# Patient Record
Sex: MALE | Race: WHITE | NOT HISPANIC OR LATINO | Employment: OTHER | URBAN - METROPOLITAN AREA
[De-identification: names, ages, dates, MRNs, and addresses within clinical notes are randomized per-mention and may not be internally consistent; named-entity substitution may affect disease eponyms.]

---

## 2021-04-27 ENCOUNTER — TRANSCRIBE ORDERS (OUTPATIENT)
Dept: ADMINISTRATIVE | Facility: HOSPITAL | Age: 86
End: 2021-04-27

## 2021-04-27 DIAGNOSIS — C61 MALIGNANT NEOPLASM OF PROSTATE (HCC): ICD-10-CM

## 2021-04-27 DIAGNOSIS — R97.20 INCREASED PROSTATE SPECIFIC ANTIGEN (PSA) VELOCITY: Primary | ICD-10-CM

## 2021-05-11 ENCOUNTER — APPOINTMENT (OUTPATIENT)
Dept: PET IMAGING | Facility: HOSPITAL | Age: 86
End: 2021-05-11

## 2021-06-17 ENCOUNTER — HOSPITAL ENCOUNTER (OUTPATIENT)
Dept: PET IMAGING | Facility: HOSPITAL | Age: 86
Discharge: HOME | End: 2021-06-17
Admitting: UROLOGY

## 2021-06-17 DIAGNOSIS — C61 MALIGNANT NEOPLASM OF PROSTATE (HCC): ICD-10-CM

## 2021-06-17 DIAGNOSIS — R97.20 INCREASED PROSTATE SPECIFIC ANTIGEN (PSA) VELOCITY: ICD-10-CM

## 2021-06-17 PROCEDURE — 78815 PET IMAGE W/CT SKULL-THIGH: CPT

## 2021-06-17 PROCEDURE — A9588 FLUCICLOVINE F-18: HCPCS | Performed by: UROLOGY

## 2021-06-17 PROCEDURE — 0 FLUCICLOVINE: Performed by: UROLOGY

## 2021-06-17 RX ADMIN — FLUCICLOVINE F-18 1 DOSE: 221 INJECTION, SOLUTION INTRAVENOUS at 12:11

## 2021-07-20 ENCOUNTER — HOSPITAL ENCOUNTER (EMERGENCY)
Facility: HOSPITAL | Age: 86
Discharge: HOME | End: 2021-07-20
Attending: EMERGENCY MEDICINE | Admitting: EMERGENCY MEDICINE

## 2021-07-20 VITALS
SYSTOLIC BLOOD PRESSURE: 153 MMHG | HEART RATE: 61 BPM | TEMPERATURE: 98.5 F | RESPIRATION RATE: 18 BRPM | DIASTOLIC BLOOD PRESSURE: 68 MMHG | OXYGEN SATURATION: 96 %

## 2021-07-20 DIAGNOSIS — L89.152 PRESSURE INJURY OF SACRAL REGION, STAGE 2 (HCC): Primary | ICD-10-CM

## 2021-07-20 LAB
ABO GROUP BLD: NORMAL
ALBUMIN SERPL-MCNC: 4.4 G/DL (ref 3.5–5.2)
ALBUMIN/GLOB SERPL: 1.4 G/DL
ALP SERPL-CCNC: 80 U/L (ref 39–117)
ALT SERPL W P-5'-P-CCNC: 16 U/L (ref 1–41)
ANION GAP SERPL CALCULATED.3IONS-SCNC: 10.1 MMOL/L (ref 5–15)
AST SERPL-CCNC: 22 U/L (ref 1–40)
BASOPHILS # BLD AUTO: 0.05 10*3/MM3 (ref 0–0.2)
BASOPHILS NFR BLD AUTO: 0.8 % (ref 0–1.5)
BILIRUB SERPL-MCNC: 0.8 MG/DL (ref 0–1.2)
BLD GP AB SCN SERPL QL: NEGATIVE
BUN SERPL-MCNC: 21 MG/DL (ref 8–23)
BUN/CREAT SERPL: 18.4 (ref 7–25)
CALCIUM SPEC-SCNC: 9.3 MG/DL (ref 8.2–9.6)
CHLORIDE SERPL-SCNC: 103 MMOL/L (ref 98–107)
CO2 SERPL-SCNC: 23.9 MMOL/L (ref 22–29)
CREAT SERPL-MCNC: 1.14 MG/DL (ref 0.76–1.27)
DEPRECATED RDW RBC AUTO: 48.2 FL (ref 37–54)
DEVELOPER EXPIRATION DATE: NORMAL
DEVELOPER LOT NUMBER: 174
EOSINOPHIL # BLD AUTO: 0.1 10*3/MM3 (ref 0–0.4)
EOSINOPHIL NFR BLD AUTO: 1.6 % (ref 0.3–6.2)
ERYTHROCYTE [DISTWIDTH] IN BLOOD BY AUTOMATED COUNT: 14.2 % (ref 12.3–15.4)
EXPIRATION DATE: NORMAL
FECAL OCCULT BLOOD SCREEN, POC: NEGATIVE
GFR SERPL CREATININE-BSD FRML MDRD: 60 ML/MIN/1.73
GLOBULIN UR ELPH-MCNC: 3.1 GM/DL
GLUCOSE SERPL-MCNC: 103 MG/DL (ref 65–99)
HCT VFR BLD AUTO: 38.9 % (ref 37.5–51)
HGB BLD-MCNC: 13.2 G/DL (ref 13–17.7)
IMM GRANULOCYTES # BLD AUTO: 0.03 10*3/MM3 (ref 0–0.05)
IMM GRANULOCYTES NFR BLD AUTO: 0.5 % (ref 0–0.5)
INR PPP: 1.89 (ref 0.9–1.1)
LYMPHOCYTES # BLD AUTO: 1.6 10*3/MM3 (ref 0.7–3.1)
LYMPHOCYTES NFR BLD AUTO: 26 % (ref 19.6–45.3)
Lab: 174
MCH RBC QN AUTO: 31.7 PG (ref 26.6–33)
MCHC RBC AUTO-ENTMCNC: 33.9 G/DL (ref 31.5–35.7)
MCV RBC AUTO: 93.5 FL (ref 79–97)
MONOCYTES # BLD AUTO: 0.34 10*3/MM3 (ref 0.1–0.9)
MONOCYTES NFR BLD AUTO: 5.5 % (ref 5–12)
NEGATIVE CONTROL: NEGATIVE
NEUTROPHILS NFR BLD AUTO: 4.03 10*3/MM3 (ref 1.7–7)
NEUTROPHILS NFR BLD AUTO: 65.6 % (ref 42.7–76)
NRBC BLD AUTO-RTO: 0 /100 WBC (ref 0–0.2)
PLAT MORPH BLD: NORMAL
PLATELET # BLD AUTO: 118 10*3/MM3 (ref 140–450)
PMV BLD AUTO: 11.5 FL (ref 6–12)
POSITIVE CONTROL: POSITIVE
POTASSIUM SERPL-SCNC: 4.6 MMOL/L (ref 3.5–5.2)
PROT SERPL-MCNC: 7.5 G/DL (ref 6–8.5)
PROTHROMBIN TIME: 21.4 SECONDS (ref 11.7–14.2)
RBC # BLD AUTO: 4.16 10*6/MM3 (ref 4.14–5.8)
RBC MORPH BLD: NORMAL
RH BLD: POSITIVE
SODIUM SERPL-SCNC: 137 MMOL/L (ref 136–145)
T&S EXPIRATION DATE: NORMAL
WBC # BLD AUTO: 6.15 10*3/MM3 (ref 3.4–10.8)
WBC MORPH BLD: NORMAL

## 2021-07-20 PROCEDURE — 85025 COMPLETE CBC W/AUTO DIFF WBC: CPT | Performed by: EMERGENCY MEDICINE

## 2021-07-20 PROCEDURE — 87186 SC STD MICRODIL/AGAR DIL: CPT | Performed by: EMERGENCY MEDICINE

## 2021-07-20 PROCEDURE — 87077 CULTURE AEROBIC IDENTIFY: CPT | Performed by: EMERGENCY MEDICINE

## 2021-07-20 PROCEDURE — 80053 COMPREHEN METABOLIC PANEL: CPT | Performed by: EMERGENCY MEDICINE

## 2021-07-20 PROCEDURE — 85610 PROTHROMBIN TIME: CPT | Performed by: EMERGENCY MEDICINE

## 2021-07-20 PROCEDURE — 99283 EMERGENCY DEPT VISIT LOW MDM: CPT

## 2021-07-20 PROCEDURE — 86900 BLOOD TYPING SEROLOGIC ABO: CPT | Performed by: EMERGENCY MEDICINE

## 2021-07-20 PROCEDURE — 87070 CULTURE OTHR SPECIMN AEROBIC: CPT | Performed by: EMERGENCY MEDICINE

## 2021-07-20 PROCEDURE — 85007 BL SMEAR W/DIFF WBC COUNT: CPT | Performed by: EMERGENCY MEDICINE

## 2021-07-20 PROCEDURE — 86850 RBC ANTIBODY SCREEN: CPT | Performed by: EMERGENCY MEDICINE

## 2021-07-20 PROCEDURE — 87205 SMEAR GRAM STAIN: CPT | Performed by: EMERGENCY MEDICINE

## 2021-07-20 PROCEDURE — 82270 OCCULT BLOOD FECES: CPT | Performed by: EMERGENCY MEDICINE

## 2021-07-20 PROCEDURE — 86901 BLOOD TYPING SEROLOGIC RH(D): CPT | Performed by: EMERGENCY MEDICINE

## 2021-07-20 RX ORDER — CEPHALEXIN 500 MG/1
500 CAPSULE ORAL 3 TIMES DAILY
Qty: 21 CAPSULE | Refills: 0 | Status: ON HOLD | OUTPATIENT
Start: 2021-07-20 | End: 2022-04-14

## 2021-07-20 NOTE — DISCHARGE INSTRUCTIONS
Follow-up with the wound care center.  Call today for an appointment.  Sit on several pillows and try to rotate your bottom every 20 to 30 minutes.  Please return to the emergency department if symptoms worsen.

## 2021-07-20 NOTE — ED PROVIDER NOTES
EMERGENCY DEPARTMENT ENCOUNTER  Patient was placed in face mask in first look and the following protective measures were taken unless additional measures were taken and documented below in the ED course. Patient was wearing facemask when I entered the room and throughout our encounter. I wore full protective equipment throughout this patient encounter including a face mask, and gloves. Hand hygiene was performed before donning protective equipment and after removal when leaving the room.    Room Number:  17/17  Date of encounter:  7/20/2021  PCP: Conor Prince MD    HPI:  Context: Bridger Elias is a 89 y.o. male who presents to the ED c/o chief complaint of rectal pain when he is sitting for approximately 2 and half weeks.  Today, while he was sitting down to urinate, he noticed a large amount of bright red rectal bleeding.  He denies abdominal pain, dizziness or lightheadedness.  He denies increasing shortness of air today.  Patient is on Coumadin for atrial fibrillation and he has been compliant with medication.  He states this is never happened before.  He does have a history of a rectal fistula repair approximately 25 years ago.  His family states he also has a history of prostate cancer but that is under control.  Patient denies tobacco or ethanol use.  He denies fever, chills, nausea or vomiting.    MEDICAL HISTORY REVIEW  Reviewed in EPIC    PAST MEDICAL HISTORY  Active Ambulatory Problems     Diagnosis Date Noted   • No Active Ambulatory Problems     Resolved Ambulatory Problems     Diagnosis Date Noted   • No Resolved Ambulatory Problems     No Additional Past Medical History       PAST SURGICAL HISTORY  No past surgical history on file.    FAMILY HISTORY  No family history on file.    SOCIAL HISTORY  Social History     Socioeconomic History   • Marital status:      Spouse name: Not on file   • Number of children: Not on file   • Years of education: Not on file   • Highest education level:  Not on file       ALLERGIES  Patient has no known allergies.    The patient's allergies have been reviewed    REVIEW OF SYSTEMS  All systems reviewed and negative except for those discussed in HPI.     PHYSICAL EXAM  I have reviewed the triage vital signs and nursing notes.  ED Triage Vitals [07/20/21 0649]   Temp Heart Rate Resp BP SpO2   98.5 °F (36.9 °C) 83 18 131/80 98 %      Temp src Heart Rate Source Patient Position BP Location FiO2 (%)   Tympanic -- -- -- --       General: Mild distress.  HENT: NCAT, PERRL, Nares patent.  Eyes: no scleral icterus.  Conjunctiva is not pale.  Neck: trachea midline, no ROM limitations.  CV: regular rhythm, regular rate.  Respiratory: Irregularly irregular without murmur.  Abdomen: soft, nondistended, NTTP, no rebound tenderness, no guarding or rigidity  : Patient has two 1 cm x 2 cm, stage II sacral decubiti.  It has white discharge.  There is no surrounding erythema but it is tender to palpation.  His rectum is mildly tender to palpation with brown stool that is heme-negative.  Musculoskeletal: no deformity.  Neuro: alert, moves all extremities, follows commands.  Skin: warm, dry.    LAB RESULTS  Recent Results (from the past 24 hour(s))   POCT Occult Blood, stool    Collection Time: 07/20/21 11:15 AM    Specimen: Per Rectum; Stool   Result Value Ref Range    Fecal Occult Blood Negative Negative    Lot Number [id number]     Expiration Date 04/30/2022     DEVELOPER LOT NUMBER [id number]     DEVELOPER EXPIRATION DATE 04/30/2022     Positive Control Positive Positive    Negative Control Negative Negative   Comprehensive Metabolic Panel    Collection Time: 07/20/21 12:01 PM    Specimen: Blood   Result Value Ref Range    Glucose 103 (H) 65 - 99 mg/dL    BUN 21 8 - 23 mg/dL    Creatinine 1.14 0.76 - 1.27 mg/dL    Sodium 137 136 - 145 mmol/L    Potassium 4.6 3.5 - 5.2 mmol/L    Chloride 103 98 - 107 mmol/L    CO2 23.9 22.0 - 29.0 mmol/L    Calcium 9.3 8.2 - 9.6 mg/dL    Total Protein 7.5 6.0 -  8.5 g/dL    Albumin 4.40 3.50 - 5.20 g/dL    ALT (SGPT) 16 1 - 41 U/L    AST (SGOT) 22 1 - 40 U/L    Alkaline Phosphatase 80 39 - 117 U/L    Total Bilirubin 0.8 0.0 - 1.2 mg/dL    eGFR Non African Amer 60 (L) >60 mL/min/1.73    Globulin 3.1 gm/dL    A/G Ratio 1.4 g/dL    BUN/Creatinine Ratio 18.4 7.0 - 25.0    Anion Gap 10.1 5.0 - 15.0 mmol/L   Protime-INR    Collection Time: 07/20/21 12:01 PM    Specimen: Blood   Result Value Ref Range    Protime 21.4 (H) 11.7 - 14.2 Seconds    INR 1.89 (H) 0.90 - 1.10   Type & Screen    Collection Time: 07/20/21 12:01 PM    Specimen: Blood   Result Value Ref Range    ABO Type A     RH type Positive     Antibody Screen Negative     T&S Expiration Date 7/23/2021 11:59:59 PM    CBC Auto Differential    Collection Time: 07/20/21 12:01 PM    Specimen: Blood   Result Value Ref Range    WBC 6.15 3.40 - 10.80 10*3/mm3    RBC 4.16 4.14 - 5.80 10*6/mm3    Hemoglobin 13.2 13.0 - 17.7 g/dL    Hematocrit 38.9 37.5 - 51.0 %    MCV 93.5 79.0 - 97.0 fL    MCH 31.7 26.6 - 33.0 pg    MCHC 33.9 31.5 - 35.7 g/dL    RDW 14.2 12.3 - 15.4 %    RDW-SD 48.2 37.0 - 54.0 fl    MPV 11.5 6.0 - 12.0 fL    Platelets 118 (L) 140 - 450 10*3/mm3    Neutrophil % 65.6 42.7 - 76.0 %    Lymphocyte % 26.0 19.6 - 45.3 %    Monocyte % 5.5 5.0 - 12.0 %    Eosinophil % 1.6 0.3 - 6.2 %    Basophil % 0.8 0.0 - 1.5 %    Immature Grans % 0.5 0.0 - 0.5 %    Neutrophils, Absolute 4.03 1.70 - 7.00 10*3/mm3    Lymphocytes, Absolute 1.60 0.70 - 3.10 10*3/mm3    Monocytes, Absolute 0.34 0.10 - 0.90 10*3/mm3    Eosinophils, Absolute 0.10 0.00 - 0.40 10*3/mm3    Basophils, Absolute 0.05 0.00 - 0.20 10*3/mm3    Immature Grans, Absolute 0.03 0.00 - 0.05 10*3/mm3    nRBC 0.0 0.0 - 0.2 /100 WBC   Scan Slide    Collection Time: 07/20/21 12:01 PM    Specimen: Blood   Result Value Ref Range    RBC Morphology Normal Normal    WBC Morphology Normal Normal    Platelet Morphology Normal Normal   Wound Culture - Wound, Spine, Sacral    Collection  Time: 07/20/21 12:05 PM    Specimen: Spine, Sacral; Wound   Result Value Ref Range    Gram Stain No WBCs or organisms seen        I ordered the above labs and reviewed the results.    RADIOLOGY  No Radiology Exams Resulted Within Past 24 Hours    I ordered the above noted radiological studies. I reviewed the images and results. I agree with the radiologist interpretation.    PROCEDURES  Procedures    MEDICATIONS GIVEN IN ER  Medications - No data to display    PROGRESS, DATA ANALYSIS, CONSULTS, AND MEDICAL DECISION MAKING  A complete history and physical exam have been performed.  All available laboratory and imaging results have been reviewed by myself prior to disposition.    MDM  After the initial H&P, I discussed pertinent information from history and physical exam with patient/family.  Discussed differential diagnosis.  Discussed plan for ED evaluation/work-up/treatment.  All questions answered.  Patient/family is agreeable with plan.  ED Course as of Jul 20 1914   Tue Jul 20, 2021   1330 I have updated patient and family on the results of his blood work.  Patient does admit that he sits down for long periods of time.  We will give referral to the wound care center.    [DE]      ED Course User Index  [DE] Jarvis Saenz MD       AS OF 19:14 EDT VITALS:    BP - 153/68  HR - 61  TEMP - 98.5 °F (36.9 °C) (Tympanic)  O2 SATS - 96%    DIAGNOSIS  Final diagnoses:   Pressure injury of sacral region, stage 2 (CMS/HCC)         DISPOSITION    DISCHARGE    Patient discharged in stable condition.    Reviewed implications of results, diagnosis, meds, responsibility to follow up, warning signs and symptoms of possible worsening, potential complications and reasons to return to ER.    Patient/Family voiced understanding of above instructions.    Discussed plan for discharge, as there is no emergent indication for admission. Patient referred to primary care provider for BP management due to today's BP. Pt/family is  agreeable and understands need for follow up and repeat testing.  Pt is aware that discharge does not mean that nothing is wrong but it indicates no emergency is present that requires admission and they must continue care with follow-up as given below or physician of their choice.     FOLLOW-UP  [facility]  [address] [facility] [address] [phone]  Schedule an appointment as soon as possible for a visit            Medication List      New Prescriptions    cephalexin 500 MG capsule  Commonly known as: KEFLEX  Take 1 capsule by mouth 3 (Three) Times a Day.           Where to Get Your Medications      You can get these medications from any pharmacy    Bring a paper prescription for each of these medications  · cephalexin 500 MG capsule          Jarvis Saenz MD  07/20/21 9256

## 2021-07-20 NOTE — ED TRIAGE NOTES
Pt to ed from home with daughter. Per daughter, patient was up urinating this morning when he noticed bright red blood from his rectum. Pt has hx of fistula repair.     I wore full protective equipment throughout this patient encounter including a face mask, goggles, and gloves. Hand hygiene was performed before donning protective equipment and after removal when leaving the room.

## 2021-07-23 LAB
BACTERIA SPEC AEROBE CULT: ABNORMAL
BACTERIA SPEC AEROBE CULT: ABNORMAL
GRAM STN SPEC: ABNORMAL

## 2021-07-28 ENCOUNTER — OFFICE VISIT (OUTPATIENT)
Dept: WOUND CARE | Facility: HOSPITAL | Age: 86
End: 2021-07-28

## 2021-07-28 PROCEDURE — G0463 HOSPITAL OUTPT CLINIC VISIT: HCPCS

## 2021-08-11 ENCOUNTER — OFFICE VISIT (OUTPATIENT)
Dept: WOUND CARE | Facility: HOSPITAL | Age: 86
End: 2021-08-11

## 2021-08-11 PROCEDURE — G0463 HOSPITAL OUTPT CLINIC VISIT: HCPCS

## 2022-02-02 ENCOUNTER — APPOINTMENT (OUTPATIENT)
Dept: RADIATION ONCOLOGY | Facility: HOSPITAL | Age: 87
End: 2022-02-02

## 2022-02-02 ENCOUNTER — CONSULT (OUTPATIENT)
Dept: RADIATION ONCOLOGY | Facility: HOSPITAL | Age: 87
End: 2022-02-02

## 2022-02-02 VITALS
WEIGHT: 165.8 LBS | SYSTOLIC BLOOD PRESSURE: 130 MMHG | DIASTOLIC BLOOD PRESSURE: 81 MMHG | HEART RATE: 81 BPM | OXYGEN SATURATION: 98 %

## 2022-02-02 DIAGNOSIS — C44.320 SCC (SQUAMOUS CELL CARCINOMA), FACE: Primary | ICD-10-CM

## 2022-02-02 PROCEDURE — G0463 HOSPITAL OUTPT CLINIC VISIT: HCPCS | Performed by: RADIOLOGY

## 2022-02-02 PROCEDURE — 99204 OFFICE O/P NEW MOD 45 MIN: CPT | Performed by: RADIOLOGY

## 2022-02-02 RX ORDER — BICALUTAMIDE 50 MG/1
1 TABLET, FILM COATED ORAL DAILY
Status: ON HOLD | COMMUNITY

## 2022-02-02 RX ORDER — CARVEDILOL 3.12 MG/1
1 TABLET ORAL 2 TIMES DAILY
COMMUNITY
End: 2022-04-22 | Stop reason: HOSPADM

## 2022-02-02 RX ORDER — WARFARIN SODIUM 5 MG/1
5 TABLET ORAL
COMMUNITY
End: 2022-04-22 | Stop reason: HOSPADM

## 2022-02-02 RX ORDER — LATANOPROST 50 UG/ML
SOLUTION/ DROPS OPHTHALMIC
Status: ON HOLD | COMMUNITY

## 2022-02-02 RX ORDER — ROSUVASTATIN CALCIUM 10 MG/1
1 TABLET, COATED ORAL DAILY
Status: ON HOLD | COMMUNITY

## 2022-02-02 RX ORDER — LEVOTHYROXINE SODIUM 88 UG/1
88 TABLET ORAL DAILY
Status: ON HOLD | COMMUNITY

## 2022-02-02 RX ORDER — LOSARTAN POTASSIUM 50 MG/1
1 TABLET ORAL DAILY
COMMUNITY
End: 2022-04-22 | Stop reason: HOSPADM

## 2022-02-03 NOTE — PROGRESS NOTES
"Radiation Oncology Consult Note    Name: Bridger Elias  YOB: 1930  MRN #: [id number]  Date of Service: 2/2/2022  Referring Provider: Maurice Cook MD  [address] [place] [address]  Primary Care Provider: Conor Prince MD    DIAGNOSIS:   Encounter Diagnosis   Name Primary?   • SCC (squamous cell carcinoma), face Yes       REASON FOR CONSULTATION/CHIEF COMPLAINT:  \"skin cancers\"  I was asked to see the patient at the request of the referring provider noted below for advice and recommendations regarding this diagnosis and the role of radiation therapy.                              REQUESTING PHYSICIAN:  Maurice Cook Md  [address] [place] [address]    RECORDS OBTAINED:  Records of the patients history including those obtained from the referring provider were reviewed and summarized in detail.    HISTORY OF PRESENT ILLNESS:  Bridger Elias is a 89 y.o. male, accompanied today by his daughter, who reports about a 1 year 6 month history of \"r cheek and L temple\" skin cancers.  His daughter states that Mr. Elias felt that hydrocortisone was helping them for a while.  He noted they were slightly enlarging and that the right preauricular lesion was bleeding and sometimes painful. Additionally, he noted a \"burning/tingling\" sensation from the right preauricular lesion down to the mandible and he could feel a sensation in his teeth when symptomatic.     He is not immunocompromised or organ transplant recipient.      He recently established with Dr. Anshul Roca in regards to the 3 lesions and he was referred to Dr. Quinn for consideration of MOHs.      Lesion 1: SCC located on the right PA cheek, present for 18 months; associated bleeding, enlarging, painful and red.  Biopsied 12/20/21; not treated previously---determined to not be a good candidate for MOHs, concern for deep involvement and bleeding risk,irregular borders.    Lesion 2: SCC on Rt #2, called lower " right temple.     Lesion 3: SCC on Lt temple/lateral forehead.    He denies any facial asymmetry, discrete numbness.  He denies any neck lymph nodes.        The following portions of the patient's history were reviewed and updated as appropriate: allergies, current medications, past family history, past medical history, past social history, past surgical history and problem list. Reviewed with the patient and remain unchanged.    PAST MEDICAL HISTORY:  he  has a past medical history of Prostate cancer (HCC). Currently on ADT. Elevated blood pressure, glaucoma.  MEDICATIONS:   Current Outpatient Medications:   •  bicalutamide (CASODEX) 50 MG chemo tablet, Take 1 tablet by mouth Daily., Disp: , Rfl:   •  carvedilol (COREG) 3.125 MG tablet, Take 1 tablet by mouth 2 (Two) Times a Day., Disp: , Rfl:   •  cephalexin (KEFLEX) 500 MG capsule, Take 1 capsule by mouth 3 (Three) Times a Day., Disp: 21 capsule, Rfl: 0  •  latanoprost (XALATAN) 0.005 % ophthalmic solution, Apply  to eye(s) as directed by provider., Disp: , Rfl:   •  levothyroxine (SYNTHROID, LEVOTHROID) 75 MCG tablet, Take 75 mcg by mouth Daily., Disp: , Rfl:   •  losartan (COZAAR) 50 MG tablet, Take 1 tablet by mouth Daily., Disp: , Rfl:   •  rosuvastatin (CRESTOR) 10 MG tablet, Take 1 tablet by mouth Daily., Disp: , Rfl:   •  warfarin (COUMADIN) 5 MG tablet, Take 5 mg by mouth., Disp: , Rfl:   ALLERGIES: No Known Allergies  PAST SURGICAL HISTORY: he has a past surgical history that includes Pacemaker Implantation (2018).Prostatectomy, Mitral valve repair  PREVIOUS RADIOTHERAPY OR CHEMOTHERAPY: no  FAMILY HISTORY: non-contributory.  No connective tissue disorders.  SOCIAL HISTORY: he  reports that he has never smoked. He has never used smokeless tobacco. He reports that he does not drink alcohol.  PAIN AND PAIN MANAGEMENT: retired.  Vitals:    02/02/22 1554   BP: 130/81   Pulse: 81   SpO2: 98%   Weight: 75.2 kg (165 lb 12.8 oz)   PainSc:   2     NUTRITIONAL  STATUS:  no issues  KPS: 90:  Minor signs or symptoms        PHQ-9 Total Score: 0     Review of Systems: Alerts--pacemaker and blood thinners  As noted above, some itching.  General: No fevers, chills, weight change, or drenching night sweats. Skin: No rashes or jaundice.  HEENT: No change in vision or hearing, no headaches.  Neck: No dysphagia or masses.  Heme/Lymph: No easy bruising or bleeding.  Respiratory System: No shortness of breath or cough.  Cardiovascular: No chest pain, palpitations, or dyspnea on exertion.  - Pacemaker. GI: No nausea, vomiting, diarrhea, melena, or hematochezia.  : No dysuria or hematuria.  Endocrine: No heat or cold intolerance. Musculoskeletal: No myalgias or arthralgias.  Neuro: No weakness, numbness, syncope, or seizures. Psych: No mood changes or depression. Ext: Denies swelling.        Objective     Vitals:  Vitals:    02/02/22 1554   BP: 130/81   Pulse: 81   SpO2: 98%   Weight: 75.2 kg (165 lb 12.8 oz)   PainSc:   2         PHYSICAL EXAM:  GENERAL: in no apparent distress, sitting comfortably in room.    HEENT: normocephalic, atraumatic. Pupils are equal, round, reactive to light. Sclera anicteric. Conjunctiva not injected. Oropharynx without erythema, ulcerations or thrush.   NECK: Supple with no masses.  LYMPHATIC: no cervical, supraclavicular or axillary adenopathy appreciated bilaterally.   CARDIOVASCULAR: S1 & S2 detected; no murmurs, rubs or gallops.  CHEST: clear to auscultation bilaterally; no wheezes, crackles or rubs. Work of breathing normal.  ABDOMEN: bowel sounds present. Abdomen is soft, nontender, nondistended.   MUSCULOSKELETAL: no tenderness to palpation along the spine or scapulae. Normal range of motion.  EXTREMITIES: no clubbing, cyanosis, edema.  SKIN: See photos, no obvious cranial nerve deficits from the heaped edged,deeper R PA SCC.  3 total lesions. Photos in ARIA.  NEUROLOGIC: cranial nerves II-XII grossly intact bilaterally. No focal neurologic  deficits.  PSYCHIATRIC:  alert, aware, and appropriate.    PERTINENT IMAGING/PATHOLOGY/LABS (Medical Decision Making):     COORDINATION OF CARE: A copy of this note is sent to the referring provider.    PATHOLOGY (Reviewed): A: Right preauricular: Invasive squamous cell carcinoma, extending to the deep and peripheral edges  B. Right temple: Invasive, moderately differentiated squamous cell carcinoma, extending to the deep and peripheral edges.    C. Left lateral FH:  Invasive squamous cell carcinoma arising in association with a hypertrophic actinic keratosis, extending to deep and peripheral edges.    IMAGING (Reviewed): No imaging or CT.    LABS (Reviewed):  Hematology WBC   Date Value Ref Range Status   07/20/2021 6.15 3.40 - 10.80 10*3/mm3 Final     RBC   Date Value Ref Range Status   07/20/2021 4.16 4.14 - 5.80 10*6/mm3 Final     Hemoglobin   Date Value Ref Range Status   07/20/2021 13.2 13.0 - 17.7 g/dL Final     Hematocrit   Date Value Ref Range Status   07/20/2021 38.9 37.5 - 51.0 % Final     Platelets   Date Value Ref Range Status   07/20/2021 118 (L) 140 - 450 10*3/mm3 Final      Chemistry   Lab Results   Component Value Date    GLUCOSE 103 (H) 07/20/2021    BUN 21 07/20/2021    CREATININE 1.14 07/20/2021    EGFRIFNONA 60 (L) 07/20/2021    BCR 18.4 07/20/2021    K 4.6 07/20/2021    CO2 23.9 07/20/2021    CALCIUM 9.3 07/20/2021    ALBUMIN 4.40 07/20/2021    AST 22 07/20/2021    ALT 16 07/20/2021       Assessment/Plan     ASSESSMENT AND PLAN:  1. SCC (squamous cell carcinoma), face       3 lesions.  All are appropriate for definitive XRT.  Dr. Quinn saw for MOHs consideration and not recommend as depth of invasion/pni big risk including risk of irregular borders and blood thinners.    He did report a burning/tingling on the right face. No facial asymmetry.  I feel that staging is needed for the Right neck and parotid area.  I am ordering CT neck and will do on the same day as his CT simulation for radiation  planning.    Final dosing/planning will be determined after the diagnostic scan.    Orders Placed This Encounter   Procedures   • CT Soft Tissue Neck With Contrast     Standing Status:   Future     Standing Expiration Date:   2/2/2023     Scheduling Instructions:      Same day as CT simulation for radiation therapy; same set-up     Order Specific Question:   Release to patient     Answer:   Immediate       This assessment comes from my review of the imaging, pathology, physician notes and other pertinent information as mentioned.    DISPOSITION: CT sim and CT neck.        TIME SPENT WITH PATIENT:   I spent 45 minutes caring for Bridger on this date of service. This time includes time spent by me in the following activities: preparing for the visit, reviewing tests, obtaining and/or reviewing a separately obtained history, performing a medically appropriate examination and/or evaluation, ordering medications, tests, or procedures, documenting information in the medical record and care coordination           CC: Anshul Roca MD Weaver, Anthony D, MD Robert Zax, MD John A Cox, MD  2/2/2022  8:35 PM EST

## 2022-02-07 ENCOUNTER — HOSPITAL ENCOUNTER (OUTPATIENT)
Dept: PET IMAGING | Facility: HOSPITAL | Age: 87
Discharge: HOME | End: 2022-02-07
Admitting: RADIOLOGY

## 2022-02-07 DIAGNOSIS — C44.320 SCC (SQUAMOUS CELL CARCINOMA), FACE: ICD-10-CM

## 2022-02-07 LAB — CREAT BLDA-MCNC: 1.2 MG/DL (ref 0.6–1.3)

## 2022-02-07 PROCEDURE — 77334 RADIATION TREATMENT AID(S): CPT | Performed by: RADIOLOGY

## 2022-02-07 PROCEDURE — 77263 THER RADIOLOGY TX PLNG CPLX: CPT | Performed by: RADIOLOGY

## 2022-02-07 PROCEDURE — 70491 CT SOFT TISSUE NECK W/DYE: CPT

## 2022-02-07 PROCEDURE — 25010000002 IOPAMIDOL 61 % SOLUTION: Performed by: RADIOLOGY

## 2022-02-07 PROCEDURE — 82565 ASSAY OF CREATININE: CPT

## 2022-02-07 RX ADMIN — IOPAMIDOL 75 ML: 612 INJECTION, SOLUTION INTRAVENOUS at 14:49

## 2022-02-15 ENCOUNTER — TELEPHONE (OUTPATIENT)
Dept: RADIATION ONCOLOGY | Facility: HOSPITAL | Age: 87
End: 2022-02-15

## 2022-02-15 DIAGNOSIS — C44.320 SCC (SQUAMOUS CELL CARCINOMA), FACE: Primary | ICD-10-CM

## 2022-02-15 NOTE — TELEPHONE ENCOUNTER
Spoke to patients daughter about CT results and need for MRI of face & neck. Patient is willing to have MRI performed. Entered order and calling scheduling to schedule. Will call daughter back with time and place.

## 2022-03-01 ENCOUNTER — APPOINTMENT (OUTPATIENT)
Dept: MRI IMAGING | Facility: HOSPITAL | Age: 87
End: 2022-03-01

## 2022-03-01 ENCOUNTER — APPOINTMENT (OUTPATIENT)
Dept: RADIATION ONCOLOGY | Facility: HOSPITAL | Age: 87
End: 2022-03-01

## 2022-03-03 ENCOUNTER — HOSPITAL ENCOUNTER (OUTPATIENT)
Dept: MRI IMAGING | Facility: HOSPITAL | Age: 87
Discharge: HOME | End: 2022-03-03
Admitting: RADIOLOGY

## 2022-03-03 VITALS
TEMPERATURE: 96.8 F | RESPIRATION RATE: 16 BRPM | OXYGEN SATURATION: 97 % | SYSTOLIC BLOOD PRESSURE: 124 MMHG | HEIGHT: 67 IN | BODY MASS INDEX: 25.9 KG/M2 | HEART RATE: 62 BPM | DIASTOLIC BLOOD PRESSURE: 81 MMHG | WEIGHT: 165 LBS

## 2022-03-03 DIAGNOSIS — C44.320 SCC (SQUAMOUS CELL CARCINOMA), FACE: ICD-10-CM

## 2022-03-03 LAB — CREAT BLDA-MCNC: 1.5 MG/DL (ref 0.6–1.3)

## 2022-03-03 PROCEDURE — A9577 INJ MULTIHANCE: HCPCS | Performed by: RADIOLOGY

## 2022-03-03 PROCEDURE — 0 GADOBENATE DIMEGLUMINE 529 MG/ML SOLUTION: Performed by: RADIOLOGY

## 2022-03-03 PROCEDURE — 70543 MRI ORBT/FAC/NCK W/O &W/DYE: CPT

## 2022-03-03 PROCEDURE — 82565 ASSAY OF CREATININE: CPT

## 2022-03-03 RX ADMIN — GADOBENATE DIMEGLUMINE 15 ML: 529 INJECTION, SOLUTION INTRAVENOUS at 12:48

## 2022-03-03 NOTE — NURSING NOTE
Patient arrived in xray triage for an MRI with pacemaker. Protective goggles and mask in place with all patient interactions today.

## 2022-03-10 ENCOUNTER — APPOINTMENT (OUTPATIENT)
Dept: MRI IMAGING | Facility: HOSPITAL | Age: 87
End: 2022-03-10

## 2022-03-11 PROCEDURE — 77338 DESIGN MLC DEVICE FOR IMRT: CPT | Performed by: RADIOLOGY

## 2022-03-11 PROCEDURE — 77301 RADIOTHERAPY DOSE PLAN IMRT: CPT | Performed by: RADIOLOGY

## 2022-03-11 PROCEDURE — 77300 RADIATION THERAPY DOSE PLAN: CPT | Performed by: RADIOLOGY

## 2022-03-15 ENCOUNTER — APPOINTMENT (OUTPATIENT)
Dept: RADIATION ONCOLOGY | Facility: HOSPITAL | Age: 87
End: 2022-03-15

## 2022-03-15 PROCEDURE — 77427 RADIATION TX MANAGEMENT X5: CPT | Performed by: RADIOLOGY

## 2022-03-15 PROCEDURE — 77386: CPT | Performed by: RADIOLOGY

## 2022-03-15 PROCEDURE — 77370 RADIATION PHYSICS CONSULT: CPT | Performed by: RADIOLOGY

## 2022-03-15 PROCEDURE — 77014 CHG CT GUIDANCE RADIATION THERAPY FLDS PLACEMENT: CPT | Performed by: RADIOLOGY

## 2022-03-15 PROCEDURE — 77386 CHG INTENSITY MODULATED RADIATION TX DLVR COMPLEX: CPT | Performed by: RADIOLOGY

## 2022-03-16 ENCOUNTER — APPOINTMENT (OUTPATIENT)
Dept: RADIATION ONCOLOGY | Facility: HOSPITAL | Age: 87
End: 2022-03-16

## 2022-03-16 PROCEDURE — 77386 CHG INTENSITY MODULATED RADIATION TX DLVR COMPLEX: CPT | Performed by: RADIOLOGY

## 2022-03-16 PROCEDURE — 77014 CHG CT GUIDANCE RADIATION THERAPY FLDS PLACEMENT: CPT | Performed by: RADIOLOGY

## 2022-03-16 PROCEDURE — 77386: CPT | Performed by: RADIOLOGY

## 2022-03-17 ENCOUNTER — APPOINTMENT (OUTPATIENT)
Dept: RADIATION ONCOLOGY | Facility: HOSPITAL | Age: 87
End: 2022-03-17

## 2022-03-17 ENCOUNTER — RADIATION ONCOLOGY WEEKLY ASSESSMENT (OUTPATIENT)
Dept: RADIATION ONCOLOGY | Facility: HOSPITAL | Age: 87
End: 2022-03-17

## 2022-03-17 VITALS
DIASTOLIC BLOOD PRESSURE: 53 MMHG | SYSTOLIC BLOOD PRESSURE: 105 MMHG | BODY MASS INDEX: 25.28 KG/M2 | WEIGHT: 161.4 LBS | HEART RATE: 66 BPM | OXYGEN SATURATION: 97 %

## 2022-03-17 DIAGNOSIS — C44.320 SCC (SQUAMOUS CELL CARCINOMA), FACE: Primary | ICD-10-CM

## 2022-03-17 PROCEDURE — FACE2FACE: Performed by: RADIOLOGY

## 2022-03-17 PROCEDURE — 77386: CPT | Performed by: RADIOLOGY

## 2022-03-17 PROCEDURE — 77386 CHG INTENSITY MODULATED RADIATION TX DLVR COMPLEX: CPT | Performed by: RADIOLOGY

## 2022-03-17 PROCEDURE — 77014 CHG CT GUIDANCE RADIATION THERAPY FLDS PLACEMENT: CPT | Performed by: RADIOLOGY

## 2022-03-18 ENCOUNTER — APPOINTMENT (OUTPATIENT)
Dept: RADIATION ONCOLOGY | Facility: HOSPITAL | Age: 87
End: 2022-03-18

## 2022-03-18 PROCEDURE — 77014 CHG CT GUIDANCE RADIATION THERAPY FLDS PLACEMENT: CPT | Performed by: RADIOLOGY

## 2022-03-18 PROCEDURE — 77386: CPT | Performed by: RADIOLOGY

## 2022-03-18 PROCEDURE — 77386 CHG INTENSITY MODULATED RADIATION TX DLVR COMPLEX: CPT | Performed by: RADIOLOGY

## 2022-03-21 ENCOUNTER — APPOINTMENT (OUTPATIENT)
Dept: RADIATION ONCOLOGY | Facility: HOSPITAL | Age: 87
End: 2022-03-21

## 2022-03-21 PROCEDURE — 77386 CHG INTENSITY MODULATED RADIATION TX DLVR COMPLEX: CPT | Performed by: RADIOLOGY

## 2022-03-21 PROCEDURE — 77386: CPT | Performed by: RADIOLOGY

## 2022-03-21 PROCEDURE — 77014 CHG CT GUIDANCE RADIATION THERAPY FLDS PLACEMENT: CPT | Performed by: RADIOLOGY

## 2022-03-22 ENCOUNTER — APPOINTMENT (OUTPATIENT)
Dept: RADIATION ONCOLOGY | Facility: HOSPITAL | Age: 87
End: 2022-03-22

## 2022-03-22 PROCEDURE — 77427 RADIATION TX MANAGEMENT X5: CPT | Performed by: RADIOLOGY

## 2022-03-22 PROCEDURE — 77386: CPT | Performed by: RADIOLOGY

## 2022-03-22 PROCEDURE — 77014 CHG CT GUIDANCE RADIATION THERAPY FLDS PLACEMENT: CPT | Performed by: RADIOLOGY

## 2022-03-22 PROCEDURE — 77336 RADIATION PHYSICS CONSULT: CPT | Performed by: RADIOLOGY

## 2022-03-23 ENCOUNTER — APPOINTMENT (OUTPATIENT)
Dept: RADIATION ONCOLOGY | Facility: HOSPITAL | Age: 87
End: 2022-03-23

## 2022-03-23 PROCEDURE — 77014 CHG CT GUIDANCE RADIATION THERAPY FLDS PLACEMENT: CPT | Performed by: RADIOLOGY

## 2022-03-23 PROCEDURE — 77386: CPT | Performed by: RADIOLOGY

## 2022-03-24 ENCOUNTER — APPOINTMENT (OUTPATIENT)
Dept: RADIATION ONCOLOGY | Facility: HOSPITAL | Age: 87
End: 2022-03-24

## 2022-03-24 ENCOUNTER — RADIATION ONCOLOGY WEEKLY ASSESSMENT (OUTPATIENT)
Dept: RADIATION ONCOLOGY | Facility: HOSPITAL | Age: 87
End: 2022-03-24

## 2022-03-24 VITALS
BODY MASS INDEX: 26.88 KG/M2 | WEIGHT: 171.6 LBS | OXYGEN SATURATION: 99 % | DIASTOLIC BLOOD PRESSURE: 99 MMHG | SYSTOLIC BLOOD PRESSURE: 167 MMHG | HEART RATE: 63 BPM

## 2022-03-24 DIAGNOSIS — C44.320 SCC (SQUAMOUS CELL CARCINOMA), FACE: Primary | ICD-10-CM

## 2022-03-24 PROCEDURE — 77014 CHG CT GUIDANCE RADIATION THERAPY FLDS PLACEMENT: CPT | Performed by: RADIOLOGY

## 2022-03-24 PROCEDURE — 77386: CPT | Performed by: RADIOLOGY

## 2022-03-25 ENCOUNTER — APPOINTMENT (OUTPATIENT)
Dept: RADIATION ONCOLOGY | Facility: HOSPITAL | Age: 87
End: 2022-03-25

## 2022-03-25 PROCEDURE — 77386: CPT | Performed by: RADIOLOGY

## 2022-03-25 PROCEDURE — 77014 CHG CT GUIDANCE RADIATION THERAPY FLDS PLACEMENT: CPT | Performed by: RADIOLOGY

## 2022-03-25 NOTE — PROGRESS NOTES
Patient Name: Bridger Elias Date: 3/24/2022       : 1930        MRN #: [id number] Diagnosis:   Encounter Diagnosis   Name Primary?   • SCC (squamous cell carcinoma), face Yes                     RADIATION ON TREATMENT VISIT NOTE - GENERAL     Treatment Summary     Treatment Site Ref. ID Energy Dose/Fx (cGy) #Fx Dose Correction (cGy) Total Dose (cGy) Start Date End Date Elapsed Days   RT FACE RXPT:R FACE 6X 200 8 / 33 0 1,600 3/15/2022 3/24/2022 9     Treatment Site Ref. ID Energy Dose/Fx (cGy) #Fx Dose Correction (cGy) Total Dose (cGy) Start Date End Date Elapsed Days   L FACEMixed e RXPT: L FACE 6E/9E 200  / 33 0 1,400 3/16/2022 3/24/2022 8           General:           Review of Systems    [x] No new complaints [] Cough [] Dysphagia  [] Bowel changes [] Fever/chills [] Nausea/vomiting  [] Skin itching/soreness/breakdown  [] Fatigue,  severity: ---------------- [] Pain,  severity: ----------------, location:       Nausea regimen: NONE   Pain medication regimen: NONE   Bowel regimen: NONE   Skin regimen: Aquaphor     Comments/Notes:  Slight drainage/size change  No pain  Tolerating therapy well.  Denies dry mouth or taste changes.    KPS: 90%       Vital Signs: /99   Pulse 63   Wt 77.8 kg (171 lb 9.6 oz)   SpO2 99%   BMI 26.88 kg/m²     Weight:   Wt Readings from Last 3 Encounters:   22 77.8 kg (171 lb 9.6 oz)   22 73.2 kg (161 lb 6.4 oz)   22 74.8 kg (165 lb)       Medication:   Current Outpatient Medications:   •  bicalutamide (CASODEX) 50 MG chemo tablet, Take 1 tablet by mouth Daily., Disp: , Rfl:   •  carvedilol (COREG) 3.125 MG tablet, Take 1 tablet by mouth 2 (Two) Times a Day., Disp: , Rfl:   •  cephalexin (KEFLEX) 500 MG capsule, Take 1 capsule by mouth 3 (Three) Times a Day., Disp: 21 capsule, Rfl: 0  •  latanoprost (XALATAN) 0.005 % ophthalmic solution, Apply  to eye(s) as directed by provider., Disp: , Rfl:   •  levothyroxine (SYNTHROID, LEVOTHROID) 75 MCG tablet,  Take 75 mcg by mouth Daily., Disp: , Rfl:   •  losartan (COZAAR) 50 MG tablet, Take 1 tablet by mouth Daily., Disp: , Rfl:   •  rosuvastatin (CRESTOR) 10 MG tablet, Take 1 tablet by mouth Daily., Disp: , Rfl:   •  warfarin (COUMADIN) 5 MG tablet, Take 5 mg by mouth., Disp: , Rfl:        LABS (Reviewed):  Hematology WBC   Date Value Ref Range Status   07/20/2021 6.15 3.40 - 10.80 10*3/mm3 Final     RBC   Date Value Ref Range Status   07/20/2021 4.16 4.14 - 5.80 10*6/mm3 Final     Hemoglobin   Date Value Ref Range Status   07/20/2021 13.2 13.0 - 17.7 g/dL Final     Hematocrit   Date Value Ref Range Status   07/20/2021 38.9 37.5 - 51.0 % Final     Platelets   Date Value Ref Range Status   07/20/2021 118 (L) 140 - 450 10*3/mm3 Final      Chemistry   Lab Results   Component Value Date    GLUCOSE 103 (H) 07/20/2021    BUN 21 07/20/2021    CREATININE 1.50 (H) 03/03/2022    EGFRIFNONA 60 (L) 07/20/2021    BCR 18.4 07/20/2021    K 4.6 07/20/2021    CO2 23.9 07/20/2021    CALCIUM 9.3 07/20/2021    ALBUMIN 4.40 07/20/2021    AST 22 07/20/2021    ALT 16 07/20/2021         Physical Exam:         Neck: [] Lymphadenopathy  Lungs: [x] Clear to auscultation  CVS: [x] Regular rate & rhythm  Skin: [x] ndGndrndanddndend:nd nd2nd Comments/Notes: no desquamation, expected slight hyperpigmentation.    [x] Review of labs, images, dosimetry, dose delivered, & treatment parameters.   Comments:     [x] Patient treatment setup reviewed.    Comments:     Recommendations: continue XRT and supportive measures    [x] Continue RT  [] Change RT Plan [] Hold RT, length:        Approved Electronically By:  Maurice Cook MD, 3/24/2022, 21:46 EDT          Confidentiality of this medical record shall be maintained except when use or disclosure is required or permitted by law, regulation or written authorization by the patient.

## 2022-03-27 NOTE — PROGRESS NOTES
Patient Name: Bridger Elias Date: 3/17/2022       : 1930        MRN #: [id number] Diagnosis: Facial skin cancers                  RADIATION ON TREATMENT VISIT NOTE - HEAD AND NECK     Treatment Summary  L face, mixed E, 2 fractions; started yesterday without issues, 4 Gy/66 Gy  R face, 2 lesions abutting, IMRT, 3/33 fractions, started on 3/15/22, no issues, 6 Gy/66 Gy     General:     Intent:curative      Review of Systems    [x] No new complaints  [] Dysphagia   [] Eye pain/discharge  [] Dry mouth   [] Hair loss   [] Earache/pain/discharge  [] Loss of taste  [] Supplemental feeding:  [] PO [] PEG/NG    kcals/d  [] Mouth soreness/tenderness,  severity: ----------------      [] Fatigue,  severity: ----------------  [] Pain,  severity: ----------------, location:     Oral care regimen: NONE   Pain medication regimen: NONE   Skin regimen: Aquaphor     Comments/Notes:  No issues    KPS: 90%       Vital Signs: /53   Pulse 66   Wt 73.2 kg (161 lb 6.4 oz)   SpO2 97%   BMI 25.28 kg/m²     Weight:   Wt Readings from Last 3 Encounters:   22 77.8 kg (171 lb 9.6 oz)   22 73.2 kg (161 lb 6.4 oz)   22 74.8 kg (165 lb)       Medication:   Current Outpatient Medications:   •  bicalutamide (CASODEX) 50 MG chemo tablet, Take 1 tablet by mouth Daily., Disp: , Rfl:   •  carvedilol (COREG) 3.125 MG tablet, Take 1 tablet by mouth 2 (Two) Times a Day., Disp: , Rfl:   •  cephalexin (KEFLEX) 500 MG capsule, Take 1 capsule by mouth 3 (Three) Times a Day., Disp: 21 capsule, Rfl: 0  •  latanoprost (XALATAN) 0.005 % ophthalmic solution, Apply  to eye(s) as directed by provider., Disp: , Rfl:   •  levothyroxine (SYNTHROID, LEVOTHROID) 75 MCG tablet, Take 75 mcg by mouth Daily., Disp: , Rfl:   •  losartan (COZAAR) 50 MG tablet, Take 1 tablet by mouth Daily., Disp: , Rfl:   •  rosuvastatin (CRESTOR) 10 MG tablet, Take 1 tablet by mouth Daily., Disp: , Rfl:   •  warfarin (COUMADIN) 5 MG tablet, Take 5 mg by  mouth., Disp: , Rfl:        LABS (Reviewed):  Hematology WBC   Date Value Ref Range Status   07/20/2021 6.15 3.40 - 10.80 10*3/mm3 Final     RBC   Date Value Ref Range Status   07/20/2021 4.16 4.14 - 5.80 10*6/mm3 Final     Hemoglobin   Date Value Ref Range Status   07/20/2021 13.2 13.0 - 17.7 g/dL Final     Hematocrit   Date Value Ref Range Status   07/20/2021 38.9 37.5 - 51.0 % Final     Platelets   Date Value Ref Range Status   07/20/2021 118 (L) 140 - 450 10*3/mm3 Final      Chemistry   Lab Results   Component Value Date    GLUCOSE 103 (H) 07/20/2021    BUN 21 07/20/2021    CREATININE 1.50 (H) 03/03/2022    EGFRIFNONA 60 (L) 07/20/2021    BCR 18.4 07/20/2021    K 4.6 07/20/2021    CO2 23.9 07/20/2021    CALCIUM 9.3 07/20/2021    ALBUMIN 4.40 07/20/2021    AST 22 07/20/2021    ALT 16 07/20/2021         Physical Exam:         Neck: [] Lymphadenopathy  Lungs: [x] Clear to auscultation  HEENT: [x] Throat clear   [] Oral cavity clear  [] TM clear   [] Nares patent   [] Xerostomia   [] Mucositis/stomatitis   [] Dysphagia   [] Voice changes/dysarthia [] PERRLA/EOMI    [] Otitis, external ear (non-infectious):   Skin: [x] stGstrstastdstest:st st1st Comments/Notes:     [x] Review of labs, images, dosimetry, dose delivered, & treatment parameters.    Comments:     [x] Patient treatment setup reviewed.    Comments:     Recommendations: continue XRT and supportive measures  Doing well.    [x] Continue RT  [] Change RT Plan [] Hold RT, length:              Confidentiality of this medical record shall be maintained except when use or disclosure is required or permitted by law, regulation or written authorization by the patient.

## 2022-03-28 PROCEDURE — 77386: CPT | Performed by: RADIOLOGY

## 2022-03-28 PROCEDURE — 77014 CHG CT GUIDANCE RADIATION THERAPY FLDS PLACEMENT: CPT | Performed by: RADIOLOGY

## 2022-03-29 PROCEDURE — 77427 RADIATION TX MANAGEMENT X5: CPT | Performed by: RADIOLOGY

## 2022-03-29 PROCEDURE — 77336 RADIATION PHYSICS CONSULT: CPT | Performed by: RADIOLOGY

## 2022-03-29 PROCEDURE — 77386 CHG INTENSITY MODULATED RADIATION TX DLVR COMPLEX: CPT | Performed by: RADIOLOGY

## 2022-03-29 PROCEDURE — 77014 CHG CT GUIDANCE RADIATION THERAPY FLDS PLACEMENT: CPT | Performed by: RADIOLOGY

## 2022-03-29 PROCEDURE — 77386: CPT | Performed by: RADIOLOGY

## 2022-03-30 PROCEDURE — 77014 CHG CT GUIDANCE RADIATION THERAPY FLDS PLACEMENT: CPT | Performed by: RADIOLOGY

## 2022-03-30 PROCEDURE — 77386 CHG INTENSITY MODULATED RADIATION TX DLVR COMPLEX: CPT | Performed by: RADIOLOGY

## 2022-03-30 PROCEDURE — 77386: CPT | Performed by: RADIOLOGY

## 2022-03-31 ENCOUNTER — APPOINTMENT (OUTPATIENT)
Dept: RADIATION ONCOLOGY | Facility: HOSPITAL | Age: 87
End: 2022-03-31

## 2022-03-31 ENCOUNTER — RADIATION ONCOLOGY WEEKLY ASSESSMENT (OUTPATIENT)
Dept: RADIATION ONCOLOGY | Facility: HOSPITAL | Age: 87
End: 2022-03-31

## 2022-03-31 DIAGNOSIS — C44.320 SCC (SQUAMOUS CELL CARCINOMA), FACE: Primary | ICD-10-CM

## 2022-03-31 PROCEDURE — 77427 RADIATION TX MANAGEMENT X5: CPT | Performed by: RADIOLOGY

## 2022-03-31 PROCEDURE — 77386 CHG INTENSITY MODULATED RADIATION TX DLVR COMPLEX: CPT | Performed by: RADIOLOGY

## 2022-03-31 PROCEDURE — 77014 CHG CT GUIDANCE RADIATION THERAPY FLDS PLACEMENT: CPT | Performed by: RADIOLOGY

## 2022-03-31 PROCEDURE — 77386: CPT | Performed by: RADIOLOGY

## 2022-04-01 ENCOUNTER — APPOINTMENT (OUTPATIENT)
Dept: RADIATION ONCOLOGY | Facility: HOSPITAL | Age: 87
End: 2022-04-01

## 2022-04-01 PROCEDURE — 77386 CHG INTENSITY MODULATED RADIATION TX DLVR COMPLEX: CPT | Performed by: RADIOLOGY

## 2022-04-01 PROCEDURE — 77331 SPECIAL RADIATION DOSIMETRY: CPT | Performed by: RADIOLOGY

## 2022-04-01 PROCEDURE — 77014 CHG CT GUIDANCE RADIATION THERAPY FLDS PLACEMENT: CPT | Performed by: RADIOLOGY

## 2022-04-01 PROCEDURE — 77386: CPT | Performed by: RADIOLOGY

## 2022-04-01 PROCEDURE — 77370 RADIATION PHYSICS CONSULT: CPT | Performed by: RADIOLOGY

## 2022-04-04 PROCEDURE — 77386: CPT | Performed by: RADIOLOGY

## 2022-04-04 PROCEDURE — 77014 CHG CT GUIDANCE RADIATION THERAPY FLDS PLACEMENT: CPT | Performed by: RADIOLOGY

## 2022-04-04 PROCEDURE — 77386 CHG INTENSITY MODULATED RADIATION TX DLVR COMPLEX: CPT | Performed by: RADIOLOGY

## 2022-04-04 PROCEDURE — 77331 SPECIAL RADIATION DOSIMETRY: CPT | Performed by: RADIOLOGY

## 2022-04-04 PROCEDURE — 77370 RADIATION PHYSICS CONSULT: CPT | Performed by: RADIOLOGY

## 2022-04-05 ENCOUNTER — APPOINTMENT (OUTPATIENT)
Dept: RADIATION ONCOLOGY | Facility: HOSPITAL | Age: 87
End: 2022-04-05

## 2022-04-05 PROCEDURE — 77336 RADIATION PHYSICS CONSULT: CPT | Performed by: RADIOLOGY

## 2022-04-05 PROCEDURE — 77386: CPT | Performed by: RADIOLOGY

## 2022-04-05 PROCEDURE — 77014 CHG CT GUIDANCE RADIATION THERAPY FLDS PLACEMENT: CPT | Performed by: RADIOLOGY

## 2022-04-06 PROCEDURE — 77386: CPT | Performed by: RADIOLOGY

## 2022-04-06 PROCEDURE — 77014 CHG CT GUIDANCE RADIATION THERAPY FLDS PLACEMENT: CPT | Performed by: RADIOLOGY

## 2022-04-07 ENCOUNTER — RADIATION ONCOLOGY WEEKLY ASSESSMENT (OUTPATIENT)
Dept: RADIATION ONCOLOGY | Facility: HOSPITAL | Age: 87
End: 2022-04-07

## 2022-04-07 DIAGNOSIS — C44.320 SCC (SQUAMOUS CELL CARCINOMA), FACE: Primary | ICD-10-CM

## 2022-04-07 PROCEDURE — 77014 CHG CT GUIDANCE RADIATION THERAPY FLDS PLACEMENT: CPT | Performed by: RADIOLOGY

## 2022-04-07 PROCEDURE — 77386: CPT | Performed by: RADIOLOGY

## 2022-04-07 NOTE — PROGRESS NOTES
Physician Weekly Management Note    Diagnosis:     Diagnosis Plan   1. SCC (squamous cell carcinoma), face         RT Details:  Treatment #17/33     2  treatment fields -  right and left side of face    Notes on Treatment course, Films, Medical progress:   Expected erythema in the treatment field -  the lesions are flattening, although there is residual nodular crust on the right, which I explained will fall off close to the end of his treatment or after he finishes his course.   He denies any pain or itching.         [facility] Onc ECOG Status: (0) Fully active, able to carry on all predisease performance without restriction      Weekly Management:  Medication reviewed?   Yes  New medications given?   No  Problemlist reviewed?   Yes  Problem added?   No      Technical aspects reviewed:  Weekly OBI approved?   Yes  Weekly port films approved?   Yes  Change requests noted on port film?   No  Patient setup and plan reviewed?   Yes    Chart Reviewed:  Continue current treatment plan?   Yes  Treatment plan change requested?   No  CBC reviewed?   No  Concurrent Chemo?   No    Objective     Toxicities:   As above     Review of Systems   As above     There were no vitals filed for this visit.    No flowsheet data found.    Physical Exam As above      Problem Summary List    Diagnosis:     Diagnosis Plan   1. SCC (squamous cell carcinoma), face       Pathology:       Past Medical History:   Diagnosis Date   • Prostate cancer (HCC)          Past Surgical History:   Procedure Laterality Date   • PACEMAKER IMPLANTATION  2018         Current Outpatient Medications on File Prior to Visit   Medication Sig Dispense Refill   • bicalutamide (CASODEX) 50 MG chemo tablet Take 1 tablet by mouth Daily.     • carvedilol (COREG) 3.125 MG tablet Take 1 tablet by mouth 2 (Two) Times a Day.     • cephalexin (KEFLEX) 500 MG capsule Take 1 capsule by mouth 3 (Three) Times a Day. 21 capsule 0   • latanoprost (XALATAN) 0.005 % ophthalmic  solution Apply  to eye(s) as directed by provider.     • levothyroxine (SYNTHROID, LEVOTHROID) 75 MCG tablet Take 75 mcg by mouth Daily.     • losartan (COZAAR) 50 MG tablet Take 1 tablet by mouth Daily.     • rosuvastatin (CRESTOR) 10 MG tablet Take 1 tablet by mouth Daily.     • warfarin (COUMADIN) 5 MG tablet Take 5 mg by mouth.       No current facility-administered medications on file prior to visit.       No Known Allergies     Primary care MD:    Conor Prince MD    Oncologist:  Patient Care Team:  Maurice Cook MD as Consulting Physician (Radiation Oncology)       Seen and approved by:  Justus Hernandez MD  04/07/2022

## 2022-04-08 ENCOUNTER — APPOINTMENT (OUTPATIENT)
Dept: RADIATION ONCOLOGY | Facility: HOSPITAL | Age: 87
End: 2022-04-08

## 2022-04-08 PROCEDURE — 77386: CPT | Performed by: RADIOLOGY

## 2022-04-08 PROCEDURE — 77014 CHG CT GUIDANCE RADIATION THERAPY FLDS PLACEMENT: CPT | Performed by: RADIOLOGY

## 2022-04-11 ENCOUNTER — APPOINTMENT (OUTPATIENT)
Dept: RADIATION ONCOLOGY | Facility: HOSPITAL | Age: 87
End: 2022-04-11

## 2022-04-11 PROCEDURE — 77014 CHG CT GUIDANCE RADIATION THERAPY FLDS PLACEMENT: CPT | Performed by: RADIOLOGY

## 2022-04-11 PROCEDURE — 77386: CPT | Performed by: RADIOLOGY

## 2022-04-12 ENCOUNTER — APPOINTMENT (OUTPATIENT)
Dept: RADIATION ONCOLOGY | Facility: HOSPITAL | Age: 87
End: 2022-04-12

## 2022-04-12 PROCEDURE — 77386 CHG INTENSITY MODULATED RADIATION TX DLVR COMPLEX: CPT | Performed by: RADIOLOGY

## 2022-04-12 PROCEDURE — 77014 CHG CT GUIDANCE RADIATION THERAPY FLDS PLACEMENT: CPT | Performed by: RADIOLOGY

## 2022-04-12 PROCEDURE — 77336 RADIATION PHYSICS CONSULT: CPT | Performed by: RADIOLOGY

## 2022-04-12 PROCEDURE — 77427 RADIATION TX MANAGEMENT X5: CPT | Performed by: RADIOLOGY

## 2022-04-12 PROCEDURE — 77386: CPT | Performed by: RADIOLOGY

## 2022-04-13 ENCOUNTER — APPOINTMENT (OUTPATIENT)
Dept: RADIATION ONCOLOGY | Facility: HOSPITAL | Age: 87
End: 2022-04-13

## 2022-04-13 ENCOUNTER — RADIATION ONCOLOGY WEEKLY ASSESSMENT (OUTPATIENT)
Dept: RADIATION ONCOLOGY | Facility: HOSPITAL | Age: 87
End: 2022-04-13

## 2022-04-13 VITALS — DIASTOLIC BLOOD PRESSURE: 65 MMHG | HEART RATE: 65 BPM | OXYGEN SATURATION: 97 % | SYSTOLIC BLOOD PRESSURE: 99 MMHG

## 2022-04-13 DIAGNOSIS — C44.320 SQUAMOUS CELL CARCINOMA OF SKIN OF FACE: Primary | ICD-10-CM

## 2022-04-13 PROCEDURE — FACE2FACE: Performed by: RADIOLOGY

## 2022-04-13 PROCEDURE — 77014 CHG CT GUIDANCE RADIATION THERAPY FLDS PLACEMENT: CPT | Performed by: RADIOLOGY

## 2022-04-13 PROCEDURE — 77386 CHG INTENSITY MODULATED RADIATION TX DLVR COMPLEX: CPT | Performed by: RADIOLOGY

## 2022-04-13 PROCEDURE — 77386: CPT | Performed by: RADIOLOGY

## 2022-04-13 NOTE — PROGRESS NOTES
Patient Name: Bridger Elias Date: 2022       : 1930        MRN #: [id number] Diagnosis: SCC Skin (facial skin cancer)                  RADIATION ON TREATMENT VISIT NOTE - HEAD AND NECK     Treatment Summary    [] Concurrent Chemo   Regimen:       Treatment Site Ref. ID Energy Dose/Fx (cGy) #Fx Dose Correction (cGy) Total Dose (cGy) Start Date End Date Elapsed Days   RT FACE RXPT:R FACE 6X 200  /  0 4,400 3/15/2022 2022 29     Treatment Site Ref. ID Energy Dose/Fx (cGy) #Fx Dose Correction (cGy) Total Dose (cGy) Start Date End Date Elapsed Days   L FACEMixed e RXPT: L FACE 6E/9E 200  0 4,200 3/16/2022 2022 28         General:           Review of Systems    [] No new complaints  [] Dysphagia   [] Eye pain/discharge  [] Dry mouth   [] Hair loss   [] Earache/pain/discharge  [] Loss of taste  [] Supplemental feeding:  [] PO [] PEG/NG    kcals/d  [] Mouth soreness/tenderness,  severity: ----------------      [x] Fatigue,  severity: 1 Relief w/ Rest  [] Pain,  severity: ----------------, location:     Oral care regimen: NONE   Pain medication regimen: NONE   Skin regimen: Aquaphor     Comments/Notes:  Denies any dry mouth      KPS: 90%       Vital Signs: BP 99/65   Pulse 65   SpO2 97%     Weight:   Wt Readings from Last 3 Encounters:   22 77.8 kg (171 lb 9.6 oz)   22 73.2 kg (161 lb 6.4 oz)   22 74.8 kg (165 lb)       Medication:   Current Outpatient Medications:   •  bicalutamide (CASODEX) 50 MG chemo tablet, Take 1 tablet by mouth Daily., Disp: , Rfl:   •  carvedilol (COREG) 3.125 MG tablet, Take 1 tablet by mouth 2 (Two) Times a Day., Disp: , Rfl:   •  cephalexin (KEFLEX) 500 MG capsule, Take 1 capsule by mouth 3 (Three) Times a Day., Disp: 21 capsule, Rfl: 0  •  latanoprost (XALATAN) 0.005 % ophthalmic solution, Apply  to eye(s) as directed by provider., Disp: , Rfl:   •  levothyroxine (SYNTHROID, LEVOTHROID) 75 MCG tablet, Take 75 mcg by mouth Daily., Disp: ,  Rfl:   •  losartan (COZAAR) 50 MG tablet, Take 1 tablet by mouth Daily., Disp: , Rfl:   •  rosuvastatin (CRESTOR) 10 MG tablet, Take 1 tablet by mouth Daily., Disp: , Rfl:   •  warfarin (COUMADIN) 5 MG tablet, Take 5 mg by mouth., Disp: , Rfl:        LABS (Reviewed):  Hematology WBC   Date Value Ref Range Status   07/20/2021 6.15 3.40 - 10.80 10*3/mm3 Final     RBC   Date Value Ref Range Status   07/20/2021 4.16 4.14 - 5.80 10*6/mm3 Final     Hemoglobin   Date Value Ref Range Status   07/20/2021 13.2 13.0 - 17.7 g/dL Final     Hematocrit   Date Value Ref Range Status   07/20/2021 38.9 37.5 - 51.0 % Final     Platelets   Date Value Ref Range Status   07/20/2021 118 (L) 140 - 450 10*3/mm3 Final      Chemistry   Lab Results   Component Value Date    GLUCOSE 103 (H) 07/20/2021    BUN 21 07/20/2021    CREATININE 1.50 (H) 03/03/2022    EGFRIFNONA 60 (L) 07/20/2021    BCR 18.4 07/20/2021    K 4.6 07/20/2021    CO2 23.9 07/20/2021    CALCIUM 9.3 07/20/2021    ALBUMIN 4.40 07/20/2021    AST 22 07/20/2021    ALT 16 07/20/2021         Physical Exam:         Neck: [] Lymphadenopathy  Lungs: [x] Clear to auscultation  HEENT: [x] Throat clear   [] Oral cavity clear  [] TM clear   [] Nares patent   [] Xerostomia   [] Mucositis/stomatitis   [] Dysphagia   [] Voice changes/dysarthia [] PERRLA/EOMI    [] Otitis, external ear (non-infectious):   Skin: [x] ndGndrndanddndend:nd nd2nd Comments/Notes: no desquamation    [x] Review of labs, images, dosimetry, dose delivered, & treatment parameters.    Comments:     [x] Patient treatment setup reviewed.    Comments:     Recommendations:asked him to increase his PO intake  Continue XRT    [x] Continue RT  [] Change RT Plan [] Hold RT, length:        Approved Electronically By:  Maurice Cook MD, 4/13/2022, 16:09 EDT        Addendum:  Patient was admitted at the end of last week with weakness/lethargy and speech difficulty. 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  We are  holding XRT at this time.    Approved by:     Maurice Cook MD  04/18/22  23:44 EDT

## 2022-04-14 ENCOUNTER — APPOINTMENT (OUTPATIENT)
Dept: GENERAL RADIOLOGY | Facility: HOSPITAL | Age: 87
End: 2022-04-14

## 2022-04-14 ENCOUNTER — APPOINTMENT (OUTPATIENT)
Dept: CT IMAGING | Facility: HOSPITAL | Age: 87
End: 2022-04-14

## 2022-04-14 ENCOUNTER — HOSPITAL ENCOUNTER (INPATIENT)
Facility: HOSPITAL | Age: 87
LOS: 8 days | Discharge: NURSING, REHAB OR LONG-TERM CARE | End: 2022-04-22
Attending: EMERGENCY MEDICINE | Admitting: INTERNAL MEDICINE

## 2022-04-14 ENCOUNTER — APPOINTMENT (OUTPATIENT)
Dept: CARDIOLOGY | Facility: HOSPITAL | Age: 87
End: 2022-04-14

## 2022-04-14 DIAGNOSIS — R60.0 BILATERAL LOWER EXTREMITY EDEMA: ICD-10-CM

## 2022-04-14 DIAGNOSIS — Z79.01 CURRENT USE OF LONG TERM ANTICOAGULATION: ICD-10-CM

## 2022-04-14 DIAGNOSIS — R53.1 GENERAL WEAKNESS: Primary | ICD-10-CM

## 2022-04-14 DIAGNOSIS — Z74.09 IMMOBILITY: ICD-10-CM

## 2022-04-14 PROBLEM — I48.91 ATRIAL FIBRILLATION (HCC): Status: ACTIVE | Noted: 2022-04-14

## 2022-04-14 PROBLEM — Z92.3 PERSONAL HISTORY OF RADIATION THERAPY: Chronic | Status: ACTIVE | Noted: 2022-04-14

## 2022-04-14 PROBLEM — C44.320 SQUAMOUS CELL CARCINOMA OF SKIN OF FACE: Status: ACTIVE | Noted: 2022-02-02

## 2022-04-14 PROBLEM — N18.30 HYPERTENSIVE KIDNEY DISEASE, STAGE III: Status: ACTIVE | Noted: 2017-03-13

## 2022-04-14 PROBLEM — I50.23 ACUTE ON CHRONIC SYSTOLIC (CONGESTIVE) HEART FAILURE: Status: ACTIVE | Noted: 2022-04-14

## 2022-04-14 PROBLEM — I12.9 HYPERTENSIVE KIDNEY DISEASE, STAGE III: Status: ACTIVE | Noted: 2017-03-13

## 2022-04-14 PROBLEM — H40.9 GLAUCOMA: Status: ACTIVE | Noted: 2022-04-14

## 2022-04-14 PROBLEM — I05.9 MITRAL VALVE DISORDER: Status: ACTIVE | Noted: 2021-01-25

## 2022-04-14 PROBLEM — I50.33 ACUTE ON CHRONIC DIASTOLIC CHF (CONGESTIVE HEART FAILURE): Status: ACTIVE | Noted: 2022-04-14

## 2022-04-14 PROBLEM — R26.9 ABNORMAL GAIT: Status: ACTIVE | Noted: 2019-04-23

## 2022-04-14 PROBLEM — N18.32 STAGE 3B CHRONIC KIDNEY DISEASE (HCC): Chronic | Status: ACTIVE | Noted: 2022-04-14

## 2022-04-14 PROBLEM — I50.32 CHRONIC DIASTOLIC HEART FAILURE (HCC): Status: ACTIVE | Noted: 2022-03-31

## 2022-04-14 PROBLEM — M81.0 OSTEOPOROSIS: Status: ACTIVE | Noted: 2022-04-14

## 2022-04-14 PROBLEM — Z95.0 PACEMAKER: Status: ACTIVE | Noted: 2022-04-14

## 2022-04-14 PROBLEM — E03.9 HYPOTHYROIDISM: Status: ACTIVE | Noted: 2017-03-19

## 2022-04-14 LAB
ALBUMIN SERPL-MCNC: 3.6 G/DL (ref 3.5–5.2)
ALBUMIN/GLOB SERPL: 1.2 G/DL
ALP SERPL-CCNC: 125 U/L (ref 39–117)
ALT SERPL W P-5'-P-CCNC: 20 U/L (ref 1–41)
ANION GAP SERPL CALCULATED.3IONS-SCNC: 9 MMOL/L (ref 5–15)
AST SERPL-CCNC: 30 U/L (ref 1–40)
BACTERIA UR QL AUTO: NORMAL /HPF
BASOPHILS # BLD AUTO: 0.06 10*3/MM3 (ref 0–0.2)
BASOPHILS NFR BLD AUTO: 0.9 % (ref 0–1.5)
BH CV LOWER VASCULAR LEFT COMMON FEMORAL AUGMENT: NORMAL
BH CV LOWER VASCULAR LEFT COMMON FEMORAL COMPETENT: NORMAL
BH CV LOWER VASCULAR LEFT COMMON FEMORAL COMPRESS: NORMAL
BH CV LOWER VASCULAR LEFT COMMON FEMORAL PHASIC: NORMAL
BH CV LOWER VASCULAR LEFT COMMON FEMORAL SPONT: NORMAL
BH CV LOWER VASCULAR LEFT DISTAL FEMORAL COMPRESS: NORMAL
BH CV LOWER VASCULAR LEFT GASTRONEMIUS COMPRESS: NORMAL
BH CV LOWER VASCULAR LEFT GREATER SAPH AK COMPRESS: NORMAL
BH CV LOWER VASCULAR LEFT GREATER SAPH BK COMPRESS: NORMAL
BH CV LOWER VASCULAR LEFT LESSER SAPH COMPRESS: NORMAL
BH CV LOWER VASCULAR LEFT MID FEMORAL AUGMENT: NORMAL
BH CV LOWER VASCULAR LEFT MID FEMORAL COMPETENT: NORMAL
BH CV LOWER VASCULAR LEFT MID FEMORAL COMPRESS: NORMAL
BH CV LOWER VASCULAR LEFT MID FEMORAL PHASIC: NORMAL
BH CV LOWER VASCULAR LEFT MID FEMORAL SPONT: NORMAL
BH CV LOWER VASCULAR LEFT PERONEAL COMPRESS: NORMAL
BH CV LOWER VASCULAR LEFT POPLITEAL AUGMENT: NORMAL
BH CV LOWER VASCULAR LEFT POPLITEAL COMPETENT: NORMAL
BH CV LOWER VASCULAR LEFT POPLITEAL COMPRESS: NORMAL
BH CV LOWER VASCULAR LEFT POPLITEAL PHASIC: NORMAL
BH CV LOWER VASCULAR LEFT POPLITEAL SPONT: NORMAL
BH CV LOWER VASCULAR LEFT POSTERIOR TIBIAL COMPRESS: NORMAL
BH CV LOWER VASCULAR LEFT PROFUNDA FEMORAL COMPRESS: NORMAL
BH CV LOWER VASCULAR LEFT PROXIMAL FEMORAL COMPRESS: NORMAL
BH CV LOWER VASCULAR LEFT SAPHENOFEMORAL JUNCTION COMPRESS: NORMAL
BH CV LOWER VASCULAR RIGHT COMMON FEMORAL AUGMENT: NORMAL
BH CV LOWER VASCULAR RIGHT COMMON FEMORAL COMPETENT: NORMAL
BH CV LOWER VASCULAR RIGHT COMMON FEMORAL COMPRESS: NORMAL
BH CV LOWER VASCULAR RIGHT COMMON FEMORAL PHASIC: NORMAL
BH CV LOWER VASCULAR RIGHT COMMON FEMORAL SPONT: NORMAL
BH CV LOWER VASCULAR RIGHT DISTAL FEMORAL COMPRESS: NORMAL
BH CV LOWER VASCULAR RIGHT GASTRONEMIUS COMPRESS: NORMAL
BH CV LOWER VASCULAR RIGHT GREATER SAPH AK COMPRESS: NORMAL
BH CV LOWER VASCULAR RIGHT GREATER SAPH BK COMPRESS: NORMAL
BH CV LOWER VASCULAR RIGHT LESSER SAPH COMPRESS: NORMAL
BH CV LOWER VASCULAR RIGHT MID FEMORAL AUGMENT: NORMAL
BH CV LOWER VASCULAR RIGHT MID FEMORAL COMPETENT: NORMAL
BH CV LOWER VASCULAR RIGHT MID FEMORAL COMPRESS: NORMAL
BH CV LOWER VASCULAR RIGHT MID FEMORAL PHASIC: NORMAL
BH CV LOWER VASCULAR RIGHT MID FEMORAL SPONT: NORMAL
BH CV LOWER VASCULAR RIGHT PERONEAL COMPRESS: NORMAL
BH CV LOWER VASCULAR RIGHT POPLITEAL AUGMENT: NORMAL
BH CV LOWER VASCULAR RIGHT POPLITEAL COMPETENT: NORMAL
BH CV LOWER VASCULAR RIGHT POPLITEAL COMPRESS: NORMAL
BH CV LOWER VASCULAR RIGHT POPLITEAL PHASIC: NORMAL
BH CV LOWER VASCULAR RIGHT POPLITEAL SPONT: NORMAL
BH CV LOWER VASCULAR RIGHT POSTERIOR TIBIAL COMPRESS: NORMAL
BH CV LOWER VASCULAR RIGHT PROFUNDA FEMORAL COMPRESS: NORMAL
BH CV LOWER VASCULAR RIGHT PROXIMAL FEMORAL COMPRESS: NORMAL
BH CV LOWER VASCULAR RIGHT SAPHENOFEMORAL JUNCTION COMPRESS: NORMAL
BILIRUB SERPL-MCNC: 1.1 MG/DL (ref 0–1.2)
BILIRUB UR QL STRIP: NEGATIVE
BUN SERPL-MCNC: 23 MG/DL (ref 8–23)
BUN/CREAT SERPL: 16.1 (ref 7–25)
CALCIUM SPEC-SCNC: 8.5 MG/DL (ref 8.2–9.6)
CHLORIDE SERPL-SCNC: 101 MMOL/L (ref 98–107)
CLARITY UR: CLEAR
CO2 SERPL-SCNC: 24 MMOL/L (ref 22–29)
COLOR UR: YELLOW
CREAT SERPL-MCNC: 1.43 MG/DL (ref 0.76–1.27)
DEPRECATED RDW RBC AUTO: 56.3 FL (ref 37–54)
EGFRCR SERPLBLD CKD-EPI 2021: 46 ML/MIN/1.73
EOSINOPHIL # BLD AUTO: 0.08 10*3/MM3 (ref 0–0.4)
EOSINOPHIL NFR BLD AUTO: 1.2 % (ref 0.3–6.2)
ERYTHROCYTE [DISTWIDTH] IN BLOOD BY AUTOMATED COUNT: 16.4 % (ref 12.3–15.4)
GLOBULIN UR ELPH-MCNC: 3 GM/DL
GLUCOSE SERPL-MCNC: 112 MG/DL (ref 65–99)
GLUCOSE UR STRIP-MCNC: NEGATIVE MG/DL
HCT VFR BLD AUTO: 32.9 % (ref 37.5–51)
HGB BLD-MCNC: 11.4 G/DL (ref 13–17.7)
HGB UR QL STRIP.AUTO: NEGATIVE
HOLD SPECIMEN: NORMAL
HYALINE CASTS UR QL AUTO: NORMAL /LPF
IMM GRANULOCYTES # BLD AUTO: 0.04 10*3/MM3 (ref 0–0.05)
IMM GRANULOCYTES NFR BLD AUTO: 0.6 % (ref 0–0.5)
INR PPP: 2.06 (ref 0.9–1.1)
KETONES UR QL STRIP: NEGATIVE
LEUKOCYTE ESTERASE UR QL STRIP.AUTO: NEGATIVE
LYMPHOCYTES # BLD AUTO: 0.94 10*3/MM3 (ref 0.7–3.1)
LYMPHOCYTES NFR BLD AUTO: 14.1 % (ref 19.6–45.3)
MAXIMAL PREDICTED HEART RATE: 128 BPM
MCH RBC QN AUTO: 33 PG (ref 26.6–33)
MCHC RBC AUTO-ENTMCNC: 34.7 G/DL (ref 31.5–35.7)
MCV RBC AUTO: 95.4 FL (ref 79–97)
MONOCYTES # BLD AUTO: 0.94 10*3/MM3 (ref 0.1–0.9)
MONOCYTES NFR BLD AUTO: 14.1 % (ref 5–12)
NEUTROPHILS NFR BLD AUTO: 4.61 10*3/MM3 (ref 1.7–7)
NEUTROPHILS NFR BLD AUTO: 69.1 % (ref 42.7–76)
NITRITE UR QL STRIP: NEGATIVE
NRBC BLD AUTO-RTO: 0.1 /100 WBC (ref 0–0.2)
NT-PROBNP SERPL-MCNC: 1816 PG/ML (ref 0–1800)
PH UR STRIP.AUTO: 6.5 [PH] (ref 5–8)
PLATELET # BLD AUTO: 122 10*3/MM3 (ref 140–450)
PMV BLD AUTO: 11 FL (ref 6–12)
POTASSIUM SERPL-SCNC: 4.2 MMOL/L (ref 3.5–5.2)
PROT SERPL-MCNC: 6.6 G/DL (ref 6–8.5)
PROT UR QL STRIP: ABNORMAL
PROTHROMBIN TIME: 23.2 SECONDS (ref 11.7–14.2)
QT INTERVAL: 503 MS
RBC # BLD AUTO: 3.45 10*6/MM3 (ref 4.14–5.8)
RBC # UR STRIP: NORMAL /HPF
REF LAB TEST METHOD: NORMAL
SARS-COV-2 ORF1AB RESP QL NAA+PROBE: NOT DETECTED
SODIUM SERPL-SCNC: 134 MMOL/L (ref 136–145)
SP GR UR STRIP: 1.02 (ref 1–1.03)
SQUAMOUS #/AREA URNS HPF: NORMAL /HPF
STRESS TARGET HR: 109 BPM
TROPONIN T SERPL-MCNC: <0.01 NG/ML (ref 0–0.03)
TROPONIN T SERPL-MCNC: <0.01 NG/ML (ref 0–0.03)
UROBILINOGEN UR QL STRIP: ABNORMAL
WBC # UR STRIP: NORMAL /HPF
WBC NRBC COR # BLD: 6.67 10*3/MM3 (ref 3.4–10.8)
WHOLE BLOOD HOLD SPECIMEN: NORMAL
WHOLE BLOOD HOLD SPECIMEN: NORMAL

## 2022-04-14 PROCEDURE — U0004 COV-19 TEST NON-CDC HGH THRU: HCPCS | Performed by: EMERGENCY MEDICINE

## 2022-04-14 PROCEDURE — 71045 X-RAY EXAM CHEST 1 VIEW: CPT

## 2022-04-14 PROCEDURE — 25010000002 FUROSEMIDE PER 20 MG: Performed by: INTERNAL MEDICINE

## 2022-04-14 PROCEDURE — 93005 ELECTROCARDIOGRAM TRACING: CPT

## 2022-04-14 PROCEDURE — 84484 ASSAY OF TROPONIN QUANT: CPT | Performed by: NURSE PRACTITIONER

## 2022-04-14 PROCEDURE — 93970 EXTREMITY STUDY: CPT

## 2022-04-14 PROCEDURE — 71046 X-RAY EXAM CHEST 2 VIEWS: CPT

## 2022-04-14 PROCEDURE — 85025 COMPLETE CBC W/AUTO DIFF WBC: CPT | Performed by: NURSE PRACTITIONER

## 2022-04-14 PROCEDURE — 73562 X-RAY EXAM OF KNEE 3: CPT

## 2022-04-14 PROCEDURE — 81001 URINALYSIS AUTO W/SCOPE: CPT | Performed by: NURSE PRACTITIONER

## 2022-04-14 PROCEDURE — 84484 ASSAY OF TROPONIN QUANT: CPT | Performed by: INTERNAL MEDICINE

## 2022-04-14 PROCEDURE — 85610 PROTHROMBIN TIME: CPT

## 2022-04-14 PROCEDURE — 83880 ASSAY OF NATRIURETIC PEPTIDE: CPT | Performed by: INTERNAL MEDICINE

## 2022-04-14 PROCEDURE — 80053 COMPREHEN METABOLIC PANEL: CPT | Performed by: NURSE PRACTITIONER

## 2022-04-14 PROCEDURE — 99284 EMERGENCY DEPT VISIT MOD MDM: CPT

## 2022-04-14 PROCEDURE — 93010 ELECTROCARDIOGRAM REPORT: CPT | Performed by: INTERNAL MEDICINE

## 2022-04-14 PROCEDURE — 73552 X-RAY EXAM OF FEMUR 2/>: CPT

## 2022-04-14 PROCEDURE — 70450 CT HEAD/BRAIN W/O DYE: CPT

## 2022-04-14 RX ORDER — ACETAMINOPHEN 160 MG/5ML
650 SOLUTION ORAL EVERY 4 HOURS PRN
Status: DISCONTINUED | OUTPATIENT
Start: 2022-04-14 | End: 2022-04-22 | Stop reason: HOSPADM

## 2022-04-14 RX ORDER — LATANOPROST 50 UG/ML
1 SOLUTION/ DROPS OPHTHALMIC NIGHTLY
Status: DISCONTINUED | OUTPATIENT
Start: 2022-04-15 | End: 2022-04-22 | Stop reason: HOSPADM

## 2022-04-14 RX ORDER — LEVOTHYROXINE SODIUM 0.07 MG/1
75 TABLET ORAL
Status: DISCONTINUED | OUTPATIENT
Start: 2022-04-15 | End: 2022-04-22 | Stop reason: HOSPADM

## 2022-04-14 RX ORDER — BISACODYL 5 MG/1
5 TABLET, DELAYED RELEASE ORAL DAILY PRN
Status: DISCONTINUED | OUTPATIENT
Start: 2022-04-14 | End: 2022-04-22 | Stop reason: HOSPADM

## 2022-04-14 RX ORDER — FUROSEMIDE 10 MG/ML
40 INJECTION INTRAMUSCULAR; INTRAVENOUS
Status: DISCONTINUED | OUTPATIENT
Start: 2022-04-15 | End: 2022-04-16

## 2022-04-14 RX ORDER — POLYETHYLENE GLYCOL 3350 17 G/17G
17 POWDER, FOR SOLUTION ORAL DAILY PRN
Status: DISCONTINUED | OUTPATIENT
Start: 2022-04-14 | End: 2022-04-22 | Stop reason: HOSPADM

## 2022-04-14 RX ORDER — CALCIUM CARBONATE 200(500)MG
2 TABLET,CHEWABLE ORAL 2 TIMES DAILY PRN
Status: DISCONTINUED | OUTPATIENT
Start: 2022-04-14 | End: 2022-04-22 | Stop reason: HOSPADM

## 2022-04-14 RX ORDER — ACETAMINOPHEN 325 MG/1
650 TABLET ORAL EVERY 4 HOURS PRN
Status: DISCONTINUED | OUTPATIENT
Start: 2022-04-14 | End: 2022-04-22 | Stop reason: HOSPADM

## 2022-04-14 RX ORDER — SODIUM CHLORIDE 0.9 % (FLUSH) 0.9 %
1-10 SYRINGE (ML) INJECTION AS NEEDED
Status: DISCONTINUED | OUTPATIENT
Start: 2022-04-14 | End: 2022-04-22 | Stop reason: HOSPADM

## 2022-04-14 RX ORDER — BISACODYL 10 MG
10 SUPPOSITORY, RECTAL RECTAL DAILY PRN
Status: DISCONTINUED | OUTPATIENT
Start: 2022-04-14 | End: 2022-04-22 | Stop reason: HOSPADM

## 2022-04-14 RX ORDER — AMOXICILLIN 250 MG
2 CAPSULE ORAL 2 TIMES DAILY
Status: DISCONTINUED | OUTPATIENT
Start: 2022-04-14 | End: 2022-04-22 | Stop reason: HOSPADM

## 2022-04-14 RX ORDER — CARVEDILOL 3.12 MG/1
3.12 TABLET ORAL 2 TIMES DAILY
Status: DISCONTINUED | OUTPATIENT
Start: 2022-04-15 | End: 2022-04-19

## 2022-04-14 RX ORDER — NITROGLYCERIN 0.4 MG/1
0.4 TABLET SUBLINGUAL
Status: DISCONTINUED | OUTPATIENT
Start: 2022-04-14 | End: 2022-04-22 | Stop reason: HOSPADM

## 2022-04-14 RX ORDER — ONDANSETRON 2 MG/ML
4 INJECTION INTRAMUSCULAR; INTRAVENOUS EVERY 6 HOURS PRN
Status: DISCONTINUED | OUTPATIENT
Start: 2022-04-14 | End: 2022-04-22 | Stop reason: HOSPADM

## 2022-04-14 RX ORDER — ROSUVASTATIN CALCIUM 10 MG/1
10 TABLET, COATED ORAL DAILY
Status: DISCONTINUED | OUTPATIENT
Start: 2022-04-15 | End: 2022-04-22 | Stop reason: HOSPADM

## 2022-04-14 RX ORDER — SODIUM CHLORIDE 0.9 % (FLUSH) 0.9 %
10 SYRINGE (ML) INJECTION EVERY 12 HOURS SCHEDULED
Status: DISCONTINUED | OUTPATIENT
Start: 2022-04-14 | End: 2022-04-22 | Stop reason: HOSPADM

## 2022-04-14 RX ORDER — FUROSEMIDE 10 MG/ML
40 INJECTION INTRAMUSCULAR; INTRAVENOUS ONCE
Status: COMPLETED | OUTPATIENT
Start: 2022-04-14 | End: 2022-04-14

## 2022-04-14 RX ORDER — BICALUTAMIDE 50 MG/1
50 TABLET, FILM COATED ORAL DAILY
Status: DISCONTINUED | OUTPATIENT
Start: 2022-04-15 | End: 2022-04-22 | Stop reason: HOSPADM

## 2022-04-14 RX ORDER — LOSARTAN POTASSIUM 50 MG/1
50 TABLET ORAL DAILY
Status: DISCONTINUED | OUTPATIENT
Start: 2022-04-15 | End: 2022-04-19

## 2022-04-14 RX ORDER — ONDANSETRON 4 MG/1
4 TABLET, FILM COATED ORAL EVERY 6 HOURS PRN
Status: DISCONTINUED | OUTPATIENT
Start: 2022-04-14 | End: 2022-04-22 | Stop reason: HOSPADM

## 2022-04-14 RX ORDER — ACETAMINOPHEN 650 MG/1
650 SUPPOSITORY RECTAL EVERY 4 HOURS PRN
Status: DISCONTINUED | OUTPATIENT
Start: 2022-04-14 | End: 2022-04-22 | Stop reason: HOSPADM

## 2022-04-14 RX ADMIN — DOCUSATE SODIUM 50MG AND SENNOSIDES 8.6MG 2 TABLET: 8.6; 5 TABLET, FILM COATED ORAL at 21:55

## 2022-04-14 RX ADMIN — Medication 10 ML: at 21:55

## 2022-04-14 RX ADMIN — FUROSEMIDE 40 MG: 10 INJECTION, SOLUTION INTRAMUSCULAR; INTRAVENOUS at 18:05

## 2022-04-15 LAB
ANION GAP SERPL CALCULATED.3IONS-SCNC: 9 MMOL/L (ref 5–15)
BASOPHILS # BLD AUTO: 0.06 10*3/MM3 (ref 0–0.2)
BASOPHILS NFR BLD AUTO: 1 % (ref 0–1.5)
BUN SERPL-MCNC: 21 MG/DL (ref 8–23)
BUN/CREAT SERPL: 17.1 (ref 7–25)
CALCIUM SPEC-SCNC: 8.4 MG/DL (ref 8.2–9.6)
CHLORIDE SERPL-SCNC: 101 MMOL/L (ref 98–107)
CHOLEST SERPL-MCNC: 83 MG/DL (ref 0–200)
CO2 SERPL-SCNC: 25 MMOL/L (ref 22–29)
CREAT SERPL-MCNC: 1.23 MG/DL (ref 0.76–1.27)
DEPRECATED RDW RBC AUTO: 53.4 FL (ref 37–54)
EGFRCR SERPLBLD CKD-EPI 2021: 55.1 ML/MIN/1.73
EOSINOPHIL # BLD AUTO: 0.11 10*3/MM3 (ref 0–0.4)
EOSINOPHIL NFR BLD AUTO: 1.8 % (ref 0.3–6.2)
ERYTHROCYTE [DISTWIDTH] IN BLOOD BY AUTOMATED COUNT: 16.1 % (ref 12.3–15.4)
GLUCOSE BLDC GLUCOMTR-MCNC: 101 MG/DL (ref 70–130)
GLUCOSE SERPL-MCNC: 94 MG/DL (ref 65–99)
HCT VFR BLD AUTO: 33.2 % (ref 37.5–51)
HDLC SERPL-MCNC: 43 MG/DL (ref 40–60)
HGB BLD-MCNC: 11.7 G/DL (ref 13–17.7)
LDLC SERPL CALC-MCNC: 26 MG/DL (ref 0–100)
LDLC/HDLC SERPL: 0.65 {RATIO}
LYMPHOCYTES # BLD AUTO: 1.37 10*3/MM3 (ref 0.7–3.1)
LYMPHOCYTES NFR BLD AUTO: 22.3 % (ref 19.6–45.3)
MAGNESIUM SERPL-MCNC: 2.2 MG/DL (ref 1.7–2.3)
MCH RBC QN AUTO: 32.5 PG (ref 26.6–33)
MCHC RBC AUTO-ENTMCNC: 35.2 G/DL (ref 31.5–35.7)
MCV RBC AUTO: 92.2 FL (ref 79–97)
MONOCYTES # BLD AUTO: 0.76 10*3/MM3 (ref 0.1–0.9)
MONOCYTES NFR BLD AUTO: 12.4 % (ref 5–12)
NEUTROPHILS NFR BLD AUTO: 3.81 10*3/MM3 (ref 1.7–7)
NEUTROPHILS NFR BLD AUTO: 62 % (ref 42.7–76)
PHOSPHATE SERPL-MCNC: 2.6 MG/DL (ref 2.5–4.5)
PLATELET # BLD AUTO: 109 10*3/MM3 (ref 140–450)
PMV BLD AUTO: 10.4 FL (ref 6–12)
POTASSIUM SERPL-SCNC: 4.1 MMOL/L (ref 3.5–5.2)
RBC # BLD AUTO: 3.6 10*6/MM3 (ref 4.14–5.8)
SODIUM SERPL-SCNC: 135 MMOL/L (ref 136–145)
TRIGL SERPL-MCNC: 60 MG/DL (ref 0–150)
TSH SERPL DL<=0.05 MIU/L-ACNC: 10 UIU/ML (ref 0.27–4.2)
VLDLC SERPL-MCNC: 14 MG/DL (ref 5–40)
WBC NRBC COR # BLD: 6.14 10*3/MM3 (ref 3.4–10.8)

## 2022-04-15 PROCEDURE — 85025 COMPLETE CBC W/AUTO DIFF WBC: CPT | Performed by: INTERNAL MEDICINE

## 2022-04-15 PROCEDURE — 77386 CHG INTENSITY MODULATED RADIATION TX DLVR COMPLEX: CPT | Performed by: RADIOLOGY

## 2022-04-15 PROCEDURE — 82962 GLUCOSE BLOOD TEST: CPT

## 2022-04-15 PROCEDURE — 84443 ASSAY THYROID STIM HORMONE: CPT | Performed by: INTERNAL MEDICINE

## 2022-04-15 PROCEDURE — 80048 BASIC METABOLIC PNL TOTAL CA: CPT | Performed by: INTERNAL MEDICINE

## 2022-04-15 PROCEDURE — 83735 ASSAY OF MAGNESIUM: CPT | Performed by: INTERNAL MEDICINE

## 2022-04-15 PROCEDURE — 97166 OT EVAL MOD COMPLEX 45 MIN: CPT

## 2022-04-15 PROCEDURE — 25010000002 FUROSEMIDE PER 20 MG: Performed by: INTERNAL MEDICINE

## 2022-04-15 PROCEDURE — 92523 SPEECH SOUND LANG COMPREHEN: CPT

## 2022-04-15 PROCEDURE — 77014 CHG CT GUIDANCE RADIATION THERAPY FLDS PLACEMENT: CPT | Performed by: RADIOLOGY

## 2022-04-15 PROCEDURE — 80061 LIPID PANEL: CPT | Performed by: INTERNAL MEDICINE

## 2022-04-15 PROCEDURE — 97530 THERAPEUTIC ACTIVITIES: CPT

## 2022-04-15 PROCEDURE — 84100 ASSAY OF PHOSPHORUS: CPT | Performed by: INTERNAL MEDICINE

## 2022-04-15 PROCEDURE — 97162 PT EVAL MOD COMPLEX 30 MIN: CPT

## 2022-04-15 PROCEDURE — 77386: CPT | Performed by: RADIOLOGY

## 2022-04-15 PROCEDURE — 97535 SELF CARE MNGMENT TRAINING: CPT

## 2022-04-15 RX ORDER — MULTIPLE VITAMINS W/ MINERALS TAB 9MG-400MCG
1 TAB ORAL DAILY
Status: ON HOLD | COMMUNITY

## 2022-04-15 RX ORDER — AMIODARONE HYDROCHLORIDE 200 MG/1
200 TABLET ORAL DAILY
COMMUNITY
End: 2022-04-22 | Stop reason: HOSPADM

## 2022-04-15 RX ORDER — PHENOL 1.4 %
600 AEROSOL, SPRAY (ML) MUCOUS MEMBRANE DAILY
Status: ON HOLD | COMMUNITY

## 2022-04-15 RX ADMIN — CARVEDILOL 3.12 MG: 3.12 TABLET, FILM COATED ORAL at 09:24

## 2022-04-15 RX ADMIN — Medication 10 ML: at 21:23

## 2022-04-15 RX ADMIN — CARVEDILOL 3.12 MG: 3.12 TABLET, FILM COATED ORAL at 00:53

## 2022-04-15 RX ADMIN — FUROSEMIDE 40 MG: 10 INJECTION, SOLUTION INTRAVENOUS at 09:23

## 2022-04-15 RX ADMIN — ROSUVASTATIN CALCIUM 10 MG: 10 TABLET, FILM COATED ORAL at 09:24

## 2022-04-15 RX ADMIN — FUROSEMIDE 40 MG: 10 INJECTION, SOLUTION INTRAVENOUS at 18:49

## 2022-04-15 RX ADMIN — LATANOPROST 1 DROP: 50 SOLUTION OPHTHALMIC at 00:53

## 2022-04-15 RX ADMIN — BICALUTAMIDE 50 MG: 50 TABLET ORAL at 09:25

## 2022-04-15 RX ADMIN — DOCUSATE SODIUM 50MG AND SENNOSIDES 8.6MG 2 TABLET: 8.6; 5 TABLET, FILM COATED ORAL at 09:24

## 2022-04-15 RX ADMIN — LEVOTHYROXINE SODIUM 75 MCG: 0.07 TABLET ORAL at 05:14

## 2022-04-15 RX ADMIN — LATANOPROST 1 DROP: 50 SOLUTION OPHTHALMIC at 21:31

## 2022-04-15 RX ADMIN — LOSARTAN POTASSIUM 50 MG: 50 TABLET, FILM COATED ORAL at 09:24

## 2022-04-16 LAB
ANION GAP SERPL CALCULATED.3IONS-SCNC: 10.4 MMOL/L (ref 5–15)
BASOPHILS # BLD AUTO: 0.06 10*3/MM3 (ref 0–0.2)
BASOPHILS NFR BLD AUTO: 0.8 % (ref 0–1.5)
BUN SERPL-MCNC: 24 MG/DL (ref 8–23)
BUN/CREAT SERPL: 15.6 (ref 7–25)
CALCIUM SPEC-SCNC: 8.5 MG/DL (ref 8.2–9.6)
CHLORIDE SERPL-SCNC: 98 MMOL/L (ref 98–107)
CO2 SERPL-SCNC: 27.6 MMOL/L (ref 22–29)
CREAT SERPL-MCNC: 1.54 MG/DL (ref 0.76–1.27)
DEPRECATED RDW RBC AUTO: 57.8 FL (ref 37–54)
EGFRCR SERPLBLD CKD-EPI 2021: 42.1 ML/MIN/1.73
EOSINOPHIL # BLD AUTO: 0.16 10*3/MM3 (ref 0–0.4)
EOSINOPHIL NFR BLD AUTO: 2.2 % (ref 0.3–6.2)
ERYTHROCYTE [DISTWIDTH] IN BLOOD BY AUTOMATED COUNT: 16.7 % (ref 12.3–15.4)
GLUCOSE SERPL-MCNC: 93 MG/DL (ref 65–99)
HCT VFR BLD AUTO: 35.1 % (ref 37.5–51)
HGB BLD-MCNC: 11.8 G/DL (ref 13–17.7)
IMM GRANULOCYTES # BLD AUTO: 0.04 10*3/MM3 (ref 0–0.05)
IMM GRANULOCYTES NFR BLD AUTO: 0.5 % (ref 0–0.5)
LYMPHOCYTES # BLD AUTO: 1.3 10*3/MM3 (ref 0.7–3.1)
LYMPHOCYTES NFR BLD AUTO: 17.5 % (ref 19.6–45.3)
MCH RBC QN AUTO: 31.8 PG (ref 26.6–33)
MCHC RBC AUTO-ENTMCNC: 33.6 G/DL (ref 31.5–35.7)
MCV RBC AUTO: 94.6 FL (ref 79–97)
MONOCYTES # BLD AUTO: 0.93 10*3/MM3 (ref 0.1–0.9)
MONOCYTES NFR BLD AUTO: 12.5 % (ref 5–12)
NEUTROPHILS NFR BLD AUTO: 4.94 10*3/MM3 (ref 1.7–7)
NEUTROPHILS NFR BLD AUTO: 66.5 % (ref 42.7–76)
NRBC BLD AUTO-RTO: 0.1 /100 WBC (ref 0–0.2)
PLATELET # BLD AUTO: 115 10*3/MM3 (ref 140–450)
PMV BLD AUTO: 11.2 FL (ref 6–12)
POTASSIUM SERPL-SCNC: 3.9 MMOL/L (ref 3.5–5.2)
RBC # BLD AUTO: 3.71 10*6/MM3 (ref 4.14–5.8)
SODIUM SERPL-SCNC: 136 MMOL/L (ref 136–145)
URATE SERPL-MCNC: 5.3 MG/DL (ref 3.4–7)
WBC NRBC COR # BLD: 7.43 10*3/MM3 (ref 3.4–10.8)

## 2022-04-16 PROCEDURE — 85025 COMPLETE CBC W/AUTO DIFF WBC: CPT | Performed by: INTERNAL MEDICINE

## 2022-04-16 PROCEDURE — 80048 BASIC METABOLIC PNL TOTAL CA: CPT | Performed by: INTERNAL MEDICINE

## 2022-04-16 PROCEDURE — 25010000002 FUROSEMIDE PER 20 MG: Performed by: INTERNAL MEDICINE

## 2022-04-16 PROCEDURE — 84550 ASSAY OF BLOOD/URIC ACID: CPT | Performed by: HOSPITALIST

## 2022-04-16 RX ORDER — FUROSEMIDE 40 MG/1
40 TABLET ORAL
Status: DISCONTINUED | OUTPATIENT
Start: 2022-04-17 | End: 2022-04-18

## 2022-04-16 RX ADMIN — BICALUTAMIDE 50 MG: 50 TABLET ORAL at 09:36

## 2022-04-16 RX ADMIN — CARVEDILOL 3.12 MG: 3.12 TABLET, FILM COATED ORAL at 09:36

## 2022-04-16 RX ADMIN — Medication 10 ML: at 09:36

## 2022-04-16 RX ADMIN — FUROSEMIDE 40 MG: 10 INJECTION, SOLUTION INTRAVENOUS at 09:36

## 2022-04-16 RX ADMIN — DOCUSATE SODIUM 50MG AND SENNOSIDES 8.6MG 2 TABLET: 8.6; 5 TABLET, FILM COATED ORAL at 09:35

## 2022-04-16 RX ADMIN — LATANOPROST 1 DROP: 50 SOLUTION OPHTHALMIC at 22:02

## 2022-04-16 RX ADMIN — ROSUVASTATIN CALCIUM 10 MG: 10 TABLET, FILM COATED ORAL at 09:36

## 2022-04-16 RX ADMIN — DOCUSATE SODIUM 50MG AND SENNOSIDES 8.6MG 2 TABLET: 8.6; 5 TABLET, FILM COATED ORAL at 22:02

## 2022-04-16 RX ADMIN — LEVOTHYROXINE SODIUM 75 MCG: 0.07 TABLET ORAL at 06:13

## 2022-04-16 RX ADMIN — LOSARTAN POTASSIUM 50 MG: 50 TABLET, FILM COATED ORAL at 09:35

## 2022-04-16 RX ADMIN — Medication 10 ML: at 23:13

## 2022-04-16 RX ADMIN — CARVEDILOL 3.12 MG: 3.12 TABLET, FILM COATED ORAL at 23:12

## 2022-04-17 ENCOUNTER — APPOINTMENT (OUTPATIENT)
Dept: CARDIOLOGY | Facility: HOSPITAL | Age: 87
End: 2022-04-17

## 2022-04-17 LAB
ANION GAP SERPL CALCULATED.3IONS-SCNC: 9 MMOL/L (ref 5–15)
ASCENDING AORTA: 2.7 CM
BASOPHILS # BLD AUTO: 0.05 10*3/MM3 (ref 0–0.2)
BASOPHILS NFR BLD AUTO: 0.7 % (ref 0–1.5)
BH CV ECHO MEAS - ACS: 1.81 CM
BH CV ECHO MEAS - AO MAX PG: 5 MMHG
BH CV ECHO MEAS - AO MEAN PG: 2.2 MMHG
BH CV ECHO MEAS - AO ROOT DIAM: 3.5 CM
BH CV ECHO MEAS - AO V2 MAX: 111.3 CM/SEC
BH CV ECHO MEAS - AO V2 VTI: 20.6 CM
BH CV ECHO MEAS - AVA(I,D): 2.9 CM2
BH CV ECHO MEAS - EDV(CUBED): 39.7 ML
BH CV ECHO MEAS - EDV(MOD-SP2): 74 ML
BH CV ECHO MEAS - EDV(MOD-SP4): 55 ML
BH CV ECHO MEAS - EF(MOD-BP): 54.5 %
BH CV ECHO MEAS - EF(MOD-SP2): 54.1 %
BH CV ECHO MEAS - EF(MOD-SP4): 54.5 %
BH CV ECHO MEAS - ESV(CUBED): 20.8 ML
BH CV ECHO MEAS - ESV(MOD-SP2): 34 ML
BH CV ECHO MEAS - ESV(MOD-SP4): 25 ML
BH CV ECHO MEAS - FS: 19.4 %
BH CV ECHO MEAS - IVS/LVPW: 0.8 CM
BH CV ECHO MEAS - IVSD: 1.56 CM
BH CV ECHO MEAS - LAT PEAK E' VEL: 8.6 CM/SEC
BH CV ECHO MEAS - LV DIASTOLIC VOL/BSA (35-75): 29.9 CM2
BH CV ECHO MEAS - LV MASS(C)D: 244 GRAMS
BH CV ECHO MEAS - LV MAX PG: 2.22 MMHG
BH CV ECHO MEAS - LV MEAN PG: 1.16 MMHG
BH CV ECHO MEAS - LV SYSTOLIC VOL/BSA (12-30): 13.6 CM2
BH CV ECHO MEAS - LV V1 MAX: 74.6 CM/SEC
BH CV ECHO MEAS - LV V1 VTI: 16.1 CM
BH CV ECHO MEAS - LVIDD: 3.4 CM
BH CV ECHO MEAS - LVIDS: 2.7 CM
BH CV ECHO MEAS - LVOT AREA: 3.7 CM2
BH CV ECHO MEAS - LVOT DIAM: 2.18 CM
BH CV ECHO MEAS - LVPWD: 1.95 CM
BH CV ECHO MEAS - MED PEAK E' VEL: 6.3 CM/SEC
BH CV ECHO MEAS - MV A DUR: 0.16 SEC
BH CV ECHO MEAS - MV A MAX VEL: 49.7 CM/SEC
BH CV ECHO MEAS - MV DEC SLOPE: 579 CM/SEC2
BH CV ECHO MEAS - MV DEC TIME: 0.21 MSEC
BH CV ECHO MEAS - MV E MAX VEL: 124 CM/SEC
BH CV ECHO MEAS - MV E/A: 2.49
BH CV ECHO MEAS - MV MAX PG: 8.1 MMHG
BH CV ECHO MEAS - MV MEAN PG: 2.8 MMHG
BH CV ECHO MEAS - MV V2 VTI: 38.8 CM
BH CV ECHO MEAS - MVA(VTI): 1.56 CM2
BH CV ECHO MEAS - PA ACC TIME: 0.13 SEC
BH CV ECHO MEAS - PA PR(ACCEL): 20.4 MMHG
BH CV ECHO MEAS - PA V2 MAX: 71.9 CM/SEC
BH CV ECHO MEAS - PI END-D VEL: 80.1 CM/SEC
BH CV ECHO MEAS - PULM DIAS VEL: 37.9 CM/SEC
BH CV ECHO MEAS - PULM SYS VEL: 27.5 CM/SEC
BH CV ECHO MEAS - RAP SYSTOLE: 15 MMHG
BH CV ECHO MEAS - RV MAX PG: 0.57 MMHG
BH CV ECHO MEAS - RV V1 MAX: 37.7 CM/SEC
BH CV ECHO MEAS - RV V1 VTI: 7.6 CM
BH CV ECHO MEAS - RVOT DIAM: 2.14 CM
BH CV ECHO MEAS - RVSP: 38.6 MMHG
BH CV ECHO MEAS - SI(MOD-SP2): 21.7 ML/M2
BH CV ECHO MEAS - SI(MOD-SP4): 16.3 ML/M2
BH CV ECHO MEAS - SUP REN AO DIAM: 2.5 CM
BH CV ECHO MEAS - SV(LVOT): 60.4 ML
BH CV ECHO MEAS - SV(MOD-SP2): 40 ML
BH CV ECHO MEAS - SV(MOD-SP4): 30 ML
BH CV ECHO MEAS - SV(RVOT): 27.4 ML
BH CV ECHO MEAS - TAPSE (>1.6): 1.53 CM
BH CV ECHO MEAS - TR MAX PG: 23.6 MMHG
BH CV ECHO MEAS - TR MAX VEL: 243 CM/SEC
BH CV ECHO MEASUREMENTS AVERAGE E/E' RATIO: 16.64
BH CV XLRA - RV BASE: 4.9 CM
BH CV XLRA - RV LENGTH: 7.5 CM
BH CV XLRA - RV MID: 4.6 CM
BH CV XLRA - TDI S': 10.7 CM/SEC
BUN SERPL-MCNC: 25 MG/DL (ref 8–23)
BUN/CREAT SERPL: 16.4 (ref 7–25)
CALCIUM SPEC-SCNC: 8.5 MG/DL (ref 8.2–9.6)
CHLORIDE SERPL-SCNC: 102 MMOL/L (ref 98–107)
CO2 SERPL-SCNC: 26 MMOL/L (ref 22–29)
CREAT SERPL-MCNC: 1.52 MG/DL (ref 0.76–1.27)
DEPRECATED RDW RBC AUTO: 55.4 FL (ref 37–54)
EGFRCR SERPLBLD CKD-EPI 2021: 42.7 ML/MIN/1.73
EOSINOPHIL # BLD AUTO: 0.14 10*3/MM3 (ref 0–0.4)
EOSINOPHIL NFR BLD AUTO: 2.1 % (ref 0.3–6.2)
ERYTHROCYTE [DISTWIDTH] IN BLOOD BY AUTOMATED COUNT: 16.8 % (ref 12.3–15.4)
GLUCOSE SERPL-MCNC: 88 MG/DL (ref 65–99)
HCT VFR BLD AUTO: 33.3 % (ref 37.5–51)
HGB BLD-MCNC: 11.8 G/DL (ref 13–17.7)
LEFT ATRIUM VOLUME INDEX: 32 ML/M2
LV EF 2D ECHO EST: 55 %
LYMPHOCYTES # BLD AUTO: 1.74 10*3/MM3 (ref 0.7–3.1)
LYMPHOCYTES NFR BLD AUTO: 25.6 % (ref 19.6–45.3)
MAXIMAL PREDICTED HEART RATE: 128 BPM
MCH RBC QN AUTO: 33.2 PG (ref 26.6–33)
MCHC RBC AUTO-ENTMCNC: 35.4 G/DL (ref 31.5–35.7)
MCV RBC AUTO: 93.8 FL (ref 79–97)
MONOCYTES # BLD AUTO: 0.76 10*3/MM3 (ref 0.1–0.9)
MONOCYTES NFR BLD AUTO: 11.2 % (ref 5–12)
NEUTROPHILS NFR BLD AUTO: 4.09 10*3/MM3 (ref 1.7–7)
NEUTROPHILS NFR BLD AUTO: 60 % (ref 42.7–76)
PLATELET # BLD AUTO: 138 10*3/MM3 (ref 140–450)
PMV BLD AUTO: 10.8 FL (ref 6–12)
POTASSIUM SERPL-SCNC: 4.2 MMOL/L (ref 3.5–5.2)
RBC # BLD AUTO: 3.55 10*6/MM3 (ref 4.14–5.8)
SINUS: 3.1 CM
SODIUM SERPL-SCNC: 137 MMOL/L (ref 136–145)
STJ: 2.8 CM
STRESS TARGET HR: 109 BPM
URATE SERPL-MCNC: 5.2 MG/DL (ref 3.4–7)
WBC NRBC COR # BLD: 6.81 10*3/MM3 (ref 3.4–10.8)

## 2022-04-17 PROCEDURE — 84550 ASSAY OF BLOOD/URIC ACID: CPT | Performed by: HOSPITALIST

## 2022-04-17 PROCEDURE — 80048 BASIC METABOLIC PNL TOTAL CA: CPT | Performed by: INTERNAL MEDICINE

## 2022-04-17 PROCEDURE — 85025 COMPLETE CBC W/AUTO DIFF WBC: CPT | Performed by: INTERNAL MEDICINE

## 2022-04-17 PROCEDURE — 93306 TTE W/DOPPLER COMPLETE: CPT

## 2022-04-17 PROCEDURE — 25010000002 PERFLUTREN (DEFINITY) 8.476 MG IN SODIUM CHLORIDE (PF) 0.9 % 10 ML INJECTION: Performed by: INTERNAL MEDICINE

## 2022-04-17 PROCEDURE — 93306 TTE W/DOPPLER COMPLETE: CPT | Performed by: INTERNAL MEDICINE

## 2022-04-17 PROCEDURE — 97110 THERAPEUTIC EXERCISES: CPT

## 2022-04-17 PROCEDURE — 97530 THERAPEUTIC ACTIVITIES: CPT

## 2022-04-17 RX ADMIN — DOCUSATE SODIUM 50MG AND SENNOSIDES 8.6MG 2 TABLET: 8.6; 5 TABLET, FILM COATED ORAL at 09:43

## 2022-04-17 RX ADMIN — LATANOPROST 1 DROP: 50 SOLUTION OPHTHALMIC at 20:24

## 2022-04-17 RX ADMIN — FUROSEMIDE 40 MG: 40 TABLET ORAL at 19:07

## 2022-04-17 RX ADMIN — FUROSEMIDE 40 MG: 40 TABLET ORAL at 09:47

## 2022-04-17 RX ADMIN — CARVEDILOL 3.12 MG: 3.12 TABLET, FILM COATED ORAL at 09:43

## 2022-04-17 RX ADMIN — BICALUTAMIDE 50 MG: 50 TABLET ORAL at 09:43

## 2022-04-17 RX ADMIN — LOSARTAN POTASSIUM 50 MG: 50 TABLET, FILM COATED ORAL at 09:43

## 2022-04-17 RX ADMIN — PERFLUTREN 3 ML: 6.52 INJECTION, SUSPENSION INTRAVENOUS at 11:42

## 2022-04-17 RX ADMIN — CARVEDILOL 3.12 MG: 3.12 TABLET, FILM COATED ORAL at 20:24

## 2022-04-17 RX ADMIN — ROSUVASTATIN CALCIUM 10 MG: 10 TABLET, FILM COATED ORAL at 09:43

## 2022-04-17 RX ADMIN — Medication 10 ML: at 20:24

## 2022-04-17 RX ADMIN — Medication 10 ML: at 09:43

## 2022-04-18 LAB
ANION GAP SERPL CALCULATED.3IONS-SCNC: 10.9 MMOL/L (ref 5–15)
BASOPHILS # BLD AUTO: 0.07 10*3/MM3 (ref 0–0.2)
BASOPHILS NFR BLD AUTO: 0.9 % (ref 0–1.5)
BUN SERPL-MCNC: 23 MG/DL (ref 8–23)
BUN/CREAT SERPL: 15.8 (ref 7–25)
CALCIUM SPEC-SCNC: 8.7 MG/DL (ref 8.2–9.6)
CHLORIDE SERPL-SCNC: 99 MMOL/L (ref 98–107)
CO2 SERPL-SCNC: 27.1 MMOL/L (ref 22–29)
CREAT SERPL-MCNC: 1.46 MG/DL (ref 0.76–1.27)
DEPRECATED RDW RBC AUTO: 55.1 FL (ref 37–54)
EGFRCR SERPLBLD CKD-EPI 2021: 44.8 ML/MIN/1.73
EOSINOPHIL # BLD AUTO: 0.21 10*3/MM3 (ref 0–0.4)
EOSINOPHIL NFR BLD AUTO: 2.7 % (ref 0.3–6.2)
ERYTHROCYTE [DISTWIDTH] IN BLOOD BY AUTOMATED COUNT: 16.2 % (ref 12.3–15.4)
GLUCOSE SERPL-MCNC: 93 MG/DL (ref 65–99)
HCT VFR BLD AUTO: 37 % (ref 37.5–51)
HGB BLD-MCNC: 12.9 G/DL (ref 13–17.7)
IMM GRANULOCYTES # BLD AUTO: 0.04 10*3/MM3 (ref 0–0.05)
IMM GRANULOCYTES NFR BLD AUTO: 0.5 % (ref 0–0.5)
LYMPHOCYTES # BLD AUTO: 1.57 10*3/MM3 (ref 0.7–3.1)
LYMPHOCYTES NFR BLD AUTO: 20 % (ref 19.6–45.3)
MCH RBC QN AUTO: 32.5 PG (ref 26.6–33)
MCHC RBC AUTO-ENTMCNC: 34.9 G/DL (ref 31.5–35.7)
MCV RBC AUTO: 93.2 FL (ref 79–97)
MONOCYTES # BLD AUTO: 0.82 10*3/MM3 (ref 0.1–0.9)
MONOCYTES NFR BLD AUTO: 10.4 % (ref 5–12)
NEUTROPHILS NFR BLD AUTO: 5.14 10*3/MM3 (ref 1.7–7)
NEUTROPHILS NFR BLD AUTO: 65.5 % (ref 42.7–76)
NRBC BLD AUTO-RTO: 0.1 /100 WBC (ref 0–0.2)
PLATELET # BLD AUTO: 145 10*3/MM3 (ref 140–450)
PMV BLD AUTO: 10.5 FL (ref 6–12)
POTASSIUM SERPL-SCNC: 3.7 MMOL/L (ref 3.5–5.2)
RBC # BLD AUTO: 3.97 10*6/MM3 (ref 4.14–5.8)
SODIUM SERPL-SCNC: 137 MMOL/L (ref 136–145)
WBC NRBC COR # BLD: 7.85 10*3/MM3 (ref 3.4–10.8)

## 2022-04-18 PROCEDURE — 80048 BASIC METABOLIC PNL TOTAL CA: CPT | Performed by: INTERNAL MEDICINE

## 2022-04-18 PROCEDURE — 85025 COMPLETE CBC W/AUTO DIFF WBC: CPT | Performed by: INTERNAL MEDICINE

## 2022-04-18 RX ORDER — SODIUM CHLORIDE 9 MG/ML
100 INJECTION, SOLUTION INTRAVENOUS ONCE
Status: COMPLETED | OUTPATIENT
Start: 2022-04-18 | End: 2022-04-18

## 2022-04-18 RX ADMIN — LEVOTHYROXINE SODIUM 75 MCG: 0.07 TABLET ORAL at 06:04

## 2022-04-18 RX ADMIN — SODIUM CHLORIDE 100 ML/HR: 9 INJECTION, SOLUTION INTRAVENOUS at 09:46

## 2022-04-18 RX ADMIN — FUROSEMIDE 40 MG: 40 TABLET ORAL at 08:07

## 2022-04-18 RX ADMIN — CARVEDILOL 3.12 MG: 3.12 TABLET, FILM COATED ORAL at 08:07

## 2022-04-18 RX ADMIN — Medication 10 ML: at 20:36

## 2022-04-18 RX ADMIN — ROSUVASTATIN CALCIUM 10 MG: 10 TABLET, FILM COATED ORAL at 08:07

## 2022-04-18 RX ADMIN — LATANOPROST 1 DROP: 50 SOLUTION OPHTHALMIC at 20:36

## 2022-04-18 RX ADMIN — LOSARTAN POTASSIUM 50 MG: 50 TABLET, FILM COATED ORAL at 08:07

## 2022-04-18 RX ADMIN — BICALUTAMIDE 50 MG: 50 TABLET ORAL at 08:07

## 2022-04-19 ENCOUNTER — APPOINTMENT (OUTPATIENT)
Dept: RADIATION ONCOLOGY | Facility: HOSPITAL | Age: 87
End: 2022-04-19

## 2022-04-19 LAB
BASOPHILS # BLD AUTO: 0.07 10*3/MM3 (ref 0–0.2)
BASOPHILS NFR BLD AUTO: 0.9 % (ref 0–1.5)
DEPRECATED RDW RBC AUTO: 59.2 FL (ref 37–54)
EOSINOPHIL # BLD AUTO: 0.2 10*3/MM3 (ref 0–0.4)
EOSINOPHIL NFR BLD AUTO: 2.5 % (ref 0.3–6.2)
ERYTHROCYTE [DISTWIDTH] IN BLOOD BY AUTOMATED COUNT: 16.4 % (ref 12.3–15.4)
HCT VFR BLD AUTO: 38.1 % (ref 37.5–51)
HGB BLD-MCNC: 12.7 G/DL (ref 13–17.7)
IMM GRANULOCYTES # BLD AUTO: 0.04 10*3/MM3 (ref 0–0.05)
IMM GRANULOCYTES NFR BLD AUTO: 0.5 % (ref 0–0.5)
INR PPP: 1.3 (ref 0.9–1.1)
LYMPHOCYTES # BLD AUTO: 1.56 10*3/MM3 (ref 0.7–3.1)
LYMPHOCYTES NFR BLD AUTO: 19.9 % (ref 19.6–45.3)
MCH RBC QN AUTO: 32.5 PG (ref 26.6–33)
MCHC RBC AUTO-ENTMCNC: 33.3 G/DL (ref 31.5–35.7)
MCV RBC AUTO: 97.4 FL (ref 79–97)
MONOCYTES # BLD AUTO: 0.76 10*3/MM3 (ref 0.1–0.9)
MONOCYTES NFR BLD AUTO: 9.7 % (ref 5–12)
NEUTROPHILS NFR BLD AUTO: 5.22 10*3/MM3 (ref 1.7–7)
NEUTROPHILS NFR BLD AUTO: 66.5 % (ref 42.7–76)
NRBC BLD AUTO-RTO: 0.1 /100 WBC (ref 0–0.2)
PLATELET # BLD AUTO: 159 10*3/MM3 (ref 140–450)
PMV BLD AUTO: 10.8 FL (ref 6–12)
PROTHROMBIN TIME: 16.1 SECONDS (ref 11.7–14.2)
RBC # BLD AUTO: 3.91 10*6/MM3 (ref 4.14–5.8)
WBC NRBC COR # BLD: 7.85 10*3/MM3 (ref 3.4–10.8)

## 2022-04-19 PROCEDURE — 77014 CHG CT GUIDANCE RADIATION THERAPY FLDS PLACEMENT: CPT | Performed by: RADIOLOGY

## 2022-04-19 PROCEDURE — 85025 COMPLETE CBC W/AUTO DIFF WBC: CPT | Performed by: INTERNAL MEDICINE

## 2022-04-19 PROCEDURE — 97110 THERAPEUTIC EXERCISES: CPT

## 2022-04-19 PROCEDURE — 77386: CPT | Performed by: RADIOLOGY

## 2022-04-19 PROCEDURE — 85610 PROTHROMBIN TIME: CPT | Performed by: INTERNAL MEDICINE

## 2022-04-19 PROCEDURE — 77336 RADIATION PHYSICS CONSULT: CPT | Performed by: RADIOLOGY

## 2022-04-19 PROCEDURE — 77386 CHG INTENSITY MODULATED RADIATION TX DLVR COMPLEX: CPT | Performed by: RADIOLOGY

## 2022-04-19 RX ORDER — SODIUM CHLORIDE 9 MG/ML
60 INJECTION, SOLUTION INTRAVENOUS CONTINUOUS
Status: DISCONTINUED | OUTPATIENT
Start: 2022-04-19 | End: 2022-04-22

## 2022-04-19 RX ADMIN — Medication 10 ML: at 20:33

## 2022-04-19 RX ADMIN — BICALUTAMIDE 50 MG: 50 TABLET ORAL at 09:20

## 2022-04-19 RX ADMIN — LEVOTHYROXINE SODIUM 75 MCG: 0.07 TABLET ORAL at 06:35

## 2022-04-19 RX ADMIN — APIXABAN 5 MG: 5 TABLET, FILM COATED ORAL at 20:30

## 2022-04-19 RX ADMIN — LATANOPROST 1 DROP: 50 SOLUTION OPHTHALMIC at 20:33

## 2022-04-19 RX ADMIN — DOCUSATE SODIUM 50MG AND SENNOSIDES 8.6MG 2 TABLET: 8.6; 5 TABLET, FILM COATED ORAL at 20:30

## 2022-04-19 RX ADMIN — SODIUM CHLORIDE 60 ML/HR: 9 INJECTION, SOLUTION INTRAVENOUS at 16:00

## 2022-04-19 RX ADMIN — ROSUVASTATIN CALCIUM 10 MG: 10 TABLET, FILM COATED ORAL at 09:20

## 2022-04-20 ENCOUNTER — RADIATION ONCOLOGY WEEKLY ASSESSMENT (OUTPATIENT)
Dept: RADIATION ONCOLOGY | Facility: HOSPITAL | Age: 87
End: 2022-04-20

## 2022-04-20 ENCOUNTER — APPOINTMENT (OUTPATIENT)
Dept: RADIATION ONCOLOGY | Facility: HOSPITAL | Age: 87
End: 2022-04-20

## 2022-04-20 DIAGNOSIS — C44.320 SCC (SQUAMOUS CELL CARCINOMA), FACE: Primary | ICD-10-CM

## 2022-04-20 LAB
ANION GAP SERPL CALCULATED.3IONS-SCNC: 8.4 MMOL/L (ref 5–15)
BASOPHILS # BLD AUTO: 0.09 10*3/MM3 (ref 0–0.2)
BASOPHILS NFR BLD AUTO: 1.3 % (ref 0–1.5)
BUN SERPL-MCNC: 25 MG/DL (ref 8–23)
BUN/CREAT SERPL: 18.5 (ref 7–25)
CALCIUM SPEC-SCNC: 8.4 MG/DL (ref 8.2–9.6)
CHLORIDE SERPL-SCNC: 100 MMOL/L (ref 98–107)
CO2 SERPL-SCNC: 24.6 MMOL/L (ref 22–29)
CORTIS SERPL-MCNC: 8.52 MCG/DL
CREAT SERPL-MCNC: 1.35 MG/DL (ref 0.76–1.27)
DEPRECATED RDW RBC AUTO: 55 FL (ref 37–54)
EGFRCR SERPLBLD CKD-EPI 2021: 49.3 ML/MIN/1.73
EOSINOPHIL # BLD AUTO: 0.27 10*3/MM3 (ref 0–0.4)
EOSINOPHIL NFR BLD AUTO: 3.9 % (ref 0.3–6.2)
ERYTHROCYTE [DISTWIDTH] IN BLOOD BY AUTOMATED COUNT: 16.4 % (ref 12.3–15.4)
GLUCOSE SERPL-MCNC: 96 MG/DL (ref 65–99)
HCT VFR BLD AUTO: 34.3 % (ref 37.5–51)
HGB BLD-MCNC: 11.9 G/DL (ref 13–17.7)
IMM GRANULOCYTES # BLD AUTO: 0.05 10*3/MM3 (ref 0–0.05)
IMM GRANULOCYTES NFR BLD AUTO: 0.7 % (ref 0–0.5)
LYMPHOCYTES # BLD AUTO: 1.49 10*3/MM3 (ref 0.7–3.1)
LYMPHOCYTES NFR BLD AUTO: 21.6 % (ref 19.6–45.3)
MCH RBC QN AUTO: 32.4 PG (ref 26.6–33)
MCHC RBC AUTO-ENTMCNC: 34.7 G/DL (ref 31.5–35.7)
MCV RBC AUTO: 93.5 FL (ref 79–97)
MONOCYTES # BLD AUTO: 0.69 10*3/MM3 (ref 0.1–0.9)
MONOCYTES NFR BLD AUTO: 10 % (ref 5–12)
NEUTROPHILS NFR BLD AUTO: 4.3 10*3/MM3 (ref 1.7–7)
NEUTROPHILS NFR BLD AUTO: 62.5 % (ref 42.7–76)
NRBC BLD AUTO-RTO: 0 /100 WBC (ref 0–0.2)
PLATELET # BLD AUTO: 154 10*3/MM3 (ref 140–450)
PMV BLD AUTO: 10.5 FL (ref 6–12)
POTASSIUM SERPL-SCNC: 4.3 MMOL/L (ref 3.5–5.2)
RBC # BLD AUTO: 3.67 10*6/MM3 (ref 4.14–5.8)
SODIUM SERPL-SCNC: 133 MMOL/L (ref 136–145)
WBC NRBC COR # BLD: 6.89 10*3/MM3 (ref 3.4–10.8)

## 2022-04-20 PROCEDURE — FACE2FACE: Performed by: RADIOLOGY

## 2022-04-20 PROCEDURE — 80048 BASIC METABOLIC PNL TOTAL CA: CPT | Performed by: INTERNAL MEDICINE

## 2022-04-20 PROCEDURE — 77386: CPT | Performed by: RADIOLOGY

## 2022-04-20 PROCEDURE — 82533 TOTAL CORTISOL: CPT | Performed by: INTERNAL MEDICINE

## 2022-04-20 PROCEDURE — 77014 CHG CT GUIDANCE RADIATION THERAPY FLDS PLACEMENT: CPT | Performed by: RADIOLOGY

## 2022-04-20 PROCEDURE — 77386 CHG INTENSITY MODULATED RADIATION TX DLVR COMPLEX: CPT | Performed by: RADIOLOGY

## 2022-04-20 PROCEDURE — 85025 COMPLETE CBC W/AUTO DIFF WBC: CPT | Performed by: INTERNAL MEDICINE

## 2022-04-20 PROCEDURE — 97530 THERAPEUTIC ACTIVITIES: CPT

## 2022-04-20 RX ADMIN — ROSUVASTATIN CALCIUM 10 MG: 10 TABLET, FILM COATED ORAL at 08:25

## 2022-04-20 RX ADMIN — Medication 10 ML: at 20:09

## 2022-04-20 RX ADMIN — APIXABAN 5 MG: 5 TABLET, FILM COATED ORAL at 20:08

## 2022-04-20 RX ADMIN — LATANOPROST 1 DROP: 50 SOLUTION OPHTHALMIC at 20:08

## 2022-04-20 RX ADMIN — SILVER SULFADIAZINE 1 APPLICATION: 10 CREAM TOPICAL at 20:08

## 2022-04-20 RX ADMIN — APIXABAN 5 MG: 5 TABLET, FILM COATED ORAL at 08:25

## 2022-04-20 RX ADMIN — DOCUSATE SODIUM 50MG AND SENNOSIDES 8.6MG 2 TABLET: 8.6; 5 TABLET, FILM COATED ORAL at 08:25

## 2022-04-20 RX ADMIN — BICALUTAMIDE 50 MG: 50 TABLET ORAL at 08:24

## 2022-04-20 RX ADMIN — LEVOTHYROXINE SODIUM 75 MCG: 0.07 TABLET ORAL at 06:19

## 2022-04-20 RX ADMIN — SODIUM CHLORIDE 60 ML/HR: 9 INJECTION, SOLUTION INTRAVENOUS at 06:19

## 2022-04-20 NOTE — PROGRESS NOTES
Patient Name: Bridger Elias Date: 2022       : 1930        MRN #: [id number] Diagnosis:   Encounter Diagnosis   Name Primary?   • SCC (squamous cell carcinoma), face Yes                     RADIATION ON TREATMENT VISIT NOTE - HEAD AND NECK     Treatment Summary      Treatment Site Ref. ID Energy Dose/Fx (cGy) #Fx Dose Correction (cGy) Total Dose (cGy) Start Date End Date Elapsed Days   RT FACE RXPT:R FACE 6X 200 25 / 33 0 5,000 3/15/2022 2022 36     Treatment Site Ref. ID Energy Dose/Fx (cGy) #Fx Dose Correction (cGy) Total Dose (cGy) Start Date End Date Elapsed Days   L FACEMixed e RXPT: L FACE 6E/9E 200 24 /  0 4,800 3/16/2022 2022 35     Today is day #6 of inpatient stay  He will be discharged to rehab  General:           Review of Systems    [] No new complaints  [] Dysphagia   [] Eye pain/discharge  [] Dry mouth   [] Hair loss   [] Earache/pain/discharge  [] Loss of taste  [] Supplemental feeding:  [] PO [] PEG/NG    kcals/d  [] Mouth soreness/tenderness,  severity: ----------------      [x] Fatigue,  severity: 1 Relief w/ Rest  [] Pain,  severity: ----------------, location:     Oral care regimen: NONE   Pain medication regimen: NONE   Skin regimen: Aquaphor     Comments/Notes:  Area has some desquamation---will discuss Silvadene 1% cream TID to affected areas Rt face and Lt temple    KPS: 70%       Vital Signs: There were no vitals taken for this visit.    Weight:   Wt Readings from Last 3 Encounters:   22 72.6 kg (160 lb 1.6 oz)   22 77.8 kg (171 lb 9.6 oz)   22 73.2 kg (161 lb 6.4 oz)       Medication: No current facility-administered medications for this visit.  No current outpatient medications on file.    Facility-Administered Medications Ordered in Other Visits:   •  acetaminophen (TYLENOL) tablet 650 mg, 650 mg, Oral, Q4H PRN **OR** acetaminophen (TYLENOL) 160 MG/5ML solution 650 mg, 650 mg, Oral, Q4H PRN **OR** acetaminophen (TYLENOL) suppository 650 mg,  650 mg, Rectal, Q4H PRN, Yvonne Robbins MD  •  apixaban (ELIQUIS) tablet 5 mg, 5 mg, Oral, Q12H, Medardo Vanegas MD, 5 mg at 04/20/22 0825  •  bicalutamide (CASODEX) chemo tablet 50 mg, 50 mg, Oral, Daily, Yvonne Robbins MD, 50 mg at 04/20/22 0824  •  sennosides-docusate (PERICOLACE) 8.6-50 MG per tablet 2 tablet, 2 tablet, Oral, BID, 2 tablet at 04/20/22 0825 **AND** polyethylene glycol (MIRALAX) packet 17 g, 17 g, Oral, Daily PRN **AND** bisacodyl (DULCOLAX) EC tablet 5 mg, 5 mg, Oral, Daily PRN **AND** bisacodyl (DULCOLAX) suppository 10 mg, 10 mg, Rectal, Daily PRN, Yvonne Robbins MD  •  calcium carbonate (TUMS) chewable tablet 500 mg (200 mg elemental), 2 tablet, Oral, BID PRN, Yvonne Robbins MD  •  latanoprost (XALATAN) 0.005 % ophthalmic solution 1 drop, 1 drop, Both Eyes, Nightly, Yvonne Robbins MD, 1 drop at 04/19/22 2033  •  levothyroxine (SYNTHROID, LEVOTHROID) tablet 75 mcg, 75 mcg, Oral, Q AM, Yvonne Robbins MD, 75 mcg at 04/20/22 0619  •  nitroglycerin (NITROSTAT) SL tablet 0.4 mg, 0.4 mg, Sublingual, Q5 Min PRN, Yvonne Robbins MD  •  ondansetron (ZOFRAN) tablet 4 mg, 4 mg, Oral, Q6H PRN **OR** ondansetron (ZOFRAN) injection 4 mg, 4 mg, Intravenous, Q6H PRN, Yvonne Robbins MD  •  rosuvastatin (CRESTOR) tablet 10 mg, 10 mg, Oral, Daily, Yvonne Robbins MD, 10 mg at 04/20/22 0825  •  sodium chloride 0.9 % flush 1-10 mL, 1-10 mL, Intravenous, PRN, Yvonne Robbins MD  •  sodium chloride 0.9 % flush 10 mL, 10 mL, Intravenous, Q12H, Yvonne Robbins MD, 10 mL at 04/19/22 2033  •  sodium chloride 0.9 % infusion, 60 mL/hr, Intravenous, Continuous, Jovanny Buenrostro MD, Last Rate: 60 mL/hr at 04/20/22 0619, 60 mL/hr at 04/20/22 0619       LABS (Reviewed):  Hematology WBC   Date Value Ref Range Status   04/20/2022 6.89 3.40 - 10.80 10*3/mm3 Final     RBC   Date Value Ref Range Status   04/20/2022  3.67 (L) 4.14 - 5.80 10*6/mm3 Final     Hemoglobin   Date Value Ref Range Status   04/20/2022 11.9 (L) 13.0 - 17.7 g/dL Final     Hematocrit   Date Value Ref Range Status   04/20/2022 34.3 (L) 37.5 - 51.0 % Final     Platelets   Date Value Ref Range Status   04/20/2022 154 140 - 450 10*3/mm3 Final      Chemistry   Lab Results   Component Value Date    GLUCOSE 96 04/20/2022    BUN 25 (H) 04/20/2022    CREATININE 1.35 (H) 04/20/2022    EGFRIFNONA 60 (L) 07/20/2021    BCR 18.5 04/20/2022    K 4.3 04/20/2022    CO2 24.6 04/20/2022    CALCIUM 8.4 04/20/2022    ALBUMIN 3.60 04/14/2022    AST 30 04/14/2022    ALT 20 04/14/2022         Physical Exam:         Neck: [] Lymphadenopathy  Lungs: [x] Clear to auscultation  HEENT: [x] Throat clear   [] Oral cavity clear  [] TM clear   [] Nares patent   [] Xerostomia   [] Mucositis/stomatitis   [] Dysphagia   [] Voice changes/dysarthia [] PERRLA/EOMI    [] Otitis, external ear (non-infectious):   Skin: [x] stGstrstastdstest:st st1st Comments/Notes: recommending Silvadene TID    [x] Review of labs, images, dosimetry, dose delivered, & treatment parameters.    Comments:     [x] Patient treatment setup reviewed.    Comments:     Recommendations: Silvadene 1% TID to affected Rt/Lt face XRT areas.      [x] Continue RT  [] Change RT Plan [] Hold RT, length:        Approved Electronically By:  Maurice Cook MD, 4/20/2022, 16:04 EDT          Confidentiality of this medical record shall be maintained except when use or disclosure is required or permitted by law, regulation or written authorization by the patient.

## 2022-04-21 ENCOUNTER — APPOINTMENT (OUTPATIENT)
Dept: RADIATION ONCOLOGY | Facility: HOSPITAL | Age: 87
End: 2022-04-21

## 2022-04-21 LAB
BASOPHILS # BLD AUTO: 0.09 10*3/MM3 (ref 0–0.2)
BASOPHILS NFR BLD AUTO: 1.1 % (ref 0–1.5)
DEPRECATED RDW RBC AUTO: 59.9 FL (ref 37–54)
EOSINOPHIL # BLD AUTO: 0.25 10*3/MM3 (ref 0–0.4)
EOSINOPHIL NFR BLD AUTO: 3.1 % (ref 0.3–6.2)
ERYTHROCYTE [DISTWIDTH] IN BLOOD BY AUTOMATED COUNT: 16.7 % (ref 12.3–15.4)
HCT VFR BLD AUTO: 35.9 % (ref 37.5–51)
HGB BLD-MCNC: 12.1 G/DL (ref 13–17.7)
IMM GRANULOCYTES # BLD AUTO: 0.05 10*3/MM3 (ref 0–0.05)
IMM GRANULOCYTES NFR BLD AUTO: 0.6 % (ref 0–0.5)
LYMPHOCYTES # BLD AUTO: 1.71 10*3/MM3 (ref 0.7–3.1)
LYMPHOCYTES NFR BLD AUTO: 21.1 % (ref 19.6–45.3)
MCH RBC QN AUTO: 32.5 PG (ref 26.6–33)
MCHC RBC AUTO-ENTMCNC: 33.7 G/DL (ref 31.5–35.7)
MCV RBC AUTO: 96.5 FL (ref 79–97)
MONOCYTES # BLD AUTO: 0.76 10*3/MM3 (ref 0.1–0.9)
MONOCYTES NFR BLD AUTO: 9.4 % (ref 5–12)
NEUTROPHILS NFR BLD AUTO: 5.25 10*3/MM3 (ref 1.7–7)
NEUTROPHILS NFR BLD AUTO: 64.7 % (ref 42.7–76)
NRBC BLD AUTO-RTO: 0 /100 WBC (ref 0–0.2)
PLATELET # BLD AUTO: 150 10*3/MM3 (ref 140–450)
PMV BLD AUTO: 9.9 FL (ref 6–12)
RBC # BLD AUTO: 3.72 10*6/MM3 (ref 4.14–5.8)
WBC NRBC COR # BLD: 8.11 10*3/MM3 (ref 3.4–10.8)

## 2022-04-21 PROCEDURE — 77386: CPT | Performed by: RADIOLOGY

## 2022-04-21 PROCEDURE — 77427 RADIATION TX MANAGEMENT X5: CPT | Performed by: RADIOLOGY

## 2022-04-21 PROCEDURE — 97535 SELF CARE MNGMENT TRAINING: CPT

## 2022-04-21 PROCEDURE — 97530 THERAPEUTIC ACTIVITIES: CPT

## 2022-04-21 PROCEDURE — 97110 THERAPEUTIC EXERCISES: CPT

## 2022-04-21 PROCEDURE — 77014 CHG CT GUIDANCE RADIATION THERAPY FLDS PLACEMENT: CPT | Performed by: RADIOLOGY

## 2022-04-21 PROCEDURE — 85025 COMPLETE CBC W/AUTO DIFF WBC: CPT | Performed by: INTERNAL MEDICINE

## 2022-04-21 RX ADMIN — SILVER SULFADIAZINE 1 APPLICATION: 10 CREAM TOPICAL at 18:38

## 2022-04-21 RX ADMIN — ROSUVASTATIN CALCIUM 10 MG: 10 TABLET, FILM COATED ORAL at 09:02

## 2022-04-21 RX ADMIN — Medication 10 ML: at 09:03

## 2022-04-21 RX ADMIN — APIXABAN 5 MG: 5 TABLET, FILM COATED ORAL at 09:02

## 2022-04-21 RX ADMIN — BICALUTAMIDE 50 MG: 50 TABLET ORAL at 08:52

## 2022-04-21 RX ADMIN — Medication 10 ML: at 20:04

## 2022-04-21 RX ADMIN — SILVER SULFADIAZINE 1 APPLICATION: 10 CREAM TOPICAL at 20:03

## 2022-04-21 RX ADMIN — SILVER SULFADIAZINE 1 APPLICATION: 10 CREAM TOPICAL at 09:03

## 2022-04-21 RX ADMIN — LATANOPROST 1 DROP: 50 SOLUTION OPHTHALMIC at 20:03

## 2022-04-21 RX ADMIN — APIXABAN 5 MG: 5 TABLET, FILM COATED ORAL at 20:03

## 2022-04-21 RX ADMIN — LEVOTHYROXINE SODIUM 75 MCG: 0.07 TABLET ORAL at 06:24

## 2022-04-21 RX ADMIN — DOCUSATE SODIUM 50MG AND SENNOSIDES 8.6MG 2 TABLET: 8.6; 5 TABLET, FILM COATED ORAL at 09:03

## 2022-04-22 ENCOUNTER — APPOINTMENT (OUTPATIENT)
Dept: RADIATION ONCOLOGY | Facility: HOSPITAL | Age: 87
End: 2022-04-22

## 2022-04-22 VITALS
HEART RATE: 59 BPM | RESPIRATION RATE: 18 BRPM | TEMPERATURE: 98.1 F | SYSTOLIC BLOOD PRESSURE: 106 MMHG | BODY MASS INDEX: 26.42 KG/M2 | DIASTOLIC BLOOD PRESSURE: 58 MMHG | HEIGHT: 67 IN | WEIGHT: 168.3 LBS | OXYGEN SATURATION: 96 %

## 2022-04-22 LAB
BASOPHILS # BLD AUTO: 0.09 10*3/MM3 (ref 0–0.2)
BASOPHILS NFR BLD AUTO: 1.3 % (ref 0–1.5)
DEPRECATED RDW RBC AUTO: 52.7 FL (ref 37–54)
EOSINOPHIL # BLD AUTO: 0.22 10*3/MM3 (ref 0–0.4)
EOSINOPHIL NFR BLD AUTO: 3.1 % (ref 0.3–6.2)
ERYTHROCYTE [DISTWIDTH] IN BLOOD BY AUTOMATED COUNT: 16 % (ref 12.3–15.4)
HCT VFR BLD AUTO: 35 % (ref 37.5–51)
HGB BLD-MCNC: 12.3 G/DL (ref 13–17.7)
IMM GRANULOCYTES # BLD AUTO: 0.03 10*3/MM3 (ref 0–0.05)
IMM GRANULOCYTES NFR BLD AUTO: 0.4 % (ref 0–0.5)
LYMPHOCYTES # BLD AUTO: 1.65 10*3/MM3 (ref 0.7–3.1)
LYMPHOCYTES NFR BLD AUTO: 23.6 % (ref 19.6–45.3)
MCH RBC QN AUTO: 32.5 PG (ref 26.6–33)
MCHC RBC AUTO-ENTMCNC: 35.1 G/DL (ref 31.5–35.7)
MCV RBC AUTO: 92.3 FL (ref 79–97)
MONOCYTES # BLD AUTO: 0.72 10*3/MM3 (ref 0.1–0.9)
MONOCYTES NFR BLD AUTO: 10.3 % (ref 5–12)
NEUTROPHILS NFR BLD AUTO: 4.28 10*3/MM3 (ref 1.7–7)
NEUTROPHILS NFR BLD AUTO: 61.3 % (ref 42.7–76)
NRBC BLD AUTO-RTO: 0.1 /100 WBC (ref 0–0.2)
PLATELET # BLD AUTO: 151 10*3/MM3 (ref 140–450)
PMV BLD AUTO: 10.2 FL (ref 6–12)
RBC # BLD AUTO: 3.79 10*6/MM3 (ref 4.14–5.8)
WBC NRBC COR # BLD: 6.99 10*3/MM3 (ref 3.4–10.8)

## 2022-04-22 PROCEDURE — 97535 SELF CARE MNGMENT TRAINING: CPT

## 2022-04-22 PROCEDURE — 77386: CPT | Performed by: RADIOLOGY

## 2022-04-22 PROCEDURE — 85025 COMPLETE CBC W/AUTO DIFF WBC: CPT | Performed by: INTERNAL MEDICINE

## 2022-04-22 PROCEDURE — 77014 CHG CT GUIDANCE RADIATION THERAPY FLDS PLACEMENT: CPT | Performed by: RADIOLOGY

## 2022-04-22 RX ORDER — ACETAMINOPHEN 325 MG/1
650 TABLET ORAL EVERY 6 HOURS PRN
Status: ON HOLD
Start: 2022-04-22

## 2022-04-22 RX ORDER — POLYETHYLENE GLYCOL 3350 17 G/17G
17 POWDER, FOR SOLUTION ORAL DAILY PRN
Status: ON HOLD
Start: 2022-04-22

## 2022-04-22 RX ADMIN — BICALUTAMIDE 50 MG: 50 TABLET ORAL at 08:14

## 2022-04-22 RX ADMIN — ROSUVASTATIN CALCIUM 10 MG: 10 TABLET, FILM COATED ORAL at 08:14

## 2022-04-22 RX ADMIN — LEVOTHYROXINE SODIUM 75 MCG: 0.07 TABLET ORAL at 05:29

## 2022-04-22 RX ADMIN — APIXABAN 5 MG: 5 TABLET, FILM COATED ORAL at 08:14

## 2022-04-22 RX ADMIN — SILVER SULFADIAZINE 1 APPLICATION: 10 CREAM TOPICAL at 08:15

## 2022-04-25 ENCOUNTER — APPOINTMENT (OUTPATIENT)
Dept: RADIATION ONCOLOGY | Facility: HOSPITAL | Age: 87
End: 2022-04-25

## 2022-04-25 PROCEDURE — 77014 CHG CT GUIDANCE RADIATION THERAPY FLDS PLACEMENT: CPT | Performed by: RADIOLOGY

## 2022-04-25 PROCEDURE — 77386: CPT | Performed by: RADIOLOGY

## 2022-04-26 ENCOUNTER — APPOINTMENT (OUTPATIENT)
Dept: RADIATION ONCOLOGY | Facility: HOSPITAL | Age: 87
End: 2022-04-26

## 2022-04-26 PROCEDURE — 77336 RADIATION PHYSICS CONSULT: CPT | Performed by: RADIOLOGY

## 2022-04-26 PROCEDURE — 77386: CPT | Performed by: RADIOLOGY

## 2022-04-27 ENCOUNTER — RADIATION ONCOLOGY WEEKLY ASSESSMENT (OUTPATIENT)
Dept: RADIATION ONCOLOGY | Facility: HOSPITAL | Age: 87
End: 2022-04-27

## 2022-04-27 ENCOUNTER — APPOINTMENT (OUTPATIENT)
Dept: RADIATION ONCOLOGY | Facility: HOSPITAL | Age: 87
End: 2022-04-27

## 2022-04-27 VITALS — HEART RATE: 60 BPM | DIASTOLIC BLOOD PRESSURE: 90 MMHG | OXYGEN SATURATION: 94 % | SYSTOLIC BLOOD PRESSURE: 138 MMHG

## 2022-04-27 DIAGNOSIS — C44.320 SCC (SQUAMOUS CELL CARCINOMA), FACE: Primary | ICD-10-CM

## 2022-04-27 PROCEDURE — 77386: CPT | Performed by: RADIOLOGY

## 2022-04-27 PROCEDURE — 77014 CHG CT GUIDANCE RADIATION THERAPY FLDS PLACEMENT: CPT | Performed by: RADIOLOGY

## 2022-04-27 NOTE — PROGRESS NOTES
Patient Name: Bridger Elias Date: 2022       : 1930        MRN #: [id number] Diagnosis:   Encounter Diagnosis   Name Primary?   • SCC (squamous cell carcinoma), face Yes                     RADIATION ON TREATMENT VISIT NOTE - HEAD AND NECK     Treatment Summary      Treatment Site Ref. ID Energy Dose/Fx (cGy) #Fx Dose Correction (cGy) Total Dose (cGy) Start Date End Date Elapsed Days   RT FACE RXPT:R FACE 6X 200 30 / 33 0 6,000 3/15/2022 2022 43     Treatment Site Ref. ID Energy Dose/Fx (cGy) #Fx Dose Correction (cGy) Total Dose (cGy) Start Date End Date Elapsed Days   L FACEMixed e RXPT: L FACE 6E/9E 200 29 / 33 0 5,800 3/16/2022 2022 42       General:           Review of Systems    [] No new complaints  [] Dysphagia   [] Eye pain/discharge  [x] Dry mouth--very minor he notes   [] Hair loss   [] Earache/pain/discharge  [] Loss of taste  [] Supplemental feeding:  [] PO [] PEG/NG    kcals/d  [] Mouth soreness/tenderness,  severity: ----------------      [x] Fatigue,  severity: 1 Relief w/ Rest  [x] Pain,  severity: 0, location:     Oral care regimen: NONE   Pain medication regimen: NONE   Skin regimen: Silvadene     Comments/Notes:  Needs silvadene refill    KPS: 80%       Vital Signs:   Vitals:    22 1609   BP: 138/90   Pulse: 60   SpO2: 94%         Weight:   Wt Readings from Last 3 Encounters:   22 76.3 kg (168 lb 4.8 oz)   22 77.8 kg (171 lb 9.6 oz)   22 73.2 kg (161 lb 6.4 oz)       Medication:   Current Outpatient Medications:   •  acetaminophen (TYLENOL) 325 MG tablet, Take 2 tablets by mouth Every 6 (Six) Hours As Needed for Mild Pain , Moderate Pain , Headache or Fever., Disp: , Rfl:   •  apixaban (ELIQUIS) 5 MG tablet tablet, Take 1 tablet by mouth Every 12 (Twelve) Hours. Indications: Atrial Fibrillation, Disp: 60 tablet, Rfl:   •  bicalutamide (CASODEX) 50 MG chemo tablet, Take 1 tablet by mouth Daily., Disp: , Rfl:   •  calcium carbonate (OS-SOPHIA) 600 MG  tablet, Take 600 mg by mouth Daily., Disp: , Rfl:   •  latanoprost (XALATAN) 0.005 % ophthalmic solution, Apply  to eye(s) as directed by provider., Disp: , Rfl:   •  levothyroxine (SYNTHROID, LEVOTHROID) 75 MCG tablet, Take 75 mcg by mouth Daily., Disp: , Rfl:   •  multivitamin with minerals (MULTIVITAMIN ADULT PO), Take 1 tablet by mouth Daily., Disp: , Rfl:   •  polyethylene glycol (MIRALAX) 17 g packet, Take 17 g by mouth Daily As Needed (Use if senna-docusate is ineffective)., Disp: , Rfl:   •  rosuvastatin (CRESTOR) 10 MG tablet, Take 1 tablet by mouth Daily., Disp: , Rfl:   •  silver sulfadiazine (SILVADENE, SSD) 1 % cream, Apply 1 application topically to the appropriate area as directed 3 (Three) Times a Day. Apply to facial regions of inflammation per rad onc recs, Disp: , Rfl:        LABS (Reviewed):  Hematology WBC   Date Value Ref Range Status   04/22/2022 6.99 3.40 - 10.80 10*3/mm3 Final     RBC   Date Value Ref Range Status   04/22/2022 3.79 (L) 4.14 - 5.80 10*6/mm3 Final     Hemoglobin   Date Value Ref Range Status   04/22/2022 12.3 (L) 13.0 - 17.7 g/dL Final     Hematocrit   Date Value Ref Range Status   04/22/2022 35.0 (L) 37.5 - 51.0 % Final     Platelets   Date Value Ref Range Status   04/22/2022 151 140 - 450 10*3/mm3 Final      Chemistry   Lab Results   Component Value Date    GLUCOSE 96 04/20/2022    BUN 25 (H) 04/20/2022    CREATININE 1.35 (H) 04/20/2022    EGFRIFNONA 60 (L) 07/20/2021    BCR 18.5 04/20/2022    K 4.3 04/20/2022    CO2 24.6 04/20/2022    CALCIUM 8.4 04/20/2022    ALBUMIN 3.60 04/14/2022    AST 30 04/14/2022    ALT 20 04/14/2022         Physical Exam:         Neck: [] Lymphadenopathy  Lungs: [x] Clear to auscultation  HEENT: [] Throat clear   [] Oral cavity clear  [] TM clear   [] Nares patent   [x] Xerostomia--still moisture present but less than baseline   [] Mucositis/stomatitis   [] Dysphagia   [] Voice changes/dysarthia [] PERRLA/EOMI    [] Otitis, external ear  (non-infectious):   Skin: [x] rdGrdrrdarddrderd:rd rd3rd Comments/Notes: Left temple area    [x] Review of labs, images, dosimetry, dose delivered, & treatment parameters.    Comments:     [x] Patient treatment setup reviewed.    Comments:     Recommendations: continue Silvadene; calling his rehab facility with a refill.  Will do skin check pre-XRT on Monday    [x] Continue RT  [] Change RT Plan [] Hold RT, length:        Approved Electronically By:  Maurice Cook MD, 4/27/2022, 15:53 EDT          Confidentiality of this medical record shall be maintained except when use or disclosure is required or permitted by law, regulation or written authorization by the patient.

## 2022-04-28 ENCOUNTER — APPOINTMENT (OUTPATIENT)
Dept: RADIATION ONCOLOGY | Facility: HOSPITAL | Age: 87
End: 2022-04-28

## 2022-04-28 ENCOUNTER — TELEPHONE (OUTPATIENT)
Dept: OTHER | Facility: HOSPITAL | Age: 87
End: 2022-04-28

## 2022-04-28 PROCEDURE — 77386 CHG INTENSITY MODULATED RADIATION TX DLVR COMPLEX: CPT | Performed by: RADIOLOGY

## 2022-04-28 PROCEDURE — 77386: CPT | Performed by: RADIOLOGY

## 2022-04-28 PROCEDURE — 77014 CHG CT GUIDANCE RADIATION THERAPY FLDS PLACEMENT: CPT | Performed by: RADIOLOGY

## 2022-04-28 PROCEDURE — 77412 RADIATION TX DELIVERY LVL 3: CPT | Performed by: RADIOLOGY

## 2022-04-28 PROCEDURE — 77427 RADIATION TX MANAGEMENT X5: CPT | Performed by: RADIOLOGY

## 2022-04-28 NOTE — TELEPHONE ENCOUNTER
Oncology Social Worker  [facility]    Phone called received from patient's son seeking information on alternative transportation options for his dad for his radiation appt's.  Patient is currently at [facility] and their wheelchair van is not operational at this time.  According to the son, the pt is needing w/c transport for his appt and this cost is adding up. Patient is paying privately for the round trip ride _ [employer] $125.00.-  OSW recommended looking into his [facility] Medicare policy to see if there is a transportation benefit via his insurance.  Also spoke to son about asking the PT or OT at the [facility] to teach safe sliding board transfers from wheelchair to car as an option as long as the car is not a van or SUV.     OSW spoke to son about [employer] or [employer] transport but both are not reliable options at this time.   Isis Ledesma, MSSW, CSW

## 2022-04-29 PROCEDURE — 77386: CPT | Performed by: RADIOLOGY

## 2022-04-29 PROCEDURE — 77014 CHG CT GUIDANCE RADIATION THERAPY FLDS PLACEMENT: CPT | Performed by: RADIOLOGY

## 2022-04-29 PROCEDURE — 77386 CHG INTENSITY MODULATED RADIATION TX DLVR COMPLEX: CPT | Performed by: RADIOLOGY

## 2022-05-02 ENCOUNTER — RADIATION ONCOLOGY WEEKLY ASSESSMENT (OUTPATIENT)
Dept: RADIATION ONCOLOGY | Facility: HOSPITAL | Age: 87
End: 2022-05-02

## 2022-05-02 ENCOUNTER — APPOINTMENT (OUTPATIENT)
Dept: RADIATION ONCOLOGY | Facility: HOSPITAL | Age: 87
End: 2022-05-02

## 2022-05-02 VITALS — OXYGEN SATURATION: 99 % | DIASTOLIC BLOOD PRESSURE: 78 MMHG | SYSTOLIC BLOOD PRESSURE: 136 MMHG | HEART RATE: 67 BPM

## 2022-05-02 DIAGNOSIS — C44.320 SCC (SQUAMOUS CELL CARCINOMA), FACE: Primary | ICD-10-CM

## 2022-05-02 PROCEDURE — 77336 RADIATION PHYSICS CONSULT: CPT | Performed by: RADIOLOGY

## 2022-05-02 PROCEDURE — FACE2FACE: Performed by: RADIOLOGY

## 2022-05-02 PROCEDURE — 77386 CHG INTENSITY MODULATED RADIATION TX DLVR COMPLEX: CPT | Performed by: RADIOLOGY

## 2022-05-02 PROCEDURE — 77386: CPT | Performed by: RADIOLOGY

## 2022-05-02 NOTE — PROGRESS NOTES
Patient Name: Bridger Elias Date: 2022       : 1930        MRN #: [id number] Diagnosis:   Encounter Diagnosis   Name Primary?   • SCC (squamous cell carcinoma), face Yes                     RADIATION ON TREATMENT VISIT NOTE - HEAD AND NECK     Treatment Summary    [] Concurrent Chemo   Regimen:       Treatment Site Ref. ID Energy Dose/Fx (cGy) #Fx Dose Correction (cGy) Total Dose (cGy) Start Date End Date Elapsed Days   RT FACE RXPT:R FACE 6X 200 33 / 33 0 6,600 3/15/2022 2022 48     Treatment Site Ref. ID Energy Dose/Fx (cGy) #Fx Dose Correction (cGy) Total Dose (cGy) Start Date End Date Elapsed Days   L FACEMixed e RXPT: L FACE 6E/9E 200 32 / 32 0 6,400 3/16/2022 2022 47     Intent:curative  Getting out of rehab today  Using Silvadene TID  Needs to see Dermatology in 3-4 weeks about additional R scalp/forehead area  General:           Review of Systems    [] No new complaints  [] Dysphagia   [] Eye pain/discharge  [] Dry mouth   [] Hair loss   [] Earache/pain/discharge  [] Loss of taste  [] Supplemental feeding:  [] PO [] PEG/NG    kcals/d  [] Mouth soreness/tenderness,  severity: ----------------      [x] Fatigue,  severity: 2 No Relief w/ Rest  [x] Pain,  severity: 1, location: treated lesions  Oral care regimen: NONE   Pain medication regimen: NONE   Skin regimen: Aquaphor     Comments/Notes:  Denies dry mouth  Still feeling weak but progressing with pt/ot    KPS: 70%       Vital Signs: /78   Pulse 67   SpO2 99%     Weight:   Wt Readings from Last 3 Encounters:   22 76.3 kg (168 lb 4.8 oz)   22 77.8 kg (171 lb 9.6 oz)   22 73.2 kg (161 lb 6.4 oz)       Medication:   Current Outpatient Medications:   •  acetaminophen (TYLENOL) 325 MG tablet, Take 2 tablets by mouth Every 6 (Six) Hours As Needed for Mild Pain , Moderate Pain , Headache or Fever., Disp: , Rfl:   •  apixaban (ELIQUIS) 5 MG tablet tablet, Take 1 tablet by mouth Every 12 (Twelve) Hours. Indications:  Atrial Fibrillation, Disp: 60 tablet, Rfl:   •  bicalutamide (CASODEX) 50 MG chemo tablet, Take 1 tablet by mouth Daily., Disp: , Rfl:   •  calcium carbonate (OS-SOPHIA) 600 MG tablet, Take 600 mg by mouth Daily., Disp: , Rfl:   •  latanoprost (XALATAN) 0.005 % ophthalmic solution, Apply  to eye(s) as directed by provider., Disp: , Rfl:   •  levothyroxine (SYNTHROID, LEVOTHROID) 75 MCG tablet, Take 75 mcg by mouth Daily., Disp: , Rfl:   •  multivitamin with minerals (MULTIVITAMIN ADULT PO), Take 1 tablet by mouth Daily., Disp: , Rfl:   •  polyethylene glycol (MIRALAX) 17 g packet, Take 17 g by mouth Daily As Needed (Use if senna-docusate is ineffective)., Disp: , Rfl:   •  rosuvastatin (CRESTOR) 10 MG tablet, Take 1 tablet by mouth Daily., Disp: , Rfl:   •  silver sulfadiazine (SILVADENE, SSD) 1 % cream, Apply 1 application topically to the appropriate area as directed 3 (Three) Times a Day. Apply to facial regions of inflammation per rad onc recs, Disp: , Rfl:   •  silver sulfadiazine (SILVADENE, SSD) 1 % cream, Apply 1 application topically to the appropriate area as directed 2 (Two) Times a Day., Disp: 400 g, Rfl: 2       LABS (Reviewed):  Hematology WBC   Date Value Ref Range Status   04/22/2022 6.99 3.40 - 10.80 10*3/mm3 Final     RBC   Date Value Ref Range Status   04/22/2022 3.79 (L) 4.14 - 5.80 10*6/mm3 Final     Hemoglobin   Date Value Ref Range Status   04/22/2022 12.3 (L) 13.0 - 17.7 g/dL Final     Hematocrit   Date Value Ref Range Status   04/22/2022 35.0 (L) 37.5 - 51.0 % Final     Platelets   Date Value Ref Range Status   04/22/2022 151 140 - 450 10*3/mm3 Final      Chemistry   Lab Results   Component Value Date    GLUCOSE 96 04/20/2022    BUN 25 (H) 04/20/2022    CREATININE 1.35 (H) 04/20/2022    EGFRIFNONA 60 (L) 07/20/2021    BCR 18.5 04/20/2022    K 4.3 04/20/2022    CO2 24.6 04/20/2022    CALCIUM 8.4 04/20/2022    ALBUMIN 3.60 04/14/2022    AST 30 04/14/2022    ALT 20 04/14/2022         Physical  Exam:         Neck: [] Lymphadenopathy  Lungs: [x] Clear to auscultation  HEENT: [x] Throat clear   [] Oral cavity clear  [] TM clear   [] Nares patent   [] Xerostomia   [] Mucositis/stomatitis   [] Dysphagia   [] Voice changes/dysarthia [] PERRLA/EOMI    [] Otitis, external ear (non-infectious):   Skin: [x] rdGrdrrdarddrderd:rd rd3rd Comments/Notes: Silvadene has helped; great response at L forehead---will not give the potential 33rd fraction and he will complete therapy today        [x] Review of labs, images, dosimetry, dose delivered, & treatment parameters.    Comments:     [x] Patient treatment setup reviewed.    Comments:     Recommendations: Continue Silvadene TID  Out of rehab and home likely on 5/3/2022  Decision to complete therapy today at current dosing/great response.  1 month f/u here  3-4 week f/u with dermatology about R forehead lesion---biopsy likely next step    [] Continue RT  [] Change RT Plan [] Hold RT, length:        Approved by:     Maurice Cook MD

## 2022-06-06 ENCOUNTER — APPOINTMENT (OUTPATIENT)
Dept: RADIATION ONCOLOGY | Facility: HOSPITAL | Age: 87
End: 2022-06-06

## 2022-06-06 ENCOUNTER — OFFICE VISIT (OUTPATIENT)
Dept: RADIATION ONCOLOGY | Facility: HOSPITAL | Age: 87
End: 2022-06-06

## 2022-06-06 VITALS
HEART RATE: 64 BPM | OXYGEN SATURATION: 99 % | SYSTOLIC BLOOD PRESSURE: 136 MMHG | DIASTOLIC BLOOD PRESSURE: 83 MMHG | BODY MASS INDEX: 25 KG/M2 | RESPIRATION RATE: 20 BRPM | WEIGHT: 159.6 LBS

## 2022-06-06 DIAGNOSIS — C44.320 SCC (SQUAMOUS CELL CARCINOMA), FACE: Primary | ICD-10-CM

## 2022-06-06 PROCEDURE — G0463 HOSPITAL OUTPT CLINIC VISIT: HCPCS | Performed by: RADIOLOGY

## 2022-06-06 PROCEDURE — 99212-NC PR NO CHARGE CBC OFFICE OUTPATIENT VISIT 10 MINUTES: Performed by: RADIOLOGY

## 2022-06-20 NOTE — PROGRESS NOTES
FOLLOW-UP NOTE    Name: Bridger Elias  YOB: 1930  MRN #: [id number]  Date of Service: 06/06/2022  Primary Care Provider: Conor Prince MD    DIAGNOSIS:   1. SCC (squamous cell carcinoma), face        REASON FOR VISIT: 1 month f/u    RADIATION TREATMENT COURSE: 1. Rt face, adjacent SCC--- 66 Gy in 33 fraction, IMRT/IGRT course--completed 05/02/2022.  2. Lt face/temple SCC--64 Gy in 32 fractions, en facer electrong    HISTORY OF PRESENT ILLNESS: The patient is a 89 y.o. year old male who is now out of rehab--he had generalized deconditioning and a-fib; admitted from 4/14/2022 to 04/22/22.    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 His fatigue is gradually improving. He was noted to have another small SCC on his vertex scalp--going to get a Mohs on this lesion.    He has no issues.         The following portions of the patient's history were reviewed and updated as appropriate: allergies, current medications, past family history, past medical history, past social history, past surgical history and problem list. Reviewed with the patient and remain unchanged.    PAST MEDICAL HISTORY:  he  has a past medical history of Acute on chronic diastolic CHF (congestive heart failure) (HCC) (4/14/2022), Atrial fibrillation (HCC) (4/14/2022), Coronary arteriosclerosis (7/3/2014), Dyslipidemia (12/5/2013), Glaucoma (4/14/2022), Hypertension (12/5/2013), Hypertensive kidney disease, stage III (HCC) (3/13/2017), Hypothyroidism (3/19/2017), Long term (current) use of anticoagulants (12/5/2013), Malignant neoplasm of prostate (HCC) (12/5/2013), Mitral valve disorder (1/25/2021), Pacemaker (4/14/2022), Personal history of radiation therapy (4/14/2022), Prostate cancer (HCC), SCC (squamous cell carcinoma), face (2/2/2022), and Stage 3b chronic kidney disease (HCC) (4/14/2022).  MEDICATIONS:   Current Outpatient Medications:   •  acetaminophen  (TYLENOL) 325 MG tablet, Take 2 tablets by mouth Every 6 (Six) Hours As Needed for Mild Pain , Moderate Pain , Headache or Fever., Disp: , Rfl:   •  apixaban (ELIQUIS) 5 MG tablet tablet, Take 1 tablet by mouth Every 12 (Twelve) Hours. Indications: Atrial Fibrillation, Disp: 60 tablet, Rfl:   •  bicalutamide (CASODEX) 50 MG chemo tablet, Take 1 tablet by mouth Daily., Disp: , Rfl:   •  calcium carbonate (OS-SOPHIA) 600 MG tablet, Take 600 mg by mouth Daily., Disp: , Rfl:   •  latanoprost (XALATAN) 0.005 % ophthalmic solution, Apply  to eye(s) as directed by provider., Disp: , Rfl:   •  levothyroxine (SYNTHROID, LEVOTHROID) 75 MCG tablet, Take 75 mcg by mouth Daily., Disp: , Rfl:   •  multivitamin with minerals tablet tablet, Take 1 tablet by mouth Daily., Disp: , Rfl:   •  polyethylene glycol (MIRALAX) 17 g packet, Take 17 g by mouth Daily As Needed (Use if senna-docusate is ineffective)., Disp: , Rfl:   •  rosuvastatin (CRESTOR) 10 MG tablet, Take 1 tablet by mouth Daily., Disp: , Rfl:   •  silver sulfadiazine (SILVADENE, SSD) 1 % cream, Apply 1 application topically to the appropriate area as directed 3 (Three) Times a Day. Apply to facial regions of inflammation per rad onc recs, Disp: , Rfl:   •  silver sulfadiazine (SILVADENE, SSD) 1 % cream, Apply 1 application topically to the appropriate area as directed 2 (Two) Times a Day., Disp: 400 g, Rfl: 2  ALLERGIES: No Known Allergies  PAST SURGICAL HISTORY: he has a past surgical history that includes Pacemaker Implantation (2018).  PREVIOUS RADIOTHERAPY OR CHEMOTHERAPY: XRT  FAMILY HISTORY: non-contributory.  SOCIAL HISTORY: he  reports that he has never smoked. He has never used smokeless tobacco. He reports that he does not drink alcohol.  PAIN AND PAIN MANAGEMENT: no pain.  Vitals:    06/06/22 1434   BP: 136/83   Pulse: 64   Resp: 20   SpO2: 99%   Weight: 72.4 kg (159 lb 9.6 oz)     NUTRITIONAL STATUS:     no issues  KPS: 80      Review of Systems:        General:  No fevers, chills, weight change, or drenching night sweats. Skin: Improved radiation dermatitis--recommended continuing aquaphor.  HEENT: No change in vision or hearing, no headaches.   Neuro: No weakness, numbness, syncope, or seizures. Psych: No mood changes or depression. Ext: Denies swelling.        Objective     Vitals:  Vitals:    06/06/22 1434   BP: 136/83   Pulse: 64   Resp: 20   SpO2: 99%   Weight: 72.4 kg (159 lb 9.6 oz)       PHYSICAL EXAM:  GENERAL: in no apparent distress, sitting comfortably in room.    HEENT: normocephalic, atraumatic. Pupils are equal, round, reactive to light. Sclera anicteric. Conjunctiva not injected. Oropharynx without erythema, ulcerations or thrush.   NECK: Supple with no masses.  LYMPHATIC: no cervical, supraclavicular or axillary adenopathy appreciated bilaterally.   CARDIOVASCULAR: S1 & S2 detected; no murmurs, rubs or gallops.  CHEST: clear to auscultation bilaterally; no wheezes, crackles or rubs. Work of breathing normal.    SKIN: Great response and cytoreduction; areas of desquamation resolved; residual hyperpigmentation but no residual mass.  Encouraged aquaphor TID.  NEUROLOGIC: cranial nerves II-XII grossly intact bilaterally. No focal neurologic deficits.      PERTINENT IMAGING/PATHOLOGY/LABS (Medical Decision Making):     COORDINATION OF CARE: A copy of this note is sent to the referring provider.    PATHOLOGY (Reviewed):     IMAGING (Reviewed):     LABS (Reviewed):  Hematology WBC   Date Value Ref Range Status   04/22/2022 6.99 3.40 - 10.80 10*3/mm3 Final     RBC   Date Value Ref Range Status   04/22/2022 3.79 (L) 4.14 - 5.80 10*6/mm3 Final     Hemoglobin   Date Value Ref Range Status   04/22/2022 12.3 (L) 13.0 - 17.7 g/dL Final     Hematocrit   Date Value Ref Range Status   04/22/2022 35.0 (L) 37.5 - 51.0 % Final     Platelets   Date Value Ref Range Status   04/22/2022 151 140 - 450 10*3/mm3 Final      Chemistry   Lab Results   Component Value Date    GLUCOSE 96  04/20/2022    BUN 25 (H) 04/20/2022    CREATININE 1.35 (H) 04/20/2022    EGFRIFNONA 60 (L) 07/20/2021    BCR 18.5 04/20/2022    K 4.3 04/20/2022    CO2 24.6 04/20/2022    CALCIUM 8.4 04/20/2022    ALBUMIN 3.60 04/14/2022    AST 30 04/14/2022    ALT 20 04/14/2022         Assessment & Plan     ASSESSMENT AND PLAN:    1. SCC (squamous cell carcinoma), face         Great response to the 3 lesions treated.  -Aquaphor TID  -Following closely with dermatology, getting mohs for a small scalp lesion.    I will offer a 6 month f/u but anticipate he can likely follow with Dermatology alone and be referred back as needed.    This assessment comes from my review of the imaging, pathology, physician notes and other pertinent information as mentioned.        CC: Conor Prince MD

## 2022-09-04 ENCOUNTER — HOSPITAL ENCOUNTER (INPATIENT)
Facility: HOSPITAL | Age: 87
LOS: 5 days | Discharge: HOME | End: 2022-09-09
Attending: INTERNAL MEDICINE | Admitting: INTERNAL MEDICINE
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
## 2022-09-04 PROBLEM — S50.12XA CONTUSION OF FOREARM, LEFT: Status: ACTIVE | Noted: 2022-09-04

## 2022-09-04 PROBLEM — S00.83XA CONTUSION OF FACE: Status: ACTIVE | Noted: 2022-09-04

## 2022-09-04 PROBLEM — I89.0 CHRONIC ACQUIRED LYMPHEDEMA: Status: ACTIVE | Noted: 2022-09-04

## 2022-09-04 PROBLEM — L03.115 CELLULITIS OF RIGHT LEG: Status: ACTIVE | Noted: 2022-09-04

## 2022-09-04 PROCEDURE — 82570 ASSAY OF URINE CREATININE: CPT | Performed by: INTERNAL MEDICINE

## 2022-09-04 PROCEDURE — 84300 ASSAY OF URINE SODIUM: CPT | Performed by: INTERNAL MEDICINE

## 2022-09-04 PROCEDURE — 83935 ASSAY OF URINE OSMOLALITY: CPT | Performed by: INTERNAL MEDICINE

## 2022-09-04 PROCEDURE — 25010000002 CEFEPIME PER 500 MG: Performed by: INTERNAL MEDICINE

## 2022-09-04 PROCEDURE — 82436 ASSAY OF URINE CHLORIDE: CPT | Performed by: INTERNAL MEDICINE

## 2022-09-04 RX ORDER — ONDANSETRON 2 MG/ML
4 INJECTION INTRAMUSCULAR; INTRAVENOUS EVERY 6 HOURS PRN
Status: DISCONTINUED | OUTPATIENT
Start: 2022-09-04 | End: 2022-09-09 | Stop reason: HOSPADM

## 2022-09-04 RX ORDER — CARVEDILOL 3.12 MG/1
3.12 TABLET ORAL NIGHTLY
Status: DISCONTINUED | OUTPATIENT
Start: 2022-09-04 | End: 2022-09-09 | Stop reason: HOSPADM

## 2022-09-04 RX ORDER — ACETAMINOPHEN 325 MG/1
650 TABLET ORAL EVERY 4 HOURS PRN
Status: DISCONTINUED | OUTPATIENT
Start: 2022-09-04 | End: 2022-09-09 | Stop reason: HOSPADM

## 2022-09-04 RX ORDER — SODIUM CHLORIDE 0.9 % (FLUSH) 0.9 %
10 SYRINGE (ML) INJECTION EVERY 12 HOURS SCHEDULED
Status: DISCONTINUED | OUTPATIENT
Start: 2022-09-04 | End: 2022-09-09 | Stop reason: HOSPADM

## 2022-09-04 RX ORDER — LEVOTHYROXINE SODIUM 88 UG/1
88 TABLET ORAL
Status: DISCONTINUED | OUTPATIENT
Start: 2022-09-05 | End: 2022-09-09 | Stop reason: HOSPADM

## 2022-09-04 RX ORDER — ACETAMINOPHEN 650 MG/1
650 SUPPOSITORY RECTAL EVERY 4 HOURS PRN
Status: DISCONTINUED | OUTPATIENT
Start: 2022-09-04 | End: 2022-09-09 | Stop reason: HOSPADM

## 2022-09-04 RX ORDER — POLYETHYLENE GLYCOL 3350 17 G/17G
17 POWDER, FOR SOLUTION ORAL DAILY PRN
Status: DISCONTINUED | OUTPATIENT
Start: 2022-09-04 | End: 2022-09-09 | Stop reason: HOSPADM

## 2022-09-04 RX ORDER — ACETAMINOPHEN 160 MG/5ML
650 SOLUTION ORAL EVERY 4 HOURS PRN
Status: DISCONTINUED | OUTPATIENT
Start: 2022-09-04 | End: 2022-09-09 | Stop reason: HOSPADM

## 2022-09-04 RX ORDER — MULTIPLE VITAMINS W/ MINERALS TAB 9MG-400MCG
1 TAB ORAL DAILY
Status: DISCONTINUED | OUTPATIENT
Start: 2022-09-05 | End: 2022-09-09 | Stop reason: HOSPADM

## 2022-09-04 RX ORDER — CARVEDILOL 3.12 MG/1
3.12 TABLET ORAL NIGHTLY
Status: ON HOLD | COMMUNITY
Start: 2022-03-18

## 2022-09-04 RX ORDER — BICALUTAMIDE 50 MG/1
50 TABLET, FILM COATED ORAL DAILY
Status: DISCONTINUED | OUTPATIENT
Start: 2022-09-05 | End: 2022-09-09 | Stop reason: HOSPADM

## 2022-09-04 RX ORDER — NITROGLYCERIN 0.4 MG/1
0.4 TABLET SUBLINGUAL
Status: DISCONTINUED | OUTPATIENT
Start: 2022-09-04 | End: 2022-09-09 | Stop reason: HOSPADM

## 2022-09-04 RX ORDER — ACETAMINOPHEN 325 MG/1
650 TABLET ORAL EVERY 6 HOURS PRN
Status: DISCONTINUED | OUTPATIENT
Start: 2022-09-04 | End: 2022-09-09 | Stop reason: HOSPADM

## 2022-09-04 RX ORDER — LATANOPROST 50 UG/ML
1 SOLUTION/ DROPS OPHTHALMIC NIGHTLY
Status: DISCONTINUED | OUTPATIENT
Start: 2022-09-04 | End: 2022-09-09 | Stop reason: HOSPADM

## 2022-09-04 RX ORDER — ROSUVASTATIN CALCIUM 10 MG/1
10 TABLET, COATED ORAL DAILY
Status: DISCONTINUED | OUTPATIENT
Start: 2022-09-05 | End: 2022-09-09 | Stop reason: HOSPADM

## 2022-09-04 RX ORDER — SODIUM CHLORIDE 0.9 % (FLUSH) 0.9 %
10 SYRINGE (ML) INJECTION AS NEEDED
Status: DISCONTINUED | OUTPATIENT
Start: 2022-09-04 | End: 2022-09-09 | Stop reason: HOSPADM

## 2022-09-04 RX ADMIN — CEFEPIME 2 G: 2 INJECTION, POWDER, FOR SOLUTION INTRAVENOUS at 23:26

## 2022-09-04 RX ADMIN — APIXABAN 5 MG: 5 TABLET, FILM COATED ORAL at 23:26

## 2022-09-05 ENCOUNTER — APPOINTMENT (OUTPATIENT)
Dept: CARDIOLOGY | Facility: HOSPITAL | Age: 87
End: 2022-09-05

## 2022-09-05 ENCOUNTER — APPOINTMENT (OUTPATIENT)
Dept: CT IMAGING | Facility: HOSPITAL | Age: 87
End: 2022-09-05

## 2022-09-05 LAB
ALBUMIN SERPL-MCNC: 3.3 G/DL (ref 3.5–5.2)
ALBUMIN/GLOB SERPL: 1.1 G/DL
ALP SERPL-CCNC: 84 U/L (ref 39–117)
ALT SERPL W P-5'-P-CCNC: 21 U/L (ref 1–41)
ANION GAP SERPL CALCULATED.3IONS-SCNC: 11.3 MMOL/L (ref 5–15)
AST SERPL-CCNC: 31 U/L (ref 1–40)
BASOPHILS # BLD AUTO: 0.05 10*3/MM3 (ref 0–0.2)
BASOPHILS NFR BLD AUTO: 0.3 % (ref 0–1.5)
BILIRUB SERPL-MCNC: 1 MG/DL (ref 0–1.2)
BUN SERPL-MCNC: 23 MG/DL (ref 8–23)
BUN/CREAT SERPL: 21.7 (ref 7–25)
CALCIUM SPEC-SCNC: 8.6 MG/DL (ref 8.2–9.6)
CHLORIDE SERPL-SCNC: 102 MMOL/L (ref 98–107)
CHLORIDE UR-SCNC: 92 MMOL/L
CO2 SERPL-SCNC: 21.7 MMOL/L (ref 22–29)
CREAT SERPL-MCNC: 1.06 MG/DL (ref 0.76–1.27)
CREAT UR-MCNC: 80.6 MG/DL
D-LACTATE SERPL-SCNC: 1.6 MMOL/L (ref 0.5–2)
DEPRECATED RDW RBC AUTO: 47.8 FL (ref 37–54)
EGFRCR SERPLBLD CKD-EPI 2021: 65.8 ML/MIN/1.73
EOSINOPHIL # BLD AUTO: 0.01 10*3/MM3 (ref 0–0.4)
EOSINOPHIL NFR BLD AUTO: 0.1 % (ref 0.3–6.2)
ERYTHROCYTE [DISTWIDTH] IN BLOOD BY AUTOMATED COUNT: 14.6 % (ref 12.3–15.4)
GLOBULIN UR ELPH-MCNC: 2.9 GM/DL
GLUCOSE SERPL-MCNC: 119 MG/DL (ref 65–99)
HCT VFR BLD AUTO: 31.3 % (ref 37.5–51)
HGB BLD-MCNC: 11.3 G/DL (ref 13–17.7)
IMM GRANULOCYTES # BLD AUTO: 0.18 10*3/MM3 (ref 0–0.05)
IMM GRANULOCYTES NFR BLD AUTO: 1.2 % (ref 0–0.5)
LYMPHOCYTES # BLD AUTO: 1.37 10*3/MM3 (ref 0.7–3.1)
LYMPHOCYTES NFR BLD AUTO: 9.3 % (ref 19.6–45.3)
MCH RBC QN AUTO: 32.4 PG (ref 26.6–33)
MCHC RBC AUTO-ENTMCNC: 36.1 G/DL (ref 31.5–35.7)
MCV RBC AUTO: 89.7 FL (ref 79–97)
MONOCYTES # BLD AUTO: 1.3 10*3/MM3 (ref 0.1–0.9)
MONOCYTES NFR BLD AUTO: 8.8 % (ref 5–12)
NEUTROPHILS NFR BLD AUTO: 11.84 10*3/MM3 (ref 1.7–7)
NEUTROPHILS NFR BLD AUTO: 80.3 % (ref 42.7–76)
NRBC BLD AUTO-RTO: 0.1 /100 WBC (ref 0–0.2)
OSMOLALITY SERPL: 285 MOSM/KG (ref 280–301)
OSMOLALITY UR: 769 MOSM/KG
PLATELET # BLD AUTO: 106 10*3/MM3 (ref 140–450)
PMV BLD AUTO: 11.5 FL (ref 6–12)
POTASSIUM SERPL-SCNC: 4 MMOL/L (ref 3.5–5.2)
PROCALCITONIN SERPL-MCNC: 0.4 NG/ML (ref 0–0.25)
PROT SERPL-MCNC: 6.2 G/DL (ref 6–8.5)
RBC # BLD AUTO: 3.49 10*6/MM3 (ref 4.14–5.8)
SODIUM SERPL-SCNC: 135 MMOL/L (ref 136–145)
SODIUM UR-SCNC: 90 MMOL/L
TROPONIN T SERPL-MCNC: <0.01 NG/ML (ref 0–0.03)
WBC NRBC COR # BLD: 14.75 10*3/MM3 (ref 3.4–10.8)

## 2022-09-05 PROCEDURE — 93306 TTE W/DOPPLER COMPLETE: CPT | Performed by: INTERNAL MEDICINE

## 2022-09-05 PROCEDURE — 83605 ASSAY OF LACTIC ACID: CPT | Performed by: INTERNAL MEDICINE

## 2022-09-05 PROCEDURE — 93010 ELECTROCARDIOGRAM REPORT: CPT | Performed by: INTERNAL MEDICINE

## 2022-09-05 PROCEDURE — 87040 BLOOD CULTURE FOR BACTERIA: CPT | Performed by: INTERNAL MEDICINE

## 2022-09-05 PROCEDURE — 83930 ASSAY OF BLOOD OSMOLALITY: CPT | Performed by: INTERNAL MEDICINE

## 2022-09-05 PROCEDURE — 93005 ELECTROCARDIOGRAM TRACING: CPT | Performed by: INTERNAL MEDICINE

## 2022-09-05 PROCEDURE — 25010000002 CEFEPIME PER 500 MG: Performed by: INTERNAL MEDICINE

## 2022-09-05 PROCEDURE — 93306 TTE W/DOPPLER COMPLETE: CPT

## 2022-09-05 PROCEDURE — 25010000002 VANCOMYCIN 10 G RECONSTITUTED SOLUTION: Performed by: INTERNAL MEDICINE

## 2022-09-05 PROCEDURE — 70450 CT HEAD/BRAIN W/O DYE: CPT

## 2022-09-05 PROCEDURE — 25010000002 PERFLUTREN (DEFINITY) 8.476 MG IN SODIUM CHLORIDE (PF) 0.9 % 10 ML INJECTION: Performed by: INTERNAL MEDICINE

## 2022-09-05 PROCEDURE — 25010000002 VANCOMYCIN PER 500 MG: Performed by: INTERNAL MEDICINE

## 2022-09-05 PROCEDURE — 85025 COMPLETE CBC W/AUTO DIFF WBC: CPT | Performed by: INTERNAL MEDICINE

## 2022-09-05 PROCEDURE — 25010000002 AMIODARONE IN DEXTROSE 5% 360-4.14 MG/200ML-% SOLUTION: Performed by: INTERNAL MEDICINE

## 2022-09-05 PROCEDURE — 84145 PROCALCITONIN (PCT): CPT | Performed by: INTERNAL MEDICINE

## 2022-09-05 PROCEDURE — 84484 ASSAY OF TROPONIN QUANT: CPT | Performed by: INTERNAL MEDICINE

## 2022-09-05 PROCEDURE — 80053 COMPREHEN METABOLIC PANEL: CPT | Performed by: INTERNAL MEDICINE

## 2022-09-05 RX ORDER — VANCOMYCIN HYDROCHLORIDE 1 G/200ML
1000 INJECTION, SOLUTION INTRAVENOUS EVERY 24 HOURS
Status: DISCONTINUED | OUTPATIENT
Start: 2022-09-05 | End: 2022-09-06

## 2022-09-05 RX ADMIN — CEFEPIME 2 G: 2 INJECTION, POWDER, FOR SOLUTION INTRAVENOUS at 08:20

## 2022-09-05 RX ADMIN — AMIODARONE HYDROCHLORIDE 0.5 MG/MIN: 1.8 INJECTION, SOLUTION INTRAVENOUS at 16:22

## 2022-09-05 RX ADMIN — AMIODARONE HYDROCHLORIDE 1 MG/MIN: 1.8 INJECTION, SOLUTION INTRAVENOUS at 10:07

## 2022-09-05 RX ADMIN — MULTIPLE VITAMINS W/ MINERALS TAB 1 TABLET: TAB at 08:20

## 2022-09-05 RX ADMIN — SODIUM CHLORIDE 500 ML: 9 INJECTION, SOLUTION INTRAVENOUS at 09:28

## 2022-09-05 RX ADMIN — Medication 10 ML: at 21:05

## 2022-09-05 RX ADMIN — CEFEPIME 2 G: 2 INJECTION, POWDER, FOR SOLUTION INTRAVENOUS at 21:03

## 2022-09-05 RX ADMIN — CALCIUM CARBONATE-VITAMIN D TAB 500 MG-200 UNIT 1 TABLET: 500-200 TAB at 08:20

## 2022-09-05 RX ADMIN — APIXABAN 5 MG: 5 TABLET, FILM COATED ORAL at 08:20

## 2022-09-05 RX ADMIN — LEVOTHYROXINE SODIUM 88 MCG: 0.09 TABLET ORAL at 06:33

## 2022-09-05 RX ADMIN — CARVEDILOL 3.12 MG: 3.12 TABLET, FILM COATED ORAL at 21:03

## 2022-09-05 RX ADMIN — APIXABAN 5 MG: 5 TABLET, FILM COATED ORAL at 21:03

## 2022-09-05 RX ADMIN — VANCOMYCIN HYDROCHLORIDE 1000 MG: 1 INJECTION, SOLUTION INTRAVENOUS at 12:46

## 2022-09-05 RX ADMIN — Medication 10 ML: at 08:21

## 2022-09-05 RX ADMIN — VANCOMYCIN HYDROCHLORIDE 1250 MG: 10 INJECTION, POWDER, LYOPHILIZED, FOR SOLUTION INTRAVENOUS at 01:33

## 2022-09-05 RX ADMIN — PERFLUTREN 2 ML: 6.52 INJECTION, SUSPENSION INTRAVENOUS at 12:15

## 2022-09-05 RX ADMIN — BICALUTAMIDE 50 MG: 50 TABLET ORAL at 15:13

## 2022-09-05 RX ADMIN — ROSUVASTATIN CALCIUM 10 MG: 10 TABLET, FILM COATED ORAL at 08:20

## 2022-09-05 RX ADMIN — LATANOPROST 1 DROP: 50 SOLUTION/ DROPS OPHTHALMIC at 21:03

## 2022-09-05 NOTE — CONSULTS
Referring Provider:   Reason for Consultation: Hypotension atrial flutter    Patient Care Team:  Conor Prince MD as PCP - General (Internal Medicine)  Maurice Cook MD as Consulting Physician (Radiation Oncology)    Chief complaint presyncope episode of syncope    Subjective .     History of present illness: 89 years old white male well-known to our practice with history of minimal coronary artery disease mitral valve repair and mildly impaired liver function ejection fraction above 45% with sitting in his bathroom when he started became weak tired and had an episode of syncope.    Patient brought to the emergency at [facility] work-up was negative for the possibility of sepsis was brought out because of cellulitis of the right lower extremity.  Cardiology consultation requested for further evaluation and treatment.    Review of Systems   Patient denies any weight loss eyes patient has visual disturbancesENT denies any history epistaxis cardiovascular patient denies any frequent palpitation gastrointestinal denies nausea vomiting diarrhea neuro patient had an episode of syncope but no seizure disorder psychiatric patient denies a history of depression    The remaining system review is negative.    History    Past medical history is positive hypertension positive for mitral valve repair he is positive also for hyperlipidemia positive pulmonary patient accommodation he is on Eliquis on the basis of atrial fibrillation and atrial flutter.    Objective     Vital Sign Min/Max for last 24 hours  Temp  Min: 97.8 °F (36.6 °C)  Max: 98.4 °F (36.9 °C)   BP  Min: 73/46  Max: 140/93   Pulse  Min: 121  Max: 128   Resp  Min: 16  Max: 19   SpO2  Min: 92 %  Max: 98 %   No data recorded   Weight  Min: 76.2 kg (167 lb 14.4 oz)  Max: 76.2 kg (167 lb 14.4 oz)     Flowsheet Rows    Flowsheet Row First Filed Value   Admission Height --   Admission Weight 76.2 kg (167 lb 14.4 oz) Documented at 09/05/2022 0231             Ejection  Fraction  No results found for: EF    Echo EF Estimated  Lab Results   Component Value Date    STEVEN 55 04/17/2022       Nuclear Stress Ejection Fraction  No components found for: NUCEF    Cath Ejection Fraction Quantitative  No results found for: CATHEF    Physical Exam:     General Appearance:    Alert, cooperative, in no acute distress   Head:    Normocephalic, without obvious abnormality, atraumatic   Eyes:            Lids and lashes normal, conjunctivae and sclerae normal, no   icterus, no pallor, corneas clear, PERRLA   Ears:    Ears appear intact with no abnormalities noted   Throat:   No oral lesions, no thrush, oral mucosa moist   Neck:   No adenopathy, supple, trachea midline, no thyromegaly, no   carotid bruit, no JVD   Back:     No kyphosis present, no scoliosis present, no skin lesions,      erythema or scars, no tenderness to percussion or                   palpation,   range of motion normal   Lungs:     Clear to auscultation,respirations regular, even and                  unlabored    Heart:  irregular rhythm.  Systolic murmur is noted in the mitral valve area.  Heart rate is 125 bpm.   Chest Wall:    No abnormalities observed   Abdomen:     Normal bowel sounds, no masses, no organomegaly, soft        non-tender, non-distended, no guarding, no rebound                tenderness   Rectal:     Deferred   Extremities:   Moves all extremities well, no edema, no cyanosis, no             redness   Pulses:   Pulses palpable and equal bilaterally   Skin:   No bleeding, bruising or rash   Lymph nodes:   No palpable adenopathy   Neurologic:   Cranial nerves 2 - 12 grossly intact, sensation intact, DTR       present and equal bilaterally       Results Review:   I reviewed the patient's new clinical results.            Cellulitis of right leg    SCC (squamous cell carcinoma), face    General weakness    Pacemaker    Atrial fibrillation (HCC)    Chronic diastolic heart failure (HCC)    Hypertensive kidney  disease, stage III (HCC)    Hypothyroidism    Long term (current) use of anticoagulants    Malignant neoplasm of prostate (HCC)    Chronic acquired lymphedema    Contusion of face    Contusion of forearm, left    Fall  Rule out sepsis  Plan  Because of present clinical picture still?    We will start patient amiodarone drip to decrease heart rate    Obtain echocardiogram    Follow blood cultures    Follow troponin    I discussed the patients findings and my recommendations with patient and the family    Medardo Vanegas MD  09/05/22  08:36 EDT    Time: Total time for this interview was 25 minutes 50% of the time was direct patient care

## 2022-09-05 NOTE — H&P
Internal medicine history and physical  INTERNAL MEDICINE   [facility]       Patient Identification:  Name: Bridger Elias  Age: 89 y.o.  Sex: male  :  1930  MRN: [id number]                   Primary Care Physician: Conor Prince MD                               Date of admission:2022    Chief Complaint: Sent from outside facility for evaluation and management of sudden fall weakness and possible sepsis.     Of information patient himself and patient's son at the bedside and review of records from outside facility which is very limited at this time.    History of Present Illness:   Patient is a 89-year-old male who has complicated past medical history remarkable for chronic diastolic congestive heart failure, atrial fibrillation, history of sick sinus syndrome and permanent pacemaker placement as well as chronic anticoagulation therapy and chronic kidney disease hypertension and chronic lymphedema as well as history of squamous cell carcinoma of the scalp for which she is currently taking treatment and history of fracture of the left leg while he was serving in the [employer] long time ago has been residing at the assisted care facility for the last couple of months because of the decline in function and needing significant help.  According to the patient's son who is at the bedside and major information provided patient did very well and was doing fine until last evening and did everything what he supposed to do an extra including more activities with physical therapy.  Family members were visiting him at the assisted care facility and did not have any inkling of worries that he would be sick.  In this background patient woke up this morning feeling fine went to the bathroom and very suddenly felt very weak and describes as if somebody took all of his energy suddenly from him.  He lost his balance and fell and developed bruising on his left side of his face as well as wrist and hand.   He was very weak.  Patient was found by the care staff at the assisted care facility and was sent to [facility] emergency room.  Patient had extensive evaluation and was found to have slight elevation in his troponin as well as elevated white blood cell count with negative urinalysis and negative CT scan of the head and chest x-ray.  Patient was recommended hospitalization for further work-up and evaluation of his leukocytosis and the cause of the sudden change in status and he chose [facility] and accepted in transfer from there to here.  Throughout his stay in the emergency room patient was hungry and wanted to eat.  Patient denies any new pain or discomfort anywhere.  Despite the fact the patient has hearing issues he is able to interact and understand and answer questions.  According to the son there is no recent changes in his medications.    Past Medical History:  Past Medical History:   Diagnosis Date   • Acute on chronic diastolic CHF (congestive heart failure) (Formerly [facility]) 4/14/2022   • Atrial fibrillation (Formerly [facility]) 4/14/2022   • Coronary arteriosclerosis 7/3/2014    Formatting of this note might be different from the original. [facility] 12/7/16  IMPRESSION:   1. Right heart disease, hypertensive cardiac disease.   2. Status post mitral valve repair.   3. Anatomy: No hemodynamically significant disease. about 30-40% lesion of    the right coronary artery. Normal. Left coronary artery system.   • Dyslipidemia 12/5/2013   • Glaucoma 4/14/2022   • Hypertension 12/5/2013   • Hypertensive kidney disease, stage III (Formerly [facility]) 3/13/2017   • Hypothyroidism 3/19/2017   • Long term (current) use of anticoagulants 12/5/2013    Formatting of this note might be different from the original. Mitral valve replacement and atrial fibrillation Assume a range of 2.5-3.5.   • Malignant neoplasm of prostate (Formerly [facility]) 12/5/2013    Formatting of this note might be different from the original. Description: He sees  urology every 6 months and he does do Lupron injections   • Mitral valve disorder 1/25/2021   • Pacemaker 4/14/2022   • Personal history of radiation therapy 4/14/2022   • Prostate cancer (HCC)    • SCC (squamous cell carcinoma), face 2/2/2022   • Stage 3b chronic kidney disease (HCC) 4/14/2022     Past Surgical History:  Past Surgical History:   Procedure Laterality Date   • PACEMAKER IMPLANTATION  2018      Home Meds:  Medications Prior to Admission   Medication Sig Dispense Refill Last Dose   • acetaminophen (TYLENOL) 325 MG tablet Take 2 tablets by mouth Every 6 (Six) Hours As Needed for Mild Pain , Moderate Pain , Headache or Fever.      • apixaban (ELIQUIS) 5 MG tablet tablet Take 1 tablet by mouth Every 12 (Twelve) Hours. Indications: Atrial Fibrillation 60 tablet  9/4/2022 at Unknown time   • bicalutamide (CASODEX) 50 MG chemo tablet Take 1 tablet by mouth Daily.   9/3/2022 at Unknown time   • calcium carbonate (OS-SOPHIA) 600 MG tablet Take 600 mg by mouth Daily.   9/4/2022 at Unknown time   • carvedilol (COREG) 3.125 MG tablet Take 3.125 mg by mouth Every Night.   9/3/2022 at Unknown time   • latanoprost (XALATAN) 0.005 % ophthalmic solution Apply  to eye(s) as directed by provider.   9/3/2022 at Unknown time   • levothyroxine (SYNTHROID, LEVOTHROID) 88 MCG tablet Take 88 mcg by mouth Daily.   9/4/2022 at Unknown time   • multivitamin with minerals tablet tablet Take 1 tablet by mouth Daily.   9/4/2022 at Unknown time   • rosuvastatin (CRESTOR) 10 MG tablet Take 1 tablet by mouth Daily.   9/3/2022 at Unknown time   • polyethylene glycol (MIRALAX) 17 g packet Take 17 g by mouth Daily As Needed (Use if senna-docusate is ineffective).      • silver sulfadiazine (SILVADENE, SSD) 1 % cream Apply 1 application topically to the appropriate area as directed 3 (Three) Times a Day. Apply to facial regions of inflammation per rad onc recs      • silver sulfadiazine (SILVADENE, SSD) 1 % cream Apply 1 application  topically to the appropriate area as directed 2 (Two) Times a Day. 400 g 2      Current Meds:   No current facility-administered medications for this encounter.  Allergies:  No Known Allergies  Social History:   Social History     Tobacco Use   • Smoking status: Never Smoker   • Smokeless tobacco: Never Used   Substance Use Topics   • Alcohol use: Never      Family History:  No family history on file.       Review of Systems  See history of present illness and past medical history.    Constitutional: Remarkable with sudden onset of generalized weakness and fall with lack of energy but denies any fever and chills.  Cardiovascular: Remarkable for no chest pain or any unusual or change in the pattern of his breathing or extra shortness of breath or decreased functional capacity except for sudden weakness that developed this morning.  Denies any orthopnea or PND.  Respiratory: Remarkable for no cough congestion or sputum production  GI: Remarkable for preserved appetite denies any nausea vomiting or diarrhea  : Remarkable for no burning in urination frequency or urgency  Musculoskeletal: Remarkable for chronic lymphedema and weakness in the leg but denies any other symptoms.  Neurological: Remarkable for sudden weakness and fall with no loss of consciousness but did develop contusion of his face.  Remainder of ROS is negative.      Vitals:   /93 (BP Location: Left arm, Patient Position: Lying)   Pulse (!) 128   Temp 98.4 °F (36.9 °C) (Oral)   Resp 19   SpO2 97%   I/O: No intake or output data in the 24 hours ending 09/04/22 9284  Exam:  Patient is examined using the personal protective equipment as per guidelines from infection control for this particular patient as enacted.  Hand washing was performed before and after patient interaction.  General Appearance:   Chronically ill male who is interactive and appropriate and does not appear to be in any acute distress   Head:   Chronic changes in the scalp from  previous seborrheic keratosis and squamous cell carcinoma but no obvious cellulitis left side of the face near the jaw as evolving contusion.   Eyes:   Pale conjunctiva   Ears:    Normal external ear canals, both ears   Nose:   Nares normal, septum midline, mucosa normal, no drainage    or sinus tenderness   Throat:   Lips, tongue, gums normal; oral mucosa pink and moist   Neck:   Supple, symmetrical, trachea midline, no adenopathy;     thyroid:  no enlargement/tenderness/nodules; no carotid    bruit or JVD   Back:     Symmetric, no curvature, ROM normal, no CVA tenderness   Lungs:    Bilateral air entry clear to auscultation   Chest Wall:    No tenderness or deformity    Heart:   S1-S2 regular   Abdomen:    Abdomen soft nontender   Extremities:  Bilateral lower extremity edema with chronic changes in the left leg but the right lower extremity appears erythematous and warmth considerably compared to the left leg.  According to the son this is new or different.   Pulses:   Pulses palpable in all extremities; symmetric all extremities   Skin:  Chronic changes in the left leg with discoloration but no warmth on the left leg noted.  Contusion on the face and the left hand and wrist noted.   Neurologic:  Alert oriented and grossly nonfocal       Data Review:      I reviewed the patient's new clinical results.    Database from outside facility except for CBC which is not available for me to review I reviewed as follows:  His respiratory panel consisting of COVID-19 and influenza A and B and RSV PCR have been negative  His lactic acid level is recorded as 2.2 which is at upper limit of normal for that test at their facility.  BNP is recorded as 845 with the upper limit of normal at the other facility is 99  Urinalysis is essentially unremarkable except for 1+ protein and 1+ blood microscopy is normal and negative leuk trase and nitrate and urine specific gravity is reported as 1030  PT is 23.6 INR 2.1  Chemistry shows  sodium 130 potassium 4.7 bicarb 23 glucose 125 BUN 29 creatinine 1.15  LFTs within normal limits except for total bilirubin of 1.6.  I do not have his report of CT scan of the head and CBC results at this time.  Assessment:  Active Hospital Problems    Diagnosis  POA   • **Cellulitis of right leg [L03.115]  Unknown   • Chronic acquired lymphedema [I89.0]  Unknown   • Contusion of face [S00.83XA]  Unknown   • Contusion of forearm, left [S50.12XA]  Unknown   • Fall [W19.XXXA]  Unknown   • Atrial fibrillation (HCC) [I48.91]  Yes   • General weakness [R53.1]  Yes   • Pacemaker [Z95.0]  Yes   • Chronic diastolic heart failure (HCC) [I50.32]  Yes   • SCC (squamous cell carcinoma), face [C44.320]  Yes   • Hypothyroidism [E03.9]  Yes   • Hypertensive kidney disease, stage III (HCC) [I12.9, N18.30]  Yes   • Long term (current) use of anticoagulants [Z79.01]  Not Applicable     Formatting of this note might be different from the original.  Mitral valve replacement and atrial fibrillation  Assume a range of 2.5-3.5.     • Malignant neoplasm of prostate (HCC) [C61]  Yes     Formatting of this note might be different from the original.  Description: He sees urology every 6 months and he does do Lupron injections         Plan: See admitting orders  · His sudden change in status could very well be due to evolving sepsis secondary to cellulitis of the right leg in the setting of chronic lymphedema.  We will start him on IV antibiotics given the fact that he lives in the assisted facility and has been recently in the hospital 5 to 6 months ago and check blood cultures and elevate his leg and based on the evolving culture data and clinical course de-escalate his antibiotics.  · Repeat CT scan of the head in a.m. as he is on anticoagulation therapy and has contusion of the face  · Fall precautions   · Monitor his renal function and avoid nephrotoxic agent and hypotensive episodes  · Cardiology consultation as patient is concerned that  his pacemaker battery is [place] down as he was told by his cardiologist who is Dr. Vanegas.  · Further management as his condition evolves.  · Check orthostatics and monitor his serum sodium level.  Norberto Dhaliwal MD   9/4/2022  21:54 EDT  Much of this encounter note is an electronic transcription/translation of spoken language to printed text. The electronic translation of spoken language may permit erroneous, or at times, nonsensical words or phrases to be inadvertently transcribed; Although I have reviewed the note for such errors, some may still exist

## 2022-09-05 NOTE — PROGRESS NOTES
Name: Bridger Elias ADMIT: 2022   : 1930  PCP: Conor Prince MD    MRN: [id number] LOS: 1 days   AGE/SEX: 89 y.o. male  ROOM: [facility]     Subjective   Subjective   CC: generalized weakness  No acute events. Patient feels a lot better today. Pain is well-controlled. Taking PO. Denies CP/dyspnea/f/c/n/v/d.    Objective   Objective   Vital Signs  Temp:  [97.8 °F (36.6 °C)-98.4 °F (36.9 °C)] 98.3 °F (36.8 °C)  Heart Rate:  [120-128] 120  Resp:  [16-19] 16  BP: ()/(46-93) 103/61  SpO2:  [92 %-98 %] 97 %  on  Flow (L/min):  [1] 1;   Device (Oxygen Therapy): room air  Body mass index is 26.16 kg/m².  Physical Exam  Vitals and nursing note reviewed.   Constitutional:       General: He is not in acute distress.     Appearance: He is not toxic-appearing or diaphoretic.   HENT:      Head: Normocephalic.      Nose: Nose normal.      Mouth/Throat:      Mouth: Mucous membranes are moist.      Pharynx: Oropharynx is clear.   Eyes:      Conjunctiva/sclera: Conjunctivae normal.      Pupils: Pupils are equal, round, and reactive to light.   Cardiovascular:      Rate and Rhythm: Tachycardia present. Rhythm irregular.      Pulses: Normal pulses.   Pulmonary:      Effort: Pulmonary effort is normal.      Breath sounds: Normal breath sounds.   Abdominal:      General: Bowel sounds are normal.      Palpations: Abdomen is soft.      Tenderness: There is no abdominal tenderness.   Musculoskeletal:         General: Swelling (1-2+ BLE), tenderness (right calf) and deformity (LLE with previous injury and well-healed skin graft) present.      Cervical back: Normal range of motion and neck supple.   Skin:     General: Skin is warm and dry.      Capillary Refill: Capillary refill takes less than 2 seconds.   Neurological:      General: No focal deficit present.      Mental Status: He is alert.   Psychiatric:         Mood and Affect: Mood normal.         Behavior: Behavior normal.       Results Review     I reviewed the  patient's new clinical results.  I reviewed the patient's telemetry.  I reviewed the patient's head CT scan  Results from last 7 days   Lab Units 09/05/22  0055   WBC 10*3/mm3 14.75*   HEMOGLOBIN g/dL 11.3*   PLATELETS 10*3/mm3 106*     Results from last 7 days   Lab Units 09/05/22  0055   SODIUM mmol/L 135*   POTASSIUM mmol/L 4.0   CHLORIDE mmol/L 102   CO2 mmol/L 21.7*   BUN mg/dL 23   CREATININE mg/dL 1.06   GLUCOSE mg/dL 119*   EGFR mL/min/1.73 65.8     Results from last 7 days   Lab Units 09/05/22  0055   ALBUMIN g/dL 3.30*   BILIRUBIN mg/dL 1.0   ALK PHOS U/L 84   AST (SGOT) U/L 31   ALT (SGPT) U/L 21     Results from last 7 days   Lab Units 09/05/22  0055 09/04/22  1022   CALCIUM mg/dL 8.6  --    ALBUMIN g/dL 3.30*  --    MAGNESIUM mg/dL  --  2.0     Results from last 7 days   Lab Units 09/05/22  0055 09/05/22  0046 09/04/22  1226   PROCALCITONIN ng/mL  --  0.40* 0.32   LACTATE mmol/L 1.6  --   --      No results found for: HGBA1C, POCGLU    CT Head Without Contrast    Result Date: 9/5/2022  No acute intracranial findings.  Radiation dose reduction techniques were utilized, including automated exposure control and exposure modulation based on body size.  This report was finalized on 9/5/2022 4:05 AM by Dr. Jaida Neal M.D.      Scheduled Medications  apixaban, 5 mg, Oral, Q12H  bicalutamide, 50 mg, Oral, Daily  calcium-vitamin D, 1 tablet, Oral, Daily  carvedilol, 3.125 mg, Oral, Nightly  cefepime, 2 g, Intravenous, Q12H  latanoprost, 1 drop, Both Eyes, Nightly  levothyroxine, 88 mcg, Oral, Q AM  multivitamin with minerals, 1 tablet, Oral, Daily  rosuvastatin, 10 mg, Oral, Daily  sodium chloride, 500 mL, Intravenous, Once  sodium chloride, 10 mL, Intravenous, Q12H  vancomycin, 1,000 mg, Intravenous, Q24H    Infusions  amiodarone, 1 mg/min, Last Rate: 1 mg/min (09/05/22 1007)   Followed by  amiodarone, 0.5 mg/min  Pharmacy to dose vancomycin,     Diet  Diet Regular; Cardiac       Assessment/Plan      Active Hospital Problems    Diagnosis  POA   • **Cellulitis of right leg [L03.115]  Unknown   • Chronic acquired lymphedema [I89.0]  Unknown   • Contusion of face [S00.83XA]  Unknown   • Contusion of forearm, left [S50.12XA]  Unknown   • Fall [W19.XXXA]  Unknown   • Atrial fibrillation (HCC) [I48.91]  Yes   • General weakness [R53.1]  Yes   • Pacemaker [Z95.0]  Yes   • Chronic diastolic heart failure (HCC) [I50.32]  Yes   • SCC (squamous cell carcinoma), face [C44.320]  Yes   • Hypothyroidism [E03.9]  Yes   • Hypertensive kidney disease, stage III (HCC) [I12.9, N18.30]  Yes   • Long term (current) use of anticoagulants [Z79.01]  Not Applicable   • Malignant neoplasm of prostate (HCC) [C61]  Yes      Resolved Hospital Problems   No resolved problems to display.   Afib/RVR  - complicated by low BP  - continue amiodarone drip  - echocardiogram pending  - on AC with eliquis  - appreciate cardiology recs    RLE Cellulitis with Sepsis present on Admission  - leukocytosis and tachycardia present on admission  - continue on vancomycin and cefepime pending blood cultures-no evidence of purulent cellulitis so probably can narrow to cefazolin if these are negative    Hypothyroidism  - continue levothyroxine    Chronic Diastolic CHF  - monitor volume status    Eliquis (home med) for DVT prophylaxis.  Full code.  Discussed with patient, family and nursing staff.  Anticipate discharge to SNU facility timing yet to be determined.      Edis Mae MD  [facility]  09/05/22  13:56 EDT

## 2022-09-05 NOTE — PROGRESS NOTES
[facility] Clinical Pharmacy Services: Vancomycin Pharmacokinetic Initial Consult Note    Bridger Elias is a 89 y.o. male who is on day 1 of pharmacy to dose vancomycin.    Indication:  Sepsis possibly secondary to cellulitis of the right leg  Consulting Provider: Dr. Dhaliwal  Planned Duration of Therapy: 7 days  Loading Dose Ordered or Given: 1250 mg on 9/5 at 0133  Culture/Source:   9/5 Blood culture in process  Target: -600 mg/L.hr   Other Antimicrobials: Cefepime 2 g iv every 12 hours    Vitals/Labs  Ht:  ; Wt: 76.2 kg (167 lb 14.4 oz)  Temp Readings from Last 1 Encounters:   09/04/22 97.9 °F (36.6 °C) (Oral)    Estimated Creatinine Clearance: 47.9 mL/min (by C-G formula based on SCr of 1.06 mg/dL).       Results from last 7 days   Lab Units 09/05/22  0055   CREATININE mg/dL 1.06   WBC 10*3/mm3 14.75*     Assessment/Plan:    Vancomycin Dose:   1000 mg IV every  24  hours  Predictive AUC level for the dose ordered is 458 mg/L.hr, which is within the target of 400-600 mg/L.hr  Vanc Trough has been ordered for 9/7 at 1230     Pharmacy will follow patient's kidney function and will adjust doses and obtain levels as necessary. Thank you for involving pharmacy in this patient's care. Please contact pharmacy with any questions or concerns.                           Cas Cat III, [facility]  Clinical Pharmacist

## 2022-09-06 LAB
ANION GAP SERPL CALCULATED.3IONS-SCNC: 12.3 MMOL/L (ref 5–15)
AORTIC DIMENSIONLESS INDEX: 0.8 (DI)
BASOPHILS # BLD AUTO: 0.07 10*3/MM3 (ref 0–0.2)
BASOPHILS NFR BLD AUTO: 0.7 % (ref 0–1.5)
BH CV ECHO MEAS - AI P1/2T: 853 MSEC
BH CV ECHO MEAS - AO MAX PG: 2.9 MMHG
BH CV ECHO MEAS - AO MEAN PG: 1.74 MMHG
BH CV ECHO MEAS - AO ROOT DIAM: 3.3 CM
BH CV ECHO MEAS - AO V2 MAX: 84.7 CM/SEC
BH CV ECHO MEAS - AO V2 VTI: 12.4 CM
BH CV ECHO MEAS - AVA(I,D): 2.15 CM2
BH CV ECHO MEAS - EDV(CUBED): 58.6 ML
BH CV ECHO MEAS - EDV(MOD-SP2): 53 ML
BH CV ECHO MEAS - EDV(MOD-SP4): 53 ML
BH CV ECHO MEAS - EF(MOD-BP): 26.2 %
BH CV ECHO MEAS - EF(MOD-SP2): 24.5 %
BH CV ECHO MEAS - EF(MOD-SP4): 22.6 %
BH CV ECHO MEAS - ESV(CUBED): 31.6 ML
BH CV ECHO MEAS - ESV(MOD-SP2): 40 ML
BH CV ECHO MEAS - ESV(MOD-SP4): 41 ML
BH CV ECHO MEAS - FS: 18.6 %
BH CV ECHO MEAS - IVS/LVPW: 1.22 CM
BH CV ECHO MEAS - IVSD: 1.87 CM
BH CV ECHO MEAS - LAT PEAK E' VEL: 6.7 CM/SEC
BH CV ECHO MEAS - LV DIASTOLIC VOL/BSA (35-75): 28.3 CM2
BH CV ECHO MEAS - LV MASS(C)D: 273.6 GRAMS
BH CV ECHO MEAS - LV MAX PG: 1.66 MMHG
BH CV ECHO MEAS - LV MEAN PG: 0.9 MMHG
BH CV ECHO MEAS - LV SYSTOLIC VOL/BSA (12-30): 21.9 CM2
BH CV ECHO MEAS - LV V1 MAX: 64.5 CM/SEC
BH CV ECHO MEAS - LV V1 VTI: 10.5 CM
BH CV ECHO MEAS - LVIDD: 3.9 CM
BH CV ECHO MEAS - LVIDS: 3.2 CM
BH CV ECHO MEAS - LVOT AREA: 2.5 CM2
BH CV ECHO MEAS - LVOT DIAM: 1.8 CM
BH CV ECHO MEAS - LVPWD: 1.53 CM
BH CV ECHO MEAS - MED PEAK E' VEL: 6.2 CM/SEC
BH CV ECHO MEAS - MV DEC SLOPE: 1352 CM/SEC2
BH CV ECHO MEAS - MV DEC TIME: 0.1 MSEC
BH CV ECHO MEAS - MV E MAX VEL: 150 CM/SEC
BH CV ECHO MEAS - MV MAX PG: 8.4 MMHG
BH CV ECHO MEAS - MV MEAN PG: 4.5 MMHG
BH CV ECHO MEAS - MV P1/2T: 31 MSEC
BH CV ECHO MEAS - MV V2 VTI: 16.2 CM
BH CV ECHO MEAS - MVA(P1/2T): 7.1 CM2
BH CV ECHO MEAS - MVA(VTI): 1.65 CM2
BH CV ECHO MEAS - PA ACC TIME: 0.07 SEC
BH CV ECHO MEAS - PA PR(ACCEL): 45.7 MMHG
BH CV ECHO MEAS - PA V2 MAX: 64 CM/SEC
BH CV ECHO MEAS - QP/QS: 1.01
BH CV ECHO MEAS - RAP SYSTOLE: 15 MMHG
BH CV ECHO MEAS - RV MAX PG: 0.56 MMHG
BH CV ECHO MEAS - RV V1 MAX: 37.5 CM/SEC
BH CV ECHO MEAS - RV V1 VTI: 4.8 CM
BH CV ECHO MEAS - RVOT DIAM: 2.7 CM
BH CV ECHO MEAS - RVSP: 38.7 MMHG
BH CV ECHO MEAS - SI(MOD-SP2): 6.9 ML/M2
BH CV ECHO MEAS - SI(MOD-SP4): 6.4 ML/M2
BH CV ECHO MEAS - SV(LVOT): 26.7 ML
BH CV ECHO MEAS - SV(MOD-SP2): 13 ML
BH CV ECHO MEAS - SV(MOD-SP4): 12 ML
BH CV ECHO MEAS - SV(RVOT): 26.9 ML
BH CV ECHO MEAS - TAPSE (>1.6): 1.2 CM
BH CV ECHO MEAS - TR MAX PG: 23.7 MMHG
BH CV ECHO MEAS - TR MAX VEL: 243.2 CM/SEC
BH CV ECHO MEASUREMENTS AVERAGE E/E' RATIO: 23.26
BH CV XLRA - RV BASE: 3.7 CM
BH CV XLRA - RV LENGTH: 8 CM
BH CV XLRA - TDI S': 9.6 CM/SEC
BUN SERPL-MCNC: 25 MG/DL (ref 8–23)
BUN/CREAT SERPL: 20.7 (ref 7–25)
CALCIUM SPEC-SCNC: 8.5 MG/DL (ref 8.2–9.6)
CHLORIDE SERPL-SCNC: 103 MMOL/L (ref 98–107)
CO2 SERPL-SCNC: 18.7 MMOL/L (ref 22–29)
CREAT SERPL-MCNC: 1.21 MG/DL (ref 0.76–1.27)
DEPRECATED RDW RBC AUTO: 48 FL (ref 37–54)
EGFRCR SERPLBLD CKD-EPI 2021: 56.2 ML/MIN/1.73
EOSINOPHIL # BLD AUTO: 0.07 10*3/MM3 (ref 0–0.4)
EOSINOPHIL NFR BLD AUTO: 0.7 % (ref 0.3–6.2)
ERYTHROCYTE [DISTWIDTH] IN BLOOD BY AUTOMATED COUNT: 14.6 % (ref 12.3–15.4)
GLUCOSE SERPL-MCNC: 104 MG/DL (ref 65–99)
HCT VFR BLD AUTO: 33.3 % (ref 37.5–51)
HGB BLD-MCNC: 11.8 G/DL (ref 13–17.7)
LEFT ATRIUM VOLUME INDEX: 36 ML/M2
LYMPHOCYTES # BLD AUTO: 1.57 10*3/MM3 (ref 0.7–3.1)
LYMPHOCYTES NFR BLD AUTO: 15.8 % (ref 19.6–45.3)
MAXIMAL PREDICTED HEART RATE: 128 BPM
MCH RBC QN AUTO: 32 PG (ref 26.6–33)
MCHC RBC AUTO-ENTMCNC: 35.4 G/DL (ref 31.5–35.7)
MCV RBC AUTO: 90.2 FL (ref 79–97)
MONOCYTES # BLD AUTO: 0.94 10*3/MM3 (ref 0.1–0.9)
MONOCYTES NFR BLD AUTO: 9.4 % (ref 5–12)
NEUTROPHILS NFR BLD AUTO: 7.23 10*3/MM3 (ref 1.7–7)
NEUTROPHILS NFR BLD AUTO: 72.6 % (ref 42.7–76)
NT-PROBNP SERPL-MCNC: 3274 PG/ML (ref 0–1800)
PLATELET # BLD AUTO: 94 10*3/MM3 (ref 140–450)
PMV BLD AUTO: 11.1 FL (ref 6–12)
POTASSIUM SERPL-SCNC: 4.1 MMOL/L (ref 3.5–5.2)
RBC # BLD AUTO: 3.69 10*6/MM3 (ref 4.14–5.8)
SODIUM SERPL-SCNC: 134 MMOL/L (ref 136–145)
STRESS TARGET HR: 109 BPM
WBC NRBC COR # BLD: 9.96 10*3/MM3 (ref 3.4–10.8)

## 2022-09-06 PROCEDURE — 80048 BASIC METABOLIC PNL TOTAL CA: CPT | Performed by: INTERNAL MEDICINE

## 2022-09-06 PROCEDURE — 25010000002 CEFAZOLIN IN DEXTROSE 2-4 GM/100ML-% SOLUTION: Performed by: INTERNAL MEDICINE

## 2022-09-06 PROCEDURE — 97162 PT EVAL MOD COMPLEX 30 MIN: CPT

## 2022-09-06 PROCEDURE — 25010000002 AMIODARONE IN DEXTROSE 5% 360-4.14 MG/200ML-% SOLUTION: Performed by: INTERNAL MEDICINE

## 2022-09-06 PROCEDURE — 83880 ASSAY OF NATRIURETIC PEPTIDE: CPT | Performed by: INTERNAL MEDICINE

## 2022-09-06 PROCEDURE — 97116 GAIT TRAINING THERAPY: CPT

## 2022-09-06 PROCEDURE — 25010000002 CEFEPIME PER 500 MG: Performed by: INTERNAL MEDICINE

## 2022-09-06 PROCEDURE — 25010000002 VANCOMYCIN PER 500 MG: Performed by: INTERNAL MEDICINE

## 2022-09-06 PROCEDURE — 85025 COMPLETE CBC W/AUTO DIFF WBC: CPT | Performed by: INTERNAL MEDICINE

## 2022-09-06 RX ORDER — CEFAZOLIN SODIUM 2 G/100ML
2 INJECTION, SOLUTION INTRAVENOUS EVERY 8 HOURS
Status: DISCONTINUED | OUTPATIENT
Start: 2022-09-06 | End: 2022-09-08

## 2022-09-06 RX ORDER — DOCUSATE SODIUM 100 MG/1
100 CAPSULE, LIQUID FILLED ORAL 2 TIMES DAILY
Status: DISCONTINUED | OUTPATIENT
Start: 2022-09-06 | End: 2022-09-09 | Stop reason: HOSPADM

## 2022-09-06 RX ADMIN — AMIODARONE HYDROCHLORIDE 0.5 MG/MIN: 1.8 INJECTION, SOLUTION INTRAVENOUS at 16:08

## 2022-09-06 RX ADMIN — DOCUSATE SODIUM 100 MG: 100 CAPSULE, LIQUID FILLED ORAL at 16:14

## 2022-09-06 RX ADMIN — CEFEPIME 2 G: 2 INJECTION, POWDER, FOR SOLUTION INTRAVENOUS at 09:12

## 2022-09-06 RX ADMIN — AMIODARONE HYDROCHLORIDE 0.5 MG/MIN: 1.8 INJECTION, SOLUTION INTRAVENOUS at 03:50

## 2022-09-06 RX ADMIN — LATANOPROST 1 DROP: 50 SOLUTION/ DROPS OPHTHALMIC at 20:54

## 2022-09-06 RX ADMIN — CEFAZOLIN SODIUM 2 G: 2 INJECTION, SOLUTION INTRAVENOUS at 18:16

## 2022-09-06 RX ADMIN — BICALUTAMIDE 50 MG: 50 TABLET ORAL at 09:12

## 2022-09-06 RX ADMIN — LEVOTHYROXINE SODIUM 88 MCG: 0.09 TABLET ORAL at 06:05

## 2022-09-06 RX ADMIN — VANCOMYCIN HYDROCHLORIDE 1000 MG: 1 INJECTION, SOLUTION INTRAVENOUS at 13:33

## 2022-09-06 RX ADMIN — CARVEDILOL 3.12 MG: 3.12 TABLET, FILM COATED ORAL at 20:48

## 2022-09-06 RX ADMIN — APIXABAN 5 MG: 5 TABLET, FILM COATED ORAL at 09:12

## 2022-09-06 RX ADMIN — Medication 10 ML: at 20:56

## 2022-09-06 RX ADMIN — MULTIPLE VITAMINS W/ MINERALS TAB 1 TABLET: TAB at 09:11

## 2022-09-06 RX ADMIN — APIXABAN 5 MG: 5 TABLET, FILM COATED ORAL at 20:48

## 2022-09-06 RX ADMIN — ROSUVASTATIN CALCIUM 10 MG: 10 TABLET, FILM COATED ORAL at 09:12

## 2022-09-06 RX ADMIN — CALCIUM CARBONATE-VITAMIN D TAB 500 MG-200 UNIT 1 TABLET: 500-200 TAB at 09:12

## 2022-09-06 RX ADMIN — DOCUSATE SODIUM 100 MG: 100 CAPSULE, LIQUID FILLED ORAL at 20:48

## 2022-09-06 NOTE — DISCHARGE PLACEMENT REQUEST
"Ailyn Elias (89 y.o. Male)             Date of Birth   01/31/1930    Social Security Number       Address   [address] [place] [address]    Home Phone   [phone]    MRN   [id number]       [facility]    Marital Status                               Admission Date   9/4/22    Admission Type   Urgent    Admitting Provider   Norberto Dahliwal MD    Attending Provider   Edis Mae MD    Department, Room/Bed   [address], [address]       Discharge Date       Discharge Disposition       Discharge Destination                               Attending Provider: Edis Mae MD    Allergies: No Known Allergies    Isolation: None   Infection: None   Code Status: CPR   Advance Care Planning Activity    Ht: 170.2 cm (67\")   Wt: 75.8 kg (167 lb)    Admission Cmt: None   Principal Problem: Cellulitis of right leg [L03.115]                 Active Insurance as of 9/4/2022     Primary Coverage     Payor Plan Insurance Group Employer/Plan Group    AETNA MEDICARE REPLACEMENT AETNA MEDICARE REPLACEMENT [id number]     Payor Plan Address Payor Plan Phone Number Payor Plan Fax Number Effective Dates    [address] [phone]  1/1/2021 - None Entered    [facility] [address]       Subscriber Name Subscriber Birth Date Member ID       AILYN ELIAS 1/31/1930 [id number]                 Emergency Contacts      (Rel.) Home Phone Work Phone Mobile Phone    EVELIA HIGH (Daughter) [phone] -- [phone]            {Outbreak/Travel/Exposure Documentation......;  Question Available Choices Patient Response   COVID-19 Outbreak Screen:  Do you currently have a new onset of the following symptoms?        Fever/Chills, Cough, Shortness of air, Loss of taste or smell, No, Unknown  Unknown (09/04/22 1647)   COVID-19 Outbreak Screen: In the last 14 days, have you had contact with anyone who is ill, has show any of the symptoms listed above and/or has been " diagnosis with the 2019 Novel Coronavirus? This includes any immediate household members but excludes any patients with whom you have been in contact within your normal work duties wearing proper PPE, if you are a healthcare worker.  Yes, No, Unknown              Unknown (09/04/22 1647)   COVID-19 Outbreak Screen: Who was notified? Free text (not recorded)   Ebola Screening Outbreak Screen: Have you traveled to the [place] the [place] or [place] within the past 21 days?  Yes, No, Unknown (not recorded)   Ebola Screening Outbreak Screen: Do you have ANY of the following symptoms: Fever/Chills, Vomiting, Diarrhea, Fatigue, Headache, Muscle pain, Unexplained bleeding, Abdominal (stomach) pain, No, Unknown (not recorded)   Ebola Screening Outbreak Screen: Name of Person notified Free text (not recorded)   Travel Screen: Have you traveled in the last month? If so, to what country have you traveled? If [place] what state? Yes, No, Unknown  List of all countries  List of all States (not recorded)  (not recorded)  (not recorded)   Infection Risk: Do you currently have the following symptoms?  (If cough is selected, the Tuberculosis Screen is performed.) Cough, Fever, Rash, No (not recorded)   Tuberculosis Screen: Do you have any of the following Tuberculosis Risks?  · Have you lived or spent time with anyone who had or may have TB?  · Have you lived in or visited any of the following areas for more than one month: [place], [place], [place], [place] or [place], the [place] or [place]?  · Do you have HIV/AIDS?  · Have you lived in or worked in a nursing home, homeless shelter, correctional facility, or substance abuse treatment facility?   · No    If Yes do you have any of the following symptoms? Yes responses display to the right    If Yes, symptoms listed are:  Cough greater than or equal to 3 weeks, Loss of appetite, Unexplained weight loss, Night sweats, Bloody sputum or hemoptysis, Hoarseness, Fever,  Fatigue, Chest pain, No (not recorded)  (not recorded)   Exposure Screen: Have you been exposed to any of these contagious diseases in the last month? Measles, Chickenpox, Meningitis, Pertussis, Whooping Cough, No (not recorded)

## 2022-09-06 NOTE — PROGRESS NOTES
"   LOS: 2 days   Patient Care Team:  Conor Prince MD as PCP - General (Internal Medicine)  Maurice Cook MD as Consulting Physician (Radiation Oncology)    Chief Complaint: Fatigue moderate shortness of breath    Subjective Patient feels better today.  Blood pressure reported 92/75.  Denies any chest pain.  Continues to receive antibiotics for right lower extremity cellulitis    Echocardiogram documents ejection fraction about 30%.  This is a significant drop compared to echocardiogram 6 months ago with ejection fraction 50%.  These findings could be secondary to infection however most likely represent worsening coronary disease.  In view of his infection and the patient's age we will continue medical therapy.    Interval History:     Patient Complaints: Mild shortness of breath pain in right lower extremity  Patient Denies: Chest pain  History taken from: patient    Review of Systems:   System review is negative except mild pain in the right lower extremity and some shortness of breath at rest.      Objective     Vital Sign Min/Max for last 24 hours  Temp  Min: 97.5 °F (36.4 °C)  Max: 98.3 °F (36.8 °C)   BP  Min: 96/66  Max: 116/85   Pulse  Min: 117  Max: 122   Resp  Min: 16  Max: 18   SpO2  Min: 97 %  Max: 98 %   No data recorded   No data recorded     Flowsheet Rows    Flowsheet Row First Filed Value   Admission Height 170.2 cm (67\") Documented at 09/05/2022 1215   Admission Weight 76.2 kg (167 lb 14.4 oz) Documented at 09/05/2022 0231          Physical Exam:     General Appearance:    Alert, cooperative, in no acute distress   Head:    Normocephalic, without obvious abnormality, atraumatic   Eyes:            Lids and lashes normal, conjunctivae and sclerae normal, no   icterus, no pallor, corneas clear, PERRLA   Ears:    Ears appear intact with no abnormalities noted   Throat:   No oral lesions, no thrush, oral mucosa moist   Neck:   No adenopathy, supple, trachea midline, no thyromegaly, no   carotid " bruit, no JVD   Back:     No kyphosis present, no scoliosis present, no skin lesions,      erythema or scars, no tenderness to percussion or                   palpation,   range of motion normal   Lungs:     Clear to auscultation,respirations regular, even and                  unlabored    Heart:    Regular rhythm and normal rate, normal S1 and S2 systolic murmur noted in the mitral valve area.   Chest Wall:    No abnormalities observed   Abdomen:     Normal bowel sounds, no masses, no organomegaly, soft        non-tender, non-distended, no guarding, no rebound                tenderness   Rectal:     Deferred   Extremities:   Moves all extremities well, no edema, no cyanosis, no             redness   Pulses:   Pulses palpable and equal bilaterally   Skin:   No bleeding, bruising or rash   Lymph nodes:   No palpable adenopathy   Neurologic:   Cranial nerves 2 - 12 grossly intact, sensation intact, DTR       present and equal bilaterally        Results Review:     I reviewed the patient's new clinical results.    Ejection Fraction  No results found for: EF    Echo EF Estimated  Lab Results   Component Value Date    ECHOEFEST 55 04/17/2022       Nuclear Stress Ejection Fraction  No components found for: NUCEF    Cath Ejection Fraction Quantitative  No results found for: CATHEF    Physical Exam    Medication Review:     Assessment & Plan       Cellulitis of right leg    SCC (squamous cell carcinoma), face    General weakness    Pacemaker    Atrial fibrillation (HCC)    Chronic diastolic heart failure (HCC)    Hypertensive kidney disease, stage III (HCC)    Hypothyroidism    Long term (current) use of anticoagulants    Malignant neoplasm of prostate (HCC)    Chronic acquired lymphedema    Contusion of face    Contusion of forearm, left    Fall  Impaired left ventricular function with ejection fraction 30%    Plan    Continue IV amiodarone    Decreased left ventricular function is new compared to echocardiogram 6 months  ago.  Most likely explanation is progression of coronary disease or secondary to atrial fibrillation with fast ventricular response.  We will continue medical therapy.  Discussed with family    Continue IV antibiotics    Plan for disposition rehab    Medardo Vanegas MD  09/06/22  12:37 EDT      Time 15 to 20 minutes.

## 2022-09-06 NOTE — CASE MANAGEMENT/SOCIAL WORK
Discharge Planning Assessment  [facility]     Patient Name: Bridger Elias  MRN: [id number]  Today's Date: 9/6/2022    Admit Date: 9/4/2022     Discharge Needs Assessment     Row Name 09/06/22 1318       Living Environment    People in Home facility resident    Current Living Arrangements assisted living facility    Primary Care Provided by [facility]  Facility staff    Provides Primary Care For no one, unable/limited ability to care for self    Family Caregiver if Needed child(ambika), adult    Family Caregiver Names son Yasir Elias [phone] and daughter Sultana Barnes [phone]    Quality of Family Relationships helpful;involved;supportive    Able to Return to Prior Arrangements yes       Resource/Environmental Concerns    Resource/Environmental Concerns none    Transportation Concerns no car       Transition Planning    Patient/Family Anticipates Transition to long-term care facility    Transportation Anticipated family or friend will provide       Discharge Needs Assessment    Current Outpatient/Agency/Support Group assisted living facility    Equipment Currently Used at Home rollator    Concerns to be Addressed discharge planning    Anticipated Changes Related to Illness inability to care for self    Equipment Needed After Discharge none    Discharge Facility/Level of Care Needs assisted living facility    Current Discharge Risk physical impairment               Discharge Plan     Row Name 09/06/22 1315       Plan    Plan Plans to return to [facility]- follow for P.T. recommendations.    Patient/Family in Agreement with Plan yes    Provided Post Acute Provider List? N/A    N/A Provider List Comment Plans for patient to return to [facility]    Provided Post Acute Provider Quality & Resource List? N/A    N/A Quality & Resource List Comment Plans for patient to return to [facility]    Plan Comments Spoke to Winsome- Director of Health Services at [facility] [phone] who states that the  patient is from personal care and in order to return he must be an assist of 1, must be able to feed himself, cannot return with IV’s or catheters and can receive onsite P.T./O.T. from an outpatient agency called  [facility] (through [facility]).  Winsome states that no COVID test is needed upon d/c as the one that the patient received upon admission on 9/4 is appropriate for his return.  Met with the patient and his son Yasir Elias [phone] at bedside and they both state that they plan for the patient to return to [facility] upon d/c.  The patient’s daughter is Sultana Barnes [phone]. The patient has resided there for about 2 ½ months, has a rollator, PCP is Conor Prince, pharmacy is [facility] in [place], the staff assist him with his medication and he has no trouble affording his medication. The patient has been to [facility] [place], his adult children transport him to his appointments and will transport him home upon d/c.  CCP will follow to see if the patient needs IV Cefepime and IV Vanc upon d/c and will follow for P.T. recommendations to assist with his return to [facility]- personal care if appropriate. TLUIZ ALDANA              Continued Care and Services - Admitted Since 9/4/2022    Coordination has not been started for this encounter.          Demographic Summary     Row Name 09/06/22 6666       General Information    Admission Type inpatient    Arrived From home    Referral Source admission list    Reason for Consult discharge planning    Preferred Language English               Functional Status     Row Name 09/06/22 7403       Functional Status    Usual Activity Tolerance fair    Current Activity Tolerance poor       Functional Status, IADL    Medications assistive equipment and person    Meal Preparation assistive equipment and person    Housekeeping assistive equipment and person    Laundry assistive equipment and person    Shopping assistive equipment and person       Mental  Status    General Appearance WDL WDL       Mental Status Summary    Recent Changes in Mental Status/Cognitive Functioning no changes               Psychosocial    No documentation.                Abuse/Neglect    No documentation.                Legal    No documentation.                Substance Abuse    No documentation.                Patient Forms    No documentation.                   LUIZ Renteria

## 2022-09-06 NOTE — PLAN OF CARE
Goal Outcome Evaluation:  Plan of Care Reviewed With: patient, son           Outcome Evaluation: Pt is a 89 yo male presenting with sudden onset weakness at home. PMH significant for CHF, a fib, and pacemaker. Prior to admission, pt was living at [facility] and independent in mobility with rollator, reports son comes to visit and help often. Upon exam, pt was able to walk 130' CGA using rwx, no complaints of fatigue and says he could go much further. MMT showed 4+/5 on R LE for knee flex/ext, hip flex, and ankle DF, 5/5 L LE throughout. STS x1 with CGA using rwx, pt has to half-stand then sit prior to standing up, states he does this normally. Pt will benefit from PT to address impairments.

## 2022-09-06 NOTE — PLAN OF CARE
Goal Outcome Evaluation:  Plan of Care Reviewed With: patient had no acute distress this shift, family at bedside. Patient had no complains. Patient is still on Amiodarone drip 16.67 ml. Continue plan of care.

## 2022-09-06 NOTE — PROGRESS NOTES
Name: Bridger Elias ADMIT: 2022   : 1930  PCP: Conor Prince MD    MRN: [id number] LOS: 2 days   AGE/SEX: 89 y.o. male  ROOM: [facility]     Subjective   Subjective   CC: generalized weakness  No acute events. Patient overall is feeling better. Taking PO but appetite is not great. Denies CP/dyspnea/f/c/n/v/d. +constipation    Objective   Objective   Vital Signs  Temp:  [97.5 °F (36.4 °C)-97.9 °F (36.6 °C)] 97.8 °F (36.6 °C)  Heart Rate:  [115-122] 115  Resp:  [16-18] 18  BP: ()/(66-85) 117/82  SpO2:  [97 %-99 %] 99 %  on   ;   Device (Oxygen Therapy): room air  Body mass index is 26.16 kg/m².  Physical Exam  Vitals and nursing note reviewed.   Constitutional:       General: He is not in acute distress.     Appearance: He is not toxic-appearing or diaphoretic.   HENT:      Head: Normocephalic.      Nose: Nose normal.      Mouth/Throat:      Mouth: Mucous membranes are moist.      Pharynx: Oropharynx is clear.   Eyes:      Conjunctiva/sclera: Conjunctivae normal.      Pupils: Pupils are equal, round, and reactive to light.   Cardiovascular:      Rate and Rhythm: Tachycardia present. Rhythm irregular.      Pulses: Normal pulses.   Pulmonary:      Effort: Pulmonary effort is normal.      Breath sounds: Normal breath sounds.   Abdominal:      General: Bowel sounds are normal.      Palpations: Abdomen is soft.      Tenderness: There is no abdominal tenderness.   Musculoskeletal:         General: Swelling (1-2+ BLE), tenderness (right calf) and deformity (LLE with previous injury and well-healed skin graft) present.      Cervical back: Normal range of motion and neck supple.   Skin:     General: Skin is warm and dry.      Capillary Refill: Capillary refill takes less than 2 seconds.   Neurological:      General: No focal deficit present.      Mental Status: He is alert.   Psychiatric:         Mood and Affect: Mood normal.         Behavior: Behavior normal.       Results Review     I reviewed the  patient's new clinical results.  I reviewed the patient's telemetry.  Results from last 7 days   Lab Units 09/06/22  0431 09/05/22  0055   WBC 10*3/mm3 9.96 14.75*   HEMOGLOBIN g/dL 11.8* 11.3*   PLATELETS 10*3/mm3 94* 106*     Results from last 7 days   Lab Units 09/06/22  0431 09/05/22  0055   SODIUM mmol/L 134* 135*   POTASSIUM mmol/L 4.1 4.0   CHLORIDE mmol/L 103 102   CO2 mmol/L 18.7* 21.7*   BUN mg/dL 25* 23   CREATININE mg/dL 1.21 1.06   GLUCOSE mg/dL 104* 119*   EGFR mL/min/1.73 56.2* 65.8     Results from last 7 days   Lab Units 09/05/22  0055   ALBUMIN g/dL 3.30*   BILIRUBIN mg/dL 1.0   ALK PHOS U/L 84   AST (SGOT) U/L 31   ALT (SGPT) U/L 21     Results from last 7 days   Lab Units 09/06/22  0431 09/05/22  0055 09/04/22  1022   CALCIUM mg/dL 8.5 8.6  --    ALBUMIN g/dL  --  3.30*  --    MAGNESIUM mg/dL  --   --  2.0     Results from last 7 days   Lab Units 09/05/22  0055 09/05/22  0046 09/04/22  1226   PROCALCITONIN ng/mL  --  0.40* 0.32   LACTATE mmol/L 1.6  --   --      No results found for: HGBA1C, POCGLU    CT Head Without Contrast    Result Date: 9/5/2022  No acute intracranial findings.  Radiation dose reduction techniques were utilized, including automated exposure control and exposure modulation based on body size.  This report was finalized on 9/5/2022 4:05 AM by Dr. Jaida Neal M.D.      Scheduled Medications  apixaban, 5 mg, Oral, Q12H  bicalutamide, 50 mg, Oral, Daily  calcium-vitamin D, 1 tablet, Oral, Daily  carvedilol, 3.125 mg, Oral, Nightly  ceFAZolin, 2 g, Intravenous, Q8H  docusate sodium, 100 mg, Oral, BID  latanoprost, 1 drop, Both Eyes, Nightly  levothyroxine, 88 mcg, Oral, Q AM  multivitamin with minerals, 1 tablet, Oral, Daily  rosuvastatin, 10 mg, Oral, Daily  sodium chloride, 10 mL, Intravenous, Q12H    Infusions  amiodarone, 0.5 mg/min, Last Rate: 0.5 mg/min (09/06/22 0350)    Diet  Diet Regular; Cardiac       Assessment/Plan     Active Hospital Problems    Diagnosis  POA    • **Cellulitis of right leg [L03.115]  Unknown   • Chronic acquired lymphedema [I89.0]  Unknown   • Contusion of face [S00.83XA]  Unknown   • Contusion of forearm, left [S50.12XA]  Unknown   • Fall [W19.XXXA]  Unknown   • Atrial fibrillation (HCC) [I48.91]  Yes   • General weakness [R53.1]  Yes   • Pacemaker [Z95.0]  Yes   • Chronic diastolic heart failure (HCC) [I50.32]  Yes   • SCC (squamous cell carcinoma), face [C44.320]  Yes   • Hypothyroidism [E03.9]  Yes   • Hypertensive kidney disease, stage III (HCC) [I12.9, N18.30]  Yes   • Long term (current) use of anticoagulants [Z79.01]  Not Applicable   • Malignant neoplasm of prostate (HCC) [C61]  Yes      Resolved Hospital Problems   No resolved problems to display.   Afib/RVR  - complicated by low BP  - continue amiodarone drip  - echocardiogram noted with decreased LVEF-treat medically per cardiology  - on AC with eliquis  - appreciate cardiology recs    RLE Cellulitis with Sepsis present on Admission  - leukocytosis and tachycardia present on admission  - overall looking much better, blood cx's NGTD  - stop vancomycin and cefepime  - start cefazolin    Hypothyroidism  - continue levothyroxine    Chronic Diastolic CHF  - monitor volume status    Constipation  - start bowel regimen    Eliquis (home med) for DVT prophylaxis.  Full code.  Discussed with patient, family, nursing staff and care team on multidisciplinary rounds.  Anticipate discharge to SNU facility timing yet to be determined.      Edis Mae MD  [facility]  09/06/22  15:19 EDT

## 2022-09-06 NOTE — THERAPY EVALUATION
Patient Name: Bridger Elias  : 1930    MRN: [id number]                              Today's Date: 2022       Admit Date: 2022    Visit Dx: No diagnosis found.  Patient Active Problem List   Diagnosis   • SCC (squamous cell carcinoma), face   • General weakness   • Pacemaker   • Abnormal gait   • Atrial fibrillation (HCC)   • Chronic diastolic heart failure (HCC)   • Coronary arteriosclerosis   • Dyslipidemia   • Glaucoma   • Mitral valve disorder   • Hypertension   • Hypertensive kidney disease, stage III (HCC)   • Hypothyroidism   • Long term (current) use of anticoagulants   • Malignant neoplasm of prostate (HCC)   • Osteoporosis   • Squamous cell carcinoma of skin of face   • Acute on chronic diastolic CHF (congestive heart failure) (HCC)   • Stage 3b chronic kidney disease (HCC)   • Personal history of radiation therapy   • Acute on chronic systolic (congestive) heart failure (HCC)   • Cellulitis of right leg   • Chronic acquired lymphedema   • Contusion of face   • Contusion of forearm, left   • Fall     Past Medical History:   Diagnosis Date   • Acute on chronic diastolic CHF (congestive heart failure) (HCC) 2022   • Atrial fibrillation (HCC) 2022   • Coronary arteriosclerosis 7/3/2014    Formatting of this note might be different from the original. [facility] 16  IMPRESSION:   1. Right heart disease, hypertensive cardiac disease.   2. Status post mitral valve repair.   3. Anatomy: No hemodynamically significant disease. about 30-40% lesion of    the right coronary artery. Normal. Left coronary artery system.   • Dyslipidemia 2013   • Glaucoma 2022   • Hypertension 2013   • Hypertensive kidney disease, stage III (HCC) 3/13/2017   • Hypothyroidism 3/19/2017   • Long term (current) use of anticoagulants 2013    Formatting of this note might be different from the original. Mitral valve replacement and atrial fibrillation Assume a range of 2.5-3.5.   • Malignant  neoplasm of prostate (HCC) 12/5/2013    Formatting of this note might be different from the original. Description: He sees urology every 6 months and he does do Lupron injections   • Mitral valve disorder 1/25/2021   • Pacemaker 4/14/2022   • Personal history of radiation therapy 4/14/2022   • Prostate cancer (HCC)    • SCC (squamous cell carcinoma), face 2/2/2022   • Stage 3b chronic kidney disease (HCC) 4/14/2022     Past Surgical History:   Procedure Laterality Date   • PACEMAKER IMPLANTATION  2018      General Information     Row Name 09/06/22 1440          Physical Therapy Time and Intention    Document Type evaluation  -EE (r) JF (t) EE (c)     Mode of Treatment individual therapy;physical therapy  -EE (r) JF (t) EE (c)     Row Name 09/06/22 1440          General Information    Patient Profile Reviewed yes  -EE (r) JF (t) EE (c)     Prior Level of Function independent:;all household mobility;community mobility  with rollator, lives in [facility]  -EE (r) JF (t) EE (c)     Existing Precautions/Restrictions fall  -EE (r) JF (t) EE (c)     Barriers to Rehab medically complex  -EE (r) JF (t) EE (c)     Row Name 09/06/22 1440          Home Main Entrance    Number of Stairs, Main Entrance none  -EE (r) JF (t) EE (c)     Row Name 09/06/22 1440          Stairs Within Home, Primary    Number of Stairs, Within Home, Primary none  -EE (r) JF (t) EE (c)     Row Name 09/06/22 1440          Cognition    Orientation Status (Cognition) oriented x 4  -EE (r) JF (t) EE (c)     Row Name 09/06/22 1440          Safety Issues, Functional Mobility    Impairments Affecting Function (Mobility) balance;strength  -EE (r) JF (t) EE (c)           User Key  (r) = Recorded By, (t) = Taken By, (c) = Cosigned By    Initials Name Provider Type    Odalys Heath, PT Physical Therapist    Justus Cuellar, PT Student PT Student               Mobility     Row Name 09/06/22 1443          Bed Mobility    Comment, (Bed Mobility) not tested, up in chair   -EE (r) JF (t) EE (c)     Row Name 09/06/22 1443          Sit-Stand Transfer    Sit-Stand [place] (Transfers) contact guard  -EE (r) JF (t) EE (c)     Assistive Device (Sit-Stand Transfers) walker, front-wheeled  -EE (r) JF (t) EE (c)     Comment, (Sit-Stand Transfer) x1 from chair. Pt does a half-stand before standing up, informed PT this is how he always gets up  -EE (r) JF (t) EE (c)     Row Name 09/06/22 1443          Gait/Stairs (Locomotion)    [place] Level (Gait) contact guard  -EE (r) JF (t) EE (c)     Assistive Device (Gait) walker, front-wheeled  -EE (r) JF (t) EE (c)     Distance in Feet (Gait) 130'  -EE (r) JF (t) EE (c)     Deviations/Abnormal Patterns (Gait) hussein decreased;gait speed decreased  -EE (r) JF (t) EE (c)     Comment, (Gait/Stairs) no LOB noted  -EE (r) JF (t) EE (c)           User Key  (r) = Recorded By, (t) = Taken By, (c) = Cosigned By    Initials Name Provider Type    EE Odalys Cowart, PT Physical Therapist    Justus Cuellar PT Student PT Student               Obj/Interventions     Row Name 09/06/22 1448          Range of Motion Comprehensive    General Range of Motion no range of motion deficits identified  -EE (r) JF (t) EE (c)     Row Name 09/06/22 1448          Strength Comprehensive (MMT)    General Manual Muscle Testing (MMT) Assessment lower extremity strength deficits identified  -EE (r) JF (t) EE (c)     Comment, General Manual Muscle Testing (MMT) Assessment grossly 4+/5 R LE, LLE WFL  -EE (r) JF (t) EE (c)     Row Name 09/06/22 1448          Balance    Balance Assessment sitting static balance;sitting dynamic balance;standing static balance;standing dynamic balance  -EE (r) JF (t) EE (c)     Static Sitting Balance supervision  -EE (r) JF (t) EE (c)     Dynamic Sitting Balance standby assist  -EE (r) JF (t) EE (c)     Position, Sitting Balance sitting in chair  -EE (r) JF (t) EE (c)     Static Standing Balance contact guard  -EE (r) JF (t) EE (c)     Dynamic  Standing Balance contact guard  -EE (r) JF (t) EE (c)     Position/Device Used, Standing Balance walker, front-wheeled  -EE (r) JF (t) EE (c)           User Key  (r) = Recorded By, (t) = Taken By, (c) = Cosigned By    Initials Name Provider Type    EE Odalys Cowart, PT Physical Therapist    Justus Cuellar, PT Student PT Student               Goals/Plan     Row Name 09/06/22 1503          Transfer Goal 1 (PT)    Activity/Assistive Device (Transfer Goal 1, PT) sit-to-stand/stand-to-sit;walker, rolling  -EE (r) JF (t) EE (c)     [place] Level/Cues Needed (Transfer Goal 1, PT) standby assist  -EE (r) JF (t) EE (c)     Time Frame (Transfer Goal 1, PT) long term goal (LTG);1 week  -EE (r) JF (t) EE (c)     Progress/Outcome (Transfer Goal 1, PT) goal ongoing  -EE (r) JF (t) EE (c)     Row Name 09/06/22 1503          Gait Training Goal 1 (PT)    Activity/Assistive Device (Gait Training Goal 1, PT) gait (walking locomotion);walker, rolling  -EE (r) JF (t) EE (c)     [place] Level (Gait Training Goal 1, PT) standby assist  -EE (r) JF (t) EE (c)     Distance (Gait Training Goal 1, PT) 200'  -EE (r) JF (t) EE (c)     Time Frame (Gait Training Goal 1, PT) long term goal (LTG);1 week  -EE (r) JF (t) EE (c)     Progress/Outcome (Gait Training Goal 1, PT) goal ongoing  -EE (r) JF (t) EE (c)     Row Name 09/06/22 1503          Therapy Assessment/Plan (PT)    Planned Therapy Interventions (PT) balance training;bed mobility training;gait training;home exercise program;patient/family education;strengthening;transfer training  -EE (r) JF (t) EE (c)           User Key  (r) = Recorded By, (t) = Taken By, (c) = Cosigned By    Initials Name Provider Type    EE Odalys Cowart, PT Physical Therapist    Justus Cuellar, PT Student PT Student               Clinical Impression     Row Name 09/06/22 1451          Pain    Pretreatment Pain Rating 0/10 - no pain  -EE (r) JF (t) EE (c)     Posttreatment Pain Rating 0/10 - no pain   -EE (r) JF (t) EE (c)     Row Name 09/06/22 1451          Plan of Care Review    Plan of Care Reviewed With patient;son  -EE (r) JF (t) EE (c)     Outcome Evaluation Pt is a 89 yo male presenting with sudden onset weakness at home. PMH significant for CHF, a fib, and pacemaker. Prior to admission, pt was living at [facility] and independent in mobility with rollator, reports son comes to visit and help often. Upon exam, pt was able to walk 130' CGA using rwx, no complaints of fatigue and says he could go much further. MMT showed 4+/5 on R LE for knee flex/ext, hip flex, and ankle DF, 5/5 L LE throughout. STS x1 with CGA using rwx, pt has to half-stand then sit prior to standing up, states he does this normally. Pt will benefit from PT to address impairments.  -EE (r) JF (t) EE (c)     Row Name 09/06/22 3891          Therapy Assessment/Plan (PT)    Rehab Potential (PT) good, to achieve stated therapy goals  -EE (r) JF (t) EE (c)     Criteria for Skilled Interventions Met (PT) yes  -EE (r) JF (t) EE (c)     Therapy Frequency (PT) 5 times/wk  -EE (r) JF (t) EE (c)     Row Name 09/06/22 8900          Positioning and Restraints    Pre-Treatment Position sitting in chair/recliner  -EE (r) JF (t) EE (c)     Post Treatment Position chair  -EE (r) JF (t) EE (c)     In Chair notified nsg;reclined;sitting;call light within reach;encouraged to call for assist;exit alarm on;with family/caregiver  -EE (r) JF (t) EE (c)           User Key  (r) = Recorded By, (t) = Taken By, (c) = Cosigned By    Initials Name Provider Type    Odalys Heath, PT Physical Therapist    Justus Cuellar, PT Student PT Student               Outcome Measures     Row Name 09/06/22 4812          How much help from another person do you currently need...    Turning from your back to your side while in flat bed without using bedrails? 3  -EE (r) JF (t) EE (c)     Moving from lying on back to sitting on the side of a flat bed without bedrails? 3  -DEL sun) ELISSA [place])  EE (c)     Moving to and from a bed to a chair (including a wheelchair)? 3  -EE (r) JF (t) EE (c)     Standing up from a chair using your arms (e.g., wheelchair, bedside chair)? 3  -EE (r) JF (t) EE (c)     Climbing 3-5 steps with a railing? 3  -EE (r) JF (t) EE (c)     To walk in hospital room? 3  -EE (r) JF (t) EE (c)     AM-PAC 6 Clicks Score (PT) 18  -EE (r) JF (t)     Highest level of mobility 6 --> Walked 10 steps or more  -EE (r) JF (t)     Row Name 09/06/22 1506          Functional Assessment    Outcome Measure Options AM-PAC 6 Clicks Basic Mobility (PT)  -EE (r) JF (t) EE (c)           User Key  (r) = Recorded By, (t) = Taken By, (c) = Cosigned By    Initials Name Provider Type     Odalys Cowart, PT Physical Therapist    Justus Cuellar, PT Student PT Student                             Physical Therapy Education                 Title: PT OT SLP Therapies (In Progress)     Topic: Physical Therapy (In Progress)     Point: Mobility training (Done)     Learning Progress Summary           Patient Acceptance, E, VU,NR by  at 9/6/2022 1507                   Point: Home exercise program (Not Started)     Learner Progress:  Not documented in this visit.          Point: Body mechanics (Done)     Learning Progress Summary           Patient Acceptance, E, VU,NR by  at 9/6/2022 1507                   Point: Precautions (Done)     Learning Progress Summary           Patient Acceptance, E, VU,NR by  at 9/6/2022 1507                               User Key     Initials Effective Dates Name Provider Type [facility] 07/28/22 -  Justus Alexander, PT Student PT Student PT              PT Recommendation and Plan  Planned Therapy Interventions (PT): balance training, bed mobility training, gait training, home exercise program, patient/family education, strengthening, transfer training  Plan of Care Reviewed With: patient, son  Outcome Evaluation: Pt is a 89 yo male presenting with sudden onset weakness at home.  PMH significant for CHF, a fib, and pacemaker. Prior to admission, pt was living at [facility] and independent in mobility with rollator, reports son comes to visit and help often. Upon exam, pt was able to walk 130' CGA using rwx, no complaints of fatigue and says he could go much further. MMT showed 4+/5 on R LE for knee flex/ext, hip flex, and ankle DF, 5/5 L LE throughout. STS x1 with CGA using rwx, pt has to half-stand then sit prior to standing up, states he does this normally. Pt will benefit from PT to address impairments.     Time Calculation:    PT Charges     Row Name 09/06/22 1507 09/06/22 0923          Time Calculation    Start Time 1401  -EE (r) JF (t) EE (c) --     Stop Time 1433  -EE (r) JF (t) EE (c) --     Time Calculation (min) 32 min  -EE (r) JF (t) --     PT Received On 09/06/22 (P)   -JF --     PT - Next Appointment 09/07/22 (P)   -JF 09/06/22  -EE     PT Goal Re-Cert Due Date 09/13/22 (P)   -JF --            Timed Charges    87547 - Gait Training Minutes  10 (P)   -JF --            Total Minutes    Timed Charges Total Minutes 10 (P)   -JF --      Total Minutes 10 (P)   -JF --           User Key  (r) = Recorded By, (t) = Taken By, (c) = Cosigned By    Initials Name Provider Type    EE Odalys Cowart, PT Physical Therapist    Justus Cuellar, PT Student PT Student              Therapy Charges for Today     Code Description Service Date Service Provider Modifiers Qty    44807948077 HC PT EVAL MOD COMPLEXITY 2 9/6/2022 Justus Alexander PT Student GP 1    44950349719 HC GAIT TRAINING EA 15 MIN 9/6/2022 Justus Alexander PT Student GP 1          PT G-Codes  Outcome Measure Options: AM-PAC 6 Clicks Basic Mobility (PT)  AM-PAC 6 Clicks Score (PT): 18    MONA Coronado  9/6/2022

## 2022-09-07 LAB
ANION GAP SERPL CALCULATED.3IONS-SCNC: 10.9 MMOL/L (ref 5–15)
BASOPHILS # BLD AUTO: 0.05 10*3/MM3 (ref 0–0.2)
BASOPHILS NFR BLD AUTO: 0.6 % (ref 0–1.5)
BUN SERPL-MCNC: 22 MG/DL (ref 8–23)
BUN/CREAT SERPL: 17.5 (ref 7–25)
CALCIUM SPEC-SCNC: 8.4 MG/DL (ref 8.2–9.6)
CHLORIDE SERPL-SCNC: 100 MMOL/L (ref 98–107)
CO2 SERPL-SCNC: 20.1 MMOL/L (ref 22–29)
CREAT SERPL-MCNC: 1.26 MG/DL (ref 0.76–1.27)
DEPRECATED RDW RBC AUTO: 50.7 FL (ref 37–54)
EGFRCR SERPLBLD CKD-EPI 2021: 53.5 ML/MIN/1.73
EOSINOPHIL # BLD AUTO: 0.11 10*3/MM3 (ref 0–0.4)
EOSINOPHIL NFR BLD AUTO: 1.2 % (ref 0.3–6.2)
ERYTHROCYTE [DISTWIDTH] IN BLOOD BY AUTOMATED COUNT: 14.9 % (ref 12.3–15.4)
GLUCOSE SERPL-MCNC: 116 MG/DL (ref 65–99)
HCT VFR BLD AUTO: 33.6 % (ref 37.5–51)
HGB BLD-MCNC: 11.6 G/DL (ref 13–17.7)
IMM GRANULOCYTES # BLD AUTO: 0.07 10*3/MM3 (ref 0–0.05)
IMM GRANULOCYTES NFR BLD AUTO: 0.8 % (ref 0–0.5)
LYMPHOCYTES # BLD AUTO: 1.26 10*3/MM3 (ref 0.7–3.1)
LYMPHOCYTES NFR BLD AUTO: 14.1 % (ref 19.6–45.3)
MCH RBC QN AUTO: 32.2 PG (ref 26.6–33)
MCHC RBC AUTO-ENTMCNC: 34.5 G/DL (ref 31.5–35.7)
MCV RBC AUTO: 93.3 FL (ref 79–97)
MONOCYTES # BLD AUTO: 0.85 10*3/MM3 (ref 0.1–0.9)
MONOCYTES NFR BLD AUTO: 9.5 % (ref 5–12)
NEUTROPHILS NFR BLD AUTO: 6.57 10*3/MM3 (ref 1.7–7)
NEUTROPHILS NFR BLD AUTO: 73.8 % (ref 42.7–76)
NRBC BLD AUTO-RTO: 0.1 /100 WBC (ref 0–0.2)
PLATELET # BLD AUTO: 105 10*3/MM3 (ref 140–450)
PMV BLD AUTO: 12.2 FL (ref 6–12)
POTASSIUM SERPL-SCNC: 4 MMOL/L (ref 3.5–5.2)
QT INTERVAL: 383 MS
RBC # BLD AUTO: 3.6 10*6/MM3 (ref 4.14–5.8)
SODIUM SERPL-SCNC: 131 MMOL/L (ref 136–145)
TROPONIN T SERPL-MCNC: <0.01 NG/ML (ref 0–0.03)
VANCOMYCIN TROUGH SERPL-MCNC: 13.1 MCG/ML (ref 5–20)
WBC NRBC COR # BLD: 8.91 10*3/MM3 (ref 3.4–10.8)

## 2022-09-07 PROCEDURE — 25010000002 CEFAZOLIN IN DEXTROSE 2-4 GM/100ML-% SOLUTION: Performed by: INTERNAL MEDICINE

## 2022-09-07 PROCEDURE — 25010000002 AMIODARONE IN DEXTROSE 5% 360-4.14 MG/200ML-% SOLUTION: Performed by: INTERNAL MEDICINE

## 2022-09-07 PROCEDURE — 97110 THERAPEUTIC EXERCISES: CPT

## 2022-09-07 PROCEDURE — 85025 COMPLETE CBC W/AUTO DIFF WBC: CPT | Performed by: INTERNAL MEDICINE

## 2022-09-07 PROCEDURE — 80048 BASIC METABOLIC PNL TOTAL CA: CPT | Performed by: INTERNAL MEDICINE

## 2022-09-07 PROCEDURE — 80202 ASSAY OF VANCOMYCIN: CPT | Performed by: INTERNAL MEDICINE

## 2022-09-07 PROCEDURE — 84484 ASSAY OF TROPONIN QUANT: CPT | Performed by: INTERNAL MEDICINE

## 2022-09-07 RX ORDER — AMIODARONE HYDROCHLORIDE 200 MG/1
200 TABLET ORAL EVERY 12 HOURS SCHEDULED
Status: DISCONTINUED | OUTPATIENT
Start: 2022-09-07 | End: 2022-09-08 | Stop reason: DRUGHIGH

## 2022-09-07 RX ADMIN — AMIODARONE HYDROCHLORIDE 200 MG: 200 TABLET ORAL at 09:51

## 2022-09-07 RX ADMIN — CEFAZOLIN SODIUM 2 G: 2 INJECTION, SOLUTION INTRAVENOUS at 08:55

## 2022-09-07 RX ADMIN — CEFAZOLIN SODIUM 2 G: 2 INJECTION, SOLUTION INTRAVENOUS at 21:23

## 2022-09-07 RX ADMIN — LATANOPROST 1 DROP: 50 SOLUTION/ DROPS OPHTHALMIC at 21:23

## 2022-09-07 RX ADMIN — APIXABAN 5 MG: 5 TABLET, FILM COATED ORAL at 21:23

## 2022-09-07 RX ADMIN — CALCIUM CARBONATE-VITAMIN D TAB 500 MG-200 UNIT 1 TABLET: 500-200 TAB at 09:53

## 2022-09-07 RX ADMIN — APIXABAN 5 MG: 5 TABLET, FILM COATED ORAL at 09:51

## 2022-09-07 RX ADMIN — ROSUVASTATIN CALCIUM 10 MG: 10 TABLET, FILM COATED ORAL at 09:51

## 2022-09-07 RX ADMIN — AMIODARONE HYDROCHLORIDE 0.5 MG/MIN: 1.8 INJECTION, SOLUTION INTRAVENOUS at 04:48

## 2022-09-07 RX ADMIN — CEFAZOLIN SODIUM 2 G: 2 INJECTION, SOLUTION INTRAVENOUS at 00:14

## 2022-09-07 RX ADMIN — BICALUTAMIDE 50 MG: 50 TABLET ORAL at 09:55

## 2022-09-07 RX ADMIN — DOCUSATE SODIUM 100 MG: 100 CAPSULE, LIQUID FILLED ORAL at 21:23

## 2022-09-07 RX ADMIN — CARVEDILOL 3.12 MG: 3.12 TABLET, FILM COATED ORAL at 21:22

## 2022-09-07 RX ADMIN — Medication 10 ML: at 21:23

## 2022-09-07 RX ADMIN — AMIODARONE HYDROCHLORIDE 200 MG: 200 TABLET ORAL at 21:22

## 2022-09-07 RX ADMIN — MULTIPLE VITAMINS W/ MINERALS TAB 1 TABLET: TAB at 09:53

## 2022-09-07 RX ADMIN — AMIODARONE HYDROCHLORIDE 0.5 MG/MIN: 1.8 INJECTION, SOLUTION INTRAVENOUS at 17:02

## 2022-09-07 RX ADMIN — LEVOTHYROXINE SODIUM 88 MCG: 0.09 TABLET ORAL at 06:43

## 2022-09-07 RX ADMIN — CEFAZOLIN SODIUM 2 G: 2 INJECTION, SOLUTION INTRAVENOUS at 17:03

## 2022-09-07 RX ADMIN — Medication 10 ML: at 09:18

## 2022-09-07 NOTE — PLAN OF CARE
Goal Outcome Evaluation:  Plan of Care Reviewed With: patient   Patient had no acute distress on this shift. Amiodarone drip still infusing at 16.67 ml. Administered cefazolin antibiotic as ordered. Patient was weak but was able to use bedside commode with assistance. Family at bedside throughout the shift. Continuing plan of care.           Problem: Adult Inpatient Plan of Care  Goal: Plan of Care Review  Outcome: Ongoing, Progressing  Flowsheets (Taken 9/7/2022 0545)  Plan of Care Reviewed With: patient  Goal: Patient-Specific Goal (Individualized)  Outcome: Ongoing, Progressing  Goal: Absence of Hospital-Acquired Illness or Injury  Outcome: Ongoing, Progressing  Intervention: Identify and Manage Fall Risk  Recent Flowsheet Documentation  Taken 9/7/2022 0400 by Terri Dan, RN  Safety Promotion/Fall Prevention:   safety round/check completed   assistive device/personal items within reach   activity supervised   clutter free environment maintained   fall prevention program maintained   lighting adjusted   nonskid shoes/slippers when out of bed   room organization consistent  Taken 9/7/2022 0200 by Terri Dan, RN  Safety Promotion/Fall Prevention:   assistive device/personal items within reach   clutter free environment maintained   activity supervised   fall prevention program maintained   lighting adjusted   nonskid shoes/slippers when out of bed   room organization consistent   safety round/check completed  Taken 9/7/2022 0000 by Terri Dan, RN  Safety Promotion/Fall Prevention:   activity supervised   assistive device/personal items within reach   clutter free environment maintained   fall prevention program maintained   lighting adjusted   nonskid shoes/slippers when out of bed   room organization consistent   safety round/check completed   toileting scheduled  Taken 9/6/2022 2200 by Terri Dan, RN  Safety Promotion/Fall Prevention:   assistive device/personal items within reach   elopement  precautions   clutter free environment maintained   fall prevention program maintained   nonskid shoes/slippers when out of bed   lighting adjusted   room organization consistent   safety round/check completed  Taken 9/6/2022 2000 by Terri Dan RN  Safety Promotion/Fall Prevention:   safety round/check completed   activity supervised   assistive device/personal items within reach   clutter free environment maintained   fall prevention program maintained   lighting adjusted   nonskid shoes/slippers when out of bed   room organization consistent   toileting scheduled  Intervention: Prevent Skin Injury  Recent Flowsheet Documentation  Taken 9/7/2022 0400 by Terri Dan RN  Body Position:   30 degrees   supine   foot of bed elevated  Taken 9/7/2022 0200 by Terri Dan RN  Body Position:   30 degrees   foot of bed elevated   supine, legs elevated  Taken 9/6/2022 2200 by Terri Dan RN  Body Position:   turned   head facing, right   supine  Taken 9/6/2022 2000 by Terri Dan RN  Body Position:   turned   left   supine  Skin Protection: adhesive use limited  Intervention: Prevent and Manage VTE (Venous Thromboembolism) Risk  Recent Flowsheet Documentation  Taken 9/6/2022 2000 by Terri Dan RN  Activity Management: activity adjusted per tolerance  Intervention: Prevent Infection  Recent Flowsheet Documentation  Taken 9/7/2022 0400 by Terri Dan RN  Infection Prevention: hand hygiene promoted  Taken 9/7/2022 0200 by Terri Dan RN  Infection Prevention: hand hygiene promoted  Taken 9/7/2022 0000 by Terri Dan RN  Infection Prevention: hand hygiene promoted  Taken 9/6/2022 2200 by Terri Dan RN  Infection Prevention: hand hygiene promoted  Taken 9/6/2022 2000 by Terri Dan RN  Infection Prevention: hand hygiene promoted  Goal: Optimal Comfort and Wellbeing  Outcome: Ongoing, Progressing  Intervention: Monitor Pain and Promote Comfort  Recent Flowsheet Documentation  Taken 9/7/2022 0000  by Terri Dan RN  Pain Management Interventions: medication offered but refused  Intervention: Provide Person-Centered Care  Recent Flowsheet Documentation  Taken 9/6/2022 2000 by Terri Dan RN  Trust Relationship/Rapport:   care explained   thoughts/feelings acknowledged   questions encouraged   questions answered  Goal: Readiness for Transition of Care  Outcome: Ongoing, Progressing     Problem: Skin Injury Risk Increased  Goal: Skin Health and Integrity  Outcome: Ongoing, Progressing  Intervention: Optimize Skin Protection  Recent Flowsheet Documentation  Taken 9/7/2022 0400 by Terri Dan RN  Head of Bed ([facility]) Positioning: HOB at 30-45 degrees  Taken 9/7/2022 0200 by Terri Dan RN  Head of Bed ([facility]) Positioning: HOB at 30-45 degrees  Taken 9/6/2022 2200 by Terri Dan RN  Head of Bed ([facility]) Positioning: HOB at 30-45 degrees  Taken 9/6/2022 2000 by Terri Dan RN  Pressure Reduction Techniques:   positioned off wounds   pressure points protected   weight shift assistance provided  Head of Bed (HOB) Positioning: HOB at 30-45 degrees  Pressure Reduction Devices:   alternating pressure pump (ADD)   feet on footrest/footstool  Skin Protection: adhesive use limited     Problem: Fall Injury Risk  Goal: Absence of Fall and Fall-Related Injury  Outcome: Ongoing, Progressing  Intervention: Identify and Manage Contributors  Recent Flowsheet Documentation  Taken 9/6/2022 2000 by Terri Dan RN  Medication Review/Management: medications reviewed  Intervention: Promote Injury-Free Environment  Recent Flowsheet Documentation  Taken 9/7/2022 0400 by Terri Dan RN  Safety Promotion/Fall Prevention:   safety round/check completed   assistive device/personal items within reach   activity supervised   clutter free environment maintained   fall prevention program maintained   lighting adjusted   nonskid shoes/slippers when out of bed   room organization consistent  Taken 9/7/2022 0200 by Terri Dan  RN  Safety Promotion/Fall Prevention:   assistive device/personal items within reach   clutter free environment maintained   activity supervised   fall prevention program maintained   lighting adjusted   nonskid shoes/slippers when out of bed   room organization consistent   safety round/check completed  Taken 9/7/2022 0000 by Terri Dan RN  Safety Promotion/Fall Prevention:   activity supervised   assistive device/personal items within reach   clutter free environment maintained   fall prevention program maintained   lighting adjusted   nonskid shoes/slippers when out of bed   room organization consistent   safety round/check completed   toileting scheduled  Taken 9/6/2022 2200 by Terri Dan RN  Safety Promotion/Fall Prevention:   assistive device/personal items within reach   elopement precautions   clutter free environment maintained   fall prevention program maintained   nonskid shoes/slippers when out of bed   lighting adjusted   room organization consistent   safety round/check completed  Taken 9/6/2022 2000 by Terri Dan RN  Safety Promotion/Fall Prevention:   safety round/check completed   activity supervised   assistive device/personal items within reach   clutter free environment maintained   fall prevention program maintained   lighting adjusted   nonskid shoes/slippers when out of bed   room organization consistent   toileting scheduled     Problem: Diabetes Comorbidity  Goal: Blood Glucose Level Within Targeted Range  Outcome: Ongoing, Progressing     Problem: Heart Failure Comorbidity  Goal: Maintenance of Heart Failure Symptom Control  Outcome: Ongoing, Progressing  Intervention: Maintain Heart Failure-Management  Recent Flowsheet Documentation  Taken 9/6/2022 2000 by Terri Dan RN  Medication Review/Management: medications reviewed     Problem: Obstructive Sleep Apnea Risk or Actual Comorbidity Management  Goal: Unobstructed Breathing During Sleep  Outcome: Ongoing, Progressing

## 2022-09-07 NOTE — NURSING NOTE
"Assessed pt for piv, pt currently has a RFA near the AC that is warm to touch, red, and firmly swollen . Pt asked if it hurts and he stated \"only when it is touched\". Alvin minaya RN is at [facility] - due to there is great blood return from this iv it would be recommended to notify MD to see if piv catheter should be cultured for infection before removing it and further orders from MD to how to proceed with identifying the cause.  "

## 2022-09-07 NOTE — PROGRESS NOTES
"   LOS: 3 days   Patient Care Team:  Conor Prince MD as PCP - General (Internal Medicine)  Maurice Cook MD as Consulting Physician (Radiation Oncology)    Chief Complaint: Fatigue mild shortness of breath  Subjective Patient feels better today denies any chest pain or shortness of breath at rest.  Continues to complain of some shortness of breath mild exercise.    Heart rate was reported 110 bpm.  We will continue IV amiodarone for 24 hours and start him on some p.o. amiodarone.  Continue anticoagulation for DVT and atrial fibrillation.    Right lower extremity cellulitis has improved.    Interval History:     Patient Complaints:   Patient Denies: Chest pain  History taken from: patient    Review of Systems:   The following systems were reviewed and negative;  respiratory complains of mild shortness of breath with exercise.    Pain of the right upper extremity secondary  to cellulitis has improved.      Objective     Vital Sign Min/Max for last 24 hours  Temp  Min: 97.8 °F (36.6 °C)  Max: 98.2 °F (36.8 °C)   BP  Min: 98/68  Max: 125/68   Pulse  Min: 113  Max: 116   Resp  Min: 18  Max: 18   SpO2  Min: 97 %  Max: 99 %   No data recorded   No data recorded     Flowsheet Rows    Flowsheet Row First Filed Value   Admission Height 170.2 cm (67\") Documented at 09/05/2022 1215   Admission Weight 76.2 kg (167 lb 14.4 oz) Documented at 09/05/2022 0231          Physical Exam:     General Appearance:    Alert, cooperative, in no acute distress   Head:    Normocephalic, without obvious abnormality, atraumatic   Eyes:            Lids and lashes normal, conjunctivae and sclerae normal, no   icterus, no pallor, corneas clear, PERRLA   Ears:    Ears appear intact with no abnormalities noted   Throat:   No oral lesions, no thrush, oral mucosa moist   Neck:   No adenopathy, supple, trachea midline, no thyromegaly, no   carotid bruit, no JVD   Back:     No kyphosis present, no scoliosis present, no skin lesions,      erythema or " scars, no tenderness to percussion or                   palpation,   range of motion normal   Lungs:     Clear to auscultation,respirations regular, even and                  unlabored    Heart:    Regular rhythm and normal rate, normal S1 and S2, systolic murmur noted in the mitral valve area.   Chest Wall:    No abnormalities observed   Abdomen:     Normal bowel sounds, no masses, no organomegaly, soft        non-tender, non-distended, no guarding, no rebound                tenderness   Rectal:     Deferred   Extremities:   Moves all extremities well, no edema, no cyanosis, no             redness   Pulses:   Pulses palpable and equal bilaterally   Skin:   No bleeding, bruising or rash   Lymph nodes:   No palpable adenopathy   Neurologic:   Cranial nerves 2 - 12 grossly intact, sensation intact, DTR       present and equal bilaterally        Results Review:     I reviewed the patient's new clinical results.    Ejection Fraction  EF calculated to 30%    Echo EF Estimated  Lab Results   Component Value Date    ECHOEFEST 55 04/17/2022       Nuclear Stress Ejection Fraction  No components found for: NUCEF    Cath Ejection Fraction Quantitative  No results found for: CATHEF    Physical Exam    Medication Review:     Assessment & Plan       Cellulitis of right leg    SCC (squamous cell carcinoma), face    General weakness    Pacemaker    Atrial fibrillation (HCC)    Chronic diastolic heart failure (HCC)    Hypertensive kidney disease, stage III (HCC)    Hypothyroidism    Long term (current) use of anticoagulants    Malignant neoplasm of prostate (HCC)    Chronic acquired lymphedema    Contusion of face    Contusion of forearm, left    Fall  Impaired LV function with EF 30%  Plan    Continue IV amiodarone    Start p.o. amiodarone    Reprogram pacemaker    Repeat chest x-ray.    Overall improving slowly      Plan for disposition: Assisted living    Medardo Vanegas MD  09/07/22  06:54 EDT      Time: 20  minutes

## 2022-09-07 NOTE — PROGRESS NOTES
Name: Bridger Elias ADMIT: 2022   : 1930  PCP: Conor Prince MD    MRN: [id number] LOS: 3 days   AGE/SEX: 89 y.o. male  ROOM: [facility]     Subjective   Subjective   CC: generalized weakness  No acute events. Patient overall is feeling better. Taking PO but appetite is poor. Denies CP/dyspnea/f/c/n/v/d. +BM    Objective   Objective   Vital Signs  Temp:  [97.8 °F (36.6 °C)-98.2 °F (36.8 °C)] 97.8 °F (36.6 °C)  Heart Rate:  [] 74  Resp:  [18] 18  BP: (111-125)/(68-86) 117/86  SpO2:  [97 %-99 %] 99 %  on   ;   Device (Oxygen Therapy): room air  Body mass index is 26.16 kg/m².  Physical Exam  Vitals and nursing note reviewed.   Constitutional:       General: He is not in acute distress.     Appearance: He is not toxic-appearing or diaphoretic.   HENT:      Head: Normocephalic.      Nose: Nose normal.      Mouth/Throat:      Mouth: Mucous membranes are moist.      Pharynx: Oropharynx is clear.   Eyes:      Conjunctiva/sclera: Conjunctivae normal.      Pupils: Pupils are equal, round, and reactive to light.   Cardiovascular:      Rate and Rhythm: Normal rate and regular rhythm.      Pulses: Normal pulses.   Pulmonary:      Effort: Pulmonary effort is normal.      Breath sounds: Normal breath sounds.   Abdominal:      General: Bowel sounds are normal.      Palpations: Abdomen is soft.      Tenderness: There is no abdominal tenderness.   Musculoskeletal:         General: Swelling (1-2+ BLE), tenderness (right calf) and deformity (LLE with previous injury and well-healed skin graft) present.      Cervical back: Normal range of motion and neck supple.      Comments: Right antecubital IV site with mild swelling and erythema   Skin:     General: Skin is warm and dry.      Capillary Refill: Capillary refill takes less than 2 seconds.   Neurological:      General: No focal deficit present.      Mental Status: He is alert.   Psychiatric:         Mood and Affect: Mood normal.         Behavior: Behavior  normal.       Results Review     I reviewed the patient's new clinical results.  I reviewed the patient's telemetry.  Results from last 7 days   Lab Units 09/07/22  0352 09/06/22 0431 09/05/22  0055   WBC 10*3/mm3 8.91 9.96 14.75*   HEMOGLOBIN g/dL 11.6* 11.8* 11.3*   PLATELETS 10*3/mm3 105* 94* 106*     Results from last 7 days   Lab Units 09/07/22  0352 09/06/22 0431 09/05/22  0055   SODIUM mmol/L 131* 134* 135*   POTASSIUM mmol/L 4.0 4.1 4.0   CHLORIDE mmol/L 100 103 102   CO2 mmol/L 20.1* 18.7* 21.7*   BUN mg/dL 22 25* 23   CREATININE mg/dL 1.26 1.21 1.06   GLUCOSE mg/dL 116* 104* 119*   EGFR mL/min/1.73 53.5* 56.2* 65.8     Results from last 7 days   Lab Units 09/05/22  0055   ALBUMIN g/dL 3.30*   BILIRUBIN mg/dL 1.0   ALK PHOS U/L 84   AST (SGOT) U/L 31   ALT (SGPT) U/L 21     Results from last 7 days   Lab Units 09/07/22  0352 09/06/22 0431 09/05/22 0055 09/04/22  1022   CALCIUM mg/dL 8.4 8.5 8.6  --    ALBUMIN g/dL  --   --  3.30*  --    MAGNESIUM mg/dL  --   --   --  2.0     Results from last 7 days   Lab Units 09/05/22  0055 09/05/22  0046 09/04/22  1226   PROCALCITONIN ng/mL  --  0.40* 0.32   LACTATE mmol/L 1.6  --   --      No results found for: HGBA1C, POCGLU    No radiology results for the last day  Scheduled Medications  amiodarone, 200 mg, Oral, Q12H  apixaban, 5 mg, Oral, Q12H  bicalutamide, 50 mg, Oral, Daily  calcium-vitamin D, 1 tablet, Oral, Daily  carvedilol, 3.125 mg, Oral, Nightly  ceFAZolin, 2 g, Intravenous, Q8H  docusate sodium, 100 mg, Oral, BID  latanoprost, 1 drop, Both Eyes, Nightly  levothyroxine, 88 mcg, Oral, Q AM  multivitamin with minerals, 1 tablet, Oral, Daily  rosuvastatin, 10 mg, Oral, Daily  sodium chloride, 10 mL, Intravenous, Q12H    Infusions  amiodarone, 0.5 mg/min, Last Rate: 0.5 mg/min (09/07/22 0448)    Diet  Diet Regular; Cardiac       Assessment/Plan     Active Hospital Problems    Diagnosis  POA   • **Cellulitis of right leg [L03.115]  Unknown   • Chronic  acquired lymphedema [I89.0]  Unknown   • Contusion of face [S00.83XA]  Unknown   • Contusion of forearm, left [S50.12XA]  Unknown   • Fall [W19.XXXA]  Unknown   • Atrial fibrillation (HCC) [I48.91]  Yes   • General weakness [R53.1]  Yes   • Pacemaker [Z95.0]  Yes   • Chronic diastolic heart failure (HCC) [I50.32]  Yes   • SCC (squamous cell carcinoma), face [C44.320]  Yes   • Hypothyroidism [E03.9]  Yes   • Hypertensive kidney disease, stage III (HCC) [I12.9, N18.30]  Yes   • Long term (current) use of anticoagulants [Z79.01]  Not Applicable   • Malignant neoplasm of prostate (HCC) [C61]  Yes      Resolved Hospital Problems   No resolved problems to display.   Afib/RVR  - complicated by low BP  - continue amiodarone drip-transitioning to PO  - echocardiogram noted with decreased LVEF-treat medically per cardiology  - pacemaker adjusted  - on AC with eliquis  - appreciate cardiology recs    RLE Cellulitis with Sepsis present on Admission  - leukocytosis and tachycardia present on admission  - overall looking much better, blood cx's NGTD  - stopped vancomycin and cefepime  - continue cefazolin    Hypothyroidism  - continue levothyroxine    Chronic Diastolic CHF  - monitor volume status    Constipation  - resovled  - continue bowel regimen    Eliquis (home med) for DVT prophylaxis.  Full code.  Discussed with patient, nursing staff and care team on multidisciplinary rounds.  Anticipate discharge assisted living with home health vs SNU facility in 1-2 days.      Edis Mae MD  [facility]  09/07/22  13:26 EDT

## 2022-09-07 NOTE — PAYOR COMM NOTE
"Ailyn Elias (89 y.o. Male)     PLEASE SEE ATTACHED FOR INPT AUTH.     REF#[id number]    PLEASE CALL   OR  [phone]    THANK YOU    JONAH TAFOYA LPN CCP            Date of Birth   01/31/1930    Social Security Number       Address   [address] [place] [address]    Home Phone   [phone]    MRN   [id number]       [facility]    Marital Status                               Admission Date   9/4/22    Admission Type   Urgent    Admitting Provider   Norberto Dhaliwal MD    Attending Provider   Edis Mae MD    Department, Room/Bed   [address], [address]/1       Discharge Date       Discharge Disposition       Discharge Destination                               Attending Provider: Edis Mae MD    Allergies: No Known Allergies    Isolation: None   Infection: None   Code Status: CPR   Advance Care Planning Activity    Ht: 170.2 cm (67\")   Wt: 75.8 kg (167 lb)    Admission Cmt: None   Principal Problem: Cellulitis of right leg [L03.115]                 Active Insurance as of 9/4/2022     Primary Coverage     Payor Plan Insurance Group Employer/Plan Group    AETNA MEDICARE REPLACEMENT AETNA MEDICARE REPLACEMENT [id number]     Payor Plan Address Payor Plan Phone Number Payor Plan Fax Number Effective Dates    [address] [phone]  1/1/2021 - None Entered    [facility] [id number]       Subscriber Name Subscriber Birth Date Member ID       AILYN ELIAS 1/31/1930 [id number]                 Emergency Contacts      (Rel.) Home Phone Work Phone Mobile Phone    EVELIA HIGH (Daughter) [phone] -- [phone]    Yasir Elias (Son) [phone] -- --            [place]: NPI [id number]  Tax ID [id number]     History & Physical      Norberto Dhaliwal MD at 09/04/22 2154          Internal medicine history and physical  INTERNAL MEDICINE   [facility]       Patient Identification:  Name: Ailyn SINGLETARY" Curt  Age: 89 y.o.  Sex: male  :  1930  MRN: [id number]                   Primary Care Physician: Conor Prince MD                               Date of admission:2022    Chief Complaint: Sent from outside facility for evaluation and management of sudden fall weakness and possible sepsis.     Of information patient himself and patient's son at the bedside and review of records from outside facility which is very limited at this time.    History of Present Illness:   Patient is a 89-year-old male who has complicated past medical history remarkable for chronic diastolic congestive heart failure, atrial fibrillation, history of sick sinus syndrome and permanent pacemaker placement as well as chronic anticoagulation therapy and chronic kidney disease hypertension and chronic lymphedema as well as history of squamous cell carcinoma of the scalp for which she is currently taking treatment and history of fracture of the left leg while he was serving in the [employer] long time ago has been residing at the assisted care facility for the last couple of months because of the decline in function and needing significant help.  According to the patient's son who is at the bedside and major information provided patient did very well and was doing fine until last evening and did everything what he supposed to do an extra including more activities with physical therapy.  Family members were visiting him at the assisted care facility and did not have any inkling of worries that he would be sick.  In this background patient woke up this morning feeling fine went to the bathroom and very suddenly felt very weak and describes as if somebody took all of his energy suddenly from him.  He lost his balance and fell and developed bruising on his left side of his face as well as wrist and hand.  He was very weak.  Patient was found by the care staff at the assisted care facility and was sent to [facility] emergency room.  Patient had extensive evaluation and was found to have slight elevation in his troponin as well as elevated white blood cell count with negative urinalysis and negative CT scan of the head and chest x-ray.  Patient was recommended hospitalization for further work-up and evaluation of his leukocytosis and the cause of the sudden change in status and he chose [facility] and accepted in transfer from there to here.  Throughout his stay in the emergency room patient was hungry and wanted to eat.  Patient denies any new pain or discomfort anywhere.  Despite the fact the patient has hearing issues he is able to interact and understand and answer questions.  According to the son there is no recent changes in his medications.    Past Medical History:  Past Medical History:   Diagnosis Date   • Acute on chronic diastolic CHF (congestive heart failure) ([facility]) 4/14/2022   • Atrial fibrillation ([facility]) 4/14/2022   • Coronary arteriosclerosis 7/3/2014    Formatting of this note might be different from the original. [facility] 12/7/16  IMPRESSION:   1. Right heart disease, hypertensive cardiac disease.   2. Status post mitral valve repair.   3. Anatomy: No hemodynamically significant disease. about 30-40% lesion of    the right coronary artery. Normal. Left coronary artery system.   • Dyslipidemia 12/5/2013   • Glaucoma 4/14/2022   • Hypertension 12/5/2013   • Hypertensive kidney disease, stage III ([facility]) 3/13/2017   • Hypothyroidism 3/19/2017   • Long term (current) use of anticoagulants 12/5/2013    Formatting of this note might be different from the original. Mitral valve replacement and atrial fibrillation Assume a range of 2.5-3.5.   • Malignant neoplasm of prostate ([facility]) 12/5/2013    Formatting of this note might be different from the original. Description: He sees urology every 6 months and he does do Lupron injections   • Mitral valve disorder 1/25/2021   • Pacemaker 4/14/2022   • Personal  history of radiation therapy 4/14/2022   • Prostate cancer (HCC)    • SCC (squamous cell carcinoma), face 2/2/2022   • Stage 3b chronic kidney disease (HCC) 4/14/2022     Past Surgical History:  Past Surgical History:   Procedure Laterality Date   • PACEMAKER IMPLANTATION  2018      Home Meds:  Medications Prior to Admission   Medication Sig Dispense Refill Last Dose   • acetaminophen (TYLENOL) 325 MG tablet Take 2 tablets by mouth Every 6 (Six) Hours As Needed for Mild Pain , Moderate Pain , Headache or Fever.      • apixaban (ELIQUIS) 5 MG tablet tablet Take 1 tablet by mouth Every 12 (Twelve) Hours. Indications: Atrial Fibrillation 60 tablet  9/4/2022 at Unknown time   • bicalutamide (CASODEX) 50 MG chemo tablet Take 1 tablet by mouth Daily.   9/3/2022 at Unknown time   • calcium carbonate (OS-SOPHIA) 600 MG tablet Take 600 mg by mouth Daily.   9/4/2022 at Unknown time   • carvedilol (COREG) 3.125 MG tablet Take 3.125 mg by mouth Every Night.   9/3/2022 at Unknown time   • latanoprost (XALATAN) 0.005 % ophthalmic solution Apply  to eye(s) as directed by provider.   9/3/2022 at Unknown time   • levothyroxine (SYNTHROID, LEVOTHROID) 88 MCG tablet Take 88 mcg by mouth Daily.   9/4/2022 at Unknown time   • multivitamin with minerals tablet tablet Take 1 tablet by mouth Daily.   9/4/2022 at Unknown time   • rosuvastatin (CRESTOR) 10 MG tablet Take 1 tablet by mouth Daily.   9/3/2022 at Unknown time   • polyethylene glycol (MIRALAX) 17 g packet Take 17 g by mouth Daily As Needed (Use if senna-docusate is ineffective).      • silver sulfadiazine (SILVADENE, SSD) 1 % cream Apply 1 application topically to the appropriate area as directed 3 (Three) Times a Day. Apply to facial regions of inflammation per rad onc recs      • silver sulfadiazine (SILVADENE, SSD) 1 % cream Apply 1 application topically to the appropriate area as directed 2 (Two) Times a Day. 400 g 2      Current Meds:   No current facility-administered  medications for this encounter.  Allergies:  No Known Allergies  Social History:   Social History     Tobacco Use   • Smoking status: Never Smoker   • Smokeless tobacco: Never Used   Substance Use Topics   • Alcohol use: Never      Family History:  No family history on file.       Review of Systems  See history of present illness and past medical history.    Constitutional: Remarkable with sudden onset of generalized weakness and fall with lack of energy but denies any fever and chills.  Cardiovascular: Remarkable for no chest pain or any unusual or change in the pattern of his breathing or extra shortness of breath or decreased functional capacity except for sudden weakness that developed this morning.  Denies any orthopnea or PND.  Respiratory: Remarkable for no cough congestion or sputum production  GI: Remarkable for preserved appetite denies any nausea vomiting or diarrhea  : Remarkable for no burning in urination frequency or urgency  Musculoskeletal: Remarkable for chronic lymphedema and weakness in the leg but denies any other symptoms.  Neurological: Remarkable for sudden weakness and fall with no loss of consciousness but did develop contusion of his face.  Remainder of ROS is negative.      Vitals:   /93 (BP Location: Left arm, Patient Position: Lying)   Pulse (!) 128   Temp 98.4 °F (36.9 °C) (Oral)   Resp 19   SpO2 97%   I/O: No intake or output data in the 24 hours ending 09/04/22 2231  Exam:  Patient is examined using the personal protective equipment as per guidelines from infection control for this particular patient as enacted.  Hand washing was performed before and after patient interaction.  General Appearance:   Chronically ill male who is interactive and appropriate and does not appear to be in any acute distress   Head:   Chronic changes in the scalp from previous seborrheic keratosis and squamous cell carcinoma but no obvious cellulitis left side of the face near the jaw as  evolving contusion.   Eyes:   Pale conjunctiva   Ears:    Normal external ear canals, both ears   Nose:   Nares normal, septum midline, mucosa normal, no drainage    or sinus tenderness   Throat:   Lips, tongue, gums normal; oral mucosa pink and moist   Neck:   Supple, symmetrical, trachea midline, no adenopathy;     thyroid:  no enlargement/tenderness/nodules; no carotid    bruit or JVD   Back:     Symmetric, no curvature, ROM normal, no CVA tenderness   Lungs:    Bilateral air entry clear to auscultation   Chest Wall:    No tenderness or deformity    Heart:   S1-S2 regular   Abdomen:    Abdomen soft nontender   Extremities:  Bilateral lower extremity edema with chronic changes in the left leg but the right lower extremity appears erythematous and warmth considerably compared to the left leg.  According to the son this is new or different.   Pulses:   Pulses palpable in all extremities; symmetric all extremities   Skin:  Chronic changes in the left leg with discoloration but no warmth on the left leg noted.  Contusion on the face and the left hand and wrist noted.   Neurologic:  Alert oriented and grossly nonfocal       Data Review:      I reviewed the patient's new clinical results.    Database from outside facility except for CBC which is not available for me to review I reviewed as follows:  His respiratory panel consisting of COVID-19 and influenza A and B and RSV PCR have been negative  His lactic acid level is recorded as 2.2 which is at upper limit of normal for that test at their facility.  BNP is recorded as 845 with the upper limit of normal at the other facility is 99  Urinalysis is essentially unremarkable except for 1+ protein and 1+ blood microscopy is normal and negative leuk trase and nitrate and urine specific gravity is reported as 1030  PT is 23.6 INR 2.1  Chemistry shows sodium 130 potassium 4.7 bicarb 23 glucose 125 BUN 29 creatinine 1.15  LFTs within normal limits except for total bilirubin of  1.6.  I do not have his report of CT scan of the head and CBC results at this time.  Assessment:  Active Hospital Problems    Diagnosis  POA   • **Cellulitis of right leg [L03.115]  Unknown   • Chronic acquired lymphedema [I89.0]  Unknown   • Contusion of face [S00.83XA]  Unknown   • Contusion of forearm, left [S50.12XA]  Unknown   • Fall [W19.XXXA]  Unknown   • Atrial fibrillation (HCC) [I48.91]  Yes   • General weakness [R53.1]  Yes   • Pacemaker [Z95.0]  Yes   • Chronic diastolic heart failure (HCC) [I50.32]  Yes   • SCC (squamous cell carcinoma), face [C44.320]  Yes   • Hypothyroidism [E03.9]  Yes   • Hypertensive kidney disease, stage III (HCC) [I12.9, N18.30]  Yes   • Long term (current) use of anticoagulants [Z79.01]  Not Applicable     Formatting of this note might be different from the original.  Mitral valve replacement and atrial fibrillation  Assume a range of 2.5-3.5.     • Malignant neoplasm of prostate (HCC) [C61]  Yes     Formatting of this note might be different from the original.  Description: He sees urology every 6 months and he does do Lupron injections         Plan: See admitting orders  · His sudden change in status could very well be due to evolving sepsis secondary to cellulitis of the right leg in the setting of chronic lymphedema.  We will start him on IV antibiotics given the fact that he lives in the assisted facility and has been recently in the hospital 5 to 6 months ago and check blood cultures and elevate his leg and based on the evolving culture data and clinical course de-escalate his antibiotics.  · Repeat CT scan of the head in a.m. as he is on anticoagulation therapy and has contusion of the face  · Fall precautions   · Monitor his renal function and avoid nephrotoxic agent and hypotensive episodes  · Cardiology consultation as patient is concerned that his pacemaker battery is [place] down as he was told by his cardiologist who is Dr. Vanegas.  · Further management as his  condition evolves.  · Check orthostatics and monitor his serum sodium level.  Norberto Dhaliwal MD   9/4/2022  21:54 EDT  Much of this encounter note is an electronic transcription/translation of spoken language to printed text. The electronic translation of spoken language may permit erroneous, or at times, nonsensical words or phrases to be inadvertently transcribed; Although I have reviewed the note for such errors, some may still exist      Electronically signed by Norberto Dhaliwal MD at 09/04/22 2210       Emergency Department Notes    No notes of this type exist for this encounter.         Oxygen Therapy (since admission)     Date/Time SpO2 Device (Oxygen Therapy) Flow (L/min) Oxygen Concentration (%) ETCO2 (mmHg)    09/06/22 2253 97 room air -- -- --    09/06/22 2000 -- room air -- -- --    09/06/22 1908 98 room air -- -- --    09/06/22 1430 -- room air -- -- --    09/06/22 1317 99 room air -- -- --    09/06/22 0920 -- room air -- -- --    09/06/22 0718 98 room air -- -- --    09/06/22 0000 -- room air -- -- --    09/05/22 2340 98 room air -- -- --    09/05/22 2053 97 room air -- -- --    09/05/22 1940 -- room air -- -- --    09/05/22 1337 97 room air -- -- --    09/05/22 0815 -- room air -- -- --    09/05/22 0727 92 room air -- -- --    09/05/22 0320 98 -- -- -- --    09/04/22 2326 -- nasal cannula 1 -- --    09/04/22 2306 94 room air -- -- --    09/04/22 2009 97 room air -- -- --    09/04/22 2000 -- room air -- -- --        Intake & Output (last 3 days)       09/04 0701  09/05 0700 09/05 0701  09/06 0700 09/06 0701  09/07 0700 09/07 0701  09/08 0700    P.O.   880     Total Intake(mL/kg)   880 (11.6)     Urine (mL/kg/hr) 200 1050 (0.6) 1825 (1)     Stool   0     Total Output 200 1050 1825     Net -200 -1050 -945             Urine Unmeasured Occurrence 0 x 0 x 1 x     Stool Unmeasured Occurrence   1 x          Lines, Drains & Airways     Active LDAs     Name Placement date Placement time Site Days    Peripheral IV  09/04/22 1900 Anterior;Right Forearm 09/04/22  1900  Forearm  2    Peripheral IV 09/06/22 1729 Distal;Posterior;Right Forearm 09/06/22  1729  Forearm  less than 1          Inactive LDAs     None                  Medication Administration Report for Bridger Elias as of 09/07/22 0721   Legend:    Given Hold Not Given Due Canceled Entry Other Actions    Time Time (Time) Time  Time-Action       Discontinued     Completed     Future     MAR Hold     Linked           Medications 09/05/22 09/06/22 09/07/22    acetaminophen (TYLENOL) tablet 650 mg  Dose: 650 mg  Freq: Every 4 Hours PRN Route: PO  PRN Reason: Mild Pain  Start: 09/04/22 2157   Admin Instructions:   Do not exceed 4 grams of acetaminophen in a 24 hr period. Max dose of 2gm for AST/ALT greater than 120 units/L    If given for fever, use fever parameter: fever greater than 100.4 °F.    If given for pain, use the following pain scale:   Mild Pain = Pain Score of 1-3, CPOT 1-2  Moderate Pain = Pain Score of 4-6, CPOT 3-4  Severe Pain = Pain Score of 7-10, CPOT 5-8         Or  acetaminophen (TYLENOL) 160 MG/5ML solution 650 mg  Dose: 650 mg  Freq: Every 4 Hours PRN Route: PO  PRN Reason: Mild Pain  Start: 09/04/22 2157   Admin Instructions:   Do not exceed 4 grams of acetaminophen in a 24 hr period. Max dose of 2gm for AST/ALT greater than 120 units/L    If given for fever, use fever parameter: fever greater than 100.4 °F.    If given for pain, use the following pain scale:   Mild Pain = Pain Score of 1-3, CPOT 1-2  Moderate Pain = Pain Score of 4-6, CPOT 3-4  Severe Pain = Pain Score of 7-10, CPOT 5-8         Or  acetaminophen (TYLENOL) suppository 650 mg  Dose: 650 mg  Freq: Every 4 Hours PRN Route: RE  PRN Reason: Mild Pain  Start: 09/04/22 2157   Admin Instructions:   Do not exceed 4 grams of acetaminophen in a 24 hr period. Max dose of 2gm for AST/ALT greater than 120 units/L    If given for fever, use fever parameter: fever greater than 100.4 °F.    If  given for pain, use the following pain scale:   Mild Pain = Pain Score of 1-3, CPOT 1-2  Moderate Pain = Pain Score of 4-6, CPOT 3-4  Severe Pain = Pain Score of 7-10, CPOT 5-8          acetaminophen (TYLENOL) tablet 650 mg  Dose: 650 mg  Freq: Every 6 Hours PRN Route: PO  PRN Reasons: Mild Pain,Moderate Pain,Headache,Fever  Start: 09/04/22 2154   Admin Instructions:   Do not exceed 4 grams of acetaminophen in a 24 hr period. Max dose of 2gm for AST/ALT greater than 120 units/L    If given for fever, use fever parameter: fever greater than 100.4 °F.    If given for pain, use the following pain scale:   Mild Pain = Pain Score of 1-3, CPOT 1-2  Moderate Pain = Pain Score of 4-6, CPOT 3-4  Severe Pain = Pain Score of 7-10, CPOT 5-8          amiodarone (PACERONE) tablet 200 mg  Dose: 200 mg  Freq: Every 12 Hours Scheduled Route: PO  Start: 09/07/22 0900   Admin Instructions:   Avoid grapefruit juice while taking this medication.      0900 2100            amiodarone 360 mg in 200 mL D5W infusion  Rate: 33.3 mL/hr Dose: 1 mg/min  Freq: Continuous Route: IV  Start: 09/05/22 0930   End: 09/05/22 1606   Admin Instructions:   Central line preferred, if unavailable use large bore IV access with frequent nurse monitoring of IV site.  IV med requiring filtration 0.22 micron inline filter.    1007-New Bag              Followed by  amiodarone 360 mg in 200 mL D5W infusion  Rate: 16.67 mL/hr Dose: 0.5 mg/min  Freq: Continuous Route: IV  Start: 09/05/22 1607   End: 09/08/22 1021   Admin Instructions:   Central line preferred, if unavailable use large bore IV access with frequent nurse monitoring of IV site.  IV med requiring filtration 0.22 micron inline filter.    1622-New Bag          0350-New Bag     1608-New Bag         0448-New Bag             apixaban (ELIQUIS) tablet 5 mg  Dose: 5 mg  Freq: Every 12 Hours Scheduled Route: PO  Indications of Use: ATRIAL FIBRILLATION  Start: 09/04/22 2245   Admin Instructions:   Tablet may  be crushed and suspended in 60 mL of water or D5W and immediately delivered via NG tube.    0820-Given     2103-Given         0912-Given     2048-Given 0900 2100            bicalutamide (CASODEX) chemo tablet 50 mg  Dose: 50 mg  Freq: Daily Route: PO  Start: 09/05/22 0900   Admin Instructions:   HAZARDOUS - Handle with care    1513-Given          0912-Given 0900             calcium-vitamin D 500-200 MG-UNIT tablet 1 tablet  Dose: 1 tablet  Freq: Daily Route: PO  Start: 09/05/22 0900 0820-Given          0912-Given 0900             carvedilol (COREG) tablet 3.125 mg  Dose: 3.125 mg  Freq: Nightly Route: PO  Start: 09/04/22 2245   Admin Instructions:   Give with food.    2103-Given 2048-Given 2100             ceFAZolin in dextrose (ANCEF) IVPB solution 2 g  Dose: 2 g  Freq: Every 8 Hours Route: IV  Indications of Use: SKIN AND SOFT TISSUE INFECTION  Start: 09/06/22 1615   End: 09/13/22 1614   Admin Instructions:   Caution: Look alike/sound alike drug alert     1816-New Bag          0014-New Bag     0815     1615           docusate sodium (COLACE) capsule 100 mg  Dose: 100 mg  Freq: 2 Times Daily Route: PO  Start: 09/06/22 1615   Admin Instructions:   Swallow whole.  Do not open, crush, or chew capsule.     1614-Given     2048-Given 0900 2100            latanoprost (XALATAN) 0.005 % ophthalmic solution 1 drop  Dose: 1 drop  Freq: Nightly Route: Both Eyes  Start: 09/04/22 2245    0136-Canceled Entry     2103-Given 2054-Given 2100             levothyroxine (SYNTHROID, LEVOTHROID) tablet 88 mcg  Dose: 88 mcg  Freq: Every Early Morning Route: PO  Start: 09/05/22 0600   Admin Instructions:   Take on empty stomach.    0633-Given          0605-Given          0643-Given             multivitamin with minerals 1 tablet  Dose: 1 tablet  Freq: Daily Route: PO  Start: 09/05/22 0900   Admin Instructions:       0820-Given          0911-Given           0900             nitroglycerin (NITROSTAT) SL tablet 0.4 mg  Dose: 0.4 mg  Freq: Every 5 Minutes PRN Route: SL  PRN Reason: Chest Pain  PRN Comment: Only if SBP Greater Than 100  Start: 09/04/22 2156   Admin Instructions:   If Pain Unrelieved After 3 Doses Notify MD          ondansetron (ZOFRAN) injection 4 mg  Dose: 4 mg  Freq: Every 6 Hours PRN Route: IV  PRN Reasons: Nausea,Vomiting  Start: 09/04/22 2157   Admin Instructions:   If BOTH ondansetron (ZOFRAN) and promethazine (PHENERGAN) are ordered use ondansetron first and THEN promethazine IF ondansetron is ineffective.          polyethylene glycol (MIRALAX) packet 17 g  Dose: 17 g  Freq: Daily PRN Route: PO  PRN Comment: Use if senna-docusate is ineffective  Start: 09/04/22 2155   Admin Instructions:   Use 4-8 ounces of water, tea, or juice for each 17 gram dose.          rosuvastatin (CRESTOR) tablet 10 mg  Dose: 10 mg  Freq: Daily Route: PO  Start: 09/05/22 0900   Admin Instructions:   Avoid grapefruit juice.    0820-Given          0912-Given          0900             sodium chloride 0.9 % flush 10 mL  Dose: 10 mL  Freq: As Needed Route: IV  PRN Reason: Line Care  Start: 09/04/22 2155          sodium chloride 0.9 % flush 10 mL  Dose: 10 mL  Freq: Every 12 Hours Scheduled Route: IV  Start: 09/04/22 2245    0821-Given     2105-Given         1223-Canceled Entry     2056-Given         0900     2100           Completed Medications  Medications 09/05/22 09/06/22 09/07/22       cefepime 2 gm IVPB in 100 ml NS (VTB)  Dose: 2 g  Freq: Once Route: IV  Start: 09/04/22 2245   End: 09/04/22 2356          perflutren (DEFINITY) 8.476 mg in Sodium Chloride (PF) 0.9 % 10 mL injection  Dose: 2 mL  Freq: Once in Imaging Route: IV  Start: 09/05/22 1315   End: 09/05/22 1215   Admin Instructions:   Mix 1.3 mL of mechanically activated Definity with 8.7 mL of normal saline. A total of 2 mL of the resulting Definity solution was administered.    1215-Given               sodium  chloride 0.9 % bolus 500 mL  Dose: 500 mL  Freq: Once Route: IV  Start: 09/05/22 0930   End: 09/05/22 1428    0928-New Bag               vancomycin 1250 mg/250 mL 0.9% NS IVPB ([facility])  Dose: 1,250 mg  Freq: Once Route: IV  Indications of Use: SKIN AND SOFT TISSUE INFECTION  Start: 09/04/22 2300   End: 09/05/22 0248    0133-New Bag              Discontinued Medications  Medications 09/05/22 09/06/22 09/07/22       cefepime 2 gm IVPB in 100 ml NS (VTB)  Dose: 2 g  Freq: Every 12 Hours Route: IV  Indications of Use: SKIN AND SOFT TISSUE INFECTION  Start: 09/05/22 0800   End: 09/06/22 1518    0820-New Bag     2103-New Bag         0912-New Bag              Pharmacy to dose vancomycin  Freq: Continuous PRN Route: XX  PRN Reason: Consult  Indications of Use: SKIN AND SOFT TISSUE INFECTION  Start: 09/04/22 2157   End: 09/06/22 1518          vancomycin (VANCOCIN) 1000 mg/200 mL dextrose 5% IVPB  Dose: 1,000 mg  Freq: Every 24 Hours Route: IV  Indications of Use: SKIN AND SOFT TISSUE INFECTION  Start: 09/05/22 1300   End: 09/06/22 1518    1246-New Bag          1333-New Bag                          Physician Progress Notes (all)      Medardo Vanegas MD at 09/07/22 0653             LOS: 3 days   Patient Care Team:  Conor Prince MD as PCP - General (Internal Medicine)  Maurice Cook MD as Consulting Physician (Radiation Oncology)    Chief Complaint: Fatigue mild shortness of breath  Subjective Patient feels better today denies any chest pain or shortness of breath at rest.  Continues to complain of some shortness of breath mild exercise.    Heart rate was reported 110 bpm.  We will continue IV amiodarone for 24 hours and start him on some p.o. amiodarone.  Continue anticoagulation for DVT and atrial fibrillation.    Right lower extremity cellulitis has improved.    Interval History:     Patient Complaints:   Patient Denies: Chest pain  History taken from: patient    Review of Systems:   The following systems  "were reviewed and negative;  respiratory complains of mild shortness of breath with exercise.    Pain of the right upper extremity secondary  to cellulitis has improved.      Objective     Vital Sign Min/Max for last 24 hours  Temp  Min: 97.8 °F (36.6 °C)  Max: 98.2 °F (36.8 °C)   BP  Min: 98/68  Max: 125/68   Pulse  Min: 113  Max: 116   Resp  Min: 18  Max: 18   SpO2  Min: 97 %  Max: 99 %   No data recorded   No data recorded     Flowsheet Rows    Flowsheet Row First Filed Value   Admission Height 170.2 cm (67\") Documented at 09/05/2022 1215   Admission Weight 76.2 kg (167 lb 14.4 oz) Documented at 09/05/2022 0231          Physical Exam:     General Appearance:    Alert, cooperative, in no acute distress   Head:    Normocephalic, without obvious abnormality, atraumatic   Eyes:            Lids and lashes normal, conjunctivae and sclerae normal, no   icterus, no pallor, corneas clear, PERRLA   Ears:    Ears appear intact with no abnormalities noted   Throat:   No oral lesions, no thrush, oral mucosa moist   Neck:   No adenopathy, supple, trachea midline, no thyromegaly, no   carotid bruit, no JVD   Back:     No kyphosis present, no scoliosis present, no skin lesions,      erythema or scars, no tenderness to percussion or                   palpation,   range of motion normal   Lungs:     Clear to auscultation,respirations regular, even and                  unlabored    Heart:    Regular rhythm and normal rate, normal S1 and S2, systolic murmur noted in the mitral valve area.   Chest Wall:    No abnormalities observed   Abdomen:     Normal bowel sounds, no masses, no organomegaly, soft        non-tender, non-distended, no guarding, no rebound                tenderness   Rectal:     Deferred   Extremities:   Moves all extremities well, no edema, no cyanosis, no             redness   Pulses:   Pulses palpable and equal bilaterally   Skin:   No bleeding, bruising or rash   Lymph nodes:   No palpable adenopathy "   Neurologic:   Cranial nerves 2 - 12 grossly intact, sensation intact, DTR       present and equal bilaterally        Results Review:     I reviewed the patient's new clinical results.    Ejection Fraction  EF calculated to 30%    Echo EF Estimated  Lab Results   Component Value Date    ECHOEFEST 55 2022       Nuclear Stress Ejection Fraction  No components found for: NUCEF    Cath Ejection Fraction Quantitative  No results found for: CATHEF    Physical Exam    Medication Review:     Assessment & Plan       Cellulitis of right leg    SCC (squamous cell carcinoma), face    General weakness    Pacemaker    Atrial fibrillation (HCC)    Chronic diastolic heart failure (HCC)    Hypertensive kidney disease, stage III (HCC)    Hypothyroidism    Long term (current) use of anticoagulants    Malignant neoplasm of prostate (HCC)    Chronic acquired lymphedema    Contusion of face    Contusion of forearm, left    Fall  Impaired LV function with EF 30%  Plan    Continue IV amiodarone    Start p.o. amiodarone    Reprogram pacemaker    Repeat chest x-ray.    Overall improving slowly      Plan for disposition: Assisted living    Medardo Vanegas MD  22  06:54 EDT      Time: 20 minutes    Electronically signed by Medardo Vanegas MD at 22 0700     Edis Mae MD at 22 1604              Name: Bridger Elias ADMIT: 2022   : 1930  PCP: Conor Prince MD    MRN: [id number] LOS: 2 days   AGE/SEX: 89 y.o. male  ROOM: [facility]     Subjective   Subjective   CC: generalized weakness  No acute events. Patient overall is feeling better. Taking PO but appetite is not great. Denies CP/dyspnea/f/c/n/v/d. +constipation    Objective   Objective   Vital Signs  Temp:  [97.5 °F (36.4 °C)-97.9 °F (36.6 °C)] 97.8 °F (36.6 °C)  Heart Rate:  [115-122] 115  Resp:  [16-18] 18  BP: ()/(66-85) 117/82  SpO2:  [97 %-99 %] 99 %  on   ;   Device (Oxygen Therapy): room air  Body  mass index is 26.16 kg/m².  Physical Exam  Vitals and nursing note reviewed.   Constitutional:       General: He is not in acute distress.     Appearance: He is not toxic-appearing or diaphoretic.   HENT:      Head: Normocephalic.      Nose: Nose normal.      Mouth/Throat:      Mouth: Mucous membranes are moist.      Pharynx: Oropharynx is clear.   Eyes:      Conjunctiva/sclera: Conjunctivae normal.      Pupils: Pupils are equal, round, and reactive to light.   Cardiovascular:      Rate and Rhythm: Tachycardia present. Rhythm irregular.      Pulses: Normal pulses.   Pulmonary:      Effort: Pulmonary effort is normal.      Breath sounds: Normal breath sounds.   Abdominal:      General: Bowel sounds are normal.      Palpations: Abdomen is soft.      Tenderness: There is no abdominal tenderness.   Musculoskeletal:         General: Swelling (1-2+ BLE), tenderness (right calf) and deformity (LLE with previous injury and well-healed skin graft) present.      Cervical back: Normal range of motion and neck supple.   Skin:     General: Skin is warm and dry.      Capillary Refill: Capillary refill takes less than 2 seconds.   Neurological:      General: No focal deficit present.      Mental Status: He is alert.   Psychiatric:         Mood and Affect: Mood normal.         Behavior: Behavior normal.       Results Review     I reviewed the patient's new clinical results.  I reviewed the patient's telemetry.  Results from last 7 days   Lab Units 09/06/22  0431 09/05/22  0055   WBC 10*3/mm3 9.96 14.75*   HEMOGLOBIN g/dL 11.8* 11.3*   PLATELETS 10*3/mm3 94* 106*     Results from last 7 days   Lab Units 09/06/22  0431 09/05/22  0055   SODIUM mmol/L 134* 135*   POTASSIUM mmol/L 4.1 4.0   CHLORIDE mmol/L 103 102   CO2 mmol/L 18.7* 21.7*   BUN mg/dL 25* 23   CREATININE mg/dL 1.21 1.06   GLUCOSE mg/dL 104* 119*   EGFR mL/min/1.73 56.2* 65.8     Results from last 7 days   Lab Units 09/05/22  0055   ALBUMIN g/dL 3.30*   BILIRUBIN mg/dL  1.0   ALK PHOS U/L 84   AST (SGOT) U/L 31   ALT (SGPT) U/L 21     Results from last 7 days   Lab Units 09/06/22  0431 09/05/22  0055 09/04/22  1022   CALCIUM mg/dL 8.5 8.6  --    ALBUMIN g/dL  --  3.30*  --    MAGNESIUM mg/dL  --   --  2.0     Results from last 7 days   Lab Units 09/05/22  0055 09/05/22  0046 09/04/22  1226   PROCALCITONIN ng/mL  --  0.40* 0.32   LACTATE mmol/L 1.6  --   --      No results found for: HGBA1C, POCGLU    CT Head Without Contrast    Result Date: 9/5/2022  No acute intracranial findings.  Radiation dose reduction techniques were utilized, including automated exposure control and exposure modulation based on body size.  This report was finalized on 9/5/2022 4:05 AM by Dr. Jaida Neal M.D.      Scheduled Medications  apixaban, 5 mg, Oral, Q12H  bicalutamide, 50 mg, Oral, Daily  calcium-vitamin D, 1 tablet, Oral, Daily  carvedilol, 3.125 mg, Oral, Nightly  ceFAZolin, 2 g, Intravenous, Q8H  docusate sodium, 100 mg, Oral, BID  latanoprost, 1 drop, Both Eyes, Nightly  levothyroxine, 88 mcg, Oral, Q AM  multivitamin with minerals, 1 tablet, Oral, Daily  rosuvastatin, 10 mg, Oral, Daily  sodium chloride, 10 mL, Intravenous, Q12H    Infusions  amiodarone, 0.5 mg/min, Last Rate: 0.5 mg/min (09/06/22 0350)    Diet  Diet Regular; Cardiac      Assessment/Plan     Active Hospital Problems    Diagnosis  POA   • **Cellulitis of right leg [L03.115]  Unknown   • Chronic acquired lymphedema [I89.0]  Unknown   • Contusion of face [S00.83XA]  Unknown   • Contusion of forearm, left [S50.12XA]  Unknown   • Fall [W19.XXXA]  Unknown   • Atrial fibrillation (HCC) [I48.91]  Yes   • General weakness [R53.1]  Yes   • Pacemaker [Z95.0]  Yes   • Chronic diastolic heart failure (HCC) [I50.32]  Yes   • SCC (squamous cell carcinoma), face [C44.320]  Yes   • Hypothyroidism [E03.9]  Yes   • Hypertensive kidney disease, stage III (HCC) [I12.9, N18.30]  Yes   • Long term (current) use of anticoagulants [Z79.01]  Not  Applicable   • Malignant neoplasm of prostate (HCC) [C61]  Yes      Resolved Hospital Problems   No resolved problems to display.   Afib/RVR  - complicated by low BP  - continue amiodarone drip  - echocardiogram noted with decreased LVEF-treat medically per cardiology  - on AC with eliquis  - appreciate cardiology recs    RLE Cellulitis with Sepsis present on Admission  - leukocytosis and tachycardia present on admission  - overall looking much better, blood cx's NGTD  - stop vancomycin and cefepime  - start cefazolin    Hypothyroidism  - continue levothyroxine    Chronic Diastolic CHF  - monitor volume status    Constipation  - start bowel regimen    Eliquis (home med) for DVT prophylaxis.  Full code.  Discussed with patient, family, nursing staff and care team on multidisciplinary rounds.  Anticipate discharge to SNU facility timing yet to be determined.      Edis Mae MD  [facility]  09/06/22  15:19 EDT      Electronically signed by Edis Mae MD at 09/06/22 1525     Medardo Vanegas MD at 09/06/22 1237             LOS: 2 days   Patient Care Team:  Conor Prince MD as PCP - General (Internal Medicine)  Maurice Cook MD as Consulting Physician (Radiation Oncology)    Chief Complaint: Fatigue moderate shortness of breath    Subjective Patient feels better today.  Blood pressure reported 92/75.  Denies any chest pain.  Continues to receive antibiotics for right lower extremity cellulitis    Echocardiogram documents ejection fraction about 30%.  This is a significant drop compared to echocardiogram 6 months ago with ejection fraction 50%.  These findings could be secondary to infection however most likely represent worsening coronary disease.  In view of his infection and the patient's age we will continue medical therapy.    Interval History:     Patient Complaints: Mild shortness of breath pain in right lower extremity  Patient Denies: Chest pain  History  "taken from: patient    Review of Systems:   System review is negative except mild pain in the right lower extremity and some shortness of breath at rest.      Objective     Vital Sign Min/Max for last 24 hours  Temp  Min: 97.5 °F (36.4 °C)  Max: 98.3 °F (36.8 °C)   BP  Min: 96/66  Max: 116/85   Pulse  Min: 117  Max: 122   Resp  Min: 16  Max: 18   SpO2  Min: 97 %  Max: 98 %   No data recorded   No data recorded     Flowsheet Rows    Flowsheet Row First Filed Value   Admission Height 170.2 cm (67\") Documented at 09/05/2022 1215   Admission Weight 76.2 kg (167 lb 14.4 oz) Documented at 09/05/2022 0231          Physical Exam:     General Appearance:    Alert, cooperative, in no acute distress   Head:    Normocephalic, without obvious abnormality, atraumatic   Eyes:            Lids and lashes normal, conjunctivae and sclerae normal, no   icterus, no pallor, corneas clear, PERRLA   Ears:    Ears appear intact with no abnormalities noted   Throat:   No oral lesions, no thrush, oral mucosa moist   Neck:   No adenopathy, supple, trachea midline, no thyromegaly, no   carotid bruit, no JVD   Back:     No kyphosis present, no scoliosis present, no skin lesions,      erythema or scars, no tenderness to percussion or                   palpation,   range of motion normal   Lungs:     Clear to auscultation,respirations regular, even and                  unlabored    Heart:    Regular rhythm and normal rate, normal S1 and S2 systolic murmur noted in the mitral valve area.   Chest Wall:    No abnormalities observed   Abdomen:     Normal bowel sounds, no masses, no organomegaly, soft        non-tender, non-distended, no guarding, no rebound                tenderness   Rectal:     Deferred   Extremities:   Moves all extremities well, no edema, no cyanosis, no             redness   Pulses:   Pulses palpable and equal bilaterally   Skin:   No bleeding, bruising or rash   Lymph nodes:   No palpable adenopathy   Neurologic:   Cranial " nerves 2 - 12 grossly intact, sensation intact, DTR       present and equal bilaterally        Results Review:     I reviewed the patient's new clinical results.    Ejection Fraction  No results found for: EF    Echo EF Estimated  Lab Results   Component Value Date    ECHOEFEST 55 2022       Nuclear Stress Ejection Fraction  No components found for: NUCEF    Cath Ejection Fraction Quantitative  No results found for: CATHEF    Physical Exam    Medication Review:     Assessment & Plan       Cellulitis of right leg    SCC (squamous cell carcinoma), face    General weakness    Pacemaker    Atrial fibrillation (HCC)    Chronic diastolic heart failure (HCC)    Hypertensive kidney disease, stage III (HCC)    Hypothyroidism    Long term (current) use of anticoagulants    Malignant neoplasm of prostate (HCC)    Chronic acquired lymphedema    Contusion of face    Contusion of forearm, left    Fall  Impaired left ventricular function with ejection fraction 30%    Plan    Continue IV amiodarone    Decreased left ventricular function is new compared to echocardiogram 6 months ago.  Most likely explanation is progression of coronary disease or secondary to atrial fibrillation with fast ventricular response.  We will continue medical therapy.  Discussed with family    Continue IV antibiotics    Plan for disposition rehab    Medardo Vanegas MD  22  12:37 EDT      Time 15 to 20 minutes.    Electronically signed by Medardo Vanegas MD at 22 1242     Edis Mae MD at 22 8727              Name: Bridger Elias ADMIT: 2022   : 1930  PCP: Conor Prince MD    MRN: [id number] LOS: 1 days   AGE/SEX: 89 y.o. male  ROOM: [facility]     Subjective   Subjective   CC: generalized weakness  No acute events. Patient feels a lot better today. Pain is well-controlled. Taking PO. Denies CP/dyspnea/f/c/n/v/d.    Objective   Objective   Vital Signs  Temp:  [97.8 °F (36.6  °C)-98.4 °F (36.9 °C)] 98.3 °F (36.8 °C)  Heart Rate:  [120-128] 120  Resp:  [16-19] 16  BP: ()/(46-93) 103/61  SpO2:  [92 %-98 %] 97 %  on  Flow (L/min):  [1] 1;   Device (Oxygen Therapy): room air  Body mass index is 26.16 kg/m².  Physical Exam  Vitals and nursing note reviewed.   Constitutional:       General: He is not in acute distress.     Appearance: He is not toxic-appearing or diaphoretic.   HENT:      Head: Normocephalic.      Nose: Nose normal.      Mouth/Throat:      Mouth: Mucous membranes are moist.      Pharynx: Oropharynx is clear.   Eyes:      Conjunctiva/sclera: Conjunctivae normal.      Pupils: Pupils are equal, round, and reactive to light.   Cardiovascular:      Rate and Rhythm: Tachycardia present. Rhythm irregular.      Pulses: Normal pulses.   Pulmonary:      Effort: Pulmonary effort is normal.      Breath sounds: Normal breath sounds.   Abdominal:      General: Bowel sounds are normal.      Palpations: Abdomen is soft.      Tenderness: There is no abdominal tenderness.   Musculoskeletal:         General: Swelling (1-2+ BLE), tenderness (right calf) and deformity (LLE with previous injury and well-healed skin graft) present.      Cervical back: Normal range of motion and neck supple.   Skin:     General: Skin is warm and dry.      Capillary Refill: Capillary refill takes less than 2 seconds.   Neurological:      General: No focal deficit present.      Mental Status: He is alert.   Psychiatric:         Mood and Affect: Mood normal.         Behavior: Behavior normal.       Results Review     I reviewed the patient's new clinical results.  I reviewed the patient's telemetry.  I reviewed the patient's head CT scan  Results from last 7 days   Lab Units 09/05/22  0055   WBC 10*3/mm3 14.75*   HEMOGLOBIN g/dL 11.3*   PLATELETS 10*3/mm3 106*     Results from last 7 days   Lab Units 09/05/22  0055   SODIUM mmol/L 135*   POTASSIUM mmol/L 4.0   CHLORIDE mmol/L 102   CO2 mmol/L 21.7*   BUN mg/dL 23    CREATININE mg/dL 1.06   GLUCOSE mg/dL 119*   EGFR mL/min/1.73 65.8     Results from last 7 days   Lab Units 09/05/22  0055   ALBUMIN g/dL 3.30*   BILIRUBIN mg/dL 1.0   ALK PHOS U/L 84   AST (SGOT) U/L 31   ALT (SGPT) U/L 21     Results from last 7 days   Lab Units 09/05/22  0055 09/04/22  1022   CALCIUM mg/dL 8.6  --    ALBUMIN g/dL 3.30*  --    MAGNESIUM mg/dL  --  2.0     Results from last 7 days   Lab Units 09/05/22  0055 09/05/22  0046 09/04/22  1226   PROCALCITONIN ng/mL  --  0.40* 0.32   LACTATE mmol/L 1.6  --   --      No results found for: HGBA1C, POCGLU    CT Head Without Contrast    Result Date: 9/5/2022  No acute intracranial findings.  Radiation dose reduction techniques were utilized, including automated exposure control and exposure modulation based on body size.  This report was finalized on 9/5/2022 4:05 AM by Dr. Jaida Neal M.D.      Scheduled Medications  apixaban, 5 mg, Oral, Q12H  bicalutamide, 50 mg, Oral, Daily  calcium-vitamin D, 1 tablet, Oral, Daily  carvedilol, 3.125 mg, Oral, Nightly  cefepime, 2 g, Intravenous, Q12H  latanoprost, 1 drop, Both Eyes, Nightly  levothyroxine, 88 mcg, Oral, Q AM  multivitamin with minerals, 1 tablet, Oral, Daily  rosuvastatin, 10 mg, Oral, Daily  sodium chloride, 500 mL, Intravenous, Once  sodium chloride, 10 mL, Intravenous, Q12H  vancomycin, 1,000 mg, Intravenous, Q24H    Infusions  amiodarone, 1 mg/min, Last Rate: 1 mg/min (09/05/22 1007)   Followed by  amiodarone, 0.5 mg/min  Pharmacy to dose vancomycin,     Diet  Diet Regular; Cardiac      Assessment/Plan     Active Hospital Problems    Diagnosis  POA   • **Cellulitis of right leg [L03.115]  Unknown   • Chronic acquired lymphedema [I89.0]  Unknown   • Contusion of face [S00.83XA]  Unknown   • Contusion of forearm, left [S50.12XA]  Unknown   • Fall [W19.XXXA]  Unknown   • Atrial fibrillation (HCC) [I48.91]  Yes   • General weakness [R53.1]  Yes   • Pacemaker [Z95.0]  Yes   • Chronic diastolic  heart failure (HCC) [I50.32]  Yes   • SCC (squamous cell carcinoma), face [C44.320]  Yes   • Hypothyroidism [E03.9]  Yes   • Hypertensive kidney disease, stage III (HCC) [I12.9, N18.30]  Yes   • Long term (current) use of anticoagulants [Z79.01]  Not Applicable   • Malignant neoplasm of prostate (HCC) [C61]  Yes      Resolved Hospital Problems   No resolved problems to display.   Afib/RVR  - complicated by low BP  - continue amiodarone drip  - echocardiogram pending  - on AC with eliquis  - appreciate cardiology recs    RLE Cellulitis with Sepsis present on Admission  - leukocytosis and tachycardia present on admission  - continue on vancomycin and cefepime pending blood cultures-no evidence of purulent cellulitis so probably can narrow to cefazolin if these are negative    Hypothyroidism  - continue levothyroxine    Chronic Diastolic CHF  - monitor volume status    Eliquis (home med) for DVT prophylaxis.  Full code.  Discussed with patient, family and nursing staff.  Anticipate discharge to SNU facility timing yet to be determined.      Edis Mae MD  [facility]  09/05/22  13:56 EDT      Electronically signed by Edis Mae MD at 09/05/22 1403          Consult Notes (all)      Medardo Vanegas MD at 09/05/22 0836            Referring Provider:   Reason for Consultation: Hypotension atrial flutter    Patient Care Team:  Conor Prince MD as PCP - General (Internal Medicine)  Maurice Cook MD as Consulting Physician (Radiation Oncology)    Chief complaint presyncope episode of syncope    Subjective .     History of present illness: 89 years old white male well-known to our practice with history of minimal coronary artery disease mitral valve repair and mildly impaired liver function ejection fraction above 45% with sitting in his bathroom when he started became weak tired and had an episode of syncope.    Patient brought to the emergency at [facility] work-up  was negative for the possibility of sepsis was brought out because of cellulitis of the right lower extremity.  Cardiology consultation requested for further evaluation and treatment.    Review of Systems   Patient denies any weight loss eyes patient has visual disturbancesENT denies any history epistaxis cardiovascular patient denies any frequent palpitation gastrointestinal denies nausea vomiting diarrhea neuro patient had an episode of syncope but no seizure disorder psychiatric patient denies a history of depression    The remaining system review is negative.    History    Past medical history is positive hypertension positive for mitral valve repair he is positive also for hyperlipidemia positive pulmonary patient accommodation he is on Eliquis on the basis of atrial fibrillation and atrial flutter.    Objective     Vital Sign Min/Max for last 24 hours  Temp  Min: 97.8 °F (36.6 °C)  Max: 98.4 °F (36.9 °C)   BP  Min: 73/46  Max: 140/93   Pulse  Min: 121  Max: 128   Resp  Min: 16  Max: 19   SpO2  Min: 92 %  Max: 98 %   No data recorded   Weight  Min: 76.2 kg (167 lb 14.4 oz)  Max: 76.2 kg (167 lb 14.4 oz)     Flowsheet Rows    Flowsheet Row First Filed Value   Admission Height --   Admission Weight 76.2 kg (167 lb 14.4 oz) Documented at 09/05/2022 0231             Ejection Fraction  No results found for: EF    Echo EF Estimated  Lab Results   Component Value Date    ECHOEFEST 55 04/17/2022       Nuclear Stress Ejection Fraction  No components found for: NUCEF    Cath Ejection Fraction Quantitative  No results found for: CATHEF    Physical Exam:     General Appearance:    Alert, cooperative, in no acute distress   Head:    Normocephalic, without obvious abnormality, atraumatic   Eyes:            Lids and lashes normal, conjunctivae and sclerae normal, no   icterus, no pallor, corneas clear, PERRLA   Ears:    Ears appear intact with no abnormalities noted   Throat:   No oral lesions, no thrush, oral mucosa moist    Neck:   No adenopathy, supple, trachea midline, no thyromegaly, no   carotid bruit, no JVD   Back:     No kyphosis present, no scoliosis present, no skin lesions,      erythema or scars, no tenderness to percussion or                   palpation,   range of motion normal   Lungs:     Clear to auscultation,respirations regular, even and                  unlabored    Heart:  irregular rhythm.  Systolic murmur is noted in the mitral valve area.  Heart rate is 125 bpm.   Chest Wall:    No abnormalities observed   Abdomen:     Normal bowel sounds, no masses, no organomegaly, soft        non-tender, non-distended, no guarding, no rebound                tenderness   Rectal:     Deferred   Extremities:   Moves all extremities well, no edema, no cyanosis, no             redness   Pulses:   Pulses palpable and equal bilaterally   Skin:   No bleeding, bruising or rash   Lymph nodes:   No palpable adenopathy   Neurologic:   Cranial nerves 2 - 12 grossly intact, sensation intact, DTR       present and equal bilaterally       Results Review:   I reviewed the patient's new clinical results.            Cellulitis of right leg    SCC (squamous cell carcinoma), face    General weakness    Pacemaker    Atrial fibrillation (HCC)    Chronic diastolic heart failure (HCC)    Hypertensive kidney disease, stage III (HCC)    Hypothyroidism    Long term (current) use of anticoagulants    Malignant neoplasm of prostate (HCC)    Chronic acquired lymphedema    Contusion of face    Contusion of forearm, left    Fall  Rule out sepsis  Plan  Because of present clinical picture still?    We will start patient amiodarone drip to decrease heart rate    Obtain echocardiogram    Follow blood cultures    Follow troponin    I discussed the patients findings and my recommendations with patient and the family    Medardo Vanegas MD  09/05/22  08:36 EDT    Time: Total time for this interview was 25 minutes 50% of the time was direct patient  care        Electronically signed by Medardo Vanegas MD at 09/05/22 0801

## 2022-09-07 NOTE — PLAN OF CARE
Goal Outcome Evaluation:  Plan of Care Reviewed With: patient           Outcome Evaluation: Patient pleasant and sitting UIC upon PT arrival. CGA required for transfers and ambulated 120' CGA with a RW- fatigue limiting. No new complaints and will continue to advance as able. Encourage patient to continue to ambulate 3x/day with nsg to maintain functional mobility.

## 2022-09-07 NOTE — THERAPY TREATMENT NOTE
Patient Name: Bridger Elias  : 1930    MRN: [id number]                              Today's Date: 2022       Admit Date: 2022    Visit Dx: No diagnosis found.  Patient Active Problem List   Diagnosis   • SCC (squamous cell carcinoma), face   • General weakness   • Pacemaker   • Abnormal gait   • Atrial fibrillation (HCC)   • Chronic diastolic heart failure (HCC)   • Coronary arteriosclerosis   • Dyslipidemia   • Glaucoma   • Mitral valve disorder   • Hypertension   • Hypertensive kidney disease, stage III (HCC)   • Hypothyroidism   • Long term (current) use of anticoagulants   • Malignant neoplasm of prostate (HCC)   • Osteoporosis   • Squamous cell carcinoma of skin of face   • Acute on chronic diastolic CHF (congestive heart failure) (HCC)   • Stage 3b chronic kidney disease (HCC)   • Personal history of radiation therapy   • Acute on chronic systolic (congestive) heart failure (HCC)   • Cellulitis of right leg   • Chronic acquired lymphedema   • Contusion of face   • Contusion of forearm, left   • Fall     Past Medical History:   Diagnosis Date   • Acute on chronic diastolic CHF (congestive heart failure) (HCC) 2022   • Atrial fibrillation (HCC) 2022   • Coronary arteriosclerosis 7/3/2014    Formatting of this note might be different from the original. [facility] 16  IMPRESSION:   1. Right heart disease, hypertensive cardiac disease.   2. Status post mitral valve repair.   3. Anatomy: No hemodynamically significant disease. about 30-40% lesion of    the right coronary artery. Normal. Left coronary artery system.   • Dyslipidemia 2013   • Glaucoma 2022   • Hypertension 2013   • Hypertensive kidney disease, stage III (HCC) 3/13/2017   • Hypothyroidism 3/19/2017   • Long term (current) use of anticoagulants 2013    Formatting of this note might be different from the original. Mitral valve replacement and atrial fibrillation Assume a range of 2.5-3.5.   • Malignant  neoplasm of prostate (HCC) 12/5/2013    Formatting of this note might be different from the original. Description: He sees urology every 6 months and he does do Lupron injections   • Mitral valve disorder 1/25/2021   • Pacemaker 4/14/2022   • Personal history of radiation therapy 4/14/2022   • Prostate cancer (HCC)    • SCC (squamous cell carcinoma), face 2/2/2022   • Stage 3b chronic kidney disease (HCC) 4/14/2022     Past Surgical History:   Procedure Laterality Date   • PACEMAKER IMPLANTATION  2018      General Information     Row Name 09/07/22 1543          Physical Therapy Time and Intention    Document Type therapy note (daily note)  -MS     Mode of Treatment physical therapy  -MS     Row Name 09/07/22 1543          General Information    Existing Precautions/Restrictions fall  -MS     Row Name 09/07/22 1543          Cognition    Orientation Status (Cognition) oriented x 4  -MS     Row Name 09/07/22 1543          Safety Issues, Functional Mobility    Impairments Affecting Function (Mobility) balance;strength;endurance/activity tolerance  -MS     Comment, Safety Issues/Impairments (Mobility) Nonskid socks and gait belt donned.  -MS           User Key  (r) = Recorded By, (t) = Taken By, (c) = Cosigned By    Initials Name Provider Type    MS DangeloTressa, PT Physical Therapist               Mobility     Row Name 09/07/22 1544          Bed Mobility    Comment, (Bed Mobility) NT- UIC upon PT arival  -MS     Row Name 09/07/22 1544          Sit-Stand Transfer    Sit-Stand [place] (Transfers) contact guard  -MS     Assistive Device (Sit-Stand Transfers) walker, front-wheeled  -MS     Row Name 09/07/22 1544          Gait/Stairs (Locomotion)    [place] Level (Gait) contact guard  -MS     Assistive Device (Gait) walker, front-wheeled  -MS     Distance in Feet (Gait) 120'  -MS     Deviations/Abnormal Patterns (Gait) hussein decreased;gait speed decreased  -MS     Comment, (Gait/Stairs) No overt LOB or  janell noted, fatigue limiting.  -MS           User Key  (r) = Recorded By, (t) = Taken By, (c) = Cosigned By    Initials Name Provider Type    MS QuirogasTressa, PT Physical Therapist               Obj/Interventions     Row Name 09/07/22 1544          Motor Skills    Therapeutic Exercise --  Seated LAQs and MIPs x 10 reps  -MS     Row Name 09/07/22 1544          Balance    Static Sitting Balance supervision  -MS     Position, Sitting Balance sitting in chair  -MS     Static Standing Balance contact guard  -MS     Dynamic Standing Balance contact guard  -MS     Position/Device Used, Standing Balance supported;walker, front-wheeled  -MS           User Key  (r) = Recorded By, (t) = Taken By, (c) = Cosigned By    Initials Name Provider Type    MS DangeloTressa, PT Physical Therapist               Goals/Plan    No documentation.                Clinical Impression     Row Name 09/07/22 1545          Pain    Pretreatment Pain Rating 0/10 - no pain  -MS     Posttreatment Pain Rating 0/10 - no pain  -MS     Row Name 09/07/22 1545          Plan of Care Review    Plan of Care Reviewed With patient  -MS     Outcome Evaluation Patient pleasant and sitting UIC upon PT arrival. CGA required for transfers and ambulated 120' CGA with a RW- fatigue limiting. No new complaints and will continue to advance as able. Encourage patient to continue to ambulate 3x/day with nsg to maintain functional mobility.  -MS     Row Name 09/07/22 1545          Therapy Assessment/Plan (PT)    Therapy Frequency (PT) 3 times/wk  -MS     Row Name 09/07/22 1545          Vital Signs    O2 Delivery Pre Treatment room air  -MS     Row Name 09/07/22 1545          Positioning and Restraints    Pre-Treatment Position sitting in chair/recliner  -MS     Post Treatment Position chair  -MS     In Chair reclined;call light within reach;encouraged to call for assist;exit alarm on  -MS           User Key  (r) = Recorded By, (t) = Taken By, (c) = Cosigned By     Initials Name Provider Type    MS DangeloTressa, PT Physical Therapist               Outcome Measures     Row Name 09/07/22 1546          How much help from another person do you currently need...    Turning from your back to your side while in flat bed without using bedrails? 3  -MS     Moving from lying on back to sitting on the side of a flat bed without bedrails? 3  -MS     Moving to and from a bed to a chair (including a wheelchair)? 3  -MS     Standing up from a chair using your arms (e.g., wheelchair, bedside chair)? 3  -MS     Climbing 3-5 steps with a railing? 2  -MS     To walk in hospital room? 3  -MS     AM-PAC 6 Clicks Score (PT) 17  -MS     Highest level of mobility 5 --> Static standing  -MS           User Key  (r) = Recorded By, (t) = Taken By, (c) = Cosigned By    Initials Name Provider Type    MS Dangelo Tressa RIVAS, PT Physical Therapist                             Physical Therapy Education                 Title: PT OT SLP Therapies (Done)     Topic: Physical Therapy (Done)     Point: Mobility training (Done)     Learning Progress Summary           Patient Acceptance, E,TB, VU,NR by MS at 9/7/2022 1546    Acceptance, E, VU,NR by  at 9/6/2022 1507                   Point: Home exercise program (Done)     Learning Progress Summary           Patient Acceptance, E,TB, VU,NR by MS at 9/7/2022 1546                   Point: Body mechanics (Done)     Learning Progress Summary           Patient Acceptance, E,TB, VU,NR by MS at 9/7/2022 1546    Acceptance, E, VU,NR by  at 9/6/2022 1507                   Point: Precautions (Done)     Learning Progress Summary           Patient Acceptance, E,TB, VU,NR by MS at 9/7/2022 1546    Acceptance, E, VU,NR by  at 9/6/2022 1507                               User Key     Initials Effective Dates Name Provider Type Discipline    MS 06/16/21 -  Tressa Dangelo, PT Physical Therapist PT    ELISSA 07/28/22 -  Justus Alexander PT Student PT Student PT               PT Recommendation and Plan     Plan of Care Reviewed With: patient  Outcome Evaluation: Patient pleasant and sitting UIC upon PT arrival. CGA required for transfers and ambulated 120' CGA with a RW- fatigue limiting. No new complaints and will continue to advance as able. Encourage patient to continue to ambulate 3x/day with nsg to maintain functional mobility.     Time Calculation:    PT Charges     Row Name 09/07/22 1543             Time Calculation    Start Time 1530  -MS      Stop Time 1543  -MS      Time Calculation (min) 13 min  -MS      PT Received On 09/07/22  -MS      PT - Next Appointment 09/09/22  -MS            User Key  (r) = Recorded By, (t) = Taken By, (c) = Cosigned By    Initials Name Provider Type    Tressa Macias, PT Physical Therapist              Therapy Charges for Today     Code Description Service Date Service Provider Modifiers Qty    09313396353 HC PT THER PROC EA 15 MIN 9/7/2022 Tressa Dangelo, PT GP 1          PT G-Codes  Outcome Measure Options: AM-PAC 6 Clicks Basic Mobility (PT)  AM-PAC 6 Clicks Score (PT): 17    Tressa Dangelo PT  9/7/2022

## 2022-09-08 LAB
ANION GAP SERPL CALCULATED.3IONS-SCNC: 9 MMOL/L (ref 5–15)
BASOPHILS # BLD AUTO: 0.04 10*3/MM3 (ref 0–0.2)
BASOPHILS NFR BLD AUTO: 0.6 % (ref 0–1.5)
BUN SERPL-MCNC: 18 MG/DL (ref 8–23)
BUN/CREAT SERPL: 14.8 (ref 7–25)
CALCIUM SPEC-SCNC: 8.5 MG/DL (ref 8.2–9.6)
CHLORIDE SERPL-SCNC: 100 MMOL/L (ref 98–107)
CO2 SERPL-SCNC: 23 MMOL/L (ref 22–29)
CREAT SERPL-MCNC: 1.22 MG/DL (ref 0.76–1.27)
DEPRECATED RDW RBC AUTO: 47.2 FL (ref 37–54)
EGFRCR SERPLBLD CKD-EPI 2021: 55.6 ML/MIN/1.73
EOSINOPHIL # BLD AUTO: 0.1 10*3/MM3 (ref 0–0.4)
EOSINOPHIL NFR BLD AUTO: 1.4 % (ref 0.3–6.2)
ERYTHROCYTE [DISTWIDTH] IN BLOOD BY AUTOMATED COUNT: 14.5 % (ref 12.3–15.4)
GLUCOSE SERPL-MCNC: 102 MG/DL (ref 65–99)
HCT VFR BLD AUTO: 32.6 % (ref 37.5–51)
HGB BLD-MCNC: 11.8 G/DL (ref 13–17.7)
LYMPHOCYTES # BLD AUTO: 1.3 10*3/MM3 (ref 0.7–3.1)
LYMPHOCYTES NFR BLD AUTO: 18.4 % (ref 19.6–45.3)
MCH RBC QN AUTO: 32.2 PG (ref 26.6–33)
MCHC RBC AUTO-ENTMCNC: 36.2 G/DL (ref 31.5–35.7)
MCV RBC AUTO: 89.1 FL (ref 79–97)
MONOCYTES # BLD AUTO: 0.72 10*3/MM3 (ref 0.1–0.9)
MONOCYTES NFR BLD AUTO: 10.2 % (ref 5–12)
NEUTROPHILS NFR BLD AUTO: 4.85 10*3/MM3 (ref 1.7–7)
NEUTROPHILS NFR BLD AUTO: 68.6 % (ref 42.7–76)
PLATELET # BLD AUTO: 118 10*3/MM3 (ref 140–450)
PMV BLD AUTO: 11.5 FL (ref 6–12)
POTASSIUM SERPL-SCNC: 4.1 MMOL/L (ref 3.5–5.2)
RBC # BLD AUTO: 3.66 10*6/MM3 (ref 4.14–5.8)
SODIUM SERPL-SCNC: 132 MMOL/L (ref 136–145)
WBC NRBC COR # BLD: 7.07 10*3/MM3 (ref 3.4–10.8)

## 2022-09-08 PROCEDURE — 25010000002 CEFAZOLIN IN DEXTROSE 2-4 GM/100ML-% SOLUTION: Performed by: INTERNAL MEDICINE

## 2022-09-08 PROCEDURE — 90791 PSYCH DIAGNOSTIC EVALUATION: CPT

## 2022-09-08 PROCEDURE — 80048 BASIC METABOLIC PNL TOTAL CA: CPT | Performed by: INTERNAL MEDICINE

## 2022-09-08 PROCEDURE — 25010000002 AMIODARONE IN DEXTROSE 5% 360-4.14 MG/200ML-% SOLUTION: Performed by: INTERNAL MEDICINE

## 2022-09-08 PROCEDURE — 85025 COMPLETE CBC W/AUTO DIFF WBC: CPT | Performed by: INTERNAL MEDICINE

## 2022-09-08 RX ORDER — CEPHALEXIN 500 MG/1
500 CAPSULE ORAL 4 TIMES DAILY
Status: DISCONTINUED | OUTPATIENT
Start: 2022-09-08 | End: 2022-09-09 | Stop reason: HOSPADM

## 2022-09-08 RX ORDER — AMIODARONE HYDROCHLORIDE 200 MG/1
200 TABLET ORAL DAILY
Status: DISCONTINUED | OUTPATIENT
Start: 2022-09-09 | End: 2022-09-09 | Stop reason: HOSPADM

## 2022-09-08 RX ADMIN — AMIODARONE HYDROCHLORIDE 200 MG: 200 TABLET ORAL at 09:16

## 2022-09-08 RX ADMIN — MULTIPLE VITAMINS W/ MINERALS TAB 1 TABLET: TAB at 09:16

## 2022-09-08 RX ADMIN — DOCUSATE SODIUM 100 MG: 100 CAPSULE, LIQUID FILLED ORAL at 09:16

## 2022-09-08 RX ADMIN — BICALUTAMIDE 50 MG: 50 TABLET ORAL at 09:16

## 2022-09-08 RX ADMIN — Medication 10 ML: at 09:18

## 2022-09-08 RX ADMIN — AMIODARONE HYDROCHLORIDE 0.5 MG/MIN: 1.8 INJECTION, SOLUTION INTRAVENOUS at 05:59

## 2022-09-08 RX ADMIN — CARVEDILOL 3.12 MG: 3.12 TABLET, FILM COATED ORAL at 21:32

## 2022-09-08 RX ADMIN — DOCUSATE SODIUM 100 MG: 100 CAPSULE, LIQUID FILLED ORAL at 21:33

## 2022-09-08 RX ADMIN — LEVOTHYROXINE SODIUM 88 MCG: 0.09 TABLET ORAL at 06:35

## 2022-09-08 RX ADMIN — Medication 10 ML: at 23:55

## 2022-09-08 RX ADMIN — APIXABAN 5 MG: 5 TABLET, FILM COATED ORAL at 21:32

## 2022-09-08 RX ADMIN — CALCIUM CARBONATE-VITAMIN D TAB 500 MG-200 UNIT 1 TABLET: 500-200 TAB at 09:16

## 2022-09-08 RX ADMIN — ROSUVASTATIN CALCIUM 10 MG: 10 TABLET, FILM COATED ORAL at 09:16

## 2022-09-08 RX ADMIN — CEPHALEXIN 500 MG: 500 CAPSULE ORAL at 18:00

## 2022-09-08 RX ADMIN — LATANOPROST 1 DROP: 50 SOLUTION/ DROPS OPHTHALMIC at 21:33

## 2022-09-08 RX ADMIN — CEPHALEXIN 500 MG: 500 CAPSULE ORAL at 21:33

## 2022-09-08 RX ADMIN — APIXABAN 5 MG: 5 TABLET, FILM COATED ORAL at 09:16

## 2022-09-08 RX ADMIN — CEFAZOLIN SODIUM 2 G: 2 INJECTION, SOLUTION INTRAVENOUS at 09:16

## 2022-09-08 NOTE — PLAN OF CARE
Goal Outcome Evaluation:   Diuretic in Use: none  Response to Diuretics (Output greater than intake): n/a  Daily Weight (up or down): unable to tell  O2 Requirements: room air  Functional Status (Activity level, tolerance and respiratory symptoms): up with standby assist + walker   Discharge Plans:  return to assisted living facility

## 2022-09-08 NOTE — PROGRESS NOTES
Name: Bridger Elias ADMIT: 2022   : 1930  PCP: Conor Prince MD    MRN: [id number] LOS: 4 days   AGE/SEX: 89 y.o. male  ROOM: [facility]     Subjective   Subjective     No acute events.  Patient feeling down this morning.  Patient laying in bed, son at bedside.  Swelling has improved.  Leg no longer tender.  Denies any fevers, chills, nausea, vomiting, diarrhea, chest pain, palpitations, dyspnea.    Objective   Objective   Vital Signs  Temp:  [97.8 °F (36.6 °C)-98.5 °F (36.9 °C)] 98.5 °F (36.9 °C)  Heart Rate:  [74-75] 74  Resp:  [18] 18  BP: ()/(63-91) 106/79  SpO2:  [92 %-99 %] 98 %  on   ;   Device (Oxygen Therapy): room air  Body mass index is 26.16 kg/m².  Physical Exam  Vitals and nursing note reviewed.   Constitutional:       General: He is not in acute distress.     Appearance: He is not toxic-appearing or diaphoretic.   HENT:      Head: Normocephalic.      Nose: Nose normal.      Mouth/Throat:      Mouth: Mucous membranes are moist.      Pharynx: Oropharynx is clear.   Eyes:      Conjunctiva/sclera: Conjunctivae normal.      Pupils: Pupils are equal, round, and reactive to light.   Cardiovascular:      Rate and Rhythm: Normal rate and regular rhythm.      Pulses: Normal pulses.   Pulmonary:      Effort: Pulmonary effort is normal.      Breath sounds: Normal breath sounds.   Abdominal:      General: Bowel sounds are normal.      Palpations: Abdomen is soft.      Tenderness: There is no abdominal tenderness.   Musculoskeletal:         General: Swelling (1-2+ BLE) and deformity (LLE with previous injury and well-healed skin graft) present. No tenderness (right calf).      Cervical back: Normal range of motion and neck supple.   Skin:     General: Skin is warm and dry.      Capillary Refill: Capillary refill takes less than 2 seconds.   Neurological:      General: No focal deficit present.      Mental Status: He is alert.   Psychiatric:         Mood and Affect: Mood normal.          Behavior: Behavior normal.       Results Review     I reviewed the patient's new clinical results.  I reviewed the patient's telemetry.  Results from last 7 days   Lab Units 09/08/22 0337 09/07/22 0352 09/06/22 0431 09/05/22  0055   WBC 10*3/mm3 7.07 8.91 9.96 14.75*   HEMOGLOBIN g/dL 11.8* 11.6* 11.8* 11.3*   PLATELETS 10*3/mm3 118* 105* 94* 106*     Results from last 7 days   Lab Units 09/08/22 0337 09/07/22 0352 09/06/22 0431 09/05/22  0055   SODIUM mmol/L 132* 131* 134* 135*   POTASSIUM mmol/L 4.1 4.0 4.1 4.0   CHLORIDE mmol/L 100 100 103 102   CO2 mmol/L 23.0 20.1* 18.7* 21.7*   BUN mg/dL 18 22 25* 23   CREATININE mg/dL 1.22 1.26 1.21 1.06   GLUCOSE mg/dL 102* 116* 104* 119*   EGFR mL/min/1.73 55.6* 53.5* 56.2* 65.8     Results from last 7 days   Lab Units 09/05/22  0055   ALBUMIN g/dL 3.30*   BILIRUBIN mg/dL 1.0   ALK PHOS U/L 84   AST (SGOT) U/L 31   ALT (SGPT) U/L 21     Results from last 7 days   Lab Units 09/08/22 0337 09/07/22 0352 09/06/22 0431 09/05/22 0055 09/04/22  1022   CALCIUM mg/dL 8.5 8.4 8.5 8.6  --    ALBUMIN g/dL  --   --   --  3.30*  --    MAGNESIUM mg/dL  --   --   --   --  2.0     Results from last 7 days   Lab Units 09/05/22 0055 09/05/22  0046 09/04/22  1226   PROCALCITONIN ng/mL  --  0.40* 0.32   LACTATE mmol/L 1.6  --   --      No results found for: HGBA1C, POCGLU    No radiology results for the last day  Scheduled Medications  amiodarone, 200 mg, Oral, Q12H  apixaban, 5 mg, Oral, Q12H  bicalutamide, 50 mg, Oral, Daily  calcium-vitamin D, 1 tablet, Oral, Daily  carvedilol, 3.125 mg, Oral, Nightly  cephalexin, 500 mg, Oral, 4x Daily  docusate sodium, 100 mg, Oral, BID  latanoprost, 1 drop, Both Eyes, Nightly  levothyroxine, 88 mcg, Oral, Q AM  multivitamin with minerals, 1 tablet, Oral, Daily  rosuvastatin, 10 mg, Oral, Daily  sodium chloride, 10 mL, Intravenous, Q12H    Infusions   Diet  Diet Regular; Cardiac       Assessment/Plan     Active Hospital Problems    Diagnosis   POA   • **Cellulitis of right leg [L03.115]  Unknown   • Chronic acquired lymphedema [I89.0]  Unknown   • Contusion of face [S00.83XA]  Unknown   • Contusion of forearm, left [S50.12XA]  Unknown   • Fall [W19.XXXA]  Unknown   • Atrial fibrillation (HCC) [I48.91]  Yes   • General weakness [R53.1]  Yes   • Pacemaker [Z95.0]  Yes   • Chronic diastolic heart failure (HCC) [I50.32]  Yes   • SCC (squamous cell carcinoma), face [C44.320]  Yes   • Hypothyroidism [E03.9]  Yes   • Hypertensive kidney disease, stage III (HCC) [I12.9, N18.30]  Yes   • Long term (current) use of anticoagulants [Z79.01]  Not Applicable   • Malignant neoplasm of prostate (HCC) [C61]  Yes      Resolved Hospital Problems   No resolved problems to display.   Afib/RVR  Chronic HFrEF  - complicated by low BP  -Patient transition to oral amiodarone  - echocardiogram noted with decreased LVEF 30%-treat medically per cardiology  - pacemaker adjusted  - on AC with eliquis  -Continue home Coreg and statin.  Consider ACEt if blood pressure remained stable.  - appreciate cardiology recs    RLE Cellulitis with Sepsis present on Admission  - leukocytosis and tachycardia present on admission  - overall looking much better, blood cx's NGTD  - stopped vancomycin and cefepime  -Cefazolin transitioned to Keflex today    Hypothyroidism  - continue levothyroxine    Chronic Diastolic CHF  - monitor volume status    Constipation  - resovled  - continue bowel regimen.    Depression-Access consulted.  Does not want to take medication unless he needs to.  If antidepressant to be started we will need to recheck EKG as previous QTC was over 500.    Eliquis (home med) for DVT prophylaxis.  Full code.  Discussed with patient, nursing staff and care team on multidisciplinary rounds.  Anticipate discharge Assisted living facility with home health tomorrow.      Amari Alva MD  [facility]  09/08/22  15:00 EDT

## 2022-09-08 NOTE — PROGRESS NOTES
"   LOS: 4 days   Patient Care Team:  Conor Prince MD as PCP - General (Internal Medicine)  Maurice Cook MD as Consulting Physician (Radiation Oncology)    Chief Complaint: Fatigue    Subjective     Interval History: Patient denies any chest pain shortness of breath continues to be afebrile.  Patient continues to be in atrial fibrillation with controlled ventricular response (paced rhythm) 75 bpm.    Developed superficial thrombophlebitis of the right upper extremity secondary to amiodarone infusion.    Patient is scheduled to discharge the next 24 hours.  Antibiotics for cellulitis of the right lower extremity will cover also with a superficial thrombophlebitis of the right upper extremity    Patient Complaints: Complains of mild tenderness at the right antecubital area  Patient Denies:  Chest pain or shortness of breath  History taken from: patient    Review of Systems:   All systems were reviewed and negative except for:  Cardiovascular: positive for  irregular pulse and paroxysmal nocturnal dyspnea      Objective     Vital Sign Min/Max for last 24 hours  Temp  Min: 97.8 °F (36.6 °C)  Max: 98.5 °F (36.9 °C)   BP  Min: 93/63  Max: 130/91   Pulse  Min: 74  Max: 75   Resp  Min: 18  Max: 18   SpO2  Min: 92 %  Max: 99 %   No data recorded   No data recorded     Flowsheet Rows    Flowsheet Row First Filed Value   Admission Height 170.2 cm (67\") Documented at 09/05/2022 1215   Admission Weight 76.2 kg (167 lb 14.4 oz) Documented at 09/05/2022 0231          Physical Exam:     General Appearance:    Alert, cooperative, in no acute distress   Head:    Normocephalic, without obvious abnormality, atraumatic   Eyes:            Lids and lashes normal, conjunctivae and sclerae normal, no   icterus, no pallor, corneas clear, PERRLA   Ears:    Ears appear intact with no abnormalities noted   Throat:   No oral lesions, no thrush, oral mucosa moist   Neck:   No adenopathy, supple, trachea midline, no thyromegaly, no   carotid " bruit, no JVD   Back:     No kyphosis present, no scoliosis present, no skin lesions,      erythema or scars, no tenderness to percussion or                   palpation,   range of motion normal   Lungs:     Clear to auscultation,respirations regular, even and                  unlabored    Heart:    irregular rhythm and systolic murmur is noted in the mitral valve area.   Chest Wall:    No abnormalities observed   Abdomen:     Normal bowel sounds, no masses, no organomegaly, soft        non-tender, non-distended, no guarding, no rebound                tenderness   Rectal:     Deferred   Extremities:   Moves all extremities well, no edema, no cyanosis, no             redness   Pulses:   Pulses palpable and equal bilaterally   Skin:   No bleeding, bruising or rash   Lymph nodes:   No palpable adenopathy   Neurologic:   Cranial nerves 2 - 12 grossly intact, sensation intact, DTR       present and equal bilaterally        Results Review:     I reviewed the patient's new clinical results.    Ejection Fraction  No results found for: EF    Echo EF Estimated  Lab Results   Component Value Date    ECHOEFEST 55 04/17/2022       Nuclear Stress Ejection Fraction  No components found for: NUCEF    Cath Ejection Fraction Quantitative  No results found for: CATHEF    Physical Exam    Medication Review:   Assessment & Plan       Cellulitis of right leg    SCC (squamous cell carcinoma), face    General weakness    Pacemaker    Atrial fibrillation (HCC)    Chronic diastolic heart failure (HCC)    Hypertensive kidney disease, stage III (HCC)    Hypothyroidism    Long term (current) use of anticoagulants    Malignant neoplasm of prostate (HCC)    Chronic acquired lymphedema    Contusion of face    Contusion of forearm, left    Fall  Atrial fibrillation with controlled ventricular response    Plan    Continue amiodarone 200 mg daily until patient is seen in the office    Continue Eliquis    Continue p.o. antibiotics for right lower   extremity cellulitis and thrombophlebitis of the right upper extremity    Follow-up in the office in 4 to 5 weeks    Will follow as needed.    Plan for disposition assisted living    Medardo Vanegas MD  09/08/22  17:57 EDT      Time: 25 minutes

## 2022-09-08 NOTE — CASE MANAGEMENT/SOCIAL WORK
Continued Stay Note  [facility]     Patient Name: Bridger Elias  MRN: [id number]  Today's Date: 9/8/2022    Admit Date: 9/4/2022     Discharge Plan     Row Name 09/08/22 1404       Plan    Plan Return to [facility]    Patient/Family in Agreement with Plan yes    Plan Comments CCP met with patient and son at bedside to discuss discharge planning. They confirm plans to return to [facility] at discharge. Patient plans to continue PT/OT services there and son states he has already spoken with [facility] staff about resuming therapy. Family able to provide transportation. Patient and son deny other discharge planning needs. Catalina CHAVEZ RN/CCP               Discharge Codes    No documentation.               Expected Discharge Date and Time     Expected Discharge Date Expected Discharge Time    Sep 9, 2022             Isis Dickerson

## 2022-09-08 NOTE — PLAN OF CARE
Goal Outcome Evaluation:           Progress: improving  Outcome Evaluation: pt seemed very hopeless and sad this morning, provided support and access consulted, pt felt better later this afternoon. no c/o pain or discomfort. up in the chair most of the day. amiodarone gtt switched to P.O. abx switched to P.O.  VSS.

## 2022-09-08 NOTE — PLAN OF CARE
Goal Outcome Evaluation:              Outcome Evaluation: pt rested comfortably tonight with son at bedside, amiodarone still infusing, pt remains on IV abx, room air, all VSS, safety maintained, will CTM cl

## 2022-09-08 NOTE — CONSULTS
"Access Center consulted regards depression:    Primary RN reported pt \"was just really sad and hopeless this morning... tearful...\".    Pt is found up in chair upon approach. Son, Yasir, is at bedside and pt gives permission for his presence during assessment. Introduced self, explained role, and pt stated, \"I think we're okay now.\". He stated, \"I had a bad night. I was awake most of it, I couldn't sleep, had dark thoughts all night long and then I shot my mouth off about it..\". \"My son and I had a good long talk and I'm doing okay\".     He spoke at length about his life, and ongoing health issues. He stated, \"I've had a good life, but sometimes I get tired of living....but I'm not going to ever do anything to myself.. I'm a believer in the good Lord..\".    He does not quantify anxiety or depression on a scale of 0-10 (10 being worst), but does relay he is \"nervous about going back.. falling.. health\". He denied current SI/HI/AVH. He denied issues with sleep. Appetite is fair, he stated, \"I have to like the food.\". Son reported his dad ate all lunch today.    MENTAL HEALTH and SUBSTANCE USE HX:    Denied and current/past history.     SOCIAL:    Pt was  for 65 yrs to his \"childhood sweetheart\" Merissa until she passed away in 2019. He has two adult children, son and daughter. His children live nearby and are attentive to his needs. He currently lives in an assisted-living facility - both pt and son state this is a \"wonderful\" place. Both feel he is getting good care and attention here. Pt relayed he will take meals with his peers in dining room, gets out walking and does exercising class as able. Relays there are a variety of activities available if he so chooses to participate. He may watch Buddhist on TV. He denied violence in his environment and stated he felt safe.     PLAN:    Pt and son denied need for mental health resources or Access follow-up. Pt was encouraged to share any thoughts or concerns with " those he feels comfortable with. He voiced understanding.

## 2022-09-09 ENCOUNTER — READMISSION MANAGEMENT (OUTPATIENT)
Dept: CALL CENTER | Facility: HOSPITAL | Age: 87
End: 2022-09-09

## 2022-09-09 VITALS
WEIGHT: 167 LBS | HEIGHT: 67 IN | HEART RATE: 74 BPM | BODY MASS INDEX: 26.21 KG/M2 | RESPIRATION RATE: 18 BRPM | SYSTOLIC BLOOD PRESSURE: 120 MMHG | DIASTOLIC BLOOD PRESSURE: 74 MMHG | TEMPERATURE: 97.6 F | OXYGEN SATURATION: 99 %

## 2022-09-09 PROBLEM — I50.22 CHRONIC SYSTOLIC HEART FAILURE: Status: ACTIVE | Noted: 2022-09-09

## 2022-09-09 LAB
BASOPHILS # BLD AUTO: 0.05 10*3/MM3 (ref 0–0.2)
BASOPHILS NFR BLD AUTO: 0.7 % (ref 0–1.5)
DEPRECATED RDW RBC AUTO: 49 FL (ref 37–54)
EOSINOPHIL # BLD AUTO: 0.12 10*3/MM3 (ref 0–0.4)
EOSINOPHIL NFR BLD AUTO: 1.6 % (ref 0.3–6.2)
ERYTHROCYTE [DISTWIDTH] IN BLOOD BY AUTOMATED COUNT: 15 % (ref 12.3–15.4)
HCT VFR BLD AUTO: 32.2 % (ref 37.5–51)
HGB BLD-MCNC: 11.5 G/DL (ref 13–17.7)
LYMPHOCYTES # BLD AUTO: 1.65 10*3/MM3 (ref 0.7–3.1)
LYMPHOCYTES NFR BLD AUTO: 22 % (ref 19.6–45.3)
MCH RBC QN AUTO: 32.8 PG (ref 26.6–33)
MCHC RBC AUTO-ENTMCNC: 35.7 G/DL (ref 31.5–35.7)
MCV RBC AUTO: 91.7 FL (ref 79–97)
MONOCYTES # BLD AUTO: 0.75 10*3/MM3 (ref 0.1–0.9)
MONOCYTES NFR BLD AUTO: 10 % (ref 5–12)
NEUTROPHILS NFR BLD AUTO: 4.84 10*3/MM3 (ref 1.7–7)
NEUTROPHILS NFR BLD AUTO: 64.6 % (ref 42.7–76)
PLATELET # BLD AUTO: 140 10*3/MM3 (ref 140–450)
PMV BLD AUTO: 11.3 FL (ref 6–12)
RBC # BLD AUTO: 3.51 10*6/MM3 (ref 4.14–5.8)
WBC NRBC COR # BLD: 7.49 10*3/MM3 (ref 3.4–10.8)

## 2022-09-09 PROCEDURE — 85025 COMPLETE CBC W/AUTO DIFF WBC: CPT | Performed by: INTERNAL MEDICINE

## 2022-09-09 PROCEDURE — 97530 THERAPEUTIC ACTIVITIES: CPT

## 2022-09-09 PROCEDURE — 97116 GAIT TRAINING THERAPY: CPT

## 2022-09-09 RX ORDER — CEPHALEXIN 500 MG/1
500 CAPSULE ORAL 4 TIMES DAILY
Qty: 17 CAPSULE | Refills: 0 | Status: SHIPPED | OUTPATIENT
Start: 2022-09-09 | End: 2022-09-14

## 2022-09-09 RX ORDER — AMIODARONE HYDROCHLORIDE 200 MG/1
200 TABLET ORAL DAILY
Qty: 30 TABLET | Refills: 0 | Status: ON HOLD | OUTPATIENT
Start: 2022-09-10

## 2022-09-09 RX ADMIN — LEVOTHYROXINE SODIUM 88 MCG: 0.09 TABLET ORAL at 05:36

## 2022-09-09 RX ADMIN — BICALUTAMIDE 50 MG: 50 TABLET ORAL at 08:59

## 2022-09-09 RX ADMIN — ROSUVASTATIN CALCIUM 10 MG: 10 TABLET, FILM COATED ORAL at 09:00

## 2022-09-09 RX ADMIN — MULTIPLE VITAMINS W/ MINERALS TAB 1 TABLET: TAB at 09:00

## 2022-09-09 RX ADMIN — Medication 10 ML: at 09:01

## 2022-09-09 RX ADMIN — CALCIUM CARBONATE-VITAMIN D TAB 500 MG-200 UNIT 1 TABLET: 500-200 TAB at 09:00

## 2022-09-09 RX ADMIN — CEPHALEXIN 500 MG: 500 CAPSULE ORAL at 08:59

## 2022-09-09 RX ADMIN — AMIODARONE HYDROCHLORIDE 200 MG: 200 TABLET ORAL at 09:00

## 2022-09-09 RX ADMIN — DOCUSATE SODIUM 100 MG: 100 CAPSULE, LIQUID FILLED ORAL at 09:00

## 2022-09-09 RX ADMIN — APIXABAN 5 MG: 5 TABLET, FILM COATED ORAL at 09:00

## 2022-09-09 NOTE — DISCHARGE SUMMARY
Patient Name: Bridger Elias  : 1930  MRN: [id number]    Date of Admission: 2022  Date of Discharge:  2022  Primary Care Physician: Conor Prince MD      Chief Complaint:   No chief complaint on file.      Discharge Diagnoses     Active Hospital Problems    Diagnosis  POA   • **Cellulitis of right leg [L03.115]  Unknown   • Chronic systolic heart failure (CMS/HCC) [I50.22]  Unknown   • Chronic acquired lymphedema [I89.0]  Unknown   • Contusion of face [S00.83XA]  Unknown   • Contusion of forearm, left [S50.12XA]  Unknown   • Fall [W19.XXXA]  Unknown   • Atrial fibrillation (HCC) [I48.91]  Yes   • General weakness [R53.1]  Yes   • Pacemaker [Z95.0]  Yes   • Chronic diastolic heart failure (HCC) [I50.32]  Yes   • SCC (squamous cell carcinoma), face [C44.320]  Yes   • Hypothyroidism [E03.9]  Yes   • Hypertensive kidney disease, stage III (HCC) [I12.9, N18.30]  Yes   • Long term (current) use of anticoagulants [Z79.01]  Not Applicable   • Malignant neoplasm of prostate (HCC) [C61]  Yes      Resolved Hospital Problems   No resolved problems to display.        Hospital Course     Mr. Elias is a 89 y.o. male with a history of chronic systolic and diastolic heart failure, A. fib, hypothyroidism presenting with right upper extremity cellulitis complicated by atrial fibrillation with RVR.  Patient started on amnio drip and transition to amnio with good rate control.  Right lower extremity cellulitis improved with antibiotics.  Patient to finish 7-day course of antibiotics.  Patient discharged on Keflex.  Patient safe to discharge.    Afib/RVR  Chronic diastolic and systolic heart failure  - complicated by low BP  -Patient transition to oral amiodarone  - echocardiogram noted with decreased LVEF 30%-treat medically per cardiology  - on AC with eliquis  -Continue home Coreg and statin.  Consider ACEt if blood pressure remained stable as outpatient  - appreciate cardiology recs     RLE Cellulitis with  Sepsis present on Admission  - leukocytosis and tachycardia present on admission  - overall looking much better  -Discharged on Keflex for total of 7 days antibiotic treatment     Hypothyroidism  - continue levothyroxine     Chronic Diastolic CHF  - monitor volume status     Constipation  - resovled  - continue bowel regimen.    At the time of discharge patient was told to take all medications as prescribed, keep all follow-up appointments, and call their doctor or return to the hospital with any worsening or concerning symptoms.    Day of Discharge     Subjective:  No acute events.  Patient sitting on side the bed about to work with PT.  Right lower extremity swelling, erythema, tenderness continues to improve.  Patient ate well this morning.  Son at bedside.    Review of Systems   Respiratory: Negative for cough and shortness of breath.    Cardiovascular: Negative for chest pain and palpitations.   Gastrointestinal: Negative for abdominal pain, diarrhea, nausea and vomiting.   Genitourinary: Negative for dysuria.   Neurological: Negative for headaches.       Physical Exam:  Temp:  [97.6 °F (36.4 °C)-98.5 °F (36.9 °C)] 97.6 °F (36.4 °C)  Heart Rate:  [74] 74  Resp:  [18] 18  BP: ()/(63-79) 120/74  Body mass index is 26.16 kg/m².  Physical Exam  Vitals and nursing note reviewed.   Constitutional:       General: He is not in acute distress.     Appearance: He is not toxic-appearing or diaphoretic.   HENT:      Head: Normocephalic.      Nose: Nose normal.      Mouth/Throat:      Mouth: Mucous membranes are moist.      Pharynx: Oropharynx is clear.   Eyes:      Conjunctiva/sclera: Conjunctivae normal.      Pupils: Pupils are equal, round, and reactive to light.   Cardiovascular:      Rate and Rhythm: Normal rate and regular rhythm.      Pulses: Normal pulses.   Pulmonary:      Effort: Pulmonary effort is normal.      Breath sounds: Normal breath sounds.   Abdominal:      General: Bowel sounds are normal.       Palpations: Abdomen is soft.      Tenderness: There is no abdominal tenderness.   Musculoskeletal:         General:   Swelling resolved.  Erythema improving.  No tenderness (right calf).      Cervical back: Normal range of motion and neck supple.   Skin:     General: Skin is warm and dry.      Capillary Refill: Capillary refill takes less than 2 seconds.   Neurological:      General: No focal deficit present.      Mental Status: He is alert.   Psychiatric:         Mood and Affect: Mood normal.         Behavior: Behavior normal.   Consultants     Consult Orders (all) (From admission, onward)     Start     Ordered    09/08/22 1109  Inpatient Access Center Consult  Once        Provider:  (Not yet assigned)    09/08/22 1109    09/04/22 2214  Inpatient Cardiology Consult  Once        Specialty:  Cardiology  Provider:  Medardo Vanegas MD    09/04/22 2213              Procedures     Imaging Results (All)     Procedure Component Value Units Date/Time    CT Head Without Contrast [[id number]] Collected: 09/05/22 0400     Updated: 09/05/22 0408    Narrative:      CT HEAD WITHOUT CONTRAST     HISTORY: Closed head injury. Patient is on blood thinners.     COMPARISON: 04/14/2022     TECHNIQUE: Axial CT imaging was obtained through the brain. No IV  contrast was administered.     FINDINGS:  No acute intracranial hemorrhage is seen. There is diffuse atrophy.  There is periventricular and deep white matter microangiopathic change.  There is no midline shift or mass effect. Paranasal sinuses are  essentially clear. There is some opacification of the right mastoid air  cells.       Impression:      No acute intracranial findings.     Radiation dose reduction techniques were utilized, including automated  exposure control and exposure modulation based on body size.     This report was finalized on 9/5/2022 4:05 AM by Dr. Jaida Neal M.D.             Pertinent Labs     Results from last 7 days   Lab Units  09/09/22 0350 09/08/22 0337 09/07/22 0352 09/06/22  0431   WBC 10*3/mm3 7.49 7.07 8.91 9.96   HEMOGLOBIN g/dL 11.5* 11.8* 11.6* 11.8*   PLATELETS 10*3/mm3 140 118* 105* 94*     Results from last 7 days   Lab Units 09/08/22 0337 09/07/22 0352 09/06/22 0431 09/05/22  0055   SODIUM mmol/L 132* 131* 134* 135*   POTASSIUM mmol/L 4.1 4.0 4.1 4.0   CHLORIDE mmol/L 100 100 103 102   CO2 mmol/L 23.0 20.1* 18.7* 21.7*   BUN mg/dL 18 22 25* 23   CREATININE mg/dL 1.22 1.26 1.21 1.06   GLUCOSE mg/dL 102* 116* 104* 119*   Estimated Creatinine Clearance: 41.4 mL/min (by C-G formula based on SCr of 1.22 mg/dL).  Results from last 7 days   Lab Units 09/05/22  0055   ALBUMIN g/dL 3.30*   BILIRUBIN mg/dL 1.0   ALK PHOS U/L 84   AST (SGOT) U/L 31   ALT (SGPT) U/L 21     Results from last 7 days   Lab Units 09/08/22 0337 09/07/22 0352 09/06/22 0431 09/05/22  0055 09/04/22  1022   CALCIUM mg/dL 8.5 8.4 8.5 8.6  --    ALBUMIN g/dL  --   --   --  3.30*  --    MAGNESIUM mg/dL  --   --   --   --  2.0       Results from last 7 days   Lab Units 09/07/22  0352 09/06/22  1750 09/05/22  1024   TROPONIN T ng/mL <0.010  --  <0.010   PROBNP pg/mL  --  3,274.0*  --      Results from last 7 days   Lab Units 09/04/22  2338   SODIUM UR mmol/L 90   CREATININE UR mg/dL 80.6   CHLORIDE UR mmol/L 92   OSMOLALITY UR mOsm/kg 769         Invalid input(s): LDLCALC  Results from last 7 days   Lab Units 09/05/22  0046   BLOODCX  No growth at 4 days  No growth at 4 days       Test Results Pending at Discharge     Pending Labs     Order Current Status    Blood Culture - Blood, Arm, Right Preliminary result    Blood Culture - Blood, Foot, Left Preliminary result          Discharge Details        Discharge Medications      New Medications      Instructions Start Date   amiodarone 200 MG tablet  Commonly known as: PACERONE   200 mg, Oral, Daily   Start Date: September 10, 2022     cephalexin 500 MG capsule  Commonly known as: KEFLEX   500 mg, Oral, 4 Times  Daily         Continue These Medications      Instructions Start Date   acetaminophen 325 MG tablet  Commonly known as: TYLENOL   650 mg, Oral, Every 6 Hours PRN      apixaban 5 MG tablet tablet  Commonly known as: ELIQUIS   5 mg, Oral, Every 12 Hours Scheduled      bicalutamide 50 MG chemo tablet  Commonly known as: CASODEX   1 tablet, Oral, Daily      calcium carbonate 600 MG tablet  Commonly known as: OS-SOPHIA   600 mg, Oral, Daily      carvedilol 3.125 MG tablet  Commonly known as: COREG   3.125 mg, Oral, Nightly      latanoprost 0.005 % ophthalmic solution  Commonly known as: XALATAN   Ophthalmic      levothyroxine 88 MCG tablet  Commonly known as: SYNTHROID, LEVOTHROID   88 mcg, Oral, Daily      multivitamin with minerals tablet tablet   1 tablet, Oral, Daily      polyethylene glycol 17 g packet  Commonly known as: MIRALAX   17 g, Oral, Daily PRN      rosuvastatin 10 MG tablet  Commonly known as: CRESTOR   1 tablet, Oral, Daily      silver sulfadiazine 1 % cream  Commonly known as: SILVADENE, SSD   1 application, Topical, 3 Times Daily, Apply to facial regions of inflammation per rad onc recs      silver sulfadiazine 1 % cream  Commonly known as: SILVADENE, SSD   1 application, Topical, 2 Times Daily             No Known Allergies      Discharge Disposition:  Home or Self Care    Discharge Diet:  Diet Order   Procedures   • Diet Regular; Cardiac       Discharge Activity:   As tolerated    CODE STATUS:    Code Status and Medical Interventions:   Ordered at: 09/04/22 2212     Code Status (Patient has no pulse and is not breathing):    CPR (Attempt to Resuscitate)     Medical Interventions (Patient has pulse or is breathing):    Full Support     Release to patient:    Routine Release       No future appointments.   Follow-up Information     Conor Prince MD .    Specialty: Internal Medicine  Contact information:  [address] [facility] [address] [phone]             Medardo Vanegas  MD [facility] Follow up.    Specialty: Cardiology  Contact information:  [address]  [address]  [place] [address]  [phone]                         Time Spent on Discharge:  Greater than 30 minutes      Amari Alva MD  [facility]  09/09/22  13:23 EDT

## 2022-09-09 NOTE — CASE MANAGEMENT/SOCIAL WORK
Case Management Discharge Note      Final Note: [facility] via son    Provided Post Acute Provider List?: N/A  N/A Provider List Comment: Plans for patient to return to [facility]  Provided Post Acute Provider Quality & Resource List?: N/A  N/A Quality & Resource List Comment: Plans for patient to return to [facility]    Selected Continued Care - Discharged on 9/9/2022 Admission date: 9/4/2022 - Discharge disposition: Home or Self Care    Destination    No services have been selected for the patient.              Durable Medical Equipment    No services have been selected for the patient.              Dialysis/Infusion    No services have been selected for the patient.              Home Medical Care    No services have been selected for the patient.              Therapy    No services have been selected for the patient.              Community Resources    No services have been selected for the patient.              Community & DME    No services have been selected for the patient.                       Final Discharge Disposition Code: 01 - home or self-care

## 2022-09-09 NOTE — THERAPY TREATMENT NOTE
Patient Name: Bridger Elias  : 1930    MRN: [id number]                              Today's Date: 2022       Admit Date: 2022    Visit Dx: No diagnosis found.  Patient Active Problem List   Diagnosis   • SCC (squamous cell carcinoma), face   • General weakness   • Pacemaker   • Abnormal gait   • Atrial fibrillation (HCC)   • Chronic diastolic heart failure (HCC)   • Coronary arteriosclerosis   • Dyslipidemia   • Glaucoma   • Mitral valve disorder   • Hypertension   • Hypertensive kidney disease, stage III (HCC)   • Hypothyroidism   • Long term (current) use of anticoagulants   • Malignant neoplasm of prostate (HCC)   • Osteoporosis   • Squamous cell carcinoma of skin of face   • Acute on chronic diastolic CHF (congestive heart failure) (HCC)   • Stage 3b chronic kidney disease (HCC)   • Personal history of radiation therapy   • Acute on chronic systolic (congestive) heart failure (HCC)   • Cellulitis of right leg   • Chronic acquired lymphedema   • Contusion of face   • Contusion of forearm, left   • Fall   • Chronic systolic heart failure (CMS/HCC)     Past Medical History:   Diagnosis Date   • Acute on chronic diastolic CHF (congestive heart failure) (HCC) 2022   • Atrial fibrillation (HCC) 2022   • Coronary arteriosclerosis 7/3/2014    Formatting of this note might be different from the original. [facility]  IMPRESSION:   1. Right heart disease, hypertensive cardiac disease.   2. Status post mitral valve repair.   3. Anatomy: No hemodynamically significant disease. about 30-40% lesion of    the right coronary artery. Normal. Left coronary artery system.   • Dyslipidemia 2013   • Glaucoma 2022   • Hypertension 2013   • Hypertensive kidney disease, stage III (HCC) 3/13/2017   • Hypothyroidism 3/19/2017   • Long term (current) use of anticoagulants 2013    Formatting of this note might be different from the original. Mitral valve replacement and atrial fibrillation  Assume a range of 2.5-3.5.   • Malignant neoplasm of prostate (HCC) 12/5/2013    Formatting of this note might be different from the original. Description: He sees urology every 6 months and he does do Lupron injections   • Mitral valve disorder 1/25/2021   • Pacemaker 4/14/2022   • Personal history of radiation therapy 4/14/2022   • Prostate cancer (HCC)    • SCC (squamous cell carcinoma), face 2/2/2022   • Stage 3b chronic kidney disease (HCC) 4/14/2022     Past Surgical History:   Procedure Laterality Date   • PACEMAKER IMPLANTATION  2018      General Information     Row Name 09/09/22 1000          Physical Therapy Time and Intention    Document Type therapy note (daily note)  -EE (r) JF (t) EE (c)     Mode of Treatment physical therapy;individual therapy  -EE (r) JF (t) EE (c)     Row Name 09/09/22 1000          General Information    Patient Profile Reviewed yes  -EE (r) JF (t) EE (c)     Existing Precautions/Restrictions fall  -EE (r) JF (t) EE (c)     Row Name 09/09/22 1000          Living Environment    People in Home facility resident  -EE (r) JF (t) EE (c)     Row Name 09/09/22 1000          Home Main Entrance    Number of Stairs, Main Entrance none  -EE (r) JF (t) EE (c)     Row Name 09/09/22 1000          Stairs Within Home, Primary    Number of Stairs, Within Home, Primary none  -EE (r) JF (t) EE (c)     Row Name 09/09/22 1000          Cognition    Orientation Status (Cognition) oriented x 4  -EE (r) JF (t) EE (c)     Row Name 09/09/22 1000          Safety Issues, Functional Mobility    Impairments Affecting Function (Mobility) balance;strength;endurance/activity tolerance  -EE (r) JF (t) EE (c)           User Key  (r) = Recorded By, (t) = Taken By, (c) = Cosigned By    Initials Name Provider Type    EE Odalys Cowart, PT Physical Therapist    Justus Cuellar, PT Student PT Student               Mobility     Row Name 09/09/22 1001          Bed Mobility    Bed Mobility supine-sit  -EE (r) JF (t) EE (c)      Supine-Sit [place] (Bed Mobility) modified independence  -EE (r) JF (t) EE (c)     Assistive Device (Bed Mobility) bed rails;head of bed elevated  -EE (r) JF (t) EE (c)     Row Name 09/09/22 1001          Sit-Stand Transfer    Sit-Stand [place] (Transfers) standby assist  -EE (r) JF (t) EE (c)     Assistive Device (Sit-Stand Transfers) walker, front-wheeled  -EE (r) JF (t) EE (c)     Comment, (Sit-Stand Transfer) x1 from EOB, x1 from commode  -EE (r) JF (t) EE (c)     Row Name 09/09/22 1001          Gait/Stairs (Locomotion)    [place] Level (Gait) standby assist  -EE (r) JF (t) EE (c)     Assistive Device (Gait) walker, front-wheeled  -EE (r) JF (t) EE (c)     Distance in Feet (Gait) 180'  -EE (r) JF (t) EE (c)     Deviations/Abnormal Patterns (Gait) hussein decreased;gait speed decreased  -EE (r) JF (t) EE (c)     Comment, (Gait/Stairs) pt reports rwx is difficult to navigate around corners. no overt LOB, reports fatigue senior living through  -EE (r) JF (t) EE (c)           User Key  (r) = Recorded By, (t) = Taken By, (c) = Cosigned By    Initials Name Provider Type    Odalys Heath, PT Physical Therapist    Justus Cuellar PT Student PT Student               Obj/Interventions     Row Name 09/09/22 1004          Balance    Balance Assessment sitting static balance;sitting dynamic balance;standing static balance;standing dynamic balance  -EE (r) JF (t) EE (c)     Static Sitting Balance supervision  -EE (r) JF (t) EE (c)     Dynamic Sitting Balance supervision  -EE (r) JF (t) EE (c)     Position, Sitting Balance sitting edge of bed  -EE (r) JF (t) EE (c)     Static Standing Balance standby assist  -EE (r) JF (t) EE (c)     Dynamic Standing Balance standby assist  -EE (r) JF (t) EE (c)     Position/Device Used, Standing Balance walker, front-wheeled  -EE (r) JF (t) EE (c)           User Key  (r) = Recorded By, (t) = Taken By, (c) = Cosigned By    Initials Name Provider Type    Odalys Heath, PT  Physical Therapist    Justus Cuellar, PT Student PT Student               Goals/Plan    No documentation.                Clinical Impression     Row Name 09/09/22 1005          Pain    Pretreatment Pain Rating 0/10 - no pain  -EE (r) JF (t) EE (c)     Posttreatment Pain Rating 0/10 - no pain  -EE (r) JF (t) EE (c)     Row Name 09/09/22 1005          Plan of Care Review    Plan of Care Reviewed With patient;son  -EE (r) JF (t) EE (c)     Progress improving  -EE (r) JF (t) EE (c)     Outcome Evaluation Pt was able to walk further today with less unsteadiness, 180' SBA using rwx, reports some fatigue midway through but able to walk back to room without assist. Pt requested to brush his teeth and use bathroom while PT in room. Pt was able to complete both tasks SBA, though he needed some help getting his brief back up. STS x1 from EOB and x1 from commode, both SBA. Pt requested to walk more today, PT informed him that he can walk with nursing throughout the day. Pt will continue to benefit from PT to improve mobility and independence.  -EE (r) JF (t) EE (c)     Row Name 09/09/22 1005          Positioning and Restraints    Pre-Treatment Position in bed  -EE (r) JF (t) EE (c)     Post Treatment Position chair  -EE (r) JF (t) EE (c)     In Chair notified nsg;reclined;sitting;call light within reach;encouraged to call for assist;exit alarm on;with family/caregiver  -EE (r) JF (t) EE (c)           User Key  (r) = Recorded By, (t) = Taken By, (c) = Cosigned By    Initials Name Provider Type    Odalys Heath, PT Physical Therapist    Justus Cuellar, PT Student PT Student               Outcome Measures     Row Name 09/09/22 1013          How much help from another person do you currently need...    Turning from your back to your side while in flat bed without using bedrails? 3  -EE (r) JF (t) EE (c)     Moving from lying on back to sitting on the side of a flat bed without bedrails? 3  -EE (r) JF (t) EE (c)      Moving to and from a bed to a chair (including a wheelchair)? 3  -EE (r) JF (t) EE (c)     Standing up from a chair using your arms (e.g., wheelchair, bedside chair)? 4  -EE (r) JF (t) EE (c)     Climbing 3-5 steps with a railing? 2  -EE (r) JF (t) EE (c)     To walk in hospital room? 3  -EE (r) JF (t) EE (c)     AM-PAC 6 Clicks Score (PT) 18  -EE (r) JF (t)     Highest level of mobility 6 --> Walked 10 steps or more  -EE (r) JF (t)     Row Name 09/09/22 1013          Functional Assessment    Outcome Measure Options AM-PAC 6 Clicks Basic Mobility (PT)  -EE (r) JF (t) EE (c)           User Key  (r) = Recorded By, (t) = Taken By, (c) = Cosigned By    Initials Name Provider Type    EE Odalys Cowart, PT Physical Therapist    Justus Cuellar PT Student PT Student                             Physical Therapy Education                 Title: PT OT SLP Therapies (Resolved)     Topic: Physical Therapy (Resolved)     Point: Mobility training (Resolved)     Learning Progress Summary           Patient Acceptance, E, VU by  at 9/9/2022 1015    Acceptance, E,TB, VU,NR by MS at 9/7/2022 1546    Acceptance, E, VU,NR by JF at 9/6/2022 1507   Family Acceptance, E, VU by JF at 9/9/2022 1015                   Point: Home exercise program (Resolved)     Learning Progress Summary           Patient Acceptance, E,TB, VU,NR by MS at 9/7/2022 1546                   Point: Body mechanics (Resolved)     Learning Progress Summary           Patient Acceptance, E, VU by JF at 9/9/2022 1015    Acceptance, E,TB, VU,NR by MS at 9/7/2022 1546    Acceptance, E, VU,NR by JF at 9/6/2022 1507   Family Acceptance, E, VU by JF at 9/9/2022 1015                   Point: Precautions (Resolved)     Learning Progress Summary           Patient Acceptance, E,TB, VU,NR by MS at 9/7/2022 1546    Acceptance, E, VU,NR by  at 9/6/2022 1507                               User Key     Initials Effective Dates Name Provider Type Discipline    MS 06/16/21 -   Tressa Dangelo, PT Physical Therapist PT     07/28/22 -  Justus Alexander, PT Student PT Student PT              PT Recommendation and Plan  Planned Therapy Interventions (PT): balance training, bed mobility training, gait training, home exercise program, patient/family education, strengthening, transfer training  Plan of Care Reviewed With: patient, son  Progress: improving  Outcome Evaluation: Pt was able to walk further today with less unsteadiness, 180' SBA using rwx, reports some fatigue midway through but able to walk back to room without assist. Pt requested to brush his teeth and use bathroom while PT in room. Pt was able to complete both tasks SBA, though he needed some help getting his brief back up. STS x1 from EOB and x1 from commode, both SBA. Pt requested to walk more today, PT informed him that he can walk with nursing throughout the day. Pt will continue to benefit from PT to improve mobility and independence.     Time Calculation:    PT Charges     Row Name 09/09/22 1015             Time Calculation    Start Time 0924  -EE (r) JF (t) EE (c)      Stop Time 0954  -EE (r) JF (t) EE (c)      Time Calculation (min) 30 min  -EE (r) JF (t)      PT Received On 09/09/22  -EE (r) JF (t) EE (c)      PT - Next Appointment 09/11/22  -EE (r) JF (t) EE (c)              Time Calculation- PT    Total Timed Code Minutes- PT 30 minute(s)  -EE (r) JF (t) EE (c)              Timed Charges    77951 - Gait Training Minutes  15  -EE (r) JF (t) EE (c)      27061 - PT Therapeutic Activity Minutes 15  -EE (r) JF (t) EE (c)              Total Minutes    Timed Charges Total Minutes 30  -EE (r) JF (t)       Total Minutes 30  -EE (r) JF (t)            User Key  (r) = Recorded By, (t) = Taken By, (c) = Cosigned By    Initials Name Provider Type    Odalys Heath, PT Physical Therapist    Justus Cuellar, PT Student PT Student              Therapy Charges for Today     Code Description Service Date Service Provider  Modifiers Qty    35636393708 HC GAIT TRAINING EA 15 MIN 9/9/2022 Justus Alexander, PT Student GP 1    97772211083 HC PT THERAPEUTIC ACT EA 15 MIN 9/9/2022 Justus Alexander, PT Student GP 1          PT G-Codes  Outcome Measure Options: AM-PAC 6 Clicks Basic Mobility (PT)  AM-PAC 6 Clicks Score (PT): 18    Jsutus Alexander, PT Student  9/9/2022

## 2022-09-09 NOTE — PAYOR COMM NOTE
"Ailyn Elias (89 y.o. Male)     PLEASE SEE ATTACHED DC SUMMARY    REF#[id number]    THANK YOU    JONAH TAFOYA LPN CCP              Date of Birth   1930    Social Security Number       Address   [address] [place] [address]    Home Phone   [phone]    MRN   [id number]       [place]   [facility]    Marital Status                               Admission Date   22    Admission Type   Urgent    Admitting Provider   Norberto Dhaliwal MD    Attending Provider       Department, Room/Bed   [address], [address]/1       Discharge Date   2022    Discharge Disposition   Home or Self Care    Discharge Destination                               Attending Provider: (none)   Allergies: No Known Allergies    Isolation: None   Infection: None   Code Status: Prior   Advance Care Planning Activity    Ht: 170.2 cm (67\")   Wt: 75.8 kg (167 lb)    Admission Cmt: None   Principal Problem: Cellulitis of right leg [L03.115]                 Active Insurance as of 2022     Primary Coverage     Payor Plan Insurance Group Employer/Plan Group    AETNA MEDICARE REPLACEMENT AETNA MEDICARE REPLACEMENT [id number]     Payor Plan Address Payor Plan Phone Number Payor Plan Fax Number Effective Dates    [address] [phone]  2021 - None Entered    [facility] [id number]       Subscriber Name Subscriber Birth Date Member ID       AILYN ELIAS 1930 [id number]                 Emergency Contacts      (Rel.) Home Phone Work Phone Mobile Phone    EVELIA HIGH (Daughter) [phone] -- [phone]    Yasir Elias (Son) [phone] -- --               Discharge Summary      Amari Alva MD at 22 1322              Patient Name: Ailyn Elias  : 1930  MRN: [id number]    Date of Admission: 2022  Date of Discharge:  2022  Primary Care Physician: Conor Prince MD      Chief Complaint:   No chief complaint on file.      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        Hospital Course     Mr. Elias is a 89 y.o. male with a history of chronic systolic and diastolic heart failure, A. fib, hypothyroidism presenting with right upper extremity cellulitis complicated by atrial fibrillation with RVR.  Patient started on amnio drip and transition to amnio with good rate control.  Right lower extremity cellulitis improved with antibiotics.  Patient to finish 7-day course of antibiotics.  Patient discharged on Keflex.  Patient safe to discharge.    Afib/RVR  Chronic diastolic and systolic heart failure  - complicated by low BP  -Patient transition to oral amiodarone  - echocardiogram noted with decreased LVEF 30%-treat medically per cardiology  - on AC with eliquis  -Continue home Coreg and statin.  Consider ACEt if blood pressure remained stable as outpatient  - appreciate cardiology recs     RLE Cellulitis with Sepsis present on Admission  - leukocytosis and tachycardia present on admission  - overall looking much better  -Discharged on Keflex for total of 7 days antibiotic treatment     Hypothyroidism  - continue levothyroxine     Chronic Diastolic CHF  -  monitor volume status     Constipation  - resovled  - continue bowel regimen.    At the time of discharge patient was told to take all medications as prescribed, keep all follow-up appointments, and call their doctor or return to the hospital with any worsening or concerning symptoms.    Day of Discharge     Subjective:  No acute events.  Patient sitting on side the bed about to work with PT.  Right lower extremity swelling, erythema, tenderness continues to improve.  Patient ate well this morning.  Son at bedside.    Review of Systems   Respiratory: Negative for cough and shortness of breath.    Cardiovascular: Negative for chest pain and palpitations.   Gastrointestinal: Negative for abdominal pain, diarrhea, nausea and vomiting.   Genitourinary: Negative for dysuria.   Neurological: Negative for headaches.       Physical Exam:  Temp:  [97.6 °F (36.4 °C)-98.5 °F (36.9 °C)] 97.6 °F (36.4 °C)  Heart Rate:  [74] 74  Resp:  [18] 18  BP: ()/(63-79) 120/74  Body mass index is 26.16 kg/m².  Physical Exam  Vitals and nursing note reviewed.   Constitutional:       General: He is not in acute distress.     Appearance: He is not toxic-appearing or diaphoretic.   HENT:      Head: Normocephalic.      Nose: Nose normal.      Mouth/Throat:      Mouth: Mucous membranes are moist.      Pharynx: Oropharynx is clear.   Eyes:      Conjunctiva/sclera: Conjunctivae normal.      Pupils: Pupils are equal, round, and reactive to light.   Cardiovascular:      Rate and Rhythm: Normal rate and regular rhythm.      Pulses: Normal pulses.   Pulmonary:      Effort: Pulmonary effort is normal.      Breath sounds: Normal breath sounds.   Abdominal:      General: Bowel sounds are normal.      Palpations: Abdomen is soft.      Tenderness: There is no abdominal tenderness.   Musculoskeletal:         General:   Swelling resolved.  Erythema improving.  No tenderness (right calf).      Cervical back: Normal range of motion and neck supple.    Skin:     General: Skin is warm and dry.      Capillary Refill: Capillary refill takes less than 2 seconds.   Neurological:      General: No focal deficit present.      Mental Status: He is alert.   Psychiatric:         Mood and Affect: Mood normal.         Behavior: Behavior normal.   Consultants     Consult Orders (all) (From admission, onward)     Start     Ordered    09/08/22 1109  Inpatient Access Center Consult  Once        Provider:  (Not yet assigned)    09/08/22 1109    09/04/22 2214  Inpatient Cardiology Consult  Once        Specialty:  Cardiology  Provider:  Medardo Vanegas MD    09/04/22 2213              Procedures     Imaging Results (All)     Procedure Component Value Units Date/Time    CT Head Without Contrast [[id number]] Collected: 09/05/22 0400     Updated: 09/05/22 0408    Narrative:      CT HEAD WITHOUT CONTRAST     HISTORY: Closed head injury. Patient is on blood thinners.     COMPARISON: 04/14/2022     TECHNIQUE: Axial CT imaging was obtained through the brain. No IV  contrast was administered.     FINDINGS:  No acute intracranial hemorrhage is seen. There is diffuse atrophy.  There is periventricular and deep white matter microangiopathic change.  There is no midline shift or mass effect. Paranasal sinuses are  essentially clear. There is some opacification of the right mastoid air  cells.       Impression:      No acute intracranial findings.     Radiation dose reduction techniques were utilized, including automated  exposure control and exposure modulation based on body size.     This report was finalized on 9/5/2022 4:05 AM by Dr. Jaida Neal M.D.             Pertinent Labs     Results from last 7 days   Lab Units 09/09/22  0350 09/08/22  0337 09/07/22  0352 09/06/22  0431   WBC 10*3/mm3 7.49 7.07 8.91 9.96   HEMOGLOBIN g/dL 11.5* 11.8* 11.6* 11.8*   PLATELETS 10*3/mm3 140 118* 105* 94*     Results from last 7 days   Lab Units 09/08/22  0337 09/07/22  0352  09/06/22  0431 09/05/22  0055   SODIUM mmol/L 132* 131* 134* 135*   POTASSIUM mmol/L 4.1 4.0 4.1 4.0   CHLORIDE mmol/L 100 100 103 102   CO2 mmol/L 23.0 20.1* 18.7* 21.7*   BUN mg/dL 18 22 25* 23   CREATININE mg/dL 1.22 1.26 1.21 1.06   GLUCOSE mg/dL 102* 116* 104* 119*   Estimated Creatinine Clearance: 41.4 mL/min (by C-G formula based on SCr of 1.22 mg/dL).  Results from last 7 days   Lab Units 09/05/22  0055   ALBUMIN g/dL 3.30*   BILIRUBIN mg/dL 1.0   ALK PHOS U/L 84   AST (SGOT) U/L 31   ALT (SGPT) U/L 21     Results from last 7 days   Lab Units 09/08/22  0337 09/07/22  0352 09/06/22  0431 09/05/22  0055 09/04/22  1022   CALCIUM mg/dL 8.5 8.4 8.5 8.6  --    ALBUMIN g/dL  --   --   --  3.30*  --    MAGNESIUM mg/dL  --   --   --   --  2.0       Results from last 7 days   Lab Units 09/07/22  0352 09/06/22  1750 09/05/22  1024   TROPONIN T ng/mL <0.010  --  <0.010   PROBNP pg/mL  --  3,274.0*  --      Results from last 7 days   Lab Units 09/04/22  2338   SODIUM UR mmol/L 90   CREATININE UR mg/dL 80.6   CHLORIDE UR mmol/L 92   OSMOLALITY UR mOsm/kg 769         Invalid input(s): LDLCALC  Results from last 7 days   Lab Units 09/05/22  0046   BLOODCX  No growth at 4 days  No growth at 4 days       Test Results Pending at Discharge     Pending Labs     Order Current Status    Blood Culture - Blood, Arm, Right Preliminary result    Blood Culture - Blood, Foot, Left Preliminary result          Discharge Details        Discharge Medications      New Medications      Instructions Start Date   amiodarone 200 MG tablet  Commonly known as: PACERONE   200 mg, Oral, Daily   Start Date: September 10, 2022     cephalexin 500 MG capsule  Commonly known as: KEFLEX   500 mg, Oral, 4 Times Daily         Continue These Medications      Instructions Start Date   acetaminophen 325 MG tablet  Commonly known as: TYLENOL   650 mg, Oral, Every 6 Hours PRN      apixaban 5 MG tablet tablet  Commonly known as: ELIQUIS   5 mg, Oral, Every 12  Hours Scheduled      bicalutamide 50 MG chemo tablet  Commonly known as: CASODEX   1 tablet, Oral, Daily      calcium carbonate 600 MG tablet  Commonly known as: OS-SOPHIA   600 mg, Oral, Daily      carvedilol 3.125 MG tablet  Commonly known as: COREG   3.125 mg, Oral, Nightly      latanoprost 0.005 % ophthalmic solution  Commonly known as: XALATAN   Ophthalmic      levothyroxine 88 MCG tablet  Commonly known as: SYNTHROID, LEVOTHROID   88 mcg, Oral, Daily      multivitamin with minerals tablet tablet   1 tablet, Oral, Daily      polyethylene glycol 17 g packet  Commonly known as: MIRALAX   17 g, Oral, Daily PRN      rosuvastatin 10 MG tablet  Commonly known as: CRESTOR   1 tablet, Oral, Daily      silver sulfadiazine 1 % cream  Commonly known as: SILVADENE, SSD   1 application, Topical, 3 Times Daily, Apply to facial regions of inflammation per rad onc recs      silver sulfadiazine 1 % cream  Commonly known as: SILVADENE, SSD   1 application, Topical, 2 Times Daily             No Known Allergies      Discharge Disposition:  Home or Self Care    Discharge Diet:  Diet Order   Procedures   • Diet Regular; Cardiac       Discharge Activity:   As tolerated    CODE STATUS:    Code Status and Medical Interventions:   Ordered at: 09/04/22 2212     Code Status (Patient has no pulse and is not breathing):    CPR (Attempt to Resuscitate)     Medical Interventions (Patient has pulse or is breathing):    Full Support     Release to patient:    Routine Release       No future appointments.   Follow-up Information     Conor Prince MD .    Specialty: Internal Medicine  Contact information:  [address] [facility] [address] [phone]             Medardo Vanegas MD Follow up.    Specialty: Cardiology  Contact information:  [address] [facility]  [address]  [facility] [id number] [phone]                         Time Spent on Discharge:  Greater than 30 minutes      Amari Alva MD  [facility]  09/09/22  13:23 EDT                Electronically signed by Amari Alva MD at 09/09/22 2449

## 2022-09-09 NOTE — PLAN OF CARE
Goal Outcome Evaluation:  Plan of Care Reviewed With: patient, son           Outcome Evaluation: Pt being discharged to [facility]. IV and heart monitor removed. Pt and son verbalize understanding.

## 2022-09-09 NOTE — PLAN OF CARE
Goal Outcome Evaluation:  Plan of Care Reviewed With: patient, son        Progress: improving  Outcome Evaluation: Pt was able to walk further today with less unsteadiness, 180' SBA using rwx, reports some fatigue midway through but able to walk back to room without assist. Pt requested to brush his teeth and use bathroom while PT in room. Pt was able to complete both tasks SBA, though he needed some help getting his brief back up. STS x1 from EOB and x1 from commode, both SBA. Pt requested to walk more today, PT informed him that he can walk with nursing throughout the day. Pt will continue to benefit from PT to improve mobility and independence.

## 2022-09-10 LAB
BACTERIA SPEC AEROBE CULT: NORMAL
BACTERIA SPEC AEROBE CULT: NORMAL

## 2022-09-10 NOTE — OUTREACH NOTE
Prep Survey    Flowsheet Row Responses   [facility] facility patient discharged from? [place]   Is LACE score < 7 ? No   Emergency Room discharge w/ pulse ox? No   Eligibility Not Eligible   What are the reasons patient is not eligible? [facility]  [[facility]]   Does the patient have one of the following disease processes/diagnoses(primary or secondary)? Sepsis   Prep survey completed? Yes          NISSA WRIGHT - Registered Nurse

## 2023-03-30 ENCOUNTER — HOSPITAL ENCOUNTER (INPATIENT)
Facility: HOSPITAL | Age: 88
LOS: 24 days | Discharge: NURSING, REHAB OR LONG-TERM CARE | DRG: 291 | End: 2023-04-24
Attending: EMERGENCY MEDICINE | Admitting: STUDENT IN AN ORGANIZED HEALTH CARE EDUCATION/TRAINING PROGRAM
Payer: MEDICARE

## 2023-03-30 ENCOUNTER — APPOINTMENT (OUTPATIENT)
Dept: GENERAL RADIOLOGY | Facility: HOSPITAL | Age: 88
DRG: 291 | End: 2023-03-30
Payer: MEDICARE

## 2023-03-30 DIAGNOSIS — J18.9 COMMUNITY ACQUIRED PNEUMONIA, UNSPECIFIED LATERALITY: Primary | ICD-10-CM

## 2023-03-30 LAB
ALBUMIN SERPL-MCNC: 3.6 G/DL (ref 3.5–5.2)
ALBUMIN/GLOB SERPL: 0.9 G/DL
ALP SERPL-CCNC: 214 U/L (ref 39–117)
ALT SERPL W P-5'-P-CCNC: 30 U/L (ref 1–41)
ANION GAP SERPL CALCULATED.3IONS-SCNC: 9 MMOL/L (ref 5–15)
AST SERPL-CCNC: 54 U/L (ref 1–40)
B PARAPERT DNA SPEC QL NAA+PROBE: NOT DETECTED
B PERT DNA SPEC QL NAA+PROBE: NOT DETECTED
BASOPHILS # BLD AUTO: 0.08 10*3/MM3 (ref 0–0.2)
BASOPHILS NFR BLD AUTO: 0.8 % (ref 0–1.5)
BILIRUB SERPL-MCNC: 0.8 MG/DL (ref 0–1.2)
BUN SERPL-MCNC: 27 MG/DL (ref 8–23)
BUN/CREAT SERPL: 22.1 (ref 7–25)
C PNEUM DNA NPH QL NAA+NON-PROBE: NOT DETECTED
CALCIUM SPEC-SCNC: 9 MG/DL (ref 8.2–9.6)
CHLORIDE SERPL-SCNC: 100 MMOL/L (ref 98–107)
CO2 SERPL-SCNC: 24 MMOL/L (ref 22–29)
CREAT SERPL-MCNC: 1.22 MG/DL (ref 0.76–1.27)
D-LACTATE SERPL-SCNC: 1.6 MMOL/L (ref 0.5–2)
DEPRECATED RDW RBC AUTO: 50.9 FL (ref 37–54)
EGFRCR SERPLBLD CKD-EPI 2021: 55.3 ML/MIN/1.73
EOSINOPHIL # BLD AUTO: 0.4 10*3/MM3 (ref 0–0.4)
EOSINOPHIL NFR BLD AUTO: 4 % (ref 0.3–6.2)
ERYTHROCYTE [DISTWIDTH] IN BLOOD BY AUTOMATED COUNT: 14.7 % (ref 12.3–15.4)
FLUAV SUBTYP SPEC NAA+PROBE: NOT DETECTED
FLUBV RNA ISLT QL NAA+PROBE: NOT DETECTED
GEN 5 2HR TROPONIN T REFLEX: 11 NG/L
GLOBULIN UR ELPH-MCNC: 3.9 GM/DL
GLUCOSE SERPL-MCNC: 98 MG/DL (ref 65–99)
HADV DNA SPEC NAA+PROBE: NOT DETECTED
HCOV 229E RNA SPEC QL NAA+PROBE: NOT DETECTED
HCOV HKU1 RNA SPEC QL NAA+PROBE: NOT DETECTED
HCOV NL63 RNA SPEC QL NAA+PROBE: NOT DETECTED
HCOV OC43 RNA SPEC QL NAA+PROBE: NOT DETECTED
HCT VFR BLD AUTO: 34.3 % (ref 37.5–51)
HGB BLD-MCNC: 12.2 G/DL (ref 13–17.7)
HMPV RNA NPH QL NAA+NON-PROBE: NOT DETECTED
HPIV1 RNA ISLT QL NAA+PROBE: NOT DETECTED
HPIV2 RNA SPEC QL NAA+PROBE: NOT DETECTED
HPIV3 RNA NPH QL NAA+PROBE: NOT DETECTED
HPIV4 P GENE NPH QL NAA+PROBE: NOT DETECTED
IMM GRANULOCYTES # BLD AUTO: 0.1 10*3/MM3 (ref 0–0.05)
IMM GRANULOCYTES NFR BLD AUTO: 1 % (ref 0–0.5)
LYMPHOCYTES # BLD AUTO: 1.15 10*3/MM3 (ref 0.7–3.1)
LYMPHOCYTES NFR BLD AUTO: 11.5 % (ref 19.6–45.3)
M PNEUMO IGG SER IA-ACNC: NOT DETECTED
MCH RBC QN AUTO: 34 PG (ref 26.6–33)
MCHC RBC AUTO-ENTMCNC: 35.6 G/DL (ref 31.5–35.7)
MCV RBC AUTO: 95.5 FL (ref 79–97)
MONOCYTES # BLD AUTO: 1.39 10*3/MM3 (ref 0.1–0.9)
MONOCYTES NFR BLD AUTO: 13.8 % (ref 5–12)
NEUTROPHILS NFR BLD AUTO: 6.92 10*3/MM3 (ref 1.7–7)
NEUTROPHILS NFR BLD AUTO: 68.9 % (ref 42.7–76)
NRBC BLD AUTO-RTO: 0.2 /100 WBC (ref 0–0.2)
NT-PROBNP SERPL-MCNC: 2683 PG/ML (ref 0–1800)
PLATELET # BLD AUTO: 165 10*3/MM3 (ref 140–450)
PMV BLD AUTO: 10 FL (ref 6–12)
POTASSIUM SERPL-SCNC: 4.3 MMOL/L (ref 3.5–5.2)
PROCALCITONIN SERPL-MCNC: 0.08 NG/ML (ref 0–0.25)
PROT SERPL-MCNC: 7.5 G/DL (ref 6–8.5)
QT INTERVAL: 475 MS
RBC # BLD AUTO: 3.59 10*6/MM3 (ref 4.14–5.8)
RHINOVIRUS RNA SPEC NAA+PROBE: NOT DETECTED
RSV RNA NPH QL NAA+NON-PROBE: NOT DETECTED
SARS-COV-2 RNA NPH QL NAA+NON-PROBE: NOT DETECTED
SODIUM SERPL-SCNC: 133 MMOL/L (ref 136–145)
TROPONIN T DELTA: -1 NG/L
TROPONIN T SERPL HS-MCNC: 12 NG/L
TROPONIN T SERPL HS-MCNC: 12 NG/L
WBC NRBC COR # BLD: 10.04 10*3/MM3 (ref 3.4–10.8)

## 2023-03-30 PROCEDURE — 25010000002 CEFTRIAXONE PER 250 MG: Performed by: EMERGENCY MEDICINE

## 2023-03-30 PROCEDURE — 93010 ELECTROCARDIOGRAM REPORT: CPT | Performed by: INTERNAL MEDICINE

## 2023-03-30 PROCEDURE — 36415 COLL VENOUS BLD VENIPUNCTURE: CPT

## 2023-03-30 PROCEDURE — 84145 PROCALCITONIN (PCT): CPT | Performed by: EMERGENCY MEDICINE

## 2023-03-30 PROCEDURE — 84484 ASSAY OF TROPONIN QUANT: CPT | Performed by: EMERGENCY MEDICINE

## 2023-03-30 PROCEDURE — 80053 COMPREHEN METABOLIC PANEL: CPT | Performed by: EMERGENCY MEDICINE

## 2023-03-30 PROCEDURE — 87040 BLOOD CULTURE FOR BACTERIA: CPT | Performed by: EMERGENCY MEDICINE

## 2023-03-30 PROCEDURE — 85025 COMPLETE CBC W/AUTO DIFF WBC: CPT | Performed by: EMERGENCY MEDICINE

## 2023-03-30 PROCEDURE — 93005 ELECTROCARDIOGRAM TRACING: CPT | Performed by: EMERGENCY MEDICINE

## 2023-03-30 PROCEDURE — 83880 ASSAY OF NATRIURETIC PEPTIDE: CPT | Performed by: EMERGENCY MEDICINE

## 2023-03-30 PROCEDURE — 25010000002 AZITHROMYCIN PER 500 MG: Performed by: EMERGENCY MEDICINE

## 2023-03-30 PROCEDURE — 83605 ASSAY OF LACTIC ACID: CPT | Performed by: EMERGENCY MEDICINE

## 2023-03-30 PROCEDURE — 99285 EMERGENCY DEPT VISIT HI MDM: CPT

## 2023-03-30 PROCEDURE — 71045 X-RAY EXAM CHEST 1 VIEW: CPT

## 2023-03-30 PROCEDURE — 0202U NFCT DS 22 TRGT SARS-COV-2: CPT | Performed by: EMERGENCY MEDICINE

## 2023-03-30 RX ORDER — SODIUM CHLORIDE 0.9 % (FLUSH) 0.9 %
10 SYRINGE (ML) INJECTION AS NEEDED
Status: DISCONTINUED | OUTPATIENT
Start: 2023-03-30 | End: 2023-04-24 | Stop reason: HOSPADM

## 2023-03-30 RX ADMIN — CEFTRIAXONE SODIUM 1 G: 1 INJECTION, POWDER, FOR SOLUTION INTRAMUSCULAR; INTRAVENOUS at 22:30

## 2023-03-30 RX ADMIN — AZITHROMYCIN MONOHYDRATE 500 MG: 500 INJECTION, POWDER, LYOPHILIZED, FOR SOLUTION INTRAVENOUS at 23:13

## 2023-03-30 NOTE — ED PROVIDER NOTES
EMERGENCY DEPARTMENT ENCOUNTER    Room Number:  N431/1  Date seen:  3/31/2023  PCP: Alma Cat MD      HPI:  Chief Complaint: Shortness of breath and fatigue  A complete HPI/ROS/PMH/PSH/SH/FH are unobtainable due to: None  Context: Bridger Elias is a 89 y.o. male who presents to the ED c/o shortness of breath and fatigue.  This has been ongoing for greater than 1 week terms of shortness of breath and generalized fatigue for several weeks.  He reports having associated cough and sneezing.  No chest pain.  Symptoms are worse with exertion.  He states that about 1 month ago he was able to walk half a mile total while walking laps at his facility.  Now he cannot even make it 10 feet.  He states that he has been on diuretics for the past 2 to 3 weeks and has had improvement in his leg swelling but daughter is concerned he may be over diuresed.          PAST MEDICAL HISTORY  Active Ambulatory Problems     Diagnosis Date Noted   • SCC (squamous cell carcinoma), face 02/02/2022   • General weakness 04/14/2022   • Pacemaker 04/14/2022   • Abnormal gait 04/23/2019   • Atrial fibrillation (HCC) 04/14/2022   • Chronic diastolic heart failure (HCC) 03/31/2022   • Coronary arteriosclerosis 07/03/2014   • Dyslipidemia 12/05/2013   • Glaucoma 04/14/2022   • Mitral valve disorder 01/25/2021   • Hypertension 12/05/2013   • Hypertensive kidney disease, stage III (HCC) 03/13/2017   • Hypothyroidism 03/19/2017   • Long term (current) use of anticoagulants 12/05/2013   • Malignant neoplasm of prostate (HCC) 12/05/2013   • Osteoporosis 04/14/2022   • Squamous cell carcinoma of skin of face 02/02/2022   • Acute on chronic diastolic CHF (congestive heart failure) (HCC) 04/14/2022   • Stage 3b chronic kidney disease (HCC) 04/14/2022   • Personal history of radiation therapy 04/14/2022   • Acute on chronic systolic (congestive) heart failure (HCC) 04/14/2022   • Cellulitis of right leg 09/04/2022   • Chronic acquired lymphedema  09/04/2022   • Contusion of face 09/04/2022   • Contusion of forearm, left 09/04/2022   • Fall 09/04/2022   • Chronic systolic heart failure (CMS/HCC) 09/09/2022     Resolved Ambulatory Problems     Diagnosis Date Noted   • No Resolved Ambulatory Problems     Past Medical History:   Diagnosis Date   • Prostate cancer (HCC)          PAST SURGICAL HISTORY  Past Surgical History:   Procedure Laterality Date   • PACEMAKER IMPLANTATION  2018         FAMILY HISTORY  History reviewed. No pertinent family history.      SOCIAL HISTORY  Social History     Socioeconomic History   • Marital status:    Tobacco Use   • Smoking status: Never   • Smokeless tobacco: Never   Substance and Sexual Activity   • Alcohol use: Never         ALLERGIES  Patient has no known allergies.        REVIEW OF SYSTEMS  Review of Systems     All systems reviewed and negative except for those discussed in HPI.       PHYSICAL EXAM  ED Triage Vitals   Temp Heart Rate Resp BP SpO2   03/30/23 1933 03/30/23 1933 03/30/23 1933 03/30/23 1939 03/30/23 1933   96.4 °F (35.8 °C) 74 16 124/74 91 %      Temp src Heart Rate Source Patient Position BP Location FiO2 (%)   03/30/23 1933 03/30/23 1933 -- 03/30/23 1939 --   Tympanic Monitor  Right arm        Physical Exam      GENERAL: no acute distress  HENT: nares patent  EYES: no scleral icterus  CV: regular rhythm, normal rate, 2+ edema to the ankles bilaterally  RESPIRATORY: normal effort, clear to auscultation bilaterally  ABDOMEN: soft, nontender  MUSCULOSKELETAL: no deformity  NEURO: alert, moves all extremities, follows commands  PSYCH:  calm, cooperative  SKIN: warm, dry    Vital signs and nursing notes reviewed.          LAB RESULTS  Recent Results (from the past 24 hour(s))   Respiratory Panel PCR w/COVID-19(SARS-CoV-2) [facility] In-House, NP Swab in UTM/VTM, 3-4 HR TAT - Swab, Nasopharynx    Collection Time: 03/30/23  8:04 PM    Specimen: Nasopharynx; Swab   Result Value Ref Range     ADENOVIRUS, PCR Not Detected Not Detected    Coronavirus 229E Not Detected Not Detected    Coronavirus HKU1 Not Detected Not Detected    Coronavirus NL63 Not Detected Not Detected    Coronavirus OC43 Not Detected Not Detected    COVID19 Not Detected Not Detected - Ref. Range    Human Metapneumovirus Not Detected Not Detected    Human Rhinovirus/Enterovirus Not Detected Not Detected    Influenza A PCR Not Detected Not Detected    Influenza B PCR Not Detected Not Detected    Parainfluenza Virus 1 Not Detected Not Detected    Parainfluenza Virus 2 Not Detected Not Detected    Parainfluenza Virus 3 Not Detected Not Detected    Parainfluenza Virus 4 Not Detected Not Detected    RSV, PCR Not Detected Not Detected    Bordetella pertussis pcr Not Detected Not Detected    Bordetella parapertussis PCR Not Detected Not Detected    Chlamydophila pneumoniae PCR Not Detected Not Detected    Mycoplasma pneumo by PCR Not Detected Not Detected   Comprehensive Metabolic Panel    Collection Time: 03/30/23  8:10 PM    Specimen: Arm, Right; Blood   Result Value Ref Range    Glucose 98 65 - 99 mg/dL    BUN 27 (H) 8 - 23 mg/dL    Creatinine 1.22 0.76 - 1.27 mg/dL    Sodium 133 (L) 136 - 145 mmol/L    Potassium 4.3 3.5 - 5.2 mmol/L    Chloride 100 98 - 107 mmol/L    CO2 24.0 22.0 - 29.0 mmol/L    Calcium 9.0 8.2 - 9.6 mg/dL    Total Protein 7.5 6.0 - 8.5 g/dL    Albumin 3.6 3.5 - 5.2 g/dL    ALT (SGPT) 30 1 - 41 U/L    AST (SGOT) 54 (H) 1 - 40 U/L    Alkaline Phosphatase 214 (H) 39 - 117 U/L    Total Bilirubin 0.8 0.0 - 1.2 mg/dL    Globulin 3.9 gm/dL    A/G Ratio 0.9 g/dL    BUN/Creatinine Ratio 22.1 7.0 - 25.0    Anion Gap 9.0 5.0 - 15.0 mmol/L    eGFR 55.3 (L) >60.0 mL/min/1.73   Single High Sensitivity Troponin T    Collection Time: 03/30/23  8:10 PM    Specimen: Arm, Right; Blood   Result Value Ref Range    HS Troponin T 12 <15 ng/L   High Sensitivity Troponin T    Collection Time: 03/30/23  8:10 PM    Specimen: Arm, Right; Blood    Result Value Ref Range    HS Troponin T 12 <15 ng/L   BNP    Collection Time: 03/30/23  8:10 PM    Specimen: Arm, Right; Blood   Result Value Ref Range    proBNP 2,683.0 (H) 0.0 - 1,800.0 pg/mL   CBC Auto Differential    Collection Time: 03/30/23  8:10 PM    Specimen: Arm, Right; Blood   Result Value Ref Range    WBC 10.04 3.40 - 10.80 10*3/mm3    RBC 3.59 (L) 4.14 - 5.80 10*6/mm3    Hemoglobin 12.2 (L) 13.0 - 17.7 g/dL    Hematocrit 34.3 (L) 37.5 - 51.0 %    MCV 95.5 79.0 - 97.0 fL    MCH 34.0 (H) 26.6 - 33.0 pg    MCHC 35.6 31.5 - 35.7 g/dL    RDW 14.7 12.3 - 15.4 %    RDW-SD 50.9 37.0 - 54.0 fl    MPV 10.0 6.0 - 12.0 fL    Platelets 165 140 - 450 10*3/mm3    Neutrophil % 68.9 42.7 - 76.0 %    Lymphocyte % 11.5 (L) 19.6 - 45.3 %    Monocyte % 13.8 (H) 5.0 - 12.0 %    Eosinophil % 4.0 0.3 - 6.2 %    Basophil % 0.8 0.0 - 1.5 %    Immature Grans % 1.0 (H) 0.0 - 0.5 %    Neutrophils, Absolute 6.92 1.70 - 7.00 10*3/mm3    Lymphocytes, Absolute 1.15 0.70 - 3.10 10*3/mm3    Monocytes, Absolute 1.39 (H) 0.10 - 0.90 10*3/mm3    Eosinophils, Absolute 0.40 0.00 - 0.40 10*3/mm3    Basophils, Absolute 0.08 0.00 - 0.20 10*3/mm3    Immature Grans, Absolute 0.10 (H) 0.00 - 0.05 10*3/mm3    nRBC 0.2 0.0 - 0.2 /100 WBC   Procalcitonin    Collection Time: 03/30/23  8:10 PM    Specimen: Arm, Right; Blood   Result Value Ref Range    Procalcitonin 0.08 0.00 - 0.25 ng/mL   ECG 12 Lead Dyspnea    Collection Time: 03/30/23  8:20 PM   Result Value Ref Range    QT Interval 475 ms   High Sensitivity Troponin T 2Hr    Collection Time: 03/30/23 10:07 PM    Specimen: Blood   Result Value Ref Range    HS Troponin T 11 <15 ng/L    Troponin T Delta -1 >=-4 - <+4 ng/L   Lactic Acid, Plasma    Collection Time: 03/30/23 10:07 PM    Specimen: Blood   Result Value Ref Range    Lactate 1.6 0.5 - 2.0 mmol/L       Ordered the above labs and reviewed the results.        RADIOLOGY  XR Chest 1 View    Result Date: 3/30/2023  PORTABLE CHEST X-RAY  HISTORY:  Shortness of breath.  TECHNIQUE: Portable chest x-ray correlated with chest x-ray 04/14/2022.  FINDINGS: Sternal wires with cardiac valve hardware and a cardiac pacing device are again observed. The cardiac silhouette is enlarged. Patchy infiltrate in the upper lobes is observed bilaterally, more dense on the left than the right. There is also some density in the left lung base and mildly at the periphery of the right lung base. There also appears to be small pleural effusions blunting the costophrenic angles.      Bilateral pneumonia.  This report was finalized on 3/30/2023 8:50 PM by Dr. Maurice Alejandro M.D.        Ordered the above noted radiological studies. Reviewed by me in PACS.          PROCEDURES  Procedures          MEDICATIONS GIVEN IN ER  Medications   sodium chloride 0.9 % flush 10 mL (has no administration in time range)   sodium chloride 0.9 % flush 10 mL (has no administration in time range)   nitroglycerin (NITROSTAT) SL tablet 0.4 mg (has no administration in time range)   acetaminophen (TYLENOL) tablet 650 mg (has no administration in time range)   ondansetron (ZOFRAN) tablet 4 mg (has no administration in time range)     Or   ondansetron (ZOFRAN) injection 4 mg (has no administration in time range)   aluminum-magnesium hydroxide-simethicone (MAALOX MAX) 400-400-40 MG/5ML suspension 15 mL (has no administration in time range)   cefTRIAXone (ROCEPHIN) 1 g in sodium chloride 0.9 % 100 mL IVPB-VTB (0 g Intravenous Stopped 3/30/23 2300)   azithromycin (ZITHROMAX) 500 mg in sodium chloride 0.9 % 250 mL IVPB-VTB (500 mg Intravenous New Bag 3/30/23 2313)         MEDICAL DECISION MAKING, PROGRESS, and CONSULTS    Discussion below represents my analysis of pertinent findings related to patient's condition, differential diagnosis, treatment plan and final disposition.      Orders placed during this visit:  Orders Placed This Encounter   Procedures   • Respiratory Panel PCR w/COVID-19(SARS-CoV-2)  [facility] In-[facility], NP Swab in UTM/VTM, 3-4 HR TAT - Swab, Nasopharynx   • Blood Culture - Blood,   • Blood Culture - Blood,   • S. Pneumo Ag Urine or CSF - Urine, Urine, Clean Catch   • Legionella Antigen, Urine - Urine, Urine, Clean Catch   • MRSA Screen, PCR (Inpatient) - Swab, Nares   • XR Chest 1 View   • CT Chest Without Contrast Diagnostic   • Comprehensive Metabolic Panel   • Single High Sensitivity Troponin T   • High Sensitivity Troponin T   • BNP   • CBC Auto Differential   • High Sensitivity Troponin T 2Hr   • Lactic Acid, Plasma   • Procalcitonin   • Potassium   • Magnesium   • High Sensitivity Troponin T   • Blood Gas, Arterial -   • Comprehensive Metabolic Panel   • CBC (No Diff)   • Diet: Cardiac Diets, Diabetic Diets; Healthy Heart (2-3 Na+); Consistent Carbohydrate; Texture: Regular Texture (IDDSI 7); Fluid Consistency: Thin (IDDSI 0)   • Monitor Blood Pressure   • Pulse Oximetry, Continuous   • Vital Signs   • Oral Care   • Place Sequential Compression Device   • Maintain Sequential Compression Device   • Telemetry - Maintain IV Access   • Continuous Cardiac Monitoring   • Telemetry - Pulse Oximetry   • May Be Off Telemetry for Tests   • Up With Assistance   • Daily Weights   • Strict Intake & Output   • Please place a check by the medications patient is currently taking.  Nursing Communication   • Code Status and Medical Interventions:   • L[facility] (on-call MD unless specified) Details   • Oxygen Therapy- Nasal Cannula; Titrate for SPO2: 90% - 95%   • Incentive Spirometry   • ECG 12 Lead Dyspnea   • ECG 12 Lead Chest Pain   • Insert Peripheral IV   • Inpatient Admission   • CBC & Differential         Additional sources:  - Discussed/obtained information from independent historians: Daughter and son-in-law  Additional information was obtained to confirm the patient's history.    - External (non-ED) record review: On medical chart review, patient was discharged from the hospital  9/9/2022.  I reviewed the discharge summary by Dr. Alva.  He has a history of chronic systolic and diastolic heart failure, atrial fibrillation and hypothyroidism.  He was admitted to the hospital with upper extremity cellulitis and atrial fibrillation with RVR.  Treated with amnio drip and transition to oral amiodarone.  Discharged home with Keflex.        Patient's most recent echocardiogram was on 9/6/2022.  Ejection fraction 31 to 35%.  Diastolic function is indeterminate.        Differential diagnosis:    Differential diagnosis includes but not limited to CHF, acute coronary syndrome, pulmonary embolism, pneumothorax, pneumonia, asthma/COPD, pulmonary hypertension, deconditioning, anemia, other hematologic etiologies such as CO poisoning, anxiety.         Independent interpretation of labs, radiology studies, and discussions with consultants:  ED Course as of 03/31/23 0344   u Mar 30, 2023   1943 Patient has a history of atrial fibrillation on Eliquis.  Patient has a history of systolic and diastolic heart failure, stage III chronic kidney disease [TD]   2024 EKG independently interpreted by myself.  Time 8:20 PM.  ventricularly paced rhythm.  Heart rate 75.  Negative Sgarbossa criteria.  PVC x1. [TD]   2131 proBNP(!): 2,683.0 [TD]   2131 Chest x-ray dependently interpreted by myself.  Bilateral pneumonia. [TD]   Fri Mar 31, 2023   0049 I discussed the case with JESUS Sher for hospitalist service.  We reviewed patient's labs, strict imaging.  She will admit for Dr. Talamantes. [TD]      ED Course User Index  [TD] Preet Singh II, MD               PPE: The patient wore a mask throughout the entire encounter. I wore a well-fitting mask.    DIAGNOSIS  Final diagnoses:   Community acquired pneumonia, unspecified laterality         DISPOSITION  Admit      Latest Documented Vital Signs:  As of 03:44 EDT  BP- 131/70 HR- 74 Temp- 97.7 °F (36.5 °C) (Oral) O2 sat- 93%      --    Please note that portions  of this were completed with a voice recognition program.       Note Disclaimer: At [facility], we believe that sharing information builds trust and better relationships. You are receiving this note because you are receiving care at [facility] or recently visited. It is possible you will see health information before a provider has talked with you about it. This kind of information can be easy to misunderstand. To help you fully understand what it means for your health, we urge you to discuss this note with your provider.       Preet Singh II, MD  03/31/23 3919

## 2023-03-30 NOTE — ED TRIAGE NOTES
Pt was brought in by family for soa and generalized weakness that has been progressive over the last couple weeks. Pt was seen at [place] for fall couple weeks ago and it was noted that he had fluid on his chest xray. Pt was placed on lasix. Reports soa with exertion    This RN wore mask and goggles during time of contact

## 2023-03-31 ENCOUNTER — APPOINTMENT (OUTPATIENT)
Dept: CT IMAGING | Facility: HOSPITAL | Age: 88
DRG: 291 | End: 2023-03-31
Payer: MEDICARE

## 2023-03-31 PROBLEM — I50.43 ACUTE ON CHRONIC COMBINED SYSTOLIC AND DIASTOLIC CHF (CONGESTIVE HEART FAILURE): Status: ACTIVE | Noted: 2022-04-14

## 2023-03-31 PROBLEM — J18.9 COMMUNITY ACQUIRED PNEUMONIA, UNSPECIFIED LATERALITY: Status: ACTIVE | Noted: 2023-03-31

## 2023-03-31 LAB
ALBUMIN SERPL-MCNC: 3.2 G/DL (ref 3.5–5.2)
ALBUMIN/GLOB SERPL: 1.1 G/DL
ALP SERPL-CCNC: 197 U/L (ref 39–117)
ALT SERPL W P-5'-P-CCNC: 27 U/L (ref 1–41)
ANION GAP SERPL CALCULATED.3IONS-SCNC: 9.4 MMOL/L (ref 5–15)
ARTERIAL PATENCY WRIST A: POSITIVE
AST SERPL-CCNC: 49 U/L (ref 1–40)
ATMOSPHERIC PRESS: 747.3 MMHG
BASE EXCESS BLDA CALC-SCNC: -1.8 MMOL/L (ref 0–2)
BDY SITE: ABNORMAL
BILIRUB SERPL-MCNC: 0.8 MG/DL (ref 0–1.2)
BUN SERPL-MCNC: 24 MG/DL (ref 8–23)
BUN/CREAT SERPL: 20.7 (ref 7–25)
CALCIUM SPEC-SCNC: 8.4 MG/DL (ref 8.2–9.6)
CHLORIDE SERPL-SCNC: 102 MMOL/L (ref 98–107)
CO2 SERPL-SCNC: 20.6 MMOL/L (ref 22–29)
CREAT SERPL-MCNC: 1.16 MG/DL (ref 0.76–1.27)
DEPRECATED RDW RBC AUTO: 49.9 FL (ref 37–54)
EGFRCR SERPLBLD CKD-EPI 2021: 58.7 ML/MIN/1.73
ERYTHROCYTE [DISTWIDTH] IN BLOOD BY AUTOMATED COUNT: 14.5 % (ref 12.3–15.4)
GAS FLOW AIRWAY: 5 LPM
GLOBULIN UR ELPH-MCNC: 3 GM/DL
GLUCOSE SERPL-MCNC: 104 MG/DL (ref 65–99)
HCO3 BLDA-SCNC: 20.4 MMOL/L (ref 22–28)
HCT VFR BLD AUTO: 29.8 % (ref 37.5–51)
HGB BLD-MCNC: 10.2 G/DL (ref 13–17.7)
L PNEUMO1 AG UR QL IA: NEGATIVE
MCH RBC QN AUTO: 32.5 PG (ref 26.6–33)
MCHC RBC AUTO-ENTMCNC: 34.2 G/DL (ref 31.5–35.7)
MCV RBC AUTO: 94.9 FL (ref 79–97)
MODALITY: ABNORMAL
PCO2 BLDA: 27 MM HG (ref 35–45)
PH BLDA: 7.49 PH UNITS (ref 7.35–7.45)
PLATELET # BLD AUTO: 156 10*3/MM3 (ref 140–450)
PMV BLD AUTO: 10.2 FL (ref 6–12)
PO2 BLDA: 68.1 MM HG (ref 80–100)
POTASSIUM SERPL-SCNC: 4.2 MMOL/L (ref 3.5–5.2)
PROT SERPL-MCNC: 6.2 G/DL (ref 6–8.5)
RBC # BLD AUTO: 3.14 10*6/MM3 (ref 4.14–5.8)
S PNEUM AG SPEC QL LA: NEGATIVE
SAO2 % BLDCOA: 95.1 % (ref 92–99)
SODIUM SERPL-SCNC: 132 MMOL/L (ref 136–145)
TOTAL RATE: 20 BREATHS/MINUTE
TSH SERPL DL<=0.05 MIU/L-ACNC: 2.54 UIU/ML (ref 0.27–4.2)
WBC NRBC COR # BLD: 9.38 10*3/MM3 (ref 3.4–10.8)

## 2023-03-31 PROCEDURE — 87449 NOS EACH ORGANISM AG IA: CPT | Performed by: NURSE PRACTITIONER

## 2023-03-31 PROCEDURE — 97116 GAIT TRAINING THERAPY: CPT

## 2023-03-31 PROCEDURE — 85027 COMPLETE CBC AUTOMATED: CPT | Performed by: NURSE PRACTITIONER

## 2023-03-31 PROCEDURE — 84443 ASSAY THYROID STIM HORMONE: CPT | Performed by: STUDENT IN AN ORGANIZED HEALTH CARE EDUCATION/TRAINING PROGRAM

## 2023-03-31 PROCEDURE — 97162 PT EVAL MOD COMPLEX 30 MIN: CPT

## 2023-03-31 PROCEDURE — 36600 WITHDRAWAL OF ARTERIAL BLOOD: CPT

## 2023-03-31 PROCEDURE — 87899 AGENT NOS ASSAY W/OPTIC: CPT | Performed by: STUDENT IN AN ORGANIZED HEALTH CARE EDUCATION/TRAINING PROGRAM

## 2023-03-31 PROCEDURE — 82803 BLOOD GASES ANY COMBINATION: CPT

## 2023-03-31 PROCEDURE — 25010000002 FUROSEMIDE PER 20 MG: Performed by: INTERNAL MEDICINE

## 2023-03-31 PROCEDURE — 80053 COMPREHEN METABOLIC PANEL: CPT | Performed by: NURSE PRACTITIONER

## 2023-03-31 PROCEDURE — 99222 1ST HOSP IP/OBS MODERATE 55: CPT | Performed by: INTERNAL MEDICINE

## 2023-03-31 PROCEDURE — 97530 THERAPEUTIC ACTIVITIES: CPT

## 2023-03-31 PROCEDURE — 97166 OT EVAL MOD COMPLEX 45 MIN: CPT

## 2023-03-31 PROCEDURE — 71250 CT THORAX DX C-: CPT

## 2023-03-31 PROCEDURE — 25010000002 FUROSEMIDE PER 20 MG: Performed by: STUDENT IN AN ORGANIZED HEALTH CARE EDUCATION/TRAINING PROGRAM

## 2023-03-31 RX ORDER — MAGNESIUM SULFATE HEPTAHYDRATE 40 MG/ML
4 INJECTION, SOLUTION INTRAVENOUS AS NEEDED
Status: DISCONTINUED | OUTPATIENT
Start: 2023-03-31 | End: 2023-04-11

## 2023-03-31 RX ORDER — POTASSIUM CHLORIDE 1.5 G/1.77G
40 POWDER, FOR SOLUTION ORAL AS NEEDED
Status: DISCONTINUED | OUTPATIENT
Start: 2023-03-31 | End: 2023-04-24 | Stop reason: HOSPADM

## 2023-03-31 RX ORDER — POLYETHYLENE GLYCOL 3350 17 G/17G
17 POWDER, FOR SOLUTION ORAL DAILY PRN
Status: DISCONTINUED | OUTPATIENT
Start: 2023-03-31 | End: 2023-04-24 | Stop reason: HOSPADM

## 2023-03-31 RX ORDER — ACETAMINOPHEN 325 MG/1
650 TABLET ORAL EVERY 4 HOURS PRN
Status: DISCONTINUED | OUTPATIENT
Start: 2023-03-31 | End: 2023-04-24 | Stop reason: HOSPADM

## 2023-03-31 RX ORDER — AMIODARONE HYDROCHLORIDE 200 MG/1
100 TABLET ORAL EVERY OTHER DAY
Status: DISCONTINUED | OUTPATIENT
Start: 2023-03-31 | End: 2023-04-05

## 2023-03-31 RX ORDER — ALUMINA, MAGNESIA, AND SIMETHICONE 2400; 2400; 240 MG/30ML; MG/30ML; MG/30ML
15 SUSPENSION ORAL EVERY 6 HOURS PRN
Status: DISCONTINUED | OUTPATIENT
Start: 2023-03-31 | End: 2023-04-24 | Stop reason: HOSPADM

## 2023-03-31 RX ORDER — LATANOPROST 50 UG/ML
1 SOLUTION/ DROPS OPHTHALMIC DAILY
Status: DISCONTINUED | OUTPATIENT
Start: 2023-03-31 | End: 2023-04-24 | Stop reason: HOSPADM

## 2023-03-31 RX ORDER — LEVOTHYROXINE SODIUM 88 UG/1
88 TABLET ORAL DAILY
Status: DISCONTINUED | OUTPATIENT
Start: 2023-03-31 | End: 2023-04-24 | Stop reason: HOSPADM

## 2023-03-31 RX ORDER — POTASSIUM CHLORIDE 7.45 MG/ML
10 INJECTION INTRAVENOUS
Status: DISCONTINUED | OUTPATIENT
Start: 2023-03-31 | End: 2023-04-24 | Stop reason: HOSPADM

## 2023-03-31 RX ORDER — FUROSEMIDE 10 MG/ML
40 INJECTION INTRAMUSCULAR; INTRAVENOUS ONCE
Status: DISCONTINUED | OUTPATIENT
Start: 2023-03-31 | End: 2023-03-31

## 2023-03-31 RX ORDER — FUROSEMIDE 10 MG/ML
40 INJECTION INTRAMUSCULAR; INTRAVENOUS ONCE
Status: COMPLETED | OUTPATIENT
Start: 2023-03-31 | End: 2023-03-31

## 2023-03-31 RX ORDER — SODIUM CHLORIDE 0.9 % (FLUSH) 0.9 %
10 SYRINGE (ML) INJECTION AS NEEDED
Status: DISCONTINUED | OUTPATIENT
Start: 2023-03-31 | End: 2023-04-24 | Stop reason: HOSPADM

## 2023-03-31 RX ORDER — MAGNESIUM SULFATE HEPTAHYDRATE 40 MG/ML
2 INJECTION, SOLUTION INTRAVENOUS AS NEEDED
Status: DISCONTINUED | OUTPATIENT
Start: 2023-03-31 | End: 2023-04-11

## 2023-03-31 RX ORDER — ROSUVASTATIN CALCIUM 10 MG/1
10 TABLET, COATED ORAL DAILY
Status: DISCONTINUED | OUTPATIENT
Start: 2023-03-31 | End: 2023-04-08

## 2023-03-31 RX ORDER — ONDANSETRON 2 MG/ML
4 INJECTION INTRAMUSCULAR; INTRAVENOUS EVERY 6 HOURS PRN
Status: DISCONTINUED | OUTPATIENT
Start: 2023-03-31 | End: 2023-04-24 | Stop reason: HOSPADM

## 2023-03-31 RX ORDER — ONDANSETRON 4 MG/1
4 TABLET, FILM COATED ORAL EVERY 6 HOURS PRN
Status: DISCONTINUED | OUTPATIENT
Start: 2023-03-31 | End: 2023-04-24 | Stop reason: HOSPADM

## 2023-03-31 RX ORDER — BICALUTAMIDE 50 MG/1
50 TABLET, FILM COATED ORAL DAILY
Status: DISCONTINUED | OUTPATIENT
Start: 2023-03-31 | End: 2023-04-05

## 2023-03-31 RX ORDER — NITROGLYCERIN 0.4 MG/1
0.4 TABLET SUBLINGUAL
Status: DISCONTINUED | OUTPATIENT
Start: 2023-03-31 | End: 2023-04-24 | Stop reason: HOSPADM

## 2023-03-31 RX ORDER — MULTIPLE VITAMINS W/ MINERALS TAB 9MG-400MCG
1 TAB ORAL DAILY
Status: DISCONTINUED | OUTPATIENT
Start: 2023-03-31 | End: 2023-04-24 | Stop reason: HOSPADM

## 2023-03-31 RX ORDER — POTASSIUM CHLORIDE 750 MG/1
40 TABLET, FILM COATED, EXTENDED RELEASE ORAL AS NEEDED
Status: DISCONTINUED | OUTPATIENT
Start: 2023-03-31 | End: 2023-04-24 | Stop reason: HOSPADM

## 2023-03-31 RX ORDER — FUROSEMIDE 10 MG/ML
20 INJECTION INTRAMUSCULAR; INTRAVENOUS ONCE
Status: COMPLETED | OUTPATIENT
Start: 2023-03-31 | End: 2023-03-31

## 2023-03-31 RX ADMIN — AMIODARONE HYDROCHLORIDE 100 MG: 200 TABLET ORAL at 17:34

## 2023-03-31 RX ADMIN — APIXABAN 5 MG: 5 TABLET, FILM COATED ORAL at 20:09

## 2023-03-31 RX ADMIN — BICALUTAMIDE 50 MG: 50 TABLET ORAL at 17:34

## 2023-03-31 RX ADMIN — FUROSEMIDE 20 MG: 10 INJECTION, SOLUTION INTRAMUSCULAR; INTRAVENOUS at 17:40

## 2023-03-31 RX ADMIN — FUROSEMIDE 40 MG: 10 INJECTION, SOLUTION INTRAMUSCULAR; INTRAVENOUS at 12:03

## 2023-03-31 RX ADMIN — LEVOTHYROXINE SODIUM 88 MCG: 0.09 TABLET ORAL at 17:34

## 2023-03-31 RX ADMIN — MULTIPLE VITAMINS W/ MINERALS TAB 1 TABLET: TAB at 17:34

## 2023-03-31 RX ADMIN — LATANOPROST 1 DROP: 50 SOLUTION OPHTHALMIC at 17:46

## 2023-03-31 RX ADMIN — ROSUVASTATIN CALCIUM 10 MG: 10 TABLET, FILM COATED ORAL at 17:33

## 2023-03-31 NOTE — PLAN OF CARE
Goal Outcome Evaluation:  Diuretic in Use: receiving IV lasix  Response to Diuretics (Output greater than intake): large output  Daily Weight (up or down): wt down  O2 Requirements: 4 L to keep pt > 90%  Functional Status (Activity level, tolerance and respiratory symptoms): up to use urinal-sitting on side of bed-working with PT  Discharge Plans:  hoping to go back to assisted living at discharge  Early this am pt placed on room air but sats dropped to 86% so pt placed back to 4L

## 2023-03-31 NOTE — H&P
Patient Name:  Bridger Elias  YOB: 1930  MRN:  [id number]  Admit Date:  3/30/2023  Patient Care Team:  Alma Cat MD as PCP - General (Family Medicine)  Maurice Cook MD as Consulting Physician (Radiation Oncology)      Subjective   History Present Illness     Chief Complaint   Patient presents with   • Shortness of Breath   • Weakness - Generalized     History of Present Illness   Mr. Elias is a 89 y.o. non-smoker with a history of CKD stage IIIb, squamous cell carcinoma, atrial fibrillation on Eliquis, pacemaker placement, chronic diastolic CHF, CAD, HLD, HTN, and hypothyroidism that presents to [facility] complaining of shortness of breath and generalized weakness.  HPI has been obtained by daughter at bedside.  She reports that patient has had a decline since his fall 3 weeks ago.  He has been very weak and fatigue.  He currently resides at assisted living facility.  She states that he typically can ride a stationary bike and walk long distances but lately has not been able to move much.  She reports that his diuretic was increased by his cardiologist a couple of weeks ago.  This week they have cut back on his diuretics to every other day.  He endorses an occasional cough and denies chest pain.  Chest x-ray obtained in the ED shows bilateral pneumonia.  Labs obtained show Pro-Peter 0.08, lactate 1.6, BNP 2683, and a white count of 10.    Review of Systems   Constitutional: Positive for fatigue. Negative for chills and fever.   HENT: Negative for congestion and rhinorrhea.    Eyes: Negative for photophobia and visual disturbance.   Respiratory: Positive for shortness of breath. Negative for cough.    Cardiovascular: Negative for chest pain and palpitations.   Gastrointestinal: Negative for constipation, diarrhea, nausea and vomiting.   Endocrine: Negative for cold intolerance and heat intolerance.   Genitourinary: Negative for difficulty urinating and dysuria.    Musculoskeletal: Negative for gait problem and joint swelling.   Skin: Negative for rash and wound.   Neurological: Positive for weakness. Negative for dizziness, light-headedness and headaches.   Psychiatric/Behavioral: Negative for sleep disturbance and suicidal ideas.        Personal History     Past Medical History:   Diagnosis Date   • Acute on chronic diastolic CHF (congestive heart failure) (HCC) 4/14/2022   • Atrial fibrillation (HCC) 4/14/2022   • Coronary arteriosclerosis 7/3/2014    Formatting of this note might be different from the original. [facility] 12/7/16  IMPRESSION:   1. Right heart disease, hypertensive cardiac disease.   2. Status post mitral valve repair.   3. Anatomy: No hemodynamically significant disease. about 30-40% lesion of    the right coronary artery. Normal. Left coronary artery system.   • Dyslipidemia 12/5/2013   • Glaucoma 4/14/2022   • Hypertension 12/5/2013   • Hypertensive kidney disease, stage III (HCC) 3/13/2017   • Hypothyroidism 3/19/2017   • Long term (current) use of anticoagulants 12/5/2013    Formatting of this note might be different from the original. Mitral valve replacement and atrial fibrillation Assume a range of 2.5-3.5.   • Malignant neoplasm of prostate (HCC) 12/5/2013    Formatting of this note might be different from the original. Description: He sees urology every 6 months and he does do Lupron injections   • Mitral valve disorder 1/25/2021   • Pacemaker 4/14/2022   • Personal history of radiation therapy 4/14/2022   • Prostate cancer (HCC)    • SCC (squamous cell carcinoma), face 2/2/2022   • Stage 3b chronic kidney disease (HCC) 4/14/2022     Past Surgical History:   Procedure Laterality Date   • PACEMAKER IMPLANTATION  2018     History reviewed. No pertinent family history.  Social History     Tobacco Use   • Smoking status: Never   • Smokeless tobacco: Never   Substance Use Topics   • Alcohol use: Never     No current facility-administered medications on file  prior to encounter.     Current Outpatient Medications on File Prior to Encounter   Medication Sig Dispense Refill   • acetaminophen (TYLENOL) 325 MG tablet Take 2 tablets by mouth Every 6 (Six) Hours As Needed for Mild Pain , Moderate Pain , Headache or Fever.     • amiodarone (PACERONE) 200 MG tablet Take 1 tablet by mouth Daily. 30 tablet 0   • apixaban (ELIQUIS) 5 MG tablet tablet Take 1 tablet by mouth Every 12 (Twelve) Hours. Indications: Atrial Fibrillation 60 tablet    • bicalutamide (CASODEX) 50 MG chemo tablet Take 1 tablet by mouth Daily.     • calcium carbonate (OS-SOPHIA) 600 MG tablet Take 600 mg by mouth Daily.     • carvedilol (COREG) 3.125 MG tablet Take 3.125 mg by mouth Every Night.     • latanoprost (XALATAN) 0.005 % ophthalmic solution Apply  to eye(s) as directed by provider.     • levothyroxine (SYNTHROID, LEVOTHROID) 88 MCG tablet Take 88 mcg by mouth Daily.     • multivitamin with minerals tablet tablet Take 1 tablet by mouth Daily.     • polyethylene glycol (MIRALAX) 17 g packet Take 17 g by mouth Daily As Needed (Use if senna-docusate is ineffective).     • rosuvastatin (CRESTOR) 10 MG tablet Take 1 tablet by mouth Daily.     • silver sulfadiazine (SILVADENE, SSD) 1 % cream Apply 1 application topically to the appropriate area as directed 3 (Three) Times a Day. Apply to facial regions of inflammation per rad onc recs     • silver sulfadiazine (SILVADENE, SSD) 1 % cream Apply 1 application topically to the appropriate area as directed 2 (Two) Times a Day. 400 g 2     No Known Allergies    Objective    Objective     Vital Signs  Temp:  [96.4 °F (35.8 °C)-97.7 °F (36.5 °C)] 97.7 °F (36.5 °C)  Heart Rate:  [74] 74  Resp:  [16-18] 18  BP: (103-149)/(70-81) 131/70  SpO2:  [91 %-94 %] 93 %  on  Flow (L/min):  [4] 4;   Device (Oxygen Therapy): nasal cannula  Body mass index is 25.69 kg/m².    Physical Exam  Vitals and nursing note reviewed.   Constitutional:       General: He is not in acute  distress.     Appearance: He is well-developed. He is not toxic-appearing.      Comments: Frail, chronically ill-appearing   HENT:      Head: Normocephalic and atraumatic.      Comments: Multiple skin lesions noted on face     Right Ear: Decreased hearing noted.      Left Ear: Decreased hearing noted.   Eyes:      General: No scleral icterus.        Right eye: No discharge.         Left eye: No discharge.      Conjunctiva/sclera: Conjunctivae normal.   Neck:      Vascular: No JVD.   Cardiovascular:      Rate and Rhythm: Normal rate. Rhythm irregularly irregular.      Heart sounds: Normal heart sounds. No murmur heard.    No friction rub. No gallop.      Comments: A paced  Pulmonary:      Effort: Pulmonary effort is normal. No respiratory distress.      Breath sounds: Normal breath sounds. No wheezing or rales.   Abdominal:      General: Bowel sounds are normal. There is no distension.      Palpations: Abdomen is soft.      Tenderness: There is no abdominal tenderness. There is no guarding.   Musculoskeletal:         General: No tenderness or deformity. Normal range of motion.      Cervical back: Normal range of motion and neck supple.      Right lower leg: Edema (  ) present.      Left lower leg: Edema present.   Skin:     General: Skin is warm and dry.      Capillary Refill: Capillary refill takes less than 2 seconds.   Neurological:      Mental Status: He is alert and oriented to person, place, and time.   Psychiatric:         Behavior: Behavior is agitated.     Results Review:  I reviewed the patient's new clinical results.  I reviewed the patient's new imaging results and agree with the interpretation.  I reviewed the patient's other test results and agree with the interpretation  I personally viewed and interpreted the patient's EKG/Telemetry data  Discussed with ED provider.    Lab Results (last 24 hours)     Procedure Component Value Units Date/Time    Respiratory Panel PCR w/COVID-19(SARS-CoV-2)  ALLY/FAITH/YONATAN/PAD/COR/MAD/HAYDEN In-House, NP Swab in UTM/VTM, 3-4 HR TAT - Swab, Nasopharynx [[id number]]  (Normal) Collected: 03/30/23 2004    Specimen: Swab from Nasopharynx Updated: 03/30/23 2336     ADENOVIRUS, PCR Not Detected     Coronavirus 229E Not Detected     Coronavirus HKU1 Not Detected     Coronavirus NL63 Not Detected     Coronavirus OC43 Not Detected     COVID19 Not Detected     Human Metapneumovirus Not Detected     Human Rhinovirus/Enterovirus Not Detected     Influenza A PCR Not Detected     Influenza B PCR Not Detected     Parainfluenza Virus 1 Not Detected     Parainfluenza Virus 2 Not Detected     Parainfluenza Virus 3 Not Detected     Parainfluenza Virus 4 Not Detected     RSV, PCR Not Detected     Bordetella pertussis pcr Not Detected     Bordetella parapertussis PCR Not Detected     Chlamydophila pneumoniae PCR Not Detected     Mycoplasma pneumo by PCR Not Detected    Narrative:      In the setting of a positive respiratory panel with a viral infection PLUS a negative procalcitonin without other underlying concern for bacterial infection, consider observing off antibiotics or discontinuation of antibiotics and continue supportive care. If the respiratory panel is positive for atypical bacterial infection (Bordetella pertussis, Chlamydophila pneumoniae, or Mycoplasma pneumoniae), consider antibiotic de-escalation to target atypical bacterial infection.    CBC & Differential [[id number]]  (Abnormal) Collected: 03/30/23 2010    Specimen: Blood from Arm, Right Updated: 03/30/23 2025    Narrative:      The following orders were created for panel order CBC & Differential.  Procedure                               Abnormality         Status                     ---------                               -----------         ------                     CBC Auto Differential[[id number]]        Abnormal            Final result                 Please view results for these tests on the individual orders.     Comprehensive Metabolic Panel [[id number]]  (Abnormal) Collected: 03/30/23 2010    Specimen: Blood from Arm, Right Updated: 03/30/23 2044     Glucose 98 mg/dL      BUN 27 mg/dL      Creatinine 1.22 mg/dL      Sodium 133 mmol/L      Potassium 4.3 mmol/L      Chloride 100 mmol/L      CO2 24.0 mmol/L      Calcium 9.0 mg/dL      Total Protein 7.5 g/dL      Albumin 3.6 g/dL      ALT (SGPT) 30 U/L      AST (SGOT) 54 U/L      Alkaline Phosphatase 214 U/L      Total Bilirubin 0.8 mg/dL      Globulin 3.9 gm/dL      A/G Ratio 0.9 g/dL      BUN/Creatinine Ratio 22.1     Anion Gap 9.0 mmol/L      eGFR 55.3 mL/min/1.73     Narrative:      GFR Normal >60  Chronic Kidney Disease <60  Kidney Failure <15    The GFR formula is only valid for adults with stable renal function between ages 18 and 70.    Single High Sensitivity Troponin T [[id number]]  (Normal) Collected: 03/30/23 2010    Specimen: Blood from Arm, Right Updated: 03/30/23 2044     HS Troponin T 12 ng/L     Narrative:      High Sensitive Troponin T Reference Range:  <10.0 ng/L- Negative Female for AMI  <15.0 ng/L- Negative Male for AMI  >=10 - Abnormal Female indicating possible myocardial injury.  >=15 - Abnormal Male indicating possible myocardial injury.   Clinicians would have to utilize clinical acumen, EKG, Troponin, and serial changes to determine if it is an Acute Myocardial Infarction or myocardial injury due to an underlying chronic condition.         High Sensitivity Troponin T [[id number]]  (Normal) Collected: 03/30/23 2010    Specimen: Blood from Arm, Right Updated: 03/30/23 2044     HS Troponin T 12 ng/L     Narrative:      High Sensitive Troponin T Reference Range:  <10.0 ng/L- Negative Female for AMI  <15.0 ng/L- Negative Male for AMI  >=10 - Abnormal Female indicating possible myocardial injury.  >=15 - Abnormal Male indicating possible myocardial injury.   Clinicians would have to utilize clinical acumen, EKG, Troponin, and serial changes to determine if it  is an Acute Myocardial Infarction or myocardial injury due to an underlying chronic condition.         BNP [[id number]]  (Abnormal) Collected: 03/30/23 2010    Specimen: Blood from Arm, Right Updated: 03/30/23 2043     proBNP 2,683.0 pg/mL     Narrative:      Among patients with dyspnea, NT-proBNP is highly sensitive for the detection of acute congestive heart failure. In addition NT-proBNP of <300 pg/ml effectively rules out acute congestive heart failure with 99% negative predictive value.    Results may be falsely decreased if patient taking Biotin.      CBC Auto Differential [[id number]]  (Abnormal) Collected: 03/30/23 2010    Specimen: Blood from Arm, Right Updated: 03/30/23 2025     WBC 10.04 10*3/mm3      RBC 3.59 10*6/mm3      Hemoglobin 12.2 g/dL      Hematocrit 34.3 %      MCV 95.5 fL      MCH 34.0 pg      MCHC 35.6 g/dL      RDW 14.7 %      RDW-SD 50.9 fl      MPV 10.0 fL      Platelets 165 10*3/mm3      Neutrophil % 68.9 %      Lymphocyte % 11.5 %      Monocyte % 13.8 %      Eosinophil % 4.0 %      Basophil % 0.8 %      Immature Grans % 1.0 %      Neutrophils, Absolute 6.92 10*3/mm3      Lymphocytes, Absolute 1.15 10*3/mm3      Monocytes, Absolute 1.39 10*3/mm3      Eosinophils, Absolute 0.40 10*3/mm3      Basophils, Absolute 0.08 10*3/mm3      Immature Grans, Absolute 0.10 10*3/mm3      nRBC 0.2 /100 WBC     Procalcitonin [[id number]]  (Normal) Collected: 03/30/23 2010    Specimen: Blood from Arm, Right Updated: 03/30/23 2259     Procalcitonin 0.08 ng/mL     Narrative:      As a Marker for Sepsis (Non-Neonates):    1. <0.5 ng/mL represents a low risk of severe sepsis and/or septic shock.  2. >2 ng/mL represents a high risk of severe sepsis and/or septic shock.    As a Marker for Lower Respiratory Tract Infections that require antibiotic therapy:    PCT on Admission    Antibiotic Therapy       6-12 Hrs later    >0.5                Strongly Recommended  >0.25 - <0.5        Recommended   0.1 - 0.25           "Discouraged              Remeasure/reassess PCT  <0.1                Strongly Discouraged     Remeasure/reassess PCT    As 28 day mortality risk marker: \"Change in Procalcitonin Result\" (>80% or <=80%) if Day 0 (or Day 1) and Day 4 values are available. Refer to http://www.[facility]-pct-calculator.com    Change in PCT <=80%  A decrease of PCT levels below or equal to 80% defines a positive change in PCT test result representing a higher risk for 28-day all-cause mortality of patients diagnosed with severe sepsis for septic shock.    Change in PCT >80%  A decrease of PCT levels of more than 80% defines a negative change in PCT result representing a lower risk for 28-day all-cause mortality of patients diagnosed with severe sepsis or septic shock.       Blood Culture - Blood, Arm, Left [[id number]] Collected: 03/30/23 2204    Specimen: Blood from Arm, Left Updated: 03/30/23 2216    High Sensitivity Troponin T 2Hr [[id number]]  (Normal) Collected: 03/30/23 2207    Specimen: Blood Updated: 03/30/23 2255     HS Troponin T 11 ng/L      Troponin T Delta -1 ng/L     Narrative:      High Sensitive Troponin T Reference Range:  <10.0 ng/L- Negative Female for AMI  <15.0 ng/L- Negative Male for AMI  >=10 - Abnormal Female indicating possible myocardial injury.  >=15 - Abnormal Male indicating possible myocardial injury.   Clinicians would have to utilize clinical acumen, EKG, Troponin, and serial changes to determine if it is an Acute Myocardial Infarction or myocardial injury due to an underlying chronic condition.         Lactic Acid, Plasma [[id number]]  (Normal) Collected: 03/30/23 2207    Specimen: Blood Updated: 03/30/23 2357     Lactate 1.6 mmol/L     Blood Culture - Blood, Arm, Left [[id number]] Collected: 03/30/23 2229    Specimen: Blood from Arm, Left Updated: 03/30/23 2240          Imaging Results (Last 24 Hours)     Procedure Component Value Units Date/Time    XR Chest 1 View [[id number]] Collected: 03/30/23 2045     " Updated: 03/30/23 2053    Narrative:      PORTABLE CHEST X-RAY     HISTORY: Shortness of breath.     TECHNIQUE: Portable chest x-ray correlated with chest x-ray 04/14/2022.     FINDINGS: Sternal wires with cardiac valve hardware and a cardiac pacing  device are again observed. The cardiac silhouette is enlarged. Patchy  infiltrate in the upper lobes is observed bilaterally, more dense on the  left than the right. There is also some density in the left lung base  and mildly at the periphery of the right lung base. There also appears  to be small pleural effusions blunting the costophrenic angles.       Impression:      Bilateral pneumonia.     This report was finalized on 3/30/2023 8:50 PM by Dr. Maurice Alejandro M.D.             Results for orders placed during the hospital encounter of 09/04/22    Adult Transthoracic Echo Complete W/ Cont if Necessary Per Protocol    Interpretation Summary  · Left ventricular ejection fraction appears to be 31 - 35%. Left ventricular systolic function is moderately decreased. Septal wall motion is abnormal, consistent with a post-operative state. Normal left ventricular cavity size noted. Left ventricular wall thickness is consistent with mild to moderate concentric hypertrophy. Left ventricular diastolic function was indeterminate.  · The right ventricular cavity is moderately dilated.  · Moderate tricuspid valve regurgitation is present. Estimated right ventricular systolic pressure from tricuspid regurgitation is mildly elevated (35-45 mmHg).      ECG 12 Lead Dyspnea   Final Result   HEART RATE= 75  bpm   RR Interval= 800  ms   MS Interval= 194  ms   P Horizontal Axis= 247  deg   P Front Axis= 199  deg   QRSD Interval= 176  ms   QT Interval= 475  ms   QRS Axis= 269  deg   T Wave Axis= 105  deg   - ABNORMAL ECG -   Atrial-sensed ventricular-paced complexes   No further analysis attempted due to paced rhythm   No change from previous tracing   Electronically Signed By: Jerzy  Anshul (HARSHAL) ([facility]) 30-Mar-2023 20:37:24   Date and Time of Study: 2023-03-30 20:20:54           Assessment/Plan     Active Hospital Problems    Diagnosis  POA   • **Community acquired pneumonia, unspecified laterality [J18.9]  Yes   • Stage 3b chronic kidney disease (HCC) [N18.32]  Yes   • Pacemaker [Z95.0]  Yes   • Atrial fibrillation (HCC) [I48.91]  Yes   • Chronic diastolic heart failure (HCC) [I50.32]  Yes   • SCC (squamous cell carcinoma), 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       Mr. Elias is a 89 y.o. non-smoker with a history of chronic diastolic CHF, CKD stage IIIb, squamous cell carcinoma, CAD, HLD, HTN, and hypothyroidism who presents with dyspnea.    Acute respiratory failure secondary to community-acquired pneumonia  -Chest x-ray shows bilateral pneumonia.  His Pro-Peter is unremarkable, no leukocytosis.  Lactate 1.6.  Has remained afebrile.  Daughter reports episodes of extreme fatigue and states that he does not have any energy which is atypical for him.  He does reside at an assisted living facility.  Consider repeating respiratory viral panel?  -Will check a CT of chest  -Given Rocephin and Zithromax in the ED, will hold for now until CT results  -Blood cultures pending  -Check urine for Legionella and S pneumoniae   -Check for MRSA swab of nares   -Supplemental O2 as needed    CKD stage IIIb  -Creat stable  -Avoid nephrotoxins  -Trend BMP    Permanent atrial fibrillation  -Currently paced on the monitor.  Rate controlled, continue antiarrhythmics  -Resume Eliquis    Chronic diastolic congestive heart failure  -He does have nonpitting mild bilateral lower extremity edema.  Chest x-ray does not mention any vascular congestion or pulmonary  edema  -As stated above, will check a CT of chest  -Daily weights  -Strict I's and O   -Resume home regimen    CAD  -Denies chest pain  -Continue statin therapy    Hypertension  -Stable, resume home regimen    Hypothyroidism  -Resume replacement as once more has been completed by nursing    Squamous cell carcinoma  -Followed by radiation oncology and dermatology    I discussed the patient's findings and my recommendations with patient and ED provider.    VTE Prophylaxis - Eliquis (home med).  Code Status - Full code.       JESUS Kathleen  [facility]  03/31/23  02:15 EDT

## 2023-03-31 NOTE — CONSULTS
[facility]  Consult Note                                                                              3/31/2023  Ramsey Talamantes MD    Patient Identification:  Bridger Elias:   89 y.o.  male  1/31/1930     Date of Admission:3/30/2023    CC:  Consult requested for management of valvular heart disease, congestive heart failure, cardiomyopathy    History of Present Illness:  Patient is a 89-year-old gentleman with history of atrial fibrillation, mild coronary artery disease, mitral valve repair, cardiomyopathy, sick sinus syndrome with pacer placement, hypertension, renal insufficiency, dyslipidemia, prostate cancer and hypothyroidism.  He has been followed by Dr. Vanegas.  He last saw Dr. Vanegas in September 2022 after he was admitted with near syncope and fatigue.  He ruled out for sepsis and was found to have atrial fibrillation with ventricular pacing.  He was started on IV amiodarone.  He was noted to have cellulitis treated with antibiotics.  He was continued on Eliquis.  Echo at that time showed an ejection fraction of 30 to 35% with indeterminate diastolic function, moderate left ventricular hypertrophy, dilated RV with moderate tricuspid valve regurgitation and mild pulmonary hypertension.  Mitral valve ring appeared to be stable without stenosis.    Patient is now admitted with worsening shortness of breath and states that Dr. Vanegas increase diuretics several weeks ago this week it was decreased and he now presents with worsening shortness of breath with chest x-ray findings also of pneumonia.  proBNP elevated to 2683.  Troponin is normal.  Sodium slightly low at 132.  He has been given Zithromax Rocephin and 1 dose of Lasix today.  Vital stable with borderline low blood pressure.  Home meds have not continued of so far including Eliquis and amiodarone.    Currently resting quietly with mild dyspnea.  No chest pain or palpitations.  He denies any exacerbating events no salt intake has been  slightly labile.    Past Medical History:  Past Medical History:   Diagnosis Date   • Acute on chronic diastolic CHF (congestive heart failure) ([facility]) 4/14/2022   • Atrial fibrillation ([facility]) 4/14/2022   • Coronary arteriosclerosis 7/3/2014    Formatting of this note might be different from the original. [facility] 12/7/16  IMPRESSION:   1. Right heart disease, hypertensive cardiac disease.   2. Status post mitral valve repair.   3. Anatomy: No hemodynamically significant disease. about 30-40% lesion of    the right coronary artery. Normal. Left coronary artery system.   • Dyslipidemia 12/5/2013   • Glaucoma 4/14/2022   • Hypertension 12/5/2013   • Hypertensive kidney disease, stage III ([facility]) 3/13/2017   • Hypothyroidism 3/19/2017   • Long term (current) use of anticoagulants 12/5/2013    Formatting of this note might be different from the original. Mitral valve replacement and atrial fibrillation Assume a range of 2.5-3.5.   • Malignant neoplasm of prostate ([facility]) 12/5/2013    Formatting of this note might be different from the original. 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   Unknown   • amiodarone (PACERONE) 200 MG tablet Take 1 tablet by mouth Daily. (Patient taking differently: Take 100 mg by mouth Every Other Day.) 30 tablet 0 Unknown   • apixaban (ELIQUIS) 5 MG  tablet tablet Take 1 tablet by mouth Every 12 (Twelve) Hours. Indications: Atrial Fibrillation 60 tablet  Unknown   • bicalutamide (CASODEX) 50 MG chemo tablet Take 1 tablet by mouth Daily.   Unknown   • calcium carbonate (OS-SOPHIA) 600 MG tablet Take 1 tablet by mouth Daily.   Unknown   • carvedilol (COREG) 3.125 MG tablet Take 1 tablet by mouth Every Night.   Unknown   • latanoprost (XALATAN) 0.005 % ophthalmic solution Apply  to eye(s) as directed by provider.   Unknown   • levothyroxine (SYNTHROID, LEVOTHROID) 88 MCG tablet Take 1 tablet by mouth Daily.   Unknown   • multivitamin with minerals tablet tablet Take 1 tablet by mouth Daily.   Unknown   • polyethylene glycol (MIRALAX) 17 g packet Take 17 g by mouth Daily As Needed (Use if senna-docusate is ineffective).   Unknown   • rosuvastatin (CRESTOR) 10 MG tablet Take 1 tablet by mouth Daily.   Unknown   • silver sulfadiazine (SILVADENE, SSD) 1 % cream Apply 1 application topically to the appropriate area as directed 3 (Three) Times a Day. Apply to facial regions of inflammation per rad onc recs      • silver sulfadiazine (SILVADENE, SSD) 1 % cream Apply 1 application topically to the appropriate area as directed 2 (Two) Times a Day. 400 g 2        Current Meds  Scheduled Meds:amiodarone, 100 mg, Oral, Every Other Day  apixaban, 5 mg, Oral, Q12H  bicalutamide, 50 mg, Oral, Daily  latanoprost, 1 drop, Both Eyes, Daily  levothyroxine, 88 mcg, Oral, Daily  multivitamin with minerals, 1 tablet, Oral, Daily  rosuvastatin, 10 mg, Oral, Daily        Social History:   Social History     Socioeconomic History   • Marital status:    Tobacco Use   • Smoking status: Never   • Smokeless tobacco: Never   Vaping Use   • Vaping Use: Never used   Substance and Sexual Activity   • Alcohol use: Never   • Drug use: Never       Family History:  History reviewed. No pertinent family history.    REVIEW OF SYSTEMS:   CONSTITUTIONAL: No weight loss, .  HEENT: Eyes: No visual loss,  "blurred vision, double vision or yellow sclerae. Ears, Nose, Throat: No hearing loss, sneezing, congestion, runny nose or sore throat.   SKIN: No rash or itching.     RESPIRATORY: No hemoptysis, cough or sputum.   GASTROINTESTINAL: No anorexia, nausea, vomiting or diarrhea. No abdominal pain, bright red blood per rectum or melena.  GENITOURINARY: No burning on urination, hematuria or increased frequency.  NEUROLOGICAL: No headache, dizziness, syncope, paralysis, ataxia, numbness or tingling in the extremities. No change in bowel or bladder control.   MUSCULOSKELETAL: No muscle, back pain, joint pain or stiffness.   HEMATOLOGIC: No anemia, bleeding or bruising.   LYMPHATICS: No enlarged nodes. No history of splenectomy.   PSYCHIATRIC: No history of depression, anxiety, hallucinations.   ENDOCRINOLOGIC: No reports of sweating, cold or heat intolerance. No polyuria or polydipsia.     Physical Exam    /74 (BP Location: Right arm, Patient Position: Lying)   Pulse 74   Temp 97.4 °F (36.3 °C) (Oral)   Resp 18   Ht 170.2 cm (67\")   Wt 73.5 kg (162 lb 1.6 oz)   SpO2 93%   BMI 25.39 kg/m²     General Appearance Well developed, cooperative and well nourished and no acute distress   Head Normocephalic, without abnormality, atraumatic   Ears Ears appear intact with no abnormalities noted   Throat No oral lesions, no thrush, oral mucosa moist   Neck No adenopathy, supple, trachea midline, no thyromegaly, no carotid bruit,   Back No skin lesions, erythema or scars, no tenderness to percussion or palpation,range of motion is normal   Lungs  few rales at both bases.  Respirations regular, even and unlabored   Heart Regular rhythm and normal rate, normal S1 and S2, no murmur, no gallop, no rub, no click   Chest wall No abnormalities observed   Abdomen Normal bowel sounds, no masses, no hepatomegaly,    Extremities Moves all extremities well, no edema, no cyanosis, no redness   Pulses Pulses palpable and equal " bilaterally. Normal radial, carotid, femoral, dorsalis pedis and posterior tibial pulses bilaterally.    Skin No bleeding, bruising or rash   Psychiatric Alert and oriented x 3, normal mood and affect     Results from last 7 days   Lab Units 03/31/23  0530   SODIUM mmol/L 132*   POTASSIUM mmol/L 4.2   CHLORIDE mmol/L 102   CO2 mmol/L 20.6*   BUN mg/dL 24*   CREATININE mg/dL 1.16   CALCIUM mg/dL 8.4   BILIRUBIN mg/dL 0.8   ALK PHOS U/L 197*   ALT (SGPT) U/L 27   AST (SGOT) U/L 49*   GLUCOSE mg/dL 104*     Results from last 7 days   Lab Units 03/30/23  2207 03/30/23 2010   HSTROP T ng/L 11 12  12     Results from last 7 days   Lab Units 03/31/23  0530 03/30/23 2010   WBC 10*3/mm3 9.38 10.04   HEMOGLOBIN g/dL 10.2* 12.2*   HEMATOCRIT % 29.8* 34.3*   PLATELETS 10*3/mm3 156 165     I personally viewed and interpreted the patient's EKG/Telemetry data  I have reviewed HPI and ROS above.    Assessment and Plan  1.  Congestive heart failure with acute on chronic systolic and diastolic heart failure..  Continue with IV Lasix for another 24 to 48 hours.  No clear precipitating event but does have persistent ventricular pacing.  Would consider the pacer and she will discuss further with primary cardiology team tomorrow  2.  Pneumonia, on antibiotic therapy  3.  Paroxysmal atrial fibrillation with ventricular pacing.  Continue with Eliquis.  QTc  4.  Sick sinus syndrome  5.  History of mitral valve repair with normal function by echo 9/2022  6.  Nonischemic cardiomyopathy, ejection fraction 30 to 35% in 9/2022 but in the setting of tachycardia.  7.  Mild coronary artery disease  8.  Hypothyroidism  9.  Anemia      Mini Mcconnell  3/31/2023  23:39 EDT    60min spent in reviewing records, discussion and examination of the patient and discussion with other members of the patient's medical team.     Dictated utilizing Dragon dictation

## 2023-03-31 NOTE — PLAN OF CARE
Goal Outcome Evaluation:  Plan of Care Reviewed With: patient, daughter           Outcome Evaluation: 89 yo white male admitted 3/30/23 with pna and generalized weakness including a recent fall at facility. PMH includes afib, hbp, CKD3b, heart failure, osteoporosis, prostate CA. PLOF: Pt lives at [facility] and was using [employer] bicycle as well as walking around facility for exercise until recent fall. He is limited now by generalized weakness and decreased activity tolerance and c/o L knee giving out. Today, eh was found sitting EOB with family present. He stood from EOB with CGA. Pt amb 100' with r wx and CGA - slow pace, flexed posture, fair balance with r wx, decreased endurance. Pt fearful of R knee giving out. Pt returned to bed and he requested to sit EOB. He would benefit from skilled PT services to address functional deficits and prepare for return to facility.

## 2023-03-31 NOTE — THERAPY EVALUATION
Patient Name: Bridger Elias  : 1930    MRN: [id number]                              Today's Date: 3/31/2023       Admit Date: 3/30/2023    Visit Dx:     ICD-10-CM ICD-9-CM   1. Community acquired pneumonia, unspecified laterality  J18.9 486     Patient Active Problem List   Diagnosis   • SCC (squamous cell carcinoma), face   • General weakness   • Pacemaker   • Abnormal gait   • Atrial fibrillation (HCC)   • Chronic diastolic heart failure (HCC)   • Coronary arteriosclerosis   • Dyslipidemia   • Glaucoma   • Mitral valve disorder   • Hypertension   • Hypertensive kidney disease, stage III (HCC)   • Hypothyroidism   • Long term (current) use of anticoagulants   • Malignant neoplasm of prostate (HCC)   • Osteoporosis   • Squamous cell carcinoma of skin of face   • Acute on chronic diastolic CHF (congestive heart failure) (HCC)   • Stage 3b chronic kidney disease (HCC)   • Personal history of radiation therapy   • Acute on chronic combined systolic and diastolic CHF (congestive heart failure) (HCC)   • Cellulitis of right leg   • Chronic acquired lymphedema   • Contusion of face   • Contusion of forearm, left   • Fall   • Chronic systolic heart failure (CMS/HCC)   • Community acquired pneumonia, unspecified laterality     Past Medical History:   Diagnosis Date   • Acute on chronic diastolic CHF (congestive heart failure) (HCC) 2022   • Atrial fibrillation (HCC) 2022   • Coronary arteriosclerosis 7/3/2014    Formatting of this note might be different from the original. [facility] 16  IMPRESSION:   1. Right heart disease, hypertensive cardiac disease.   2. Status post mitral valve repair.   3. Anatomy: No hemodynamically significant disease. about 30-40% lesion of    the right coronary artery. Normal. Left coronary artery system.   • Dyslipidemia 2013   • Glaucoma 2022   • Hypertension 2013   • Hypertensive kidney disease, stage III (HCC) 3/13/2017   • Hypothyroidism 3/19/2017   • Long  term (current) use of anticoagulants 12/5/2013    Formatting of this note might be different from the original. Mitral valve replacement and atrial fibrillation Assume a range of 2.5-3.5.   • Malignant neoplasm of prostate (HCC) 12/5/2013    Formatting of this note might be different from the original. Description: He sees urology every 6 months and he does do Lupron injections   • Mitral valve disorder 1/25/2021   • Pacemaker 4/14/2022   • Personal history of radiation therapy 4/14/2022   • Prostate cancer (HCC)    • SCC (squamous cell carcinoma), face 2/2/2022   • Stage 3b chronic kidney disease (HCC) 4/14/2022     Past Surgical History:   Procedure Laterality Date   • PACEMAKER IMPLANTATION  2018      General Information     Row Name 03/31/23 1604          Physical Therapy Time and Intention    Document Type evaluation  -DJ     Mode of Treatment co-treatment;physical therapy;occupational therapy  -DJ     Row Name 03/31/23 1604          General Information    Patient Profile Reviewed yes  -DJ     Prior Level of Function independent:  used nustep and was walking for exs at facility  -DJ     Existing Precautions/Restrictions fall  -DJ     Barriers to Rehab hearing deficit  -DJ     Row Name 03/31/23 1604          Living Environment    People in Home facility resident  -DJ     Row Name 03/31/23 1604          Home Main Entrance    Number of Stairs, Main Entrance none  -DJ     Row Name 03/31/23 1604          Stairs Within Home, Primary    Number of Stairs, Within Home, Primary none  -DJ     Row Name 03/31/23 1604          Cognition    Orientation Status (Cognition) oriented x 3  -DJ     Row Name 03/31/23 1604          Safety Issues, Functional Mobility    Safety Issues Affecting Function (Mobility) safety precaution awareness;judgment  -DJ     Impairments Affecting Function (Mobility) endurance/activity tolerance;strength;balance  -DJ     Comment, Safety Issues/Impairments (Mobility) gt belt, nonskid socks  -DJ            User Key  (r) = Recorded By, (t) = Taken By, (c) = Cosigned By    Initials Name Provider Type    Latosha Sheets, PT Physical Therapist               Mobility     Row Name 03/31/23 1605          Bed Mobility    Bed Mobility supine-sit;sit-supine  -DJ     Supine-Sit [place] (Bed Mobility) not tested  -DJ     Sit-Supine [place] (Bed Mobility) not tested  -DJ     Comment, (Bed Mobility) Pt sitting EOB upon PT entry; pt left sitting EOB per pt request with family present and nurse aware  -DJ     Row Name 03/31/23 1605          Transfers    Comment, (Transfers) sit/stand from EOB  -DJ     Row Name 03/31/23 1605          Bed-Chair Transfer    Bed-Chair [place] (Transfers) not tested  -DJ     Row Name 03/31/23 1605          Sit-Stand Transfer    Sit-Stand [place] (Transfers) standby assist  -DJ     Assistive Device (Sit-Stand Transfers) walker, front-wheeled  -DJ     Row Name 03/31/23 1605          Gait/Stairs (Locomotion)    [place] Level (Gait) contact guard  -DJ     Assistive Device (Gait) walker, front-wheeled  -DJ     Distance in Feet (Gait) 100'  -DJ     Deviations/Abnormal Patterns (Gait) hussein decreased;gait speed decreased;stride length decreased  -DJ     Bilateral Gait Deviations forward flexed posture  -DJ     [place] Level (Stairs) not tested  -DJ     Comment, (Gait/Stairs) Pt amb 100' with r wx and CGA - slow pace, flexed posture, fair balance with r wx, decreased endurance. Pt fearful of R knee giving out  -DJ           User Key  (r) = Recorded By, (t) = Taken By, (c) = Cosigned By    Initials Name Provider Type    Latosha Sheets, PT Physical Therapist               Obj/Interventions     Row Name 03/31/23 1608          Range of Motion Comprehensive    Comment, General Range of Motion grossly WFL  -DJ     Row Name 03/31/23 1608          Strength Comprehensive (MMT)    Comment, General Manual Muscle Testing (MMT) Assessment fair extremity strength  -DJ     Row Name 03/31/23  1608          Motor Skills    Motor Skills functional endurance  -DJ     Functional Endurance fatigued after amb  -DJ     Row Name 03/31/23 1608          Balance    Balance Assessment standing static balance;standing dynamic balance  -DJ     Static Standing Balance standby assist;verbal cues  -DJ     Dynamic Standing Balance contact guard;verbal cues  -DJ     Position/Device Used, Standing Balance walker, front-wheeled;supported  -DJ     Balance Interventions sitting;standing;sit to stand;supported;weight shifting activity  -DJ     Comment, Balance no LOB  -DJ     Row Name 03/31/23 1608          Sensory Assessment (Somatosensory)    Sensory Assessment (Somatosensory) not tested  -DJ           User Key  (r) = Recorded By, (t) = Taken By, (c) = Cosigned By    Initials Name Provider Type    Latosha Sheets, PT Physical Therapist               Goals/Plan     Row Name 03/31/23 1632          Bed Mobility Goal 1 (PT)    Activity/Assistive Device (Bed Mobility Goal 1, PT) sit to supine;supine to sit  -DJ     [place] Level/Cues Needed (Bed Mobility Goal 1, PT) standby assist;verbal cues required  -DJ     Time Frame (Bed Mobility Goal 1, PT) 10 days  -DJ     Row Name 03/31/23 1632          Transfer Goal 1 (PT)    Activity/Assistive Device (Transfer Goal 1, PT) sit-to-stand/stand-to-sit  -DJ     [place] Level/Cues Needed (Transfer Goal 1, PT) verbal cues required;standby assist  -DJ     Time Frame (Transfer Goal 1, PT) 10 days  -DJ     Row Name 03/31/23 1632          Gait Training Goal 1 (PT)    Activity/Assistive Device (Gait Training Goal 1, PT) gait (walking locomotion);assistive device use;improve balance and speed;increase endurance/gait distance;walker, rolling  -DJ     Bowman Level (Gait Training Goal 1, PT) standby assist  -DJ     Distance (Gait Training Goal 1, PT) >350'  -DJ     Row Name 03/31/23 1632          Patient Education Goal (PT)    Activity (Patient Education Goal, PT) HEP  -DJ      [place]/Cues/Accuracy (Memory Goal 2, PT) demonstrates adequately;verbalizes understanding  -DJ     Time Frame (Patient Education Goal, PT) 5 days;short term goal (STG)  -DJ     Row Name 03/31/23 1632          Therapy Assessment/Plan (PT)    Planned Therapy Interventions (PT) balance training;bed mobility training;gait training;home exercise program;strengthening;postural re-education;patient/family education;transfer training  -DJ           User Key  (r) = Recorded By, (t) = Taken By, (c) = Cosigned By    Initials Name Provider Type    Latosha Sheets, PT Physical Therapist               Clinical Impression     Row Name 03/31/23 1610          Pain    Pretreatment Pain Rating 0/10 - no pain  -DJ     Row Name 03/31/23 1610          Plan of Care Review    Plan of Care Reviewed With patient;daughter  -DJ     Outcome Evaluation 89 yo white male admitted 3/30/23 with pna and generalized weakness including a recent fall at facility. PMH includes afib, hbp, CKD3b, heart failure, osteoporosis, prostate CA. PLOF: Pt lives at [facility] and was using Nustep bicycle as well as walking around facility for exercise until recent fall. He is limited now by generalized weakness and decreased activity tolerance and c/o L knee giving out. Today, eh was found sitting EOB with family present. He stood from EOB with CGA. Pt amb 100' with r wx and CGA - slow pace, flexed posture, fair balance with r wx, decreased endurance. Pt fearful of R knee giving out. Pt returned to bed and he requested to sit EOB. He would benefit from skilled PT services to address functional deficits and prepare for return to facility.  -DJ     Row Name 03/31/23 1610          Therapy Assessment/Plan (PT)    Patient/Family Therapy Goals Statement (PT) return to [facility]  -DJ     Rehab Potential (PT) good, to achieve stated therapy goals  -DJ     Criteria for Skilled Interventions Met (PT) yes;meets criteria;skilled treatment is necessary  -DJ     Therapy Frequency (PT) 5  times/wk  -DJ     Row Name 03/31/23 1610          Vital Signs    O2 Delivery Pre Treatment room air  -DJ     O2 Delivery Intra Treatment room air  -DJ     O2 Delivery Post Treatment room air  -DJ     Pre Patient Position Sitting  -DJ     Intra Patient Position Standing  -DJ     Post Patient Position Sitting  -DJ     Row Name 03/31/23 1610          Positioning and Restraints    Pre-Treatment Position sitting in chair/recliner  -DJ     Post Treatment Position bed  -DJ     In Bed sitting EOB;call light within reach;encouraged to call for assist;with family/caregiver  -DJ           User Key  (r) = Recorded By, (t) = Taken By, (c) = Cosigned By    Initials Name Provider Type    Latosha Sheets, PT Physical Therapist               Outcome Measures     Row Name 03/31/23 1633 03/31/23 0800       How much help from another person do you currently need...    Turning from your back to your side while in flat bed without using bedrails? 4  -DJ 4  -CM    Moving from lying on back to sitting on the side of a flat bed without bedrails? 4  -DJ 4  -CM    Moving to and from a bed to a chair (including a wheelchair)? 3  -DJ 3  -CM    Standing up from a chair using your arms (e.g., wheelchair, bedside chair)? 3  -DJ 3  -CM    Climbing 3-5 steps with a railing? 2  -DJ 2  -CM    To walk in hospital room? 3  -DJ 3  -CM    AM-PAC 6 Clicks Score (PT) 19  -DJ 19  -CM    Highest level of mobility 6 --> Walked 10 steps or more  -DJ 6 --> Walked 10 steps or more  -CM    Row Name 03/31/23 1633 03/31/23 1528       Functional Assessment    Outcome Measure Options AM-PAC 6 Clicks Basic Mobility (PT)  -DJ AM-PAC 6 Clicks Daily Activity (OT)  -MW          User Key  (r) = Recorded By, (t) = Taken By, (c) = Cosigned By    Initials Name Provider Type    Violette Ashby RN Registered Nurse    Latosha Sheets, PT Physical Therapist    Mariel Patel, OT Occupational Therapist                             Physical Therapy Education     Title:  PT OT SLP Therapies (In Progress)     Topic: Physical Therapy (In Progress)     Point: Mobility training (Done)     Learning Progress Summary           Patient Acceptance, E, VU,NR by DJ at 3/31/2023 1633                   Point: Home exercise program (Not Started)     Learner Progress:  Not documented in this visit.          Point: Body mechanics (Done)     Learning Progress Summary           Patient Acceptance, E, VU,NR by DJ at 3/31/2023 1633                   Point: Precautions (Done)     Learning Progress Summary           Patient Acceptance, E, VU,NR by DJ at 3/31/2023 1633                               User Key     Initials Effective Dates Name Provider Type Discipline    DJ 10/25/19 -  Latosha Sanders, PT Physical Therapist PT              PT Recommendation and Plan  Planned Therapy Interventions (PT): balance training, bed mobility training, gait training, home exercise program, strengthening, postural re-education, patient/family education, transfer training  Plan of Care Reviewed With: patient, daughter  Outcome Evaluation: 89 yo white male admitted 3/30/23 with pna and generalized weakness including a recent fall at facility. PMH includes afib, hbp, CKD3b, heart failure, osteoporosis, prostate CA. PLOF: Pt lives at [facility] and was using Nustep bicycle as well as walking around facility for exercise until recent fall. He is limited now by generalized weakness and decreased activity tolerance and c/o L knee giving out. Today, eh was found sitting EOB with family present. He stood from EOB with CGA. Pt amb 100' with r wx and CGA - slow pace, flexed posture, fair balance with r wx, decreased endurance. Pt fearful of R knee giving out. Pt returned to bed and he requested to sit EOB. He would benefit from skilled PT services to address functional deficits and prepare for return to facility.     Time Calculation:    PT Charges     Row Name 03/31/23 7587             Time Calculation    Start Time 1425  -DJ      Stop  Time 1449  -DJ      Time Calculation (min) 24 min  -DJ      PT Non-Billable Time (min) 10 min  -DJ      PT Received On 03/31/23  -RODGER      PT - Next Appointment 04/03/23  -RODGER      PT Goal Re-Cert Due Date 04/10/23  -DJ            User Key  (r) = Recorded By, (t) = Taken By, (c) = Cosigned By    Initials Name Provider Type    Latosha Sheets, MONA Physical Therapist              Therapy Charges for Today     Code Description Service Date Service Provider Modifiers Qty    71105331667 HC PT EVAL MOD COMPLEXITY 2 3/31/2023 Latosha Sanders, PT GP 1    27916012509 HC PT THERAPEUTIC ACT EA 15 MIN 3/31/2023 Latosha Sanders, PT GP 1    75473734895 HC PT THER SUPP EA 15 MIN 3/31/2023 Latosha Sanders, PT GP 1    20151085992 HC GAIT TRAINING EA 15 MIN 3/31/2023 Latosha Sanders, PT GP 1          PT G-Codes  Outcome Measure Options: AM-PAC 6 Clicks Basic Mobility (PT)  AM-PAC 6 Clicks Score (PT): 19  AM-PAC 6 Clicks Score (OT): 19  PT Discharge Summary  Anticipated Discharge Disposition (PT): home with home health, assisted living    Latosha Sanders PT  3/31/2023

## 2023-03-31 NOTE — PLAN OF CARE
Goal Outcome Evaluation:  Plan of Care Reviewed With: patient, daughter        Progress: no change  Outcome Evaluation: Pt is a 89 yo male admitted with generalized weakness that has been progressive over the last couple weeks. dx acute resp failure 2/2 PNA. Pt seen for OT eval, pt's daughter present for eval and provided detailed prior level history as well as pt. Pt lives at [facility], using rollator, walking halls and usign bike 20 mins/day. (I) with ADLs. x1 recent fall getting out of shower. Pt presents this date with SOA on exertion, overall decreased activity tolerance and decreased (I) with ADLs due to generalized weakness. Pt sitting on EOB upon entering, A&Ox3, on RA. STS with CGA, Rwx demo'ing hosuehold distances with CGA, minimal standing rest breaks with activity. Pt plans to d/c back to [facility] with OT/PT, continue to progress during acute stay.

## 2023-03-31 NOTE — NURSING NOTE
Consult to pt.'s cardiologist  Dr Vanegas returned call and is out of town today and cannot see pt  Requested to have Dr Gamboa to see today  Dr Vanegas will be here tomorrow

## 2023-03-31 NOTE — PLAN OF CARE
Goal Outcome Evaluation:         No acute distress noted this shift, safety maintained, continue plan of care

## 2023-03-31 NOTE — THERAPY EVALUATION
Patient Name: Bridger Elias  : 1930    MRN: [id number]                              Today's Date: 3/31/2023       Admit Date: 3/30/2023    Visit Dx:     ICD-10-CM ICD-9-CM   1. Community acquired pneumonia, unspecified laterality  J18.9 486     Patient Active Problem List   Diagnosis   • SCC (squamous cell carcinoma), face   • General weakness   • Pacemaker   • Abnormal gait   • Atrial fibrillation (HCC)   • Chronic diastolic heart failure (HCC)   • Coronary arteriosclerosis   • Dyslipidemia   • Glaucoma   • Mitral valve disorder   • Hypertension   • Hypertensive kidney disease, stage III (HCC)   • Hypothyroidism   • Long term (current) use of anticoagulants   • Malignant neoplasm of prostate (HCC)   • Osteoporosis   • Squamous cell carcinoma of skin of face   • Acute on chronic diastolic CHF (congestive heart failure) (HCC)   • Stage 3b chronic kidney disease (HCC)   • Personal history of radiation therapy   • Acute on chronic combined systolic and diastolic CHF (congestive heart failure) (HCC)   • Cellulitis of right leg   • Chronic acquired lymphedema   • Contusion of face   • Contusion of forearm, left   • Fall   • Chronic systolic heart failure (CMS/HCC)   • Community acquired pneumonia, unspecified laterality     Past Medical History:   Diagnosis Date   • Acute on chronic diastolic CHF (congestive heart failure) (HCC) 2022   • Atrial fibrillation (HCC) 2022   • Coronary arteriosclerosis 7/3/2014    Formatting of this note might be different from the original. [facility]  IMPRESSION:   1. Right heart disease, hypertensive cardiac disease.   2. Status post mitral valve repair.   3. Anatomy: No hemodynamically significant disease. about 30-40% lesion of    the right coronary artery. Normal. Left coronary artery system.   • Dyslipidemia 2013   • Glaucoma 2022   • Hypertension 2013   • Hypertensive kidney disease, stage III (HCC) 3/13/2017   • Hypothyroidism 3/19/2017   • Long  term (current) use of anticoagulants 12/5/2013    Formatting of this note might be different from the original. Mitral valve replacement and atrial fibrillation Assume a range of 2.5-3.5.   • Malignant neoplasm of prostate (HCC) 12/5/2013    Formatting of this note might be different from the original. Description: He sees urology every 6 months and he does do Lupron injections   • Mitral valve disorder 1/25/2021   • Pacemaker 4/14/2022   • Personal history of radiation therapy 4/14/2022   • Prostate cancer (HCC)    • SCC (squamous cell carcinoma), face 2/2/2022   • Stage 3b chronic kidney disease (HCC) 4/14/2022     Past Surgical History:   Procedure Laterality Date   • PACEMAKER IMPLANTATION  2018      General Information     Row Name 03/31/23 1520          OT Time and Intention    Document Type evaluation  -MW     Mode of Treatment occupational therapy  -     Row Name 03/31/23 1520          General Information    Patient Profile Reviewed yes  -MW     Prior Level of Function independent:;ADL's;all household mobility;transfer  rollator, JASON, (I) with ADLs, x1 fall recently getting out of shower  -     Existing Precautions/Restrictions fall  -MW     Barriers to Rehab none identified  -MW     Row Name 03/31/23 1520          Living Environment    People in Home facility resident  [facility]  -     Row Name 03/31/23 1520          Home Main Entrance    Number of Stairs, Main Entrance none  -MW     Row Name 03/31/23 1520          Cognition    Orientation Status (Cognition) oriented x 4  [place]  -     Row Name 03/31/23 1520          Safety Issues, Functional Mobility    Impairments Affecting Function (Mobility) endurance/activity tolerance;strength;shortness of breath  -           User Key  (r) = Recorded By, (t) = Taken By, (c) = Cosigned By    Initials Name Provider Type    Mariel Patel OT Occupational Therapist                 Mobility/ADL's     Row Name 03/31/23 1520          Bed Mobility    Comment, (Bed  Mobility) sitting on EOB on entry and end of session (I), family present and RN aware  -MW     Row Name 03/31/23 1520          Transfers    Transfers sit-stand transfer;stand-sit transfer;toilet transfer  -MW     Row Name 03/31/23 1520          Sit-Stand Transfer    Sit-Stand [place] (Transfers) standby assist  -     Assistive Device (Sit-Stand Transfers) walker, front-wheeled  -MW     Row Name 03/31/23 1520          Stand-Sit Transfer    Stand-Sit [place] (Transfers) standby assist;contact guard  -     Assistive Device (Stand-Sit Transfers) walker, front-wheeled  -MW     Row Name 03/31/23 1520          Toilet Transfer    Comment, (Toilet Transfer) pt standing using urinal at bedside multiple times in AM  -MW     Row Name 03/31/23 1520          Functional Mobility    Functional Mobility- Ind. Level contact guard assist  -     Functional Mobility- Device walker, front-wheeled  -     Functional Mobility- Comment demo household distance into hallway with Rwx, CGA  -MW     Row Name 03/31/23 1520          Activities of Daily Living    BADL Assessment/Intervention toileting;feeding  -MW     Row Name 03/31/23 1520          Toileting Assessment/Training    Comment, (Toileting) urinal, s/up  -MW     Row Name 03/31/23 1520          Self-Feeding Assessment/Training    Comment, (Feeding) WFL  -           User Key  (r) = Recorded By, (t) = Taken By, (c) = Cosigned By    Initials Name Provider Type     Mariel Garza OT Occupational Therapist               Obj/Interventions     Row Name 03/31/23 1522          Sensory Assessment (Somatosensory)    Sensory Assessment (Somatosensory) UE sensation intact  -MW     Row Name 03/31/23 1522          Vision Assessment/Intervention    Visual Impairment/Limitations WFL;corrective lenses full-time  -MW     Row Name 03/31/23 1522          Range of Motion Comprehensive    General Range of Motion bilateral upper extremity ROM WNL  -MW     Row Name 03/31/23 1522           Strength Comprehensive (MMT)    General Manual Muscle Testing (MMT) Assessment no strength deficits identified  -     Comment, General Manual Muscle Testing (MMT) Assessment grossly weak due to PNA  -     Row Name 03/31/23 1522          Motor Skills    Motor Skills functional endurance  -     Functional Endurance increased SOA with exertion  -     Row Name 03/31/23 1522          Balance    Balance Assessment sitting static balance;sitting dynamic balance;sit to stand dynamic balance;standing static balance;standing dynamic balance  -     Static Sitting Balance independent  -     Dynamic Sitting Balance supervision  -     Position, Sitting Balance sitting edge of bed  -     Sit to Stand Dynamic Balance standby assist;contact guard  -     Static Standing Balance standby assist  -MW     Dynamic Standing Balance contact guard  -     Position/Device Used, Standing Balance supported;walker, front-wheeled  -     Comment, Balance no overt LOBs  -           User Key  (r) = Recorded By, (t) = Taken By, (c) = Cosigned By    Initials Name Provider Type     Mariel Garza, AZUCENA Occupational Therapist               Goals/Plan     Row Name 03/31/23 1527          Transfer Goal 1 (OT)    Activity/Assistive Device (Transfer Goal 1, OT) transfers, all  -MW     [place] Level/Cues Needed (Transfer Goal 1, OT) modified independence  -MW     Time Frame (Transfer Goal 1, OT) short term goal (STG);2 weeks  -MW     Progress/Outcome (Transfer Goal 1, OT) goal ongoing  -[facility] Name 03/31/23 1527          Dressing Goal 1 (OT)    Activity/Device (Dressing Goal 1, OT) dressing skills, all  -MW     [place]/Cues Needed (Dressing Goal 1, OT) modified independence  -MW     Time Frame (Dressing Goal 1, OT) short term goal (STG);2 weeks  -MW     Progress/Outcome (Dressing Goal 1, OT) goal ongoing  -[facility] Name 03/31/23 1527          Toileting Goal 1 (OT)    Activity/Device (Toileting Goal 1, OT) toileting  skills, all  -MW     [place] Level/Cues Needed (Toileting Goal 1, OT) modified independence  -MW     Time Frame (Toileting Goal 1, OT) short term goal (STG);2 weeks  -MW     Row Name 03/31/23 1527          Grooming Goal 1 (OT)    Activity/Device (Grooming Goal 1, OT) grooming skills, all  -MW     [place] (Grooming Goal 1, OT) modified independence  -MW     Time Frame (Grooming Goal 1, OT) short term goal (STG);2 weeks  -MW     Progress/Outcome (Grooming Goal 1, OT) goal ongoing  -MW     Row Name 03/31/23 1527          Therapy Assessment/Plan (OT)    Planned Therapy Interventions (OT) activity tolerance training;functional balance retraining;BADL retraining;neuromuscular control/coordination retraining;patient/caregiver education/training;transfer/mobility retraining;strengthening exercise;ROM/therapeutic exercise;occupation/activity based interventions  -MW           User Key  (r) = Recorded By, (t) = Taken By, (c) = Cosigned By    Initials Name Provider Type    Mariel Patel, OT Occupational Therapist               Clinical Impression     Row Name 03/31/23 1523          Pain Assessment    Pretreatment Pain Rating 0/10 - no pain  -MW     Posttreatment Pain Rating 0/10 - no pain  -MW     Row Name 03/31/23 1523          Plan of Care Review    Plan of Care Reviewed With patient;daughter  -MW     Progress no change  -MW     Outcome Evaluation Pt is a 89 yo male admitted with generalized weakness that has been progressive over the last couple weeks. dx acute resp failure 2/2 PNA. Pt seen for OT eval, pt's daughter present for eval and provided detailed prior level history as well as pt. Pt lives at [facility], using rollator, walking halls and usign bike 20 mins/day. (I) with ADLs. x1 recent fall getting out of shower. Pt presents this date with SOA on exertion, overall decreased activity tolerance and decreased (I) with ADLs due to generalized weakness. Pt sitting on EOB upon entering, A&Ox3, on RA. STS with  CGA, Rwx demo'ing hosuehold distances with CGA, minimal standing rest breaks with activity. Pt plans to d/c back to long term with OT/PT, continue to progress during acute stay.  -     Row Name 03/31/23 1523          Therapy Assessment/Plan (OT)    Rehab Potential (OT) good, to achieve stated therapy goals  -     Criteria for Skilled Therapeutic Interventions Met (OT) yes;meets criteria;skilled treatment is necessary  -     Therapy Frequency (OT) 5 times/wk  -     Row Name 03/31/23 1523          Therapy Plan Review/Discharge Plan (OT)    Anticipated Discharge Disposition (OT) assisted living  -     Row Name 03/31/23 1523          Vital Signs    O2 Delivery Pre Treatment room air  -MW     Pre Patient Position Sitting  -MW     Intra Patient Position Standing  -MW     Post Patient Position Sitting  -MW     Row Name 03/31/23 1523          Positioning and Restraints    Pre-Treatment Position in bed  -MW     Post Treatment Position bed  -MW     In Bed sitting EOB;encouraged to call for assist;call light within reach;notified nsg;patient within staff view  -MW           User Key  (r) = Recorded By, (t) = Taken By, (c) = Cosigned By    Initials Name Provider Type    Mariel Patel, OT Occupational Therapist               Outcome Measures     Row Name 03/31/23 1528          How much help from another is currently needed...    Putting on and taking off regular lower body clothing? 3  -MW     Bathing (including washing, rinsing, and drying) 3  -MW     Toileting (which includes using toilet bed pan or urinal) 3  -MW     Putting on and taking off regular upper body clothing 3  -MW     Taking care of personal grooming (such as brushing teeth) 3  -MW     Eating meals 4  -MW     AM-PAC 6 Clicks Score (OT) 19  -MW     Row Name 03/31/23 0800          How much help from another person do you currently need...    Turning from your back to your side while in flat bed without using bedrails? 4  -CM     Moving from lying on back  to sitting on the side of a flat bed without bedrails? 4  -CM     Moving to and from a bed to a chair (including a wheelchair)? 3  -CM     Standing up from a chair using your arms (e.g., wheelchair, bedside chair)? 3  -CM     Climbing 3-5 steps with a railing? 2  -CM     To walk in hospital room? 3  -CM     AM-PAC 6 Clicks Score (PT) 19  -CM     Highest level of mobility 6 --> Walked 10 steps or more  -CM     Row Name 03/31/23 1528          Functional Assessment    Outcome Measure Options AM-PAC 6 Clicks Daily Activity (OT)  -MW           User Key  (r) = Recorded By, (t) = Taken By, (c) = Cosigned By    Initials Name Provider Type    Violette Ashby RN Registered Nurse    Mariel Patel OT Occupational Therapist                Occupational Therapy Education     Title: PT OT SLP Therapies (Done)     Topic: Occupational Therapy (Done)     Point: ADL training (Done)     Description:   Instruct learner(s) on proper safety adaptation and remediation techniques during self care or transfers.   Instruct in proper use of assistive devices.              Learning Progress Summary           Patient Acceptance, E, VU by ENOCH at 3/31/2023 1528    Comment: role of OT, d/c rec   Family Acceptance, E, VU by MW at 3/31/2023 1528    Comment: role of OT, d/c rec                   Point: Home exercise program (Done)     Description:   Instruct learner(s) on appropriate technique for monitoring, assisting and/or progressing therapeutic exercises/activities.              Learning Progress Summary           Patient Acceptance, E, VU by MW at 3/31/2023 1528    Comment: role of OT, d/c rec   Family Acceptance, E, VU by ENOCH at 3/31/2023 1528    Comment: role of OT, d/c rec                   Point: Precautions (Done)     Description:   Instruct learner(s) on prescribed precautions during self-care and functional transfers.              Learning Progress Summary           Patient Acceptance, E, VU by ENOCH at 3/31/2023 1528     Comment: role of OT, d/c rec   Family Acceptance, E, VU by  at 3/31/2023 1528    Comment: role of OT, d/c rec                   Point: Body mechanics (Done)     Description:   Instruct learner(s) on proper positioning and spine alignment during self-care, functional mobility activities and/or exercises.              Learning Progress Summary           Patient Acceptance, E, VU by  at 3/31/2023 1528    Comment: role of OT, d/c rec   Family Acceptance, E, VU by  at 3/31/2023 1528    Comment: role of OT, d/c rec                               User Key     Initials Effective Dates Name Provider Type Discipline    ENOCH 08/20/21 -  Mariel Garza OT Occupational Therapist OT              OT Recommendation and Plan  Planned Therapy Interventions (OT): activity tolerance training, functional balance retraining, BADL retraining, neuromuscular control/coordination retraining, patient/caregiver education/training, transfer/mobility retraining, strengthening exercise, ROM/therapeutic exercise, occupation/activity based interventions  Therapy Frequency (OT): 5 times/wk  Plan of Care Review  Plan of Care Reviewed With: patient, daughter  Progress: no change  Outcome Evaluation: Pt is a 89 yo male admitted with generalized weakness that has been progressive over the last couple weeks. dx acute resp failure 2/2 PNA. Pt seen for OT eval, pt's daughter present for eval and provided detailed prior level history as well as pt. Pt lives at [facility], using rollator, walking halls and usign bike 20 mins/day. (I) with ADLs. x1 recent fall getting out of shower. Pt presents this date with SOA on exertion, overall decreased activity tolerance and decreased (I) with ADLs due to generalized weakness. Pt sitting on EOB upon entering, A&Ox3, on RA. STS with CGA, Rwx demo'ing hosuehold distances with CGA, minimal standing rest breaks with activity. Pt plans to d/c back to [facility] with OT/PT, continue to progress during acute stay.     Time  Calculation:    Time Calculation- OT     Row Name 03/31/23 1529             Time Calculation- OT    OT Start Time 1424  -MW      OT Stop Time 1445  -MW      OT Time Calculation (min) 21 min  -MW      Total Timed Code Minutes- OT 14 minute(s)  -MW      OT Received On 03/31/23  -MW      OT - Next Appointment 04/03/23  -MW      OT Goal Re-Cert Due Date 04/14/23  -MW         Timed Charges    71397 - OT Therapeutic Activity Minutes 14  -MW         Untimed Charges    OT Eval/Re-eval Minutes 7  -MW         Total Minutes    Timed Charges Total Minutes 14  -MW      Untimed Charges Total Minutes 7  -MW       Total Minutes 21  -MW            User Key  (r) = Recorded By, (t) = Taken By, (c) = Cosigned By    Initials Name Provider Type    Mariel Patel OT Occupational Therapist              Therapy Charges for Today     Code Description Service Date Service Provider Modifiers Qty    54838333585 HC OT THERAPEUTIC ACT EA 15 MIN 3/31/2023 Mariel Garza OT GO 1    50713743567 HC OT EVAL MOD COMPLEXITY 2 3/31/2023 Mariel Garza OT GO 1               Mariel Garza OT  3/31/2023

## 2023-04-01 ENCOUNTER — APPOINTMENT (OUTPATIENT)
Dept: CARDIOLOGY | Facility: HOSPITAL | Age: 88
DRG: 291 | End: 2023-04-01
Payer: MEDICARE

## 2023-04-01 LAB
ALBUMIN SERPL-MCNC: 2.9 G/DL (ref 3.5–5.2)
ALBUMIN/GLOB SERPL: 0.7 G/DL
ALP SERPL-CCNC: 220 U/L (ref 39–117)
ALT SERPL W P-5'-P-CCNC: 37 U/L (ref 1–41)
ANION GAP SERPL CALCULATED.3IONS-SCNC: 10.2 MMOL/L (ref 5–15)
AORTIC DIMENSIONLESS INDEX: 0.8 (DI)
AST SERPL-CCNC: 55 U/L (ref 1–40)
BH CV ECHO MEAS - ACS: 2 CM
BH CV ECHO MEAS - AO MAX PG: 2.11 MMHG
BH CV ECHO MEAS - AO MEAN PG: 1 MMHG
BH CV ECHO MEAS - AO ROOT DIAM: 3.5 CM
BH CV ECHO MEAS - AO V2 MAX: 72.7 CM/SEC
BH CV ECHO MEAS - AO V2 VTI: 14.4 CM
BH CV ECHO MEAS - AVA(I,D): 2.8 CM2
BH CV ECHO MEAS - EDV(CUBED): 79.5 ML
BH CV ECHO MEAS - EDV(MOD-SP2): 56 ML
BH CV ECHO MEAS - EDV(MOD-SP4): 67 ML
BH CV ECHO MEAS - EF(MOD-BP): 35.4 %
BH CV ECHO MEAS - EF(MOD-SP2): 35.7 %
BH CV ECHO MEAS - EF(MOD-SP4): 34.3 %
BH CV ECHO MEAS - ESV(CUBED): 46.3 ML
BH CV ECHO MEAS - ESV(MOD-SP2): 36 ML
BH CV ECHO MEAS - ESV(MOD-SP4): 44 ML
BH CV ECHO MEAS - FS: 16.5 %
BH CV ECHO MEAS - IVS/LVPW: 1 CM
BH CV ECHO MEAS - IVSD: 1 CM
BH CV ECHO MEAS - LAT PEAK E' VEL: 4.6 CM/SEC
BH CV ECHO MEAS - LV DIASTOLIC VOL/BSA (35-75): 36.7 CM2
BH CV ECHO MEAS - LV MASS(C)D: 142.5 GRAMS
BH CV ECHO MEAS - LV MAX PG: 1.59 MMHG
BH CV ECHO MEAS - LV MEAN PG: 1 MMHG
BH CV ECHO MEAS - LV SYSTOLIC VOL/BSA (12-30): 24.1 CM2
BH CV ECHO MEAS - LV V1 MAX: 63 CM/SEC
BH CV ECHO MEAS - LV V1 VTI: 11 CM
BH CV ECHO MEAS - LVIDD: 4.3 CM
BH CV ECHO MEAS - LVIDS: 3.6 CM
BH CV ECHO MEAS - LVOT AREA: 3.6 CM2
BH CV ECHO MEAS - LVOT DIAM: 2.15 CM
BH CV ECHO MEAS - LVPWD: 1 CM
BH CV ECHO MEAS - MED PEAK E' VEL: 3.7 CM/SEC
BH CV ECHO MEAS - MV A MAX VEL: 72.8 CM/SEC
BH CV ECHO MEAS - MV DEC SLOPE: 474.5 CM/SEC2
BH CV ECHO MEAS - MV DEC TIME: 0.24 MSEC
BH CV ECHO MEAS - MV E MAX VEL: 97.3 CM/SEC
BH CV ECHO MEAS - MV E/A: 1.34
BH CV ECHO MEAS - MV MAX PG: 4 MMHG
BH CV ECHO MEAS - MV MEAN PG: 1.65 MMHG
BH CV ECHO MEAS - MV P1/2T: 65.4 MSEC
BH CV ECHO MEAS - MV V2 VTI: 23.5 CM
BH CV ECHO MEAS - MVA(P1/2T): 3.4 CM2
BH CV ECHO MEAS - MVA(VTI): 1.7 CM2
BH CV ECHO MEAS - PA V2 MAX: 84.2 CM/SEC
BH CV ECHO MEAS - QP/QS: 0.65
BH CV ECHO MEAS - RAP SYSTOLE: 3 MMHG
BH CV ECHO MEAS - RV MAX PG: 0.68 MMHG
BH CV ECHO MEAS - RV V1 MAX: 41.1 CM/SEC
BH CV ECHO MEAS - RV V1 VTI: 8.4 CM
BH CV ECHO MEAS - RVOT DIAM: 1.99 CM
BH CV ECHO MEAS - RVSP: 38 MMHG
BH CV ECHO MEAS - SI(MOD-SP2): 11 ML/M2
BH CV ECHO MEAS - SI(MOD-SP4): 12.6 ML/M2
BH CV ECHO MEAS - SV(LVOT): 40 ML
BH CV ECHO MEAS - SV(MOD-SP2): 20 ML
BH CV ECHO MEAS - SV(MOD-SP4): 23 ML
BH CV ECHO MEAS - SV(RVOT): 25.9 ML
BH CV ECHO MEAS - TR MAX PG: 35 MMHG
BH CV ECHO MEAS - TR MAX VEL: 295.7 CM/SEC
BH CV ECHO MEASUREMENTS AVERAGE E/E' RATIO: 23.45
BH CV ECHO SHUNT ASSESSMENT PERFORMED (HIDDEN SCRIPTING): 1
BH CV XLRA - TDI S': 8.9 CM/SEC
BILIRUB SERPL-MCNC: 1 MG/DL (ref 0–1.2)
BUN SERPL-MCNC: 21 MG/DL (ref 8–23)
BUN/CREAT SERPL: 17.2 (ref 7–25)
CALCIUM SPEC-SCNC: 8.4 MG/DL (ref 8.2–9.6)
CHLORIDE SERPL-SCNC: 97 MMOL/L (ref 98–107)
CO2 SERPL-SCNC: 23.8 MMOL/L (ref 22–29)
CREAT SERPL-MCNC: 1.22 MG/DL (ref 0.76–1.27)
DEPRECATED RDW RBC AUTO: 50.9 FL (ref 37–54)
EGFRCR SERPLBLD CKD-EPI 2021: 55.3 ML/MIN/1.73
ERYTHROCYTE [DISTWIDTH] IN BLOOD BY AUTOMATED COUNT: 14.5 % (ref 12.3–15.4)
GLOBULIN UR ELPH-MCNC: 4.1 GM/DL
GLUCOSE BLDC GLUCOMTR-MCNC: 113 MG/DL (ref 70–130)
GLUCOSE SERPL-MCNC: 108 MG/DL (ref 65–99)
HCT VFR BLD AUTO: 32.4 % (ref 37.5–51)
HGB BLD-MCNC: 11.2 G/DL (ref 13–17.7)
LEFT ATRIUM VOLUME INDEX: 43.2 ML/M2
MAGNESIUM SERPL-MCNC: 2.1 MG/DL (ref 1.7–2.3)
MAXIMAL PREDICTED HEART RATE: 127 BPM
MCH RBC QN AUTO: 33 PG (ref 26.6–33)
MCHC RBC AUTO-ENTMCNC: 34.6 G/DL (ref 31.5–35.7)
MCV RBC AUTO: 95.6 FL (ref 79–97)
PHOSPHATE SERPL-MCNC: 3 MG/DL (ref 2.5–4.5)
PLATELET # BLD AUTO: 180 10*3/MM3 (ref 140–450)
PMV BLD AUTO: 10.3 FL (ref 6–12)
POTASSIUM SERPL-SCNC: 4 MMOL/L (ref 3.5–5.2)
PROT SERPL-MCNC: 7 G/DL (ref 6–8.5)
RBC # BLD AUTO: 3.39 10*6/MM3 (ref 4.14–5.8)
SINUS: 3.8 CM
SODIUM SERPL-SCNC: 131 MMOL/L (ref 136–145)
STRESS TARGET HR: 108 BPM
WBC NRBC COR # BLD: 9.28 10*3/MM3 (ref 3.4–10.8)

## 2023-04-01 PROCEDURE — 85027 COMPLETE CBC AUTOMATED: CPT | Performed by: STUDENT IN AN ORGANIZED HEALTH CARE EDUCATION/TRAINING PROGRAM

## 2023-04-01 PROCEDURE — 84100 ASSAY OF PHOSPHORUS: CPT | Performed by: STUDENT IN AN ORGANIZED HEALTH CARE EDUCATION/TRAINING PROGRAM

## 2023-04-01 PROCEDURE — 80053 COMPREHEN METABOLIC PANEL: CPT | Performed by: STUDENT IN AN ORGANIZED HEALTH CARE EDUCATION/TRAINING PROGRAM

## 2023-04-01 PROCEDURE — 25010000002 FUROSEMIDE PER 20 MG: Performed by: INTERNAL MEDICINE

## 2023-04-01 PROCEDURE — 82962 GLUCOSE BLOOD TEST: CPT

## 2023-04-01 PROCEDURE — 93306 TTE W/DOPPLER COMPLETE: CPT | Performed by: INTERNAL MEDICINE

## 2023-04-01 PROCEDURE — 25510000001 PERFLUTREN (DEFINITY) 8.476 MG IN SODIUM CHLORIDE (PF) 0.9 % 10 ML INJECTION: Performed by: STUDENT IN AN ORGANIZED HEALTH CARE EDUCATION/TRAINING PROGRAM

## 2023-04-01 PROCEDURE — 99232 SBSQ HOSP IP/OBS MODERATE 35: CPT | Performed by: INTERNAL MEDICINE

## 2023-04-01 PROCEDURE — 83735 ASSAY OF MAGNESIUM: CPT | Performed by: STUDENT IN AN ORGANIZED HEALTH CARE EDUCATION/TRAINING PROGRAM

## 2023-04-01 PROCEDURE — 93306 TTE W/DOPPLER COMPLETE: CPT

## 2023-04-01 PROCEDURE — 25010000002 FUROSEMIDE PER 20 MG: Performed by: STUDENT IN AN ORGANIZED HEALTH CARE EDUCATION/TRAINING PROGRAM

## 2023-04-01 RX ORDER — FUROSEMIDE 10 MG/ML
20 INJECTION INTRAMUSCULAR; INTRAVENOUS ONCE
Status: COMPLETED | OUTPATIENT
Start: 2023-04-01 | End: 2023-04-01

## 2023-04-01 RX ORDER — UREA 10 %
2 LOTION (ML) TOPICAL NIGHTLY
Status: DISCONTINUED | OUTPATIENT
Start: 2023-04-01 | End: 2023-04-10

## 2023-04-01 RX ADMIN — BICALUTAMIDE 50 MG: 50 TABLET ORAL at 09:00

## 2023-04-01 RX ADMIN — APIXABAN 5 MG: 5 TABLET, FILM COATED ORAL at 09:00

## 2023-04-01 RX ADMIN — APIXABAN 5 MG: 5 TABLET, FILM COATED ORAL at 23:09

## 2023-04-01 RX ADMIN — MULTIPLE VITAMINS W/ MINERALS TAB 1 TABLET: TAB at 09:00

## 2023-04-01 RX ADMIN — ROSUVASTATIN CALCIUM 10 MG: 10 TABLET, FILM COATED ORAL at 09:00

## 2023-04-01 RX ADMIN — FUROSEMIDE 20 MG: 10 INJECTION, SOLUTION INTRAMUSCULAR; INTRAVENOUS at 10:57

## 2023-04-01 RX ADMIN — FUROSEMIDE 20 MG: 10 INJECTION, SOLUTION INTRAMUSCULAR; INTRAVENOUS at 18:38

## 2023-04-01 RX ADMIN — PERFLUTREN 2 ML: 6.52 INJECTION, SUSPENSION INTRAVENOUS at 10:19

## 2023-04-01 RX ADMIN — LEVOTHYROXINE SODIUM 88 MCG: 0.09 TABLET ORAL at 09:00

## 2023-04-01 RX ADMIN — LATANOPROST 1 DROP: 50 SOLUTION OPHTHALMIC at 09:02

## 2023-04-01 NOTE — PROGRESS NOTES
Name: Bridger Elias ADMIT: 3/30/2023   : 1930  PCP: Alma Cat MD    MRN: [id number] LOS: 1 days   AGE/SEX: 89 y.o. male  ROOM: [facility]     Subjective   Subjective   Patient seen and examined this morning.  Hospital day 2.  Daughter at bedside.  Patient states he continues to have dyspnea, generalized fatigue and weakness, slightly improved from prior.  Discussed with nursing, patient has had great response to diuretics, weight is down from admission.  Remains on supplemental oxygen, however, on 3 L currently (down from 4 L on admission).         Objective   Objective   Vital Signs  Temp:  [97.4 °F (36.3 °C)-98.6 °F (37 °C)] 98.2 °F (36.8 °C)  Heart Rate:  [74-75] 74  Resp:  [18] 18  BP: ()/(51-74) 92/60  SpO2:  [92 %-95 %] 92 %  on  Flow (L/min):  [3-5] 3;   Device (Oxygen Therapy): nasal cannula  Body mass index is 24.59 kg/m².  Const: Elderly/frail appearing, no acute distress  Eyes: PERRL, normal conjunctiva  HENT: atraumatic, non-tender  CV: Paced rhythm, normal rate.  RESP: On 3 L O2 via NC, decreased breath sounds bilaterally  GI: soft, non-tender, non-distended  MSK: No gross deformities appreciated, no tenderness, trace bilateral lower extremity edema  Skin: Warm, dry, pale, scattered bruises.  Neuro: Awake, alert, oriented, no appreciable focal neurological deficits.  Psych: Appropriate mood and affect.    Results Review     I reviewed the patient's new clinical results.  Results from last 7 days   Lab Units 23  0438 23  0530 23   WBC 10*3/mm3 9.28 9.38 10.04   HEMOGLOBIN g/dL 11.2* 10.2* 12.2*   PLATELETS 10*3/mm3 180 156 165     Results from last 7 days   Lab Units 23  0438 23  0530 23   SODIUM mmol/L 131* 132* 133*   POTASSIUM mmol/L 4.0 4.2 4.3   CHLORIDE mmol/L 97* 102 100   CO2 mmol/L 23.8 20.6* 24.0   BUN mg/dL 21 24* 27*   CREATININE mg/dL 1.22 1.16 1.22   GLUCOSE mg/dL 108* 104* 98   EGFR mL/min/1.73 55.3* 58.7* 55.3*      Results from last 7 days   Lab Units 04/01/23 0438 03/31/23 0530 03/30/23 2010   ALBUMIN g/dL 2.9* 3.2* 3.6   BILIRUBIN mg/dL 1.0 0.8 0.8   ALK PHOS U/L 220* 197* 214*   AST (SGOT) U/L 55* 49* 54*   ALT (SGPT) U/L 37 27 30     Results from last 7 days   Lab Units 04/01/23 0438 03/31/23 0530 03/30/23 2010   CALCIUM mg/dL 8.4 8.4 9.0   ALBUMIN g/dL 2.9* 3.2* 3.6   MAGNESIUM mg/dL 2.1  --   --    PHOSPHORUS mg/dL 3.0  --   --      Results from last 7 days   Lab Units 03/30/23 2207 03/30/23 2010   PROCALCITONIN ng/mL  --  0.08   LACTATE mmol/L 1.6  --      Glucose   Date/Time Value Ref Range Status   04/01/2023 0551 113 70 - 130 mg/dL Final     Comment:     Meter: [id number] : [id number] Krissy DEWITT       CT Chest Without Contrast Diagnostic    Result Date: 3/31/2023  Cardiac hardware with changes of prior sternotomy and mitral valve repair or replacement. There our bilateral pleural effusions with patchy opacity in the lungs bilaterally which appears similar as on the x-ray from yesterday and is favored represent pneumonia.  Radiation dose reduction techniques were utilized, including automated exposure control and exposure modulation based on body size.  This report was finalized on 3/31/2023 9:06 PM by Dr. Maurice Alejandro M.D.      XR Chest 1 View    Result Date: 3/30/2023  Bilateral pneumonia.  This report was finalized on 3/30/2023 8:50 PM by Dr. Maurice Alejandro M.D.      I have personally reviewed all medications:  Scheduled Medications  amiodarone, 100 mg, Oral, Every Other Day  apixaban, 5 mg, Oral, Q12H  bicalutamide, 50 mg, Oral, Daily  latanoprost, 1 drop, Both Eyes, Daily  levothyroxine, 88 mcg, Oral, Daily  multivitamin with minerals, 1 tablet, Oral, Daily  rosuvastatin, 10 mg, Oral, Daily    Infusions   Diet  Diet: Cardiac Diets; Low Sodium (2g); Texture: Regular Texture (IDDSI 7); Fluid Consistency: Thin (IDDSI 0)    I have personally reviewed:  [x]  Laboratory   [x]  Microbiology    [x]  Radiology   [x]  EKG/Telemetry  [x]  Cardiology/Vascular   [x]  Records       Assessment/Plan     Active Hospital Problems    Diagnosis  POA   • **Community acquired pneumonia, unspecified laterality [J18.9]  Yes   • Stage 3b chronic kidney disease [N18.32]  Yes   • Pacemaker [Z95.0]  Yes   • Atrial fibrillation [I48.91]  Yes   • Acute on chronic combined systolic and diastolic CHF (congestive heart failure) [I50.43]  Yes   • SCC (squamous cell carcinoma), 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       89 y.o. male admitted with Community acquired pneumonia, unspecified laterality.    89 y.o. male with history of chronic combined systolic and diastolic heart failure, atrial fibrillation on long-term anticoagulation, s/p PPM, CAD, hypertension, hyperlipidemia, squamous cell carcinoma, CKD stage IIIb, hypothyroidism who presents to [facility] with complaints of worsening dyspnea and generalized weakness.  Admitted with acute hypoxic respiratory failure secondary to acute on acquired pneumonia and acute on chronic combined systolic and diastolic heart failure.     Acute respiratory failure with hypoxia  Community-acquired pneumonia  Acute on chronic combined systolic and diastolic heart failure  - Patient meets criteria for diagnosis of acute respiratory failure with hypoxia with: Complaints of dyspnea, new oxygen requirement, P/F ratio <300 per ABG.  - ProBNP elevated to 2683 on admission (most recently was 662 on 03/10).  Chest x-ray showing findings concerning for multifocal pneumonia, bilateral pleural effusions and possible pulmonary vascular congestion. Did receive x1 dose of IV antibiotics in ED, however, given absence of additional findings to  support pneumonia, further antibiotics were held, pending results of CT scan and infectious workup.  - Procalcitonin, strep, Legionella, respiratory viral panel negative.  Cultures no growth to date.  - Has had a great response to diuresis thus far, weight 73.5 kg on admission, most recently 71.4 kg this morning.  Does continue to require supplemental oxygen, however, requirement slightly less than prior.  - Continue diuresis.  Closely monitor urine output in response to ongoing therapy.   - Follow-up Cardiology. Follow up CT Chest and 2D Echo for further evaluation.  - Continue to monitor off antibiotic therapy pending CT and patient clinical course.  - Strict I/O, daily weights, telemetry, pulse oximetry, supplemental oxygen as needed to maintain O2 sats greater than 90%, elevate HOB, aspiration precautions.     Coronary artery disease  Atrial fibrillation on long-term anticoagulation  - Appears stable at present, patient has history of PPM placement, per chart review.  No signs or symptoms suggestive of ACS. Hold carvedilol on admission in the setting of acute decompensated heart failure, while awaiting cardiology input.  Continue amiodarone and Eliquis as prescribed.  Monitor on telemetry.     Hypertension  - BP acceptable acutely. Appears stable. No indications to warrant acute changes/intervention at this time.  - Continue current medications as prescribed. Trend BP to guide ongoing management decisions.     Hypothyroidism  - Stable. Continue Levothyroxine as prescribed.     Hyperlipidemia  - Stable. Continue Statin as prescribed.    Chronic kidney disease stage 3B  - On most recent labs, patient's creatinine found to be stable and near apparent baseline, per review of previous lab values and outside records.  - No indications to warrant acute changes and/or intervention at this time.  - Order repeat BMP in AM for reassessment of renal function.   - Will continue to monitor and trend creatinine to guide  ongoing management decisions. Avoid nephrotoxic medications, IVF, strict I/O, daily weights.     Generalized weakness  - Consult PT/OT, fall precautions.      · Eliquis (home med) for DVT prophylaxis.  · Full code.  · Discussed with patient, family and nursing staff.  · Anticipate discharge TBD timing yet to be determined.      Som Mckinney DO  [facility]  04/01/23  12:21 EDT

## 2023-04-01 NOTE — PLAN OF CARE
Goal Outcome Evaluation:   Iv lasix x2 today, good UO, oxygenation improving, down to 2L. Still feels weak. ECHO completed today. Continue to treat for chf exac/fluid overload, seems to be improving.

## 2023-04-01 NOTE — PROGRESS NOTES
LOS: 1 day   Patient Care Team:  Alma Cat MD as PCP - General (Family Medicine)  Maurice Cook MD as Consulting Physician (Radiation Oncology)    Chief Complaint: Follow-up pneumonia, acute on chronic combined CHF, paroxysmal atrial fibrillation, pacemaker.    Interval History: He is still very weak.  He still gets short of breath with movement.  No chest pain.    Vital Signs:  Temp:  [97.4 °F (36.3 °C)-98.6 °F (37 °C)] 98 °F (36.7 °C)  Heart Rate:  [74-75] 75  Resp:  [18] 18  BP: ()/(60-74) 112/68    Intake/Output Summary (Last 24 hours) at 4/1/2023 1656  Last data filed at 4/1/2023 1304  Gross per 24 hour   Intake 0 ml   Output 2070 ml   Net -2070 ml       Physical Exam:   General Appearance:    No acute distress, alert and oriented x4, appears ill.   Lungs:     Decreased breath sounds bilaterally    Heart:    Regular rhythm and normal rate.  No murmurs, gallops, or       rubs.   Abdomen:     Soft, nontender, nondistended.    Extremities:   Trace edema of the lower extremities.     Results Review:    Results from last 7 days   Lab Units 04/01/23  0438   SODIUM mmol/L 131*   POTASSIUM mmol/L 4.0   CHLORIDE mmol/L 97*   CO2 mmol/L 23.8   BUN mg/dL 21   CREATININE mg/dL 1.22   GLUCOSE mg/dL 108*   CALCIUM mg/dL 8.4     Results from last 7 days   Lab Units 03/30/23 2207 03/30/23 2010   HSTROP T ng/L 11 12  12     Results from last 7 days   Lab Units 04/01/23  0438   WBC 10*3/mm3 9.28   HEMOGLOBIN g/dL 11.2*   HEMATOCRIT % 32.4*   PLATELETS 10*3/mm3 180             Results from last 7 days   Lab Units 04/01/23  0438   MAGNESIUM mg/dL 2.1           I reviewed the patient's new clinical results.        Assessment:  1.  Acute hypoxic respiratory failure  2.  Community-acquired pneumonia  3.  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  Severe right ventricular enlargement with normal function  5.  Severe tricuspid regurgitation  6.  Status post mitral valve repair  7.  Paroxysmal atrial  fibrillation  8.  Sick sinus syndrome, status post pacemaker  9.  Hypertension  10.  Generalized weakness, multifactorial  11.  Deconditioning    Plan:  -I reviewed his echocardiogram.  EF is about 40 to 45%.  The right ventricle is significantly enlarged with normal function, although there is septal flattening consistent with increased right ventricular pressure and volume overload.  There was also severe tricuspid regurgitation.  His mitral valve repair looked good.    -He is currently being treated for pneumonia, and this is likely a combined picture with the heart failure.    -Continue diuretics today.  He got 20 mg of IV Lasix this morning.  I will give him another 20 mg this afternoon.    -His blood pressure has been on the lower side.  I am going to hold his Coreg today and potentially resume tomorrow.    -Continue amiodarone and Eliquis for his history of atrial fibrillation.    -We will continue to follow over the weekend.  Dr. Vanegas will likely take over on Monday.    Osbaldo Bruce MD  04/01/23  16:56 EDT

## 2023-04-02 PROBLEM — E87.1 HYPONATREMIA: Status: ACTIVE | Noted: 2023-04-02

## 2023-04-02 PROBLEM — E87.6 HYPOKALEMIA: Status: ACTIVE | Noted: 2023-04-02

## 2023-04-02 PROBLEM — D64.9 NORMOCYTIC ANEMIA: Status: ACTIVE | Noted: 2023-04-02

## 2023-04-02 PROBLEM — R74.01 TRANSAMINITIS: Status: ACTIVE | Noted: 2023-04-02

## 2023-04-02 PROBLEM — J96.01 ACUTE RESPIRATORY FAILURE WITH HYPOXIA: Status: ACTIVE | Noted: 2023-04-02

## 2023-04-02 PROBLEM — N18.31 STAGE 3A CHRONIC KIDNEY DISEASE: Status: ACTIVE | Noted: 2022-04-14

## 2023-04-02 LAB
ALBUMIN SERPL-MCNC: 2.9 G/DL (ref 3.5–5.2)
ALBUMIN/GLOB SERPL: 0.8 G/DL
ALP SERPL-CCNC: 219 U/L (ref 39–117)
ALT SERPL W P-5'-P-CCNC: 40 U/L (ref 1–41)
ANION GAP SERPL CALCULATED.3IONS-SCNC: 11.9 MMOL/L (ref 5–15)
AST SERPL-CCNC: 53 U/L (ref 1–40)
BILIRUB SERPL-MCNC: 0.8 MG/DL (ref 0–1.2)
BILIRUB UR QL STRIP: NEGATIVE
BUN SERPL-MCNC: 23 MG/DL (ref 8–23)
BUN/CREAT SERPL: 20.2 (ref 7–25)
CALCIUM SPEC-SCNC: 8.3 MG/DL (ref 8.2–9.6)
CHLORIDE SERPL-SCNC: 92 MMOL/L (ref 98–107)
CHLORIDE UR-SCNC: 69 MMOL/L
CLARITY UR: CLEAR
CO2 SERPL-SCNC: 23.1 MMOL/L (ref 22–29)
COLOR UR: YELLOW
CREAT SERPL-MCNC: 1.14 MG/DL (ref 0.76–1.27)
DEPRECATED RDW RBC AUTO: 47.6 FL (ref 37–54)
EGFRCR SERPLBLD CKD-EPI 2021: 60 ML/MIN/1.73
ERYTHROCYTE [DISTWIDTH] IN BLOOD BY AUTOMATED COUNT: 14 % (ref 12.3–15.4)
GLOBULIN UR ELPH-MCNC: 3.7 GM/DL
GLUCOSE BLDC GLUCOMTR-MCNC: 115 MG/DL (ref 70–130)
GLUCOSE SERPL-MCNC: 130 MG/DL (ref 65–99)
GLUCOSE UR STRIP-MCNC: NEGATIVE MG/DL
HCT VFR BLD AUTO: 32.1 % (ref 37.5–51)
HGB BLD-MCNC: 11.1 G/DL (ref 13–17.7)
HGB UR QL STRIP.AUTO: NEGATIVE
KETONES UR QL STRIP: NEGATIVE
LEUKOCYTE ESTERASE UR QL STRIP.AUTO: NEGATIVE
MAGNESIUM SERPL-MCNC: 2.3 MG/DL (ref 1.7–2.3)
MCH RBC QN AUTO: 32.5 PG (ref 26.6–33)
MCHC RBC AUTO-ENTMCNC: 34.6 G/DL (ref 31.5–35.7)
MCV RBC AUTO: 93.9 FL (ref 79–97)
NITRITE UR QL STRIP: NEGATIVE
OSMOLALITY SERPL: 269 MOSM/KG (ref 280–301)
OSMOLALITY UR: 276 MOSM/KG
PH UR STRIP.AUTO: 6 [PH] (ref 5–8)
PHOSPHATE SERPL-MCNC: 2.5 MG/DL (ref 2.5–4.5)
PLATELET # BLD AUTO: 171 10*3/MM3 (ref 140–450)
PMV BLD AUTO: 10.4 FL (ref 6–12)
POTASSIUM SERPL-SCNC: 3.2 MMOL/L (ref 3.5–5.2)
PROT SERPL-MCNC: 6.6 G/DL (ref 6–8.5)
PROT UR QL STRIP: NEGATIVE
RBC # BLD AUTO: 3.42 10*6/MM3 (ref 4.14–5.8)
SODIUM SERPL-SCNC: 127 MMOL/L (ref 136–145)
SODIUM UR-SCNC: 66 MMOL/L
SP GR UR STRIP: 1.01 (ref 1–1.03)
URATE SERPL-MCNC: 4.4 MG/DL (ref 3.4–7)
UROBILINOGEN UR QL STRIP: NORMAL
WBC NRBC COR # BLD: 8.75 10*3/MM3 (ref 3.4–10.8)

## 2023-04-02 PROCEDURE — 82962 GLUCOSE BLOOD TEST: CPT

## 2023-04-02 PROCEDURE — 85027 COMPLETE CBC AUTOMATED: CPT | Performed by: STUDENT IN AN ORGANIZED HEALTH CARE EDUCATION/TRAINING PROGRAM

## 2023-04-02 PROCEDURE — 84550 ASSAY OF BLOOD/URIC ACID: CPT | Performed by: INTERNAL MEDICINE

## 2023-04-02 PROCEDURE — 99232 SBSQ HOSP IP/OBS MODERATE 35: CPT | Performed by: INTERNAL MEDICINE

## 2023-04-02 PROCEDURE — 82436 ASSAY OF URINE CHLORIDE: CPT | Performed by: INTERNAL MEDICINE

## 2023-04-02 PROCEDURE — 83735 ASSAY OF MAGNESIUM: CPT | Performed by: STUDENT IN AN ORGANIZED HEALTH CARE EDUCATION/TRAINING PROGRAM

## 2023-04-02 PROCEDURE — 80053 COMPREHEN METABOLIC PANEL: CPT | Performed by: STUDENT IN AN ORGANIZED HEALTH CARE EDUCATION/TRAINING PROGRAM

## 2023-04-02 PROCEDURE — 25010000002 CEFTRIAXONE PER 250 MG: Performed by: STUDENT IN AN ORGANIZED HEALTH CARE EDUCATION/TRAINING PROGRAM

## 2023-04-02 PROCEDURE — 83930 ASSAY OF BLOOD OSMOLALITY: CPT | Performed by: INTERNAL MEDICINE

## 2023-04-02 PROCEDURE — 81003 URINALYSIS AUTO W/O SCOPE: CPT | Performed by: STUDENT IN AN ORGANIZED HEALTH CARE EDUCATION/TRAINING PROGRAM

## 2023-04-02 PROCEDURE — 84100 ASSAY OF PHOSPHORUS: CPT | Performed by: STUDENT IN AN ORGANIZED HEALTH CARE EDUCATION/TRAINING PROGRAM

## 2023-04-02 PROCEDURE — 83935 ASSAY OF URINE OSMOLALITY: CPT | Performed by: INTERNAL MEDICINE

## 2023-04-02 PROCEDURE — 84300 ASSAY OF URINE SODIUM: CPT | Performed by: INTERNAL MEDICINE

## 2023-04-02 RX ORDER — BUMETANIDE 0.25 MG/ML
4 INJECTION INTRAMUSCULAR; INTRAVENOUS ONCE
Status: COMPLETED | OUTPATIENT
Start: 2023-04-02 | End: 2023-04-02

## 2023-04-02 RX ORDER — FLUTICASONE PROPIONATE 50 MCG
2 SPRAY, SUSPENSION (ML) NASAL DAILY
Status: DISCONTINUED | OUTPATIENT
Start: 2023-04-02 | End: 2023-04-24 | Stop reason: HOSPADM

## 2023-04-02 RX ADMIN — ROSUVASTATIN CALCIUM 10 MG: 10 TABLET, FILM COATED ORAL at 09:04

## 2023-04-02 RX ADMIN — CEFTRIAXONE 2 G: 2 INJECTION, POWDER, FOR SOLUTION INTRAMUSCULAR; INTRAVENOUS at 10:24

## 2023-04-02 RX ADMIN — MULTIPLE VITAMINS W/ MINERALS TAB 1 TABLET: TAB at 09:04

## 2023-04-02 RX ADMIN — BUMETANIDE 4 MG: 0.25 INJECTION INTRAMUSCULAR; INTRAVENOUS at 12:43

## 2023-04-02 RX ADMIN — POTASSIUM CHLORIDE 40 MEQ: 750 TABLET, EXTENDED RELEASE ORAL at 17:06

## 2023-04-02 RX ADMIN — BICALUTAMIDE 50 MG: 50 TABLET ORAL at 09:03

## 2023-04-02 RX ADMIN — LATANOPROST 1 DROP: 50 SOLUTION OPHTHALMIC at 09:04

## 2023-04-02 RX ADMIN — APIXABAN 5 MG: 5 TABLET, FILM COATED ORAL at 19:58

## 2023-04-02 RX ADMIN — POTASSIUM CHLORIDE 40 MEQ: 750 TABLET, EXTENDED RELEASE ORAL at 09:03

## 2023-04-02 RX ADMIN — AMIODARONE HYDROCHLORIDE 100 MG: 200 TABLET ORAL at 09:03

## 2023-04-02 RX ADMIN — APIXABAN 5 MG: 5 TABLET, FILM COATED ORAL at 09:03

## 2023-04-02 RX ADMIN — LEVOTHYROXINE SODIUM 88 MCG: 0.09 TABLET ORAL at 09:03

## 2023-04-02 NOTE — PROGRESS NOTES
Name: Bridger Elias ADMIT: 3/30/2023   : 1930  PCP: Alma Cat MD    MRN: [id number] LOS: 2 days   AGE/SEX: 89 y.o. male  ROOM: [facility]     Subjective   Subjective   Patient seen and examined this morning.  Hospital day 3.  Daughter at bedside.  Patient states that he feels better today when compared to prior as far as his breathing goes, he is now on 2 L O2 via NC with O2 sats greater than 90% (improved from prior).  However, does continue to have generalized fatigue and unsteadiness on his feet when walking, he states that he is worried about falling and walking by himself at this time.     Objective   Objective   Vital Signs  Temp:  [97.8 °F (36.6 °C)-98.3 °F (36.8 °C)] 97.8 °F (36.6 °C)  Heart Rate:  [74-75] 75  Resp:  [16-18] 16  BP: ()/(60-68) 97/63  SpO2:  [91 %-95 %] 92 %  on  Flow (L/min):  [2-3] 2;   Device (Oxygen Therapy): nasal cannula  Body mass index is 24.56 kg/m².  Const: Elderly/frail appearing, no acute distress  Eyes: PERRL, normal conjunctiva  HENT: atraumatic, non-tender  CV: Paced rhythm, normal rate.  RESP: On 2 L O2 via NC, decreased breath sounds bilaterally  GI: soft, non-tender, non-distended  MSK: No gross deformities appreciated, no tenderness, trace bilateral lower extremity edema  Skin: Warm, dry, pale, scattered bruises.  Neuro: Awake, alert, oriented, no appreciable focal neurological deficits.  Psych: Appropriate mood and affect.    Results Review     I reviewed the patient's new clinical results.  Results from last 7 days   Lab Units 238 23  0530 23   WBC 10*3/mm3 8.75 9.28 9.38 10.04   HEMOGLOBIN g/dL 11.1* 11.2* 10.2* 12.2*   PLATELETS 10*3/mm3 171 180 156 165     Results from last 7 days   Lab Units 23  0438 23  0530 23   SODIUM mmol/L 127* 131* 132* 133*   POTASSIUM mmol/L 3.2* 4.0 4.2 4.3   CHLORIDE mmol/L 92* 97* 102 100   CO2 mmol/L 23.1 23.8 20.6* 24.0   BUN mg/dL  23 21 24* 27*   CREATININE mg/dL 1.14 1.22 1.16 1.22   GLUCOSE mg/dL 130* 108* 104* 98   EGFR mL/min/1.73 60.0* 55.3* 58.7* 55.3*     Results from last 7 days   Lab Units 04/02/23 0418 04/01/23 0438 03/31/23 0530 03/30/23 2010   ALBUMIN g/dL 2.9* 2.9* 3.2* 3.6   BILIRUBIN mg/dL 0.8 1.0 0.8 0.8   ALK PHOS U/L 219* 220* 197* 214*   AST (SGOT) U/L 53* 55* 49* 54*   ALT (SGPT) U/L 40 37 27 30     Results from last 7 days   Lab Units 04/02/23 0418 04/01/23 0438 03/31/23 0530 03/30/23 2010   CALCIUM mg/dL 8.3 8.4 8.4 9.0   ALBUMIN g/dL 2.9* 2.9* 3.2* 3.6   MAGNESIUM mg/dL 2.3 2.1  --   --    PHOSPHORUS mg/dL 2.5 3.0  --   --      Results from last 7 days   Lab Units 03/30/23 2207 03/30/23 2010   PROCALCITONIN ng/mL  --  0.08   LACTATE mmol/L 1.6  --      Glucose   Date/Time Value Ref Range Status   04/01/2023 0551 113 70 - 130 mg/dL Final     Comment:     Meter: [id number] : [id number] Krissy DEWITT       CT Chest Without Contrast Diagnostic    Result Date: 3/31/2023  Cardiac hardware with changes of prior sternotomy and mitral valve repair or replacement. There our bilateral pleural effusions with patchy opacity in the lungs bilaterally which appears similar as on the x-ray from yesterday and is favored represent pneumonia.  Radiation dose reduction techniques were utilized, including automated exposure control and exposure modulation based on body size.  This report was finalized on 3/31/2023 9:06 PM by Dr. Maurice Alejandro M.D.      I have personally reviewed all medications:  Scheduled Medications  amiodarone, 100 mg, Oral, Every Other Day  apixaban, 5 mg, Oral, Q12H  bicalutamide, 50 mg, Oral, Daily  [START ON 4/3/2023] cefTRIAXone, 1 g, Intravenous, Q24H  cefTRIAXone, 2 g, Intravenous, Once  latanoprost, 1 drop, Both Eyes, Daily  levothyroxine, 88 mcg, Oral, Daily  melatonin, 2 mg, Oral, Nightly  multivitamin with minerals, 1 tablet, Oral, Daily  rosuvastatin, 10 mg, Oral, Daily    Infusions    Diet  Diet: Cardiac Diets; Low Sodium (2g); Texture: Regular Texture (IDDSI 7); Fluid Consistency: Thin (IDDSI 0)    I have personally reviewed:  [x]  Laboratory   [x]  Microbiology   [x]  Radiology   [x]  EKG/Telemetry  [x]  Cardiology/Vascular   [x]  Records       Assessment/Plan     Active Hospital Problems    Diagnosis  POA   • **Acute respiratory failure with hypoxia [J96.01]  Yes   • Hyponatremia [E87.1]  Yes   • Normocytic anemia [D64.9]  Yes   • Hypokalemia [E87.6]  No   • Transaminitis [R74.01]  Yes   • Community acquired pneumonia, unspecified laterality [J18.9]  Yes   • Stage 3a chronic kidney disease [N18.31]  Yes   • Pacemaker [Z95.0]  Yes   • Atrial fibrillation [I48.91]  Yes   • Acute on chronic combined systolic and diastolic CHF (congestive heart failure) [I50.43]  Yes   • SCC (squamous cell carcinoma), 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       89 y.o. male with history of chronic combined systolic and diastolic heart failure, atrial fibrillation on long-term anticoagulation, s/p PPM, CAD, hypertension, hyperlipidemia, squamous cell carcinoma, CKD stage IIIb, hypothyroidism who presents to [facility] with complaints of worsening dyspnea and generalized weakness.  Admitted with acute hypoxic respiratory failure secondary to acute on chronic combined systolic and diastolic heart failure.     Acute respiratory failure with hypoxia  Acute on chronic combined systolic and diastolic heart failure  - Patient meets criteria for diagnosis of acute respiratory failure with hypoxia with: Complaints of dyspnea, new oxygen requirement, P/F ratio <300 per ABG.  - ProBNP elevated to 2683 on admission (most recently was 662 on 03/10).  Chest x-ray  showing findings concerning for multifocal pneumonia, bilateral pleural effusions and pulmonary vascular congestion. Did receive x1 dose of IV antibiotics in ED, however, given absence of additional findings to support pneumonia, further antibiotics were held, pending results of CT scan and infectious workup.  - Procalcitonin, strep, Legionella, respiratory viral panel negative.  Cultures no growth to date.  - 2D Echocardiogram showing EF 41-45% with systolic and diastolic flattening of the interventricular septum consistent with right ventricular pressure and volume overload, severely dilated right ventricular cavity.  - Initially had great response to diuresis, however, now management is complicated by worsening hyponatremia. Consulted Nephrology after discussion with Dr. Bruce, subsequently discussed with Dr. Hernandez, he is going to adjust diuretic regimen today.  - Clinically, patient appears to be improving from a respiratory perspective, his symptoms are improving compared to prior and his oxygen requirements are decreased.  The CT scan obtained of his chest suggested that findings were consistent with pulmonary vascular congestion and pneumonia, however, I personally reviewed the imaging and discussed with Dr. Hernandez, both of us feel that the findings on CT are more indicative of pulmonary vascular congestion from heart failure rather than a pneumonia, further supported by the fact that patient denies any cough/sputum production, he is afebrile, infectious work-up including procalcitonin is negative and his WBC count is within normal limits. Patient did receive an additional dose of Ceftriaxone after admission, however, at this time, further antibiotic use does not seem to be warranted. However, can always reassess and treat if patient develops new signs or symptoms.  - Continue diuresis as prescribed. Closely monitor urine output in response to ongoing therapy.   - Follow-up Cardiology and Nephrology.  -  Continue to monitor off antibiotic therapy.  - Strict I/O, daily weights, telemetry, pulse oximetry, supplemental oxygen as needed to maintain O2 sats greater than 90%, elevate HOB, aspiration precautions.    Hyponatremia  - Sodium continues to be low, however, worse today when compared to prior.  Discussed with Dr. Bruce, and we agreed that nephrology consultation was warranted, especially in setting of diuretic use from CHF.  Patient was subsequently seen by Dr. Hernandez this morning, I discussed with him afterwards, he is going to check urine studies for further evaluation, also going to place patient on 1500 cc fluid restriction.  - Order repeat BMP in AM for reassessment.  - Follow up Nephrology plans and recommendations.    Hypokalemia  - Potassium low on most recent labs. Will replete via electrolyte protocol. Follow up repeat labs to guide ongoing management decisions. Replete PRN per protocol. Continue to monitor    History of Hypertension now with hypotension  - BP more soft today when compared to prior, have been holding his carvedilol since admission.  Discussed with nephrology today, Dr. Hernandez is going to consider adding midodrine to treatment regimen, if blood pressure continues to be low.  - Closely monitor BP to guide further management decisions.     Coronary artery disease  Atrial fibrillation on long-term anticoagulation  - Appears stable at present, patient has history of PPM placement, per chart review.  No signs or symptoms suggestive of ACS. Held carvedilol on admission in the setting of acute decompensated heart failure and soft BP. Continue amiodarone and Eliquis as prescribed.  Continue to hold Carvedilol, resume if/when appropriate. Monitor on telemetry.    Chronic kidney disease stage 3A  - On most recent labs, patient's creatinine found to be stable and near apparent baseline, per review of previous lab values and outside records.  - No indications to warrant acute changes and/or  intervention at this time.  - Order repeat BMP in AM for reassessment of renal function.   - Will continue to monitor and trend creatinine to guide ongoing management decisions. Avoid nephrotoxic medications, IVF, strict I/O, daily weights.    Anemia  - Hemoglobin slightly low, however, stable from prior. No evidence of overt blood loss. No indications for acute intervention at this time.    - Order repeat CBC in AM for reassessment. Continue to monitor, transfuse for hemoglobin <7.    Transaminitis  - LFTs slightly elevated, however, relatively stable from prior.  Monitor for now.  Repeat CMP in the AM for reassessment.    Hypothyroidism  - Stable. Repeat TSH within normal limits. Continue Levothyroxine as prescribed.     Hyperlipidemia  - Stable. Continue Statin as prescribed.     Generalized weakness/Debility  - Consulted PT/OT, fall precautions.      · Eliquis (home med) for DVT prophylaxis.  · Full code.  · Discussed with patient, family and nursing staff.  · Anticipate discharge TBD timing yet to be determined.      Som Mckinney DO  [facility]  04/02/23  09:00 EDT

## 2023-04-02 NOTE — PROGRESS NOTES
LOS: 2 days   Patient Care Team:  Alma Cat MD as PCP - General (Family Medicine)  Maurice Cook MD as Consulting Physician (Radiation Oncology)    Chief Complaint: Follow-up pneumonia, acute on chronic combined CHF, paroxysmal atrial fibrillation, pacemaker.    Interval History: He is still very weak.  Feels slightly better.  Not as short of breath today.  No chest pain.    Vital Signs:  Temp:  [97.5 °F (36.4 °C)-98.3 °F (36.8 °C)] 97.5 °F (36.4 °C)  Heart Rate:  [74-75] 74  Resp:  [16-17] 17  BP: ()/(56-63) 111/62    Intake/Output Summary (Last 24 hours) at 4/2/2023 1907  Last data filed at 4/2/2023 1530  Gross per 24 hour   Intake 240 ml   Output 1400 ml   Net -1160 ml       Physical Exam:   General Appearance:    No acute distress, alert and oriented x4, appears ill.   Lungs:     Decreased breath sounds bilaterally    Heart:    Regular rhythm and normal rate.  No murmurs, gallops, or       rubs.   Abdomen:     Soft, nontender, nondistended.    Extremities:   Trace edema of the lower extremities.     Results Review:    Results from last 7 days   Lab Units 04/02/23  0418   SODIUM mmol/L 127*   POTASSIUM mmol/L 3.2*   CHLORIDE mmol/L 92*   CO2 mmol/L 23.1   BUN mg/dL 23   CREATININE mg/dL 1.14   GLUCOSE mg/dL 130*   CALCIUM mg/dL 8.3     Results from last 7 days   Lab Units 03/30/23 2207 03/30/23 2010   HSTROP T ng/L 11 12  12     Results from last 7 days   Lab Units 04/02/23  0418   WBC 10*3/mm3 8.75   HEMOGLOBIN g/dL 11.1*   HEMATOCRIT % 32.1*   PLATELETS 10*3/mm3 171             Results from last 7 days   Lab Units 04/02/23  0418   MAGNESIUM mg/dL 2.3           I reviewed the patient's new clinical results.        Assessment:  1.  Acute hypoxic respiratory failure  2.  Community-acquired pneumonia  3.  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  Severe right ventricular enlargement with normal function  5.  Severe tricuspid regurgitation  6.  Status post mitral valve repair  7.   Paroxysmal atrial fibrillation  8.  Sick sinus syndrome, status post pacemaker  9.  Hypertension  10.  Generalized weakness, multifactorial  11.  Deconditioning    Plan:  -I reviewed his echocardiogram.  EF is about 40 to 45%.  The right ventricle is significantly enlarged with normal function, although there is septal flattening consistent with increased right ventricular pressure and volume overload.  There was also severe tricuspid regurgitation.  His mitral valve repair looked good.    -He is currently being treated for pneumonia, although his white blood cell count and procalcitonin have been normal.  Suspect that this was largely secondary to congestive heart failure.  Discussed with Dr. Mckinney.    -Appreciate assistance from Dr. Hernandez for hyponatremia.  He is going to give him 1 dose of IV Bumex today.  Agree with midodrine if needed for blood pressure.    -His blood pressure has been on the lower side.  I am going to hold his Coreg today and potentially resume tomorrow.     -Continue amiodarone and Eliquis for his history of atrial fibrillation.    -He does have bilateral pleural effusions which are moderate on my review of the CT scan.  Could consider thoracentesis if he does not improve clinically.  I will recheck chest x-ray tomorrow.    -Discussed with the patient and his daughter at bedside.    Osbaldo Bruce MD  04/02/23  19:07 EDT

## 2023-04-02 NOTE — CONSULTS
[facility] Consult Note      Patient Name: Bridger Elias  : 1930  MRN: [id number]  Primary Care Physician:  Alma Cat MD  Referring Physician: Ramsey Talamantes MD  Date of admission: 3/30/2023    Subjective     Reason for Consult: Hyponatremia    HPI:   Bridger Elias is a 89 y.o. male was admitted on 3/30/2023 with increasing shortness of breath and generalized weakness.  The patient has worsening hyponatremia hence nephrology consult was requested and he has abnormal renal function he had CKD's stage III.  Baseline creatinine around 1.2-1.4.  He has a history of acute on chronic diastolic congestive heart failure, atrial fibrillation, coronary artery disease, dyslipidemia, hypertension tension with chronic kidney disease, hypothyroidism, chronically anticoagulated, he has history of prostate cancer, pacemaker.  He will he was on a low-dose of diuretics furosemide 20 mg daily which was discontinued and his ejection fraction reported to be 40 to 45% also had severe tricuspid regurgitation and he had a prior mitral valve repair.    The patient is having some difficulty breathing when lying down, no chest pain, no nausea or vomiting, no abdominal pain, he had difficulty getting up to void so he had some urinary incontinence because of inability to get up to void.          Review of Systems:   14 point review of systems is otherwise negative except for mentioned above on HPI    Personal History     Past Medical History:   Diagnosis Date   • Acute on chronic diastolic CHF (congestive heart failure) 2022   • Atrial fibrillation 2022   • Coronary arteriosclerosis 7/3/2014    Formatting of this note might be different from the original. [facility] 16  IMPRESSION:   1. Right heart disease, hypertensive cardiac disease.   2. Status post mitral valve repair.   3. Anatomy: No hemodynamically significant disease. about 30-40% lesion of    the right coronary artery. Normal.  Left coronary artery system.   • Dyslipidemia 12/5/2013   • Glaucoma 4/14/2022   • Hypertension 12/5/2013   • Hypertensive kidney disease, stage III 3/13/2017   • Hypothyroidism 3/19/2017   • Long term (current) use of anticoagulants 12/5/2013    Formatting of this note might be different from the original. Mitral valve replacement and atrial fibrillation Assume a range of 2.5-3.5.   • Malignant neoplasm of prostate 12/5/2013    Formatting of this note might be different from the original. 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    Social History:  reports that he has never smoked. He has never used smokeless tobacco. He reports that he does not drink alcohol and does not use drugs.    Home Medications:  Prior to Admission medications    Medication Sig Start Date End Date Taking? Authorizing Provider   acetaminophen (TYLENOL) 325 MG tablet Take 2 tablets by mouth Every 6 (Six) Hours As Needed for Mild Pain , Moderate Pain , Headache or Fever. 4/22/22   Abad Yee MD   amiodarone (PACERONE) 200 MG tablet Take 1 tablet by mouth Daily.  Patient taking differently: Take 100 mg by mouth Every Other Day. 9/10/22   Amari Alva MD   apixaban (ELIQUIS) 5 MG tablet tablet Take 1 tablet by mouth Every 12 (Twelve) Hours. Indications: Atrial Fibrillation 4/22/22   Abad Yee MD   bicalutamide (CASODEX) 50 MG chemo tablet Take 1 tablet by mouth Daily.    Provider, MD Eugenia   calcium carbonate (OS-SOPHIA) 600 MG tablet Take 1 tablet by mouth Daily.    ProviderEugenia MD   carvedilol (COREG)  3.125 MG tablet Take 1 tablet by mouth Every Night. 3/18/22   Eugenia Fisher MD   latanoprost (XALATAN) 0.005 % ophthalmic solution Apply  to eye(s) as directed by provider.    Eugenia Fisher MD   levothyroxine (SYNTHROID, LEVOTHROID) 88 MCG tablet Take 1 tablet by mouth Daily.    Eugenia Fisher MD   multivitamin with minerals tablet tablet Take 1 tablet by mouth Daily.    Eugenia Fisher MD   polyethylene glycol (MIRALAX) 17 g packet Take 17 g by mouth Daily As Needed (Use if senna-docusate is ineffective). 4/22/22   Abad Yee MD   rosuvastatin (CRESTOR) 10 MG tablet Take 1 tablet by mouth Daily.    Eugenia Fisher MD   silver sulfadiazine (SILVADENE, SSD) 1 % cream Apply 1 application topically to the appropriate area as directed 3 (Three) Times a Day. Apply to facial regions of inflammation per rad onc recs 4/22/22   Abad Yee MD   silver sulfadiazine (SILVADENE, SSD) 1 % cream Apply 1 application topically to the appropriate area as directed 2 (Two) Times a Day. 5/2/22   Maurice Cook MD       Allergies:  No Known Allergies    Objective     Vitals:   Temp:  [97.8 °F (36.6 °C)-98.3 °F (36.8 °C)] 97.8 °F (36.6 °C)  Heart Rate:  [74-75] 75  Resp:  [16-18] 16  BP: ()/(63-68) 97/63  Flow (L/min):  [2-5] 5    Intake/Output Summary (Last 24 hours) at 4/2/2023 1124  Last data filed at 4/2/2023 0725  Gross per 24 hour   Intake 240 ml   Output 1360 ml   Net -1120 ml       Physical Exam:   Constitutional: Awake, alert, no acute distress.  Chronically ill and frail  HEENT: Sclera anicteric, no conjunctival injection  Neck: Supple, no thyromegaly, no lymphadenopathy, trachea at midline, mild JVD  Respiratory: Bilateral rhonchi and crackles, nonlabored respiration  Cardiovascular: Irregularly irregular, no murmurs, no rubs or gallops, no carotid bruit  Gastrointestinal: Positive bowel sounds, abdomen is soft, nontender and nondistended  : No palpable  bladder  Musculoskeletal: N 2+ left ankle edema, the patient has no right ankle edema, no clubbing or cyanosis  Psychiatric: Flat affect, cooperative  Neurologic: Hard of hearing, moving all extremities, normal speech and mental status  Skin: Warm and dry       Scheduled Meds:     amiodarone, 100 mg, Oral, Every Other Day  apixaban, 5 mg, Oral, Q12H  bicalutamide, 50 mg, Oral, Daily  [START ON 4/3/2023] cefTRIAXone, 1 g, Intravenous, Q24H  latanoprost, 1 drop, Both Eyes, Daily  levothyroxine, 88 mcg, Oral, Daily  melatonin, 2 mg, Oral, Nightly  multivitamin with minerals, 1 tablet, Oral, Daily  rosuvastatin, 10 mg, Oral, Daily      IV Meds:        Results Reviewed:   I have personally reviewed the results from the time of this admission to 4/2/2023 11:24 EDT     Lab Results   Component Value Date    GLUCOSE 130 (H) 04/02/2023    CALCIUM 8.3 04/02/2023     (L) 04/02/2023    K 3.2 (L) 04/02/2023    CO2 23.1 04/02/2023    CL 92 (L) 04/02/2023    BUN 23 04/02/2023    CREATININE 1.14 04/02/2023    EGFRIFNONA 60 (L) 07/20/2021    BCR 20.2 04/02/2023    ANIONGAP 11.9 04/02/2023      Lab Results   Component Value Date    MG 2.3 04/02/2023    PHOS 2.5 04/02/2023    ALBUMIN 2.9 (L) 04/02/2023           Assessment / Plan     ASSESSMENT:  -Hyponatremia associated with acute on chronic diastolic dysfunction  -CKD stage III secondary to nephrosclerosis and advanced age  -History of hypertension but the patient is hypotensive at present  -A-fib  -Chronically anticoagulated  -Coronary artery disease  -Valvular heart disease  -Frailty    PLAN:  -Check random urine for sodium, chloride and also urine and serum osmolality, will check uric acid  -1 dose of bumetanide 4 mg IV today  -May consider adding midodrine  -Restrict fluid intake to 1500 cc per 24 hours  -Surveillance labs  Prognosis is is very guarded given his advanced age.    Thank you for involving us in the care of Bridger Elias.  Please feel free to call with any  questions.    Naeem Hernandez MD  04/02/23  11:24 EDT    [facility]  [phone]      Please note that portions of this note were completed with a voice recognition program.

## 2023-04-02 NOTE — PLAN OF CARE
Goal Outcome Evaluation:   Nephrology consulted for hyponatremia. IV bumex x1 given per nephrology. Patient appetite is poor. Stregth and gait remain steady, patient up to bathroom with just CGA with a walker. Blanchable redness observed to bilateral gluteal folds and scrotum, barrier cream applied, patient encouraged to shift positions frequently. Weight continues to decrease, breathing still about the same. 2-3L NC at rest, up to 5L with activity. Potassium fully replaced today.     Diuretic in Use: iv lasix, iv bumex  Response to Diuretics (Output greater than intake): yes  Daily Weight (up or down): down   O2 Requirements: 2-5L  Functional Status (Activity level, tolerance and respiratory symptoms): CGA, walker  Discharge Plans: TBD

## 2023-04-03 ENCOUNTER — APPOINTMENT (OUTPATIENT)
Dept: GENERAL RADIOLOGY | Facility: HOSPITAL | Age: 88
DRG: 291 | End: 2023-04-03
Payer: MEDICARE

## 2023-04-03 PROBLEM — J18.9 COMMUNITY ACQUIRED PNEUMONIA, UNSPECIFIED LATERALITY: Status: RESOLVED | Noted: 2023-03-31 | Resolved: 2023-04-03

## 2023-04-03 LAB
ALBUMIN SERPL-MCNC: 3.1 G/DL (ref 3.5–5.2)
ALBUMIN/GLOB SERPL: 0.8 G/DL
ALP SERPL-CCNC: 251 U/L (ref 39–117)
ALT SERPL W P-5'-P-CCNC: 45 U/L (ref 1–41)
ANION GAP SERPL CALCULATED.3IONS-SCNC: 9.3 MMOL/L (ref 5–15)
AST SERPL-CCNC: 65 U/L (ref 1–40)
BASOPHILS # BLD AUTO: 0.09 10*3/MM3 (ref 0–0.2)
BASOPHILS NFR BLD AUTO: 0.8 % (ref 0–1.5)
BILIRUB SERPL-MCNC: 0.7 MG/DL (ref 0–1.2)
BUN SERPL-MCNC: 24 MG/DL (ref 8–23)
BUN/CREAT SERPL: 17.5 (ref 7–25)
CALCIUM SPEC-SCNC: 8.8 MG/DL (ref 8.2–9.6)
CHLORIDE SERPL-SCNC: 96 MMOL/L (ref 98–107)
CO2 SERPL-SCNC: 26.7 MMOL/L (ref 22–29)
CREAT SERPL-MCNC: 1.37 MG/DL (ref 0.76–1.27)
DEPRECATED RDW RBC AUTO: 47.1 FL (ref 37–54)
EGFRCR SERPLBLD CKD-EPI 2021: 48.1 ML/MIN/1.73
EOSINOPHIL # BLD AUTO: 0.4 10*3/MM3 (ref 0–0.4)
EOSINOPHIL NFR BLD AUTO: 3.8 % (ref 0.3–6.2)
ERYTHROCYTE [DISTWIDTH] IN BLOOD BY AUTOMATED COUNT: 14 % (ref 12.3–15.4)
GLOBULIN UR ELPH-MCNC: 3.9 GM/DL
GLUCOSE SERPL-MCNC: 108 MG/DL (ref 65–99)
HCT VFR BLD AUTO: 32.4 % (ref 37.5–51)
HGB BLD-MCNC: 11.9 G/DL (ref 13–17.7)
IMM GRANULOCYTES # BLD AUTO: 0.14 10*3/MM3 (ref 0–0.05)
IMM GRANULOCYTES NFR BLD AUTO: 1.3 % (ref 0–0.5)
LYMPHOCYTES # BLD AUTO: 1.28 10*3/MM3 (ref 0.7–3.1)
LYMPHOCYTES NFR BLD AUTO: 12.1 % (ref 19.6–45.3)
MAGNESIUM SERPL-MCNC: 2.2 MG/DL (ref 1.7–2.3)
MCH RBC QN AUTO: 34.1 PG (ref 26.6–33)
MCHC RBC AUTO-ENTMCNC: 36.7 G/DL (ref 31.5–35.7)
MCV RBC AUTO: 92.8 FL (ref 79–97)
MONOCYTES # BLD AUTO: 1.25 10*3/MM3 (ref 0.1–0.9)
MONOCYTES NFR BLD AUTO: 11.8 % (ref 5–12)
NEUTROPHILS NFR BLD AUTO: 7.45 10*3/MM3 (ref 1.7–7)
NEUTROPHILS NFR BLD AUTO: 70.2 % (ref 42.7–76)
NRBC BLD AUTO-RTO: 0.2 /100 WBC (ref 0–0.2)
PHOSPHATE SERPL-MCNC: 2.1 MG/DL (ref 2.5–4.5)
PLATELET # BLD AUTO: 181 10*3/MM3 (ref 140–450)
PMV BLD AUTO: 10.5 FL (ref 6–12)
POTASSIUM SERPL-SCNC: 4.6 MMOL/L (ref 3.5–5.2)
PROT SERPL-MCNC: 7 G/DL (ref 6–8.5)
RBC # BLD AUTO: 3.49 10*6/MM3 (ref 4.14–5.8)
SODIUM SERPL-SCNC: 132 MMOL/L (ref 136–145)
URATE SERPL-MCNC: 4.6 MG/DL (ref 3.4–7)
WBC NRBC COR # BLD: 10.61 10*3/MM3 (ref 3.4–10.8)

## 2023-04-03 PROCEDURE — 99232 SBSQ HOSP IP/OBS MODERATE 35: CPT | Performed by: INTERNAL MEDICINE

## 2023-04-03 PROCEDURE — 85025 COMPLETE CBC W/AUTO DIFF WBC: CPT | Performed by: INTERNAL MEDICINE

## 2023-04-03 PROCEDURE — 71045 X-RAY EXAM CHEST 1 VIEW: CPT

## 2023-04-03 PROCEDURE — 84550 ASSAY OF BLOOD/URIC ACID: CPT | Performed by: INTERNAL MEDICINE

## 2023-04-03 PROCEDURE — 83735 ASSAY OF MAGNESIUM: CPT | Performed by: STUDENT IN AN ORGANIZED HEALTH CARE EDUCATION/TRAINING PROGRAM

## 2023-04-03 PROCEDURE — 97530 THERAPEUTIC ACTIVITIES: CPT

## 2023-04-03 PROCEDURE — 97110 THERAPEUTIC EXERCISES: CPT

## 2023-04-03 PROCEDURE — 80053 COMPREHEN METABOLIC PANEL: CPT | Performed by: STUDENT IN AN ORGANIZED HEALTH CARE EDUCATION/TRAINING PROGRAM

## 2023-04-03 PROCEDURE — 84100 ASSAY OF PHOSPHORUS: CPT | Performed by: STUDENT IN AN ORGANIZED HEALTH CARE EDUCATION/TRAINING PROGRAM

## 2023-04-03 RX ORDER — TORSEMIDE 20 MG/1
40 TABLET ORAL DAILY
Status: DISCONTINUED | OUTPATIENT
Start: 2023-04-03 | End: 2023-04-07

## 2023-04-03 RX ADMIN — MULTIPLE VITAMINS W/ MINERALS TAB 1 TABLET: TAB at 09:04

## 2023-04-03 RX ADMIN — Medication 10 ML: at 20:57

## 2023-04-03 RX ADMIN — TORSEMIDE 40 MG: 20 TABLET ORAL at 13:47

## 2023-04-03 RX ADMIN — APIXABAN 5 MG: 5 TABLET, FILM COATED ORAL at 20:57

## 2023-04-03 RX ADMIN — FLUTICASONE PROPIONATE 2 SPRAY: 50 SPRAY, METERED NASAL at 09:04

## 2023-04-03 RX ADMIN — BICALUTAMIDE 50 MG: 50 TABLET ORAL at 09:04

## 2023-04-03 RX ADMIN — ROSUVASTATIN CALCIUM 10 MG: 10 TABLET, FILM COATED ORAL at 09:04

## 2023-04-03 RX ADMIN — LEVOTHYROXINE SODIUM 88 MCG: 0.09 TABLET ORAL at 09:04

## 2023-04-03 RX ADMIN — APIXABAN 5 MG: 5 TABLET, FILM COATED ORAL at 09:04

## 2023-04-03 RX ADMIN — LATANOPROST 1 DROP: 50 SOLUTION OPHTHALMIC at 09:04

## 2023-04-03 NOTE — THERAPY TREATMENT NOTE
Patient Name: Bridger Elias  : 1930    MRN: [id number]                              Today's Date: 4/3/2023       Admit Date: 3/30/2023    Visit Dx:     ICD-10-CM ICD-9-CM   1. Community acquired pneumonia, unspecified laterality  J18.9 486     Patient Active Problem List   Diagnosis   • SCC (squamous cell carcinoma), face   • General weakness   • Pacemaker   • Abnormal gait   • Atrial fibrillation   • Chronic diastolic heart failure   • Coronary arteriosclerosis   • Dyslipidemia   • Glaucoma   • Mitral valve disorder   • Hypertension   • Hypertensive kidney disease, stage III   • Hypothyroidism   • Long term (current) use of anticoagulants   • Malignant neoplasm of prostate   • Osteoporosis   • Squamous cell carcinoma of skin of face   • Acute on chronic diastolic CHF (congestive heart failure)   • Stage 3a chronic kidney disease   • Personal history of radiation therapy   • Acute on chronic combined systolic and diastolic CHF (congestive heart failure)   • Cellulitis of right leg   • Chronic acquired lymphedema   • Contusion of face   • Contusion of forearm, left   • Fall   • Chronic systolic heart failure (CMS/HCC)   • Acute respiratory failure with hypoxia   • Hyponatremia   • Normocytic anemia   • Hypokalemia   • Transaminitis     Past Medical History:   Diagnosis Date   • Acute on chronic diastolic CHF (congestive heart failure) 2022   • Atrial fibrillation 2022   • Coronary arteriosclerosis 7/3/2014    Formatting of this note might be different from the original. [facility] 16  IMPRESSION:   1. Right heart disease, hypertensive cardiac disease.   2. Status post mitral valve repair.   3. Anatomy: No hemodynamically significant disease. about 30-40% lesion of    the right coronary artery. Normal. Left coronary artery system.   • Dyslipidemia 2013   • Glaucoma 2022   • Hypertension 2013   • Hypertensive kidney disease, stage III 3/13/2017   • Hypothyroidism 3/19/2017   • Long term  (current) use of anticoagulants 12/5/2013    Formatting of this note might be different from the original. Mitral valve replacement and atrial fibrillation Assume a range of 2.5-3.5.   • Malignant neoplasm of prostate 12/5/2013    Formatting of this note might be different from the original. 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   • Stage 3b chronic kidney disease 4/14/2022     Past Surgical History:   Procedure Laterality Date   • PACEMAKER IMPLANTATION  2018      General Information     Row Name 04/03/23 1557          Physical Therapy Time and Intention    Document Type therapy note (daily note)  -EB     Mode of Treatment individual therapy;physical therapy  -EB     Row Name 04/03/23 1557          General Information    Existing Precautions/Restrictions fall  -EB     Row Name 04/03/23 1557          Cognition    Orientation Status (Cognition) oriented x 3  -EB     Row Name 04/03/23 1558          Safety Issues, Functional Mobility    Safety Issues Affecting Function (Mobility) judgment;insight into deficits/self-awareness  -     Impairments Affecting Function (Mobility) endurance/activity tolerance;strength;range of motion (ROM)  -           User Key  (r) = Recorded By, (t) = Taken By, (c) = Cosigned By    Initials Name Provider Type    EB Noa Garcia PTA Physical Therapist Assistant               Mobility     Row Name 04/03/23 1557          Bed Mobility    Supine-Sit Bishop (Bed Mobility) minimum assist (75% patient effort)  -     Sit-Supine Bishop (Bed Mobility) contact guard  -     Assistive Device (Bed Mobility) bed rails;head of bed elevated  -EB     Comment, (Bed Mobility) increased time with bed mobility but able get to EOB with little assistance. Pt felt weak and  unable to perform bed mobility  -EB     Row Name 04/03/23 1559          Transfers    Comment, (Transfers) pt declined transfers or ambulation due to feeling weak. Max encouragement given.  -EB           User Key  (r) = Recorded By, (t) = Taken By, (c) = Cosigned By    Initials Name Provider Type    Noa Hancock PTA Physical Therapist Assistant               Obj/Interventions     Row Name 04/03/23 1552          Motor Skills    Therapeutic Exercise --  BLE: AP, LAQs, seated marches (X10)  -     Row Name 04/03/23 1556          Balance    Balance Assessment sitting static balance;sitting dynamic balance  -EB     Static Sitting Balance independent  -EB     Dynamic Sitting Balance supervision  -EB     Position, Sitting Balance sitting edge of bed  -EB     Comment, Balance pt felt he was leaning back too far, however pt leans forward  and cues for pt to correct sitting posture. Pt's center is off. but no LOB.  -EB           User Key  (r) = Recorded By, (t) = Taken By, (c) = Cosigned By    Initials Name Provider Type    Noa Hancock PTA Physical Therapist Assistant               Goals/Plan    No documentation.                Clinical Impression     Row Name 04/03/23 0457          Plan of Care Review    Plan of Care Reviewed With patient;daughter  -     Progress no change  -     Outcome Evaluation Pt seen for PT treatment today. Pt initially declined due to feeling too weak but with pt's daughter's encouragement, pt agreed to at least sit on the EOB. Pt moves well with bed mobility needing Celso/CGA. Once pt was sitting on EOB, pt took some time scooting out but able to without assistance. Pt able to sit on EOB with supervision and perform bilateral LE exercises. Pt felt like he was lean posteriorly, however pt's center was leaning forward. Cues for pt to correct sitting posture. Pt felt too weak to be able to perform transfers and ambulate despite encouragement. Pt felt fatigued and requested to lay back  down. Will continue to progress pt as able.  -EB     Row Name 04/03/23 1559          Therapy Assessment/Plan (PT)    Therapy Frequency (PT) 5 times/wk  -EB     Row Name 04/03/23 1559          Positioning and Restraints    Pre-Treatment Position in bed  -EB     Post Treatment Position bed  -EB     In Bed supine;encouraged to call for assist;exit alarm on;call light within reach  -EB           User Key  (r) = Recorded By, (t) = Taken By, (c) = Cosigned By    Initials Name Provider Type    Noa Hancock PTA Physical Therapist Assistant               Outcome Measures     Row Name 04/03/23 1603          How much help from another person do you currently need...    Turning from your back to your side while in flat bed without using bedrails? 3  -EB     Moving from lying on back to sitting on the side of a flat bed without bedrails? 3  -EB     Moving to and from a bed to a chair (including a wheelchair)? 3  -EB     Standing up from a chair using your arms (e.g., wheelchair, bedside chair)? 3  -EB     Climbing 3-5 steps with a railing? 2  -EB     To walk in hospital room? 2  -EB     AM-PAC 6 Clicks Score (PT) 16  -EB     Highest level of mobility 5 --> Static standing  -EB           User Key  (r) = Recorded By, (t) = Taken By, (c) = Cosigned By    Initials Name Provider Type    Noa Hancock PTA Physical Therapist Assistant                             Physical Therapy Education     Title: PT OT SLP Therapies (In Progress)     Topic: Physical Therapy (In Progress)     Point: Mobility training (Done)     Learning Progress Summary           Patient Acceptance, E,D, VU,NR by GEORGE at 4/3/2023 1603    Acceptance, E, VU,NR by RODGER at 3/31/2023 1633                   Point: Home exercise program (Not Started)     Learner Progress:  Not documented in this visit.          Point: Body mechanics (Done)     Learning Progress Summary           Patient Acceptance, E,D, VU,NR by GEORGE at 4/3/2023 1603    Acceptance, E, VU,NR by RODGER at  3/31/2023 1633                   Point: Precautions (Done)     Learning Progress Summary           Patient Acceptance, E,D, VU,NR by EB at 4/3/2023 1603    Acceptance, E, VU,NR by DJ at 3/31/2023 1633                               User Key     Initials Effective Dates Name Provider Type Discipline    EB 02/14/23 -  Noa Garcia PTA Physical Therapist Assistant PT    DJ 10/25/19 -  Latosha Sanders PT Physical Therapist PT              PT Recommendation and Plan     Plan of Care Reviewed With: patient, daughter  Progress: no change  Outcome Evaluation: Pt seen for PT treatment today. Pt initially declined due to feeling too weak but with pt's daughter's encouragement, pt agreed to at least sit on the EOB. Pt moves well with bed mobility needing Celso/CGA. Once pt was sitting on EOB, pt took some time scooting out but able to without assistance. Pt able to sit on EOB with supervision and perform bilateral LE exercises. Pt felt like he was lean posteriorly, however pt's center was leaning forward. Cues for pt to correct sitting posture. Pt felt too weak to be able to perform transfers and ambulate despite encouragement. Pt felt fatigued and requested to lay back down. Will continue to progress pt as able.     Time Calculation:    PT Charges     Row Name 04/03/23 1556             Time Calculation    Start Time 1446  -EB      Stop Time 1509  -EB      Time Calculation (min) 23 min  -EB      PT Received On 04/03/23  -EB      PT - Next Appointment 04/04/23  -EB         Time Calculation- PT    Total Timed Code Minutes- PT 23 minute(s)  -EB            User Key  (r) = Recorded By, (t) = Taken By, (c) = Cosigned By    Initials Name Provider Type    EB Noa Garcia PTA Physical Therapist Assistant              Therapy Charges for Today     Code Description Service Date Service Provider Modifiers Qty    02561603089 HC PT THER PROC EA 15 MIN 4/3/2023 Noa Garcia PTA GP 1    07486174727 HC PT THERAPEUTIC ACT EA 15 MIN 4/3/2023  Noa Garcia, PTA GP 1          PT G-Codes  Outcome Measure Options: AM-PAC 6 Clicks Basic Mobility (PT)  AM-PAC 6 Clicks Score (PT): 16  AM-PAC 6 Clicks Score (OT): 19       Noa Garcia, INEZ  4/3/2023

## 2023-04-03 NOTE — PLAN OF CARE
Goal Outcome Evaluation:         Pt A&Ox4, 3L while awake and had to increase to 5L n/c humidified during HS due to pt was mouth-breathing and desat'ing.  V-paced, repositions self in bed with minimal assist and has preferred daughter's help.  Pt's daughter at bedside assisting pt with urinal and has applied zinc cream to pt's scrotum with perineal care as well.  VSS, no acute signs of distress at this time.        Problem: Fall Injury Risk  Goal: Absence of Fall and Fall-Related Injury  Outcome: Ongoing, Progressing  Intervention: Identify and Manage Contributors  Description: Develop a fall prevention plan with the patient and caregiver/family.  Provide reorientation, appropriate sensory stimulation and routines with changes in mental status to decrease risk of fall.  Promote use of personal vision and auditory aids.  Assess assistance level required for safe and effective self-care; provide support as needed, such as toileting, mobilization. For age 65 and older, implement timed toileting with assistance.  Encourage physical activity, such as performance of mobility and self-care at highest level of patient ability, multicomponent exercise program and provision of appropriate assistive devices.  If fall occurs, assess the severity of injury; implement fall injury protocol. Determine the cause and revise fall injury prevention plan.  Regularly review medication contribution to fall risk; adjust medication administration times to minimize risk of falling.  Consider risk related to polypharmacy and age.  Balance adequate pain management with potential for oversedation.  Recent Flowsheet Documentation  Taken 4/3/2023 0209 by Lakeisha Cottrell, RN  Medication Review/Management: medications reviewed  Taken 4/3/2023 0021 by Lakeisha Cottrell, RN  Medication Review/Management:  • medications reviewed  • high-risk medications identified  Self-Care Promotion:  • independence encouraged  • BADL personal objects within  reach  • safe use of adaptive equipment encouraged  Taken 4/2/2023 2146 by Lakeisha Cottrell, RN  Medication Review/Management: medications reviewed  Taken 4/2/2023 1952 by Lakeisha Cottrell RN  Medication Review/Management:  • medications reviewed  • high-risk medications identified  Self-Care Promotion:  • independence encouraged  • BADL personal objects within reach  • safe use of adaptive equipment encouraged  Intervention: Promote Injury-Free Environment  Description: Provide a safe, barrier-free environment that encourages independent activity.  Keep care area uncluttered and well-lighted.  Determine need for increased observation or monitoring.  Avoid use of devices that minimize mobility, such as restraints or indwelling urinary catheter.  Recent Flowsheet Documentation  Taken 4/3/2023 0209 by Lakeisha Cottrell RN  Safety Promotion/Fall Prevention: safety round/check completed  Taken 4/3/2023 0021 by Lakeisha Cottrell RN  Safety Promotion/Fall Prevention: safety round/check completed  Taken 4/2/2023 2146 by Lakeisha Cottrell RN  Safety Promotion/Fall Prevention: safety round/check completed  Taken 4/2/2023 1952 by Lakeisha Cottrell RN  Safety Promotion/Fall Prevention: safety round/check completed     Problem: Skin Injury Risk Increased  Goal: Skin Health and Integrity  Outcome: Ongoing, Progressing  Intervention: Promote and Optimize Oral Intake  Description: Perform a nutrition assessment; include a nutrition-focused physical exam.  Determine calorie, protein, vitamin, mineral and fluid requirements.  Assess for micronutrient deficiencies; supplement if depleted.  Assess need and assist with meal set-up and feeding.  Adjust diet or meal schedule based on preferences and tolerance.  Offer oral supplemental food or drinks to enhance calorie and protein intake.  Establish bowel elimination program to increase comfort and appetite.  Minimize unnecessary dietary restrictions to increase oral intake.  Provide and  encourage oral hygiene to enhance desire to eat.  Consider enteral nutrition support if oral intake remains inadequate; provide parenteral nutrition if enteral is contraindicated.  Recent Flowsheet Documentation  Taken 4/3/2023 0021 by Lakeisha Cottrell, RN  Oral Nutrition Promotion:  • rest periods promoted  • safe use of adaptive equipment encouraged  Taken 4/2/2023 1952 by Lakeisha Cottrell, RN  Oral Nutrition Promotion:  • safe use of adaptive equipment encouraged  • rest periods promoted  Intervention: Optimize Skin Protection  Description: Perform a full pressure injury risk assessment, as indicated by screening, upon admission to care unit.  Reassess skin (injury risk, full inspection) frequently (e.g., scheduled interval, with change in condition) to provide optimal early detection and prevention.  Maintain adequate tissue perfusion (e.g., encourage fluid balance; avoid crossing legs, constrictive clothing or devices) to promote tissue oxygenation.  Maintain head of bed at lowest degree of elevation tolerated, considering medical condition and other restrictions.  Avoid positioning onto an area that remains reddened.  Minimize incontinence and moisture (e.g., toileting schedule; moisture-wicking pad, diaper or incontinence collection device; skin moisture barrier).  Cleanse skin promptly and gently when soiled utilizing a pH-balanced cleanser.  Relieve and redistribute pressure (e.g., scheduled position changes, weight shifts, use of support surface, medical device repositioning, protective dressing application, use of positioning device, microclimate control, use of pressure-injury-monitor  Encourage increased activity, such as sitting in a chair at the bedside or early mobilization, when able to tolerate.  Recent Flowsheet Documentation  Taken 4/3/2023 0209 by Lakeisha Cottrell, RN  Head of Bed (HOB) Positioning: HOB elevated  Taken 4/3/2023 0021 by Lakeisha Cottrell, RN  Pressure Reduction Techniques:  •  frequent weight shift encouraged  • weight shift assistance provided  • positioned off wounds  Head of Bed (HOB) Positioning: HOB at 20-30 degrees  Pressure Reduction Devices:  • positioning supports utilized  • pressure-redistributing mattress utilized  Skin Protection:  • adhesive use limited  • incontinence pads utilized  • tubing/devices free from skin contact  • transparent dressing maintained  Taken 4/2/2023 2146 by Lakeisha Cottrell, RN  Head of Bed (HOB) Positioning: HOB at 20-30 degrees  Taken 4/2/2023 1952 by Lakeisha Cottrell, RN  Pressure Reduction Techniques:  • frequent weight shift encouraged  • weight shift assistance provided  • positioned off wounds  Head of Bed (HOB) Positioning: HOB elevated  Pressure Reduction Devices:  • positioning supports utilized  • pressure-redistributing mattress utilized  Skin Protection:  • adhesive use limited  • incontinence pads utilized  • tubing/devices free from skin contact  • transparent dressing maintained  • skin sealant/moisture barrier applied

## 2023-04-03 NOTE — PLAN OF CARE
Goal Outcome Evaluation:  Plan of Care Reviewed With: patient, daughter        Progress: no change  Outcome Evaluation: Pt seen for PT treatment today. Pt initially declined due to feeling too weak but with pt's daughter's encouragement, pt agreed to at least sit on the EOB. Pt moves well with bed mobility needing Celso/CGA. Once pt was sitting on EOB, pt took some time scooting out but able to without assistance. Pt able to sit on EOB with supervision and perform bilateral LE exercises. Pt felt like he was lean posteriorly, however pt's center was leaning forward. Cues for pt to correct sitting posture. Pt felt too weak to be able to perform transfers and ambulate despite encouragement. Pt felt fatigued and requested to lay back down. Will continue to progress pt as able.

## 2023-04-03 NOTE — PLAN OF CARE
Goal Outcome Evaluation:  Diuretic in Use: demadex  Response to Diuretics (Output greater than intake): output is greater than input-pt on 1500 cc requirement  Daily Weight (up or down): pt is 10 # less than admission  O2 Requirements: up to 6L-SOB with minimal exertion  Functional Status (Activity level, tolerance and respiratory symptoms): pt up to BRP with minimal assist-takes pt several minutes to recoup o2 sats >90%  Discharge Plans:  plans to return to assistive living facility at present  Daughter at side always  Safety maintained this shift

## 2023-04-03 NOTE — PAYOR COMM NOTE
"Ailyn Elias E \"GENE\" (89 y.o. Male)     PLEASE SEE ATTACHED FOR INPT AUTH AND DAYS.     REF#[id number]    PLEASE CALL  OR  [phone]    THANK YOU    JONAH TAFOYA LPN CCP    Date of Birth   01/31/1930    Social Security Number       Address   [address] [place] [address]    Home Phone   [phone]    MRN   [id number]       Congregation   [facility]    Marital Status                               Admission Date   3/30/23    Admission Type   Emergency    Admitting Provider   Som Mckinney DO    Attending Provider   Rock Daley MD    Department, Room/Bed   [address], [address]/1       Discharge Date       Discharge Disposition       Discharge Destination                               Attending Provider: Rock Daley MD    Allergies: No Known Allergies    Isolation: None   Infection: None   Code Status: CPR    Ht: 170.2 cm (67\")   Wt: 70.3 kg (154 lb 14.4 oz)    Admission Cmt: None   Principal Problem: Acute respiratory failure with hypoxia [J96.01]                 Active Insurance as of 3/30/2023     Primary Coverage     Payor Plan Insurance Group Employer/Plan Group    AETNA MEDICARE REPLACEMENT AETNA MEDICARE REPLACEMENT [id number]     Payor Plan Address Payor Plan Phone Number Payor Plan Fax Number Effective Dates    [address] [phone]  1/1/2021 - None Entered    [facility] [id number]       Subscriber Name Subscriber Birth Date Member ID       ALIYN ELIAS 1/31/1930 [id number]                 Emergency Contacts      (Rel.) Home Phone Work Phone Mobile Phone    EVELIA HIGH (Daughter) [phone] -- [phone]    Yasir Elias (Son) [phone] -- --            [place]: NPI [id number]  Tax ID [id number]     History & Physical      Gavi Norman APRN at 03/31/23 0215     Attestation signed by Som Mckinney DO at 03/31/23 2016 (Updated)      Brief Attending Admission Attestation   I have seen and " examined the patient, performing an independent face-to-face diagnostic evaluation with plan of care reviewed and developed with JESUS Sher.  The majority of medical decision making (>50%) for this encounter was completed by myself and discussed with this APRN.    Brief Summary Statement/HPI  89 y.o. male with history of chronic combined systolic and diastolic heart failure, atrial fibrillation on long-term anticoagulation, s/p PPM, CAD, hypertension, hyperlipidemia, squamous cell carcinoma, CKD stage IIIb, hypothyroidism who presents to [facility] with complaints of worsening dyspnea and generalized weakness.  Patient states that symptoms began roughly 3 weeks ago, have been constant and progressive in nature since onset.  Dyspnea is both at rest and with exertion.  States that he recently had his diuretic regimen adjusted, changed to every other day.  Otherwise, denies recollection of other inciting factors.  Family at bedside during time my examination, helps to provide information surrounding HPI.  Upon arrival to the ED, further work-up obtained, chest x-ray showing findings concerning for multifocal pneumonia coupled with possible pulmonary vascular congestion, labs showing elevated proBNP and patient found to have hypoxia with new oxygen requirement.  He has subsequently been admitted for further evaluation and management.  Remainder of detailed HPI is as noted above and has been reviewed by me for completeness.    Attending Physical Exam  Temp:  [97.5 °F (36.4 °C)-98.5 °F (36.9 °C)] 97.5 °F (36.4 °C)  Heart Rate:  [74-77] 74  Resp:  [18] 18  BP: (100-149)/(51-81) 120/70  Const: Elderly/frail appearing, no acute distress  Eyes: PERRL, normal conjunctiva  HENT: atraumatic, non-tender  CV: Regular rate, regular rhythm  RESP: On 4 L O2 via NC, decreased breath sounds bilaterally  GI: soft, non-tender, non-distended  MSK: No gross deformities appreciated, no tenderness, 1+ bilateral  lower extremity edema  Skin: Warm, dry.  Neuro: Awake, alert, oriented, no appreciable focal neurological deficits.  Psych: Appropriate mood and affect.    Results Review:  I reviewed the patient's new clinical results.  I reviewed the patient's new imaging results and agree with the interpretation.  I reviewed the patient's other test results and agree with the interpretation  I personally viewed and interpreted the patient's EKG/Telemetry data    Assessment/Plan  89 y.o. male with history of chronic combined systolic and diastolic heart failure, atrial fibrillation on long-term anticoagulation, s/p PPM, CAD, hypertension, hyperlipidemia, squamous cell carcinoma, CKD stage IIIb, hypothyroidism who presents to [facility] with complaints of worsening dyspnea and generalized weakness.    Acute respiratory failure with hypoxia  Community-acquired pneumonia  Acute on chronic combined systolic and diastolic heart failure  - Patient meets criteria for diagnosis of acute respiratory failure with hypoxia with: Complaints of dyspnea, new oxygen requirement, P/F ratio <300 per ABG.  - ProBNP elevated to 2683 on admission (most recently was 662 on 03/10).  Chest x-ray showing findings concerning for multifocal pneumonia, bilateral pleural effusions and possible pulmonary vascular congestion.  - Continue empiric antibiotics as prescribed for community acquired pneumonia while awaiting results of infectious work-up.  - Give x1 dose of IV Lasix 40 mg, closely monitor urine output and response to this to guide further diuretic management.  - Order CT Chest and 2D Echo for further evaluation.  - Consult cardiology for further evaluation and management.  - Strict I/O, daily weights, telemetry, pulse oximetry, supplemental oxygen as needed to maintain O2 sats greater than 90%, elevate HOB, aspiration precautions.    Atrial fibrillation on long-term anticoagulation  - Appears stable at present, patient has history of  PPM placement, per chart review.  Hold carvedilol on admission in the setting of acute decompensated heart failure, while awaiting cardiology input.  Continue amiodarone and Eliquis as prescribed.  Monitor on telemetry.    Hypertension  - BP acceptable acutely. Appears stable. No indications to warrant acute changes/intervention at this time.  - Continue current medications as prescribed. Trend BP to guide ongoing management decisions.    Hypothyroidism  - Stable. Continue Levothyroxine as prescribed.    Chronic kidney disease stage 3B  - On most recent labs, patient's creatinine found to be stable and near apparent baseline, per review of previous lab values and outside records.  - No indications to warrant acute changes and/or intervention at this time.  - Order repeat BMP in AM for reassessment of renal function.   - Will continue to monitor and trend creatinine to guide ongoing management decisions. Avoid nephrotoxic medications, IVF, strict I/O, daily weights.    Generalized weakness  - Consult PT/OT, fall precautions.    Active Hospital Problems    Diagnosis  POA   • **Community acquired pneumonia, unspecified laterality [J18.9]  Yes   • Stage 3b chronic kidney disease (HCC) [N18.32]  Yes   • Pacemaker [Z95.0]  Yes   • Atrial fibrillation (HCC) [I48.91]  Yes   • Acute on chronic combined systolic and diastolic CHF (congestive heart failure) (HCC) [I50.43]  Yes   • SCC (squamous cell carcinoma), 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     See above section for further detailed assessment and plan developed with APRN which I have reviewed.      Electronically signed by Som Mckinney DO, 3/31/2023.                         Patient Name:  Bridger Elias  Date of  Birth:  1/31/1930  MRN:  [id number]  Admit Date:  3/30/2023  Patient Care Team:  Alma Cat MD as PCP - General (Family Medicine)  Maurice Cook MD as Consulting Physician (Radiation Oncology)      Subjective    History Present Illness     Chief Complaint   Patient presents with   • Shortness of Breath   • Weakness - Generalized     History of Present Illness   Mr. Elias is a 89 y.o. non-smoker with a history of CKD stage IIIb, squamous cell carcinoma, atrial fibrillation on Eliquis, pacemaker placement, chronic diastolic CHF, CAD, HLD, HTN, and hypothyroidism that presents to [facility] complaining of shortness of breath and generalized weakness.  HPI has been obtained by daughter at bedside.  She reports that patient has had a decline since his fall 3 weeks ago.  He has been very weak and fatigue.  He currently resides at assisted living facility.  She states that he typically can ride a stationary bike and walk long distances but lately has not been able to move much.  She reports that his diuretic was increased by his cardiologist a couple of weeks ago.  This week they have cut back on his diuretics to every other day.  He endorses an occasional cough and denies chest pain.  Chest x-ray obtained in the ED shows bilateral pneumonia.  Labs obtained show Pro-Peter 0.08, lactate 1.6, BNP 2683, and a white count of 10.    Review of Systems   Constitutional: Positive for fatigue. Negative for chills and fever.   HENT: Negative for congestion and rhinorrhea.    Eyes: Negative for photophobia and visual disturbance.   Respiratory: Positive for shortness of breath. Negative for cough.    Cardiovascular: Negative for chest pain and palpitations.   Gastrointestinal: Negative for constipation, diarrhea, nausea and vomiting.   Endocrine: Negative for cold intolerance and heat intolerance.   Genitourinary: Negative for difficulty urinating and dysuria.   Musculoskeletal: Negative for gait problem and  joint swelling.   Skin: Negative for rash and wound.   Neurological: Positive for weakness. Negative for dizziness, light-headedness and headaches.   Psychiatric/Behavioral: Negative for sleep disturbance and suicidal ideas.        Personal History     Past Medical History:   Diagnosis Date   • Acute on chronic diastolic CHF (congestive heart failure) (HCC) 4/14/2022   • Atrial fibrillation (HCC) 4/14/2022   • Coronary arteriosclerosis 7/3/2014    Formatting of this note might be different from the original. [facility] 12/7/16  IMPRESSION:   1. Right heart disease, hypertensive cardiac disease.   2. Status post mitral valve repair.   3. Anatomy: No hemodynamically significant disease. about 30-40% lesion of    the right coronary artery. Normal. Left coronary artery system.   • Dyslipidemia 12/5/2013   • Glaucoma 4/14/2022   • Hypertension 12/5/2013   • Hypertensive kidney disease, stage III (formerly [facility]) 3/13/2017   • Hypothyroidism 3/19/2017   • Long term (current) use of anticoagulants 12/5/2013    Formatting of this note might be different from the original. Mitral valve replacement and atrial fibrillation Assume a range of 2.5-3.5.   • Malignant neoplasm of prostate (HCC) 12/5/2013    Formatting of this note might be different from the original. Description: He sees urology every 6 months and he does do Lupron injections   • Mitral valve disorder 1/25/2021   • Pacemaker 4/14/2022   • Personal history of radiation therapy 4/14/2022   • Prostate cancer (HCC)    • SCC (squamous cell carcinoma), face 2/2/2022   • Stage 3b chronic kidney disease (HCC) 4/14/2022     Past Surgical History:   Procedure Laterality Date   • PACEMAKER IMPLANTATION  2018     History reviewed. No pertinent family history.  Social History     Tobacco Use   • Smoking status: Never   • Smokeless tobacco: Never   Substance Use Topics   • Alcohol use: Never     No current facility-administered medications on file prior to encounter.     Current Outpatient  Medications on File Prior to Encounter   Medication Sig Dispense Refill   • acetaminophen (TYLENOL) 325 MG tablet Take 2 tablets by mouth Every 6 (Six) Hours As Needed for Mild Pain , Moderate Pain , Headache or Fever.     • amiodarone (PACERONE) 200 MG tablet Take 1 tablet by mouth Daily. 30 tablet 0   • apixaban (ELIQUIS) 5 MG tablet tablet Take 1 tablet by mouth Every 12 (Twelve) Hours. Indications: Atrial Fibrillation 60 tablet    • bicalutamide (CASODEX) 50 MG chemo tablet Take 1 tablet by mouth Daily.     • calcium carbonate (OS-SOPHIA) 600 MG tablet Take 600 mg by mouth Daily.     • carvedilol (COREG) 3.125 MG tablet Take 3.125 mg by mouth Every Night.     • latanoprost (XALATAN) 0.005 % ophthalmic solution Apply  to eye(s) as directed by provider.     • levothyroxine (SYNTHROID, LEVOTHROID) 88 MCG tablet Take 88 mcg by mouth Daily.     • multivitamin with minerals tablet tablet Take 1 tablet by mouth Daily.     • polyethylene glycol (MIRALAX) 17 g packet Take 17 g by mouth Daily As Needed (Use if senna-docusate is ineffective).     • rosuvastatin (CRESTOR) 10 MG tablet Take 1 tablet by mouth Daily.     • silver sulfadiazine (SILVADENE, SSD) 1 % cream Apply 1 application topically to the appropriate area as directed 3 (Three) Times a Day. Apply to facial regions of inflammation per rad onc recs     • silver sulfadiazine (SILVADENE, SSD) 1 % cream Apply 1 application topically to the appropriate area as directed 2 (Two) Times a Day. 400 g 2     No Known Allergies    Objective     Objective     Vital Signs  Temp:  [96.4 °F (35.8 °C)-97.7 °F (36.5 °C)] 97.7 °F (36.5 °C)  Heart Rate:  [74] 74  Resp:  [16-18] 18  BP: (103-149)/(70-81) 131/70  SpO2:  [91 %-94 %] 93 %  on  Flow (L/min):  [4] 4;   Device (Oxygen Therapy): nasal cannula  Body mass index is 25.69 kg/m².    Physical Exam  Vitals and nursing note reviewed.   Constitutional:       General: He is not in acute distress.     Appearance: He is well-developed. He  is not toxic-appearing.      Comments: Frail, chronically ill-appearing   HENT:      Head: Normocephalic and atraumatic.      Comments: Multiple skin lesions noted on face     Right Ear: Decreased hearing noted.      Left Ear: Decreased hearing noted.   Eyes:      General: No scleral icterus.        Right eye: No discharge.         Left eye: No discharge.      Conjunctiva/sclera: Conjunctivae normal.   Neck:      Vascular: No JVD.   Cardiovascular:      Rate and Rhythm: Normal rate. Rhythm irregularly irregular.      Heart sounds: Normal heart sounds. No murmur heard.    No friction rub. No gallop.      Comments: A paced  Pulmonary:      Effort: Pulmonary effort is normal. No respiratory distress.      Breath sounds: Normal breath sounds. No wheezing or rales.   Abdominal:      General: Bowel sounds are normal. There is no distension.      Palpations: Abdomen is soft.      Tenderness: There is no abdominal tenderness. There is no guarding.   Musculoskeletal:         General: No tenderness or deformity. Normal range of motion.      Cervical back: Normal range of motion and neck supple.      Right lower leg: Edema (  ) present.      Left lower leg: Edema present.   Skin:     General: Skin is warm and dry.      Capillary Refill: Capillary refill takes less than 2 seconds.   Neurological:      Mental Status: He is alert and oriented to person, place, and time.   Psychiatric:         Behavior: Behavior is agitated.     Results Review:  I reviewed the patient's new clinical results.  I reviewed the patient's new imaging results and agree with the interpretation.  I reviewed the patient's other test results and agree with the interpretation  I personally viewed and interpreted the patient's EKG/Telemetry data  Discussed with ED provider.    Lab Results (last 24 hours)     Procedure Component Value Units Date/Time    Respiratory Panel PCR w/COVID-19(SARS-CoV-2) ALLY/FAITH/YONATAN/PAD/COR/MAD/HAYDEN In-House, NP Swab in UTM/VTM, 3-4  HR TAT - Swab, Nasopharynx [[id number]]  (Normal) Collected: 03/30/23 2004    Specimen: Swab from Nasopharynx Updated: 03/30/23 2336     ADENOVIRUS, PCR Not Detected     Coronavirus 229E Not Detected     Coronavirus HKU1 Not Detected     Coronavirus NL63 Not Detected     Coronavirus OC43 Not Detected     COVID19 Not Detected     Human Metapneumovirus Not Detected     Human Rhinovirus/Enterovirus Not Detected     Influenza A PCR Not Detected     Influenza B PCR Not Detected     Parainfluenza Virus 1 Not Detected     Parainfluenza Virus 2 Not Detected     Parainfluenza Virus 3 Not Detected     Parainfluenza Virus 4 Not Detected     RSV, PCR Not Detected     Bordetella pertussis pcr Not Detected     Bordetella parapertussis PCR Not Detected     Chlamydophila pneumoniae PCR Not Detected     Mycoplasma pneumo by PCR Not Detected    Narrative:      CBC & Differential [[id number]]  (Abnormal) Collected: 03/30/23 2010    Specimen: Blood from Arm, Right Updated: 03/30/23 2025    Narrative:      Comprehensive Metabolic Panel [[id number]]  (Abnormal) Collected: 03/30/23 2010    Specimen: Blood from Arm, Right Updated: 03/30/23 2044     Glucose 98 mg/dL      BUN 27 mg/dL      Creatinine 1.22 mg/dL      Sodium 133 mmol/L      Potassium 4.3 mmol/L      Chloride 100 mmol/L      CO2 24.0 mmol/L      Calcium 9.0 mg/dL      Total Protein 7.5 g/dL      Albumin 3.6 g/dL      ALT (SGPT) 30 U/L      AST (SGOT) 54 U/L      Alkaline Phosphatase 214 U/L      Total Bilirubin 0.8 mg/dL      Globulin 3.9 gm/dL      A/G Ratio 0.9 g/dL      BUN/Creatinine Ratio 22.1     Anion Gap 9.0 mmol/L      eGFR 55.3 mL/min/1.73     Narrative:      Single High Sensitivity Troponin T [[id number]]  (Normal) Collected: 03/30/23 2010    Specimen: Blood from Arm, Right Updated: 03/30/23 2044     HS Troponin T 12 ng/L     Narrative:      High Sensitivity Troponin T [[id number]]  (Normal) Collected: 03/30/23 2010    Specimen: Blood from Arm, Right Updated: 03/30/23  2044     HS Troponin T 12 ng/L     Narrative:      BNP [[id number]]  (Abnormal) Collected: 03/30/23 2010    Specimen: Blood from Arm, Right Updated: 03/30/23 2043     proBNP 2,683.0 pg/mL     Narrative:      Among patients with dyspnea, NT-proBNP is highly sensitive for the detection of acute congestive heart failure. In addition NT-proBNP of <300 pg/ml effectively rules out acute congestive heart failure with 99% negative predictive value.    Results may be falsely decreased if patient taking Biotin.      Specimen: Blood from Arm, Right Updated: 03/30/23 2025     WBC 10.04 10*3/mm3      RBC 3.59 10*6/mm3      Hemoglobin 12.2 g/dL      Hematocrit 34.3 %      MCV 95.5 fL      MCH 34.0 pg      MCHC 35.6 g/dL      RDW 14.7 %      RDW-SD 50.9 fl      MPV 10.0 fL      Platelets 165 10*3/mm3      Neutrophil % 68.9 %      Lymphocyte % 11.5 %      Monocyte % 13.8 %      Eosinophil % 4.0 %      Basophil % 0.8 %      Immature Grans % 1.0 %      Neutrophils, Absolute 6.92 10*3/mm3      Lymphocytes, Absolute 1.15 10*3/mm3      Monocytes, Absolute 1.39 10*3/mm3      Eosinophils, Absolute 0.40 10*3/mm3      Basophils, Absolute 0.08 10*3/mm3      Immature Grans, Absolute 0.10 10*3/mm3      nRBC 0.2 /100 WBC     Procalcitonin [[id number]]  (Normal) Collected: 03/30/23 2010    Specimen: Blood from Arm, Right Updated: 03/30/23 2259     Procalcitonin 0.08 ng/mL     Narrative:      Blood Culture - Blood, Arm, Left [[id number]] Collected: 03/30/23 2204    Specimen: Blood from Arm, Left Updated: 03/30/23 2216    High Sensitivity Troponin T 2Hr [[id number]]  (Normal) Collected: 03/30/23 2207    Specimen: Blood Updated: 03/30/23 2255     HS Troponin T 11 ng/L      Troponin T Delta -1 ng/L     Narrative:      High Sensitive Troponin T Reference Range:  <10.0 ng/L- Negative Female for AMI  <15.0 ng/L- Negative Male for AMI  >=10 - Abnormal Female indicating possible myocardial injury.  >=15 - Abnormal Male indicating possible myocardial injury.    Clinicians would have to utilize clinical acumen, EKG, Troponin, and serial changes to determine if it is an Acute Myocardial Infarction or myocardial injury due to an underlying chronic condition.         Lactic Acid, Plasma [[id number]]  (Normal) Collected: 03/30/23 2207    Specimen: Blood Updated: 03/30/23 2357     Lactate 1.6 mmol/L     Blood Culture - Blood, Arm, Left [[id number]] Collected: 03/30/23 2229    Specimen: Blood from Arm, Left Updated: 03/30/23 2240          Imaging Results (Last 24 Hours)     Procedure Component Value Units Date/Time    XR Chest 1 View [[id number]] Collected: 03/30/23 2045     Updated: 03/30/23 2053    Narrative:      PORTABLE CHEST X-RAY     HISTORY: Shortness of breath.     TECHNIQUE: Portable chest x-ray correlated with chest x-ray 04/14/2022.     FINDINGS: Sternal wires with cardiac valve hardware and a cardiac pacing  device are again observed. The cardiac silhouette is enlarged. Patchy  infiltrate in the upper lobes is observed bilaterally, more dense on the  left than the right. There is also some density in the left lung base  and mildly at the periphery of the right lung base. There also appears  to be small pleural effusions blunting the costophrenic angles.       Impression:      Bilateral pneumonia.     This report was finalized on 3/30/2023 8:50 PM by Dr. Maurice Alejandro M.D.             Results for orders placed during the hospital encounter of 09/04/22    Adult Transthoracic Echo Complete W/ Cont if Necessary Per Protocol    Interpretation Summary  · Left ventricular ejection fraction appears to be 31 - 35%. Left ventricular systolic function is moderately decreased. Septal wall motion is abnormal, consistent with a post-operative state. Normal left ventricular cavity size noted. Left ventricular wall thickness is consistent with mild to moderate concentric hypertrophy. Left ventricular diastolic function was indeterminate.  · The right ventricular cavity is  moderately dilated.  · Moderate tricuspid valve regurgitation is present. Estimated right ventricular systolic pressure from tricuspid regurgitation is mildly elevated (35-45 mmHg).      ECG 12 Lead Dyspnea   Final Result   HEART RATE= 75  bpm   RR Interval= 800  ms   NV Interval= 194  ms   P Horizontal Axis= 247  deg   P Front Axis= 199  deg   QRSD Interval= 176  ms   QT Interval= 475  ms   QRS Axis= 269  deg   T Wave Axis= 105  deg   - ABNORMAL ECG -   Atrial-sensed ventricular-paced complexes   No further analysis attempted due to paced rhythm   No change from previous tracing   Electronically Signed By: Anshul Bertrand) ([facility]) 30-Mar-2023 20:37:24   Date and Time of Study: 2023-03-30 20:20:54          Assessment/Plan     Active Hospital Problems    Diagnosis  POA   • **Community acquired pneumonia, unspecified laterality [J18.9]  Yes   • Stage 3b chronic kidney disease (HCC) [N18.32]  Yes   • Pacemaker [Z95.0]  Yes   • Atrial fibrillation (HCC) [I48.91]  Yes   • Chronic diastolic heart failure (HCC) [I50.32]  Yes   • SCC (squamous cell carcinoma), 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       Mr. Elias is a 89 y.o. non-smoker with a history of chronic diastolic CHF, CKD stage IIIb, squamous cell carcinoma, CAD, HLD, HTN, and hypothyroidism who presents with dyspnea.    Acute respiratory failure secondary to community-acquired pneumonia  -Chest x-ray shows bilateral pneumonia.  His Pro-Peter is unremarkable, no leukocytosis.  Lactate 1.6.  Has remained afebrile.  Daughter reports episodes of extreme fatigue and states that he does not have any energy which is atypical for him.  He does reside at an assisted living facility.  Consider repeating respiratory viral  panel?  -Will check a CT of chest  -Given Rocephin and Zithromax in the ED, will hold for now until CT results  -Blood cultures pending  -Check urine for Legionella and S pneumoniae   -Check for MRSA swab of nares   -Supplemental O2 as needed    CKD stage IIIb  -Creat stable  -Avoid nephrotoxins  -Trend BMP    Permanent atrial fibrillation  -Currently paced on the monitor.  Rate controlled, continue antiarrhythmics  -Resume Eliquis    Chronic diastolic congestive heart failure  -He does have nonpitting mild bilateral lower extremity edema.  Chest x-ray does not mention any vascular congestion or pulmonary edema  -As stated above, will check a CT of chest  -Daily weights  -Strict I's and O   -Resume home regimen    CAD  -Denies chest pain  -Continue statin therapy    Hypertension  -Stable, resume home regimen    Hypothyroidism  -Resume replacement as once more has been completed by nursing    Squamous cell carcinoma  -Followed by radiation oncology and dermatology    I discussed the patient's findings and my recommendations with patient and ED provider.    VTE Prophylaxis - Eliquis (home med).  Code Status - Full code.       JESUS Kathleen  [facility]  03/31/23  02:15 EDT    Electronically signed by Som Mckinney DO at 03/31/23 2016          Emergency Department Notes      Keila Dunn, RN at 03/30/23 1937        Pt was brought in by family for soa and generalized weakness that has been progressive over the last couple weeks. Pt was seen at [place] for fall couple weeks ago and it was noted that he had fluid on his chest xray. Pt was placed on lasix. Reports soa with exertion    This RN wore mask and goggles during time of contact      Electronically signed by Keila Dunn, RN at 03/30/23 1939     Preet Singh II, MD at 03/30/23 1959           EMERGENCY DEPARTMENT ENCOUNTER    Room Number:  N431/1  Date seen:  3/31/2023  PCP: Alma Cta,  MD      HPI:  Chief Complaint: Shortness of breath and fatigue  A complete HPI/ROS/PMH/PSH/SH/FH are unobtainable due to: None  Context: Bridger Elias is a 89 y.o. male who presents to the ED c/o shortness of breath and fatigue.  This has been ongoing for greater than 1 week terms of shortness of breath and generalized fatigue for several weeks.  He reports having associated cough and sneezing.  No chest pain.  Symptoms are worse with exertion.  He states that about 1 month ago he was able to walk half a mile total while walking laps at his facility.  Now he cannot even make it 10 feet.  He states that he has been on diuretics for the past 2 to 3 weeks and has had improvement in his leg swelling but daughter is concerned he may be over diuresed.          PAST MEDICAL HISTORY  Active Ambulatory Problems     Diagnosis Date Noted   • SCC (squamous cell carcinoma), face 02/02/2022   • General weakness 04/14/2022   • Pacemaker 04/14/2022   • Abnormal gait 04/23/2019   • Atrial fibrillation (HCC) 04/14/2022   • Chronic diastolic heart failure ([facility]) 03/31/2022   • Coronary arteriosclerosis 07/03/2014   • Dyslipidemia 12/05/2013   • Glaucoma 04/14/2022   • Mitral valve disorder 01/25/2021   • Hypertension 12/05/2013   • Hypertensive kidney disease, stage III ([facility]) 03/13/2017   • Hypothyroidism 03/19/2017   • Long term (current) use of anticoagulants 12/05/2013   • Malignant neoplasm of prostate ([facility]) 12/05/2013   • Osteoporosis 04/14/2022   • Squamous cell carcinoma of skin of face 02/02/2022   • Acute on chronic diastolic CHF (congestive heart failure) ([facility]) 04/14/2022   • Stage 3b chronic kidney disease ([facility]) 04/14/2022   • Personal history of radiation therapy 04/14/2022   • Acute on chronic systolic (congestive) heart failure (HCC) 04/14/2022   • Cellulitis of right leg 09/04/2022   • Chronic acquired lymphedema 09/04/2022   • Contusion of face 09/04/2022   • Contusion of forearm, left 09/04/2022   • Fall 09/04/2022    • Chronic systolic heart failure (CMS/HCC) 09/09/2022     Resolved Ambulatory Problems     Diagnosis Date Noted   • No Resolved Ambulatory Problems     Past Medical History:   Diagnosis Date   • Prostate cancer (HCC)          PAST SURGICAL HISTORY  Past Surgical History:   Procedure Laterality Date   • PACEMAKER IMPLANTATION  2018         FAMILY HISTORY  History reviewed. No pertinent family history.      SOCIAL HISTORY  Social History     Socioeconomic History   • Marital status:    Tobacco Use   • Smoking status: Never   • Smokeless tobacco: Never   Substance and Sexual Activity   • Alcohol use: Never         ALLERGIES  Patient has no known allergies.        REVIEW OF SYSTEMS  Review of Systems     All systems reviewed and negative except for those discussed in HPI.       PHYSICAL EXAM  ED Triage Vitals   Temp Heart Rate Resp BP SpO2   03/30/23 1933 03/30/23 1933 03/30/23 1933 03/30/23 1939 03/30/23 1933   96.4 °F (35.8 °C) 74 16 124/74 91 %      Temp src Heart Rate Source Patient Position BP Location FiO2 (%)   03/30/23 1933 03/30/23 1933 -- 03/30/23 1939 --   Tympanic Monitor  Right arm        Physical Exam      GENERAL: no acute distress  HENT: nares patent  EYES: no scleral icterus  CV: regular rhythm, normal rate, 2+ edema to the ankles bilaterally  RESPIRATORY: normal effort, clear to auscultation bilaterally  ABDOMEN: soft, nontender  MUSCULOSKELETAL: no deformity  NEURO: alert, moves all extremities, follows commands  PSYCH:  calm, cooperative  SKIN: warm, dry    Vital signs and nursing notes reviewed.              RADIOLOGY  XR Chest 1 View    Result Date: 3/30/2023  PORTABLE CHEST X-RAY  HISTORY: Shortness of breath.  TECHNIQUE: Portable chest x-ray correlated with chest x-ray 04/14/2022.  FINDINGS: Sternal wires with cardiac valve hardware and a cardiac pacing device are again observed. The cardiac silhouette is enlarged. Patchy infiltrate in the upper lobes is observed bilaterally, more dense  on the left than the right. There is also some density in the left lung base and mildly at the periphery of the right lung base. There also appears to be small pleural effusions blunting the costophrenic angles.      Bilateral pneumonia.  This report was finalized on 3/30/2023 8:50 PM by Dr. Maurice Alejandro M.D.        Ordered the above noted radiological studies. Reviewed by me in PACS.          PROCEDURES  Procedures          MEDICATIONS GIVEN IN ER  Medications   sodium chloride 0.9 % flush 10 mL (has no administration in time range)   sodium chloride 0.9 % flush 10 mL (has no administration in time range)   nitroglycerin (NITROSTAT) SL tablet 0.4 mg (has no administration in time range)   acetaminophen (TYLENOL) tablet 650 mg (has no administration in time range)   ondansetron (ZOFRAN) tablet 4 mg (has no administration in time range)     Or   ondansetron (ZOFRAN) injection 4 mg (has no administration in time range)   aluminum-magnesium hydroxide-simethicone (MAALOX MAX) 400-400-40 MG/5ML suspension 15 mL (has no administration in time range)   cefTRIAXone (ROCEPHIN) 1 g in sodium chloride 0.9 % 100 mL IVPB-VTB (0 g Intravenous Stopped 3/30/23 2300)   azithromycin (ZITHROMAX) 500 mg in sodium chloride 0.9 % 250 mL IVPB-VTB (500 mg Intravenous New Bag 3/30/23 2313)         MEDICAL DECISION MAKING, PROGRESS, and CONSULTS    Discussion below represents my analysis of pertinent findings related to patient's condition, differential diagnosis, treatment plan and final disposition.      Orders placed during this visit:  Orders Placed This Encounter   Procedures   • Respiratory Panel PCR w/COVID-19(SARS-CoV-2) ALLY/FAITH/YONATAN/PAD/[facility]/M[facility]DEN In-House, NP Swab in UTM/VTM, 3-4 HR TAT - Swab, Nasopharynx   • Blood Culture - Blood,   • Blood Culture - Blood,   • S. Pneumo Ag Urine or CSF - Urine, Urine, Clean Catch   • Legionella Antigen, Urine - Urine, Urine, Clean Catch   • MRSA Screen, PCR (Inpatient) - Swab, Nares   • XR  Chest 1 View   • CT Chest Without Contrast Diagnostic   • Comprehensive Metabolic Panel   • Single High Sensitivity Troponin T   • High Sensitivity Troponin T   • BNP   • CBC Auto Differential   • High Sensitivity Troponin T 2Hr   • Lactic Acid, Plasma   • Procalcitonin   • Potassium   • Magnesium   • High Sensitivity Troponin T   • Blood Gas, Arterial -   • Comprehensive Metabolic Panel   • CBC (No Diff)   • Diet: Cardiac Diets, Diabetic Diets; Healthy Heart (2-3 Na+); Consistent Carbohydrate; Texture: Regular Texture (IDDSI 7); Fluid Consistency: Thin (IDDSI 0)   • Monitor Blood Pressure   • Pulse Oximetry, Continuous   • Vital Signs   • Oral Care   • Place Sequential Compression Device   • Maintain Sequential Compression Device   • Telemetry - Maintain IV Access   • Continuous Cardiac Monitoring   • Telemetry - Pulse Oximetry   • May Be Off Telemetry for Tests   • Up With Assistance   • Daily Weights   • Strict Intake & Output   • Please place a check by the medications patient is currently taking.  Nursing Communication   • Code Status and Medical Interventions:   • LHA (on-call MD unless specified) Details   • Oxygen Therapy- Nasal Cannula; Titrate for SPO2: 90% - 95%   • Incentive Spirometry   • ECG 12 Lead Dyspnea   • ECG 12 Lead Chest Pain   • Insert Peripheral IV   • Inpatient Admission   • CBC & Differential         Additional sources:  - Discussed/obtained information from independent historians: Daughter and son-in-law  Additional information was obtained to confirm the patient's history.    - External (non-ED) record review: On medical chart review, patient was discharged from the hospital 9/9/2022.  I reviewed the discharge summary by Dr. Alva.  He has a history of chronic systolic and diastolic heart failure, atrial fibrillation and hypothyroidism.  He was admitted to the hospital with upper extremity cellulitis and atrial fibrillation with RVR.  Treated with amnio drip and transition to oral  amiodarone.  Discharged home with Keflex.        Patient's most recent echocardiogram was on 9/6/2022.  Ejection fraction 31 to 35%.  Diastolic function is indeterminate.        Differential diagnosis:    Differential diagnosis includes but not limited to CHF, acute coronary syndrome, pulmonary embolism, pneumothorax, pneumonia, asthma/COPD, pulmonary hypertension, deconditioning, anemia, other hematologic etiologies such as CO poisoning, anxiety.         Independent interpretation of labs, radiology studies, and discussions with consultants:  ED Course as of 03/31/23 0344   Thu Mar 30, 2023   1943 Patient has a history of atrial fibrillation on Eliquis.  Patient has a history of systolic and diastolic heart failure, stage III chronic kidney disease [TD]   2024 EKG independently interpreted by myself.  Time 8:20 PM.  ventricularly paced rhythm.  Heart rate 75.  Negative Sgarbossa criteria.  PVC x1. [TD]   2131 proBNP(!): 2,683.0 [TD]   2131 Chest x-ray dependently interpreted by myself.  Bilateral pneumonia. [TD]   Fri Mar 31, 2023   0049 I discussed the case with JESUS Sher for hospitalist service.  We reviewed patient's labs, strict imaging.  She will admit for Dr. Talamantes. [TD]      ED Course User Index  [TD] Preet Singh II, MD               PPE: The patient wore a mask throughout the entire encounter. I wore a well-fitting mask.    DIAGNOSIS  Final diagnoses:   Community acquired pneumonia, unspecified laterality         DISPOSITION  Admit      Latest Documented Vital Signs:  As of 03:44 EDT  BP- 131/70 HR- 74 Temp- 97.7 °F (36.5 °C) (Oral) O2 sat- 93%      --    Please note that portions of this were completed with a voice recognition program.       Note Disclaimer: At [facility], we believe that sharing information builds trust and better relationships. You are receiving this note because you are receiving care at [facility] or recently visited. It is possible you will see [facility]  information before a provider has talked with you about it. This kind of information can be easy to misunderstand. To help you fully understand what it means for your health, we urge you to discuss this note with your provider.       Preet Singh II, MD  03/31/23 0344      Electronically signed by Preet Singh II, MD at 03/31/23 0344     Orly Mosquera, RN at 03/31/23 0057        Report given to ADDISON Meredith, on [address].     Electronically signed by Orly Mosquera, RN at 03/31/23 0057       Oxygen Therapy (since admission)     Date/Time SpO2 Device (Oxygen Therapy) Flow (L/min) Oxygen Concentration (%) ETCO2 (mmHg)    04/03/23 0700 90 room air -- -- --    04/03/23 0021 -- nasal cannula;humidified 5 -- --    04/02/23 2237 90 nasal cannula;humidified 3 -- --    04/02/23 1952 -- nasal cannula;humidified 3 -- --    04/02/23 1929 91 nasal cannula 3 -- --    04/02/23 1731 90 nasal cannula 3 -- --    04/02/23 1615 93 nasal cannula 2 -- --    04/02/23 1552 92 -- -- -- --    04/02/23 1421 -- nasal cannula 2 -- --    04/02/23 1340 94 nasal cannula;humidified 3 -- --    04/02/23 1239 93 nasal cannula 3 -- --    04/02/23 1235 93 -- -- -- --    04/02/23 0912 90 nasal cannula 5 -- --    04/02/23 0859 -- nasal cannula 5 -- --    04/02/23 0725 92 nasal cannula 2 -- --    04/02/23 0012 -- nasal cannula 2 -- --    04/01/23 2254 92 nasal cannula;humidified 2 -- --    04/01/23 2030 91 nasal cannula 2 -- --    04/01/23 2023 -- nasal cannula 2 -- --    04/01/23 1840 95 nasal cannula 2 -- --    04/01/23 1436 -- nasal cannula 3 -- --    04/01/23 1304 93 nasal cannula 3 -- --    04/01/23 1101 92 nasal cannula 3 -- --    04/01/23 0859 -- nasal cannula 3 -- --    04/01/23 0726 94 nasal cannula 5 -- --    04/01/23 0016 -- nasal cannula 4 -- --    03/31/23 2243 93 nasal cannula 4 -- --    03/31/23 2011 -- nasal cannula 4 -- --    03/31/23 1922 95 nasal cannula 4 -- --    03/31/23 1304 -- room air -- -- --    03/31/23 0915 -- room air  "-- -- --    03/31/23 0845 -- nasal cannula 4 -- --    03/31/23 0752 97 -- -- -- --    03/31/23 0153 -- nasal cannula 4 -- --    03/31/23 0139 93 nasal cannula 4 -- --    03/31/23 0041 92 -- -- -- --    03/31/23 0035 93 -- -- -- --    03/31/23 0011 93 -- -- -- --    03/30/23 2211 94 -- -- -- --    03/30/23 2141 93 room air -- -- --    03/30/23 2111 92 -- -- -- --    03/30/23 2041 93 room air -- -- --    03/30/23 1933 91 room air -- -- --        Intake & Output (last 7 days)       03/27 0701 03/28 0700 03/28 0701 03/29 0700 03/29 0701 03/30 0700 03/30 0701 03/31 0700 03/31 0701  04/01 0700 04/01 0701  04/02 0700 04/02 0701  04/03 0700 04/03 0701  04/04 0700    P.O.      240 360     IV Piggyback    100        Total Intake(mL/kg)    100 (1.3)  240 (3.4) 360 (5.1)     Urine (mL/kg/hr)     2900 (1.7) 1280 (0.8) 1700 (1)     Total Output     2900 1280 1700     Net    +100 -2900 -1040 -1340                 Urine Unmeasured Occurrence     0 x 2 x 0 x           Lines, Drains & Airways     Active LDAs     Name Placement date Placement time Site Days    Peripheral IV 03/30/23 2011 Right Antecubital 03/30/23 2011  Antecubital  3                  Medication Administration Report for CurtBridger E \"GENE\" as of 04/03/23 0920   Legend:    Given Hold Not Given Due Canceled Entry Other Actions    Time Time (Time) Time  Time-Action       Discontinued     Completed     Future     MAR Hold     Linked           Medications 03/28/23 03/29/23 03/30/23 03/31/23 04/01/23 04/02/23 04/03/23    acetaminophen (TYLENOL) tablet 650 mg  Dose: 650 mg  Freq: Every 4 Hours PRN Route: PO  PRN Reason: Mild Pain  Start: 03/31/23 0211   Admin Instructions:   Based on patient request - if ordered for moderate or severe pain, provider allows for administration of a medication prescribed for a lower pain scale.  Do not exceed 4 grams of acetaminophen in a 24 hr period. Max dose of 2gm for AST/ALT greater than 120 units/L    If given for fever, use " fever parameter: fever greater than 100.4 °F.    If given for pain, use the following pain scale:   Mild Pain = Pain Score of 1-3, CPOT 1-2  Moderate Pain = Pain Score of 4-6, CPOT 3-4  Severe Pain = Pain Score of 7-10, CPOT 5-8              aluminum-magnesium hydroxide-simethicone (MAALOX MAX) 400-400-40 MG/5ML suspension 15 mL  Dose: 15 mL  Freq: Every 6 Hours PRN Route: PO  PRN Reason: Heartburn  Start: 03/31/23 0212   Admin Instructions:   Maximum 60 mL in 24 hours.              amiodarone (PACERONE) tablet 100 mg  Dose: 100 mg  Freq: Every Other Day Route: PO  Start: 03/31/23 1630   Admin Instructions:   Avoid grapefruit juice while taking this medication.       1734-Given         0903-Given            apixaban (ELIQUIS) tablet 5 mg  Dose: 5 mg  Freq: Every 12 Hours Scheduled Route: PO  Indications of Use: ATRIAL FIBRILLATION  Start: 03/31/23 2100   Admin Instructions:   Tablet may be crushed and suspended in 60 mL of water or D5W and immediately delivered via NG tube.       2009-Given        0900-Given       2309-Given        0903-Given       1958-Given        0904-Given       2100           bicalutamide (CASODEX) chemo tablet 50 mg  Dose: 50 mg  Freq: Daily Route: PO  Start: 03/31/23 1630   Admin Instructions:   HAZARDOUS - Handle with care       1734-Given        0900-Given        0903-Given        0904-Given           fluticasone (FLONASE) 50 MCG/ACT nasal spray 2 spray  Dose: 2 spray  Freq: Daily Route: EACH NARE  Start: 04/02/23 1700         1616-Canceled Entry        0904-Given           latanoprost (XALATAN) 0.005 % ophthalmic solution 1 drop  Dose: 1 drop  Freq: Daily Route: Both Eyes  Start: 03/31/23 1630       1746-Given        0902-Given        0904-Given        0904-Given           levothyroxine (SYNTHROID, LEVOTHROID) tablet 88 mcg  Dose: 88 mcg  Freq: Daily Route: PO  Start: 03/31/23 1630   Admin Instructions:   Take on empty stomach.       1734-Given        0900-Given        0903-Given         0904-Given           Magnesium Sulfate - Total Dose 10 grams - Magnesium 1 or Less  Dose: 2 g  Freq: As Needed Route: IV  PRN Comment: See Administration Instructions  Indications of Use: HYPOMAGNESEMIA  Start: 03/31/23 0808   Admin Instructions:   Magnesium 1 or Less  Administer 2 grams over 30 minutes, Followed By 2 grams over 2 Hours x4 Doses (Total Dose 10 grams)  Recheck Magnesium in AM             Or  Magnesium Sulfate - Total Dose 6 grams - Magnesium 1.1 - 1.5  Dose: 2 g  Freq: As Needed Route: IV  PRN Comment: See Administration Instructions  Start: 03/31/23 0808   Admin Instructions:   Mg 1.1 -1.5  Administer 2 grams over 2 Hours x3 Doses (Total Dose 6 grams)  Recheck Magnesium in AM             Or  Magnesium Sulfate - Total Dose 4 grams - Magnesium 1.6 - 1.9  Dose: 4 g  Freq: As Needed Route: IV  PRN Comment: See Administration Instructions  Start: 03/31/23 0808   Admin Instructions:   Mg 1.6 - 1.9  Administer 4 grams over 4 Hours x1 Dose  Recheck Magnesium in AM              melatonin tablet 2 mg  Dose: 2 mg  Freq: Nightly Route: PO  Start: 04/01/23 2100        (2309)-Not Given        2100-Hold        2100           multivitamin with minerals 1 tablet  Dose: 1 tablet  Freq: Daily Route: PO  Start: 03/31/23 1630   Admin Instructions:          1734-Given        0900-Given        0904-Given        0904-Given           nitroglycerin (NITROSTAT) SL tablet 0.4 mg  Dose: 0.4 mg  Freq: Every 5 Minutes PRN Route: SL  PRN Reason: Chest Pain  PRN Comment: Only if SBP Greater Than 100  Start: 03/31/23 0211   Admin Instructions:   If Pain Unrelieved After 3 Doses Notify MD              ondansetron (ZOFRAN) tablet 4 mg  Dose: 4 mg  Freq: Every 6 Hours PRN Route: PO  PRN Reasons: Nausea,Vomiting  Start: 03/31/23 0211   Admin Instructions:   If BOTH ondansetron (ZOFRAN) and promethazine (PHENERGAN) are ordered use ondansetron first and THEN promethazine IF ondansetron is ineffective.             Or  ondansetron (ZOFRAN)  injection 4 mg  Dose: 4 mg  Freq: Every 6 Hours PRN Route: IV  PRN Reasons: Nausea,Vomiting  Start: 03/31/23 0211   Admin Instructions:   If BOTH ondansetron (ZOFRAN) and promethazine (PHENERGAN) are ordered use ondansetron first and THEN promethazine IF ondansetron is ineffective.              polyethylene glycol (MIRALAX) packet 17 g  Dose: 17 g  Freq: Daily PRN Route: PO  PRN Comment: Use if senna-docusate is ineffective  Start: 03/31/23 1531   Admin Instructions:   Use 4-8 ounces of water, tea, or juice for each 17 gram dose.              potassium & sodium phosphates (PHOS-NAK) 280-160-250 MG packet - for Phosphorus less than 1.25 mg/dL  Dose: 2 packet  Freq: Every 6 Hours PRN Route: PO  PRN Comment: Phosphorus less than 1.25 mg/dL  Start: 03/31/23 0808   Admin Instructions:   Phosphorus replacement:     If Phos    < 1.25 mg/dL   : Give Neutraphos 2 packets by mouth every 6 hours x 2 doses. Recheck Phosphorus level 6 hours after replacement complete.             Or  potassium & sodium phosphates (PHOS-NAK) 280-160-250 MG packet - for Phosphorus 1.25 - 2.5 mg/dL  Dose: 2 packet  Freq: Every 6 Hours PRN Route: PO  PRN Comment: Phosphorus 1.25 - 2.5 mg/dL  Start: 03/31/23 0808   Admin Instructions:   Phosphorus replacement:       If Phos 1.25 - 2.5  mg/dL: Give Neutraphos 2 packets by mouth every 6 hours x 1 dose. Recheck Phosphorus level 6 hours after replacement complete.              potassium chloride (K-DUR,KLOR-CON) ER tablet 40 mEq  Dose: 40 mEq  Freq: As Needed Route: PO  PRN Comment: Potassium Replacement.  See Admin Instructions  Start: 03/31/23 0808   Admin Instructions:   Potassium 3.1 or Less Give KCl 40 mEq q4h x3 Doses   Potassium 3.2 - 3.6 Give KCl 40 mEq q4h x2 Doses     Check Potassium 4 Hours After Last Dose Given   Check Magnesium if Potassium Level Remains Low After Replacement   DO NOT GIVE if CrCl is Less Than 30 mL/minute or Urine Output Less Than 30 mL/hr  Swallow whole; do not crush,  split, or chew.         0903-Given       1706-Given           Or  potassium chloride (KLOR-CON) packet 40 mEq  Dose: 40 mEq  Freq: As Needed Route: PO  PRN Comment: potassium replacement, see admin instructions  Start: 03/31/23 0808   Admin Instructions:   Potassium 3.1 or Less Give KCl 40 mEq q4h x3 Doses   Potassium 3.2 - 3.6 Give KCl 40 mEq q4h x2 Doses     Check Potassium 4 Hours After Last Dose Given   Check Magnesium if Potassium Level Remains Low After Replacement   DO NOT GIVE if CrCl is Less Than 30 mL/minute or Urine Output Less Than 30 mL/hr         0903-Not Given:  See Alt       1706-Not Given:  See Alt           Or  potassium chloride 10 mEq in 100 mL IVPB  Dose: 10 mEq  Freq: Every 1 Hour PRN Route: IV  PRN Comment: Potassium Replacement - See Admin Instructions  Start: 03/31/23 0808   Admin Instructions:   Peripheral or Central IV  Potassium 3.1 or Less Give KCl 10 mEq/100 mL NS IV q1h x6 Doses  Potassium 3.2 - 3.6 Give KCl 10 mEq/100 mL NS q1h x4 Doses    Check Potassium 4 Hours After Last Dose Given  Check Magnesium if Potassium Remains Low After Replacement  DO NOT GIVE if CrCl is Less Than 30 mL/minute or Urine Output Less Than 30 mL/hr.     Rates Greater Than 10 mEq/hr Require ECG Monitoring.  OUTPATIENT/NON-MONITORED UNITS: Potassium Chloride standard bolus infusion rate is a maximum of 10 mEq/hr on unmonitored patients    MONITORED UNITS: Potassium Chloride standard bolus infusion rate is a maximum of 20 mEq/hr on ECG monitored patients ONLY           0903-Not Given:  See Alt       1706-Not Given:  See Alt            rosuvastatin (CRESTOR) tablet 10 mg  Dose: 10 mg  Freq: Daily Route: PO  Start: 03/31/23 1630   Admin Instructions:   Avoid grapefruit juice.       1733-Given        0900-Given        0904-Given        0904-Given           sodium chloride 0.9 % flush 10 mL  Dose: 10 mL  Freq: As Needed Route: IV  PRN Reason: Line Care  Start: 03/31/23 0211              sodium chloride 0.9 % flush  10 mL  Dose: 10 mL  Freq: As Needed Route: IV  PRN Reason: Line Care  Start: 03/30/23 1942             Completed Medications  Medications 03/28/23 03/29/23 03/30/23 03/31/23 04/01/23 04/02/23 04/03/23       azithromycin (ZITHROMAX) 500 mg in sodium chloride 0.9 % 250 mL IVPB-VTB  Dose: 500 mg  Freq: Once Route: IV  Indications of Use: PNEUMONIA  Start: 03/30/23 2134   End: 03/31/23 0013      2313-New Bag [C]               bumetanide (BUMEX) injection 4 mg  Dose: 4 mg  Freq: Once Route: IV  Start: 04/02/23 1230   End: 04/02/23 1243   Admin Instructions:   Give slow IV push over 1-2 minutes.         1243-Given            cefTRIAXone (ROCEPHIN) 1 g in sodium chloride 0.9 % 100 mL IVPB-VTB  Dose: 1 g  Freq: Once Route: IV  Indications of Use: PNEUMONIA  Start: 03/30/23 2134   End: 03/30/23 2300   Admin Instructions:   LR should be paused and flushing of the line with NS is recommended prior to and after completion of ceftriaxone infusion due to incompatibility. Do not co-adminster with calcium-containing solutions.  Caution: Look alike/sound alike drug alert      2230-New Bag       2300-Stopped               cefTRIAXone (ROCEPHIN) 2 g in sodium chloride 0.9 % 100 mL IVPB-VTB  Dose: 2 g  Freq: Once Route: IV  Indications of Use: PNEUMONIA  Start: 04/02/23 1000   End: 04/02/23 1054   Admin Instructions:   LR should be paused and flushing of the line with NS is recommended prior to and after completion of ceftriaxone infusion due to incompatibility. Do not co-adminster with calcium-containing solutions.  Caution: Look alike/sound alike drug alert         1024-New Bag            furosemide (LASIX) injection 20 mg  Dose: 20 mg  Freq: Once Route: IV  Start: 04/01/23 1745   End: 04/01/23 1838        1838-Given             furosemide (LASIX) injection 20 mg  Dose: 20 mg  Freq: Once Route: IV  Start: 04/01/23 1015   End: 04/01/23 1057        1057-Given             furosemide (LASIX) injection 20 mg  Dose: 20 mg  Freq: Once  Route: IV  Start: 03/31/23 1700   End: 03/31/23 1740       1740-Given              furosemide (LASIX) injection 40 mg  Dose: 40 mg  Freq: Once Route: IV  Start: 03/31/23 1200   End: 03/31/23 1203       1203-Given              perflutren (DEFINITY) 8.476 mg in Sodium Chloride (PF) 0.9 % 10 mL injection  Dose: 2 mL  Freq: Once in Imaging Route: IV  Start: 04/01/23 1115   End: 04/01/23 1019   Admin Instructions:   Mix 1.3 mL of mechanically activated Definity with 8.7 mL of normal saline. A total of 2 mL of the resulting Definity solution was administered.        1019-Given            Discontinued Medications  Medications 03/28/23 03/29/23 03/30/23 03/31/23 04/01/23 04/02/23 04/03/23       cefTRIAXone (ROCEPHIN) 1 g in sodium chloride 0.9 % 100 mL IVPB-VTB  Dose: 1 g  Freq: Every 24 Hours Route: IV  Indications of Use: PNEUMONIA  Start: 04/03/23 1000   End: 04/02/23 1614   Admin Instructions:   LR should be paused and flushing of the line with NS is recommended prior to and after completion of ceftriaxone infusion due to incompatibility. Do not co-adminster with calcium-containing solutions.  Caution: Look alike/sound alike drug alert              cefTRIAXone (ROCEPHIN) 1 g in sodium chloride 0.9 % 100 mL IVPB-VTB  Dose: 1 g  Freq: Every 24 Hours Route: IV  Indications of Use: PNEUMONIA  Start: 04/02/23 0945   End: 04/02/23 0900   Admin Instructions:   LR should be paused and flushing of the line with NS is recommended prior to and after completion of ceftriaxone infusion due to incompatibility. Do not co-adminster with calcium-containing solutions.  Caution: Look alike/sound alike drug alert              furosemide (LASIX) injection 40 mg  Dose: 40 mg  Freq: Once Route: IV  Start: 03/31/23 1700   End: 03/31/23 1607                          Physician Progress Notes (all)      Osbaldo Bruce MD at 04/02/23 1906           LOS: 2 days   Patient Care Team:  Alma Cat MD as PCP - General (Family  Medicine)  Maurice Cook MD as Consulting Physician (Radiation Oncology)    Chief Complaint: Follow-up pneumonia, acute on chronic combined CHF, paroxysmal atrial fibrillation, pacemaker.    Interval History: He is still very weak.  Feels slightly better.  Not as short of breath today.  No chest pain.    Vital Signs:  Temp:  [97.5 °F (36.4 °C)-98.3 °F (36.8 °C)] 97.5 °F (36.4 °C)  Heart Rate:  [74-75] 74  Resp:  [16-17] 17  BP: ()/(56-63) 111/62    Intake/Output Summary (Last 24 hours) at 4/2/2023 1907  Last data filed at 4/2/2023 1530  Gross per 24 hour   Intake 240 ml   Output 1400 ml   Net -1160 ml       Physical Exam:   General Appearance:    No acute distress, alert and oriented x4, appears ill.   Lungs:     Decreased breath sounds bilaterally    Heart:    Regular rhythm and normal rate.  No murmurs, gallops, or       rubs.   Abdomen:     Soft, nontender, nondistended.    Extremities:   Trace edema of the lower extremities.     Results Review:    Results from last 7 days   Lab Units 04/02/23  0418   SODIUM mmol/L 127*   POTASSIUM mmol/L 3.2*   CHLORIDE mmol/L 92*   CO2 mmol/L 23.1   BUN mg/dL 23   CREATININE mg/dL 1.14   GLUCOSE mg/dL 130*   CALCIUM mg/dL 8.3     Results from last 7 days   Lab Units 03/30/23 2207 03/30/23 2010   HSTROP T ng/L 11 12  12     Results from last 7 days   Lab Units 04/02/23  0418   WBC 10*3/mm3 8.75   HEMOGLOBIN g/dL 11.1*   HEMATOCRIT % 32.1*   PLATELETS 10*3/mm3 171             Results from last 7 days   Lab Units 04/02/23  0418   MAGNESIUM mg/dL 2.3           I reviewed the patient's new clinical results.        Assessment:  1.  Acute hypoxic respiratory failure  2.  Community-acquired pneumonia  3.  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  Severe right ventricular enlargement with normal function  5.  Severe tricuspid regurgitation  6.  Status post mitral valve repair  7.  Paroxysmal atrial fibrillation  8.  Sick sinus syndrome, status post pacemaker  9.   Hypertension  10.  Generalized weakness, multifactorial  11.  Deconditioning    Plan:  -I reviewed his echocardiogram.  EF is about 40 to 45%.  The right ventricle is significantly enlarged with normal function, although there is septal flattening consistent with increased right ventricular pressure and volume overload.  There was also severe tricuspid regurgitation.  His mitral valve repair looked good.    -He is currently being treated for pneumonia, although his white blood cell count and procalcitonin have been normal.  Suspect that this was largely secondary to congestive heart failure.  Discussed with Dr. Mckinney.    -Appreciate assistance from Dr. Hernandez for hyponatremia.  He is going to give him 1 dose of IV Bumex today.  Agree with midodrine if needed for blood pressure.    -His blood pressure has been on the lower side.  I am going to hold his Coreg today and potentially resume tomorrow.     -Continue amiodarone and Eliquis for his history of atrial fibrillation.    -He does have bilateral pleural effusions which are moderate on my review of the CT scan.  Could consider thoracentesis if he does not improve clinically.  I will recheck chest x-ray tomorrow.    -Discussed with the patient and his daughter at bedside.    Osbaldo Bruce MD  23  19:07 EDT        Electronically signed by Osbaldo Bruce MD at 23 191     Som Mckinnye DO at 23 0859              Name: Bridger Elias ADMIT: 3/30/2023   : 1930  PCP: Alma Cat MD    MRN: [id number] LOS: 2 days   AGE/SEX: 89 y.o. male  ROOM: [facility]     Subjective   Subjective   Patient seen and examined this morning.  Hospital day 3.  Daughter at bedside.  Patient states that he feels better today when compared to prior as far as his breathing goes, he is now on 2 L O2 via NC with O2 sats greater than 90% (improved from prior).  However, does continue to have generalized fatigue and unsteadiness on his feet when walking, he  states that he is worried about falling and walking by himself at this time.    Objective   Objective   Vital Signs  Temp:  [97.8 °F (36.6 °C)-98.3 °F (36.8 °C)] 97.8 °F (36.6 °C)  Heart Rate:  [74-75] 75  Resp:  [16-18] 16  BP: ()/(60-68) 97/63  SpO2:  [91 %-95 %] 92 %  on  Flow (L/min):  [2-3] 2;   Device (Oxygen Therapy): nasal cannula  Body mass index is 24.56 kg/m².  Const: Elderly/frail appearing, no acute distress  Eyes: PERRL, normal conjunctiva  HENT: atraumatic, non-tender  CV: Paced rhythm, normal rate.  RESP: On 2 L O2 via NC, decreased breath sounds bilaterally  GI: soft, non-tender, non-distended  MSK: No gross deformities appreciated, no tenderness, trace bilateral lower extremity edema  Skin: Warm, dry, pale, scattered bruises.  Neuro: Awake, alert, oriented, no appreciable focal neurological deficits.  Psych: Appropriate mood and affect.    Results Review     I reviewed the patient's new clinical results.  Results from last 7 days   Lab Units 04/02/23 0418 04/01/23 0438 03/31/23 0530 03/30/23 2010   WBC 10*3/mm3 8.75 9.28 9.38 10.04   HEMOGLOBIN g/dL 11.1* 11.2* 10.2* 12.2*   PLATELETS 10*3/mm3 171 180 156 165     Results from last 7 days   Lab Units 04/02/23 0418 04/01/23 0438 03/31/23 0530 03/30/23 2010   SODIUM mmol/L 127* 131* 132* 133*   POTASSIUM mmol/L 3.2* 4.0 4.2 4.3   CHLORIDE mmol/L 92* 97* 102 100   CO2 mmol/L 23.1 23.8 20.6* 24.0   BUN mg/dL 23 21 24* 27*   CREATININE mg/dL 1.14 1.22 1.16 1.22   GLUCOSE mg/dL 130* 108* 104* 98   EGFR mL/min/1.73 60.0* 55.3* 58.7* 55.3*     Results from last 7 days   Lab Units 04/02/23 0418 04/01/23 0438 03/31/23 0530 03/30/23 2010   ALBUMIN g/dL 2.9* 2.9* 3.2* 3.6   BILIRUBIN mg/dL 0.8 1.0 0.8 0.8   ALK PHOS U/L 219* 220* 197* 214*   AST (SGOT) U/L 53* 55* 49* 54*   ALT (SGPT) U/L 40 37 27 30     Results from last 7 days   Lab Units 04/02/23 0418 04/01/23 0438 03/31/23  0530 03/30/23 2010   CALCIUM mg/dL 8.3 8.4 8.4 9.0   ALBUMIN  g/dL 2.9* 2.9* 3.2* 3.6   MAGNESIUM mg/dL 2.3 2.1  --   --    PHOSPHORUS mg/dL 2.5 3.0  --   --      Results from last 7 days   Lab Units 03/30/23 2207 03/30/23 2010   PROCALCITONIN ng/mL  --  0.08   LACTATE mmol/L 1.6  --      Glucose   Date/Time Value Ref Range Status   04/01/2023 0551 113 70 - 130 mg/dL Final     Comment:     Meter: [id number] : [id number] Krissy DEWITT       CT Chest Without Contrast Diagnostic    Result Date: 3/31/2023  Cardiac hardware with changes of prior sternotomy and mitral valve repair or replacement. There our bilateral pleural effusions with patchy opacity in the lungs bilaterally which appears similar as on the x-ray from yesterday and is favored represent pneumonia.  Radiation dose reduction techniques were utilized, including automated exposure control and exposure modulation based on body size.  This report was finalized on 3/31/2023 9:06 PM by Dr. Maurice Alejandro M.D.      I have personally reviewed all medications:  Scheduled Medications  amiodarone, 100 mg, Oral, Every Other Day  apixaban, 5 mg, Oral, Q12H  bicalutamide, 50 mg, Oral, Daily  [START ON 4/3/2023] cefTRIAXone, 1 g, Intravenous, Q24H  cefTRIAXone, 2 g, Intravenous, Once  latanoprost, 1 drop, Both Eyes, Daily  levothyroxine, 88 mcg, Oral, Daily  melatonin, 2 mg, Oral, Nightly  multivitamin with minerals, 1 tablet, Oral, Daily  rosuvastatin, 10 mg, Oral, Daily    Infusions   Diet  Diet: Cardiac Diets; Low Sodium (2g); Texture: Regular Texture (IDDSI 7); Fluid Consistency: Thin (IDDSI 0)    I have personally reviewed:  [x]  Laboratory   [x]  Microbiology   [x]  Radiology   [x]  EKG/Telemetry  [x]  Cardiology/Vascular   [x]  Records      Assessment/Plan     Active Hospital Problems    Diagnosis  POA   • **Acute respiratory failure with hypoxia [J96.01]  Yes   • Hyponatremia [E87.1]  Yes   • Normocytic anemia [D64.9]  Yes   • Hypokalemia [E87.6]  No   • Transaminitis [R74.01]  Yes   • Community acquired  pneumonia, unspecified laterality [J18.9]  Yes   • Stage 3a chronic kidney disease [N18.31]  Yes   • Pacemaker [Z95.0]  Yes   • Atrial fibrillation [I48.91]  Yes   • Acute on chronic combined systolic and diastolic CHF (congestive heart failure) [I50.43]  Yes   • SCC (squamous cell carcinoma), 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       89 y.o. male with history of chronic combined systolic and diastolic heart failure, atrial fibrillation on long-term anticoagulation, s/p PPM, CAD, hypertension, hyperlipidemia, squamous cell carcinoma, CKD stage IIIb, hypothyroidism who presents to [facility] with complaints of worsening dyspnea and generalized weakness.  Admitted with acute hypoxic respiratory failure secondary to acute on chronic combined systolic and diastolic heart failure.     Acute respiratory failure with hypoxia  Acute on chronic combined systolic and diastolic heart failure  - Patient meets criteria for diagnosis of acute respiratory failure with hypoxia with: Complaints of dyspnea, new oxygen requirement, P/F ratio <300 per ABG.  - ProBNP elevated to 2683 on admission (most recently was 662 on 03/10).  Chest x-ray showing findings concerning for multifocal pneumonia, bilateral pleural effusions and pulmonary vascular congestion. Did receive x1 dose of IV antibiotics in ED, however, given absence of additional findings to support pneumonia, further antibiotics were held, pending results of CT scan and infectious workup.  - Procalcitonin, strep, Legionella, respiratory viral panel negative.  Cultures no growth to date.  - 2D Echocardiogram showing EF 41-45% with systolic and diastolic flattening of the interventricular septum consistent with  right ventricular pressure and volume overload, severely dilated right ventricular cavity.  - Initially had great response to diuresis, however, now management is complicated by worsening hyponatremia. Consulted Nephrology after discussion with Dr. Bruce, subsequently discussed with Dr. Hernandez, he is going to adjust diuretic regimen today.  - Clinically, patient appears to be improving from a respiratory perspective, his symptoms are improving compared to prior and his oxygen requirements are decreased.  The CT scan obtained of his chest suggested that findings were consistent with pulmonary vascular congestion and pneumonia, however, I personally reviewed the imaging and discussed with Dr. Hernandez, both of us feel that the findings on CT are more indicative of pulmonary vascular congestion from heart failure rather than a pneumonia, further supported by the fact that patient denies any cough/sputum production, he is afebrile, infectious work-up including procalcitonin is negative and his WBC count is within normal limits. Patient did receive an additional dose of Ceftriaxone after admission, however, at this time, further antibiotic use does not seem to be warranted. However, can always reassess and treat if patient develops new signs or symptoms.  - Continue diuresis as prescribed. Closely monitor urine output in response to ongoing therapy.   - Follow-up Cardiology and Nephrology.  - Continue to monitor off antibiotic therapy.  - Strict I/O, daily weights, telemetry, pulse oximetry, supplemental oxygen as needed to maintain O2 sats greater than 90%, elevate HOB, aspiration precautions.    Hyponatremia  - Sodium continues to be low, however, worse today when compared to prior.  Discussed with Dr. Bruce, and we agreed that nephrology consultation was warranted, especially in setting of diuretic use from CHF.  Patient was subsequently seen by Dr. Hernandez this morning, I discussed with him afterwards, he is  going to check urine studies for further evaluation, also going to place patient on 1500 cc fluid restriction.  - Order repeat BMP in AM for reassessment.  - Follow up Nephrology plans and recommendations.    Hypokalemia  - Potassium low on most recent labs. Will replete via electrolyte protocol. Follow up repeat labs to guide ongoing management decisions. Replete PRN per protocol. Continue to monitor    History of Hypertension now with hypotension  - BP more soft today when compared to prior, have been holding his carvedilol since admission.  Discussed with nephrology today, Dr. Hernandez is going to consider adding midodrine to treatment regimen, if blood pressure continues to be low.  - Closely monitor BP to guide further management decisions.     Coronary artery disease  Atrial fibrillation on long-term anticoagulation  - Appears stable at present, patient has history of PPM placement, per chart review.  No signs or symptoms suggestive of ACS. Held carvedilol on admission in the setting of acute decompensated heart failure and soft BP. Continue amiodarone and Eliquis as prescribed.  Continue to hold Carvedilol, resume if/when appropriate. Monitor on telemetry.    Chronic kidney disease stage 3A  - On most recent labs, patient's creatinine found to be stable and near apparent baseline, per review of previous lab values and outside records.  - No indications to warrant acute changes and/or intervention at this time.  - Order repeat BMP in AM for reassessment of renal function.   - Will continue to monitor and trend creatinine to guide ongoing management decisions. Avoid nephrotoxic medications, IVF, strict I/O, daily weights.    Anemia  - Hemoglobin slightly low, however, stable from prior. No evidence of overt blood loss. No indications for acute intervention at this time.    - Order repeat CBC in AM for reassessment. Continue to monitor, transfuse for hemoglobin <7.    Transaminitis  - LFTs slightly elevated,  however, relatively stable from prior.  Monitor for now.  Repeat CMP in the AM for reassessment.    Hypothyroidism  - Stable. Repeat TSH within normal limits. Continue Levothyroxine as prescribed.     Hyperlipidemia  - Stable. Continue Statin as prescribed.     Generalized weakness/Debility  - Consulted PT/OT, fall precautions.      · Eliquis (home med) for DVT prophylaxis.  · Full code.  · Discussed with patient, family and nursing staff.  · Anticipate discharge TBD timing yet to be determined.      DO [facility]  04/02/23  09:00 EDT      Electronically signed by Som Mckinney DO at 04/02/23 1610     Osbaldo Bruce MD at 04/01/23 1656           LOS: 1 day   Patient Care Team:  Alma Cat MD as PCP - General (Family Medicine)  Maurice Cook MD as Consulting Physician (Radiation Oncology)    Chief Complaint: Follow-up pneumonia, acute on chronic combined CHF, paroxysmal atrial fibrillation, pacemaker.    Interval History: He is still very weak.  He still gets short of breath with movement.  No chest pain.    Vital Signs:  Temp:  [97.4 °F (36.3 °C)-98.6 °F (37 °C)] 98 °F (36.7 °C)  Heart Rate:  [74-75] 75  Resp:  [18] 18  BP: ()/(60-74) 112/68    Intake/Output Summary (Last 24 hours) at 4/1/2023 1656  Last data filed at 4/1/2023 1304  Gross per 24 hour   Intake 0 ml   Output 2070 ml   Net -2070 ml       Physical Exam:   General Appearance:    No acute distress, alert and oriented x4, appears ill.   Lungs:     Decreased breath sounds bilaterally    Heart:    Regular rhythm and normal rate.  No murmurs, gallops, or       rubs.   Abdomen:     Soft, nontender, nondistended.    Extremities:   Trace edema of the lower extremities.     Results Review:    Results from last 7 days   Lab Units 04/01/23  0438   SODIUM mmol/L 131*   POTASSIUM mmol/L 4.0   CHLORIDE mmol/L 97*   CO2 mmol/L 23.8   BUN mg/dL 21   CREATININE mg/dL 1.22   GLUCOSE mg/dL 108*   CALCIUM mg/dL 8.4      Results from last 7 days   Lab Units 23  2207 23   HSTROP T ng/L 11 12  12     Results from last 7 days   Lab Units 23  0438   WBC 10*3/mm3 9.28   HEMOGLOBIN g/dL 11.2*   HEMATOCRIT % 32.4*   PLATELETS 10*3/mm3 180             Results from last 7 days   Lab Units 23  0438   MAGNESIUM mg/dL 2.1           I reviewed the patient's new clinical results.        Assessment:  1.  Acute hypoxic respiratory failure  2.  Community-acquired pneumonia  3.  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  Severe right ventricular enlargement with normal function  5.  Severe tricuspid regurgitation  6.  Status post mitral valve repair  7.  Paroxysmal atrial fibrillation  8.  Sick sinus syndrome, status post pacemaker  9.  Hypertension  10.  Generalized weakness, multifactorial  11.  Deconditioning    Plan:  -I reviewed his echocardiogram.  EF is about 40 to 45%.  The right ventricle is significantly enlarged with normal function, although there is septal flattening consistent with increased right ventricular pressure and volume overload.  There was also severe tricuspid regurgitation.  His mitral valve repair looked good.    -He is currently being treated for pneumonia, and this is likely a combined picture with the heart failure.    -Continue diuretics today.  He got 20 mg of IV Lasix this morning.  I will give him another 20 mg this afternoon.    -His blood pressure has been on the lower side.  I am going to hold his Coreg today and potentially resume tomorrow.    -Continue amiodarone and Eliquis for his history of atrial fibrillation.    -We will continue to follow over the weekend.  Dr. Vanegas will likely take over on Monday.    Osbaldo Bruce MD  23  16:56 EDT        Electronically signed by Osbaldo Bruce MD at 23 1710     Som Mckinney DO at 23 1021              Name: Bridger Elias ADMIT: 3/30/2023   : 1930  PCP: Alma Cat MD    MRN:  [id number] LOS: 1 days   AGE/SEX: 89 y.o. male  ROOM: [facility]     Subjective   Subjective   Patient seen and examined this morning.  Hospital day 2.  Daughter at bedside.  Patient states he continues to have dyspnea, generalized fatigue and weakness, slightly improved from prior.  Discussed with nursing, patient has had great response to diuretics, weight is down from admission.  Remains on supplemental oxygen, however, on 3 L currently (down from 4 L on admission).        Objective   Objective   Vital Signs  Temp:  [97.4 °F (36.3 °C)-98.6 °F (37 °C)] 98.2 °F (36.8 °C)  Heart Rate:  [74-75] 74  Resp:  [18] 18  BP: ()/(51-74) 92/60  SpO2:  [92 %-95 %] 92 %  on  Flow (L/min):  [3-5] 3;   Device (Oxygen Therapy): nasal cannula  Body mass index is 24.59 kg/m².  Const: Elderly/frail appearing, no acute distress  Eyes: PERRL, normal conjunctiva  HENT: atraumatic, non-tender  CV: Paced rhythm, normal rate.  RESP: On 3 L O2 via NC, decreased breath sounds bilaterally  GI: soft, non-tender, non-distended  MSK: No gross deformities appreciated, no tenderness, trace bilateral lower extremity edema  Skin: Warm, dry, pale, scattered bruises.  Neuro: Awake, alert, oriented, no appreciable focal neurological deficits.  Psych: Appropriate mood and affect.    Results Review     I reviewed the patient's new clinical results.  Results from last 7 days   Lab Units 04/01/23 0438 03/31/23 0530 03/30/23 2010   WBC 10*3/mm3 9.28 9.38 10.04   HEMOGLOBIN g/dL 11.2* 10.2* 12.2*   PLATELETS 10*3/mm3 180 156 165     Results from last 7 days   Lab Units 04/01/23 0438 03/31/23 0530 03/30/23 2010   SODIUM mmol/L 131* 132* 133*   POTASSIUM mmol/L 4.0 4.2 4.3   CHLORIDE mmol/L 97* 102 100   CO2 mmol/L 23.8 20.6* 24.0   BUN mg/dL 21 24* 27*   CREATININE mg/dL 1.22 1.16 1.22   GLUCOSE mg/dL 108* 104* 98   EGFR mL/min/1.73 55.3* 58.7* 55.3*     Results from last 7 days   Lab Units 04/01/23  0438 03/31/23  0530 03/30/23 2010   ALBUMIN g/dL  2.9* 3.2* 3.6   BILIRUBIN mg/dL 1.0 0.8 0.8   ALK PHOS U/L 220* 197* 214*   AST (SGOT) U/L 55* 49* 54*   ALT (SGPT) U/L 37 27 30     Results from last 7 days   Lab Units 04/01/23  0438 03/31/23  0530 03/30/23 2010   CALCIUM mg/dL 8.4 8.4 9.0   ALBUMIN g/dL 2.9* 3.2* 3.6   MAGNESIUM mg/dL 2.1  --   --    PHOSPHORUS mg/dL 3.0  --   --      Results from last 7 days   Lab Units 03/30/23 2207 03/30/23 2010   PROCALCITONIN ng/mL  --  0.08   LACTATE mmol/L 1.6  --      Glucose   Date/Time Value Ref Range Status   04/01/2023 0551 113 70 - 130 mg/dL Final     Comment:     Meter: [id number] : [id number] DEWITT       CT Chest Without Contrast Diagnostic    Result Date: 3/31/2023  Cardiac hardware with changes of prior sternotomy and mitral valve repair or replacement. There our bilateral pleural effusions with patchy opacity in the lungs bilaterally which appears similar as on the x-ray from yesterday and is favored represent pneumonia.  Radiation dose reduction techniques were utilized, including automated exposure control and exposure modulation based on body size.  This report was finalized on 3/31/2023 9:06 PM by Dr. Maurice Alejandro M.D.      XR Chest 1 View    Result Date: 3/30/2023  Bilateral pneumonia.  This report was finalized on 3/30/2023 8:50 PM by Dr. Maurice Alejandro M.D.      I have personally reviewed all medications:  Scheduled Medications  amiodarone, 100 mg, Oral, Every Other Day  apixaban, 5 mg, Oral, Q12H  bicalutamide, 50 mg, Oral, Daily  latanoprost, 1 drop, Both Eyes, Daily  levothyroxine, 88 mcg, Oral, Daily  multivitamin with minerals, 1 tablet, Oral, Daily  rosuvastatin, 10 mg, Oral, Daily    Infusions   Diet  Diet: Cardiac Diets; Low Sodium (2g); Texture: Regular Texture (IDDSI 7); Fluid Consistency: Thin (IDDSI 0)    I have personally reviewed:  [x]  Laboratory   [x]  Microbiology   [x]  Radiology   [x]  EKG/Telemetry  [x]  Cardiology/Vascular   [x]  Records      Assessment/Plan      Active Hospital Problems    Diagnosis  POA   • **Community acquired pneumonia, unspecified laterality [J18.9]  Yes   • Stage 3b chronic kidney disease [N18.32]  Yes   • Pacemaker [Z95.0]  Yes   • Atrial fibrillation [I48.91]  Yes   • Acute on chronic combined systolic and diastolic CHF (congestive heart failure) [I50.43]  Yes   • SCC (squamous cell carcinoma), 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       89 y.o. male admitted with Community acquired pneumonia, unspecified laterality.    89 y.o. male with history of chronic combined systolic and diastolic heart failure, atrial fibrillation on long-term anticoagulation, s/p PPM, CAD, hypertension, hyperlipidemia, squamous cell carcinoma, CKD stage IIIb, hypothyroidism who presents to [facility] with complaints of worsening dyspnea and generalized weakness.  Admitted with acute hypoxic respiratory failure secondary to acute on acquired pneumonia and acute on chronic combined systolic and diastolic heart failure.     Acute respiratory failure with hypoxia  Community-acquired pneumonia  Acute on chronic combined systolic and diastolic heart failure  - Patient meets criteria for diagnosis of acute respiratory failure with hypoxia with: Complaints of dyspnea, new oxygen requirement, P/F ratio <300 per ABG.  - ProBNP elevated to 2683 on admission (most recently was 662 on 03/10).  Chest x-ray showing findings concerning for multifocal pneumonia, bilateral pleural effusions and possible pulmonary vascular congestion. Did receive x1 dose of IV antibiotics in ED, however, given absence of additional findings to support pneumonia, further antibiotics were held, pending results of CT scan and infectious  workup.  - Procalcitonin, strep, Legionella, respiratory viral panel negative.  Cultures no growth to date.  - Has had a great response to diuresis thus far, weight 73.5 kg on admission, most recently 71.4 kg this morning.  Does continue to require supplemental oxygen, however, requirement slightly less than prior.  - Continue diuresis.  Closely monitor urine output in response to ongoing therapy.   - Follow-up Cardiology. Follow up CT Chest and 2D Echo for further evaluation.  - Continue to monitor off antibiotic therapy pending CT and patient clinical course.  - Strict I/O, daily weights, telemetry, pulse oximetry, supplemental oxygen as needed to maintain O2 sats greater than 90%, elevate HOB, aspiration precautions.     Coronary artery disease  Atrial fibrillation on long-term anticoagulation  - Appears stable at present, patient has history of PPM placement, per chart review.  No signs or symptoms suggestive of ACS. Hold carvedilol on admission in the setting of acute decompensated heart failure, while awaiting cardiology input.  Continue amiodarone and Eliquis as prescribed.  Monitor on telemetry.     Hypertension  - BP acceptable acutely. Appears stable. No indications to warrant acute changes/intervention at this time.  - Continue current medications as prescribed. Trend BP to guide ongoing management decisions.     Hypothyroidism  - Stable. Continue Levothyroxine as prescribed.     Hyperlipidemia  - Stable. Continue Statin as prescribed.    Chronic kidney disease stage 3B  - On most recent labs, patient's creatinine found to be stable and near apparent baseline, per review of previous lab values and outside records.  - No indications to warrant acute changes and/or intervention at this time.  - Order repeat BMP in AM for reassessment of renal function.   - Will continue to monitor and trend creatinine to guide ongoing management decisions. Avoid nephrotoxic medications, IVF, strict I/O, daily  weights.     Generalized weakness  - Consult PT/OT, fall precautions.      · Eliquis (home med) for DVT prophylaxis.  · Full code.  · Discussed with patient, family and nursing staff.  · Anticipate discharge TBD timing yet to be determined.      Som Mckinney DO  [facility]  23  12:21 EDT        Electronically signed by Som Mckinney DO at 23 1543          Consult Notes (all)      Naeem Hernandez MD at 23 1124      Consult Orders    1. Inpatient Nephrology Consult [[id number]] ordered by Som Mckinney DO at 23 0859                 [facility] Consult Note      Patient Name: Bridger Elias  : 1930  MRN: [id number]  Primary Care Physician:  Alma Cat MD  Referring Physician: Ramsey Talamantes MD  Date of admission: 3/30/2023    Subjective     Reason for Consult: Hyponatremia    HPI:   Bridger Elias is a 89 y.o. male was admitted on 3/30/2023 with increasing shortness of breath and generalized weakness.  The patient has worsening hyponatremia hence nephrology consult was requested and he has abnormal renal function he had CKD's stage III.  Baseline creatinine around 1.2-1.4.  He has a history of acute on chronic diastolic congestive heart failure, atrial fibrillation, coronary artery disease, dyslipidemia, hypertension tension with chronic kidney disease, hypothyroidism, chronically anticoagulated, he has history of prostate cancer, pacemaker.  He will he was on a low-dose of diuretics furosemide 20 mg daily which was discontinued and his ejection fraction reported to be 40 to 45% also had severe tricuspid regurgitation and he had a prior mitral valve repair.    The patient is having some difficulty breathing when lying down, no chest pain, no nausea or vomiting, no abdominal pain, he had difficulty getting up to void so he had some urinary incontinence because of inability to get up to void.          Review of Systems:   14  point review of systems is otherwise negative except for mentioned above on HPI    Personal History     Past Medical History:   Diagnosis Date   • Acute on chronic diastolic CHF (congestive heart failure) 4/14/2022   • Atrial fibrillation 4/14/2022   • Coronary arteriosclerosis 7/3/2014    Formatting of this note might be different from the original. [facility] 12/7/16  IMPRESSION:   1. Right heart disease, hypertensive cardiac disease.   2. Status post mitral valve repair.   3. Anatomy: No hemodynamically significant disease. about 30-40% lesion of    the right coronary artery. Normal. Left coronary artery system.   • Dyslipidemia 12/5/2013   • Glaucoma 4/14/2022   • Hypertension 12/5/2013   • Hypertensive kidney disease, stage III 3/13/2017   • Hypothyroidism 3/19/2017   • Long term (current) use of anticoagulants 12/5/2013    Formatting of this note might be different from the original. Mitral valve replacement and atrial fibrillation Assume a range of 2.5-3.5.   • Malignant neoplasm of prostate 12/5/2013    Formatting of this note might be different from the original. 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    Social History:  reports that he has never smoked. He has never used smokeless tobacco. He reports that he does not drink alcohol and does not use drugs.    Home Medications:  Prior to Admission medications    Medication Sig Start Date End Date Taking? Authorizing Provider   acetaminophen (TYLENOL) 325 MG tablet Take 2 tablets by mouth Every 6 (Six) Hours As Needed for Mild Pain , Moderate Pain , Headache or  Fever. 4/22/22   Abad Yee MD   amiodarone (PACERONE) 200 MG tablet Take 1 tablet by mouth Daily.  Patient taking differently: Take 100 mg by mouth Every Other Day. 9/10/22   Amari Alva MD   apixaban (ELIQUIS) 5 MG tablet tablet Take 1 tablet by mouth Every 12 (Twelve) Hours. Indications: Atrial Fibrillation 4/22/22   Abad Yee MD   bicalutamide (CASODEX) 50 MG chemo tablet Take 1 tablet by mouth Daily.    Eugenia Fisher MD   calcium carbonate (OS-SOPHIA) 600 MG tablet Take 1 tablet by mouth Daily.    Eugenia Fisher MD   carvedilol (COREG) 3.125 MG tablet Take 1 tablet by mouth Every Night. 3/18/22   Eugenia Fisher MD   latanoprost (XALATAN) 0.005 % ophthalmic solution Apply  to eye(s) as directed by provider.    Eugenia Fisher MD   levothyroxine (SYNTHROID, LEVOTHROID) 88 MCG tablet Take 1 tablet by mouth Daily.    Eugenia Fisher MD   multivitamin with minerals tablet tablet Take 1 tablet by mouth Daily.    Eugenia Fisher MD   polyethylene glycol (MIRALAX) 17 g packet Take 17 g by mouth Daily As Needed (Use if senna-docusate is ineffective). 4/22/22   Abad Yee MD   rosuvastatin (CRESTOR) 10 MG tablet Take 1 tablet by mouth Daily.    ProvideruEgenia MD   silver sulfadiazine (SILVADENE, SSD) 1 % cream Apply 1 application topically to the appropriate area as directed 3 (Three) Times a Day. Apply to facial regions of inflammation per rad onc recs 4/22/22   Abad Yee MD   silver sulfadiazine (SILVADENE, SSD) 1 % cream Apply 1 application topically to the appropriate area as directed 2 (Two) Times a Day. 5/2/22   Maurice Cook MD       Allergies:  No Known Allergies    Objective     Vitals:   Temp:  [97.8 °F (36.6 °C)-98.3 °F (36.8 °C)] 97.8 °F (36.6 °C)  Heart Rate:  [74-75] 75  Resp:  [16-18] 16  BP: ()/(63-68) 97/63  Flow (L/min):  [2-5] 5    Intake/Output Summary (Last 24 hours) at 4/2/2023 1124  Last  data filed at 4/2/2023 0725  Gross per 24 hour   Intake 240 ml   Output 1360 ml   Net -1120 ml       Physical Exam:   Constitutional: Awake, alert, no acute distress.  Chronically ill and frail  HEENT: Sclera anicteric, no conjunctival injection  Neck: Supple, no thyromegaly, no lymphadenopathy, trachea at midline, mild JVD  Respiratory: Bilateral rhonchi and crackles, nonlabored respiration  Cardiovascular: Irregularly irregular, no murmurs, no rubs or gallops, no carotid bruit  Gastrointestinal: Positive bowel sounds, abdomen is soft, nontender and nondistended  : No palpable bladder  Musculoskeletal: N 2+ left ankle edema, the patient has no right ankle edema, no clubbing or cyanosis  Psychiatric: Flat affect, cooperative  Neurologic: Hard of hearing, moving all extremities, normal speech and mental status  Skin: Warm and dry       Scheduled Meds:     amiodarone, 100 mg, Oral, Every Other Day  apixaban, 5 mg, Oral, Q12H  bicalutamide, 50 mg, Oral, Daily  [START ON 4/3/2023] cefTRIAXone, 1 g, Intravenous, Q24H  latanoprost, 1 drop, Both Eyes, Daily  levothyroxine, 88 mcg, Oral, Daily  melatonin, 2 mg, Oral, Nightly  multivitamin with minerals, 1 tablet, Oral, Daily  rosuvastatin, 10 mg, Oral, Daily      IV Meds:        Results Reviewed:   I have personally reviewed the results from the time of this admission to 4/2/2023 11:24 EDT     Lab Results   Component Value Date    GLUCOSE 130 (H) 04/02/2023    CALCIUM 8.3 04/02/2023     (L) 04/02/2023    K 3.2 (L) 04/02/2023    CO2 23.1 04/02/2023    CL 92 (L) 04/02/2023    BUN 23 04/02/2023    CREATININE 1.14 04/02/2023    EGFRIFNONA 60 (L) 07/20/2021    BCR 20.2 04/02/2023    ANIONGAP 11.9 04/02/2023      Lab Results   Component Value Date    MG 2.3 04/02/2023    PHOS 2.5 04/02/2023    ALBUMIN 2.9 (L) 04/02/2023           Assessment / Plan     ASSESSMENT:  -Hyponatremia associated with acute on chronic diastolic dysfunction  -CKD stage III secondary to  nephrosclerosis and advanced age  -History of hypertension but the patient is hypotensive at present  -A-fib  -Chronically anticoagulated  -Coronary artery disease  -Valvular heart disease  -Frailty    PLAN:  -Check random urine for sodium, chloride and also urine and serum osmolality, will check uric acid  -1 dose of bumetanide 4 mg IV today  -May consider adding midodrine  -Restrict fluid intake to 1500 cc per 24 hours  -Surveillance labs  Prognosis is is very guarded given his advanced age.    Thank you for involving us in the care of Bridger Elias.  Please feel free to call with any questions.    Naeem Hernandez MD  04/02/23  11:24 EDT    [facility]  [phone]      Please note that portions of this note were completed with a voice recognition program.    Electronically signed by Naeem Hernandez MD at 04/02/23 1131     Lisa Mcconnell MD at 03/31/23 1504          [facility]  Consult Note                                                                              3/31/2023  Ramsey Talamantes MD    Patient Identification:  Bridger Elias:   89 y.o.  male  1/31/1930     Date of Admission:3/30/2023    CC:  Consult requested for management of valvular heart disease, congestive heart failure, cardiomyopathy    History of Present Illness:  Patient is a 89-year-old gentleman with history of atrial fibrillation, mild coronary artery disease, mitral valve repair, cardiomyopathy, sick sinus syndrome with pacer placement, hypertension, renal insufficiency, dyslipidemia, prostate cancer and hypothyroidism.  He has been followed by Dr. Vanegas.  He last saw Dr. Vanegas in September 2022 after he was admitted with near syncope and fatigue.  He ruled out for sepsis and was found to have atrial fibrillation with ventricular pacing.  He was started on IV amiodarone.  He was noted to have cellulitis treated with antibiotics.  He was continued on Eliquis.  Echo at that time showed an  ejection fraction of 30 to 35% with indeterminate diastolic function, moderate left ventricular hypertrophy, dilated RV with moderate tricuspid valve regurgitation and mild pulmonary hypertension.  Mitral valve ring appeared to be stable without stenosis.    Patient is now admitted with worsening shortness of breath and states that Dr. Vanegas increase diuretics several weeks ago this week it was decreased and he now presents with worsening shortness of breath with chest x-ray findings also of pneumonia.  proBNP elevated to 2683.  Troponin is normal.  Sodium slightly low at 132.  He has been given Zithromax Rocephin and 1 dose of Lasix today.  Vital stable with borderline low blood pressure.  Home meds have not continued of so far including Eliquis and amiodarone.    Currently resting quietly with mild dyspnea.  No chest pain or palpitations.  He denies any exacerbating events no salt intake has been slightly labile.    Past Medical History:  Past Medical History:   Diagnosis Date   • Acute on chronic diastolic CHF (congestive heart failure) ([facility]) 4/14/2022   • Atrial fibrillation ([facility]) 4/14/2022   • Coronary arteriosclerosis 7/3/2014    Formatting of this note might be different from the original. [facility] 12/7/16  IMPRESSION:   1. Right heart disease, hypertensive cardiac disease.   2. Status post mitral valve repair.   3. Anatomy: No hemodynamically significant disease. about 30-40% lesion of    the right coronary artery. Normal. Left coronary artery system.   • Dyslipidemia 12/5/2013   • Glaucoma 4/14/2022   • Hypertension 12/5/2013   • Hypertensive kidney disease, stage III ([facility]) 3/13/2017   • Hypothyroidism 3/19/2017   • Long term (current) use of anticoagulants 12/5/2013    Formatting of this note might be different from the original. Mitral valve replacement and atrial fibrillation Assume a range of 2.5-3.5.   • Malignant neoplasm of prostate ([facility]) 12/5/2013    Formatting of this note might be different from the  original. Description: He sees urology every 6 months and he does do Lupron injections   • Mitral valve disorder 1/25/2021   • Pacemaker 4/14/2022   • Personal history of radiation therapy 4/14/2022   • Prostate cancer ([facility])    • SCC (squamous cell carcinoma), face 2/2/2022   • Stage 3b chronic kidney disease ([facility]) 4/14/2022       Past Surgical History:  Past Surgical History:   Procedure Laterality Date   • PACEMAKER IMPLANTATION  2018       Allergies:  No Known Allergies    Home Meds:  Medications Prior to Admission   Medication Sig Dispense Refill Last Dose   • acetaminophen (TYLENOL) 325 MG tablet Take 2 tablets by mouth Every 6 (Six) Hours As Needed for Mild Pain , Moderate Pain , Headache or Fever.   Unknown   • amiodarone (PACERONE) 200 MG tablet Take 1 tablet by mouth Daily. (Patient taking differently: Take 100 mg by mouth Every Other Day.) 30 tablet 0 Unknown   • apixaban (ELIQUIS) 5 MG tablet tablet Take 1 tablet by mouth Every 12 (Twelve) Hours. Indications: Atrial Fibrillation 60 tablet  Unknown   • bicalutamide (CASODEX) 50 MG chemo tablet Take 1 tablet by mouth Daily.   Unknown   • calcium carbonate (OS-SOPHIA) 600 MG tablet Take 1 tablet by mouth Daily.   Unknown   • carvedilol (COREG) 3.125 MG tablet Take 1 tablet by mouth Every Night.   Unknown   • latanoprost (XALATAN) 0.005 % ophthalmic solution Apply  to eye(s) as directed by provider.   Unknown   • levothyroxine (SYNTHROID, LEVOTHROID) 88 MCG tablet Take 1 tablet by mouth Daily.   Unknown   • multivitamin with minerals tablet tablet Take 1 tablet by mouth Daily.   Unknown   • polyethylene glycol (MIRALAX) 17 g packet Take 17 g by mouth Daily As Needed (Use if senna-docusate is ineffective).   Unknown   • rosuvastatin (CRESTOR) 10 MG tablet Take 1 tablet by mouth Daily.   Unknown   • silver sulfadiazine (SILVADENE, SSD) 1 % cream Apply 1 application topically to the appropriate area as directed 3 (Three) Times a Day. Apply to facial regions of  inflammation per rad onc recs      • silver sulfadiazine (SILVADENE, SSD) 1 % cream Apply 1 application topically to the appropriate area as directed 2 (Two) Times a Day. 400 g 2        Current Meds  Scheduled Meds:amiodarone, 100 mg, Oral, Every Other Day  apixaban, 5 mg, Oral, Q12H  bicalutamide, 50 mg, Oral, Daily  latanoprost, 1 drop, Both Eyes, Daily  levothyroxine, 88 mcg, Oral, Daily  multivitamin with minerals, 1 tablet, Oral, Daily  rosuvastatin, 10 mg, Oral, Daily        Social History:   Social History     Socioeconomic History   • Marital status:    Tobacco Use   • Smoking status: Never   • Smokeless tobacco: Never   Vaping Use   • Vaping Use: Never used   Substance and Sexual Activity   • Alcohol use: Never   • Drug use: Never       Family History:  History reviewed. No pertinent family history.    REVIEW OF SYSTEMS:   CONSTITUTIONAL: No weight loss, .  HEENT: Eyes: No visual loss, blurred vision, double vision or yellow sclerae. Ears, Nose, Throat: No hearing loss, sneezing, congestion, runny nose or sore throat.   SKIN: No rash or itching.     RESPIRATORY: No hemoptysis, cough or sputum.   GASTROINTESTINAL: No anorexia, nausea, vomiting or diarrhea. No abdominal pain, bright red blood per rectum or melena.  GENITOURINARY: No burning on urination, hematuria or increased frequency.  NEUROLOGICAL: No headache, dizziness, syncope, paralysis, ataxia, numbness or tingling in the extremities. No change in bowel or bladder control.   MUSCULOSKELETAL: No muscle, back pain, joint pain or stiffness.   HEMATOLOGIC: No anemia, bleeding or bruising.   LYMPHATICS: No enlarged nodes. No history of splenectomy.   PSYCHIATRIC: No history of depression, anxiety, hallucinations.   ENDOCRINOLOGIC: No reports of sweating, cold or heat intolerance. No polyuria or polydipsia.     Physical Exam    /74 (BP Location: Right arm, Patient Position: Lying)   Pulse 74   Temp 97.4 °F (36.3 °C) (Oral)   Resp 18   Ht  "170.2 cm (67\")   Wt 73.5 kg (162 lb 1.6 oz)   SpO2 93%   BMI 25.39 kg/m²     General Appearance Well developed, cooperative and well nourished and no acute distress   Head Normocephalic, without abnormality, atraumatic   Ears Ears appear intact with no abnormalities noted   Throat No oral lesions, no thrush, oral mucosa moist   Neck No adenopathy, supple, trachea midline, no thyromegaly, no carotid bruit,   Back No skin lesions, erythema or scars, no tenderness to percussion or palpation,range of motion is normal   Lungs  few rales at both bases.  Respirations regular, even and unlabored   Heart Regular rhythm and normal rate, normal S1 and S2, no murmur, no gallop, no rub, no click   Chest wall No abnormalities observed   Abdomen Normal bowel sounds, no masses, no hepatomegaly,    Extremities Moves all extremities well, no edema, no cyanosis, no redness   Pulses Pulses palpable and equal bilaterally. Normal radial, carotid, femoral, dorsalis pedis and posterior tibial pulses bilaterally.    Skin No bleeding, bruising or rash   Psychiatric Alert and oriented x 3, normal mood and affect     Results from last 7 days   Lab Units 03/31/23  0530   SODIUM mmol/L 132*   POTASSIUM mmol/L 4.2   CHLORIDE mmol/L 102   CO2 mmol/L 20.6*   BUN mg/dL 24*   CREATININE mg/dL 1.16   CALCIUM mg/dL 8.4   BILIRUBIN mg/dL 0.8   ALK PHOS U/L 197*   ALT (SGPT) U/L 27   AST (SGOT) U/L 49*   GLUCOSE mg/dL 104*     Results from last 7 days   Lab Units 03/30/23  2207 03/30/23 2010   HSTROP T ng/L 11 12  12     Results from last 7 days   Lab Units 03/31/23  0530 03/30/23 2010   WBC 10*3/mm3 9.38 10.04   HEMOGLOBIN g/dL 10.2* 12.2*   HEMATOCRIT % 29.8* 34.3*   PLATELETS 10*3/mm3 156 165     I personally viewed and interpreted the patient's EKG/Telemetry data  I have reviewed HPI and ROS above.    Assessment and Plan  1.  Congestive heart failure with acute on chronic systolic and diastolic heart failure..  Continue with IV Lasix for " another 24 to 48 hours.  No clear precipitating event but does have persistent ventricular pacing.  Would consider the pacer and she will discuss further with primary cardiology team tomorrow  2.  Pneumonia, on antibiotic therapy  3.  Paroxysmal atrial fibrillation with ventricular pacing.  Continue with Eliquis.  QTc  4.  Sick sinus syndrome  5.  History of mitral valve repair with normal function by echo 9/2022  6.  Nonischemic cardiomyopathy, ejection fraction 30 to 35% in 9/2022 but in the setting of tachycardia.  7.  Mild coronary artery disease  8.  Hypothyroidism  9.  Anemia      Mini Mcconnell  3/31/2023  23:39 EDT    60min spent in reviewing records, discussion and examination of the patient and discussion with other members of the patient's medical team.     Dictated utilizing Dragon dictation    Electronically signed by Lisa Mcconnell MD at 03/31/23 5740

## 2023-04-03 NOTE — DISCHARGE PLACEMENT REQUEST
"Ailyn Elias \"GENE\" (89 y.o. Male)     Date of Birth   01/31/1930    Social Security Number       Address   [address] [place] [address]    Home Phone   [phone]    MRN   [id number]       [facility]    Marital Status                               Admission Date   3/30/23    Admission Type   Emergency    Admitting Provider   Som Mckinney DO    Attending Provider   Rock Daley MD    Department, Room/Bed   [address], [address]       Discharge Date       Discharge Disposition       Discharge Destination                               Attending Provider: Rock Daley MD    Allergies: No Known Allergies    Isolation: None   Infection: None   Code Status: CPR    Ht: 170.2 cm (67\")   Wt: 70.3 kg (154 lb 14.4 oz)    Admission Cmt: None   Principal Problem: Acute respiratory failure with hypoxia [J96.01]                 Active Insurance as of 3/30/2023     Primary Coverage     Payor Plan Insurance Group Employer/Plan Group    AETNA MEDICARE REPLACEMENT AETNA MEDICARE REPLACEMENT [id number]     Payor Plan Address Payor Plan Phone Number Payor Plan Fax Number Effective Dates    [address] [phone]  1/1/2021 - None Entered    [facility] [id number]       Subscriber Name Subscriber Birth Date Member ID       AILYN ELIAS 1/31/1930 [id number]                 Emergency Contacts      (Rel.) Home Phone Work Phone Mobile Phone    LENNOXWESTI (Daughter) [phone] -- [phone]    Yasir Elias (Son) [phone] -- --              "

## 2023-04-03 NOTE — PROGRESS NOTES
LOS: 3 days   Patient Care Team:  Alma Cat MD as PCP - General (Family Medicine)  Mauirce Cook MD as Consulting Physician (Radiation Oncology)    Chief Complaint: Follow-up pneumonia, acute on chronic combined CHF, paroxysmal atrial fibrillation, pacemaker.    Interval History: No chest pain    Vital Signs:  Temp:  [97.5 °F (36.4 °C)-98.3 °F (36.8 °C)] 98.3 °F (36.8 °C)  Heart Rate:  [74-75] 74  Resp:  [17-18] 18  BP: (103-113)/(56-67) 113/61    Intake/Output Summary (Last 24 hours) at 4/3/2023 0930  Last data filed at 4/3/2023 0700  Gross per 24 hour   Intake 360 ml   Output 1500 ml   Net -1140 ml       Physical Exam:   General Appearance:    No acute distress, alert and oriented x4, appears ill.   Lungs:     Decreased breath sounds bilaterally    Heart:    Regular rhythm and normal rate.  No murmurs, gallops, or       rubs.   Abdomen:     Soft, nontender, nondistended.    Extremities:   Trace edema of the lower extremities.     Results Review:    Results from last 7 days   Lab Units 04/03/23  0427   SODIUM mmol/L 132*   POTASSIUM mmol/L 4.6   CHLORIDE mmol/L 96*   CO2 mmol/L 26.7   BUN mg/dL 24*   CREATININE mg/dL 1.37*   GLUCOSE mg/dL 108*   CALCIUM mg/dL 8.8     Results from last 7 days   Lab Units 03/30/23 2207 03/30/23 2010   HSTROP T ng/L 11 12  12     Results from last 7 days   Lab Units 04/03/23  0427   WBC 10*3/mm3 10.61   HEMOGLOBIN g/dL 11.9*   HEMATOCRIT % 32.4*   PLATELETS 10*3/mm3 181             Results from last 7 days   Lab Units 04/03/23  0427   MAGNESIUM mg/dL 2.2           I reviewed the patient's new clinical results.        Assessment:  1.  Acute hypoxic respiratory failure, on 5 L nasal cannula when sleeping, 3 L nasal cannula while awake  2.  Community-acquired pneumonia  3.  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  Severe right ventricular enlargement with normal function  5.  Severe tricuspid regurgitation  6.  Status post mitral  valve repair-looks good  7.  Paroxysmal atrial fibrillation, carvedilol has been held because of hypotension, remains on amiodarone and Eliquis  8.  Sick sinus syndrome, status post pacemaker  9.  Hypertension  10.  Generalized weakness, multifactorial  11.  Deconditioning  12.  Hyponatremia, renal following  13.  Pleural effusions, bilateral, chest x-ray this morning shows some improvement on the right, unchanged on the left.      Oswaldo Fish III, MD  04/03/23  09:30 EDT

## 2023-04-03 NOTE — PROGRESS NOTES
Discharge Planning Assessment  [facility]     Patient Name: Bridger Elias  MRN: [id number]  Today's Date: 4/3/2023    Admit Date: 3/30/2023    Plan: tbd   Discharge Needs Assessment     Row Name 04/03/23 1646       Living Environment    People in Home facility resident    Current Living Arrangements assisted living facility    Primary Care Provided by other (see comments)  PC at [facility]    Provides Primary Care For no one    Family Caregiver if Needed child(ambika), adult    Family Caregiver Names daughterSultana    Quality of Family Relationships helpful;involved;supportive    Able to Return to Prior Arrangements yes       Resource/Environmental Concerns    Resource/Environmental Concerns none       Transition Planning    Patient/Family Anticipates Transition to inpatient rehabilitation facility    Patient/Family Anticipated Services at Transition rehabilitation services;skilled nursing;home health care    Transportation Anticipated family or friend will provide;health plan transportation       Discharge Needs Assessment    Readmission Within the Last 30 Days no previous admission in last 30 days    Equipment Currently Used at Home wheelchair;walker, rolling    Concerns to be Addressed adjustment to diagnosis/illness    Equipment Needed After Discharge other (see comments)  o2    Discharge Facility/Level of Care Needs assisted living facility;rehabilitation facility;nursing facility, skilled    Provided Post Acute Provider List? N/A    Provided Post Acute Provider Quality & Resource List? N/A               Discharge Plan     Row Name 04/03/23 1648       Plan    Plan tbd    Plan Comments Met with patient and pt's daughter, Sultana at the bedside. Facesheet verified. Patient is in PC at  [facility]. Patient must be no more than asst x1 and able to feed himself in order to return to [facility]; [facility] left for [place] to confirm [phone] awaiting call back. If pt requires rehab [facility]  family prefer [facility] or [facility]. Patient has had HH at his [facility] in the past, if HH is need pt/family request [facility]. Pt currently requiring 5.5 lpm o2, CCP will check to see if [facility] is able to accept pt's on o2. [facility] and [facility]. [employer] MCR pre-cert required. CCP will follow up.              Continued Care and Services - Admitted Since 3/30/2023    Coordination has not been started for this encounter.       Expected Discharge Date and Time     Expected Discharge Date Expected Discharge Time    Apr 3, 2023          Demographic Summary    No documentation.                Functional Status     Row Name 04/03/23 1648       Functional Status    Usual Activity Tolerance fair       Assessment of Health Literacy    Health Literacy Low       Functional Status, IADL    Medications completely dependent    Meal Preparation completely dependent    Housekeeping completely dependent    Laundry completely dependent    Shopping completely dependent       Mental Status    General Appearance WDL WDL       Mental Status Summary    Recent Changes in Mental Status/Cognitive Functioning unable to assess               Psychosocial    No documentation.                Abuse/Neglect    No documentation.                Legal    No documentation.                Substance Abuse    No documentation.                Patient Forms    No documentation.                   Jennifer Marcos RN

## 2023-04-03 NOTE — PROGRESS NOTES
Name: Bridger Elias ADMIT: 3/30/2023   : 1930  PCP: Alma Cat MD    MRN: [id number] LOS: 3 days   AGE/SEX: 89 y.o. male  ROOM: [facility]     Subjective   Subjective   Feels about the same today.  Mainly fatigued and rundown overall.    Objective   Objective   Vital Signs  Temp:  [97.5 °F (36.4 °C)-98.3 °F (36.8 °C)] 98.3 °F (36.8 °C)  Heart Rate:  [74-75] 74  Resp:  [17-18] 18  BP: (103-113)/(56-67) 113/61  SpO2:  [90 %-94 %] 90 %  on  Flow (L/min):  [2-5] 5;   Device (Oxygen Therapy): room air  Body mass index is 24.26 kg/m².  Physical Exam  Constitutional:       General: He is not in acute distress.     Comments: Chronically ill-appearing   Cardiovascular:      Rate and Rhythm: Normal rate.   Pulmonary:      Effort: Pulmonary effort is normal. No respiratory distress.      Breath sounds: Wheezing present.   Abdominal:      General: Abdomen is flat. There is no distension.      Palpations: Abdomen is soft.      Tenderness: There is no abdominal tenderness.   Musculoskeletal:         General: No swelling.      Right lower leg: No edema.      Left lower leg: No edema.   Skin:     Comments: Scattered ecchymoses   Neurological:      General: No focal deficit present.      Mental Status: He is alert and oriented to person, place, and time.         Results Review     I reviewed the patient's new clinical results.  Results from last 7 days   Lab Units 23  0530   WBC 10*3/mm3 10.61 8.75 9.28 9.38   HEMOGLOBIN g/dL 11.9* 11.1* 11.2* 10.2*   PLATELETS 10*3/mm3 181 171 180 156     Results from last 7 days   Lab Units 23  0530   SODIUM mmol/L 132* 127* 131* 132*   POTASSIUM mmol/L 4.6 3.2* 4.0 4.2   CHLORIDE mmol/L 96* 92* 97* 102   CO2 mmol/L 26.7 23.1 23.8 20.6*   BUN mg/dL 24* 23 21 24*   CREATININE mg/dL 1.37* 1.14 1.22 1.16   GLUCOSE mg/dL 108* 130* 108* 104*   EGFR mL/min/1.73 48.1* 60.0* 55.3* 58.7*      Results from last 7 days   Lab Units 04/03/23 0427 04/02/23 0418 04/01/23 0438 03/31/23  0530   ALBUMIN g/dL 3.1* 2.9* 2.9* 3.2*   BILIRUBIN mg/dL 0.7 0.8 1.0 0.8   ALK PHOS U/L 251* 219* 220* 197*   AST (SGOT) U/L 65* 53* 55* 49*   ALT (SGPT) U/L 45* 40 37 27     Results from last 7 days   Lab Units 04/03/23 0427 04/02/23 0418 04/01/23 0438 03/31/23  0530   CALCIUM mg/dL 8.8 8.3 8.4 8.4   ALBUMIN g/dL 3.1* 2.9* 2.9* 3.2*   MAGNESIUM mg/dL 2.2 2.3 2.1  --    PHOSPHORUS mg/dL 2.1* 2.5 3.0  --      Results from last 7 days   Lab Units 03/30/23 2207 03/30/23 2010   PROCALCITONIN ng/mL  --  0.08   LACTATE mmol/L 1.6  --      Glucose   Date/Time Value Ref Range Status   04/02/2023 1638 115 70 - 130 mg/dL Final     Comment:     Meter: [id number] : [id number] Radha Jewell RN   04/01/2023 0551 113 70 - 130 mg/dL Final     Comment:     Meter: [id number] : [id number] Krissy DEWITT       No radiology results for the last day  Scheduled Medications  amiodarone, 100 mg, Oral, Every Other Day  apixaban, 5 mg, Oral, Q12H  bicalutamide, 50 mg, Oral, Daily  fluticasone, 2 spray, Each Nare, Daily  latanoprost, 1 drop, Both Eyes, Daily  levothyroxine, 88 mcg, Oral, Daily  melatonin, 2 mg, Oral, Nightly  multivitamin with minerals, 1 tablet, Oral, Daily  rosuvastatin, 10 mg, Oral, Daily  torsemide, 40 mg, Oral, Daily    Infusions   Diet  Diet: Cardiac Diets, Fluid Restriction (240 mL/tray) Diets; Low Sodium (2g); 1500 mL/day; Texture: Regular Texture (IDDSI 7); Fluid Consistency: Thin (IDDSI 0)       Assessment/Plan     Active Hospital Problems    Diagnosis  POA   • **Acute respiratory failure with hypoxia [J96.01]  Yes   • Hyponatremia [E87.1]  Yes   • Normocytic anemia [D64.9]  Yes   • Hypokalemia [E87.6]  No   • Transaminitis [R74.01]  Yes   • Community acquired pneumonia, unspecified laterality [J18.9]  Yes   • Stage 3a chronic kidney disease [N18.31]  Yes   • Pacemaker [Z95.0]  Yes   • Atrial  fibrillation [I48.91]  Yes   • Acute on chronic combined systolic and diastolic CHF (congestive heart failure) [I50.43]  Yes   • SCC (squamous cell carcinoma), 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       89 y.o. male admitted with Acute respiratory failure with hypoxia.      04/03/23  Switch to p.o. diuretics.    Acute respiratory failure with hypoxia  Acute on chronic combined systolic and diastolic heart failure  -ProBNP 2683 OA  -CXR w/ b/l effusions, congestion  -Procalcitonin, strep, Legionella, respiratory viral panel negative.  -TTE (4/1/23): 41-45%; systolic and diastolic flattening of IV septum consistent with RV pressure/volume overload  -Cardiology, Nephrology following  -Torsemide 40 mg     Hyponatremia  -improved with diuresis  -Renal following     Atrial fibrillation on long-term anticoagulation  -RC: Amiodarone 100 mg every other day; Coreg ON HOLD given soft BP  -AC: Apixaban    CKD3a  -Crt near baseline; monitor with diuresis      Transaminitis  -LFTs slightly elevated, however, relatively stable     Hypothyroidism  -Synthroid 88 mcg     Hyperlipidemia  -Rosuvastatin 10 mg     Generalized weakness/Debility  - Consulted PT/OT, fall precautions.    · Eliquis  for DVT prophylaxis.  · Full code.  · Discussed with patient, family and care team on multidisciplinary rounds.  · Anticipate discharge return to [facility] when cleared by consultants      Rock Daley MD  [facility]  04/03/23  12:15 EDT

## 2023-04-03 NOTE — PROGRESS NOTES
[facility] Progress Note      Patient Name: Bridger Elias  : 1930  MRN: [id number]  Primary Care Physician:  Alma Cat MD  Date of admission: 3/30/2023    Subjective     Interval History:   Follow-up hyponatremia and CKD    The patient is feeling the same, denies any chest pain or shortness of air, no orthopnea or PND, no nausea or vomiting, no dysuria or gross hematuria, he has chronic swelling of his left ankle.  He received 1 dose of diuretics yesterday and his sodium has improved    Review of Systems:   As noted above    Objective     Vitals:   Temp:  [97.5 °F (36.4 °C)-98.3 °F (36.8 °C)] 98.3 °F (36.8 °C)  Heart Rate:  [74-75] 74  Resp:  [17-18] 18  BP: (103-113)/(56-67) 113/61  Flow (L/min):  [2-5] 5    Intake/Output Summary (Last 24 hours) at 4/3/2023 1138  Last data filed at 4/3/2023 1122  Gross per 24 hour   Intake 360 ml   Output 1650 ml   Net -1290 ml       Physical Exam:    General Appearance: Awake, alert, very frail and chronically ill  Skin: warm and dry  HEENT: oral mucosa normal, nonicteric sclera  Neck: Minimal JVD  Lungs: Bilateral rhonchi, breathing effort not labored  Heart: Irregularly irregular, no rub  Abdomen: soft, nontender, nondistended, normoactive bowels  : no palpable bladder  Extremities: 1+ left ankle edema no edema on the right, no hip or thigh edema  Neuro: normal speech and mental status, hard of hearing    Scheduled Meds:     amiodarone, 100 mg, Oral, Every Other Day  apixaban, 5 mg, Oral, Q12H  bicalutamide, 50 mg, Oral, Daily  fluticasone, 2 spray, Each Nare, Daily  latanoprost, 1 drop, Both Eyes, Daily  levothyroxine, 88 mcg, Oral, Daily  melatonin, 2 mg, Oral, Nightly  multivitamin with minerals, 1 tablet, Oral, Daily  rosuvastatin, 10 mg, Oral, Daily      IV Meds:        Results Reviewed:   I have personally reviewed the results from the time of this admission to 4/3/2023 11:38 EDT     Results from last 7 days   Lab Units  04/03/23 0427 04/02/23 0418 04/01/23 0438   SODIUM mmol/L 132* 127* 131*   POTASSIUM mmol/L 4.6 3.2* 4.0   CHLORIDE mmol/L 96* 92* 97*   CO2 mmol/L 26.7 23.1 23.8   BUN mg/dL 24* 23 21   CREATININE mg/dL 1.37* 1.14 1.22   CALCIUM mg/dL 8.8 8.3 8.4   BILIRUBIN mg/dL 0.7 0.8 1.0   ALK PHOS U/L 251* 219* 220*   ALT (SGPT) U/L 45* 40 37   AST (SGOT) U/L 65* 53* 55*   GLUCOSE mg/dL 108* 130* 108*       Estimated Creatinine Clearance: 33.5 mL/min (A) (by C-G formula based on SCr of 1.37 mg/dL (H)).    Results from last 7 days   Lab Units 04/03/23 0427 04/02/23 0418 04/01/23 0438   MAGNESIUM mg/dL 2.2 2.3 2.1   PHOSPHORUS mg/dL 2.1* 2.5 3.0       Results from last 7 days   Lab Units 04/03/23 0427 04/02/23  1215   URIC ACID mg/dL 4.6 4.4       Results from last 7 days   Lab Units 04/03/23 0427 04/02/23 0418 04/01/23 0438 03/31/23  0530 03/30/23 2010   WBC 10*3/mm3 10.61 8.75 9.28 9.38 10.04   HEMOGLOBIN g/dL 11.9* 11.1* 11.2* 10.2* 12.2*   PLATELETS 10*3/mm3 181 171 180 156 165             Assessment / Plan     ASSESSMENT:  -Cardiorenal syndrome, creatinine up to 1.37, related to diuretics but the sodium has improved  -Hyponatremia associated with acute on chronic diastolic dysfunction, sodium improved to 132 with diuretics  -CKD stage III secondary to nephrosclerosis and advanced age  -History of hypertension but the patient is hypotensive at present  -A-fib  -Chronically anticoagulated  -Coronary artery disease  -Valvular heart disease  -Frailty    PLAN:  -Permissive azotemia to avoid congestive heart failure-keep the sodium greater than 130, to minimize the risk of falls  -Start patient on torsemide 40 mg daily  -Surveillance labs  -I discussed the case with the patient's daughter at the bedside, and her family member over FaceTime.      Thank you for involving us in the care of Bridger Elias.  Please feel free to call with any questions.    Naeem Hernandez MD  04/03/23  11:38 EDT    Nephrology  [facility]  [phone]    Please note that portions of this note were completed with a voice recognition program.

## 2023-04-04 LAB
ALBUMIN SERPL-MCNC: 2.9 G/DL (ref 3.5–5.2)
ALBUMIN SERPL-MCNC: 3.4 G/DL (ref 3.5–5.2)
ALBUMIN/GLOB SERPL: 0.7 G/DL
ALP SERPL-CCNC: 247 U/L (ref 39–117)
ALT SERPL W P-5'-P-CCNC: 44 U/L (ref 1–41)
ANION GAP SERPL CALCULATED.3IONS-SCNC: 11 MMOL/L (ref 5–15)
ANION GAP SERPL CALCULATED.3IONS-SCNC: 13.2 MMOL/L (ref 5–15)
AST SERPL-CCNC: 64 U/L (ref 1–40)
BACTERIA SPEC AEROBE CULT: NORMAL
BACTERIA SPEC AEROBE CULT: NORMAL
BASOPHILS # BLD AUTO: 0.07 10*3/MM3 (ref 0–0.2)
BASOPHILS NFR BLD AUTO: 0.7 % (ref 0–1.5)
BILIRUB SERPL-MCNC: 0.8 MG/DL (ref 0–1.2)
BUN SERPL-MCNC: 27 MG/DL (ref 8–23)
BUN SERPL-MCNC: 29 MG/DL (ref 8–23)
BUN/CREAT SERPL: 18.6 (ref 7–25)
BUN/CREAT SERPL: 21.6 (ref 7–25)
CALCIUM SPEC-SCNC: 8.5 MG/DL (ref 8.2–9.6)
CALCIUM SPEC-SCNC: 8.6 MG/DL (ref 8.2–9.6)
CHLORIDE SERPL-SCNC: 96 MMOL/L (ref 98–107)
CHLORIDE SERPL-SCNC: 97 MMOL/L (ref 98–107)
CO2 SERPL-SCNC: 25 MMOL/L (ref 22–29)
CO2 SERPL-SCNC: 26.8 MMOL/L (ref 22–29)
CREAT SERPL-MCNC: 1.34 MG/DL (ref 0.76–1.27)
CREAT SERPL-MCNC: 1.45 MG/DL (ref 0.76–1.27)
DEPRECATED RDW RBC AUTO: 49.2 FL (ref 37–54)
DEPRECATED RDW RBC AUTO: 50.8 FL (ref 37–54)
EGFRCR SERPLBLD CKD-EPI 2021: 44.9 ML/MIN/1.73
EGFRCR SERPLBLD CKD-EPI 2021: 49.4 ML/MIN/1.73
EOSINOPHIL # BLD AUTO: 0.32 10*3/MM3 (ref 0–0.4)
EOSINOPHIL NFR BLD AUTO: 3.4 % (ref 0.3–6.2)
ERYTHROCYTE [DISTWIDTH] IN BLOOD BY AUTOMATED COUNT: 14.3 % (ref 12.3–15.4)
ERYTHROCYTE [DISTWIDTH] IN BLOOD BY AUTOMATED COUNT: 14.5 % (ref 12.3–15.4)
GLOBULIN UR ELPH-MCNC: 4.3 GM/DL
GLUCOSE SERPL-MCNC: 110 MG/DL (ref 65–99)
GLUCOSE SERPL-MCNC: 129 MG/DL (ref 65–99)
HCT VFR BLD AUTO: 34.1 % (ref 37.5–51)
HCT VFR BLD AUTO: 35.1 % (ref 37.5–51)
HGB BLD-MCNC: 11.7 G/DL (ref 13–17.7)
HGB BLD-MCNC: 12.3 G/DL (ref 13–17.7)
HIV1+2 AB SER QL: NORMAL
L PNEUMO1 AG UR QL IA: NEGATIVE
LYMPHOCYTES # BLD AUTO: 0.99 10*3/MM3 (ref 0.7–3.1)
LYMPHOCYTES NFR BLD AUTO: 10.4 % (ref 19.6–45.3)
MAGNESIUM SERPL-MCNC: 2.4 MG/DL (ref 1.7–2.3)
MAGNESIUM SERPL-MCNC: 2.4 MG/DL (ref 1.7–2.3)
MCH RBC QN AUTO: 32.5 PG (ref 26.6–33)
MCH RBC QN AUTO: 33.3 PG (ref 26.6–33)
MCHC RBC AUTO-ENTMCNC: 34.3 G/DL (ref 31.5–35.7)
MCHC RBC AUTO-ENTMCNC: 35 G/DL (ref 31.5–35.7)
MCV RBC AUTO: 94.7 FL (ref 79–97)
MCV RBC AUTO: 95.1 FL (ref 79–97)
MONOCYTES # BLD AUTO: 1.2 10*3/MM3 (ref 0.1–0.9)
MONOCYTES NFR BLD AUTO: 12.6 % (ref 5–12)
NEUTROPHILS NFR BLD AUTO: 6.85 10*3/MM3 (ref 1.7–7)
NEUTROPHILS NFR BLD AUTO: 71.6 % (ref 42.7–76)
PHOSPHATE SERPL-MCNC: 2.9 MG/DL (ref 2.5–4.5)
PLATELET # BLD AUTO: 163 10*3/MM3 (ref 140–450)
PLATELET # BLD AUTO: 177 10*3/MM3 (ref 140–450)
PMV BLD AUTO: 10.1 FL (ref 6–12)
PMV BLD AUTO: 10.3 FL (ref 6–12)
POTASSIUM SERPL-SCNC: 3.8 MMOL/L (ref 3.5–5.2)
POTASSIUM SERPL-SCNC: 4 MMOL/L (ref 3.5–5.2)
PROCALCITONIN SERPL-MCNC: 0.14 NG/ML (ref 0–0.25)
PROT SERPL-MCNC: 7.2 G/DL (ref 6–8.5)
RBC # BLD AUTO: 3.6 10*6/MM3 (ref 4.14–5.8)
RBC # BLD AUTO: 3.69 10*6/MM3 (ref 4.14–5.8)
SODIUM SERPL-SCNC: 133 MMOL/L (ref 136–145)
SODIUM SERPL-SCNC: 136 MMOL/L (ref 136–145)
URATE SERPL-MCNC: 5 MG/DL (ref 3.4–7)
URATE SERPL-MCNC: 5.6 MG/DL (ref 3.4–7)
WBC NRBC COR # BLD: 10.9 10*3/MM3 (ref 3.4–10.8)
WBC NRBC COR # BLD: 9.55 10*3/MM3 (ref 3.4–10.8)

## 2023-04-04 PROCEDURE — 83735 ASSAY OF MAGNESIUM: CPT | Performed by: INTERNAL MEDICINE

## 2023-04-04 PROCEDURE — 85025 COMPLETE CBC W/AUTO DIFF WBC: CPT | Performed by: INTERNAL MEDICINE

## 2023-04-04 PROCEDURE — 80053 COMPREHEN METABOLIC PANEL: CPT | Performed by: STUDENT IN AN ORGANIZED HEALTH CARE EDUCATION/TRAINING PROGRAM

## 2023-04-04 PROCEDURE — 97530 THERAPEUTIC ACTIVITIES: CPT

## 2023-04-04 PROCEDURE — 86480 TB TEST CELL IMMUN MEASURE: CPT | Performed by: INTERNAL MEDICINE

## 2023-04-04 PROCEDURE — 84100 ASSAY OF PHOSPHORUS: CPT | Performed by: STUDENT IN AN ORGANIZED HEALTH CARE EDUCATION/TRAINING PROGRAM

## 2023-04-04 PROCEDURE — 87449 NOS EACH ORGANISM AG IA: CPT | Performed by: INTERNAL MEDICINE

## 2023-04-04 PROCEDURE — G0432 EIA HIV-1/HIV-2 SCREEN: HCPCS | Performed by: INTERNAL MEDICINE

## 2023-04-04 PROCEDURE — 84550 ASSAY OF BLOOD/URIC ACID: CPT | Performed by: INTERNAL MEDICINE

## 2023-04-04 PROCEDURE — 84145 PROCALCITONIN (PCT): CPT | Performed by: INTERNAL MEDICINE

## 2023-04-04 PROCEDURE — 85027 COMPLETE CBC AUTOMATED: CPT | Performed by: STUDENT IN AN ORGANIZED HEALTH CARE EDUCATION/TRAINING PROGRAM

## 2023-04-04 PROCEDURE — 99232 SBSQ HOSP IP/OBS MODERATE 35: CPT | Performed by: NURSE PRACTITIONER

## 2023-04-04 RX ORDER — SODIUM CHLORIDE FOR INHALATION 3 %
3 VIAL, NEBULIZER (ML) INHALATION AS NEEDED
Status: DISCONTINUED | OUTPATIENT
Start: 2023-04-05 | End: 2023-04-24 | Stop reason: HOSPADM

## 2023-04-04 RX ORDER — ALBUTEROL SULFATE 2.5 MG/3ML
2.5 SOLUTION RESPIRATORY (INHALATION) AS NEEDED
Status: DISCONTINUED | OUTPATIENT
Start: 2023-04-05 | End: 2023-04-24 | Stop reason: HOSPADM

## 2023-04-04 RX ORDER — ALBUTEROL SULFATE 2.5 MG/3ML
2.5 SOLUTION RESPIRATORY (INHALATION) ONCE AS NEEDED
Status: COMPLETED | OUTPATIENT
Start: 2023-04-04 | End: 2023-04-05

## 2023-04-04 RX ORDER — SODIUM CHLORIDE FOR INHALATION 7 %
4 VIAL, NEBULIZER (ML) INHALATION ONCE AS NEEDED
Status: COMPLETED | OUTPATIENT
Start: 2023-04-04 | End: 2023-04-05

## 2023-04-04 RX ADMIN — BICALUTAMIDE 50 MG: 50 TABLET ORAL at 09:13

## 2023-04-04 RX ADMIN — LEVOTHYROXINE SODIUM 88 MCG: 0.09 TABLET ORAL at 09:13

## 2023-04-04 RX ADMIN — TORSEMIDE 40 MG: 20 TABLET ORAL at 09:13

## 2023-04-04 RX ADMIN — AMIODARONE HYDROCHLORIDE 100 MG: 200 TABLET ORAL at 09:13

## 2023-04-04 RX ADMIN — ROSUVASTATIN CALCIUM 10 MG: 10 TABLET, FILM COATED ORAL at 09:13

## 2023-04-04 RX ADMIN — MULTIPLE VITAMINS W/ MINERALS TAB 1 TABLET: TAB at 09:13

## 2023-04-04 RX ADMIN — FLUTICASONE PROPIONATE 2 SPRAY: 50 SPRAY, METERED NASAL at 10:21

## 2023-04-04 RX ADMIN — APIXABAN 5 MG: 5 TABLET, FILM COATED ORAL at 10:21

## 2023-04-04 NOTE — PLAN OF CARE
Goal Outcome Evaluation:  Plan of Care Reviewed With: patient        Progress: no change  Outcome Evaluation: Maintained on 6L N/C to keep sats >92%. Pt desats to mid to high 80's with turns, recovers to baseline quickly. Pt reports very fatigued, has rested well during shift. Daughter at bedside, assisting pt. Safety maintained.  Diuretic in Use: Demadex  Response to Diuretics (Output greater than intake): yes   Daily Weight (up or down):   O2 Requirements: 6 L N/C  Functional Status (Activity level, tolerance and respiratory symptoms): reports very fatigued, soa /desats with min exertion  Discharge Plans:  pt wishes to go back to Assisted Living

## 2023-04-04 NOTE — PROGRESS NOTES
Name: Bridger Elias ADMIT: 3/30/2023   : 1930  PCP: Alma Cat MD    MRN: [id number] LOS: 4 days   AGE/SEX: 89 y.o. male  ROOM: [facility]     Subjective   Subjective   Continuing to require higher amounts of oxygen.  No new complaints this morning. Discussed with son in law.    Objective   Objective   Vital Signs  Temp:  [97.6 °F (36.4 °C)-98.5 °F (36.9 °C)] 98.5 °F (36.9 °C)  Heart Rate:  [74] 74  Resp:  [18] 18  BP: ()/(52-58) 90/57  SpO2:  [90 %-96 %] 94 %  on  Flow (L/min):  [6] 6;   Device (Oxygen Therapy): humidified;nasal cannula  Body mass index is 24.26 kg/m².  Physical Exam  Constitutional:       General: He is not in acute distress.     Comments: Chronically ill-appearing   Cardiovascular:      Rate and Rhythm: Normal rate.   Pulmonary:      Effort: Pulmonary effort is normal. No respiratory distress.      Breath sounds: Wheezing present.   Abdominal:      General: Abdomen is flat. There is no distension.      Palpations: Abdomen is soft.      Tenderness: There is no abdominal tenderness.   Musculoskeletal:         General: No swelling.      Right lower leg: No edema.      Left lower leg: No edema.   Skin:     Comments: Scattered ecchymoses   Neurological:      General: No focal deficit present.      Mental Status: He is alert and oriented to person, place, and time.         Results Review     I reviewed the patient's new clinical results.  Results from last 7 days   Lab Units 23   WBC 10*3/mm3 10.90* 10.61 8.75 9.28   HEMOGLOBIN g/dL 11.7* 11.9* 11.1* 11.2*   PLATELETS 10*3/mm3 177 181 171 180     Results from last 7 days   Lab Units 23  043   SODIUM mmol/L 133* 132* 127* 131*   POTASSIUM mmol/L 4.0 4.6 3.2* 4.0   CHLORIDE mmol/L 97* 96* 92* 97*   CO2 mmol/L 25.0 26.7 23.1 23.8   BUN mg/dL 27* 24* 23 21   CREATININE mg/dL 1.45* 1.37* 1.14 1.22   GLUCOSE mg/dL 110* 108*  130* 108*   EGFR mL/min/1.73 44.9* 48.1* 60.0* 55.3*     Results from last 7 days   Lab Units 04/04/23 0334 04/03/23 0427 04/02/23 0418 04/01/23 0438   ALBUMIN g/dL 2.9* 3.1* 2.9* 2.9*   BILIRUBIN mg/dL 0.8 0.7 0.8 1.0   ALK PHOS U/L 247* 251* 219* 220*   AST (SGOT) U/L 64* 65* 53* 55*   ALT (SGPT) U/L 44* 45* 40 37     Results from last 7 days   Lab Units 04/04/23 0334 04/03/23 0427 04/02/23 0418 04/01/23 0438   CALCIUM mg/dL 8.5 8.8 8.3 8.4   ALBUMIN g/dL 2.9* 3.1* 2.9* 2.9*   MAGNESIUM mg/dL 2.4* 2.2 2.3 2.1   PHOSPHORUS mg/dL  --  2.1* 2.5 3.0     Results from last 7 days   Lab Units 03/30/23 2207 03/30/23 2010   PROCALCITONIN ng/mL  --  0.08   LACTATE mmol/L 1.6  --      Glucose   Date/Time Value Ref Range Status   04/02/2023 1638 115 70 - 130 mg/dL Final     Comment:     Meter: [id number] : [id number] Radha Jewell RN       No radiology results for the last day  Scheduled Medications  amiodarone, 100 mg, Oral, Every Other Day  apixaban, 5 mg, Oral, Q12H  bicalutamide, 50 mg, Oral, Daily  fluticasone, 2 spray, Each Nare, Daily  latanoprost, 1 drop, Both Eyes, Daily  levothyroxine, 88 mcg, Oral, Daily  melatonin, 2 mg, Oral, Nightly  multivitamin with minerals, 1 tablet, Oral, Daily  rosuvastatin, 10 mg, Oral, Daily  torsemide, 40 mg, Oral, Daily    Infusions   Diet  Diet: Cardiac Diets, Fluid Restriction (240 mL/tray) Diets; Low Sodium (2g); 1500 mL/day; Texture: Regular Texture (IDDSI 7); Fluid Consistency: Thin (IDDSI 0)       Assessment/Plan     Active Hospital Problems    Diagnosis  POA   • **Acute respiratory failure with hypoxia [J96.01]  Yes   • Hyponatremia [E87.1]  Yes   • Normocytic anemia [D64.9]  Yes   • Hypokalemia [E87.6]  No   • Transaminitis [R74.01]  Yes   • Stage 3a chronic kidney disease [N18.31]  Yes   • Pacemaker [Z95.0]  Yes   • Atrial fibrillation [I48.91]  Yes   • Acute on chronic combined systolic and diastolic CHF (congestive heart failure) [I50.43]  Yes   • SCC  (squamous cell carcinoma), face [C44.320]  Yes   • Hypothyroidism [E03.9]  Yes   • Coronary arteriosclerosis [I25.10]  Yes   • Dyslipidemia [E78.5]  Yes   • Hypertension [I10]  Yes   • Long term (current) use of anticoagulants [Z79.01]  Not Applicable      Resolved Hospital Problems    Diagnosis Date Resolved POA   • Community acquired pneumonia, unspecified laterality [J18.9] 04/03/2023 Yes       89 y.o. male admitted with Acute respiratory failure with hypoxia.      04/04/23  Ask pulmonology to see given persistent high O2 requirements.  Unsure that there is enough of a pocket for thoracentesis but will appreciate their expertise.     Acute respiratory failure with hypoxia  Acute on chronic combined systolic and diastolic heart failure  -ProBNP 2683 OA  -CXR w/ b/l effusions, congestion  -Procalcitonin, strep, Legionella, respiratory viral panel negative.  -TTE (4/1/23): 41-45%; systolic and diastolic flattening of IV septum consistent with RV pressure/volume overload  -Cardiology, Nephrology following  -Torsemide 40 mg      Hyponatremia  -improved with diuresis  -Renal following     Atrial fibrillation on long-term anticoagulation  -RC: Amiodarone 100 mg every other day; Coreg ON HOLD given soft BP  -AC: Apixaban    CKD3a  -Crt near baseline; monitor with diuresis      Transaminitis  -LFTs slightly elevated, however, relatively stable     Hypothyroidism  -Synthroid 88 mcg     Hyperlipidemia  -Rosuvastatin 10 mg     Generalized weakness/Debility  - Consulted PT/OT, fall precautions.    · Eliquis  for DVT prophylaxis.  · Full code.  · Discussed with patient, family and care team on multidisciplinary rounds.  · Anticipate discharge return to [facility] when cleared by consultants      Rock Daley MD  [facility]  04/04/23  12:27 EDT

## 2023-04-04 NOTE — PLAN OF CARE
Goal Outcome Evaluation:  Plan of Care Reviewed With: patient, family        Progress: no change  Outcome Evaluation: Pt tolerated treatment with c/o fatigue. Pt agreeable to participate with encouragement but states he is very tired. Pt performed bed mobility Shaista and able to sit on EOB for several minutes independently. Pt agreed STS transfer which pt required CGA/SBA and able to maintain standing balance with SBA for at least 2 minutes. Pt took 4 side steps towards HOB but deferred further ambulation.  Will continue to progress pt as able.

## 2023-04-04 NOTE — THERAPY TREATMENT NOTE
Patient Name: Bridger Elias  : 1930    MRN: [id number]                              Today's Date: 2023       Admit Date: 3/30/2023    Visit Dx:     ICD-10-CM ICD-9-CM   1. Community acquired pneumonia, unspecified laterality  J18.9 486     Patient Active Problem List   Diagnosis   • SCC (squamous cell carcinoma), face   • General weakness   • Pacemaker   • Abnormal gait   • Atrial fibrillation   • Chronic diastolic heart failure   • Coronary arteriosclerosis   • Dyslipidemia   • Glaucoma   • Mitral valve disorder   • Hypertension   • Hypertensive kidney disease, stage III   • Hypothyroidism   • Long term (current) use of anticoagulants   • Malignant neoplasm of prostate   • Osteoporosis   • Squamous cell carcinoma of skin of face   • Acute on chronic diastolic CHF (congestive heart failure)   • Stage 3a chronic kidney disease   • Personal history of radiation therapy   • Acute on chronic combined systolic and diastolic CHF (congestive heart failure)   • Cellulitis of right leg   • Chronic acquired lymphedema   • Contusion of face   • Contusion of forearm, left   • Fall   • Chronic systolic heart failure (CMS/HCC)   • Acute respiratory failure with hypoxia   • Hyponatremia   • Normocytic anemia   • Hypokalemia   • Transaminitis     Past Medical History:   Diagnosis Date   • Acute on chronic diastolic CHF (congestive heart failure) 2022   • Atrial fibrillation 2022   • Coronary arteriosclerosis 7/3/2014    Formatting of this note might be different from the original. [facility]  IMPRESSION:   1. Right heart disease, hypertensive cardiac disease.   2. Status post mitral valve repair.   3. Anatomy: No hemodynamically significant disease. about 30-40% lesion of    the right coronary artery. Normal. Left coronary artery system.   • Dyslipidemia 2013   • Glaucoma 2022   • Hypertension 2013   • Hypertensive kidney disease, stage III 3/13/2017   • Hypothyroidism 3/19/2017   • Long term  (current) use of anticoagulants 12/5/2013    Formatting of this note might be different from the original. Mitral valve replacement and atrial fibrillation Assume a range of 2.5-3.5.   • Malignant neoplasm of prostate 12/5/2013    Formatting of this note might be different from the original. 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   • Stage 3b chronic kidney disease 4/14/2022     Past Surgical History:   Procedure Laterality Date   • PACEMAKER IMPLANTATION  2018      General Information     Row Name 04/04/23 1540          Physical Therapy Time and Intention    Document Type therapy note (daily note)  -     Mode of Treatment physical therapy  -     Row Name 04/04/23 1540          General Information    Existing Precautions/Restrictions fall  -     Row Name 04/04/23 1540          Cognition    Orientation Status (Cognition) oriented x 3  -     Row Name 04/04/23 1540          Safety Issues, Functional Mobility    Safety Issues Affecting Function (Mobility) judgment  -     Impairments Affecting Function (Mobility) endurance/activity tolerance;strength;range of motion (ROM);shortness of breath  -           User Key  (r) = Recorded By, (t) = Taken By, (c) = Cosigned By    Initials Name Provider Type     Noa Garcia PTA Physical Therapist Assistant               Mobility     Row Name 04/04/23 1546          Bed Mobility    Supine-Sit Rolla (Bed Mobility) minimum assist (75% patient effort)  -     Sit-Supine Rolla (Bed Mobility) minimum assist (75% patient effort)  -     Assistive Device (Bed Mobility) head of bed elevated;bed rails  -     Row Name 04/04/23 1549          Sit-Stand Transfer    Sit-Stand Rolla (Transfers) contact guard;standby assist  -     Assistive  Device (Sit-Stand Transfers) walker, front-wheeled  -EB     Comment, (Sit-Stand Transfer) pt able to take 4 lateral side steps to HOB with CGA. Pt declined further gait today.  -EB           User Key  (r) = Recorded By, (t) = Taken By, (c) = Cosigned By    Initials Name Provider Type    Noa Hancock PTA Physical Therapist Assistant               Obj/Interventions     Row Name 04/04/23 1541          Balance    Balance Assessment sitting static balance;standing static balance  -EB     Static Sitting Balance independent  -EB     Position, Sitting Balance sitting edge of bed  -EB     Static Standing Balance standby assist  -EB     Comment, Balance Pt able to maintain standing balance for at least a couple of minutes near EOB.  -EB           User Key  (r) = Recorded By, (t) = Taken By, (c) = Cosigned By    Initials Name Provider Type    Noa Hancock PTA Physical Therapist Assistant               Goals/Plan    No documentation.                Clinical Impression     Row Name 04/04/23 1541          Plan of Care Review    Plan of Care Reviewed With patient;family  -EB     Progress no change  -     Outcome Evaluation Pt tolerated treatment with c/o fatigue. Pt agreeable to participate with encouragement but states he is very tired. Pt performed bed mobility Shaista and able to sit on EOB for several minutes independently. Pt agreed STS transfer which pt required CGA/SBA and able to maintain standing balance with SBA for at least 2 minutes. Pt took 4 side steps towards HOB but deferred further ambulation.  Will continue to progress pt as able.  -     Row Name 04/04/23 1541          Therapy Assessment/Plan (PT)    Therapy Frequency (PT) 5 times/wk  -     Row Name 04/04/23 1541          Positioning and Restraints    Pre-Treatment Position in bed  -EB     Post Treatment Position bed  -EB     In Bed supine;call light within reach;encouraged to call for assist;exit alarm on  -EB           User Key  (r) = Recorded By,  (t) = Taken By, (c) = Cosigned By    Initials Name Provider Type    Noa Hancock PTA Physical Therapist Assistant               Outcome Measures     Row Name 04/04/23 1544 04/04/23 0800       How much help from another person do you currently need...    Turning from your back to your side while in flat bed without using bedrails? 3  -EB 3  -CM    Moving from lying on back to sitting on the side of a flat bed without bedrails? 3  -EB 3  -CM    Moving to and from a bed to a chair (including a wheelchair)? 3  -EB 3  -CM    Standing up from a chair using your arms (e.g., wheelchair, bedside chair)? 3  -EB 3  -CM    Climbing 3-5 steps with a railing? 2  -EB 2  -CM    To walk in hospital room? 2  -EB 2  -CM    AM-PAC 6 Clicks Score (PT) 16  -EB 16  -CM    Highest level of mobility 5 --> Static standing  -EB 5 --> Static standing  -CM    Row Name 04/04/23 1506          Functional Assessment    Outcome Measure Options AM-PAC 6 Clicks Daily Activity (OT)  -           User Key  (r) = Recorded By, (t) = Taken By, (c) = Cosigned By    Initials Name Provider Type    Violette Ashby RN Registered Nurse    Noa Hancock PTA Physical Therapist Assistant    Mariel Patel, OT Occupational Therapist                             Physical Therapy Education     Title: PT OT SLP Therapies (In Progress)     Topic: Physical Therapy (In Progress)     Point: Mobility training (Done)     Learning Progress Summary           Patient Acceptance, E, VU by GEORGE at 4/4/2023 1545    Acceptance, E,D, VU,NR by GEORGE at 4/3/2023 1603    Acceptance, E, VU,NR by RODGER at 3/31/2023 1633                   Point: Home exercise program (Not Started)     Learner Progress:  Not documented in this visit.          Point: Body mechanics (Done)     Learning Progress Summary           Patient Acceptance, E, VU by GEORGE at 4/4/2023 1545    Acceptance, E,D, VU,NR by GEORGE at 4/3/2023 1603    Acceptance, E, VU,NR by RODGER at 3/31/2023 1633                    Point: Precautions (Done)     Learning Progress Summary           Patient Acceptance, E, VU by EB at 4/4/2023 1545    Acceptance, E,D, VU,NR by EB at 4/3/2023 1603    Acceptance, E, VU,NR by DJ at 3/31/2023 1633                               User Key     Initials Effective Dates Name Provider Type Discipline    EB 02/14/23 -  Noa Garcia PTA Physical Therapist Assistant PT    DJ 10/25/19 -  Latosha Sanders PT Physical Therapist PT              PT Recommendation and Plan     Plan of Care Reviewed With: patient, family  Progress: no change  Outcome Evaluation: Pt tolerated treatment with c/o fatigue. Pt agreeable to participate with encouragement but states he is very tired. Pt performed bed mobility Shaista and able to sit on EOB for several minutes independently. Pt agreed STS transfer which pt required CGA/SBA and able to maintain standing balance with SBA for at least 2 minutes. Pt took 4 side steps towards HOB but deferred further ambulation.  Will continue to progress pt as able.     Time Calculation:    PT Charges     Row Name 04/04/23 1540             Time Calculation    Start Time 1340  -EB      Stop Time 1357  -EB      Time Calculation (min) 17 min  -EB      PT Received On 04/04/23  -EB      PT - Next Appointment 04/05/23  -EB         Time Calculation- PT    Total Timed Code Minutes- PT 17 minute(s)  -EB            User Key  (r) = Recorded By, (t) = Taken By, (c) = Cosigned By    Initials Name Provider Type    EB Noa Garcia PTA Physical Therapist Assistant              Therapy Charges for Today     Code Description Service Date Service Provider Modifiers Qty    48535937938 HC PT THER PROC EA 15 MIN 4/3/2023 Noa Garcia PTA GP 1    07077041513 HC PT THERAPEUTIC ACT EA 15 MIN 4/3/2023 Noa Garcia PTA GP 1    24576778465 HC PT THERAPEUTIC ACT EA 15 MIN 4/4/2023 Noa Garcia PTA GP 1    02899194351 HC PT THER SUPP EA 15 MIN 4/4/2023 Noa Garcia PTA GP 1          PT G-Codes  Outcome Measure Options:  AM-PAC 6 Clicks Daily Activity (OT)  AM-PAC 6 Clicks Score (PT): 16  AM-PAC 6 Clicks Score (OT): 17       Noa Garcia, INEZ  4/4/2023

## 2023-04-04 NOTE — PLAN OF CARE
Goal Outcome Evaluation:  Diuretic in Use: demadex po  Response to Diuretics (Output greater than intake): output > input  Daily Weight (up or down): daily weight down  O2 Requirements: remains on 6 L  Functional Status (Activity level, tolerance and respiratory symptoms): pt with minimal activity tolerance -pt verbalizes he is too tired to do anything  Discharge Plans:  pt unsure is he will go back to assisted living  Pt daughter very upset at Father's lack of trying-much assurance given-  Pastoral Care consult placed  Safety maintained

## 2023-04-04 NOTE — PROGRESS NOTES
[facility] Progress Note      Patient Name: Bridger Elias  : 1930  MRN: [id number]  Primary Care Physician:  Alma Cat MD  Date of admission: 3/30/2023    Subjective     Interval History:   Follow-up hyponatremia and CKD    The patient is feeling the same, denies any chest pain or shortness of air, no orthopnea or PND, no nausea or vomiting, no dysuria or gross hematuria, he has chronic swelling of his left ankle.  He was transitioned to oral diuretics, and his sodium is slowly improving    Review of Systems:   As noted above    Objective     Vitals:   Temp:  [97.6 °F (36.4 °C)-98.5 °F (36.9 °C)] 98.5 °F (36.9 °C)  Heart Rate:  [74] 74  Resp:  [18] 18  BP: ()/(52-58) 90/57  Flow (L/min):  [6] 6    Intake/Output Summary (Last 24 hours) at 2023 1209  Last data filed at 2023 0910  Gross per 24 hour   Intake 60 ml   Output 2000 ml   Net -1940 ml       Physical Exam:    General Appearance: Awake, alert, very frail and chronically ill  Skin: warm and dry  HEENT: oral mucosa normal, nonicteric sclera  Neck: Minimal JVD  Lungs: Bilateral rhonchi, breathing effort not labored  Heart: Irregularly irregular, no rub  Abdomen: soft, nontender, nondistended, normoactive bowels  : no palpable bladder  Extremities: Trace left ankle edema no edema on the right, no hip or thigh edema  Neuro: normal speech and mental status, hard of hearing    Scheduled Meds:     amiodarone, 100 mg, Oral, Every Other Day  apixaban, 5 mg, Oral, Q12H  bicalutamide, 50 mg, Oral, Daily  fluticasone, 2 spray, Each Nare, Daily  latanoprost, 1 drop, Both Eyes, Daily  levothyroxine, 88 mcg, Oral, Daily  melatonin, 2 mg, Oral, Nightly  multivitamin with minerals, 1 tablet, Oral, Daily  rosuvastatin, 10 mg, Oral, Daily  torsemide, 40 mg, Oral, Daily      IV Meds:        Results Reviewed:   I have personally reviewed the results from the time of this admission to 2023 12:09 EDT     Results from  last 7 days   Lab Units 04/04/23  0334 04/03/23 0427 04/02/23  0418   SODIUM mmol/L 133* 132* 127*   POTASSIUM mmol/L 4.0 4.6 3.2*   CHLORIDE mmol/L 97* 96* 92*   CO2 mmol/L 25.0 26.7 23.1   BUN mg/dL 27* 24* 23   CREATININE mg/dL 1.45* 1.37* 1.14   CALCIUM mg/dL 8.5 8.8 8.3   BILIRUBIN mg/dL 0.8 0.7 0.8   ALK PHOS U/L 247* 251* 219*   ALT (SGPT) U/L 44* 45* 40   AST (SGOT) U/L 64* 65* 53*   GLUCOSE mg/dL 110* 108* 130*       Estimated Creatinine Clearance: 31.6 mL/min (A) (by C-G formula based on SCr of 1.45 mg/dL (H)).    Results from last 7 days   Lab Units 04/04/23 0334 04/03/23 0427 04/02/23 0418 04/01/23  0438   MAGNESIUM mg/dL 2.4* 2.2 2.3 2.1   PHOSPHORUS mg/dL  --  2.1* 2.5 3.0       Results from last 7 days   Lab Units 04/04/23 0334 04/03/23 0427 04/02/23  1215   URIC ACID mg/dL 5.0 4.6 4.4       Results from last 7 days   Lab Units 04/04/23 0334 04/03/23 0427 04/02/23 0418 04/01/23  0438 03/31/23  0530   WBC 10*3/mm3 10.90* 10.61 8.75 9.28 9.38   HEMOGLOBIN g/dL 11.7* 11.9* 11.1* 11.2* 10.2*   PLATELETS 10*3/mm3 177 181 171 180 156             Assessment / Plan     ASSESSMENT:  -Cardiorenal syndrome, creatinine up to 1.45, related to diuretics but the sodium has improved  -Hyponatremia associated with acute on chronic diastolic dysfunction, sodium improved to 133 with diuretics  -CKD stage III secondary to nephrosclerosis and advanced age  -History of hypertension but the patient is hypotensive at present  -A-fib  -Chronically anticoagulated  -Coronary artery disease  -Valvular heart disease  -Frailty    PLAN:  -Permissive azotemia to avoid congestive heart failure-keep the sodium greater than 130, to minimize the risk of falls  -Continue the same dose of oral torsemide 40 mg daily  -Surveillance labs  -I discussed the case with the patient's daughter at the bedside.    Thank you for involving us in the care of Bridger Elias.  Please feel free to call with any questions.    Naeem Hernandez,  MD  04/04/23  12:09 EDT    [facility]  [phone]    Please note that portions of this note were completed with a voice recognition program.

## 2023-04-04 NOTE — CONSULTS
Patient Care Team:  Alma Cat MD as PCP - General (Family Medicine)  Maurice Cook MD as Consulting Physician (Radiation Oncology)      Subjective     Patient is a 89 y.o. male.  Asked to see for persistent O2 requirements and bilateral pleural effusions.  Patient was admitted on sleep 3/31 presenting with shortness of breath and weakness he has chronic kidney disease stage IIIb, atrial fibrillation chronically anticoagulated and sick sinus syndrome with a permanent pacemaker chronic diastolic CHF, coronary disease, hypertension, hyperlipidemia, hypothyroidism and history of squamous cell carcinoma of the face.  He has been treated for acute on chronic combined CHF, he has cardiorenal syndrome nephrology is helping manage diuretics, he has had some hyponatremia that is complicated management.  Patient is very pleasant he is not the best historian family and caregivers help with history.  He did have the squamous cell carcinoma of his head and had radiation treatments 35 him that was last year he has prostate cancer he is getting every 6 month injections to control that.  He has no history of prior lung disease he was never smoker.  No history of TB or TB exposure.  He has had a cough for a little while not just recently but more recently the cough is worse and he has been producing sputum it sort of gray.  He has not been somewhat short of breath as he has been very weak he says he has just no energy he has no appetite he says he can even force himself to eat he is lost a lot of weight with diuresis but apparently he was probably losing some weight before then.  No chest pain.  No heartburn indigestion belching no sour taste in his mouth no difficulty swallowing choking or eating with liquids or solids.  No hemoptysis.  Patient has dropped about 8 pounds since admission but his oxygenation really has not improved..  No sweats    Review of Systems:  No chest pain or palpitation no nausea or  vomiting no easy bleeding.  No headaches no new skin rashes or lesions      History  Past Medical History:   Diagnosis Date   • Acute on chronic diastolic CHF (congestive heart failure) 4/14/2022   • Atrial fibrillation 4/14/2022   • Coronary arteriosclerosis 7/3/2014    Formatting of this note might be different from the original. [facility] 12/7/16  IMPRESSION:   1. Right heart disease, hypertensive cardiac disease.   2. Status post mitral valve repair.   3. Anatomy: No hemodynamically significant disease. about 30-40% lesion of    the right coronary artery. Normal. Left coronary artery system.   • Dyslipidemia 12/5/2013   • Glaucoma 4/14/2022   • Hypertension 12/5/2013   • Hypertensive kidney disease, stage III 3/13/2017   • Hypothyroidism 3/19/2017   • Long term (current) use of anticoagulants 12/5/2013    Formatting of this note might be different from the original. Mitral valve replacement and atrial fibrillation Assume a range of 2.5-3.5.   • Malignant neoplasm of prostate 12/5/2013    Formatting of this note might be different from the original. 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   • Stage 3b chronic kidney disease 4/14/2022     Past Surgical History:   Procedure Laterality Date   • PACEMAKER IMPLANTATION  2018     Social History     Socioeconomic History   • Marital status:    Tobacco Use   • Smoking status: Never   • Smokeless tobacco: Never   Vaping Use   • Vaping Use: Never used   Substance and Sexual Activity   • Alcohol use: Never   • Drug use: Never     History reviewed. No pertinent family history.      Allergies:  Patient has no known allergies.    Medications:  Prior to Admission medications    Medication Sig Start Date End Date Taking? Authorizing Provider   acetaminophen  (TYLENOL) 325 MG tablet Take 2 tablets by mouth Every 6 (Six) Hours As Needed for Mild Pain , Moderate Pain , Headache or Fever. 4/22/22   Abad Yee MD   amiodarone (PACERONE) 200 MG tablet Take 1 tablet by mouth Daily.  Patient taking differently: Take 100 mg by mouth Every Other Day. 9/10/22   Amari Alva MD   apixaban (ELIQUIS) 5 MG tablet tablet Take 1 tablet by mouth Every 12 (Twelve) Hours. Indications: Atrial Fibrillation 4/22/22   Abad Yee MD   bicalutamide (CASODEX) 50 MG chemo tablet Take 1 tablet by mouth Daily.    ProviderEugenia MD   calcium carbonate (OS-SOPHIA) 600 MG tablet Take 1 tablet by mouth Daily.    Eugenia Fisher MD   carvedilol (COREG) 3.125 MG tablet Take 1 tablet by mouth Every Night. 3/18/22   Eugenia Fisher MD   latanoprost (XALATAN) 0.005 % ophthalmic solution Apply  to eye(s) as directed by provider.    Eugenia Fisher MD   levothyroxine (SYNTHROID, LEVOTHROID) 88 MCG tablet Take 1 tablet by mouth Daily.    Eugenia Fisher MD   multivitamin with minerals tablet tablet Take 1 tablet by mouth Daily.    Eugenia Fisher MD   polyethylene glycol (MIRALAX) 17 g packet Take 17 g by mouth Daily As Needed (Use if senna-docusate is ineffective). 4/22/22   Abad Yee MD   rosuvastatin (CRESTOR) 10 MG tablet Take 1 tablet by mouth Daily.    ProviderEugenia MD   silver sulfadiazine (SILVADENE, SSD) 1 % cream Apply 1 application topically to the appropriate area as directed 3 (Three) Times a Day. Apply to facial regions of inflammation per rad onc recs 4/22/22   Abad Yee MD   silver sulfadiazine (SILVADENE, SSD) 1 % cream Apply 1 application topically to the appropriate area as directed 2 (Two) Times a Day. 5/2/22   Maurice Cook MD     amiodarone, 100 mg, Oral, Every Other Day  apixaban, 5 mg, Oral, Q12H  bicalutamide, 50 mg, Oral, Daily  fluticasone, 2 spray, Each Nare, Daily  latanoprost, 1  "drop, Both Eyes, Daily  levothyroxine, 88 mcg, Oral, Daily  melatonin, 2 mg, Oral, Nightly  multivitamin with minerals, 1 tablet, Oral, Daily  rosuvastatin, 10 mg, Oral, Daily  torsemide, 40 mg, Oral, Daily           Objective     Vital Signs  Vital Sign Min/Max for last 24 hours  Temp  Min: 97.6 °F (36.4 °C)  Max: 98.5 °F (36.9 °C)   BP  Min: 90/57  Max: 115/58   Pulse  Min: 74  Max: 74   Resp  Min: 18  Max: 18   SpO2  Min: 90 %  Max: 95 %   Flow (L/min)  Min: 6  Max: 6   No data recorded       Intake/Output Summary (Last 24 hours) at 4/4/2023 1400  Last data filed at 4/4/2023 1300  Gross per 24 hour   Intake 60 ml   Output 2400 ml   Net -2340 ml     I/O this shift:  In: -   Out: 800 [Urine:800]  Last Weight and Admission Weight        04/03/23  0502   Weight: 70.3 kg (154 lb 14.4 oz)     Flowsheet Rows    Flowsheet Row First Filed Value   Admission Height 170.2 cm (67\") Documented at 03/30/2023 1939   Admission Weight 74.4 kg (164 lb) Documented at 03/30/2023 1939          Body mass index is 24.26 kg/m².           Physical Exam:  General Appearance: Well-developed elderly white male he is sitting up in a chair he does not look in acute distress he is requiring 6 L nasal cannula O2 with oxygen saturations of about 93 to 94%  Eyes: Conjunctiva are clear and anicteric pupils look equal mucous membranes are moist no erythema no exudates  ENT: Mucous membranes are moist Mallampati 1 airway nasal septum midline  Neck: No palpable adenopathy or thyromegaly no visible jugular venous distention and trachea midline  Lungs: Clear I do not hear wheezes rales or rhonchi amazing given his x-ray and CT scan  Cardiac: Irregularly irregular no definite murmur  Abdomen: Soft no palpable hepatosplenomegaly or masses  : Not examined  Musculoskeletal: He has some mild to moderate thoracic kyphosis  Skin: Warm and dry no jaundice no petechiae  Neuro: He is hard of hearing but he is alert and oriented he is cooperative  Extremities/P " Vascular: No clubbing no cyanosis no edema palpable radial and dorsalis pedis pulses  MSE: Pleasant gentleman he is appropriate      Labs:  Results from last 7 days   Lab Units 04/04/23 0334 04/03/23 0427 04/02/23 0418 04/01/23 0438 03/31/23 0530 03/30/23 2010   GLUCOSE mg/dL 110* 108* 130* 108* 104* 98   SODIUM mmol/L 133* 132* 127* 131* 132* 133*   POTASSIUM mmol/L 4.0 4.6 3.2* 4.0 4.2 4.3   MAGNESIUM mg/dL 2.4* 2.2 2.3 2.1  --   --    CO2 mmol/L 25.0 26.7 23.1 23.8 20.6* 24.0   CHLORIDE mmol/L 97* 96* 92* 97* 102 100   ANION GAP mmol/L 11.0 9.3 11.9 10.2 9.4 9.0   CREATININE mg/dL 1.45* 1.37* 1.14 1.22 1.16 1.22   BUN mg/dL 27* 24* 23 21 24* 27*   BUN / CREAT RATIO  18.6 17.5 20.2 17.2 20.7 22.1   CALCIUM mg/dL 8.5 8.8 8.3 8.4 8.4 9.0   ALK PHOS U/L 247* 251* 219* 220* 197* 214*   TOTAL PROTEIN g/dL 7.2 7.0 6.6 7.0 6.2 7.5   ALT (SGPT) U/L 44* 45* 40 37 27 30   AST (SGOT) U/L 64* 65* 53* 55* 49* 54*   BILIRUBIN mg/dL 0.8 0.7 0.8 1.0 0.8 0.8   ALBUMIN g/dL 2.9* 3.1* 2.9* 2.9* 3.2* 3.6   GLOBULIN gm/dL 4.3 3.9 3.7 4.1 3.0 3.9     Estimated Creatinine Clearance: 31.6 mL/min (A) (by C-G formula based on SCr of 1.45 mg/dL (H)).      Results from last 7 days   Lab Units 04/04/23 0334 04/03/23 0427 04/02/23 0418 04/01/23  0438 03/31/23  0530 03/30/23 2010   WBC 10*3/mm3 10.90* 10.61 8.75 9.28 9.38 10.04   RBC 10*6/mm3 3.60* 3.49* 3.42* 3.39* 3.14* 3.59*   HEMOGLOBIN g/dL 11.7* 11.9* 11.1* 11.2* 10.2* 12.2*   HEMATOCRIT % 34.1* 32.4* 32.1* 32.4* 29.8* 34.3*   MCV fL 94.7 92.8 93.9 95.6 94.9 95.5   MCH pg 32.5 34.1* 32.5 33.0 32.5 34.0*   MCHC g/dL 34.3 36.7* 34.6 34.6 34.2 35.6   RDW % 14.3 14.0 14.0 14.5 14.5 14.7   RDW-SD fl 49.2 47.1 47.6 50.9 49.9 50.9   MPV fL 10.3 10.5 10.4 10.3 10.2 10.0   PLATELETS 10*3/mm3 177 181 171 180 156 165   NEUTROPHIL % %  --  70.2  --   --   --  68.9   LYMPHOCYTE % %  --  12.1*  --   --   --  11.5*   MONOCYTES % %  --  11.8  --   --   --  13.8*   EOSINOPHIL % %  --  3.8  --    --   --  4.0   BASOPHIL % %  --  0.8  --   --   --  0.8   IMM GRAN % %  --  1.3*  --   --   --  1.0*   NEUTROS ABS 10*3/mm3  --  7.45*  --   --   --  6.92   LYMPHS ABS 10*3/mm3  --  1.28  --   --   --  1.15   MONOS ABS 10*3/mm3  --  1.25*  --   --   --  1.39*   EOS ABS 10*3/mm3  --  0.40  --   --   --  0.40   BASOS ABS 10*3/mm3  --  0.09  --   --   --  0.08   IMMATURE GRANS (ABS) 10*3/mm3  --  0.14*  --   --   --  0.10*   NRBC /100 WBC  --  0.2  --   --   --  0.2     Results from last 7 days   Lab Units 03/31/23  0854   PH, ARTERIAL pH units 7.486*   PO2 ART mm Hg 68.1*   PCO2, ARTERIAL mm Hg 27.0*   HCO3 ART mmol/L 20.4*     Results from last 7 days   Lab Units 03/30/23 2207 03/30/23 2010   HSTROP T ng/L 11 12  12     Results from last 7 days   Lab Units 03/30/23 2010   PROBNP pg/mL 2,683.0*     Results from last 7 days   Lab Units 03/31/23  0530   TSH uIU/mL 2.540     Results from last 7 days   Lab Units 03/30/23 2207 03/30/23 2010   LACTATE mmol/L 1.6  --    PROCALCITONIN ng/mL  --  0.08         Microbiology Results (last 10 days)     Procedure Component Value - Date/Time    Legionella Antigen, Urine - Urine, Urine, Clean Catch [[id number]]  (Normal) Collected: 03/31/23 0819    Lab Status: Final result Specimen: Urine, Clean Catch Updated: 03/31/23 0922     LEGIONELLA ANTIGEN, URINE Negative    S. Pneumo Ag Urine or CSF - Urine, Urine, Clean Catch [[id number]]  (Normal) Collected: 03/31/23 0819    Lab Status: Final result Specimen: Urine, Clean Catch Updated: 03/31/23 0922     Strep Pneumo Ag Negative    Blood Culture - Blood, Arm, Left [[id number]]  (Normal) Collected: 03/30/23 2229    Lab Status: Preliminary result Specimen: Blood from Arm, Left Updated: 04/03/23 2245     Blood Culture No growth at 4 days    Blood Culture - Blood, Arm, Left [[id number]]  (Normal) Collected: 03/30/23 2204    Lab Status: Preliminary result Specimen: Blood from Arm, Left Updated: 04/03/23 2230     Blood Culture No growth at 4  days    Respiratory Panel PCR w/COVID-19(SARS-CoV-2) [facility] In-House, NP Swab in UTM/VTM, 3-4 HR TAT - Swab, Nasopharynx [[id number]]  (Normal) Collected: 03/30/23 2004    Lab Status: Final result Specimen: Swab from Nasopharynx Updated: 03/30/23 2336     ADENOVIRUS, PCR Not Detected     Coronavirus 229E Not Detected     Coronavirus HKU1 Not Detected     Coronavirus NL63 Not Detected     Coronavirus OC43 Not Detected     COVID19 Not Detected     Human Metapneumovirus Not Detected     Human Rhinovirus/Enterovirus Not Detected     Influenza A PCR Not Detected     Influenza B PCR Not Detected     Parainfluenza Virus 1 Not Detected     Parainfluenza Virus 2 Not Detected     Parainfluenza Virus 3 Not Detected     Parainfluenza Virus 4 Not Detected     RSV, PCR Not Detected     Bordetella pertussis pcr Not Detected     Bordetella parapertussis PCR Not Detected     Chlamydophila pneumoniae PCR Not Detected     Mycoplasma pneumo by PCR Not Detected    Narrative:      In the setting of a positive respiratory panel with a viral infection PLUS a negative procalcitonin without other underlying concern for bacterial infection, consider observing off antibiotics or discontinuation of antibiotics and continue supportive care. If the respiratory panel is positive for atypical bacterial infection (Bordetella pertussis, Chlamydophila pneumoniae, or Mycoplasma pneumoniae), consider antibiotic de-escalation to target atypical bacterial infection.            Diagnostics:  Adult Transthoracic Echo Complete W/ Cont if Necessary Per Protocol    Result Date: 4/1/2023  •  Left ventricular ejection fraction appears to be 41 - 45%. •  Systolic and diastolic flattening of the interventricular septum consistent with right ventricle pressure and volume overload. •  The right ventricular cavity is severely dilated. Normal right ventricular systolic function noted. •  The left atrial cavity is moderately dilated. •  The right  atrial cavity is severely dilated. •  Saline test results are negative. •  Trace mitral valve regurgitation is present. The mitral valve mean gradient is 1.65 mmHg. There is a mitral valve ring present. •  Severe tricuspid valve regurgitation is present. •  Calculated right ventricular systolic pressure from tricuspid regurgitation is 38.0 mmHg. •  There is a trivial pericardial effusion.     CT Chest Without Contrast Diagnostic    Result Date: 3/31/2023  CT CHEST WITHOUT CONTRAST  HISTORY: Dyspnea.  TECHNIQUE: Noncontrast chest CT is provided and correlated with x-ray from yesterday.  FINDINGS: Cardiac pacing hardware is observed along with hardware at the mitral valve, dilated cardiac chambers, and simple appearing, posteriorly layering pleural effusions bilaterally. Those measure under 3 cm AP thickness in each. There is an enlarged precarinal lymph node measuring about 12 mm short axis. No other significant appearing lymphadenopathy. Abnormal opacity in the left upper lobe and posterior left lower lobe with groundglass density throughout the right upper lobe favored represent pneumonia. No obstructing airway lesion.  Visualized upper abdominal structures appear normal. Degenerative change in the spine.      Cardiac hardware with changes of prior sternotomy and mitral valve repair or replacement. There our bilateral pleural effusions with patchy opacity in the lungs bilaterally which appears similar as on the x-ray from yesterday and is favored represent pneumonia.  Radiation dose reduction techniques were utilized, including automated exposure control and exposure modulation based on body size.  This report was finalized on 3/31/2023 9:06 PM by Dr. Maurice Alejandro M.D.      XR Chest 1 View    Result Date: 4/3/2023  XR CHEST 1 VW-  Clinical: CHF, bilateral pleural effusions  COMPARISON examination 03/31/2013 CT chest  FINDINGS: Unchanged left-sided pleural effusion, right-sided pleural effusion appears slightly  improved within the interim. The cardiomediastinal silhouette is stable. Right base infiltrate/atelectasis more pronounced compared to the previous examination. Right and left upper lobe infiltrates similar to the previous examination. The remainder of examination is unremarkable.  This report was finalized on 4/3/2023 6:22 AM by Dr. Segun Jara M.D.      XR Chest 1 View    Result Date: 3/30/2023  PORTABLE CHEST X-RAY  HISTORY: Shortness of breath.  TECHNIQUE: Portable chest x-ray correlated with chest x-ray 04/14/2022.  FINDINGS: Sternal wires with cardiac valve hardware and a cardiac pacing device are again observed. The cardiac silhouette is enlarged. Patchy infiltrate in the upper lobes is observed bilaterally, more dense on the left than the right. There is also some density in the left lung base and mildly at the periphery of the right lung base. There also appears to be small pleural effusions blunting the costophrenic angles.      Bilateral pneumonia.  This report was finalized on 3/30/2023 8:50 PM by Dr. Maurice Alejandro M.D.      XR CHEST 1 VW PORTABLE    Result Date: 3/10/2023  EXAM: CR Chest 1 Vw Portable NUMBER OF IMAGES:  1 INDICATION: Shortness of breath Technique: AP view of the chest obtained portably on 3/10/2023 10:31 AM Comparison:  Comparison is made to frontal radiograph the chest dated 09/04/2022. Findings: The cardiomediastinal silhouette is enlarged. There are calcified lesions of the thoracic aorta. There are bilateral pleural effusions, right larger than left, with associated patchy bilateral lower lobe airspace disease. There is no pneumothorax. The visualized osseous structures demonstrate degenerative changes. Intact midline sternotomy wires are seen. Lines and Tubes: Left-sided dual-lead cardiac pacer is in place, with leads terminating in satisfactory position. Impression:  Bilateral pleural effusions, right larger than left, with associated patchy bilateral lower lobe airspace  disease. Dictated by: Paco Shah MD Signed by Paco Shah MD on 3/10/2023 10:46 AM ##### Final ##### Dictated by:    PACO SHAH MD-RAD Dictated DT/TM: 03/10/2023 10:46 am Interpreted and electronically signed by:  PACO SHAH MD-RAD Signed DT/TM:  03/10/2023 10:46 am    Results for orders placed during the hospital encounter of 03/30/23    Adult Transthoracic Echo Complete W/ Cont if Necessary Per Protocol    Interpretation Summary  •  Left ventricular ejection fraction appears to be 41 - 45%.  •  Systolic and diastolic flattening of the interventricular septum consistent with right ventricle pressure and volume overload.  •  The right ventricular cavity is severely dilated. Normal right ventricular systolic function noted.  •  The left atrial cavity is moderately dilated.  •  The right atrial cavity is severely dilated.  •  Saline test results are negative.  •  Trace mitral valve regurgitation is present. The mitral valve mean gradient is 1.65 mmHg. There is a mitral valve ring present.  •  Severe tricuspid valve regurgitation is present.  •  Calculated right ventricular systolic pressure from tricuspid regurgitation is 38.0 mmHg.  •  There is a trivial pericardial effusion.      CT scan of the chest reviewed from 3/31/2023 small bilateral layering pleural effusions with minimal associated compressive atelectasis, there is cardiomegaly there are bilateral predominantly upper lobe infiltrates the right is more interstitial the left has a mixture of interstitial and more dense alveolar consolidation.  There is a minimal amount in the left base as well  I compared admit chest x-rays to the one done on 4/3/2023 and there has been slight improvement in the pleural effusions probably more on the right than left and there is slight improvement may be in the right effusion but there is increased right base infiltrate/edema and may be even slight increase in the right apical or upper lung zone infiltrates the left  is not appreciably changed.    Assessment & Plan     1. Bilateral pulmonary infiltrates versus edema CT scan certainly looks more infiltrative than pulmonary edema white count procalcitonin initially and I have repeated it today are negative no leukocytosis no fevers or chills.  This speaks against at least typical infections.  Do not think it entirely excludes atypical infections with his anorexia etc. which could also be explained by heart failure but he is not improved with diuresis we have to consider atypical infection such as TB atypical Mycobacterium fungal less likely.  Viral infection less likely given the respiratory panel being negative.  This of course could be noninfectious inflammation could be a pneumonitis from aspiration although we have no history of aspiration.  He could have a noninfectious process such as cryptogenic organizing pneumonia or this could be an another ILD.  Lymphangitic tumor would seem to be less likely.  I think we have to rule out TB sputum's for AFB and a T spot if that is negative the decision is whether we do bronchoscopy with washings and biopsy or whether we try some empiric steroids.  He is 89 with heart disease I am not super anxious to put him under anesthesia but it could probably be done.  They might have some trouble with hypoxia as well since he is on 6 L.  I talked with he and family and caregivers I think they would probably tend to lean towards empiric steroid trial but we will cross that road we will get there we certainly cannot start steroids until we have at least tried to rule out atypical infections.  The other big differential here that we have not discussed would be drugs he is on amiodarone and amiodarone can cause interstitial disease so can Casodex.  His amiodarone test is tiny in the ILD tends to be more dose related .  If Casodex or immune related steroids would be the treatment of choice and withdrawing therapy.  2. Bilateral pleural effusions looks  like there has been some improvement in these with diuresis.  3. Acute hypoxic respiratory failure despite treatment of his CHF he has had worsening oxygenation.  I think this is due to the infiltrates they do appear to be progressing some and if we get pushed to fatigue spot is negative we could jump to empiric steroids I guess.  4. Acute on chronic combined heart failure per nephrology and cardiology looks like patient has been diuresing.  5. Acute kidney injury chronic kidney disease/cardiorenal syndrome nephrology following  6. Sick sinus syndrome and paroxysmal atrial fibrillation he is on amiodarone we certainly have to consider amiodarone in the differential of his interstitial disease he is on a very tiny dose..  He is anticoagulated with Eliquis.  Has permanent pacemaker.  7. Pulmonary hypertension mild by echocardiogram RV dilatation  8. History of mitral valve disease status post mitral valve repair valve appears to be functioning well by echocardiogram  9. Hyponatremia nephrology following and managing  10. Anemia mild  11. Mildly elevated transaminases question etiology.      Martín Flowers Jr, MD  04/04/23  14:00 EDT    Time:

## 2023-04-04 NOTE — PLAN OF CARE
Goal Outcome Evaluation:  Plan of Care Reviewed With: patient, family        Progress: no change  Outcome Evaluation: Pt seen for OT tx session in PM, agreeable with encouragment for participation. Pt currently on 6LO2 with spO2 WFL throughout activity. Pt coming to EOB with CGA/min A x1, SPV for sitting balance, increased time at EOB due to overall pt feeling very fatigued per pt report. Pt agreeable to x1 STS with CGA, demo'd x2 lateral side steps to HOB, spO2 WFL. Pt very quick to fatigue with activity, returning to supine at end of session. Pt now plans SNF prior to senior care. Pt will continue to benefit from skilled OT to address deficits and improve overall (I) with ADLs.

## 2023-04-04 NOTE — PROGRESS NOTES
"    Patient Name: Bridegr Elias  :1930  89 y.o.      Patient Care Team:  Alma Cat MD as PCP - General (Family Medicine)  Maurice Cook MD as Consulting Physician (Radiation Oncology)    Chief Complaint: follow up CHF      Interval History:  Spoke with daughter. She is very worried about his weakness and decreased appetite. It all seems to be declining since he was admitted.  He was working with PT. Doesn't voice any complaints.    Objective   Vital Signs  Temp:  [97.6 °F (36.4 °C)-98.5 °F (36.9 °C)] 97.7 °F (36.5 °C)  Heart Rate:  [74] 74  Resp:  [18] 18  BP: ()/(52-58) 106/56    Intake/Output Summary (Last 24 hours) at 2023 1540  Last data filed at 2023 1300  Gross per 24 hour   Intake 60 ml   Output 2175 ml   Net -2115 ml     Flowsheet Rows    Flowsheet Row First Filed Value   Admission Height 170.2 cm (67\") Documented at 2023   Admission Weight 74.4 kg (164 lb) Documented at 2023          Physical Exam:   General Appearance:    Alert, cooperative, in no acute distress   Lungs:     Clear to auscultation.  Normal respiratory effort and rate.      Heart:    Regular rhythm and normal rate, normal S1 and S2, no murmurs, gallops or rubs.     Chest Wall:    No abnormalities observed   Abdomen:     Soft, nontender, positive bowel sounds.     Extremities:   no cyanosis, clubbing or edema.  No marked joint deformities.  Adequate musculoskeletal strength.       Results Review:    Results from last 7 days   Lab Units 23  0334   SODIUM mmol/L 133*   POTASSIUM mmol/L 4.0   CHLORIDE mmol/L 97*   CO2 mmol/L 25.0   BUN mg/dL 27*   CREATININE mg/dL 1.45*   GLUCOSE mg/dL 110*   CALCIUM mg/dL 8.5     Results from last 7 days   Lab Units 23   HSTROP T ng/L 11 12  12     Results from last 7 days   Lab Units 23  0334   WBC 10*3/mm3 10.90*   HEMOGLOBIN g/dL 11.7*   HEMATOCRIT % 34.1*   PLATELETS 10*3/mm3 177         Results from last 7 days "   Lab Units 04/04/23  0334   MAGNESIUM mg/dL 2.4*           Medication Review:   amiodarone, 100 mg, Oral, Every Other Day  apixaban, 5 mg, Oral, Q12H  bicalutamide, 50 mg, Oral, Daily  fluticasone, 2 spray, Each Nare, Daily  latanoprost, 1 drop, Both Eyes, Daily  levothyroxine, 88 mcg, Oral, Daily  melatonin, 2 mg, Oral, Nightly  multivitamin with minerals, 1 tablet, Oral, Daily  rosuvastatin, 10 mg, Oral, Daily  torsemide, 40 mg, Oral, Daily             Assessment & Plan   1.  Acute hypoxic respiratory failure, on 6L awake  2.  Community-acquired pneumonia  3.  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  Severe right ventricular enlargement with normal function  5.  Severe tricuspid regurgitation  6.  Status post mitral valve repair-looks good  7.  Paroxysmal atrial fibrillation, carvedilol has been held because of hypotension, remains on amiodarone and Eliquis  8.  Sick sinus syndrome, status post pacemaker  9.  Hypertension  10.  Generalized weakness, multifactorial  11.  Deconditioning  12.  Hyponatremia, renal following  13.  Pleural effusions, bilateral, chest x-ray 4/3 shows some improvement on the right, unchanged on the left.    Pretty weak and not eating much.   Stable on current dose of diuretic per nephrology.   Beta blocker remains held due to low BP. Would continue to hold this.    JESUS El  [facility]  04/04/23  15:40 EDT

## 2023-04-04 NOTE — PROGRESS NOTES
Continued Stay Note  [facility]     Patient Name: Bridger Elias  MRN: [id number]  Today's Date: 4/4/2023    Admit Date: 3/30/2023    Plan: rehab   Discharge Plan     Row Name 04/04/23 1358       Plan    Plan rehab    Plan Comments Spoke with Brianna at [facility] (Brianna covering for [facility]) reviewed therapy eval. Brianna recommends patient go to SNF prior to returning to [facility]. If patient improves with therapy and o2 requirements decrease pt would be able to return to [facility]. No beds at [facility] in [place]. Maria Luisa following for [facility]. [facility] does not have a bed, but they may have some discharges soon. [facility] will update [facility] once a bed becomes available.  [facility] cannot accept while pt is  requiring more than 5L o2; they're unable to titrate o2. [facility] will follow progress.               Discharge Codes    No documentation.               Expected Discharge Date and Time     Expected Discharge Date Expected Discharge Time    Apr 7, 2023             Jennifer Marcos RN

## 2023-04-04 NOTE — THERAPY TREATMENT NOTE
Patient Name: Bridger Elias  : 1930    MRN: [id number]                              Today's Date: 2023       Admit Date: 3/30/2023    Visit Dx:     ICD-10-CM ICD-9-CM   1. Community acquired pneumonia, unspecified laterality  J18.9 486     Patient Active Problem List   Diagnosis   • SCC (squamous cell carcinoma), face   • General weakness   • Pacemaker   • Abnormal gait   • Atrial fibrillation   • Chronic diastolic heart failure   • Coronary arteriosclerosis   • Dyslipidemia   • Glaucoma   • Mitral valve disorder   • Hypertension   • Hypertensive kidney disease, stage III   • Hypothyroidism   • Long term (current) use of anticoagulants   • Malignant neoplasm of prostate   • Osteoporosis   • Squamous cell carcinoma of skin of face   • Acute on chronic diastolic CHF (congestive heart failure)   • Stage 3a chronic kidney disease   • Personal history of radiation therapy   • Acute on chronic combined systolic and diastolic CHF (congestive heart failure)   • Cellulitis of right leg   • Chronic acquired lymphedema   • Contusion of face   • Contusion of forearm, left   • Fall   • Chronic systolic heart failure (CMS/HCC)   • Acute respiratory failure with hypoxia   • Hyponatremia   • Normocytic anemia   • Hypokalemia   • Transaminitis     Past Medical History:   Diagnosis Date   • Acute on chronic diastolic CHF (congestive heart failure) 2022   • Atrial fibrillation 2022   • Coronary arteriosclerosis 7/3/2014    Formatting of this note might be different from the original. [facility]  IMPRESSION:   1. Right heart disease, hypertensive cardiac disease.   2. Status post mitral valve repair.   3. Anatomy: No hemodynamically significant disease. about 30-40% lesion of    the right coronary artery. Normal. Left coronary artery system.   • Dyslipidemia 2013   • Glaucoma 2022   • Hypertension 2013   • Hypertensive kidney disease, stage III 3/13/2017   • Hypothyroidism 3/19/2017   • Long term  (current) use of anticoagulants 12/5/2013    Formatting of this note might be different from the original. Mitral valve replacement and atrial fibrillation Assume a range of 2.5-3.5.   • Malignant neoplasm of prostate 12/5/2013    Formatting of this note might be different from the original. 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   • Stage 3b chronic kidney disease 4/14/2022     Past Surgical History:   Procedure Laterality Date   • PACEMAKER IMPLANTATION  2018      General Information     Row Name 04/04/23 1501          OT Time and Intention    Document Type therapy note (daily note)  -MW     Mode of Treatment occupational therapy  -     Row Name 04/04/23 1501          General Information    Patient Profile Reviewed yes  -MW     Existing Precautions/Restrictions fall  -     Row Name 04/04/23 1501          Cognition    Orientation Status (Cognition) oriented x 3  -MW     Row Name 04/04/23 1501          Safety Issues, Functional Mobility    Impairments Affecting Function (Mobility) endurance/activity tolerance;strength;range of motion (ROM);shortness of breath  -MW           User Key  (r) = Recorded By, (t) = Taken By, (c) = Cosigned By    Initials Name Provider Type    Mariel Patel OT Occupational Therapist                 Mobility/ADL's     Row Name 04/04/23 1501          Bed Mobility    Bed Mobility supine-sit;sit-supine  -MW     Supine-Sit [place] (Bed Mobility) minimum assist (75% patient effort)  -MW     Sit-Supine [place] (Bed Mobility) minimum assist (75% patient effort)  -MW     Assistive Device (Bed Mobility) bed rails;head of bed elevated  -MW     Comment, (Bed Mobility) SPV sitting balance  -     Row Name 04/04/23 1501          Transfers    Transfers sit-stand  transfer;stand-sit transfer  -     Comment, (Transfers) pt agreeable to x1 STS from EOB, deferred walking due to fatigue  -MW     Row Name 04/04/23 1501          Sit-Stand Transfer    Sit-Stand [place] (Transfers) contact guard;standby assist  -MW     Assistive Device (Sit-Stand Transfers) walker, front-wheeled  -MW     Comment, (Sit-Stand Transfer) able to demo x2 lateral side steps with CGA  -MW     Row Name 04/04/23 1501          Stand-Sit Transfer    Stand-Sit [place] (Transfers) standby assist;contact guard  -     Assistive Device (Stand-Sit Transfers) walker, front-wheeled  -MW     Row Name 04/04/23 1501          Activities of Daily Living    BADL Assessment/Intervention toileting  -     Row Name 04/04/23 1501          Toileting Assessment/Training    Comment, (Toileting) urinal at EOB  -MW           User Key  (r) = Recorded By, (t) = Taken By, (c) = Cosigned By    Initials Name Provider Type    Mariel Patel OT Occupational Therapist               Obj/Interventions     Row Name 04/04/23 1502          Balance    Balance Assessment sitting static balance;sitting dynamic balance;sit to stand dynamic balance;standing static balance;standing dynamic balance  -MW     Static Sitting Balance independent  -     Dynamic Sitting Balance supervision  -     Position, Sitting Balance sitting edge of bed  -     Sit to Stand Dynamic Balance contact guard  -MW     Static Standing Balance standby assist  -MW     Dynamic Standing Balance contact guard;standby assist  -MW     Position/Device Used, Standing Balance supported;walker, front-wheeled  -MW           User Key  (r) = Recorded By, (t) = Taken By, (c) = Cosigned By    Initials Name Provider Type    Mariel Patel OT Occupational Therapist               Goals/Plan    No documentation.                Clinical Impression     Row Name 04/04/23 1503          Pain Assessment    Pretreatment Pain Rating 0/10 - no pain  -     Posttreatment  Pain Rating 0/10 - no pain  -     Row Name 04/04/23 1503          Plan of Care Review    Plan of Care Reviewed With patient;family  -MW     Progress no change  -     Outcome Evaluation Pt seen for OT tx session in PM, agreeable with encouragment for participation. Pt currently on 6LO2 with spO2 WFL throughout activity. Pt coming to EOB with CGA/min A x1, SPV for sitting balance, increased time at EOB due to overall pt feeling very fatigued per pt report. Pt agreeable to x1 STS with CGA, demo'd x2 lateral side steps to HOB, spO2 WFL. Pt very quick to fatigue with activity, returning to supine at end of session. Pt now plans SNF prior to [facility]. Pt will continue to benefit from skilled OT to address deficits and improve overall (I) with ADLs.  -     Row Name 04/04/23 1503          Therapy Plan Review/Discharge Plan (OT)    Anticipated Discharge Disposition (OT) skilled nursing facility  -     Row Name 04/04/23 1503          Vital Signs    Pre SpO2 (%) 94  -MW     O2 Delivery Pre Treatment nasal cannula  -MW     O2 Delivery Intra Treatment nasal cannula  -MW     Post SpO2 (%) 91  -MW     O2 Delivery Post Treatment nasal cannula  -MW     Pre Patient Position Supine  -MW     Intra Patient Position Standing  -MW     Post Patient Position Supine  -MW     Row Name 04/04/23 1503          Positioning and Restraints    Pre-Treatment Position in bed  -MW     Post Treatment Position bed  -MW     In Bed notified nsg;fowlers;call light within reach;encouraged to call for assist;exit alarm on;side rails up x2;with family/caregiver;with other staff  -MW           User Key  (r) = Recorded By, (t) = Taken By, (c) = Cosigned By    Initials Name Provider Type    Mariel Patel, OT Occupational Therapist               Outcome Measures     Row Name 04/04/23 1506          How much help from another is currently needed...    Putting on and taking off regular lower body clothing? 2  -MW     Bathing (including washing, rinsing, and  drying) 2  -MW     Toileting (which includes using toilet bed pan or urinal) 3  -MW     Putting on and taking off regular upper body clothing 3  -MW     Taking care of personal grooming (such as brushing teeth) 3  -MW     Eating meals 4  -MW     AM-PAC 6 Clicks Score (OT) 17  -MW     Row Name 04/04/23 0800          How much help from another person do you currently need...    Turning from your back to your side while in flat bed without using bedrails? 3  -CM     Moving from lying on back to sitting on the side of a flat bed without bedrails? 3  -CM     Moving to and from a bed to a chair (including a wheelchair)? 3  -CM     Standing up from a chair using your arms (e.g., wheelchair, bedside chair)? 3  -CM     Climbing 3-5 steps with a railing? 2  -CM     To walk in hospital room? 2  -CM     AM-PAC 6 Clicks Score (PT) 16  -CM     Highest level of mobility 5 --> Static standing  -CM     Row Name 04/04/23 1506          Functional Assessment    Outcome Measure Options AM-PAC 6 Clicks Daily Activity (OT)  -MW           User Key  (r) = Recorded By, (t) = Taken By, (c) = Cosigned By    Initials Name Provider Type    Violette Ashby RN Registered Nurse    Mariel Patel OT Occupational Therapist                Occupational Therapy Education     Title: PT OT SLP Therapies (In Progress)     Topic: Occupational Therapy (Done)     Point: ADL training (Done)     Description:   Instruct learner(s) on proper safety adaptation and remediation techniques during self care or transfers.   Instruct in proper use of assistive devices.              Learning Progress Summary           Patient Acceptance, E, VU by ENOCH at 3/31/2023 1528    Comment: role of OT, d/c rec   Family Acceptance, E, VU by ENOCH at 3/31/2023 1528    Comment: role of OT, d/c rec                   Point: Home exercise program (Done)     Description:   Instruct learner(s) on appropriate technique for monitoring, assisting and/or progressing therapeutic  exercises/activities.              Learning Progress Summary           Patient Acceptance, E, VU by  at 3/31/2023 1528    Comment: role of OT, d/c rec   Family Acceptance, E, VU by  at 3/31/2023 1528    Comment: role of OT, d/c rec                   Point: Precautions (Done)     Description:   Instruct learner(s) on prescribed precautions during self-care and functional transfers.              Learning Progress Summary           Patient Acceptance, E, VU by  at 3/31/2023 1528    Comment: role of OT, d/c rec   Family Acceptance, E, VU by  at 3/31/2023 1528    Comment: role of OT, d/c rec                   Point: Body mechanics (Done)     Description:   Instruct learner(s) on proper positioning and spine alignment during self-care, functional mobility activities and/or exercises.              Learning Progress Summary           Patient Acceptance, E, VU by  at 3/31/2023 1528    Comment: role of OT, d/c rec   Family Acceptance, E, VU by  at 3/31/2023 1528    Comment: role of OT, d/c rec                               User Key     Initials Effective Dates Name Provider Type Discipline     08/20/21 -  Mariel Garza OT Occupational Therapist OT              OT Recommendation and Plan  Planned Therapy Interventions (OT): activity tolerance training, functional balance retraining, BADL retraining, neuromuscular control/coordination retraining, patient/caregiver education/training, transfer/mobility retraining, strengthening exercise, ROM/therapeutic exercise, occupation/activity based interventions  Therapy Frequency (OT): 5 times/wk  Plan of Care Review  Plan of Care Reviewed With: patient, family  Progress: no change  Outcome Evaluation: Pt seen for OT tx session in PM, agreeable with encouragment for participation. Pt currently on 6LO2 with spO2 WFL throughout activity. Pt coming to EOB with CGA/min A x1, SPV for sitting balance, increased time at EOB due to overall pt feeling very fatigued per pt  report. Pt agreeable to x1 STS with CGA, demo'd x2 lateral side steps to HOB, spO2 WFL. Pt very quick to fatigue with activity, returning to supine at end of session. Pt now plans SNF prior to [facility]. Pt will continue to benefit from skilled OT to address deficits and improve overall (I) with ADLs.     Time Calculation:    Time Calculation- OT     Row Name 04/04/23 1506             Time Calculation- OT    OT Start Time 1338  -MW      OT Stop Time 1356  -MW      OT Time Calculation (min) 18 min  -MW      Total Timed Code Minutes- OT 18 minute(s)  -MW      OT Received On 04/04/23  -MW      OT - Next Appointment 04/05/23  -MW         Timed Charges    35416 - OT Therapeutic Activity Minutes 18  -MW         Total Minutes    Timed Charges Total Minutes 18  -MW       Total Minutes 18  -MW            User Key  (r) = Recorded By, (t) = Taken By, (c) = Cosigned By    Initials Name Provider Type     Mariel Garza OT Occupational Therapist              Therapy Charges for Today     Code Description Service Date Service Provider Modifiers Qty    [id number]  OT THERAPEUTIC ACT EA 15 MIN 4/4/2023 Mariel Garza OT GO 1               Mariel Garza OT  4/4/2023

## 2023-04-05 PROBLEM — E87.1 HYPONATREMIA: Status: RESOLVED | Noted: 2023-04-02 | Resolved: 2023-04-05

## 2023-04-05 PROBLEM — E87.6 HYPOKALEMIA: Status: RESOLVED | Noted: 2023-04-02 | Resolved: 2023-04-05

## 2023-04-05 LAB
ALBUMIN SERPL-MCNC: 3 G/DL (ref 3.5–5.2)
ALBUMIN/GLOB SERPL: 0.7 G/DL
ALP SERPL-CCNC: 215 U/L (ref 39–117)
ALT SERPL W P-5'-P-CCNC: 41 U/L (ref 1–41)
ANION GAP SERPL CALCULATED.3IONS-SCNC: 7.9 MMOL/L (ref 5–15)
AST SERPL-CCNC: 56 U/L (ref 1–40)
BILIRUB SERPL-MCNC: 0.9 MG/DL (ref 0–1.2)
BUN SERPL-MCNC: 30 MG/DL (ref 8–23)
BUN/CREAT SERPL: 20 (ref 7–25)
CALCIUM SPEC-SCNC: 8.4 MG/DL (ref 8.2–9.6)
CHLORIDE SERPL-SCNC: 95 MMOL/L (ref 98–107)
CO2 SERPL-SCNC: 27.1 MMOL/L (ref 22–29)
CREAT SERPL-MCNC: 1.5 MG/DL (ref 0.76–1.27)
DEPRECATED RDW RBC AUTO: 48.3 FL (ref 37–54)
EGFRCR SERPLBLD CKD-EPI 2021: 43.1 ML/MIN/1.73
ERYTHROCYTE [DISTWIDTH] IN BLOOD BY AUTOMATED COUNT: 14.1 % (ref 12.3–15.4)
GLOBULIN UR ELPH-MCNC: 4.3 GM/DL
GLUCOSE SERPL-MCNC: 105 MG/DL (ref 65–99)
HCT VFR BLD AUTO: 33.2 % (ref 37.5–51)
HGB BLD-MCNC: 11.8 G/DL (ref 13–17.7)
MCH RBC QN AUTO: 33.5 PG (ref 26.6–33)
MCHC RBC AUTO-ENTMCNC: 35.5 G/DL (ref 31.5–35.7)
MCV RBC AUTO: 94.3 FL (ref 79–97)
PLATELET # BLD AUTO: 154 10*3/MM3 (ref 140–450)
PMV BLD AUTO: 10 FL (ref 6–12)
POTASSIUM SERPL-SCNC: 3.6 MMOL/L (ref 3.5–5.2)
PROT SERPL-MCNC: 7.3 G/DL (ref 6–8.5)
RBC # BLD AUTO: 3.52 10*6/MM3 (ref 4.14–5.8)
SODIUM SERPL-SCNC: 130 MMOL/L (ref 136–145)
WBC NRBC COR # BLD: 9.78 10*3/MM3 (ref 3.4–10.8)

## 2023-04-05 PROCEDURE — 99232 SBSQ HOSP IP/OBS MODERATE 35: CPT | Performed by: NURSE PRACTITIONER

## 2023-04-05 PROCEDURE — 94761 N-INVAS EAR/PLS OXIMETRY MLT: CPT

## 2023-04-05 PROCEDURE — 87206 SMEAR FLUORESCENT/ACID STAI: CPT | Performed by: INTERNAL MEDICINE

## 2023-04-05 PROCEDURE — 87070 CULTURE OTHR SPECIMN AEROBIC: CPT | Performed by: INTERNAL MEDICINE

## 2023-04-05 PROCEDURE — 94799 UNLISTED PULMONARY SVC/PX: CPT

## 2023-04-05 PROCEDURE — 94664 DEMO&/EVAL PT USE INHALER: CPT

## 2023-04-05 PROCEDURE — 87116 MYCOBACTERIA CULTURE: CPT | Performed by: INTERNAL MEDICINE

## 2023-04-05 PROCEDURE — 94640 AIRWAY INHALATION TREATMENT: CPT

## 2023-04-05 PROCEDURE — 97530 THERAPEUTIC ACTIVITIES: CPT

## 2023-04-05 PROCEDURE — 80053 COMPREHEN METABOLIC PANEL: CPT | Performed by: STUDENT IN AN ORGANIZED HEALTH CARE EDUCATION/TRAINING PROGRAM

## 2023-04-05 PROCEDURE — 87205 SMEAR GRAM STAIN: CPT | Performed by: INTERNAL MEDICINE

## 2023-04-05 PROCEDURE — 97110 THERAPEUTIC EXERCISES: CPT

## 2023-04-05 PROCEDURE — 85027 COMPLETE CBC AUTOMATED: CPT | Performed by: STUDENT IN AN ORGANIZED HEALTH CARE EDUCATION/TRAINING PROGRAM

## 2023-04-05 RX ORDER — BICALUTAMIDE 50 MG/1
50 TABLET, FILM COATED ORAL DAILY
Status: DISCONTINUED | OUTPATIENT
Start: 2023-04-06 | End: 2023-04-11

## 2023-04-05 RX ADMIN — ROSUVASTATIN CALCIUM 10 MG: 10 TABLET, FILM COATED ORAL at 09:05

## 2023-04-05 RX ADMIN — APIXABAN 5 MG: 5 TABLET, FILM COATED ORAL at 10:39

## 2023-04-05 RX ADMIN — Medication 4 ML: at 07:59

## 2023-04-05 RX ADMIN — MULTIPLE VITAMINS W/ MINERALS TAB 1 TABLET: TAB at 09:05

## 2023-04-05 RX ADMIN — LATANOPROST 1 DROP: 50 SOLUTION OPHTHALMIC at 21:26

## 2023-04-05 RX ADMIN — BICALUTAMIDE 50 MG: 50 TABLET ORAL at 10:39

## 2023-04-05 RX ADMIN — ALBUTEROL SULFATE 2.5 MG: 2.5 SOLUTION RESPIRATORY (INHALATION) at 07:59

## 2023-04-05 RX ADMIN — FLUTICASONE PROPIONATE 2 SPRAY: 50 SPRAY, METERED NASAL at 09:07

## 2023-04-05 RX ADMIN — Medication 10 ML: at 21:26

## 2023-04-05 RX ADMIN — LEVOTHYROXINE SODIUM 88 MCG: 0.09 TABLET ORAL at 09:05

## 2023-04-05 RX ADMIN — LATANOPROST 1 DROP: 50 SOLUTION OPHTHALMIC at 21:44

## 2023-04-05 RX ADMIN — APIXABAN 5 MG: 5 TABLET, FILM COATED ORAL at 21:26

## 2023-04-05 RX ADMIN — APIXABAN 5 MG: 5 TABLET, FILM COATED ORAL at 00:55

## 2023-04-05 NOTE — PROGRESS NOTES
"    Patient Name: Bridger Elias  :1930  89 y.o.      Patient Care Team:  Alma Cat MD as PCP - General (Family Medicine)  Maurice Cook MD as Consulting Physician (Radiation Oncology)    Chief Complaint: follow up CHF      Interval History:  Resting in bed. Doing a little better today. Drinking a boost.     Objective   Vital Signs  Temp:  [98.2 °F (36.8 °C)-99.7 °F (37.6 °C)] 99.2 °F (37.3 °C)  Heart Rate:  [74-75] 75  Resp:  [16-18] 16  BP: ()/(47-62) 94/52    Intake/Output Summary (Last 24 hours) at 2023 1635  Last data filed at 2023 1538  Gross per 24 hour   Intake 580 ml   Output 1275 ml   Net -695 ml     Flowsheet Rows    Flowsheet Row First Filed Value   Admission Height 170.2 cm (67\") Documented at 2023   Admission Weight 74.4 kg (164 lb) Documented at 2023          Physical Exam:   General Appearance:    Alert, cooperative, in no acute distress   Lungs:     Clear to auscultation.  Normal respiratory effort and rate.      Heart:    Regular rhythm and normal rate, normal S1 and S2, no murmurs, gallops or rubs.     Chest Wall:    No abnormalities observed   Abdomen:     Soft, nontender, positive bowel sounds.     Extremities:   no cyanosis, clubbing or edema.  No marked joint deformities.  Adequate musculoskeletal strength.       Results Review:    Results from last 7 days   Lab Units 23  0506   SODIUM mmol/L 130*   POTASSIUM mmol/L 3.6   CHLORIDE mmol/L 95*   CO2 mmol/L 27.1   BUN mg/dL 30*   CREATININE mg/dL 1.50*   GLUCOSE mg/dL 105*   CALCIUM mg/dL 8.4     Results from last 7 days   Lab Units 237 23   HSTROP T ng/L 11 12  12     Results from last 7 days   Lab Units 23  0506   WBC 10*3/mm3 9.78   HEMOGLOBIN g/dL 11.8*   HEMATOCRIT % 33.2*   PLATELETS 10*3/mm3 154         Results from last 7 days   Lab Units 238   MAGNESIUM mg/dL 2.4*           Medication Review:   apixaban, 5 mg, Oral, Q12H  [START ON " 4/6/2023] bicalutamide, 50 mg, Oral, Daily  fluticasone, 2 spray, Each Nare, Daily  latanoprost, 1 drop, Both Eyes, Daily  levothyroxine, 88 mcg, Oral, Daily  melatonin, 2 mg, Oral, Nightly  multivitamin with minerals, 1 tablet, Oral, Daily  rosuvastatin, 10 mg, Oral, Daily  torsemide, 40 mg, Oral, Daily             Assessment & Plan   1.  Acute hypoxic respiratory failure, on 6L awake  2.  Community-acquired pneumonia  3.  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  Severe right ventricular enlargement with normal function  5.  Severe tricuspid regurgitation  6.  Status post mitral valve repair-looks good  7.  Paroxysmal atrial fibrillation, carvedilol has been held because of hypotension, remains on amiodarone and Eliquis  8.  Sick sinus syndrome, status post pacemaker  9.  Hypertension  10.  Generalized weakness, multifactorial  11.  Deconditioning  12.  Hyponatremia, renal following  13.  Pleural effusions, bilateral, chest x-ray 4/3 shows some improvement on the right, unchanged on the left.    Pulmonary evaluated for persistent hypoxia. He is now in TB precautions. Amiodarone was mentioned as unlikely but could not be ruled out.  Stop amiodarone and follow.     JESUS El  [facility]  04/05/23  16:35 EDT

## 2023-04-05 NOTE — PLAN OF CARE
Goal Outcome Evaluation:  Plan of Care Reviewed With: patient        Progress: improving  Outcome Evaluation: Pt seen this PM for PT treatment. Pt more alert today and agreeable to treatment, however still fatigues quickly. Pt able to complete bed mobility with SBA along with STS transfers. Pt completed bilateral LE/UE exercises with supervision. Pt took a few steps forward and back and along side of EOB with SBA. Pt fatigued after and asked to lay back down. Encouraged pt to get up to the chair later today with nsg. Will continue to progress pt as able.

## 2023-04-05 NOTE — PROGRESS NOTES
[facility] Progress Note      Patient Name: Bridger Elias  : 1930  MRN: [id number]  Primary Care Physician:  Alma Cat MD  Date of admission: 3/30/2023    Subjective     Interval History:   Follow-up hyponatremia and CKD    The patient is feeling better, denies chest pain or shortness of air, no orthopnea or PND, no nausea or vomiting, no abdominal pain, no dysuria or gross hematuria, his blood pressure is on the low side.    Review of Systems:   As noted above    Objective     Vitals:   Temp:  [97.7 °F (36.5 °C)-99.7 °F (37.6 °C)] 98.7 °F (37.1 °C)  Heart Rate:  [74] 74  Resp:  [16-18] 16  BP: ()/(47-62) 98/47  Flow (L/min):  [4-6] 4    Intake/Output Summary (Last 24 hours) at 2023 0922  Last data filed at 2023 0600  Gross per 24 hour   Intake 180 ml   Output 1400 ml   Net -1220 ml       Physical Exam:    General Appearance: Awake, alert, very frail and chronically ill  Skin: warm and dry  HEENT: oral mucosa normal, nonicteric sclera  Neck: Minimal JVD  Lungs: Bilateral rhonchi, breathing effort not labored  Heart: Irregularly irregular, no rub  Abdomen: soft, nontender, nondistended, normoactive bowels  : no palpable bladder  Extremities: Trace left ankle edema no edema on the right, no hip or thigh edema  Neuro: normal speech and mental status, hard of hearing    Scheduled Meds:     amiodarone, 100 mg, Oral, Every Other Day  apixaban, 5 mg, Oral, Q12H  bicalutamide, 50 mg, Oral, Daily  fluticasone, 2 spray, Each Nare, Daily  latanoprost, 1 drop, Both Eyes, Daily  levothyroxine, 88 mcg, Oral, Daily  melatonin, 2 mg, Oral, Nightly  multivitamin with minerals, 1 tablet, Oral, Daily  rosuvastatin, 10 mg, Oral, Daily  torsemide, 40 mg, Oral, Daily      IV Meds:        Results Reviewed:   I have personally reviewed the results from the time of this admission to 2023 09:22 EDT     Results from last 7 days   Lab Units 23  0506 23  04/04/23 0334 04/03/23 0427   SODIUM mmol/L 130* 136 133* 132*   POTASSIUM mmol/L 3.6 3.8 4.0 4.6   CHLORIDE mmol/L 95* 96* 97* 96*   CO2 mmol/L 27.1 26.8 25.0 26.7   BUN mg/dL 30* 29* 27* 24*   CREATININE mg/dL 1.50* 1.34* 1.45* 1.37*   CALCIUM mg/dL 8.4 8.6 8.5 8.8   BILIRUBIN mg/dL 0.9  --  0.8 0.7   ALK PHOS U/L 215*  --  247* 251*   ALT (SGPT) U/L 41  --  44* 45*   AST (SGOT) U/L 56*  --  64* 65*   GLUCOSE mg/dL 105* 129* 110* 108*       Estimated Creatinine Clearance: 30 mL/min (A) (by C-G formula based on SCr of 1.5 mg/dL (H)).    Results from last 7 days   Lab Units 04/04/23 2048 04/04/23 0334 04/03/23 0427 04/02/23  0418   MAGNESIUM mg/dL 2.4* 2.4* 2.2 2.3   PHOSPHORUS mg/dL 2.9  --  2.1* 2.5       Results from last 7 days   Lab Units 04/04/23 2048 04/04/23 0334 04/03/23 0427 04/02/23  1215   URIC ACID mg/dL 5.6 5.0 4.6 4.4       Results from last 7 days   Lab Units 04/05/23  0506 04/04/23 2048 04/04/23 0334 04/03/23 0427 04/02/23  0418   WBC 10*3/mm3 9.78 9.55 10.90* 10.61 8.75   HEMOGLOBIN g/dL 11.8* 12.3* 11.7* 11.9* 11.1*   PLATELETS 10*3/mm3 154 163 177 181 171             Assessment / Plan     ASSESSMENT:  -Acute kidney injury on chronic kidney disease, cardiorenal syndrome, creatinine up to 1.5, part of cardiorenal syndrome  -Hyponatremia associated with acute on chronic diastolic dysfunction, sodium today is 130  -CKD stage III secondary to nephrosclerosis and advanced age  -History of hypertension but the patient is hypotensive at present  -A-fib  -Chronically anticoagulated  -Coronary artery disease  -Valvular heart disease  -Frailty    PLAN:  -Permissive azotemia to avoid congestive heart failure-keep the sodium greater than 130, to minimize the risk of falls  -Continue the same dose of oral torsemide 40 mg daily  -Surveillance labs.    Thank you for involving us in the care of Bridger Elias.  Please feel free to call with any questions.    Naeem Hernandez MD  04/05/23  09:22  JESSA    [facility]  [phone]    Please note that portions of this note were completed with a voice recognition program.

## 2023-04-05 NOTE — PROGRESS NOTES
Name: Bridger Elias ADMIT: 3/30/2023   : 1930  PCP: Alma Cat MD    MRN: [id number] LOS: 5 days   AGE/SEX: 89 y.o. male  ROOM: [facility]     Subjective   Subjective   Feels like he has a bit more energy today.  He is a little bit more hungry as well.    Objective   Objective   Vital Signs  Temp:  [98.2 °F (36.8 °C)-99.7 °F (37.6 °C)] 99.2 °F (37.3 °C)  Heart Rate:  [74-75] 75  Resp:  [16-18] 16  BP: ()/(47-62) 94/52  SpO2:  [90 %-98 %] 96 %  on  Flow (L/min):  [4-6] 4;   Device (Oxygen Therapy): humidified;nasal cannula  Body mass index is 23.79 kg/m².  Physical Exam  Constitutional:       General: He is not in acute distress.     Comments: Chronically ill-appearing   Cardiovascular:      Rate and Rhythm: Normal rate.   Pulmonary:      Effort: Pulmonary effort is normal. No respiratory distress.      Breath sounds: Wheezing present.   Abdominal:      General: Abdomen is flat. There is no distension.      Palpations: Abdomen is soft.      Tenderness: There is no abdominal tenderness.   Musculoskeletal:         General: No swelling.      Right lower leg: No edema.      Left lower leg: No edema.   Skin:     Comments: Scattered ecchymoses   Neurological:      General: No focal deficit present.      Mental Status: He is alert and oriented to person, place, and time.         Results Review     I reviewed the patient's new clinical results.  Results from last 7 days   Lab Units 23  05023   WBC 10*3/mm3 9.78 9.55 10.90* 10.61   HEMOGLOBIN g/dL 11.8* 12.3* 11.7* 11.9*   PLATELETS 10*3/mm3 154 163 177 181     Results from last 7 days   Lab Units 23  0506 23  0427   SODIUM mmol/L 130* 136 133* 132*   POTASSIUM mmol/L 3.6 3.8 4.0 4.6   CHLORIDE mmol/L 95* 96* 97* 96*   CO2 mmol/L 27.1 26.8 25.0 26.7   BUN mg/dL 30* 29* 27* 24*   CREATININE mg/dL 1.50* 1.34* 1.45* 1.37*   GLUCOSE mg/dL 105* 129* 110* 108*   EGFR  mL/min/1.73 43.1* 49.4* 44.9* 48.1*     Results from last 7 days   Lab Units 04/05/23  0506 04/04/23 2048 04/04/23 0334 04/03/23 0427 04/02/23 0418   ALBUMIN g/dL 3.0* 3.4* 2.9* 3.1* 2.9*   BILIRUBIN mg/dL 0.9  --  0.8 0.7 0.8   ALK PHOS U/L 215*  --  247* 251* 219*   AST (SGOT) U/L 56*  --  64* 65* 53*   ALT (SGPT) U/L 41  --  44* 45* 40     Results from last 7 days   Lab Units 04/05/23 0506 04/04/23 2048 04/04/23 0334 04/03/23 0427 04/02/23 0418 04/01/23 0438   CALCIUM mg/dL 8.4 8.6 8.5 8.8 8.3 8.4   ALBUMIN g/dL 3.0* 3.4* 2.9* 3.1* 2.9* 2.9*   MAGNESIUM mg/dL  --  2.4* 2.4* 2.2 2.3 2.1   PHOSPHORUS mg/dL  --  2.9  --  2.1* 2.5 3.0     Results from last 7 days   Lab Units 04/04/23 0334 03/30/23 2207 03/30/23 2010   PROCALCITONIN ng/mL 0.14  --  0.08   LACTATE mmol/L  --  1.6  --      Glucose   Date/Time Value Ref Range Status   04/02/2023 1638 115 70 - 130 mg/dL Final     Comment:     Meter: [id number] : [id number] Radha Jewell RN       No radiology results for the last day  Scheduled Medications  amiodarone, 100 mg, Oral, Every Other Day  apixaban, 5 mg, Oral, Q12H  bicalutamide, 50 mg, Oral, Daily  fluticasone, 2 spray, Each Nare, Daily  latanoprost, 1 drop, Both Eyes, Daily  levothyroxine, 88 mcg, Oral, Daily  melatonin, 2 mg, Oral, Nightly  multivitamin with minerals, 1 tablet, Oral, Daily  rosuvastatin, 10 mg, Oral, Daily  torsemide, 40 mg, Oral, Daily    Infusions   Diet  Diet: Cardiac Diets, Fluid Restriction (240 mL/tray) Diets; Low Sodium (2g); 1500 mL/day; Texture: Regular Texture (IDDSI 7); Fluid Consistency: Thin (IDDSI 0)       Assessment/Plan     Active Hospital Problems    Diagnosis  POA   • **Acute respiratory failure with hypoxia [J96.01]  Yes   • Hyponatremia [E87.1]  Yes   • Normocytic anemia [D64.9]  Yes   • Transaminitis [R74.01]  Yes   • Stage 3a chronic kidney disease [N18.31]  Yes   • Pacemaker [Z95.0]  Yes   • Atrial fibrillation [I48.91]  Yes   • Acute on chronic  combined systolic and diastolic CHF (congestive heart failure) [I50.43]  Yes   • SCC (squamous cell carcinoma), face [C44.320]  Yes   • Hypothyroidism [E03.9]  Yes   • Coronary arteriosclerosis [I25.10]  Yes   • Dyslipidemia [E78.5]  Yes   • Hypertension [I10]  Yes   • Long term (current) use of anticoagulants [Z79.01]  Not Applicable      Resolved Hospital Problems    Diagnosis Date Resolved POA   • Hypokalemia [E87.6] 04/05/2023 No   • Community acquired pneumonia, unspecified laterality [J18.9] 04/03/2023 Yes       89 y.o. male admitted with Acute respiratory failure with hypoxia.      04/05/23  Encourage p.o. intake.  Awaiting AFB smears.    Acute respiratory failure with hypoxia  Acute on chronic combined systolic and diastolic heart failure  -ProBNP 2683 OA  -CXR w/ b/l effusions, congestion  -Procalcitonin, strep, Legionella, respiratory viral panel negative.  -TTE (4/1/23): 41-45%; systolic and diastolic flattening of IV septum consistent with RV pressure/volume overload  -Cardiology, Nephrology following  -Pulmonology consulted given persistent O2 needs-AFB smears pending; if negative empiric steroids versus bronchoscopy with biopsy  -Torsemide 40 mg      Hyponatremia  -improved with diuresis  -Renal following     Atrial fibrillation on long-term anticoagulation  -RC: Amiodarone 100 mg every other day; Coreg ON HOLD given soft BP  -AC: Apixaban    CKD3a  -Crt near baseline; monitor with diuresis      Transaminitis  -LFTs slightly elevated, however, relatively stable     Hypothyroidism  -Synthroid 88 mcg     Hyperlipidemia  -Rosuvastatin 10 mg     Generalized weakness/Debility  - Consulted PT/OT, fall precautions.    · Eliquis  for DVT prophylaxis.  · Full code.  · Discussed with patient, family and care team on multidisciplinary rounds.  · Anticipate discharge return to [facility] when cleared by consultants      Rock Daley MD  [facility]  04/05/23  14:44 EDT

## 2023-04-05 NOTE — PLAN OF CARE
Goal Outcome Evaluation:  Plan of Care Reviewed With: patient, daughter        Progress: improving  Outcome Evaluation: Patient worked with PT today. pt currently on 3.5L NC. D/c amio per cardio. pt has had a coug hbut not productive. RN held demadex this AM due to lower BP. pt has been resting well since 3PM, pt woke up to eat some dinner and has no complaints. daugther at bedside.

## 2023-04-05 NOTE — THERAPY TREATMENT NOTE
Patient Name: Bridger Elias  : 1930    MRN: [id number]                              Today's Date: 2023       Admit Date: 3/30/2023    Visit Dx:     ICD-10-CM ICD-9-CM   1. Community acquired pneumonia, unspecified laterality  J18.9 486     Patient Active Problem List   Diagnosis   • SCC (squamous cell carcinoma), face   • General weakness   • Pacemaker   • Abnormal gait   • Atrial fibrillation   • Chronic diastolic heart failure   • Coronary arteriosclerosis   • Dyslipidemia   • Glaucoma   • Mitral valve disorder   • Hypertension   • Hypertensive kidney disease, stage III   • Hypothyroidism   • Long term (current) use of anticoagulants   • Malignant neoplasm of prostate   • Osteoporosis   • Squamous cell carcinoma of skin of face   • Acute on chronic diastolic CHF (congestive heart failure)   • Stage 3a chronic kidney disease   • Personal history of radiation therapy   • Acute on chronic combined systolic and diastolic CHF (congestive heart failure)   • Cellulitis of right leg   • Chronic acquired lymphedema   • Contusion of face   • Contusion of forearm, left   • Fall   • Chronic systolic heart failure (CMS/HCC)   • Acute respiratory failure with hypoxia   • Hyponatremia   • Normocytic anemia   • Transaminitis     Past Medical History:   Diagnosis Date   • Acute on chronic diastolic CHF (congestive heart failure) 2022   • Atrial fibrillation 2022   • Coronary arteriosclerosis 7/3/2014    Formatting of this note might be different from the original. [facility] 16  IMPRESSION:   1. Right heart disease, hypertensive cardiac disease.   2. Status post mitral valve repair.   3. Anatomy: No hemodynamically significant disease. about 30-40% lesion of    the right coronary artery. Normal. Left coronary artery system.   • Dyslipidemia 2013   • Glaucoma 2022   • Hypertension 2013   • Hypertensive kidney disease, stage III 3/13/2017   • Hypothyroidism 3/19/2017   • Long term (current) use  of anticoagulants 12/5/2013    Formatting of this note might be different from the original. Mitral valve replacement and atrial fibrillation Assume a range of 2.5-3.5.   • Malignant neoplasm of prostate 12/5/2013    Formatting of this note might be different from the original. 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   • Stage 3b chronic kidney disease 4/14/2022     Past Surgical History:   Procedure Laterality Date   • PACEMAKER IMPLANTATION  2018      General Information     Row Name 04/05/23 1620          Physical Therapy Time and Intention    Document Type therapy note (daily note)  -     Mode of Treatment individual therapy;physical therapy  -     Row Name 04/05/23 1620          General Information    Existing Precautions/Restrictions fall  -     Row Name 04/05/23 1620          Cognition    Orientation Status (Cognition) oriented x 3  -     Row Name 04/05/23 1620          Safety Issues, Functional Mobility    Impairments Affecting Function (Mobility) endurance/activity tolerance;strength;range of motion (ROM);shortness of breath  -           User Key  (r) = Recorded By, (t) = Taken By, (c) = Cosigned By    Initials Name Provider Type     Noa Garcia PTA Physical Therapist Assistant               Mobility     Row Name 04/05/23 1620          Bed Mobility    Supine-Sit Start (Bed Mobility) standby assist  -EB     Sit-Supine Start (Bed Mobility) standby assist  -EB     Assistive Device (Bed Mobility) bed rails;head of bed elevated  -     Row Name 04/05/23 1620          Sit-Stand Transfer    Sit-Stand Start (Transfers) standby assist  -EB     Assistive Device (Sit-Stand Transfers) walker, front-wheeled  -     Row Name 04/05/23 1620          Gait/Stairs  (Locomotion)    [place] Level (Gait) contact guard;standby assist  -EB     Assistive Device (Gait) walker, front-wheeled  -EB     Distance in Feet (Gait) 4ft  -EB     Bilateral Gait Deviations forward flexed posture  -EB           User Key  (r) = Recorded By, (t) = Taken By, (c) = Cosigned By    Initials Name Provider Type     Noa Garcia PTA Physical Therapist Assistant               Obj/Interventions     Row Name 04/05/23 1616          Motor Skills    Therapeutic Exercise --  BLE: LAQs and seated marches (X15) BUE: forward punches and shoulder raises (X15)  -     Row Name 04/05/23 1616          Balance    Balance Assessment sitting static balance;sitting dynamic balance;standing static balance  -EB     Static Sitting Balance independent  -EB     Dynamic Sitting Balance supervision  -EB     Position, Sitting Balance sitting edge of bed  -EB     Static Standing Balance standby assist  -EB           User Key  (r) = Recorded By, (t) = Taken By, (c) = Cosigned By    Initials Name Provider Type    Noa Hancock PTA Physical Therapist Assistant               Goals/Plan    No documentation.                Clinical Impression     Row Name 04/05/23 1617          Plan of Care Review    Plan of Care Reviewed With patient  -EB     Progress improving  -EB     Outcome Evaluation Pt seen this PM for PT treatment. Pt more alert today and agreeable to treatment, however still fatigues quickly. Pt able to complete bed mobility with SBA along with STS transfers. Pt completed bilateral LE/UE exercises with supervision. Pt took a few steps forward and back and along side of EOB with SBA. Pt fatigued after and asked to lay back down. Encouraged pt to get up to the chair later today with nsg. Will continue to progress pt as able.  -     Row Name 04/05/23 1617          Therapy Assessment/Plan (PT)    Therapy Frequency (PT) 5 times/wk  -     Row Name 04/05/23 1617          Positioning and Restraints    Pre-Treatment  Position in bed  -EB     Post Treatment Position bed  -EB     In Bed side lying left;call light within reach;encouraged to call for assist;exit alarm on;with family/caregiver  -EB           User Key  (r) = Recorded By, (t) = Taken By, (c) = Cosigned By    Initials Name Provider Type    Noa Hancock PTA Physical Therapist Assistant               Outcome Measures     Row Name 04/05/23 1617 04/05/23 0915       How much help from another person do you currently need...    Turning from your back to your side while in flat bed without using bedrails? 3  -EB 3  -HS    Moving from lying on back to sitting on the side of a flat bed without bedrails? 3  -EB 3  -HS    Moving to and from a bed to a chair (including a wheelchair)? 3  -EB 3  -HS    Standing up from a chair using your arms (e.g., wheelchair, bedside chair)? 3  -EB 3  -HS    Climbing 3-5 steps with a railing? 2  -EB 2  -HS    To walk in hospital room? 3  -EB 3  -HS    AM-PAC 6 Clicks Score (PT) 17  -EB 17  -HS    Highest level of mobility 5 --> Static standing  -EB 5 --> Static standing  -HS          User Key  (r) = Recorded By, (t) = Taken By, (c) = Cosigned By    Initials Name Provider Type    Noa Hancock PTA Physical Therapist Assistant     Francesca Lake RN Registered Nurse                             Physical Therapy Education     Title: PT OT SLP Therapies (Done)     Topic: Physical Therapy (Done)     Point: Mobility training (Done)     Learning Progress Summary           Patient Acceptance, E, VU,NR by GEORGE at 4/5/2023 1617    Acceptance, E, VU by GEORGE at 4/4/2023 1545    Acceptance, E,D, VU,NR by GEORGE at 4/3/2023 1603    Acceptance, E, VU,NR by RODGER at 3/31/2023 1633                   Point: Home exercise program (Done)     Learning Progress Summary           Patient Acceptance, E, VU,NR by GEORGE at 4/5/2023 1617                   Point: Body mechanics (Done)     Learning Progress Summary           Patient Acceptance, E, VU,NR by EB at 4/5/2023 1617     Acceptance, E, VU by EB at 4/4/2023 1545    Acceptance, E,D, VU,NR by EB at 4/3/2023 1603    Acceptance, E, VU,NR by DJ at 3/31/2023 1633                   Point: Precautions (Done)     Learning Progress Summary           Patient Acceptance, E, VU,NR by EB at 4/5/2023 1617    Acceptance, E, VU by EB at 4/4/2023 1545    Acceptance, E,D, VU,NR by EB at 4/3/2023 1603    Acceptance, E, VU,NR by DJ at 3/31/2023 1633                               User Key     Initials Effective Dates Name Provider Type Discipline    EB 02/14/23 -  Noa Garcia PTA Physical Therapist Assistant PT    DJ 10/25/19 -  Latosha Sanders PT Physical Therapist PT              PT Recommendation and Plan     Plan of Care Reviewed With: patient  Progress: improving  Outcome Evaluation: Pt seen this PM for PT treatment. Pt more alert today and agreeable to treatment, however still fatigues quickly. Pt able to complete bed mobility with SBA along with STS transfers. Pt completed bilateral LE/UE exercises with supervision. Pt took a few steps forward and back and along side of EOB with SBA. Pt fatigued after and asked to lay back down. Encouraged pt to get up to the chair later today with nsg. Will continue to progress pt as able.     Time Calculation:    PT Charges     Row Name 04/05/23 1616             Time Calculation    Start Time 1527  -EB      Stop Time 1550  -EB      Time Calculation (min) 23 min  -EB      PT Received On 04/05/23  -EB      PT - Next Appointment 04/06/23  -EB         Time Calculation- PT    Total Timed Code Minutes- PT 23 minute(s)  -EB            User Key  (r) = Recorded By, (t) = Taken By, (c) = Cosigned By    Initials Name Provider Type    EB Noa Garcia PTA Physical Therapist Assistant              Therapy Charges for Today     Code Description Service Date Service Provider Modifiers Qty    97579768510 HC PT THERAPEUTIC ACT EA 15 MIN 4/4/2023 Noa Garcia PTA GP 1    41293750815 HC PT THER SUPP EA 15 MIN 4/4/2023 Jose  Noa PTA GP 1    43357655307 HC PT THERAPEUTIC ACT EA 15 MIN 4/5/2023 Noa Garcia, PTA GP 1    64648746564 HC PT THER PROC EA 15 MIN 4/5/2023 Noa Garcia, PTA GP 1          PT G-Codes  Outcome Measure Options: AM-PAC 6 Clicks Daily Activity (OT)  AM-PAC 6 Clicks Score (PT): 17  AM-PAC 6 Clicks Score (OT): 17       Noa Garcia PTA  4/5/2023

## 2023-04-05 NOTE — PLAN OF CARE
Goal Outcome Evaluation:  Plan of Care Reviewed With: patient, daughter        Progress: no change  Outcome Evaluation: Maintained on 6L N/C to keep sats >92%. Pulm consult noted, pt placed in room 440 for Airborne Isolation to r/o TB. Temp max 99.7 during noc. Occ loose cough, non productive at this time. Pt up in chair for several hours at HS, olaf fair. Pt able to use soft touch to call out for needs. Pt reports overall very fatigued. Engineering up for airborne safety check.  Safety maintained.

## 2023-04-05 NOTE — PROGRESS NOTES
LOS: 5 days   Patient Care Team:  Alma Cat MD as PCP - General (Family Medicine)  Maurice Cook MD as Consulting Physician (Radiation Oncology)    Subjective     Patient is a 89 y.o. male.  Asked to see for persistent O2 requirements and bilateral pleural effusions.   Patient says he is about the same as yesterday he is just tired did not have much energy no appetite not really short of breath on supplemental O2 at 6 L  Review of Systems:          Objective     Vital Signs  Vital Sign Min/Max for last 24 hours  Temp  Min: 97.7 °F (36.5 °C)  Max: 99.7 °F (37.6 °C)   BP  Min: 90/57  Max: 106/62   Pulse  Min: 74  Max: 74   Resp  Min: 18  Max: 18   SpO2  Min: 90 %  Max: 98 %   Flow (L/min)  Min: 6  Max: 6   No data recorded        Ventilator/Non-Invasive Ventilation Settings (From admission, onward)    None                       Body mass index is 24.26 kg/m².  I/O last 3 completed shifts:  In: 60 [P.O.:60]  Out: 3250 [Urine:3250]  I/O this shift:  In: 60 [P.O.:60]  Out: -         Physical Exam:    General Appearance: Well-developed elderly white male resting in bed he does not look in acute distress he is requiring 6 L nasal cannula O2 with oxygen saturations of about 95%  Eyes: Conjunctiva are clear and anicteric pupils look equal mucous membranes are moist no erythema no exudates  ENT: Mucous membranes are moist Mallampati 1 airway nasal septum midline  Neck: No palpable adenopathy or thyromegaly no visible jugular venous distention and trachea midline  Lungs: Clear I do not hear wheezes rales or rhonchi amazing given his x-ray and CT scan  Cardiac: Irregularly irregular no definite murmur  Abdomen: Soft no palpable hepatosplenomegaly or masses  : Not examined  Musculoskeletal: He has some mild to moderate thoracic kyphosis  Skin: Warm and dry no jaundice no petechiae  Neuro: He is hard of hearing but he is alert and oriented he is cooperative  Extremities/P Vascular: No clubbing no cyanosis no  edema palpable radial and dorsalis pedis pulses  MSE: Bonifacio gentleman he is appropriate     Labs:  Results from last 7 days   Lab Units 04/04/23 2048 04/04/23 0334 04/03/23 0427 04/02/23 0418 04/01/23 0438 03/31/23  0530 03/30/23 2010   GLUCOSE mg/dL 129* 110* 108* 130* 108* 104* 98   SODIUM mmol/L 136 133* 132* 127* 131* 132* 133*   POTASSIUM mmol/L 3.8 4.0 4.6 3.2* 4.0 4.2 4.3   MAGNESIUM mg/dL 2.4* 2.4* 2.2 2.3 2.1  --   --    CO2 mmol/L 26.8 25.0 26.7 23.1 23.8 20.6* 24.0   CHLORIDE mmol/L 96* 97* 96* 92* 97* 102 100   ANION GAP mmol/L 13.2 11.0 9.3 11.9 10.2 9.4 9.0   CREATININE mg/dL 1.34* 1.45* 1.37* 1.14 1.22 1.16 1.22   BUN mg/dL 29* 27* 24* 23 21 24* 27*   BUN / CREAT RATIO  21.6 18.6 17.5 20.2 17.2 20.7 22.1   CALCIUM mg/dL 8.6 8.5 8.8 8.3 8.4 8.4 9.0   ALK PHOS U/L  --  247* 251* 219* 220* 197* 214*   TOTAL PROTEIN g/dL  --  7.2 7.0 6.6 7.0 6.2 7.5   ALT (SGPT) U/L  --  44* 45* 40 37 27 30   AST (SGOT) U/L  --  64* 65* 53* 55* 49* 54*   BILIRUBIN mg/dL  --  0.8 0.7 0.8 1.0 0.8 0.8   ALBUMIN g/dL 3.4* 2.9* 3.1* 2.9* 2.9* 3.2* 3.6   GLOBULIN gm/dL  --  4.3 3.9 3.7 4.1 3.0 3.9     Estimated Creatinine Clearance: 34.2 mL/min (A) (by C-G formula based on SCr of 1.34 mg/dL (H)).      Results from last 7 days   Lab Units 04/05/23  0506 04/04/23 2048 04/04/23  0334 04/03/23  0427 04/02/23  0418 04/01/23  0438 03/31/23  0530 03/30/23 2010   WBC 10*3/mm3 9.78 9.55 10.90* 10.61 8.75 9.28 9.38 10.04   RBC 10*6/mm3 3.52* 3.69* 3.60* 3.49* 3.42* 3.39* 3.14* 3.59*   HEMOGLOBIN g/dL 11.8* 12.3* 11.7* 11.9* 11.1* 11.2* 10.2* 12.2*   HEMATOCRIT % 33.2* 35.1* 34.1* 32.4* 32.1* 32.4* 29.8* 34.3*   MCV fL 94.3 95.1 94.7 92.8 93.9 95.6 94.9 95.5   MCH pg 33.5* 33.3* 32.5 34.1* 32.5 33.0 32.5 34.0*   MCHC g/dL 35.5 35.0 34.3 36.7* 34.6 34.6 34.2 35.6   RDW % 14.1 14.5 14.3 14.0 14.0 14.5 14.5 14.7   RDW-SD fl 48.3 50.8 49.2 47.1 47.6 50.9 49.9 50.9   MPV fL 10.0 10.1 10.3 10.5 10.4 10.3 10.2 10.0   PLATELETS  10*3/mm3 154 163 177 181 171 180 156 165   NEUTROPHIL % %  --  71.6  --  70.2  --   --   --  68.9   LYMPHOCYTE % %  --  10.4*  --  12.1*  --   --   --  11.5*   MONOCYTES % %  --  12.6*  --  11.8  --   --   --  13.8*   EOSINOPHIL % %  --  3.4  --  3.8  --   --   --  4.0   BASOPHIL % %  --  0.7  --  0.8  --   --   --  0.8   IMM GRAN % %  --   --   --  1.3*  --   --   --  1.0*   NEUTROS ABS 10*3/mm3  --  6.85  --  7.45*  --   --   --  6.92   LYMPHS ABS 10*3/mm3  --  0.99  --  1.28  --   --   --  1.15   MONOS ABS 10*3/mm3  --  1.20*  --  1.25*  --   --   --  1.39*   EOS ABS 10*3/mm3  --  0.32  --  0.40  --   --   --  0.40   BASOS ABS 10*3/mm3  --  0.07  --  0.09  --   --   --  0.08   IMMATURE GRANS (ABS) 10*3/mm3  --   --   --  0.14*  --   --   --  0.10*   NRBC /100 WBC  --   --   --  0.2  --   --   --  0.2     Results from last 7 days   Lab Units 03/31/23  0854   PH, ARTERIAL pH units 7.486*   PO2 ART mm Hg 68.1*   PCO2, ARTERIAL mm Hg 27.0*   HCO3 ART mmol/L 20.4*     Results from last 7 days   Lab Units 03/30/23 2207 03/30/23 2010   HSTROP T ng/L 11 12  12     Results from last 7 days   Lab Units 03/30/23 2010   PROBNP pg/mL 2,683.0*     Results from last 7 days   Lab Units 03/31/23  0530   TSH uIU/mL 2.540     Results from last 7 days   Lab Units 04/04/23 0334 03/30/23 2207 03/30/23 2010   LACTATE mmol/L  --  1.6  --    PROCALCITONIN ng/mL 0.14  --  0.08         Microbiology Results (last 10 days)     Procedure Component Value - Date/Time    Legionella Antigen, Urine - Urine, Urine, Clean Catch [[id number]]  (Normal) Collected: 04/04/23 2300    Lab Status: Final result Specimen: Urine, Clean Catch Updated: 04/04/23 2343     LEGIONELLA ANTIGEN, URINE Negative    Legionella Antigen, Urine - Urine, Urine, Clean Catch [[id number]]  (Normal) Collected: 03/31/23 0819    Lab Status: Final result Specimen: Urine, Clean Catch Updated: 03/31/23 0922     LEGIONELLA ANTIGEN, URINE Negative    S. Pneumo Ag Urine or CSF -  Urine, Urine, Clean Catch [[id number]]  (Normal) Collected: 03/31/23 0819    Lab Status: Final result Specimen: Urine, Clean Catch Updated: 03/31/23 0922     Strep Pneumo Ag Negative    Blood Culture - Blood, Arm, Left [[id number]]  (Normal) Collected: 03/30/23 2229    Lab Status: Final result Specimen: Blood from Arm, Left Updated: 04/04/23 2246     Blood Culture No growth at 5 days    Blood Culture - Blood, Arm, Left [[id number]]  (Normal) Collected: 03/30/23 2204    Lab Status: Final result Specimen: Blood from Arm, Left Updated: 04/04/23 2231     Blood Culture No growth at 5 days    Respiratory Panel PCR w/COVID-19(SARS-CoV-2) [facility]ONATAN/[facility] In-House, NP Swab in UTM/VTM, 3-4 HR TAT - Swab, Nasopharynx [[id number]]  (Normal) Collected: 03/30/23 2004    Lab Status: Final result Specimen: Swab from Nasopharynx Updated: 03/30/23 2336     ADENOVIRUS, PCR Not Detected     Coronavirus 229E Not Detected     Coronavirus HKU1 Not Detected     Coronavirus NL63 Not Detected     Coronavirus OC43 Not Detected     COVID19 Not Detected     Human Metapneumovirus Not Detected     Human Rhinovirus/Enterovirus Not Detected     Influenza A PCR Not Detected     Influenza B PCR Not Detected     Parainfluenza Virus 1 Not Detected     Parainfluenza Virus 2 Not Detected     Parainfluenza Virus 3 Not Detected     Parainfluenza Virus 4 Not Detected     RSV, PCR Not Detected     Bordetella pertussis pcr Not Detected     Bordetella parapertussis PCR Not Detected     Chlamydophila pneumoniae PCR Not Detected     Mycoplasma pneumo by PCR Not Detected    Narrative:      In the setting of a positive respiratory panel with a viral infection PLUS a negative procalcitonin without other underlying concern for bacterial infection, consider observing off antibiotics or discontinuation of antibiotics and continue supportive care. If the respiratory panel is positive for atypical bacterial infection (Bordetella pertussis, Chlamydophila  pneumoniae, or Mycoplasma pneumoniae), consider antibiotic de-escalation to target atypical bacterial infection.              amiodarone, 100 mg, Oral, Every Other Day  apixaban, 5 mg, Oral, Q12H  bicalutamide, 50 mg, Oral, Daily  fluticasone, 2 spray, Each Nare, Daily  latanoprost, 1 drop, Both Eyes, Daily  levothyroxine, 88 mcg, Oral, Daily  melatonin, 2 mg, Oral, Nightly  multivitamin with minerals, 1 tablet, Oral, Daily  rosuvastatin, 10 mg, Oral, Daily  torsemide, 40 mg, Oral, Daily           Diagnostics:  Adult Transthoracic Echo Complete W/ Cont if Necessary Per Protocol    Result Date: 4/1/2023  •  Left ventricular ejection fraction appears to be 41 - 45%. •  Systolic and diastolic flattening of the interventricular septum consistent with right ventricle pressure and volume overload. •  The right ventricular cavity is severely dilated. Normal right ventricular systolic function noted. •  The left atrial cavity is moderately dilated. •  The right atrial cavity is severely dilated. •  Saline test results are negative. •  Trace mitral valve regurgitation is present. The mitral valve mean gradient is 1.65 mmHg. There is a mitral valve ring present. •  Severe tricuspid valve regurgitation is present. •  Calculated right ventricular systolic pressure from tricuspid regurgitation is 38.0 mmHg. •  There is a trivial pericardial effusion.     CT Chest Without Contrast Diagnostic    Result Date: 3/31/2023  CT CHEST WITHOUT CONTRAST  HISTORY: Dyspnea.  TECHNIQUE: Noncontrast chest CT is provided and correlated with x-ray from yesterday.  FINDINGS: Cardiac pacing hardware is observed along with hardware at the mitral valve, dilated cardiac chambers, and simple appearing, posteriorly layering pleural effusions bilaterally. Those measure under 3 cm AP thickness in each. There is an enlarged precarinal lymph node measuring about 12 mm short axis. No other significant appearing lymphadenopathy. Abnormal opacity in the left  upper lobe and posterior left lower lobe with groundglass density throughout the right upper lobe favored represent pneumonia. No obstructing airway lesion.  Visualized upper abdominal structures appear normal. Degenerative change in the spine.      Cardiac hardware with changes of prior sternotomy and mitral valve repair or replacement. There our bilateral pleural effusions with patchy opacity in the lungs bilaterally which appears similar as on the x-ray from yesterday and is favored represent pneumonia.  Radiation dose reduction techniques were utilized, including automated exposure control and exposure modulation based on body size.  This report was finalized on 3/31/2023 9:06 PM by Dr. Maurice Alejandro M.D.      XR Chest 1 View    Result Date: 4/3/2023  XR CHEST 1 VW-  Clinical: CHF, bilateral pleural effusions  COMPARISON examination 03/31/2013 CT chest  FINDINGS: Unchanged left-sided pleural effusion, right-sided pleural effusion appears slightly improved within the interim. The cardiomediastinal silhouette is stable. Right base infiltrate/atelectasis more pronounced compared to the previous examination. Right and left upper lobe infiltrates similar to the previous examination. The remainder of examination is unremarkable.  This report was finalized on 4/3/2023 6:22 AM by Dr. Segun Jara M.D.      XR Chest 1 View    Result Date: 3/30/2023  PORTABLE CHEST X-RAY  HISTORY: Shortness of breath.  TECHNIQUE: Portable chest x-ray correlated with chest x-ray 04/14/2022.  FINDINGS: Sternal wires with cardiac valve hardware and a cardiac pacing device are again observed. The cardiac silhouette is enlarged. Patchy infiltrate in the upper lobes is observed bilaterally, more dense on the left than the right. There is also some density in the left lung base and mildly at the periphery of the right lung base. There also appears to be small pleural effusions blunting the costophrenic angles.      Bilateral pneumonia.  This  report was finalized on 3/30/2023 8:50 PM by Dr. Maurice Alejandro M.D.      XR CHEST 1 VW PORTABLE    Result Date: 3/10/2023  EXAM: CR Chest 1 Vw Portable NUMBER OF IMAGES:  1 INDICATION: Shortness of breath Technique: AP view of the chest obtained portably on 3/10/2023 10:31 AM Comparison:  Comparison is made to frontal radiograph the chest dated 09/04/2022. Findings: The cardiomediastinal silhouette is enlarged. There are calcified lesions of the thoracic aorta. There are bilateral pleural effusions, right larger than left, with associated patchy bilateral lower lobe airspace disease. There is no pneumothorax. The visualized osseous structures demonstrate degenerative changes. Intact midline sternotomy wires are seen. Lines and Tubes: Left-sided dual-lead cardiac pacer is in place, with leads terminating in satisfactory position. Impression:  Bilateral pleural effusions, right larger than left, with associated patchy bilateral lower lobe airspace disease. Dictated by: Paco Shah MD Signed by Paco Shah MD on 3/10/2023 10:46 AM ##### Final ##### Dictated by:    PACO SHAH MD-RAD Dictated DT/TM: 03/10/2023 10:46 am Interpreted and electronically signed by:  PACO SHAH MD-RAD Signed DT/TM:  03/10/2023 10:46 am    Results for orders placed during the hospital encounter of 03/30/23    Adult Transthoracic Echo Complete W/ Cont if Necessary Per Protocol    Interpretation Summary  •  Left ventricular ejection fraction appears to be 41 - 45%.  •  Systolic and diastolic flattening of the interventricular septum consistent with right ventricle pressure and volume overload.  •  The right ventricular cavity is severely dilated. Normal right ventricular systolic function noted.  •  The left atrial cavity is moderately dilated.  •  The right atrial cavity is severely dilated.  •  Saline test results are negative.  •  Trace mitral valve regurgitation is present. The mitral valve mean gradient is 1.65 mmHg. There is a  mitral valve ring present.  •  Severe tricuspid valve regurgitation is present.  •  Calculated right ventricular systolic pressure from tricuspid regurgitation is 38.0 mmHg.  •  There is a trivial pericardial effusion.          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 Bilateral pulmonary infiltrates versus edema CT scan certainly looks more infiltrative than pulmonary edema white count procalcitonin initially and I have repeated it today are negative no leukocytosis no fevers or chills.  This speaks against at least typical infections.  Do not think it entirely excludes atypical infections with his anorexia etc. which could also be explained by heart failure but he is not improved with diuresis we have to consider atypical infection such as TB atypical Mycobacterium fungal less likely.  Viral infection less likely given the respiratory panel being negative.  This of course could be noninfectious inflammation could be a pneumonitis from aspiration although we have no history of aspiration.  He could have a noninfectious process such as cryptogenic organizing pneumonia or this could be an another  ILD.  Lymphangitic tumor would seem to be less likely.  I think we have to rule out TB sputum's for AFB and a T spot if that is negative the decision is whether we do bronchoscopy with washings and biopsy or whether we try some empiric steroids.  He is 89 with heart disease I am not super anxious to put him under anesthesia but it could probably be done.  They might have some trouble with hypoxia as well since he is on 6 L.  I talked with he and family and caregivers I think they would probably tend to lean towards empiric steroid trial but we will cross that road when get there we certainly cannot start steroids until we have at least tried to rule out atypical infections.  The other big differential here that we have not discussed would be drugs he is on amiodarone and amiodarone can cause interstitial disease so can Casodex.  His amiodarone dose is tiny and  the ILD tends to be more dose related .  If Casodex or immune related steroids would be the treatment of choice and withdrawing drug.  2. Bilateral pleural effusions looks like there has been some improvement in these with diuresis.  3. Acute hypoxic respiratory failure despite treatment of his CHF he has had worsening oxygenation.  I think this is due to the infiltrates they do appear to be progressing some and if we get pushed and t-spot is negative we could jump to empiric steroids I guess.  4. Acute on chronic combined heart failure per nephrology and cardiology looks like patient has been diuresing.  5. Acute kidney injury chronic kidney disease/cardiorenal syndrome nephrology following  6. Sick sinus syndrome and paroxysmal atrial fibrillation he is on amiodarone we certainly have to consider amiodarone in the differential of his interstitial disease he is on a very tiny dose..  He is anticoagulated with Eliquis.  Has permanent pacemaker.  7. Pulmonary hypertension mild by echocardiogram RV dilatation  8. History of mitral valve disease status post  mitral valve repair valve appears to be functioning well by echocardiogram  9. Hyponatremia nephrology following and managing  10. Anemia mild  11. Mildly elevated transaminases/hepatitis question etiology.      Awaiting AFB smears and/or T spot for next step either empiric steroids or bronchoscopy with biopsy.    Plan for disposition:    Martín Flowers Jr, MD  04/05/23  05:38 EDT    Time:

## 2023-04-05 NOTE — CONSULTS
Consult notification for TB rule out. Patient appropriately placed in Airborne precautions in Airborne room. Engineering notified to check negative pressure now and work order placed for daily checks until notified to discontinue. Patient will need to have AFB sputum x3 collected at least 8 hours apart with one AFB sputum being early morning collection. Patient may not be removed from Airborne precautions until reviewed by Infection Control, discussed with RN today. Thank you for notifying us of this patient.

## 2023-04-06 LAB
ALBUMIN SERPL-MCNC: 3 G/DL (ref 3.5–5.2)
ALBUMIN/GLOB SERPL: 0.7 G/DL
ALP SERPL-CCNC: 227 U/L (ref 39–117)
ALT SERPL W P-5'-P-CCNC: 60 U/L (ref 1–41)
ANION GAP SERPL CALCULATED.3IONS-SCNC: 13.5 MMOL/L (ref 5–15)
AST SERPL-CCNC: 101 U/L (ref 1–40)
BASOPHILS # BLD AUTO: 0.07 10*3/MM3 (ref 0–0.2)
BASOPHILS NFR BLD AUTO: 0.9 % (ref 0–1.5)
BILIRUB SERPL-MCNC: 0.8 MG/DL (ref 0–1.2)
BUN SERPL-MCNC: 32 MG/DL (ref 8–23)
BUN/CREAT SERPL: 23.5 (ref 7–25)
CALCIUM SPEC-SCNC: 8.4 MG/DL (ref 8.2–9.6)
CHLORIDE SERPL-SCNC: 94 MMOL/L (ref 98–107)
CO2 SERPL-SCNC: 26.5 MMOL/L (ref 22–29)
CREAT SERPL-MCNC: 1.36 MG/DL (ref 0.76–1.27)
DEPRECATED RDW RBC AUTO: 47.1 FL (ref 37–54)
EGFRCR SERPLBLD CKD-EPI 2021: 48.5 ML/MIN/1.73
EOSINOPHIL # BLD AUTO: 0.32 10*3/MM3 (ref 0–0.4)
EOSINOPHIL NFR BLD AUTO: 4.3 % (ref 0.3–6.2)
ERYTHROCYTE [DISTWIDTH] IN BLOOD BY AUTOMATED COUNT: 14.1 % (ref 12.3–15.4)
GLOBULIN UR ELPH-MCNC: 4.3 GM/DL
GLUCOSE SERPL-MCNC: 111 MG/DL (ref 65–99)
HCT VFR BLD AUTO: 32.2 % (ref 37.5–51)
HGB BLD-MCNC: 11.2 G/DL (ref 13–17.7)
IMM GRANULOCYTES # BLD AUTO: 0.07 10*3/MM3 (ref 0–0.05)
IMM GRANULOCYTES NFR BLD AUTO: 0.9 % (ref 0–0.5)
LYMPHOCYTES # BLD AUTO: 1.43 10*3/MM3 (ref 0.7–3.1)
LYMPHOCYTES NFR BLD AUTO: 19.3 % (ref 19.6–45.3)
MAGNESIUM SERPL-MCNC: 2.5 MG/DL (ref 1.7–2.3)
MCH RBC QN AUTO: 32.6 PG (ref 26.6–33)
MCHC RBC AUTO-ENTMCNC: 34.8 G/DL (ref 31.5–35.7)
MCV RBC AUTO: 93.6 FL (ref 79–97)
MONOCYTES # BLD AUTO: 0.78 10*3/MM3 (ref 0.1–0.9)
MONOCYTES NFR BLD AUTO: 10.5 % (ref 5–12)
NEUTROPHILS NFR BLD AUTO: 4.74 10*3/MM3 (ref 1.7–7)
NEUTROPHILS NFR BLD AUTO: 64.1 % (ref 42.7–76)
NRBC BLD AUTO-RTO: 0.1 /100 WBC (ref 0–0.2)
PHOSPHATE SERPL-MCNC: 2.7 MG/DL (ref 2.5–4.5)
PLATELET # BLD AUTO: 162 10*3/MM3 (ref 140–450)
PMV BLD AUTO: 10.1 FL (ref 6–12)
POTASSIUM SERPL-SCNC: 3.8 MMOL/L (ref 3.5–5.2)
PROT SERPL-MCNC: 7.3 G/DL (ref 6–8.5)
RBC # BLD AUTO: 3.44 10*6/MM3 (ref 4.14–5.8)
SODIUM SERPL-SCNC: 134 MMOL/L (ref 136–145)
URATE SERPL-MCNC: 5.9 MG/DL (ref 3.4–7)
WBC NRBC COR # BLD: 7.41 10*3/MM3 (ref 3.4–10.8)

## 2023-04-06 PROCEDURE — 87556 M.TUBERCULO DNA AMP PROBE: CPT | Performed by: INTERNAL MEDICINE

## 2023-04-06 PROCEDURE — 99232 SBSQ HOSP IP/OBS MODERATE 35: CPT | Performed by: INTERNAL MEDICINE

## 2023-04-06 PROCEDURE — 84100 ASSAY OF PHOSPHORUS: CPT | Performed by: INTERNAL MEDICINE

## 2023-04-06 PROCEDURE — 83735 ASSAY OF MAGNESIUM: CPT | Performed by: INTERNAL MEDICINE

## 2023-04-06 PROCEDURE — 80053 COMPREHEN METABOLIC PANEL: CPT | Performed by: STUDENT IN AN ORGANIZED HEALTH CARE EDUCATION/TRAINING PROGRAM

## 2023-04-06 PROCEDURE — 85025 COMPLETE CBC W/AUTO DIFF WBC: CPT | Performed by: INTERNAL MEDICINE

## 2023-04-06 PROCEDURE — 84550 ASSAY OF BLOOD/URIC ACID: CPT | Performed by: INTERNAL MEDICINE

## 2023-04-06 PROCEDURE — 87206 SMEAR FLUORESCENT/ACID STAI: CPT | Performed by: INTERNAL MEDICINE

## 2023-04-06 PROCEDURE — 97530 THERAPEUTIC ACTIVITIES: CPT

## 2023-04-06 PROCEDURE — 87116 MYCOBACTERIA CULTURE: CPT | Performed by: INTERNAL MEDICINE

## 2023-04-06 PROCEDURE — 92610 EVALUATE SWALLOWING FUNCTION: CPT

## 2023-04-06 PROCEDURE — 87798 DETECT AGENT NOS DNA AMP: CPT | Performed by: INTERNAL MEDICINE

## 2023-04-06 PROCEDURE — 97116 GAIT TRAINING THERAPY: CPT

## 2023-04-06 RX ADMIN — BICALUTAMIDE 50 MG: 50 TABLET ORAL at 08:08

## 2023-04-06 RX ADMIN — APIXABAN 5 MG: 5 TABLET, FILM COATED ORAL at 08:09

## 2023-04-06 RX ADMIN — MULTIPLE VITAMINS W/ MINERALS TAB 1 TABLET: TAB at 08:08

## 2023-04-06 RX ADMIN — LEVOTHYROXINE SODIUM 88 MCG: 0.09 TABLET ORAL at 08:08

## 2023-04-06 RX ADMIN — TORSEMIDE 40 MG: 20 TABLET ORAL at 08:09

## 2023-04-06 RX ADMIN — ROSUVASTATIN CALCIUM 10 MG: 10 TABLET, FILM COATED ORAL at 08:09

## 2023-04-06 RX ADMIN — FLUTICASONE PROPIONATE 2 SPRAY: 50 SPRAY, METERED NASAL at 08:10

## 2023-04-06 RX ADMIN — LATANOPROST 1 DROP: 50 SOLUTION OPHTHALMIC at 20:35

## 2023-04-06 RX ADMIN — APIXABAN 5 MG: 5 TABLET, FILM COATED ORAL at 20:29

## 2023-04-06 NOTE — PLAN OF CARE
Goal Outcome Evaluation:  Plan of Care Reviewed With: patient, daughter        Progress: improving  Outcome Evaluation: gave torsemide today per nephro. repeat chest xray tomorrow morning. video swallow tomorrow. speech eval patient and changed diet to soft to chew, meds whole in applesauce. pt up to chair with ax1. pt on 3L right now to keep O2 sats >90%. code status changed to DNR. encourage pt to eat and drink.     Third sputum sample sent down today, waiting to result to decide bronch vs steroids per pulm note.

## 2023-04-06 NOTE — PROGRESS NOTES
[facility] Progress Note      Patient Name: Bridger Elias  : 1930  MRN: [id number]  Primary Care Physician:  Alma Cat MD  Date of admission: 3/30/2023    Subjective     Interval History:   Follow-up hyponatremia and CKD    The patient is feeling better, denies chest pain or shortness of air, no orthopnea or PND, no nausea or vomiting, no abdominal pain, no dysuria or gross hematuria, his blood pressure remains on the low side    Review of Systems:   As noted above    Objective     Vitals:   Temp:  [98.4 °F (36.9 °C)-99.2 °F (37.3 °C)] 98.5 °F (36.9 °C)  Heart Rate:  [74-75] 74  Resp:  [16-18] 16  BP: ()/(52-60) 91/52  Flow (L/min):  [3.5-4.5] 4    Intake/Output Summary (Last 24 hours) at 2023 0949  Last data filed at 2023 0412  Gross per 24 hour   Intake 760 ml   Output 925 ml   Net -165 ml       Physical Exam:    General Appearance: Awake, alert, very frail and chronically ill  Skin: warm and dry  HEENT: oral mucosa normal, nonicteric sclera  Neck: Minimal JVD  Lungs: Bilateral rhonchi, breathing effort not labored  Heart: Irregularly irregular, no rub  Abdomen: soft, nontender, nondistended, normoactive bowels  : no palpable bladder  Extremities: No left ankle edema no edema on the right, no hip or thigh edema  Neuro: normal speech and mental status, hard of hearing    Scheduled Meds:     apixaban, 5 mg, Oral, Q12H  bicalutamide, 50 mg, Oral, Daily  fluticasone, 2 spray, Each Nare, Daily  latanoprost, 1 drop, Both Eyes, Daily  levothyroxine, 88 mcg, Oral, Daily  melatonin, 2 mg, Oral, Nightly  multivitamin with minerals, 1 tablet, Oral, Daily  rosuvastatin, 10 mg, Oral, Daily  torsemide, 40 mg, Oral, Daily      IV Meds:        Results Reviewed:   I have personally reviewed the results from the time of this admission to 2023 09:49 EDT     Results from last 7 days   Lab Units 23  0350 23  0506 23  2048 23  0334   SODIUM  mmol/L 134* 130* 136 133*   POTASSIUM mmol/L 3.8 3.6 3.8 4.0   CHLORIDE mmol/L 94* 95* 96* 97*   CO2 mmol/L 26.5 27.1 26.8 25.0   BUN mg/dL 32* 30* 29* 27*   CREATININE mg/dL 1.36* 1.50* 1.34* 1.45*   CALCIUM mg/dL 8.4 8.4 8.6 8.5   BILIRUBIN mg/dL 0.8 0.9  --  0.8   ALK PHOS U/L 227* 215*  --  247*   ALT (SGPT) U/L 60* 41  --  44*   AST (SGOT) U/L 101* 56*  --  64*   GLUCOSE mg/dL 111* 105* 129* 110*       Estimated Creatinine Clearance: 33.4 mL/min (A) (by C-G formula based on SCr of 1.36 mg/dL (H)).    Results from last 7 days   Lab Units 04/06/23  0350 04/04/23 2048 04/04/23 0334 04/03/23  0427   MAGNESIUM mg/dL 2.5* 2.4* 2.4* 2.2   PHOSPHORUS mg/dL 2.7 2.9  --  2.1*       Results from last 7 days   Lab Units 04/06/23  0350 04/04/23 2048 04/04/23  0334 04/03/23  0427 04/02/23  1215   URIC ACID mg/dL 5.9 5.6 5.0 4.6 4.4       Results from last 7 days   Lab Units 04/06/23  0350 04/05/23  0506 04/04/23 2048 04/04/23 0334 04/03/23  0427   WBC 10*3/mm3 7.41 9.78 9.55 10.90* 10.61   HEMOGLOBIN g/dL 11.2* 11.8* 12.3* 11.7* 11.9*   PLATELETS 10*3/mm3 162 154 163 177 181             Assessment / Plan     ASSESSMENT:  -Acute kidney injury on chronic kidney disease, cardiorenal syndrome, creatinine today is 1.36, very stable and sodium up to 134  -Hyponatremia associated with acute on chronic diastolic dysfunction, sodium today is 134  -CKD stage III secondary to nephrosclerosis and advanced age  -History of hypertension but the patient is hypotensive at present  -A-fib  -Chronically anticoagulated  -Coronary artery disease  -Valvular heart disease  -Frailty    PLAN:  -Continue the same treatment and oral torsemide  -I discussed the case with the patient and his daughter at the bedside  -Surveillance labs.    Thank you for involving us in the care of Bridger Elias.  Please feel free to call with any questions.    Naeem Hernandez MD  04/06/23  09:49 EDT    [facility]  [phone]    Please note that portions of this note were completed with a voice recognition program.

## 2023-04-06 NOTE — CASE MANAGEMENT/SOCIAL WORK
Continued Stay Note  [facility]     Patient Name: Bridger Elias  MRN: [id number]  Today's Date: 4/6/2023    Admit Date: 3/30/2023    Plan: Palliative consult pending   Discharge Plan     Row Name 04/06/23 1308       Plan    Plan Palliative consult pending    Plan Comments CCP left [facility] for [facility] to find out when [place] would have a bed available. Palliative consult is pending. CCP to follow up post palliative meeting for updated goals of care. Patient currently on 4.5L oxygen. MAGDA, CSW               Discharge Codes    No documentation.               Expected Discharge Date and Time     Expected Discharge Date Expected Discharge Time    Apr 10, 2023             LUIZ Husain

## 2023-04-06 NOTE — PROGRESS NOTES
LOS: 6 days   Patient Care Team:  Alma Cat MD as PCP - General (Family Medicine)  Maurice Cook MD as Consulting Physician (Radiation Oncology)    Subjective     Patient is a 89 y.o. male.  Asked to see for persistent O2 requirements and bilateral pleural effusions.   Patient notes he actually feels better today just a little more energy not short of breath no chest pain he did eat a little bit better yesterday.  Review of Systems:          Objective     Vital Signs  Vital Sign Min/Max for last 24 hours  Temp  Min: 98.4 °F (36.9 °C)  Max: 99.2 °F (37.3 °C)   BP  Min: 94/52  Max: 111/60   Pulse  Min: 74  Max: 75   Resp  Min: 16  Max: 18   SpO2  Min: 94 %  Max: 97 %   Flow (L/min)  Min: 3.5  Max: 4.5   Weight  Min: 68.9 kg (151 lb 14.4 oz)  Max: 69.6 kg (153 lb 7 oz)        Ventilator/Non-Invasive Ventilation Settings (From admission, onward)    None                       Body mass index is 24.03 kg/m².  I/O last 3 completed shifts:  In: 820 [P.O.:820]  Out: 2325 [Urine:2325]  I/O this shift:  In: 120 [P.O.:120]  Out: 300 [Urine:300]        Physical Exam:    General Appearance: Well-developed elderly white male resting in bed he does not look in acute distress he is requiring 4 L nasal cannula O2 with oxygen saturations of about 93%  Eyes: Conjunctiva are clear and anicteric pupils look equal mucous membranes are moist no erythema no exudates  ENT: Mucous membranes are moist Mallampati 1 airway nasal septum midline  Neck: No palpable adenopathy or thyromegaly no visible jugular venous distention and trachea midline  Lungs: Clear I do not hear wheezes rales or rhonchi amazing given his x-ray and CT scan  Cardiac: Irregularly irregular no definite murmur  Abdomen: Soft no palpable hepatosplenomegaly or masses  : Not examined  Musculoskeletal: He has some mild to moderate thoracic kyphosis  Skin: Warm and dry no jaundice no petechiae  Neuro: He is hard of hearing but he is alert and oriented he is  cooperative  Extremities/P Vascular: No clubbing no cyanosis no edema palpable radial and dorsalis pedis pulses  MSE: Pleasant gentleman he is appropriate     Labs:  Results from last 7 days   Lab Units 04/06/23  0350 04/05/23  0506 04/04/23 2048 04/04/23  0334 04/03/23  0427 04/02/23  0418 04/01/23  0438 03/31/23  0530   GLUCOSE mg/dL 111* 105* 129* 110* 108* 130* 108* 104*   SODIUM mmol/L 134* 130* 136 133* 132* 127* 131* 132*   POTASSIUM mmol/L 3.8 3.6 3.8 4.0 4.6 3.2* 4.0 4.2   MAGNESIUM mg/dL 2.5*  --  2.4* 2.4* 2.2 2.3 2.1  --    CO2 mmol/L 26.5 27.1 26.8 25.0 26.7 23.1 23.8 20.6*   CHLORIDE mmol/L 94* 95* 96* 97* 96* 92* 97* 102   ANION GAP mmol/L 13.5 7.9 13.2 11.0 9.3 11.9 10.2 9.4   CREATININE mg/dL 1.36* 1.50* 1.34* 1.45* 1.37* 1.14 1.22 1.16   BUN mg/dL 32* 30* 29* 27* 24* 23 21 24*   BUN / CREAT RATIO  23.5 20.0 21.6 18.6 17.5 20.2 17.2 20.7   CALCIUM mg/dL 8.4 8.4 8.6 8.5 8.8 8.3 8.4 8.4   ALK PHOS U/L 227* 215*  --  247* 251* 219* 220* 197*   TOTAL PROTEIN g/dL 7.3 7.3  --  7.2 7.0 6.6 7.0 6.2   ALT (SGPT) U/L 60* 41  --  44* 45* 40 37 27   AST (SGOT) U/L 101* 56*  --  64* 65* 53* 55* 49*   BILIRUBIN mg/dL 0.8 0.9  --  0.8 0.7 0.8 1.0 0.8   ALBUMIN g/dL 3.0* 3.0* 3.4* 2.9* 3.1* 2.9* 2.9* 3.2*   GLOBULIN gm/dL 4.3 4.3  --  4.3 3.9 3.7 4.1 3.0     Estimated Creatinine Clearance: 33.4 mL/min (A) (by C-G formula based on SCr of 1.36 mg/dL (H)).      Results from last 7 days   Lab Units 04/06/23  0350 04/05/23  0506 04/04/23  2048 04/04/23  0334 04/03/23  0427 04/02/23  0418 04/01/23  0438 03/31/23  0530 03/30/23 2010   WBC 10*3/mm3 7.41 9.78 9.55 10.90* 10.61 8.75 9.28   < > 10.04   RBC 10*6/mm3 3.44* 3.52* 3.69* 3.60* 3.49* 3.42* 3.39*   < > 3.59*   HEMOGLOBIN g/dL 11.2* 11.8* 12.3* 11.7* 11.9* 11.1* 11.2*   < > 12.2*   HEMATOCRIT % 32.2* 33.2* 35.1* 34.1* 32.4* 32.1* 32.4*   < > 34.3*   MCV fL 93.6 94.3 95.1 94.7 92.8 93.9 95.6   < > 95.5   MCH pg 32.6 33.5* 33.3* 32.5 34.1* 32.5 33.0   < > 34.0*    MCHC g/dL 34.8 35.5 35.0 34.3 36.7* 34.6 34.6   < > 35.6   RDW % 14.1 14.1 14.5 14.3 14.0 14.0 14.5   < > 14.7   RDW-SD fl 47.1 48.3 50.8 49.2 47.1 47.6 50.9   < > 50.9   MPV fL 10.1 10.0 10.1 10.3 10.5 10.4 10.3   < > 10.0   PLATELETS 10*3/mm3 162 154 163 177 181 171 180   < > 165   NEUTROPHIL % % 64.1  --  71.6  --  70.2  --   --   --  68.9   LYMPHOCYTE % % 19.3*  --  10.4*  --  12.1*  --   --   --  11.5*   MONOCYTES % % 10.5  --  12.6*  --  11.8  --   --   --  13.8*   EOSINOPHIL % % 4.3  --  3.4  --  3.8  --   --   --  4.0   BASOPHIL % % 0.9  --  0.7  --  0.8  --   --   --  0.8   IMM GRAN % % 0.9*  --   --   --  1.3*  --   --   --  1.0*   NEUTROS ABS 10*3/mm3 4.74  --  6.85  --  7.45*  --   --   --  6.92   LYMPHS ABS 10*3/mm3 1.43  --  0.99  --  1.28  --   --   --  1.15   MONOS ABS 10*3/mm3 0.78  --  1.20*  --  1.25*  --   --   --  1.39*   EOS ABS 10*3/mm3 0.32  --  0.32  --  0.40  --   --   --  0.40   BASOS ABS 10*3/mm3 0.07  --  0.07  --  0.09  --   --   --  0.08   IMMATURE GRANS (ABS) 10*3/mm3 0.07*  --   --   --  0.14*  --   --   --  0.10*   NRBC /100 WBC 0.1  --   --   --  0.2  --   --   --  0.2    < > = values in this interval not displayed.     Results from last 7 days   Lab Units 03/31/23  0854   PH, ARTERIAL pH units 7.486*   PO2 ART mm Hg 68.1*   PCO2, ARTERIAL mm Hg 27.0*   HCO3 ART mmol/L 20.4*     Results from last 7 days   Lab Units 03/30/23 2207 03/30/23 2010   HSTROP T ng/L 11 12  12     Results from last 7 days   Lab Units 03/30/23 2010   PROBNP pg/mL 2,683.0*     Results from last 7 days   Lab Units 03/31/23 0530   TSH uIU/mL 2.540     Results from last 7 days   Lab Units 04/04/23 0334 03/30/23 2207 03/30/23 2010   LACTATE mmol/L  --  1.6  --    PROCALCITONIN ng/mL 0.14  --  0.08         Microbiology Results (last 10 days)     Procedure Component Value - Date/Time    Respiratory Culture - Sputum, Cough [[id number]] Collected: 04/05/23 0811    Lab Status: Preliminary result Specimen:  Sputum from Cough Updated: 04/05/23 1509     Gram Stain Many (4+) WBCs per low power field      Rare (1+) Epithelial cells per low power field      Few (2+) Gram positive cocci      Rare (1+) Gram positive bacilli      Rare (1+) Gram negative bacilli    Legionella Antigen, Urine - Urine, Urine, Clean Catch [[id number]]  (Normal) Collected: 04/04/23 2300    Lab Status: Final result Specimen: Urine, Clean Catch Updated: 04/04/23 2343     LEGIONELLA ANTIGEN, URINE Negative    Legionella Antigen, Urine - Urine, Urine, Clean Catch [[id number]]  (Normal) Collected: 03/31/23 0819    Lab Status: Final result Specimen: Urine, Clean Catch Updated: 03/31/23 0922     LEGIONELLA ANTIGEN, URINE Negative    S. Pneumo Ag Urine or CSF - Urine, Urine, Clean Catch [[id number]]  (Normal) Collected: 03/31/23 0819    Lab Status: Final result Specimen: Urine, Clean Catch Updated: 03/31/23 0922     Strep Pneumo Ag Negative    Blood Culture - Blood, Arm, Left [[id number]]  (Normal) Collected: 03/30/23 2229    Lab Status: Final result Specimen: Blood from Arm, Left Updated: 04/04/23 2246     Blood Culture No growth at 5 days    Blood Culture - Blood, Arm, Left [[id number]]  (Normal) Collected: 03/30/23 2204    Lab Status: Final result Specimen: Blood from Arm, Left Updated: 04/04/23 2231     Blood Culture No growth at 5 days    Respiratory Panel PCR w/COVID-19(SARS-CoV-2) [facility] In-House, NP Swab in UTM/VTM, 3-4 HR TAT - Swab, Nasopharynx [[id number]]  (Normal) Collected: 03/30/23 2004    Lab Status: Final result Specimen: Swab from Nasopharynx Updated: 03/30/23 2336     ADENOVIRUS, PCR Not Detected     Coronavirus 229E Not Detected     Coronavirus HKU1 Not Detected     Coronavirus NL63 Not Detected     Coronavirus OC43 Not Detected     COVID19 Not Detected     Human Metapneumovirus Not Detected     Human Rhinovirus/Enterovirus Not Detected     Influenza A PCR Not Detected     Influenza B PCR Not Detected     Parainfluenza  Virus 1 Not Detected     Parainfluenza Virus 2 Not Detected     Parainfluenza Virus 3 Not Detected     Parainfluenza Virus 4 Not Detected     RSV, PCR Not Detected     Bordetella pertussis pcr Not Detected     Bordetella parapertussis PCR Not Detected     Chlamydophila pneumoniae PCR Not Detected     Mycoplasma pneumo by PCR Not Detected    Narrative:      In the setting of a positive respiratory panel with a viral infection PLUS a negative procalcitonin without other underlying concern for bacterial infection, consider observing off antibiotics or discontinuation of antibiotics and continue supportive care. If the respiratory panel is positive for atypical bacterial infection (Bordetella pertussis, Chlamydophila pneumoniae, or Mycoplasma pneumoniae), consider antibiotic de-escalation to target atypical bacterial infection.              apixaban, 5 mg, Oral, Q12H  bicalutamide, 50 mg, Oral, Daily  fluticasone, 2 spray, Each Nare, Daily  latanoprost, 1 drop, Both Eyes, Daily  levothyroxine, 88 mcg, Oral, Daily  melatonin, 2 mg, Oral, Nightly  multivitamin with minerals, 1 tablet, Oral, Daily  rosuvastatin, 10 mg, Oral, Daily  torsemide, 40 mg, Oral, Daily           Diagnostics:  Adult Transthoracic Echo Complete W/ Cont if Necessary Per Protocol    Result Date: 4/1/2023  •  Left ventricular ejection fraction appears to be 41 - 45%. •  Systolic and diastolic flattening of the interventricular septum consistent with right ventricle pressure and volume overload. •  The right ventricular cavity is severely dilated. Normal right ventricular systolic function noted. •  The left atrial cavity is moderately dilated. •  The right atrial cavity is severely dilated. •  Saline test results are negative. •  Trace mitral valve regurgitation is present. The mitral valve mean gradient is 1.65 mmHg. There is a mitral valve ring present. •  Severe tricuspid valve regurgitation is present. •  Calculated right ventricular systolic  pressure from tricuspid regurgitation is 38.0 mmHg. •  There is a trivial pericardial effusion.     CT Chest Without Contrast Diagnostic    Result Date: 3/31/2023  CT CHEST WITHOUT CONTRAST  HISTORY: Dyspnea.  TECHNIQUE: Noncontrast chest CT is provided and correlated with x-ray from yesterday.  FINDINGS: Cardiac pacing hardware is observed along with hardware at the mitral valve, dilated cardiac chambers, and simple appearing, posteriorly layering pleural effusions bilaterally. Those measure under 3 cm AP thickness in each. There is an enlarged precarinal lymph node measuring about 12 mm short axis. No other significant appearing lymphadenopathy. Abnormal opacity in the left upper lobe and posterior left lower lobe with groundglass density throughout the right upper lobe favored represent pneumonia. No obstructing airway lesion.  Visualized upper abdominal structures appear normal. Degenerative change in the spine.      Cardiac hardware with changes of prior sternotomy and mitral valve repair or replacement. There our bilateral pleural effusions with patchy opacity in the lungs bilaterally which appears similar as on the x-ray from yesterday and is favored represent pneumonia.  Radiation dose reduction techniques were utilized, including automated exposure control and exposure modulation based on body size.  This report was finalized on 3/31/2023 9:06 PM by Dr. Maurice Alejandro M.D.      XR Chest 1 View    Result Date: 4/3/2023  XR CHEST 1 VW-  Clinical: CHF, bilateral pleural effusions  COMPARISON examination 03/31/2013 CT chest  FINDINGS: Unchanged left-sided pleural effusion, right-sided pleural effusion appears slightly improved within the interim. The cardiomediastinal silhouette is stable. Right base infiltrate/atelectasis more pronounced compared to the previous examination. Right and left upper lobe infiltrates similar to the previous examination. The remainder of examination is unremarkable.  This report  was finalized on 4/3/2023 6:22 AM by Dr. Segun Jara M.D.      XR Chest 1 View    Result Date: 3/30/2023  PORTABLE CHEST X-RAY  HISTORY: Shortness of breath.  TECHNIQUE: Portable chest x-ray correlated with chest x-ray 04/14/2022.  FINDINGS: Sternal wires with cardiac valve hardware and a cardiac pacing device are again observed. The cardiac silhouette is enlarged. Patchy infiltrate in the upper lobes is observed bilaterally, more dense on the left than the right. There is also some density in the left lung base and mildly at the periphery of the right lung base. There also appears to be small pleural effusions blunting the costophrenic angles.      Bilateral pneumonia.  This report was finalized on 3/30/2023 8:50 PM by Dr. Maurice Alejandro M.D.      XR CHEST 1 VW PORTABLE    Result Date: 3/10/2023  EXAM: CR Chest 1 Vw Portable NUMBER OF IMAGES:  1 INDICATION: Shortness of breath Technique: AP view of the chest obtained portably on 3/10/2023 10:31 AM Comparison:  Comparison is made to frontal radiograph the chest dated 09/04/2022. Findings: The cardiomediastinal silhouette is enlarged. There are calcified lesions of the thoracic aorta. There are bilateral pleural effusions, right larger than left, with associated patchy bilateral lower lobe airspace disease. There is no pneumothorax. The visualized osseous structures demonstrate degenerative changes. Intact midline sternotomy wires are seen. Lines and Tubes: Left-sided dual-lead cardiac pacer is in place, with leads terminating in satisfactory position. Impression:  Bilateral pleural effusions, right larger than left, with associated patchy bilateral lower lobe airspace disease. Dictated by: Paco Shah MD Signed by Paco Shah MD on 3/10/2023 10:46 AM ##### Final ##### Dictated by:    PACO SHAH MD-RAD Dictated DT/TM: 03/10/2023 10:46 am Interpreted and electronically signed by:  PACO SHAH MD-RAD Signed DT/TM:  03/10/2023 10:46 am    Results for orders  placed during the hospital encounter of 03/30/23    Adult Transthoracic Echo Complete W/ Cont if Necessary Per Protocol    Interpretation Summary  •  Left ventricular ejection fraction appears to be 41 - 45%.  •  Systolic and diastolic flattening of the interventricular septum consistent with right ventricle pressure and volume overload.  •  The right ventricular cavity is severely dilated. Normal right ventricular systolic function noted.  •  The left atrial cavity is moderately dilated.  •  The right atrial cavity is severely dilated.  •  Saline test results are negative.  •  Trace mitral valve regurgitation is present. The mitral valve mean gradient is 1.65 mmHg. There is a mitral valve ring present.  •  Severe tricuspid valve regurgitation is present.  •  Calculated right ventricular systolic pressure from tricuspid regurgitation is 38.0 mmHg.  •  There is a trivial pericardial effusion.          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 Bilateral pulmonary infiltrates versus edema CT scan certainly looks more infiltrative than pulmonary edema white count procalcitonin initially and I  have repeated it today are negative no leukocytosis no fevers or chills.  This speaks against at least typical infections.  Do not think it entirely excludes atypical infections with his anorexia etc. which could also be explained by heart failure but he is not improved with diuresis we have to consider atypical infection such as TB atypical Mycobacterium fungal less likely.  Viral infection less likely given the respiratory panel being negative.  This of course could be noninfectious inflammation could be a pneumonitis from aspiration although we have no history of aspiration.  He could have a noninfectious process such as cryptogenic organizing pneumonia or this could be an another ILD.  Lymphangitic tumor would seem to be less likely.  I think we have to rule out TB sputum's for AFB and a T spot if that is negative the decision is whether we do bronchoscopy with washings and biopsy or whether we try some empiric steroids.  He is 89 with heart disease I am not super anxious to put him under anesthesia but it could probably be done.  They might have some trouble with hypoxia as well since he is on 6 L.  I talked with he and family and caregivers I think they would probably tend to lean towards empiric steroid trial but we will cross that road when get there we certainly cannot start steroids until we have at least tried to rule out atypical infections.  The other big differential here that we have not discussed would be drugs he is on amiodarone and amiodarone can cause interstitial disease so can Casodex.  His amiodarone dose is tiny and  the ILD tends to be more dose related .  If Casodex or immune related steroids would be the treatment of choice and withdrawing drug.  Amiodarone stopped yesterday  2. Bilateral pleural effusions looks like there has been some improvement in these with diuresis.  3. Acute hypoxic respiratory failure despite treatment of his CHF he has had worsening oxygenation.  I think this is  due to the infiltrates they do appear to be progressing some and if we get pushed and t-spot is negative we could jump to empiric steroids I guess.  4. Acute on chronic combined heart failure per nephrology and cardiology looks like patient has been diuresing.  5. Acute kidney injury chronic kidney disease/cardiorenal syndrome nephrology following  6. Sick sinus syndrome and paroxysmal atrial fibrillation he is on amiodarone we certainly have to consider amiodarone in the differential of his interstitial disease he is on a very tiny dose..  He is anticoagulated with Eliquis.  Has permanent pacemaker.  7. Pulmonary hypertension mild by echocardiogram RV dilatation  8. History of mitral valve disease status post mitral valve repair valve appears to be functioning well by echocardiogram  9. Hyponatremia nephrology following and managing  10. Anemia mild  11. Mildly elevated transaminases/hepatitis question etiology.      Awaiting AFB smears and/or T spot for next step either empiric steroids or bronchoscopy with biopsy.  We have been discussing this option probably leaning towards just empiric steroids if AFB smears are negative.  I am going to get a follow-up x-ray in the morning to see what infiltrates look like since his oxygenation has improved a little bit.    Plan for disposition:    Martín Flowers Jr, MD  04/06/23  06:04 EDT    Time:

## 2023-04-06 NOTE — PROGRESS NOTES
LOS: 6 days   Patient Care Team:  Alma Cat MD as PCP - General (Family Medicine)  Maurice Cook MD as Consulting Physician (Radiation Oncology)    Chief Complaint: Follow-up pneumonia, acute on chronic combined CHF, paroxysmal atrial fibrillation, pacemaker.    Interval History: He is still very weak and fatigued.  States he is breathing okay.  No chest pain.    Vital Signs:  Temp:  [97.2 °F (36.2 °C)-98.5 °F (36.9 °C)] 97.2 °F (36.2 °C)  Heart Rate:  [74-75] 74  Resp:  [16-18] 16  BP: ()/(52-65) 108/65    Intake/Output Summary (Last 24 hours) at 4/6/2023 1500  Last data filed at 4/6/2023 1151  Gross per 24 hour   Intake 580 ml   Output 1000 ml   Net -420 ml       Physical Exam:   General Appearance:    No acute distress, alert and oriented x4, appears ill.   Lungs:     Clear to auscultation bilaterally.    Heart:    Regular rhythm and normal rate.  No murmurs, gallops, or       rubs.   Abdomen:     Soft, nontender, nondistended.    Extremities:   No clubbing cyanosis or edema.     Results Review:    Results from last 7 days   Lab Units 04/06/23  0350   SODIUM mmol/L 134*   POTASSIUM mmol/L 3.8   CHLORIDE mmol/L 94*   CO2 mmol/L 26.5   BUN mg/dL 32*   CREATININE mg/dL 1.36*   GLUCOSE mg/dL 111*   CALCIUM mg/dL 8.4     Results from last 7 days   Lab Units 03/30/23 2207 03/30/23 2010   HSTROP T ng/L 11 12  12     Results from last 7 days   Lab Units 04/06/23  0350   WBC 10*3/mm3 7.41   HEMOGLOBIN g/dL 11.2*   HEMATOCRIT % 32.2*   PLATELETS 10*3/mm3 162             Results from last 7 days   Lab Units 04/06/23  0350   MAGNESIUM mg/dL 2.5*           I reviewed the patient's new clinical results.        Assessment:  1.  Acute hypoxic respiratory failure  2.  Bilateral pulmonary infiltrates  3.  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  Severe right ventricular enlargement with normal function  5.  Severe tricuspid regurgitation  6.  Status post mitral valve repair  7.  Paroxysmal atrial  fibrillation  8.  Sick sinus syndrome, status post pacemaker  9.  Hypertension  10.  Generalized weakness, multifactorial  11.  Deconditioning  12.  Hyponatremia    Plan:  -Again, unclear as to the etiology of the bilateral pulmonary infiltrates.  AFB stains are pending as is a TB spot test.  Questioning whether to proceed with steroids versus a bronchoscopy and biopsy.  I will defer this ultimately to Dr. Flowers and the pulmonology team.    -Agree with stopping the amiodarone in case this is contributing to lung disease.    -Diuretics per nephrology.  Currently on torsemide 40 mg p.o. daily.  He appears to be euvolemic.    -I reviewed his echocardiogram.  EF is about 40 to 45%.  The right ventricle is significantly enlarged with normal function, although there is septal flattening consistent with increased right ventricular pressure and volume overload.  There was also severe tricuspid regurgitation.  His mitral valve repair looked good.    -I am going to continue to hold his Coreg.  His blood pressure has been fairly low.    -Continue Eliquis.  No bleeding issues.    -Discussed with the patient and his daughter at bedside.    Osbaldo Bruce MD  04/06/23  15:00 EDT

## 2023-04-06 NOTE — PLAN OF CARE
"Goal Outcome Evaluation:  Plan of Care Reviewed With: patient        Progress: improving  Outcome Evaluation: Pt seen for PT treatment this PM. Pt needed a little more assistance with bed mobility and STS transfers today, Shaista. Pt reported of feeling fatigued. With encouragement, pt agreed to sit up in the chair. Pt able to ambulate 10ft with CGA, rwx. Pt needed cues for upright posture and some guidance with walker. Pt stated he \"felt good\" after getting to the chair and sitting up. Will continue to progress pt as able.  "

## 2023-04-06 NOTE — THERAPY TREATMENT NOTE
Patient Name: Bridger Elias  : 1930    MRN: [id number]                              Today's Date: 2023       Admit Date: 3/30/2023    Visit Dx:     ICD-10-CM ICD-9-CM   1. Community acquired pneumonia, unspecified laterality  J18.9 486     Patient Active Problem List   Diagnosis   • SCC (squamous cell carcinoma), face   • General weakness   • Pacemaker   • Abnormal gait   • Atrial fibrillation   • Chronic diastolic heart failure   • Coronary arteriosclerosis   • Dyslipidemia   • Glaucoma   • Mitral valve disorder   • Hypertension   • Hypertensive kidney disease, stage III   • Hypothyroidism   • Long term (current) use of anticoagulants   • Malignant neoplasm of prostate   • Osteoporosis   • Squamous cell carcinoma of skin of face   • Acute on chronic diastolic CHF (congestive heart failure)   • Stage 3a chronic kidney disease   • Personal history of radiation therapy   • Acute on chronic combined systolic and diastolic CHF (congestive heart failure)   • Cellulitis of right leg   • Chronic acquired lymphedema   • Contusion of face   • Contusion of forearm, left   • Fall   • Chronic systolic heart failure (CMS/HCC)   • Acute respiratory failure with hypoxia   • Hyponatremia   • Normocytic anemia   • Transaminitis     Past Medical History:   Diagnosis Date   • Acute on chronic diastolic CHF (congestive heart failure) 2022   • Atrial fibrillation 2022   • Coronary arteriosclerosis 7/3/2014    Formatting of this note might be different from the original. [facility] 16  IMPRESSION:   1. Right heart disease, hypertensive cardiac disease.   2. Status post mitral valve repair.   3. Anatomy: No hemodynamically significant disease. about 30-40% lesion of    the right coronary artery. Normal. Left coronary artery system.   • Dyslipidemia 2013   • Glaucoma 2022   • Hypertension 2013   • Hypertensive kidney disease, stage III 3/13/2017   • Hypothyroidism 3/19/2017   • Long term (current) use  of anticoagulants 12/5/2013    Formatting of this note might be different from the original. Mitral valve replacement and atrial fibrillation Assume a range of 2.5-3.5.   • Malignant neoplasm of prostate 12/5/2013    Formatting of this note might be different from the original. 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   • Stage 3b chronic kidney disease 4/14/2022     Past Surgical History:   Procedure Laterality Date   • PACEMAKER IMPLANTATION  2018      General Information     Row Name 04/06/23 1548          Physical Therapy Time and Intention    Document Type therapy note (daily note)  -     Mode of Treatment individual therapy;physical therapy  -     Row Name 04/06/23 1548          General Information    Existing Precautions/Restrictions fall  -     Row Name 04/06/23 1548          Cognition    Orientation Status (Cognition) oriented x 3  -     Row Name 04/06/23 1548          Safety Issues, Functional Mobility    Safety Issues Affecting Function (Mobility) judgment  -     Impairments Affecting Function (Mobility) endurance/activity tolerance;strength;range of motion (ROM);shortness of breath  -           User Key  (r) = Recorded By, (t) = Taken By, (c) = Cosigned By    Initials Name Provider Type     Noa Garcia PTA Physical Therapist Assistant               Mobility     Row Name 04/06/23 1548          Bed Mobility    Supine-Sit [place] (Bed Mobility) minimum assist (75% patient effort)  -     Sit-Supine [place] (Bed Mobility) not tested  -     Assistive Device (Bed Mobility) bed rails;head of bed elevated  -     Row Name 04/06/23 1548          Sit-Stand Transfer    Sit-Stand [place] (Transfers) minimum assist (75% patient effort)  -     Assistive Device  "(Sit-Stand Transfers) walker, front-wheeled  -EB     Row Name 04/06/23 1548          Gait/Stairs (Locomotion)    [place] Level (Gait) contact guard  -EB     Assistive Device (Gait) walker, front-wheeled  -EB     Distance in Feet (Gait) 10ft  -EB     Deviations/Abnormal Patterns (Gait) hussein decreased;gait speed decreased;stride length decreased  -EB     Bilateral Gait Deviations forward flexed posture  -EB     Comment, (Gait/Stairs) cues for upright posture. walker guidance needed due to small room.  Max encouragement for pt to sit up in the chair today. Once pt sat up in the chair, Pt stated \"feels good\"  -EB           User Key  (r) = Recorded By, (t) = Taken By, (c) = Cosigned By    Initials Name Provider Type    Noa Hancock PTA Physical Therapist Assistant               Obj/Interventions     Row Name 04/06/23 1546          Motor Skills    Therapeutic Exercise --  BLE: LAQs (X10)  -EB     Row Name 04/06/23 1549          Balance    Balance Assessment sitting static balance;sitting dynamic balance;standing static balance  -EB     Static Sitting Balance independent  -EB     Dynamic Sitting Balance supervision  -EB     Position, Sitting Balance sitting edge of bed  -EB     Static Standing Balance standby assist  -EB           User Key  (r) = Recorded By, (t) = Taken By, (c) = Cosigned By    Initials Name Provider Type    Noa Hancock PTA Physical Therapist Assistant               Goals/Plan    No documentation.                Clinical Impression     Row Name 04/06/23 3692          Plan of Care Review    Plan of Care Reviewed With patient  -EB     Progress improving  -EB     Outcome Evaluation Pt seen for PT treatment this PM. Pt needed a little more assistance with bed mobility and STS transfers today, Shaista. Pt reported of feeling fatigued. With encouragement, pt agreed to sit up in the chair. Pt able to ambulate 10ft with CGA, rwx. Pt needed cues for upright posture and some guidance with walker. Pt " "stated he \"felt good\" after getting to the chair and sitting up. Will continue to progress pt as able.  -EB     Row Name 04/06/23 1550          Therapy Assessment/Plan (PT)    Therapy Frequency (PT) 5 times/wk  -EB     Row Name 04/06/23 1550          Positioning and Restraints    Pre-Treatment Position in bed  -EB     Post Treatment Position chair  -EB     In Chair reclined;call light within reach;encouraged to call for assist;with family/caregiver  -EB           User Key  (r) = Recorded By, (t) = Taken By, (c) = Cosigned By    Initials Name Provider Type    Noa Hancock PTA Physical Therapist Assistant               Outcome Measures     Row Name 04/06/23 1551 04/06/23 0808       How much help from another person do you currently need...    Turning from your back to your side while in flat bed without using bedrails? 3  -EB 3  -CL    Moving from lying on back to sitting on the side of a flat bed without bedrails? 3  -EB 3  -CL    Moving to and from a bed to a chair (including a wheelchair)? 3  -EB 3  -CL    Standing up from a chair using your arms (e.g., wheelchair, bedside chair)? 3  -EB 3  -CL    Climbing 3-5 steps with a railing? 2  -EB 2  -CL    To walk in hospital room? 3  -EB 3  -CL    AM-PAC 6 Clicks Score (PT) 17  -EB 17  -CL    Highest level of mobility 5 --> Static standing  -EB 5 --> Static standing  -CL          User Key  (r) = Recorded By, (t) = Taken By, (c) = Cosigned By    Initials Name Provider Type    Noa Hancock PTA Physical Therapist Assistant    CL Shelia Thomas RN Registered Nurse                             Physical Therapy Education     Title: PT OT SLP Therapies (Done)     Topic: Physical Therapy (Done)     Point: Mobility training (Done)     Learning Progress Summary           Patient Acceptance, E, VU by EB at 4/6/2023 1552    Acceptance, E, VU,NR by EB at 4/5/2023 1617    Acceptance, E, VU by EB at 4/4/2023 1545    Acceptance, E,D, VU,NR by EB at 4/3/2023 1603    Acceptance, " "E, VU,NR by DJ at 3/31/2023 1633                   Point: Home exercise program (Done)     Learning Progress Summary           Patient Acceptance, E, VU by EB at 4/6/2023 1552    Acceptance, E, VU,NR by EB at 4/5/2023 1617                   Point: Body mechanics (Done)     Learning Progress Summary           Patient Acceptance, E, VU by EB at 4/6/2023 1552    Acceptance, E, VU,NR by EB at 4/5/2023 1617    Acceptance, E, VU by EB at 4/4/2023 1545    Acceptance, E,D, VU,NR by EB at 4/3/2023 1603    Acceptance, E, VU,NR by DJ at 3/31/2023 1633                   Point: Precautions (Done)     Learning Progress Summary           Patient Acceptance, E, VU by EB at 4/6/2023 1552    Acceptance, E, VU,NR by EB at 4/5/2023 1617    Acceptance, E, VU by EB at 4/4/2023 1545    Acceptance, E,D, VU,NR by EB at 4/3/2023 1603    Acceptance, E, VU,NR by DJ at 3/31/2023 1633                               User Key     Initials Effective Dates Name Provider Type Discipline    EB 02/14/23 -  Noa Garcia PTA Physical Therapist Assistant PT    DJ 10/25/19 -  Latosha Sanders PT Physical Therapist PT              PT Recommendation and Plan     Plan of Care Reviewed With: patient  Progress: improving  Outcome Evaluation: Pt seen for PT treatment this PM. Pt needed a little more assistance with bed mobility and STS transfers today, Shaista. Pt reported of feeling fatigued. With encouragement, pt agreed to sit up in the chair. Pt able to ambulate 10ft with CGA, rwx. Pt needed cues for upright posture and some guidance with walker. Pt stated he \"felt good\" after getting to the chair and sitting up. Will continue to progress pt as able.     Time Calculation:    PT Charges     Row Name 04/06/23 1547             Time Calculation    Start Time 1414  -EB      Stop Time 1440  -EB      Time Calculation (min) 26 min  -EB      PT Received On 04/06/23  -EB      PT - Next Appointment 04/07/23  -EB         Time Calculation- PT    Total Timed Code Minutes- PT " 23 minute(s)  -            User Key  (r) = Recorded By, (t) = Taken By, (c) = Cosigned By    Initials Name Provider Type    Noa Hancock PTA Physical Therapist Assistant              Therapy Charges for Today     Code Description Service Date Service Provider Modifiers Qty    89213386792 HC PT THERAPEUTIC ACT EA 15 MIN 4/5/2023 Noa Garcia, INEZ GP 1    99137026748 HC PT THER PROC EA 15 MIN 4/5/2023 Noa Garcia, INEZ GP 1    33463999586 HC GAIT TRAINING EA 15 MIN 4/6/2023 Noa Garcia, INEZ GP 1    51116410200 HC PT THERAPEUTIC ACT EA 15 MIN 4/6/2023 Noa Garcia, INEZ GP 1          PT G-Codes  Outcome Measure Options: AM-PAC 6 Clicks Daily Activity (OT)  AM-PAC 6 Clicks Score (PT): 17  AM-PAC 6 Clicks Score (OT): 17       Noa Garcia PTA  4/6/2023

## 2023-04-06 NOTE — PLAN OF CARE
Goal Outcome Evaluation:  Plan of Care Reviewed With: patient, daughter           Outcome Evaluation: Clinical swallow evaluation completed. Full report pending. Throat clearing observed with ice chip, thin by spoon/straw, and mixed consistency. No overt s/s of aspiration on thin by cup, NTL by cup/straw, or soft/chopped solids. Recommend pt complete VFSS to determine safest PO diet. Pending VFSS results, recommend pt initiate NTL and soft/chopped diet. No mixed consistencies. Meds whole or crushed with puree, as tolerated. Sitting upright, slow rate, small bites/sips. Water sips in between meals after oral care OK with supervision. Speech to follow with VFSS.    Patient was not wearing a face mask during this therapy encounter. Therapist used appropriate personal protective equipment including mask, eye protection and gloves.  Mask used was N95/duckbill. Appropriate PPE was worn during the entire therapy session. Hand hygiene was completed before and after therapy session. Patient is not in enhanced droplet precautions.

## 2023-04-06 NOTE — PROGRESS NOTES
Name: Bridger Elias ADMIT: 3/30/2023   : 1930  PCP: Alma Cat MD    MRN: [id number] LOS: 6 days   AGE/SEX: 89 y.o. male  ROOM: [facility]     Subjective   Subjective   Feels worse again today, more tired and does not really want to work with PT.    Objective   Objective   Vital Signs  Temp:  [97.2 °F (36.2 °C)-98.5 °F (36.9 °C)] 97.2 °F (36.2 °C)  Heart Rate:  [74-75] 74  Resp:  [16-18] 16  BP: ()/(52-65) 108/65  SpO2:  [95 %-97 %] 95 %  on  Flow (L/min):  [3.5-4.5] 4;   Device (Oxygen Therapy): nasal cannula  Body mass index is 24.03 kg/m².  Physical Exam  Constitutional:       General: He is not in acute distress.     Comments: Chronically ill-appearing   Cardiovascular:      Rate and Rhythm: Normal rate.   Pulmonary:      Effort: Pulmonary effort is normal. No respiratory distress.      Breath sounds: Wheezing present.   Abdominal:      General: Abdomen is flat. There is no distension.      Palpations: Abdomen is soft.      Tenderness: There is no abdominal tenderness.   Musculoskeletal:         General: No swelling.      Right lower leg: No edema.      Left lower leg: No edema.   Skin:     Comments: Scattered ecchymoses   Neurological:      General: No focal deficit present.      Mental Status: He is alert and oriented to person, place, and time.         Results Review     I reviewed the patient's new clinical results.  Results from last 7 days   Lab Units 23  0350 23  0506 23  0334   WBC 10*3/mm3 7.41 9.78 9.55 10.90*   HEMOGLOBIN g/dL 11.2* 11.8* 12.3* 11.7*   PLATELETS 10*3/mm3 162 154 163 177     Results from last 7 days   Lab Units 23  0350 23  0506 23  0334   SODIUM mmol/L 134* 130* 136 133*   POTASSIUM mmol/L 3.8 3.6 3.8 4.0   CHLORIDE mmol/L 94* 95* 96* 97*   CO2 mmol/L 26.5 27.1 26.8 25.0   BUN mg/dL 32* 30* 29* 27*   CREATININE mg/dL 1.36* 1.50* 1.34* 1.45*   GLUCOSE mg/dL 111* 105* 129* 110*   EGFR mL/min/1.73  48.5* 43.1* 49.4* 44.9*     Results from last 7 days   Lab Units 04/06/23  0350 04/05/23  0506 04/04/23 2048 04/04/23 0334 04/03/23 0427   ALBUMIN g/dL 3.0* 3.0* 3.4* 2.9* 3.1*   BILIRUBIN mg/dL 0.8 0.9  --  0.8 0.7   ALK PHOS U/L 227* 215*  --  247* 251*   AST (SGOT) U/L 101* 56*  --  64* 65*   ALT (SGPT) U/L 60* 41  --  44* 45*     Results from last 7 days   Lab Units 04/06/23 0350 04/05/23 0506 04/04/23 2048 04/04/23 0334 04/03/23 0427 04/02/23 0418   CALCIUM mg/dL 8.4 8.4 8.6 8.5 8.8 8.3   ALBUMIN g/dL 3.0* 3.0* 3.4* 2.9* 3.1* 2.9*   MAGNESIUM mg/dL 2.5*  --  2.4* 2.4* 2.2 2.3   PHOSPHORUS mg/dL 2.7  --  2.9  --  2.1* 2.5     Results from last 7 days   Lab Units 04/04/23 0334 03/30/23 2207 03/30/23 2010   PROCALCITONIN ng/mL 0.14  --  0.08   LACTATE mmol/L  --  1.6  --      No results found for: HGBA1C, POCGLU    No radiology results for the last day  Scheduled Medications  apixaban, 5 mg, Oral, Q12H  bicalutamide, 50 mg, Oral, Daily  fluticasone, 2 spray, Each Nare, Daily  latanoprost, 1 drop, Both Eyes, Daily  levothyroxine, 88 mcg, Oral, Daily  melatonin, 2 mg, Oral, Nightly  multivitamin with minerals, 1 tablet, Oral, Daily  rosuvastatin, 10 mg, Oral, Daily  torsemide, 40 mg, Oral, Daily    Infusions   Diet  Diet: Cardiac Diets, Fluid Restriction (240 mL/tray) Diets; Low Sodium (2g); 1500 mL/day; No Mixed Consistencies; Texture: Soft to Chew (NDD 3); Soft to Chew: Chopped Meat; Fluid Consistency: Nectar Thick       Assessment/Plan     Active Hospital Problems    Diagnosis  POA   • **Acute respiratory failure with hypoxia [J96.01]  Yes   • Hyponatremia [E87.1]  Yes   • Normocytic anemia [D64.9]  Yes   • Transaminitis [R74.01]  Yes   • Stage 3a chronic kidney disease [N18.31]  Yes   • Pacemaker [Z95.0]  Yes   • Atrial fibrillation [I48.91]  Yes   • Acute on chronic combined systolic and diastolic CHF (congestive heart failure) [I50.43]  Yes   • SCC (squamous cell carcinoma), face [C44.320]  Yes   •  Hypothyroidism [E03.9]  Yes   • Coronary arteriosclerosis [I25.10]  Yes   • Dyslipidemia [E78.5]  Yes   • Hypertension [I10]  Yes   • Long term (current) use of anticoagulants [Z79.01]  Not Applicable      Resolved Hospital Problems    Diagnosis Date Resolved POA   • Hypokalemia [E87.6] 04/05/2023 No   • Community acquired pneumonia, unspecified laterality [J18.9] 04/03/2023 Yes       89 y.o. male admitted with Acute respiratory failure with hypoxia.      04/06/23   Awaiting AFB smears, likely trial of steroids if negative.    Acute respiratory failure with hypoxia  Acute on chronic combined systolic and diastolic heart failure  -ProBNP 2683 OA  -CXR w/ b/l effusions, congestion  -Procalcitonin, strep, Legionella, respiratory viral panel negative.  -TTE (4/1/23): 41-45%; systolic and diastolic flattening of IV septum consistent with RV pressure/volume overload  -Cardiology, Nephrology following  -Pulmonology consulted given persistent O2 needs-AFB smears pending; if negative empiric steroids versus bronchoscopy with biopsy  -Torsemide 40 mg      Hyponatremia  -improved with diuresis  -Renal following     Atrial fibrillation on long-term anticoagulation  -RC: Amiodarone 100 mg every other day; Coreg ON HOLD given soft BP  -AC: Apixaban    CKD3a  -Crt near baseline; monitor with diuresis      Transaminitis  -LFTs slightly elevated, however, relatively stable     Hypothyroidism  -Synthroid 88 mcg     Hyperlipidemia  -Rosuvastatin 10 mg     Generalized weakness/Debility  - Consulted PT/OT, fall precautions.    · Eliquis  for DVT prophylaxis.  · Full code.  · Discussed with patient, family, care team on multidisciplinary rounds and Dr Bruce.  · Anticipate discharge to SNF when cleared by consultants      Rock Daley MD  [facility]  04/06/23  13:57 EDT

## 2023-04-06 NOTE — THERAPY EVALUATION
Acute Care - Speech Language Pathology   Swallow Initial Evaluation [facility]     Patient Name: Bridger Elias  : 1930  MRN: [id number]  Today's Date: 2023               Admit Date: 3/30/2023    Visit Dx:     ICD-10-CM ICD-9-CM   1. Community acquired pneumonia, unspecified laterality  J18.9 486     Patient Active Problem List   Diagnosis   • SCC (squamous cell carcinoma), face   • General weakness   • Pacemaker   • Abnormal gait   • Atrial fibrillation   • Chronic diastolic heart failure   • Coronary arteriosclerosis   • Dyslipidemia   • Glaucoma   • Mitral valve disorder   • Hypertension   • Hypertensive kidney disease, stage III   • Hypothyroidism   • Long term (current) use of anticoagulants   • Malignant neoplasm of prostate   • Osteoporosis   • Squamous cell carcinoma of skin of face   • Acute on chronic diastolic CHF (congestive heart failure)   • Stage 3a chronic kidney disease   • Personal history of radiation therapy   • Acute on chronic combined systolic and diastolic CHF (congestive heart failure)   • Cellulitis of right leg   • Chronic acquired lymphedema   • Contusion of face   • Contusion of forearm, left   • Fall   • Chronic systolic heart failure (CMS/HCC)   • Acute respiratory failure with hypoxia   • Hyponatremia   • Normocytic anemia   • Transaminitis     Past Medical History:   Diagnosis Date   • Acute on chronic diastolic CHF (congestive heart failure) 2022   • Atrial fibrillation 2022   • Coronary arteriosclerosis 7/3/2014    Formatting of this note might be different from the original. [facility]  IMPRESSION:   1. Right heart disease, hypertensive cardiac disease.   2. Status post mitral valve repair.   3. Anatomy: No hemodynamically significant disease. about 30-40% lesion of    the right coronary artery. Normal. Left coronary artery system.   • Dyslipidemia 2013   • Glaucoma 2022   • Hypertension 2013   • Hypertensive kidney disease, stage III  3/13/2017   • Hypothyroidism 3/19/2017   • Long term (current) use of anticoagulants 12/5/2013    Formatting of this note might be different from the original. Mitral valve replacement and atrial fibrillation Assume a range of 2.5-3.5.   • Malignant neoplasm of prostate 12/5/2013    Formatting of this note might be different from the original. 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   • Stage 3b chronic kidney disease 4/14/2022     Past Surgical History:   Procedure Laterality Date   • PACEMAKER IMPLANTATION  2018       SLP Recommendation and Plan  SLP Swallowing Diagnosis: R/O pharyngeal dysphagia, suspected pharyngeal dysphagia (04/06/23 1300)  SLP Diet Recommendation: soft to chew textures, chopped, nectar thick liquids, water between meals after oral care, with supervision (04/06/23 1300)  Recommended Precautions and Strategies: upright posture during/after eating, small bites of food and sips of liquid, other (see comments) (slow rate) (04/06/23 1300)  SLP Rec. for Method of Medication Administration: meds whole, meds crushed, with puree, as tolerated (04/06/23 1300)     Monitor for Signs of Aspiration: yes, notify SLP if any concerns (04/06/23 1300)  Recommended Diagnostics: VFSS (MBS) (04/06/23 1300)  Swallow Criteria for Skilled Therapeutic Interventions Met: demonstrates skilled criteria (04/06/23 1300)  Anticipated Discharge Disposition (SLP): unknown (04/06/23 1300)  Rehab Potential/Prognosis, Swallowing: good, to achieve stated therapy goals (04/06/23 1300)  Therapy Frequency (Swallow): PRN (04/06/23 1300)  Predicted Duration Therapy Intervention (Days): until discharge (04/06/23 1300)                                        Plan of Care Reviewed With: patient, daughter  Outcome Evaluation:  Clinical swallow evaluation completed. Full report pending. Throat clearing observed with ice chip, thin by spoon/straw, and mixed consistency. No overt s/s of aspiration on thin by cup, NTL by cup/straw, or soft/chopped solids. Recommend pt complete VFSS to determine safest PO diet. Pending VFSS results, recommend pt initiate NTL and soft/chopped diet. No mixed consistencies. Meds whole or crushed with puree, as tolerated. Sitting upright, slow rate, small bites/sips. Water sips in between meals after oral care OK with supervision. Speech to follow with VFSS.      SWALLOW EVALUATION (last 72 hours)     SLP Adult Swallow Evaluation     Row Name 04/06/23 1300                   Rehab Evaluation    Document Type evaluation  -CR        Subjective Information no complaints  -CR        Patient Observations alert;cooperative  -CR        Patient Effort good  -CR        Symptoms Noted During/After Treatment none  -CR           General Information    Patient Profile Reviewed yes  -CR        Pertinent History Of Current Problem 89 y/o male admitted with multifocal pneumonia. Orders received due to daughter reports of coughing during meals.  -CR        Current Method of Nutrition regular textures;thin liquids  -CR        Precautions/Limitations, Vision difficult to assess  -CR        Precautions/Limitations, Hearing hearing impairment, bilaterally  -CR        Prior Level of Function-Communication unknown  -CR        Prior Level of Function-Swallowing no diet consistency restrictions  -CR        Plans/Goals Discussed with patient and family;agreed upon  -CR        Barriers to Rehab medically complex  -CR           Pain    Additional Documentation Pain Scale: Numbers Pre/Post-Treatment (Group)  -CR           Pain Scale: Numbers Pre/Post-Treatment    Pretreatment Pain Rating 0/10 - no pain  -CR        Posttreatment Pain Rating 0/10 - no pain  -CR           Oral Motor Structure and Function    Dentition Assessment teeth are in poor  condition  -CR        Secretion Management WNL/WFL  -CR        Mucosal Quality moist, healthy  -CR           Oral Musculature and Cranial Nerve Assessment    Oral Motor General Assessment WFL  -CR           General Eating/Swallowing Observations    Respiratory Support Currently in Use nasal cannula  -CR        O2 Liters 4L  -CR           Clinical Swallow Eval    Clinical Swallow Evaluation Summary Clinical swallow evaluation completed. Daughter at bedside and son on FaceTime with concerns for aspiration as coughing during meals has been observed. Throat clearing observed with ice chip, thin by spoon/straw, and mixed consistency. No overt s/s of aspiration on thin by cup, NTL by cup/straw, or soft/chopped solids. No vocal change appreciated. Pt refused regular solid trial. Recommend pt complete VFSS to determine safest PO diet. Pending VFSS results, recommend pt initiate NTL and soft/chopped diet. No mixed consistencies. Meds whole or crushed with puree, as tolerated. Sitting upright, slow rate, small bites/sips. Water sips by cup in between meals after oral care OK with supervision. Speech to follow with VFSS.  -CR           SLP Evaluation Clinical Impression    SLP Swallowing Diagnosis R/O pharyngeal dysphagia;suspected pharyngeal dysphagia  -CR        Functional Impact risk of aspiration/pneumonia  -CR        Rehab Potential/Prognosis, Swallowing good, to achieve stated therapy goals  -CR        Swallow Criteria for Skilled Therapeutic Interventions Met demonstrates skilled criteria  -CR           Recommendations    Therapy Frequency (Swallow) PRN  -CR        Predicted Duration Therapy Intervention (Days) until discharge  -CR        SLP Diet Recommendation soft to chew textures;chopped;nectar thick liquids;water between meals after oral care, with supervision  -CR        Recommended Diagnostics VFSS (MBS)  -CR        Recommended Precautions and Strategies upright posture during/after eating;small bites of food and  sips of liquid;other (see comments)  slow rate  -CR        Oral Care Recommendations Oral Care BID/PRN  -CR        SLP Rec. for Method of Medication Administration meds whole;meds crushed;with puree;as tolerated  -CR        Monitor for Signs of Aspiration yes;notify SLP if any concerns  -CR        Anticipated Discharge Disposition (SLP) unknown  -CR           Swallow Goals (SLP)    Swallow LTGs Patient will demonstrate functional swallow for  -CR           (LTG) Patient will demonstrate functional swallow for    Diet Texture (Demonstrate functional swallow) regular textures  -CR        Liquid viscosity (Demonstrate functional swallow) thin liquids  -CR        [place] (Demonstrate functional swallow) independently (over 90% accuracy)  -CR        Time Frame (Demonstrate functional swallow) by discharge  -CR              User Key  (r) = Recorded By, (t) = Taken By, (c) = Cosigned By    Initials Name Effective Dates    Berenice Quezada CF-SLP 11/10/22 -                 EDUCATION  The patient has been educated in the following areas:   Dysphagia (Swallowing Impairment).        SLP GOALS     Row Name 04/06/23 1300             (LTG) Patient will demonstrate functional swallow for    Diet Texture (Demonstrate functional swallow) regular textures  -CR      Liquid viscosity (Demonstrate functional swallow) thin liquids  -CR      [place] (Demonstrate functional swallow) independently (over 90% accuracy)  -CR      Time Frame (Demonstrate functional swallow) by discharge  -CR            User Key  (r) = Recorded By, (t) = Taken By, (c) = Cosigned By    Initials Name Provider Type    Berenice Quezada CF-SLP Speech and Language Pathologist                   Time Calculation:    Time Calculation- SLP     Row Name 04/06/23 1416             Time Calculation- SLP    SLP Start Time 1230  -CR      SLP Received On 04/06/23  -CR            User Key  (r) = Recorded By, (t) = Taken By, (c) = Cosigned By    Initials Name  Provider Type    CR Berenice Rojo CF-SLP Speech and Language Pathologist                Therapy Charges for Today     Code Description Service Date Service Provider Modifiers Qty    96136915016 HC ST EVAL ORAL PHARYNG SWALLOW 3 4/6/2023 Berenice Rojo CF-SLP GN 1               GUERDA Bravo  4/6/2023

## 2023-04-06 NOTE — CONSULTS
Purpose of the visit was to evaluate for: goals of care/advanced care planning and support for patient/family. Spoke with RN as well as patient and family and discussed palliative care, goals of care and resuscitation status.      Assessment:  Patient is palliative care appropriate for community based services with [facility] or SNF with palliative care (list reasons): Congestive systolic and diastolic heart failure, CKD III, CAD, HTN, atrial fibrillation, SSS, and non-ischemic cardiomyopathy.    Recommendations/Plan: Continue current level of care. Pt states his goal is to return to his assisted living facility if he is able. Pt does state his CODE status is DNR/DNI.    Other Comments: Met with Mr. Elias and his daughter, Sultana, at the bedside. His son, Yasir, was called to be included in the conversation. When discussing goals of care, the patient states he wants to regain strength so that he can return to the nursing facility. He states he has felt weak since being at the hospital but otherwise feels well and is frustrated that he isn't feeling strong. His children are feeling discouraged by the lack of concrete diagnosis and are eager to understand what is making him so weak and short of breath so that they can discuss next steps. I will reach out to Dr. Daley and ask him to call the daughter tomorrow after some of the cultures/tests have resulted and discuss next steps. When discussing CODE status, Mr. Elias states he is a DNR/DNI, I am reaching out to Dr. Daley for the chart to reflect his wishes. They denied any other needs at this time, the goal is for Mr. Elias to return to his assisted living facility if possible. We will sign off but can be re-consulted as needed.

## 2023-04-06 NOTE — PLAN OF CARE
Goal Outcome Evaluation:  Plan of Care Reviewed With: patient        Progress: no change  Outcome Evaluation: Maintained on 3.5L N/C while awake, increased to 4.5L N/C while sleeping to keep sats >92%. Pt able to provide small sputum specimen, to lab for AFB, it has been over 8 hours since the last one as per Infection Control note. Pt's voice in stronger and he is in good spirits. Assisting more with turns in bed. B/P remains soft. Daughter at bedside. Safety maintained.  Diuretic in Use: Torsemide  Response to Diuretics (Output greater than intake): no  Daily Weight (up or down): trending same   O2 Requirements: 3.5 L N/C during day, 4.5 L N/C while sleeping  Functional Status (Activity level, tolerance and respiratory symptoms): very weak, fatigued.   Discharge Plans: TBD

## 2023-04-07 ENCOUNTER — APPOINTMENT (OUTPATIENT)
Dept: GENERAL RADIOLOGY | Facility: HOSPITAL | Age: 88
DRG: 291 | End: 2023-04-07
Payer: MEDICARE

## 2023-04-07 LAB
ALBUMIN SERPL-MCNC: 2.9 G/DL (ref 3.5–5.2)
ALBUMIN/GLOB SERPL: 0.6 G/DL
ALP SERPL-CCNC: 232 U/L (ref 39–117)
ALT SERPL W P-5'-P-CCNC: 83 U/L (ref 1–41)
ANION GAP SERPL CALCULATED.3IONS-SCNC: 11 MMOL/L (ref 5–15)
AST SERPL-CCNC: 141 U/L (ref 1–40)
BACTERIA SPEC RESP CULT: NORMAL
BILIRUB SERPL-MCNC: 0.6 MG/DL (ref 0–1.2)
BUN SERPL-MCNC: 39 MG/DL (ref 8–23)
BUN/CREAT SERPL: 27.1 (ref 7–25)
CALCIUM SPEC-SCNC: 8.6 MG/DL (ref 8.2–9.6)
CHLORIDE SERPL-SCNC: 94 MMOL/L (ref 98–107)
CO2 SERPL-SCNC: 28 MMOL/L (ref 22–29)
CREAT SERPL-MCNC: 1.44 MG/DL (ref 0.76–1.27)
DEPRECATED RDW RBC AUTO: 50.3 FL (ref 37–54)
EGFRCR SERPLBLD CKD-EPI 2021: 45.3 ML/MIN/1.73
ERYTHROCYTE [DISTWIDTH] IN BLOOD BY AUTOMATED COUNT: 14.5 % (ref 12.3–15.4)
GAMMA INTERFERON BACKGROUND BLD IA-ACNC: 0.02 IU/ML
GLOBULIN UR ELPH-MCNC: 4.5 GM/DL
GLUCOSE SERPL-MCNC: 106 MG/DL (ref 65–99)
GRAM STN SPEC: NORMAL
HCT VFR BLD AUTO: 33.1 % (ref 37.5–51)
HGB BLD-MCNC: 11.5 G/DL (ref 13–17.7)
M TB IFN-G BLD-IMP: NEGATIVE
M TB IFN-G CD4+ T-CELLS BLD-ACNC: 0.02 IU/ML
M TBIFN-G CD4+ CD8+T-CELLS BLD-ACNC: 0.02 IU/ML
MAGNESIUM SERPL-MCNC: 2.6 MG/DL (ref 1.7–2.3)
MCH RBC QN AUTO: 33.3 PG (ref 26.6–33)
MCHC RBC AUTO-ENTMCNC: 34.7 G/DL (ref 31.5–35.7)
MCV RBC AUTO: 95.9 FL (ref 79–97)
MITOGEN IGNF BLD-ACNC: 4.15 IU/ML
PHOSPHATE SERPL-MCNC: 2.9 MG/DL (ref 2.5–4.5)
PLATELET # BLD AUTO: 161 10*3/MM3 (ref 140–450)
PMV BLD AUTO: 10.5 FL (ref 6–12)
POTASSIUM SERPL-SCNC: 3.4 MMOL/L (ref 3.5–5.2)
PROT SERPL-MCNC: 7.4 G/DL (ref 6–8.5)
QUANTIFERON INCUBATION: NORMAL
RBC # BLD AUTO: 3.45 10*6/MM3 (ref 4.14–5.8)
SERVICE CMNT-IMP: NORMAL
SODIUM SERPL-SCNC: 133 MMOL/L (ref 136–145)
URATE SERPL-MCNC: 6.4 MG/DL (ref 3.4–7)
WBC NRBC COR # BLD: 6.6 10*3/MM3 (ref 3.4–10.8)

## 2023-04-07 PROCEDURE — 84550 ASSAY OF BLOOD/URIC ACID: CPT | Performed by: INTERNAL MEDICINE

## 2023-04-07 PROCEDURE — 74230 X-RAY XM SWLNG FUNCJ C+: CPT

## 2023-04-07 PROCEDURE — 92611 MOTION FLUOROSCOPY/SWALLOW: CPT

## 2023-04-07 PROCEDURE — 84100 ASSAY OF PHOSPHORUS: CPT | Performed by: INTERNAL MEDICINE

## 2023-04-07 PROCEDURE — 83735 ASSAY OF MAGNESIUM: CPT | Performed by: INTERNAL MEDICINE

## 2023-04-07 PROCEDURE — 97530 THERAPEUTIC ACTIVITIES: CPT

## 2023-04-07 PROCEDURE — 63710000001 PREDNISONE PER 1 MG: Performed by: INTERNAL MEDICINE

## 2023-04-07 PROCEDURE — 71045 X-RAY EXAM CHEST 1 VIEW: CPT

## 2023-04-07 PROCEDURE — 99232 SBSQ HOSP IP/OBS MODERATE 35: CPT | Performed by: NURSE PRACTITIONER

## 2023-04-07 PROCEDURE — 80053 COMPREHEN METABOLIC PANEL: CPT | Performed by: STUDENT IN AN ORGANIZED HEALTH CARE EDUCATION/TRAINING PROGRAM

## 2023-04-07 PROCEDURE — 85027 COMPLETE CBC AUTOMATED: CPT | Performed by: STUDENT IN AN ORGANIZED HEALTH CARE EDUCATION/TRAINING PROGRAM

## 2023-04-07 RX ORDER — BISACODYL 10 MG
10 SUPPOSITORY, RECTAL RECTAL ONCE
Status: COMPLETED | OUTPATIENT
Start: 2023-04-07 | End: 2023-04-07

## 2023-04-07 RX ORDER — POTASSIUM CHLORIDE 750 MG/1
40 TABLET, FILM COATED, EXTENDED RELEASE ORAL ONCE
Status: COMPLETED | OUTPATIENT
Start: 2023-04-07 | End: 2023-04-07

## 2023-04-07 RX ORDER — PREDNISONE 20 MG/1
20 TABLET ORAL 2 TIMES DAILY WITH MEALS
Status: DISCONTINUED | OUTPATIENT
Start: 2023-04-07 | End: 2023-04-10

## 2023-04-07 RX ADMIN — LATANOPROST 1 DROP: 50 SOLUTION OPHTHALMIC at 20:28

## 2023-04-07 RX ADMIN — APIXABAN 5 MG: 5 TABLET, FILM COATED ORAL at 20:28

## 2023-04-07 RX ADMIN — BARIUM SULFATE 1 TEASPOON(S): 0.6 CREAM ORAL at 15:44

## 2023-04-07 RX ADMIN — APIXABAN 5 MG: 5 TABLET, FILM COATED ORAL at 09:10

## 2023-04-07 RX ADMIN — BARIUM SULFATE 50 ML: 400 SUSPENSION ORAL at 15:44

## 2023-04-07 RX ADMIN — FLUTICASONE PROPIONATE 2 SPRAY: 50 SPRAY, METERED NASAL at 09:11

## 2023-04-07 RX ADMIN — POTASSIUM CHLORIDE 40 MEQ: 750 TABLET, EXTENDED RELEASE ORAL at 17:00

## 2023-04-07 RX ADMIN — BARIUM SULFATE 250 ML: 0.81 POWDER, FOR SUSPENSION ORAL at 15:44

## 2023-04-07 RX ADMIN — MULTIPLE VITAMINS W/ MINERALS TAB 1 TABLET: TAB at 09:10

## 2023-04-07 RX ADMIN — ROSUVASTATIN CALCIUM 10 MG: 10 TABLET, FILM COATED ORAL at 09:10

## 2023-04-07 RX ADMIN — BICALUTAMIDE 50 MG: 50 TABLET ORAL at 09:10

## 2023-04-07 RX ADMIN — Medication 10 ML: at 09:10

## 2023-04-07 RX ADMIN — BISACODYL 10 MG: 10 SUPPOSITORY RECTAL at 17:00

## 2023-04-07 RX ADMIN — LEVOTHYROXINE SODIUM 88 MCG: 0.09 TABLET ORAL at 09:10

## 2023-04-07 RX ADMIN — PREDNISONE 20 MG: 20 TABLET ORAL at 20:28

## 2023-04-07 RX ADMIN — BARIUM SULFATE 135 ML: 980 POWDER, FOR SUSPENSION ORAL at 15:44

## 2023-04-07 NOTE — THERAPY TREATMENT NOTE
Patient Name: Bridger Elias  : 1930    MRN: [id number]                              Today's Date: 2023       Admit Date: 3/30/2023    Visit Dx:     ICD-10-CM ICD-9-CM   1. Community acquired pneumonia, unspecified laterality  J18.9 486     Patient Active Problem List   Diagnosis   • SCC (squamous cell carcinoma), face   • General weakness   • Pacemaker   • Abnormal gait   • Atrial fibrillation   • Chronic diastolic heart failure   • Coronary arteriosclerosis   • Dyslipidemia   • Glaucoma   • Mitral valve disorder   • Hypertension   • Hypertensive kidney disease, stage III   • Hypothyroidism   • Long term (current) use of anticoagulants   • Malignant neoplasm of prostate   • Osteoporosis   • Squamous cell carcinoma of skin of face   • Acute on chronic diastolic CHF (congestive heart failure)   • Stage 3a chronic kidney disease   • Personal history of radiation therapy   • Acute on chronic combined systolic and diastolic CHF (congestive heart failure)   • Cellulitis of right leg   • Chronic acquired lymphedema   • Contusion of face   • Contusion of forearm, left   • Fall   • Chronic systolic heart failure (CMS/HCC)   • Acute respiratory failure with hypoxia   • Hyponatremia   • Normocytic anemia   • Transaminitis     Past Medical History:   Diagnosis Date   • Acute on chronic diastolic CHF (congestive heart failure) 2022   • Atrial fibrillation 2022   • Coronary arteriosclerosis 7/3/2014    Formatting of this note might be different from the original. [facility] 16  IMPRESSION:   1. Right heart disease, hypertensive cardiac disease.   2. Status post mitral valve repair.   3. Anatomy: No hemodynamically significant disease. about 30-40% lesion of    the right coronary artery. Normal. Left coronary artery system.   • Dyslipidemia 2013   • Glaucoma 2022   • Hypertension 2013   • Hypertensive kidney disease, stage III 3/13/2017   • Hypothyroidism 3/19/2017   • Long term (current) use  of anticoagulants 12/5/2013    Formatting of this note might be different from the original. Mitral valve replacement and atrial fibrillation Assume a range of 2.5-3.5.   • Malignant neoplasm of prostate 12/5/2013    Formatting of this note might be different from the original. 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   • Stage 3b chronic kidney disease 4/14/2022     Past Surgical History:   Procedure Laterality Date   • PACEMAKER IMPLANTATION  2018      General Information     Row Name 04/07/23 1432          Physical Therapy Time and Intention    Document Type therapy note (daily note)  -CS     Mode of Treatment individual therapy;physical therapy  -CS     Row Name 04/07/23 1432          General Information    Patient Profile Reviewed yes  -CS     Existing Precautions/Restrictions fall;oxygen therapy device and L/min  -CS     Row Name 04/07/23 1432          Cognition    Orientation Status (Cognition) oriented x 3  -CS     Row Name 04/07/23 1432          Safety Issues, Functional Mobility    Impairments Affecting Function (Mobility) endurance/activity tolerance;strength;range of motion (ROM);shortness of breath;postural/trunk control  -CS           User Key  (r) = Recorded By, (t) = Taken By, (c) = Cosigned By    Initials Name Provider Type    CS Josefina Santiago, PT Physical Therapist               Mobility     Row Name 04/07/23 1433          Bed Mobility    Bed Mobility supine-sit  -CS     Supine-Sit [place] (Bed Mobility) minimum assist (75% patient effort);verbal cues  -CS     Assistive Device (Bed Mobility) bed rails;head of bed elevated  -CS     Comment, (Bed Mobility) UIC at end of session  -CS     Row Name 04/07/23 1433          Sit-Stand Transfer    Sit-Stand [place] (Transfers)  contact guard;verbal cues  -CS     Assistive Device (Sit-Stand Transfers) walker, front-wheeled  -CS     Comment, (Sit-Stand Transfer) cues for hand placement  -CS     Row Name 04/07/23 [facility]3          Gait/Stairs (Locomotion)    [place] Level (Gait) contact guard  -CS     Assistive Device (Gait) walker, front-wheeled  -CS     Distance in Feet (Gait) 12'  -CS     Deviations/Abnormal Patterns (Gait) hussein decreased;gait speed decreased;stride length decreased;festinating/shuffling  -CS     Bilateral Gait Deviations forward flexed posture;heel strike decreased  -CS     [place] Level (Stairs) not tested  -CS     Comment, (Gait/Stairs) slow shuffling steps; no LOB  -CS           User Key  (r) = Recorded By, (t) = Taken By, (c) = Cosigned By    Initials Name Provider Type    CS Josefina Santiago, PT Physical Therapist               Obj/Interventions     Row Name 04/07/23 [facility]          Motor Skills    Therapeutic Exercise other (see comments)  10 reps B LE AP, LAQ, & seated marches + B UE OH raises & bicep curls  -CS     Row Name 04/07/23 [facility]          Balance    Balance Assessment sitting static balance;sitting dynamic balance;standing static balance;standing dynamic balance  -CS     Static Sitting Balance standby assist  -CS     Dynamic Sitting Balance contact guard  -CS     Position, Sitting Balance unsupported;sitting edge of bed  -CS     Static Standing Balance contact guard  -CS     Dynamic Standing Balance contact guard  -CS     Position/Device Used, Standing Balance supported;walker, front-wheeled  -CS           User Key  (r) = Recorded By, (t) = Taken By, (c) = Cosigned By    Initials Name Provider Type    CS Josefina Santiago, PT Physical Therapist               Goals/Plan    No documentation.                Clinical Impression     Row Name 04/07/23 1434          Pain    Pretreatment Pain Rating 0/10 - no pain  -CS     Posttreatment Pain Rating 0/10 - no pain  -CS     Row Name 04/07/23 1434          Plan  of Care Review    Plan of Care Reviewed With patient;daughter  -CS     Progress improving  -CS     Outcome Evaluation Pt received in bed upon arrival and after some encouragement agreeable to PT. Pt required increased time and min A to reach sitting EOB. Pt stood and ambulated 12' c RW requiring CGA. Pt demo's slow shuffling steps but no LOB. Distance limited secondary to TB precautions with confined spaces. Pt UIC and completed B UE & LE strengthening exercises x 10. PT encouraged pt to sit UIC and complete HEP throughout the day. Pt will continue to benefit from skilled PT to address strength, endurance, and functional mobility.  -CS     Row Name 04/07/23 1434          Therapy Assessment/Plan (PT)    Criteria for Skilled Interventions Met (PT) yes;meets criteria  -CS     Therapy Frequency (PT) 5 times/wk  -CS     Row Name 04/07/23 1434          Positioning and Restraints    Pre-Treatment Position in bed  -CS     Post Treatment Position chair  -CS     In Chair notified nsg;reclined;call light within reach;encouraged to call for assist;exit alarm on;with family/caregiver;heels elevated  -CS           User Key  (r) = Recorded By, (t) = Taken By, (c) = Cosigned By    Initials Name Provider Type    CS Josefina Santiago, PT Physical Therapist               Outcome Measures     Row Name 04/07/23 1436 04/07/23 0915       How much help from another person do you currently need...    Turning from your back to your side while in flat bed without using bedrails? 3  -CS 3  -HS    Moving from lying on back to sitting on the side of a flat bed without bedrails? 3  -CS 3  -HS    Moving to and from a bed to a chair (including a wheelchair)? 3  -CS 3  -HS    Standing up from a chair using your arms (e.g., wheelchair, bedside chair)? 3  -CS 3  -HS    Climbing 3-5 steps with a railing? 2  -CS 2  -HS    To walk in hospital room? 3  -CS 3  -HS    AM-PAC 6 Clicks Score (PT) 17  -CS 17  -HS    Highest level of mobility 5 --> Static  standing  - 5 --> Static standing  -    Row Name 04/07/23 1436          Functional Assessment    Outcome Measure Options AM-PAC 6 Clicks Basic Mobility (PT)  -CS           User Key  (r) = Recorded By, (t) = Taken By, (c) = Cosigned By    Initials Name Provider Type    Francesca Stevens, RN Registered Nurse    Josefina Casanova, PT Physical Therapist                             Physical Therapy Education     Title: PT OT SLP Therapies (Done)     Topic: Physical Therapy (Done)     Point: Mobility training (Done)     Learning Progress Summary           Patient Acceptance, E,TB, VU,DU by CS at 4/7/2023 1437    Acceptance, E, VU by EB at 4/6/2023 1552    Acceptance, E, VU,NR by EB at 4/5/2023 1617    Acceptance, E, VU by EB at 4/4/2023 1545    Acceptance, E,D, VU,NR by EB at 4/3/2023 1603    Acceptance, E, VU,NR by DJ at 3/31/2023 1633                   Point: Home exercise program (Done)     Learning Progress Summary           Patient Acceptance, E,TB, VU,DU by CS at 4/7/2023 1437    Acceptance, E, VU by EB at 4/6/2023 1552    Acceptance, E, VU,NR by EB at 4/5/2023 1617                   Point: Body mechanics (Done)     Learning Progress Summary           Patient Acceptance, E,TB, VU,DU by CS at 4/7/2023 1437    Acceptance, E, VU by EB at 4/6/2023 1552    Acceptance, E, VU,NR by EB at 4/5/2023 1617    Acceptance, E, VU by EB at 4/4/2023 1545    Acceptance, E,D, VU,NR by EB at 4/3/2023 1603    Acceptance, E, VU,NR by DJ at 3/31/2023 1633                   Point: Precautions (Done)     Learning Progress Summary           Patient Acceptance, E,TB, VU,DU by CS at 4/7/2023 1437    Acceptance, E, VU by EB at 4/6/2023 1552    Acceptance, E, VU,NR by EB at 4/5/2023 1617    Acceptance, E, VU by EB at 4/4/2023 1545    Acceptance, E,D, VU,NR by EB at 4/3/2023 1603    Acceptance, E, VU,NR by RODGER at 3/31/2023 1633                               User Key     Initials Effective Dates Name Provider Type Discipline    EB 02/14/23 -   Noa Garcia, PTA Physical Therapist Assistant PT    DJ 10/25/19 -  Latosha Sanders PT Physical Therapist PT    CS 09/22/22 -  Josefina Santiago PT Physical Therapist PT              PT Recommendation and Plan     Plan of Care Reviewed With: patient, daughter  Progress: improving  Outcome Evaluation: Pt received in bed upon arrival and after some encouragement agreeable to PT. Pt required increased time and min A to reach sitting EOB. Pt stood and ambulated 12' c RW requiring CGA. Pt demo's slow shuffling steps but no LOB. Distance limited secondary to TB precautions with confined spaces. Pt UIC and completed B UE & LE strengthening exercises x 10. PT encouraged pt to sit UIC and complete HEP throughout the day. Pt will continue to benefit from skilled PT to address strength, endurance, and functional mobility.     Time Calculation:    PT Charges     Row Name 04/07/23 1437             Time Calculation    Start Time 1357  -CS      Stop Time 1419  -CS      Time Calculation (min) 22 min  -CS      PT Received On 04/07/23  -CS      PT - Next Appointment 04/10/23  -CS         Time Calculation- PT    Total Timed Code Minutes- PT 20 minute(s)  -CS         Timed Charges    51611 - PT Therapeutic Activity Minutes 20  -CS         Total Minutes    Timed Charges Total Minutes 20  -CS       Total Minutes 20  -CS            User Key  (r) = Recorded By, (t) = Taken By, (c) = Cosigned By    Initials Name Provider Type    CS Josefina Santiago, PT Physical Therapist              Therapy Charges for Today     Code Description Service Date Service Provider Modifiers Qty    93406796186 HC PT THERAPEUTIC ACT EA 15 MIN 4/7/2023 Josefina Santiago, PT GP 1          PT G-Codes  Outcome Measure Options: AM-PAC 6 Clicks Basic Mobility (PT)  AM-PAC 6 Clicks Score (PT): 17  AM-PAC 6 Clicks Score (OT): 17  PT Discharge Summary  Anticipated Discharge Disposition (PT): skilled nursing facility    Josefina Santiago PT  4/7/2023

## 2023-04-07 NOTE — PROGRESS NOTES
"    Patient Name: Bridger Elias  :1930  89 y.o.      Patient Care Team:  Alma Cat MD as PCP - General (Family Medicine)  Maurice Cook MD as Consulting Physician (Radiation Oncology)    Chief Complaint: follow up CHF      Interval History:  Sitting on the bedside commode. Daughter at bedside. He does not really complain of much. Hypotensive this morning.    Objective   Vital Signs  Temp:  [97.2 °F (36.2 °C)-99.1 °F (37.3 °C)] 98.4 °F (36.9 °C)  Heart Rate:  [74-75] 75  Resp:  [16-18] 18  BP: ()/(50-65) 98/55    Intake/Output Summary (Last 24 hours) at 2023 1030  Last data filed at 2023 0420  Gross per 24 hour   Intake 120 ml   Output 1350 ml   Net -1230 ml     Flowsheet Rows    Flowsheet Row First Filed Value   Admission Height 170.2 cm (67\") Documented at 2023   Admission Weight 74.4 kg (164 lb) Documented at 2023          Physical Exam:   General Appearance:    Alert, cooperative, in no acute distress   Lungs:     Clear to auscultation.  Normal respiratory effort and rate.      Heart:    Regular rhythm and normal rate, normal S1 and S2, no murmurs, gallops or rubs.     Chest Wall:    No abnormalities observed   Abdomen:     Soft, nontender, positive bowel sounds.     Extremities:   no cyanosis, clubbing or edema.  No marked joint deformities.  Adequate musculoskeletal strength.       Results Review:    Results from last 7 days   Lab Units 23  0509   SODIUM mmol/L 133*   POTASSIUM mmol/L 3.4*   CHLORIDE mmol/L 94*   CO2 mmol/L 28.0   BUN mg/dL 39*   CREATININE mg/dL 1.44*   GLUCOSE mg/dL 106*   CALCIUM mg/dL 8.6         Results from last 7 days   Lab Units 23  0509   WBC 10*3/mm3 6.60   HEMOGLOBIN g/dL 11.5*   HEMATOCRIT % 33.1*   PLATELETS 10*3/mm3 161         Results from last 7 days   Lab Units 23  0509   MAGNESIUM mg/dL 2.6*           Medication Review:   apixaban, 5 mg, Oral, Q12H  bicalutamide, 50 mg, Oral, Daily  fluticasone, 2 spray, " Each Nare, Daily  latanoprost, 1 drop, Both Eyes, Daily  levothyroxine, 88 mcg, Oral, Daily  melatonin, 2 mg, Oral, Nightly  multivitamin with minerals, 1 tablet, Oral, Daily  potassium chloride, 40 mEq, Oral, Once  rosuvastatin, 10 mg, Oral, Daily             Assessment & Plan   1.  Acute hypoxic respiratory failure, on 3 liters which is down some.  2.  Community-acquired pneumonia  3.  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  Severe right ventricular enlargement with normal function  5.  Severe tricuspid regurgitation  6.  Status post mitral valve repair-looks good  7.  Paroxysmal atrial fibrillation, carvedilol has been held because of hypotension, remains on Eliquis. Amiodarone stopped due to persistent unexplained hypoxia. He is mostly paced.  8.  Sick sinus syndrome, status post pacemaker - follows with Dr. Vanegas  9.  Hypertension  10.  Generalized weakness, multifactorial  11.  Deconditioning  12.  Hyponatremia, renal following  13.  Pleural effusions, bilateral, chest x-ray 4/3 shows some improvement on the right, unchanged on the left.    Nephrology holding diuretic. Not on any other antiHTN.   Amiodarone stopped. Rhythm stable and paced.     Will see again Monday. Please call over weekend if needed.     JESUS El  [facility]  04/07/23  10:30 EDT

## 2023-04-07 NOTE — MBS/VFSS/FEES
Acute Care - Speech Language Pathology   Swallow Initial Evaluation [facility]     Patient Name: Bridger Elias  : 1930  MRN: [id number]  Today's Date: 2023               Admit Date: 3/30/2023    Visit Dx:     ICD-10-CM ICD-9-CM   1. Community acquired pneumonia, unspecified laterality  J18.9 486     Patient Active Problem List   Diagnosis   • SCC (squamous cell carcinoma), face   • General weakness   • Pacemaker   • Abnormal gait   • Atrial fibrillation   • Chronic diastolic heart failure   • Coronary arteriosclerosis   • Dyslipidemia   • Glaucoma   • Mitral valve disorder   • Hypertension   • Hypertensive kidney disease, stage III   • Hypothyroidism   • Long term (current) use of anticoagulants   • Malignant neoplasm of prostate   • Osteoporosis   • Squamous cell carcinoma of skin of face   • Acute on chronic diastolic CHF (congestive heart failure)   • Stage 3a chronic kidney disease   • Personal history of radiation therapy   • Acute on chronic combined systolic and diastolic CHF (congestive heart failure)   • Cellulitis of right leg   • Chronic acquired lymphedema   • Contusion of face   • Contusion of forearm, left   • Fall   • Chronic systolic heart failure (CMS/HCC)   • Acute respiratory failure with hypoxia   • Hyponatremia   • Normocytic anemia   • Transaminitis     Past Medical History:   Diagnosis Date   • Acute on chronic diastolic CHF (congestive heart failure) 2022   • Atrial fibrillation 2022   • Coronary arteriosclerosis 7/3/2014    Formatting of this note might be different from the original. [facility] 16  IMPRESSION:   1. Right heart disease, hypertensive cardiac disease.   2. Status post mitral valve repair.   3. Anatomy: No hemodynamically significant disease. about 30-40% lesion of    the right coronary artery. Normal. Left coronary artery system.   • Dyslipidemia 2013   • Glaucoma 2022   • Hypertension 2013   • Hypertensive kidney disease, stage III  3/13/2017   • Hypothyroidism 3/19/2017   • Long term (current) use of anticoagulants 12/5/2013    Formatting of this note might be different from the original. Mitral valve replacement and atrial fibrillation Assume a range of 2.5-3.5.   • Malignant neoplasm of prostate 12/5/2013    Formatting of this note might be different from the original. 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   • Stage 3b chronic kidney disease 4/14/2022     Past Surgical History:   Procedure Laterality Date   • PACEMAKER IMPLANTATION  2018       SLP Recommendation and Plan    Video swallow study completed. Pt presents with mild, mild-moderate oropharyngeal dysphagia. Epiglottis fixed, impacting laryngeal vestibular closure and buildup of vallculae residue. Diffuse pharyngeal residue additionally attributed to cervical spurring and overall poor pharyngeal squeeze. Transient penetration with NTL and thins by straw. No additional penetration or incidents of aspiration. Residue buildup ranging from minimal to severe, but is decreased most effectively with alternating liquids and solids, and multiple swallows following solids/liquids.     Recommend: mechanical soft and thin liquids. Medications: with thin liquids or puree if difficulties. Compensations: alternate liquids and solids, multiple swallows s/p solids, multiple swallows s/p liquids.     SLP to follow for carryover of recommendations.     SWALLOW EVALUATION (last 72 hours)     SLP Adult Swallow Evaluation     Row Name 04/07/23 1600       Rehab Evaluation    Document Type evaluation  -AB    Subjective Information no complaints  -AB    Patient Observations alert;cooperative;agree to therapy  -AB    Patient/Family/Caregiver Comments/Observations seen in fluoro suite,  air[place] precautions maintained  -AB    Patient Effort good  -AB    Symptoms Noted During/After Treatment none  -AB       General Information    Patient Profile Reviewed yes  -AB    Pertinent History Of Current Problem acute respiratory failure w/hypoxia  -AB    Current Method of Nutrition regular textures;thin liquids  -AB    Precautions/Limitations, Vision WFL  -AB    Precautions/Limitations, Hearing hearing impairment, bilaterally  -AB    Prior Level of Function-Communication WFL  -AB    Prior Level of Function-Swallowing no diet consistency restrictions  -AB    Plans/Goals Discussed with patient;agreed upon  -AB    Barriers to Rehab none identified  -AB       Pain    Additional Documentation Pain Scale: Numbers Pre/Post-Treatment (Group)  -AB       Pain Scale: Numbers Pre/Post-Treatment    Pretreatment Pain Rating 0/10 - no pain  -AB    Posttreatment Pain Rating 0/10 - no pain  -AB       Oral Motor Structure and Function    Oral Lesions or Structural Abnormalities and/or variants none  -AB    Dentition Assessment teeth are in poor condition  -AB    Secretion Management WNL/WFL  -AB    Mucosal Quality moist, healthy  -AB    Gag Response --  did not test  -AB    Volitional Swallow WFL  -AB    Volitional Cough weak  -AB       Oral Musculature and Cranial Nerve Assessment    Oral Motor General Assessment WFL  -AB       General Eating/Swallowing Observations    Respiratory Support Currently in Use --    O2 Liters --       Clinical Swallow Eval    Clinical Swallow Evaluation Summary --       MBS/VFSS Interpretation    VFSS Summary Radiologist present - Dr. Beach. Video fluoroscopic swallow study completed. Pt seated, 90 degree hip flexion, visualized in a lateral position. Trials assessed included: NTL by cup/straw, puree, mechanical soft/mixed consistency, regular solids. Cervical spurring C2-C6, mild impedance on posterior esophageal wall. View mildly complicated by calcification versus scarring inferior  epiglottis, present in initial  view. Lingual movements slow in initiation. Increased time required for bolus propulsion. Premature spillage to valleculae with NTL by cup/straw, thin by cup, pyriform for thin by straw. Base of tongue, superior hyolaryngeal elevation adequate. Mildly reduced anterior excursion of hyoid complex. Epiglottis in fixed upright position with minimal deflection. Transient penetration, shallow, ejected post prandial with NTL by cup and thins by straw. Difficult to fully visualize material entry into laryngeal vestibule given aforementioned shadowing. Minimal to moderate residuals valleculae with NTL, mechanical soft/mixed consistency.  Thin liquids with minimal material lining hypopharynx, pooling in pyriform. Residue moderate to maximal BOT, valleculae, PPW C3-C6 following puree. Majority of regular solid bolus spills over BOT, requires liquid wash to propel, moderate to maximal residuals retained BOT, valleculae, epiglottic tip. Cued second swallow decreases. Liquid wash with increased efficacy. Compensations do not eliminate residuals, can reduce to minimal.  -AB       SLP Communication to Radiology    Severity Level of Dysphagia mild dysphagia;oral dysfunction;pharyngeal dysfunction  -AB    Consistencies Aspirated/Penetrated penetrated;thin liquids;nectar-thick liquids  -AB    Summary Statement Radiologist present - Dr. Beach. Video fluoroscopic swallow study completed. Pt seated, 90 degree hip flexion, visualized in a lateral position. Trials assessed included: NTL by cup/straw, puree, mechanical soft/mixed consistency, regular solids. Cervical spurring C2-C6, mild impedance on posterior esophageal wall. View mildly complicated by calcification versus scarring inferior epiglottis, present in initial  view. Lingual movements slow in initiation. Increased time required for bolus propulsion. Premature spillage to valleculae with NTL by cup/straw, thin by cup, pyriform for thin  by straw. Base of tongue, superior hyolaryngeal elevation adequate. Mildly reduced anterior excursion of hyoid complex. Epiglottis in fixed upright position with minimal deflection. Transient penetration, shallow, ejected post prandial with NTL by cup and thins by straw. Difficult to fully visualize material entry into laryngeal vestibule given aforementioned shadowing. Minimal to moderate residuals valleculae with NTL, mechanical soft/mixed consistency.  Thin liquids with minimal material lining hypopharynx, pooling in pyriform. Residue moderate to maximal BOT, valleculae, PPW C3-C6 following puree. Majority of regular solid bolus spills over BOT, requires liquid wash to propel, moderate to maximal residuals retained BOT, valleculae, epiglottic tip. Cued second swallow decreases. Liquid wash with increased efficacy. Compensations do not eliminate residuals, can reduce to minimal.  -AB       SLP Evaluation Clinical Impression    SLP Swallowing Diagnosis mild;oral dysphagia;pharyngeal dysphagia  -AB    Functional Impact risk of aspiration/pneumonia  -AB    Rehab Potential/Prognosis, Swallowing good, to achieve stated therapy goals  -AB    Swallow Criteria for Skilled Therapeutic Interventions Met demonstrates skilled criteria  -AB       SLP Treatment Clinical Impressions    Care Plan Review evaluation/treatment results reviewed;care plan/treatment goals reviewed;current/potential barriers reviewed;risks/benefits reviewed;patient/other agree to care plan  -AB       Recommendations    Therapy Frequency (Swallow) PRN  -AB    Predicted Duration Therapy Intervention (Days) until discharge  -AB    SLP Diet Recommendation soft to chew textures;thin liquids  -AB    Recommended Diagnostics --    Recommended Precautions and Strategies multiple swallows per bite of food;multiple swallows per sip of liquid;alternate between small bites of food and sips of liquid  -AB    Oral Care Recommendations Oral Care BID/PRN  -AB    SLP  Rec. for Method of Medication Administration with thin liquids;with puree;as tolerated  -AB    Monitor for Signs of Aspiration yes;notify SLP if any concerns  -AB    Anticipated Discharge Disposition (SLP) unknown  -AB       Swallow Goals (SLP)    Swallow LTGs Patient will demonstrate functional swallow for  -AB       (LTG) Patient will demonstrate functional swallow for    Diet Texture (Demonstrate functional swallow) soft to chew (whole) textures  -AB    Liquid viscosity (Demonstrate functional swallow) thin liquids  -AB    [place] (Demonstrate functional swallow) independently (over 90% accuracy)  -AB    Time Frame (Demonstrate functional swallow) by discharge  -AB    Progress/Outcomes (Demonstrate functional swallow) good progress toward goal  -AB          User Key  (r) = Recorded By, (t) = Taken By, (c) = Cosigned By    Initials Name Effective Dates    AB Criss Martinez MS CCC-SLP 12/27/22 -                 EDUCATION  The patient has been educated in the following areas:   Dysphagia (Swallowing Impairment) Modified Diet Instruction.        SLP GOALS     Row Name 04/07/23 1600       (LTG) Patient will demonstrate functional swallow for    Diet Texture (Demonstrate functional swallow) soft to chew (whole) textures  -AB    Liquid viscosity (Demonstrate functional swallow) thin liquids  -AB    [place] (Demonstrate functional swallow) independently (over 90% accuracy)  -AB    Time Frame (Demonstrate functional swallow) by discharge  -AB    Progress/Outcomes (Demonstrate functional swallow) good progress toward goal  -AB          User Key  (r) = Recorded By, (t) = Taken By, (c) = Cosigned By    Initials Name Provider Type    Criss Duarte, MS CCC-SLP Speech and Language Pathologist                   Time Calculation:    Time Calculation- SLP     Row Name 04/07/23 1655             Time Calculation- SLP    SLP Start Time 1530  -AB      SLP Stop Time 1655  -AB      SLP Time Calculation (min) 85 min   -AB      SLP Received On 04/07/23  -AB         Untimed Charges    85701[employer] Fluoro Eval Swallow Minutes 85  -AB         Total Minutes    Untimed Charges Total Minutes 85  -AB       Total Minutes 85  -AB            User Key  (r) = Recorded By, (t) = Taken By, (c) = Cosigned By    Initials Name Provider Type    Criss Duarte, MS CCC-SLP Speech and Language Pathologist                Therapy Charges for Today     Code Description Service Date Service Provider Modifiers Qty    [id number]  ST MOTION FLUORO EVAL SWALLOW 6 4/7/2023 Criss Martinez, MS CCC-SLP GN 1               Criss Martinez MS CCC-SLP  4/7/2023

## 2023-04-07 NOTE — PROGRESS NOTES
Name: Bridger Elias ADMIT: 3/30/2023   : 1930  PCP: Alma Cat MD    MRN: [id number] LOS: 7 days   AGE/SEX: 89 y.o. male  ROOM: [facility]     Subjective   Subjective   No BM for several days, he feels very constipated.    Objective   Objective   Vital Signs  Temp:  [97.2 °F (36.2 °C)-99.1 °F (37.3 °C)] 99.1 °F (37.3 °C)  Heart Rate:  [74] 74  Resp:  [16] 16  BP: ()/(52-65) 94/57  SpO2:  [94 %-95 %] 95 %  on  Flow (L/min):  [3-4] 3;   Device (Oxygen Therapy): humidified;nasal cannula  Body mass index is 23.72 kg/m².  Physical Exam  Constitutional:       General: He is not in acute distress.     Comments: Chronically ill-appearing   Cardiovascular:      Rate and Rhythm: Normal rate.   Pulmonary:      Effort: Pulmonary effort is normal. No respiratory distress.      Breath sounds: Wheezing present.   Abdominal:      General: Abdomen is flat. There is no distension.      Palpations: Abdomen is soft.      Tenderness: There is no abdominal tenderness.   Musculoskeletal:         General: No swelling.      Right lower leg: No edema.      Left lower leg: No edema.   Skin:     Comments: Scattered ecchymoses   Neurological:      General: No focal deficit present.      Mental Status: He is alert and oriented to person, place, and time.         Results Review     I reviewed the patient's new clinical results.  Results from last 7 days   Lab Units 23  05023  0350 23  05023   WBC 10*3/mm3 6.60 7.41 9.78 9.55   HEMOGLOBIN g/dL 11.5* 11.2* 11.8* 12.3*   PLATELETS 10*3/mm3 161 162 154 163     Results from last 7 days   Lab Units 23  0509 23  0350 23  05023   SODIUM mmol/L 133* 134* 130* 136   POTASSIUM mmol/L 3.4* 3.8 3.6 3.8   CHLORIDE mmol/L 94* 94* 95* 96*   CO2 mmol/L 28.0 26.5 27.1 26.8   BUN mg/dL 39* 32* 30* 29*   CREATININE mg/dL 1.44* 1.36* 1.50* 1.34*   GLUCOSE mg/dL 106* 111* 105* 129*   EGFR mL/min/1.73 45.3* 48.5* 43.1* 49.4*      Results from last 7 days   Lab Units 04/07/23  0509 04/06/23  0350 04/05/23  0506 04/04/23 2048 04/04/23  0334   ALBUMIN g/dL 2.9* 3.0* 3.0* 3.4* 2.9*   BILIRUBIN mg/dL 0.6 0.8 0.9  --  0.8   ALK PHOS U/L 232* 227* 215*  --  247*   AST (SGOT) U/L 141* 101* 56*  --  64*   ALT (SGPT) U/L 83* 60* 41  --  44*     Results from last 7 days   Lab Units 04/07/23  0509 04/06/23  0350 04/05/23  0506 04/04/23 2048 04/04/23 0334 04/03/23  0427   CALCIUM mg/dL 8.6 8.4 8.4 8.6 8.5 8.8   ALBUMIN g/dL 2.9* 3.0* 3.0* 3.4* 2.9* 3.1*   MAGNESIUM mg/dL 2.6* 2.5*  --  2.4* 2.4* 2.2   PHOSPHORUS mg/dL 2.9 2.7  --  2.9  --  2.1*     Results from last 7 days   Lab Units 04/04/23  0334   PROCALCITONIN ng/mL 0.14     No results found for: HGBA1C, POCGLU    No radiology results for the last day  Scheduled Medications  apixaban, 5 mg, Oral, Q12H  bicalutamide, 50 mg, Oral, Daily  fluticasone, 2 spray, Each Nare, Daily  latanoprost, 1 drop, Both Eyes, Daily  levothyroxine, 88 mcg, Oral, Daily  melatonin, 2 mg, Oral, Nightly  multivitamin with minerals, 1 tablet, Oral, Daily  rosuvastatin, 10 mg, Oral, Daily  torsemide, 40 mg, Oral, Daily    Infusions   Diet  Diet: Cardiac Diets, Fluid Restriction (240 mL/tray) Diets; Low Sodium (2g); 1500 mL/day; No Mixed Consistencies; Texture: Soft to Chew (NDD 3); Soft to Chew: Chopped Meat; Fluid Consistency: Nectar Thick       Assessment/Plan     Active Hospital Problems    Diagnosis  POA   • **Acute respiratory failure with hypoxia [J96.01]  Yes   • Hyponatremia [E87.1]  Yes   • Normocytic anemia [D64.9]  Yes   • Transaminitis [R74.01]  Yes   • Stage 3a chronic kidney disease [N18.31]  Yes   • Pacemaker [Z95.0]  Yes   • Atrial fibrillation [I48.91]  Yes   • Acute on chronic combined systolic and diastolic CHF (congestive heart failure) [I50.43]  Yes   • SCC (squamous cell carcinoma), face [C44.320]  Yes   • Hypothyroidism [E03.9]  Yes   • Coronary arteriosclerosis [I25.10]  Yes   • Dyslipidemia  [E78.5]  Yes   • Hypertension [I10]  Yes   • Long term (current) use of anticoagulants [Z79.01]  Not Applicable      Resolved Hospital Problems    Diagnosis Date Resolved POA   • Hypokalemia [E87.6] 04/05/2023 No   • Community acquired pneumonia, unspecified laterality [J18.9] 04/03/2023 Yes       89 y.o. male admitted with Acute respiratory failure with hypoxia.      04/07/23   Awaiting AFB smears, likely trial of steroids if negative.  Had a long discussion with patient, daughter, and son via telephone.  Explained that patient is very weak from prolonged hospitalization and significant number of days in bed.  He has not been eating very well despite encouragement.  Volume wise he looks good, at this point we are waiting on pulmonary work-up.  Every day patient states he is very tired.  Palliative care met with patient yesterday and he reaffirmed DNR CODE STATUS as per his living will.  Patient has had weight loss coupled with decline over the past couple weeks.  I noted that I am not sure how much recovery he will have, however at this point the family is very hopeful he will be able to go to rehab and return to assisted living facility.    Acute respiratory failure with hypoxia  Acute on chronic combined systolic and diastolic heart failure  -ProBNP 2683 OA  -CXR w/ b/l effusions, congestion  -Procalcitonin, strep, Legionella, respiratory viral panel negative.  -TTE (4/1/23): 41-45%; systolic and diastolic flattening of IV septum consistent with RV pressure/volume overload  -Cardiology, Nephrology following  -Pulmonology consulted given persistent O2 needs-AFB smears pending; if negative empiric steroids versus bronchoscopy with biopsy      Hyponatremia  -improved with diuresis  -Renal following     Atrial fibrillation on long-term anticoagulation  -RC: Amiodarone 100 mg every other day; Coreg ON HOLD given soft BP  -AC: Apixaban    CKD3a  -Crt near baseline; monitor      Transaminitis  -LFTs slightly elevated,  however, relatively stable     Hypothyroidism  -Synthroid 88 mcg     Hyperlipidemia  -Rosuvastatin 10 mg     Generalized weakness/Debility  - Consulted PT/OT, fall precautions.    · Eliquis  for DVT prophylaxis.  · Full code.  · Discussed with patient, family, care team on multidisciplinary rounds and Dr Bruce.  · Anticipate discharge to SNF when cleared by consultants      Rock Daley MD  [facility]  04/07/23  06:36 EDT

## 2023-04-07 NOTE — PLAN OF CARE
Goal Outcome Evaluation:                 Video swallow study completed. Pt presents with mild, mild-moderate oropharyngeal dysphagia. Epiglottis fixed, impacting laryngeal vestibular closure and buildup of vallculae residue. Diffuse pharyngeal residue additionally attributed to cervical spurring and overall poor pharyngeal squeeze. Transient penetration with NTL and thins by straw. No additional penetration or incidents of aspiration. Residue buildup ranging from minimal to severe, but is decreased most effectively with alternating liquids and solids, and multiple swallows following solids/liquids.     Recommend: mechanical soft and thin liquids. Medications: with thin liquids or puree if difficulties. Compensations: alternate liquids and solids, multiple swallows s/p solids, multiple swallows s/p liquids.     SLP to follow for carryover of recommendations.

## 2023-04-07 NOTE — CONSULTS
I spoke with this patient's daughter Sultana and answered questions re: discharge planning. She would like for the patient to discharge to rehab for strength building, and if possible to return to his assisted living facility. If he is not able to safely return to AL then she may pursue LTC options.  No further needs identified.  I advised her of our availability and all questions answered.    Palliative Care will sign off.    Deloris Mcclelland RN

## 2023-04-07 NOTE — PLAN OF CARE
Goal Outcome Evaluation:  Plan of Care Reviewed With: patient, daughter        Progress: improving  Outcome Evaluation: Pt received in bed upon arrival and after some encouragement agreeable to PT. Pt required increased time and min A to reach sitting EOB. Pt stood and ambulated 12' c RW requiring CGA. Pt demo's slow shuffling steps but no LOB. Distance limited secondary to TB precautions with confined spaces. Pt UIC and completed B UE & LE strengthening exercises x 10. PT encouraged pt to sit UIC and complete HEP throughout the day. Pt will continue to benefit from skilled PT to address strength, endurance, and functional mobility.

## 2023-04-07 NOTE — CONSULTS
I was requested by family to see patient.  Mr. Elias is a very pleasant man.  He is of the [employer] for many, many years.  His daughter was present to provide support.  We had prayer together and he quoted the 23rd Psalm. He stated that he knew God will take care of him.

## 2023-04-07 NOTE — PLAN OF CARE
Goal Outcome Evaluation:  Plan of Care Reviewed With: patient, daughter        Progress: improving  Outcome Evaluation: Pt had CXR this am pending. Pt daughter at bedside very helpful and pleasant. AOX4. No cough witnessed. Sat >90% on 3L humidified NC. Pt is weak and seems to be hurting all over. Urine OP adaquate, uses urinal with daughters help. Repositioned and turned on waffle cushing. CTM, safety maintained.  Daily Care Plan Summary: Heart Failure  Diuretic in Use: Torsemide Oral   Response to Diuretics (Output greater than intake): greater output   Daily Weight (up or down): down  O2 Requirements: same 3L   Functional Status (Activity level, tolerance and respiratory symptoms): same weak  Discharge Plans: TBD

## 2023-04-07 NOTE — PROGRESS NOTES
[facility] Progress Note      Patient Name: Bridger Elias  : 1930  MRN: [id number]  Primary Care Physician:  Alma Cat MD  Date of admission: 3/30/2023    Subjective     Interval History:   Follow-up hyponatremia and CKD    The patient is feeling better, denies chest pain or shortness of air, no orthopnea or PND, no nausea or vomiting, no abdominal pain, no dysuria or gross hematuria, he is hypotensive now.    Review of Systems:   As noted above    Objective     Vitals:   Temp:  [97.2 °F (36.2 °C)-99.1 °F (37.3 °C)] 98.4 °F (36.9 °C)  Heart Rate:  [74-75] 75  Resp:  [16-18] 18  BP: ()/(50-65) 98/55  Flow (L/min):  [3] 3    Intake/Output Summary (Last 24 hours) at 2023 1003  Last data filed at 2023 0420  Gross per 24 hour   Intake 120 ml   Output 1350 ml   Net -1230 ml       Physical Exam:    General Appearance: Awake, alert, very frail and chronically ill  Skin: warm and dry  HEENT: oral mucosa normal, nonicteric sclera  Neck: Minimal JVD  Lungs: Bilateral rhonchi, breathing effort not labored  Heart: Irregularly irregular, no rub  Abdomen: soft, nontender, nondistended, normoactive bowels  : no palpable bladder  Extremities: No left ankle edema no edema on the right, no hip or thigh edema  Neuro: normal speech and mental status, hard of hearing    Scheduled Meds:     apixaban, 5 mg, Oral, Q12H  bicalutamide, 50 mg, Oral, Daily  fluticasone, 2 spray, Each Nare, Daily  latanoprost, 1 drop, Both Eyes, Daily  levothyroxine, 88 mcg, Oral, Daily  melatonin, 2 mg, Oral, Nightly  multivitamin with minerals, 1 tablet, Oral, Daily  rosuvastatin, 10 mg, Oral, Daily      IV Meds:        Results Reviewed:   I have personally reviewed the results from the time of this admission to 2023 10:03 EDT     Results from last 7 days   Lab Units 23  0509 23  0350 23  0506   SODIUM mmol/L 133* 134* 130*   POTASSIUM mmol/L 3.4* 3.8 3.6   CHLORIDE mmol/L  94* 94* 95*   CO2 mmol/L 28.0 26.5 27.1   BUN mg/dL 39* 32* 30*   CREATININE mg/dL 1.44* 1.36* 1.50*   CALCIUM mg/dL 8.6 8.4 8.4   BILIRUBIN mg/dL 0.6 0.8 0.9   ALK PHOS U/L 232* 227* 215*   ALT (SGPT) U/L 83* 60* 41   AST (SGOT) U/L 141* 101* 56*   GLUCOSE mg/dL 106* 111* 105*       Estimated Creatinine Clearance: 31.1 mL/min (A) (by C-G formula based on SCr of 1.44 mg/dL (H)).    Results from last 7 days   Lab Units 04/07/23  0509 04/06/23  0350 04/04/23  2048   MAGNESIUM mg/dL 2.6* 2.5* 2.4*   PHOSPHORUS mg/dL 2.9 2.7 2.9       Results from last 7 days   Lab Units 04/07/23  0509 04/06/23  0350 04/04/23 2048 04/04/23  0334 04/03/23  0427 04/02/23  1215   URIC ACID mg/dL 6.4 5.9 5.6 5.0 4.6 4.4       Results from last 7 days   Lab Units 04/07/23  0509 04/06/23  0350 04/05/23  0506 04/04/23 2048 04/04/23  0334   WBC 10*3/mm3 6.60 7.41 9.78 9.55 10.90*   HEMOGLOBIN g/dL 11.5* 11.2* 11.8* 12.3* 11.7*   PLATELETS 10*3/mm3 161 162 154 163 177             Assessment / Plan     ASSESSMENT:  -Acute kidney injury on chronic kidney disease, cardiorenal syndrome, creatinine today is 1.44, worsening associated with diuretics and decreased blood pressure  -Hyponatremia associated with acute on chronic diastolic dysfunction, sodium today is 133  -Hypokalemia, potassium 3.4  -CKD stage III secondary to nephrosclerosis and advanced age  -History of hypertension but the patient is hypotensive at present  -A-fib  -Chronically anticoagulated  -Coronary artery disease  -Valvular heart disease  -Frailty    PLAN:  -No diuretics today  -Replete potassium by protocol  -I discussed the case with the patient and his daughter at the bedside  -Surveillance labs.    Thank you for involving us in the care of Bridger Elias.  Please feel free to call with any questions.    Naeem Hernandez MD  04/07/23  10:03 EDT    [facility]  [phone]    Please note that portions of this note were completed with a voice  recognition program.

## 2023-04-08 LAB
ALBUMIN SERPL-MCNC: 2.9 G/DL (ref 3.5–5.2)
ALBUMIN/GLOB SERPL: 0.7 G/DL
ALP SERPL-CCNC: 221 U/L (ref 39–117)
ALT SERPL W P-5'-P-CCNC: 80 U/L (ref 1–41)
ANION GAP SERPL CALCULATED.3IONS-SCNC: 7.8 MMOL/L (ref 5–15)
AST SERPL-CCNC: 121 U/L (ref 1–40)
BASOPHILS # BLD AUTO: 0.03 10*3/MM3 (ref 0–0.2)
BASOPHILS NFR BLD AUTO: 0.5 % (ref 0–1.5)
BILIRUB SERPL-MCNC: 0.7 MG/DL (ref 0–1.2)
BUN SERPL-MCNC: 38 MG/DL (ref 8–23)
BUN/CREAT SERPL: 31.7 (ref 7–25)
CALCIUM SPEC-SCNC: 8.8 MG/DL (ref 8.2–9.6)
CHLORIDE SERPL-SCNC: 95 MMOL/L (ref 98–107)
CO2 SERPL-SCNC: 28.2 MMOL/L (ref 22–29)
CREAT SERPL-MCNC: 1.2 MG/DL (ref 0.76–1.27)
DEPRECATED RDW RBC AUTO: 48.5 FL (ref 37–54)
EGFRCR SERPLBLD CKD-EPI 2021: 56.4 ML/MIN/1.73
EOSINOPHIL # BLD AUTO: 0 10*3/MM3 (ref 0–0.4)
EOSINOPHIL NFR BLD AUTO: 0 % (ref 0.3–6.2)
ERYTHROCYTE [DISTWIDTH] IN BLOOD BY AUTOMATED COUNT: 14 % (ref 12.3–15.4)
GLOBULIN UR ELPH-MCNC: 4.3 GM/DL
GLUCOSE SERPL-MCNC: 147 MG/DL (ref 65–99)
HCT VFR BLD AUTO: 32.5 % (ref 37.5–51)
HGB BLD-MCNC: 11 G/DL (ref 13–17.7)
IMM GRANULOCYTES # BLD AUTO: 0.12 10*3/MM3 (ref 0–0.05)
IMM GRANULOCYTES NFR BLD AUTO: 2.1 % (ref 0–0.5)
LYMPHOCYTES # BLD AUTO: 0.82 10*3/MM3 (ref 0.7–3.1)
LYMPHOCYTES NFR BLD AUTO: 14.5 % (ref 19.6–45.3)
MAGNESIUM SERPL-MCNC: 2.6 MG/DL (ref 1.7–2.3)
MCH RBC QN AUTO: 32.1 PG (ref 26.6–33)
MCHC RBC AUTO-ENTMCNC: 33.8 G/DL (ref 31.5–35.7)
MCV RBC AUTO: 94.8 FL (ref 79–97)
MONOCYTES # BLD AUTO: 0.18 10*3/MM3 (ref 0.1–0.9)
MONOCYTES NFR BLD AUTO: 3.2 % (ref 5–12)
NEUTROPHILS NFR BLD AUTO: 4.5 10*3/MM3 (ref 1.7–7)
NEUTROPHILS NFR BLD AUTO: 79.7 % (ref 42.7–76)
NRBC BLD AUTO-RTO: 0 /100 WBC (ref 0–0.2)
PHOSPHATE SERPL-MCNC: 3 MG/DL (ref 2.5–4.5)
PLATELET # BLD AUTO: 179 10*3/MM3 (ref 140–450)
PMV BLD AUTO: 10.9 FL (ref 6–12)
POTASSIUM SERPL-SCNC: 4.2 MMOL/L (ref 3.5–5.2)
PROT SERPL-MCNC: 7.2 G/DL (ref 6–8.5)
RBC # BLD AUTO: 3.43 10*6/MM3 (ref 4.14–5.8)
SODIUM SERPL-SCNC: 131 MMOL/L (ref 136–145)
URATE SERPL-MCNC: 6.1 MG/DL (ref 3.4–7)
WBC NRBC COR # BLD: 5.65 10*3/MM3 (ref 3.4–10.8)

## 2023-04-08 PROCEDURE — 84100 ASSAY OF PHOSPHORUS: CPT | Performed by: INTERNAL MEDICINE

## 2023-04-08 PROCEDURE — 84550 ASSAY OF BLOOD/URIC ACID: CPT | Performed by: INTERNAL MEDICINE

## 2023-04-08 PROCEDURE — 80053 COMPREHEN METABOLIC PANEL: CPT | Performed by: STUDENT IN AN ORGANIZED HEALTH CARE EDUCATION/TRAINING PROGRAM

## 2023-04-08 PROCEDURE — 63710000001 PREDNISONE PER 1 MG: Performed by: INTERNAL MEDICINE

## 2023-04-08 PROCEDURE — 85025 COMPLETE CBC W/AUTO DIFF WBC: CPT | Performed by: INTERNAL MEDICINE

## 2023-04-08 PROCEDURE — 83735 ASSAY OF MAGNESIUM: CPT | Performed by: INTERNAL MEDICINE

## 2023-04-08 RX ORDER — AMOXICILLIN 250 MG
1 CAPSULE ORAL 2 TIMES DAILY PRN
Status: DISCONTINUED | OUTPATIENT
Start: 2023-04-08 | End: 2023-04-24 | Stop reason: HOSPADM

## 2023-04-08 RX ORDER — POLYETHYLENE GLYCOL 3350 17 G/17G
17 POWDER, FOR SOLUTION ORAL DAILY
Status: DISCONTINUED | OUTPATIENT
Start: 2023-04-08 | End: 2023-04-24 | Stop reason: HOSPADM

## 2023-04-08 RX ORDER — TORSEMIDE 20 MG/1
40 TABLET ORAL DAILY
Status: DISCONTINUED | OUTPATIENT
Start: 2023-04-08 | End: 2023-04-15

## 2023-04-08 RX ADMIN — POLYETHYLENE GLYCOL 3350 17 G: 17 POWDER, FOR SOLUTION ORAL at 13:16

## 2023-04-08 RX ADMIN — TORSEMIDE 40 MG: 20 TABLET ORAL at 13:16

## 2023-04-08 RX ADMIN — LATANOPROST 1 DROP: 50 SOLUTION OPHTHALMIC at 20:35

## 2023-04-08 RX ADMIN — APIXABAN 5 MG: 5 TABLET, FILM COATED ORAL at 10:13

## 2023-04-08 RX ADMIN — PREDNISONE 20 MG: 20 TABLET ORAL at 10:13

## 2023-04-08 RX ADMIN — FLUTICASONE PROPIONATE 2 SPRAY: 50 SPRAY, METERED NASAL at 10:14

## 2023-04-08 RX ADMIN — ROSUVASTATIN CALCIUM 10 MG: 10 TABLET, FILM COATED ORAL at 10:13

## 2023-04-08 RX ADMIN — PREDNISONE 20 MG: 20 TABLET ORAL at 17:23

## 2023-04-08 RX ADMIN — DOCUSATE SODIUM 50MG AND SENNOSIDES 8.6MG 1 TABLET: 8.6; 5 TABLET, FILM COATED ORAL at 13:16

## 2023-04-08 RX ADMIN — BICALUTAMIDE 50 MG: 50 TABLET ORAL at 10:14

## 2023-04-08 RX ADMIN — LEVOTHYROXINE SODIUM 88 MCG: 0.09 TABLET ORAL at 10:13

## 2023-04-08 RX ADMIN — MULTIPLE VITAMINS W/ MINERALS TAB 1 TABLET: TAB at 10:13

## 2023-04-08 RX ADMIN — APIXABAN 5 MG: 5 TABLET, FILM COATED ORAL at 20:35

## 2023-04-08 NOTE — PROGRESS NOTES
[facility] Progress Note      Patient Name: Bridger Elias  : 1930  MRN: [id number]  Primary Care Physician:  Alma Cat MD  Date of admission: 3/30/2023    Subjective     Interval History:   Follow-up hyponatremia and CKD    The patient is feeling better, denies chest pain or shortness of air, no orthopnea or PND, no nausea or vomiting, no abdominal pain, no dysuria or gross hematuria, his blood pressure improved somewhat but remains on the low side.  Review of Systems:   As noted above    Objective     Vitals:   Temp:  [97.8 °F (36.6 °C)-98 °F (36.7 °C)] 98 °F (36.7 °C)  Heart Rate:  [74] 74  Resp:  [18-20] 18  BP: ()/(62-91) 92/62  Flow (L/min):  [1-3] 2    Intake/Output Summary (Last 24 hours) at 2023 1020  Last data filed at 2023 0630  Gross per 24 hour   Intake 50 ml   Output 700 ml   Net -650 ml       Physical Exam:    General Appearance: Awake, alert, very frail and chronically ill  Skin: warm and dry  HEENT: oral mucosa normal, nonicteric sclera  Neck: Minimal JVD  Lungs: Bilateral rhonchi, breathing effort not labored  Heart: Irregularly irregular, no rub  Abdomen: soft, nontender, nondistended, normoactive bowels  : no palpable bladder  Extremities: No left ankle edema no edema on the right, no hip or thigh edema  Neuro: normal speech and mental status, hard of hearing    Scheduled Meds:     apixaban, 5 mg, Oral, Q12H  bicalutamide, 50 mg, Oral, Daily  fluticasone, 2 spray, Each Nare, Daily  latanoprost, 1 drop, Both Eyes, Daily  levothyroxine, 88 mcg, Oral, Daily  melatonin, 2 mg, Oral, Nightly  multivitamin with minerals, 1 tablet, Oral, Daily  predniSONE, 20 mg, Oral, BID With Meals  rosuvastatin, 10 mg, Oral, Daily      IV Meds:        Results Reviewed:   I have personally reviewed the results from the time of this admission to 2023 10:20 EDT     Results from last 7 days   Lab Units 23  0640 23  0509 23  0350   SODIUM  mmol/L 131* 133* 134*   POTASSIUM mmol/L 4.2 3.4* 3.8   CHLORIDE mmol/L 95* 94* 94*   CO2 mmol/L 28.2 28.0 26.5   BUN mg/dL 38* 39* 32*   CREATININE mg/dL 1.20 1.44* 1.36*   CALCIUM mg/dL 8.8 8.6 8.4   BILIRUBIN mg/dL 0.7 0.6 0.8   ALK PHOS U/L 221* 232* 227*   ALT (SGPT) U/L 80* 83* 60*   AST (SGOT) U/L 121* 141* 101*   GLUCOSE mg/dL 147* 106* 111*       Estimated Creatinine Clearance: 38.1 mL/min (by C-G formula based on SCr of 1.2 mg/dL).    Results from last 7 days   Lab Units 04/08/23  0640 04/07/23  0509 04/06/23  0350   MAGNESIUM mg/dL 2.6* 2.6* 2.5*   PHOSPHORUS mg/dL 3.0 2.9 2.7       Results from last 7 days   Lab Units 04/08/23  0640 04/07/23  0509 04/06/23  0350 04/04/23  2048 04/04/23  0334 04/03/23  0427 04/02/23  1215   URIC ACID mg/dL 6.1 6.4 5.9 5.6 5.0 4.6 4.4       Results from last 7 days   Lab Units 04/08/23  0640 04/07/23  0509 04/06/23  0350 04/05/23  0506 04/04/23  2048   WBC 10*3/mm3 5.65 6.60 7.41 9.78 9.55   HEMOGLOBIN g/dL 11.0* 11.5* 11.2* 11.8* 12.3*   PLATELETS 10*3/mm3 179 161 162 154 163             Assessment / Plan     ASSESSMENT:  -Acute kidney injury on chronic kidney disease, cardiorenal syndrome, creatinine today is 1.2, slightly better than yesterday  -Hyponatremia associated with acute on chronic diastolic dysfunction, sodium today is 131  -Hypokalemia, potassium four-point  -CKD stage III secondary to nephrosclerosis and advanced age  -History of hypertension but the patient is hypotensive at present  -A-fib  -Chronically anticoagulated  -Coronary artery disease  -Valvular heart disease  -Frailty    PLAN:  -Resume oral diuretic, torsemide 40 mg daily  -I discussed the case with the patient and his daughter at the bedside  -Surveillance labs.    Thank you for involving us in the care of Bridger Elias.  Please feel free to call with any questions.    Naeem Hernandez MD  04/08/23  10:20 EDT    [facility]  [phone]    Please note that  portions of this note were completed with a voice recognition program.

## 2023-04-08 NOTE — PROGRESS NOTES
LOS: 8 days   Patient Care Team:  Alma Cat MD as PCP - General (Family Medicine)  Maurice Cook MD as Consulting Physician (Radiation Oncology)    Subjective     Patient is a 89 y.o. male.  Asked to see for persistent O2 requirements and bilateral pleural effusions.   Patient says maybe feels a little better today he is a little more jittery as well.  Appetite has not improved.  Not really short of breath on O2.  Review of Systems:          Objective     Vital Signs  Vital Sign Min/Max for last 24 hours  Temp  Min: 97.8 °F (36.6 °C)  Max: 98 °F (36.7 °C)   BP  Min: 92/62  Max: 133/91   Pulse  Min: 74  Max: 74   Resp  Min: 18  Max: 20   SpO2  Min: 88 %  Max: 100 %   Flow (L/min)  Min: 1  Max: 3   Weight  Min: 70 kg (154 lb 5.2 oz)  Max: 70 kg (154 lb 5.2 oz)        Ventilator/Non-Invasive Ventilation Settings (From admission, onward)    None                       Body mass index is 24.17 kg/m².  I/O last 3 completed shifts:  In: 50 [P.O.:50]  Out: 1500 [Urine:1500]  No intake/output data recorded.        Physical Exam:    General Appearance: Well-developed elderly white male resting in bed he does not look in acute distress he has weaned down to 2 L nasal cannula O2 and his sats are 99% I dropped him down to a liter.  Eyes: Conjunctiva are clear and anicteric pupils look equal mucous membranes are moist no erythema no exudates  ENT: Mucous membranes are moist Mallampati 1 airway nasal septum midline  Neck: No palpable adenopathy or thyromegaly no visible jugular venous distention and trachea midline  Lungs: Clear I do not hear wheezes rales or rhonchi   Cardiac: Irregularly irregular no definite murmur  Abdomen: Soft no palpable hepatosplenomegaly or masses  : Not examined  Musculoskeletal: He has some mild to moderate thoracic kyphosis  Skin: Warm and dry no jaundice no petechiae  Neuro: He is hard of hearing but he is alert and oriented he is cooperative  Extremities/P Vascular: No clubbing no  cyanosis no edema palpable radial and dorsalis pedis pulses  MSE: Pleasant gentleman he is appropriate     Labs:  Results from last 7 days   Lab Units 04/08/23  0640 04/07/23  0509 04/06/23  0350 04/05/23  0506 04/04/23 2048 04/04/23  0334 04/03/23  0427 04/02/23  0418   GLUCOSE mg/dL 147* 106* 111* 105* 129* 110* 108* 130*   SODIUM mmol/L 131* 133* 134* 130* 136 133* 132* 127*   POTASSIUM mmol/L 4.2 3.4* 3.8 3.6 3.8 4.0 4.6 3.2*   MAGNESIUM mg/dL 2.6* 2.6* 2.5*  --  2.4* 2.4* 2.2 2.3   CO2 mmol/L 28.2 28.0 26.5 27.1 26.8 25.0 26.7 23.1   CHLORIDE mmol/L 95* 94* 94* 95* 96* 97* 96* 92*   ANION GAP mmol/L 7.8 11.0 13.5 7.9 13.2 11.0 9.3 11.9   CREATININE mg/dL 1.20 1.44* 1.36* 1.50* 1.34* 1.45* 1.37* 1.14   BUN mg/dL 38* 39* 32* 30* 29* 27* 24* 23   BUN / CREAT RATIO  31.7* 27.1* 23.5 20.0 21.6 18.6 17.5 20.2   CALCIUM mg/dL 8.8 8.6 8.4 8.4 8.6 8.5 8.8 8.3   ALK PHOS U/L 221* 232* 227* 215*  --  247* 251* 219*   TOTAL PROTEIN g/dL 7.2 7.4 7.3 7.3  --  7.2 7.0 6.6   ALT (SGPT) U/L 80* 83* 60* 41  --  44* 45* 40   AST (SGOT) U/L 121* 141* 101* 56*  --  64* 65* 53*   BILIRUBIN mg/dL 0.7 0.6 0.8 0.9  --  0.8 0.7 0.8   ALBUMIN g/dL 2.9* 2.9* 3.0* 3.0* 3.4* 2.9* 3.1* 2.9*   GLOBULIN gm/dL 4.3 4.5 4.3 4.3  --  4.3 3.9 3.7     Estimated Creatinine Clearance: 38.1 mL/min (by C-G formula based on SCr of 1.2 mg/dL).      Results from last 7 days   Lab Units 04/08/23  0640 04/07/23  0509 04/06/23  0350 04/05/23  0506 04/04/23  2048 04/04/23  0334 04/03/23  0427   WBC 10*3/mm3 5.65 6.60 7.41 9.78 9.55 10.90* 10.61   RBC 10*6/mm3 3.43* 3.45* 3.44* 3.52* 3.69* 3.60* 3.49*   HEMOGLOBIN g/dL 11.0* 11.5* 11.2* 11.8* 12.3* 11.7* 11.9*   HEMATOCRIT % 32.5* 33.1* 32.2* 33.2* 35.1* 34.1* 32.4*   MCV fL 94.8 95.9 93.6 94.3 95.1 94.7 92.8   MCH pg 32.1 33.3* 32.6 33.5* 33.3* 32.5 34.1*   MCHC g/dL 33.8 34.7 34.8 35.5 35.0 34.3 36.7*   RDW % 14.0 14.5 14.1 14.1 14.5 14.3 14.0   RDW-SD fl 48.5 50.3 47.1 48.3 50.8 49.2 47.1   MPV fL 10.9 10.5  10.1 10.0 10.1 10.3 10.5   PLATELETS 10*3/mm3 179 161 162 154 163 177 181   NEUTROPHIL % % 79.7*  --  64.1  --  71.6  --  70.2   LYMPHOCYTE % % 14.5*  --  19.3*  --  10.4*  --  12.1*   MONOCYTES % % 3.2*  --  10.5  --  12.6*  --  11.8   EOSINOPHIL % % 0.0*  --  4.3  --  3.4  --  3.8   BASOPHIL % % 0.5  --  0.9  --  0.7  --  0.8   IMM GRAN % % 2.1*  --  0.9*  --   --   --  1.3*   NEUTROS ABS 10*3/mm3 4.50  --  4.74  --  6.85  --  7.45*   LYMPHS ABS 10*3/mm3 0.82  --  1.43  --  0.99  --  1.28   MONOS ABS 10*3/mm3 0.18  --  0.78  --  1.20*  --  1.25*   EOS ABS 10*3/mm3 0.00  --  0.32  --  0.32  --  0.40   BASOS ABS 10*3/mm3 0.03  --  0.07  --  0.07  --  0.09   IMMATURE GRANS (ABS) 10*3/mm3 0.12*  --  0.07*  --   --   --  0.14*   NRBC /100 WBC 0.0  --  0.1  --   --   --  0.2                     Results from last 7 days   Lab Units 04/04/23  0334   PROCALCITONIN ng/mL 0.14         Microbiology Results (last 10 days)     Procedure Component Value - Date/Time    AFB Culture - Sputum, Cough [[id number]] Collected: 04/06/23 1311    Lab Status: Preliminary result Specimen: Sputum from Cough Updated: 04/07/23 2118     AFB Stain No acid fast bacilli seen on direct smear    AFB Culture - Sputum, Cough [[id number]] Collected: 04/05/23 2222    Lab Status: Preliminary result Specimen: Sputum from Cough Updated: 04/07/23 2118     AFB Stain No acid fast bacilli seen on concentrated smear    Respiratory Culture - Sputum, Cough [[id number]] Collected: 04/05/23 0811    Lab Status: Final result Specimen: Sputum from Cough Updated: 04/07/23 1040     Respiratory Culture Scant growth (1+) Normal respiratory rob. No S. aureus or Pseudomonas aeruginosa detected. Final report.     Gram Stain Many (4+) WBCs per low power field      Rare (1+) Epithelial cells per low power field      Few (2+) Gram positive cocci      Rare (1+) Gram positive bacilli      Rare (1+) Gram negative bacilli    AFB Culture - Sputum, Cough [[id number]] Collected: 04/05/23  0811    Lab Status: Preliminary result Specimen: Sputum from Cough Updated: 04/06/23 1406     AFB Stain No acid fast bacilli seen on concentrated smear    Legionella Antigen, Urine - Urine, Urine, Clean Catch [[id number]]  (Normal) Collected: 04/04/23 2300    Lab Status: Final result Specimen: Urine, Clean Catch Updated: 04/04/23 2343     LEGIONELLA ANTIGEN, URINE Negative    Legionella Antigen, Urine - Urine, Urine, Clean Catch [[id number]]  (Normal) Collected: 03/31/23 0819    Lab Status: Final result Specimen: Urine, Clean Catch Updated: 03/31/23 0922     LEGIONELLA ANTIGEN, URINE Negative    S. Pneumo Ag Urine or CSF - Urine, Urine, Clean Catch [[id number]]  (Normal) Collected: 03/31/23 0819    Lab Status: Final result Specimen: Urine, Clean Catch Updated: 03/31/23 0922     Strep Pneumo Ag Negative    Blood Culture - Blood, Arm, Left [[id number]]  (Normal) Collected: 03/30/23 2229    Lab Status: Final result Specimen: Blood from Arm, Left Updated: 04/04/23 2246     Blood Culture No growth at 5 days    Blood Culture - Blood, Arm, Left [[id number]]  (Normal) Collected: 03/30/23 2204    Lab Status: Final result Specimen: Blood from Arm, Left Updated: 04/04/23 2231     Blood Culture No growth at 5 days    Respiratory Panel PCR w/COVID-19(SARS-CoV-2) [facility] In-House, NP Swab in UTM/VTM, 3-4 HR TAT - Swab, Nasopharynx [[id number]]  (Normal) Collected: 03/30/23 2004    Lab Status: Final result Specimen: Swab from Nasopharynx Updated: 03/30/23 2336     ADENOVIRUS, PCR Not Detected     Coronavirus 229E Not Detected     Coronavirus HKU1 Not Detected     Coronavirus NL63 Not Detected     Coronavirus OC43 Not Detected     COVID19 Not Detected     Human Metapneumovirus Not Detected     Human Rhinovirus/Enterovirus Not Detected     Influenza A PCR Not Detected     Influenza B PCR Not Detected     Parainfluenza Virus 1 Not Detected     Parainfluenza Virus 2 Not Detected     Parainfluenza Virus 3 Not Detected      Parainfluenza Virus 4 Not Detected     RSV, PCR Not Detected     Bordetella pertussis pcr Not Detected     Bordetella parapertussis PCR Not Detected     Chlamydophila pneumoniae PCR Not Detected     Mycoplasma pneumo by PCR Not Detected    Narrative:      In the setting of a positive respiratory panel with a viral infection PLUS a negative procalcitonin without other underlying concern for bacterial infection, consider observing off antibiotics or discontinuation of antibiotics and continue supportive care. If the respiratory panel is positive for atypical bacterial infection (Bordetella pertussis, Chlamydophila pneumoniae, or Mycoplasma pneumoniae), consider antibiotic de-escalation to target atypical bacterial infection.              apixaban, 5 mg, Oral, Q12H  bicalutamide, 50 mg, Oral, Daily  fluticasone, 2 spray, Each Nare, Daily  latanoprost, 1 drop, Both Eyes, Daily  levothyroxine, 88 mcg, Oral, Daily  melatonin, 2 mg, Oral, Nightly  multivitamin with minerals, 1 tablet, Oral, Daily  predniSONE, 20 mg, Oral, BID With Meals  rosuvastatin, 10 mg, Oral, Daily  torsemide, 40 mg, Oral, Daily           Diagnostics:  Adult Transthoracic Echo Complete W/ Cont if Necessary Per Protocol    Result Date: 4/1/2023  •  Left ventricular ejection fraction appears to be 41 - 45%. •  Systolic and diastolic flattening of the interventricular septum consistent with right ventricle pressure and volume overload. •  The right ventricular cavity is severely dilated. Normal right ventricular systolic function noted. •  The left atrial cavity is moderately dilated. •  The right atrial cavity is severely dilated. •  Saline test results are negative. •  Trace mitral valve regurgitation is present. The mitral valve mean gradient is 1.65 mmHg. There is a mitral valve ring present. •  Severe tricuspid valve regurgitation is present. •  Calculated right ventricular systolic pressure from tricuspid regurgitation is 38.0 mmHg. •  There is a  trivial pericardial effusion.     CT Chest Without Contrast Diagnostic    Result Date: 3/31/2023  CT CHEST WITHOUT CONTRAST  HISTORY: Dyspnea.  TECHNIQUE: Noncontrast chest CT is provided and correlated with x-ray from yesterday.  FINDINGS: Cardiac pacing hardware is observed along with hardware at the mitral valve, dilated cardiac chambers, and simple appearing, posteriorly layering pleural effusions bilaterally. Those measure under 3 cm AP thickness in each. There is an enlarged precarinal lymph node measuring about 12 mm short axis. No other significant appearing lymphadenopathy. Abnormal opacity in the left upper lobe and posterior left lower lobe with groundglass density throughout the right upper lobe favored represent pneumonia. No obstructing airway lesion.  Visualized upper abdominal structures appear normal. Degenerative change in the spine.      Cardiac hardware with changes of prior sternotomy and mitral valve repair or replacement. There our bilateral pleural effusions with patchy opacity in the lungs bilaterally which appears similar as on the x-ray from yesterday and is favored represent pneumonia.  Radiation dose reduction techniques were utilized, including automated exposure control and exposure modulation based on body size.  This report was finalized on 3/31/2023 9:06 PM by Dr. Maurice Alejandro M.D.      XR Chest 1 View    Result Date: 4/3/2023  XR CHEST 1 VW-  Clinical: CHF, bilateral pleural effusions  COMPARISON examination 03/31/2013 CT chest  FINDINGS: Unchanged left-sided pleural effusion, right-sided pleural effusion appears slightly improved within the interim. The cardiomediastinal silhouette is stable. Right base infiltrate/atelectasis more pronounced compared to the previous examination. Right and left upper lobe infiltrates similar to the previous examination. The remainder of examination is unremarkable.  This report was finalized on 4/3/2023 6:22 AM by Dr. Segun Jara M.D.      XR  Chest 1 View    Result Date: 3/30/2023  PORTABLE CHEST X-RAY  HISTORY: Shortness of breath.  TECHNIQUE: Portable chest x-ray correlated with chest x-ray 04/14/2022.  FINDINGS: Sternal wires with cardiac valve hardware and a cardiac pacing device are again observed. The cardiac silhouette is enlarged. Patchy infiltrate in the upper lobes is observed bilaterally, more dense on the left than the right. There is also some density in the left lung base and mildly at the periphery of the right lung base. There also appears to be small pleural effusions blunting the costophrenic angles.      Bilateral pneumonia.  This report was finalized on 3/30/2023 8:50 PM by Dr. Maurice Alejandro M.D.      XR CHEST 1 VW PORTABLE    Result Date: 3/10/2023  EXAM: CR Chest 1 Vw Portable NUMBER OF IMAGES:  1 INDICATION: Shortness of breath Technique: AP view of the chest obtained portably on 3/10/2023 10:31 AM Comparison:  Comparison is made to frontal radiograph the chest dated 09/04/2022. Findings: The cardiomediastinal silhouette is enlarged. There are calcified lesions of the thoracic aorta. There are bilateral pleural effusions, right larger than left, with associated patchy bilateral lower lobe airspace disease. There is no pneumothorax. The visualized osseous structures demonstrate degenerative changes. Intact midline sternotomy wires are seen. Lines and Tubes: Left-sided dual-lead cardiac pacer is in place, with leads terminating in satisfactory position. Impression:  Bilateral pleural effusions, right larger than left, with associated patchy bilateral lower lobe airspace disease. Dictated by: Paco Shah MD Signed by Paco Shah MD on 3/10/2023 10:46 AM ##### Final ##### Dictated by:    PACO SHAH MD-RAD Dictated DT/TM: 03/10/2023 10:46 am Interpreted and electronically signed by:  PACO SHAH MD-RAD Signed DT/TM:  03/10/2023 10:46 am    Results for orders placed during the hospital encounter of 03/30/23    Adult  Transthoracic Echo Complete W/ Cont if Necessary Per Protocol    Interpretation Summary  •  Left ventricular ejection fraction appears to be 41 - 45%.  •  Systolic and diastolic flattening of the interventricular septum consistent with right ventricle pressure and volume overload.  •  The right ventricular cavity is severely dilated. Normal right ventricular systolic function noted.  •  The left atrial cavity is moderately dilated.  •  The right atrial cavity is severely dilated.  •  Saline test results are negative.  •  Trace mitral valve regurgitation is present. The mitral valve mean gradient is 1.65 mmHg. There is a mitral valve ring present.  •  Severe tricuspid valve regurgitation is present.  •  Calculated right ventricular systolic pressure from tricuspid regurgitation is 38.0 mmHg.  •  There is a trivial pericardial effusion.          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    Assessment & Plan     1. Bilateral pulmonary infiltrates versus edema CT scan certainly looks more infiltrative than  pulmonary edema white count procalcitonin initially and I have repeated it today are negative no leukocytosis no fevers or chills.  This speaks against at least typical infections.  Do not think it entirely excludes atypical infections with his anorexia etc. which could also be explained by heart failure but he is not improved with diuresis we have to consider atypical infection such as TB atypical Mycobacterium fungal less likely.  Viral infection less likely given the respiratory panel being negative.  This of course could be noninfectious inflammation could be a pneumonitis from aspiration although we have no history of aspiration.  He could have a noninfectious process such as cryptogenic organizing pneumonia or this could be an another ILD.  Lymphangitic tumor would seem to be less likely.    To be pretty well ruled out with 3 negative AFB sputum's and a negative T spot.  Drug-induced interstitial disease including amiodarone and Casodex not entirely excluded, the amiodarone has been discontinued.  As per discussions yesterday we are doing empiric steroids we will see how he progresses.  2. Bilateral pleural effusions looks like there has been some improvement in these with diuresis.  3. Acute hypoxic respiratory failure improving continue weaning O2  4. Acute on chronic combined heart failure per nephrology and cardiology looks like patient has been diuresing.  5. Acute kidney injury chronic kidney disease/cardiorenal syndrome nephrology following  6. Sick sinus syndrome and paroxysmal atrial fibrillation he is on amiodarone we certainly have to consider amiodarone in the differential of his interstitial disease he is on a very tiny dose..  He is anticoagulated with Eliquis.  Has permanent pacemaker.  7. Pulmonary hypertension mild by echocardiogram RV dilatation  8. History of mitral valve disease status post mitral valve repair valve appears to be functioning well by echocardiogram  9. Hyponatremia  nephrology following and managing  10. Anemia mild  11. Mildly elevated transaminases/hepatitis question etiology.  A little improved from yesterday              Plan for disposition: Patient is getting better clinically I think he may be able to get off of O2 today and he may be ready for discharge as soon as tomorrow.    Martín Flowers Jr, MD  04/08/23  10:33 EDT    Time:

## 2023-04-08 NOTE — PROGRESS NOTES
[facility]     LOS: 8 days     Subjective:    CC:Shortness of Breath and Weakness - Generalized    DIET:  Diet Order   Procedures   • Diet: Cardiac Diets, Fluid Restriction (240 mL/tray) Diets; Low Sodium (2g); 1500 mL/day; No Mixed Consistencies; Texture: Soft to Chew (NDD 3); Soft to Chew: Chopped Meat; Fluid Consistency: Thin (IDDSI 0)     Refusing to eat at times per the nurse, patient says he food is not good      Objective:    Vital Signs:  Temp:  [97.8 °F (36.6 °C)-98 °F (36.7 °C)] 98 °F (36.7 °C)  Heart Rate:  [74] 74  Resp:  [18-20] 18  BP: ()/(62-91) 92/62    SpO2:  [88 %-100 %] 93 %  on  Flow (L/min):  [1-3] 2;   Device (Oxygen Therapy): nasal cannula  Body mass index is 24.17 kg/m².    Physical Exam  Constitutional:       Appearance: Normal appearance.   HENT:      Head: Normocephalic and atraumatic.   Cardiovascular:      Rate and Rhythm: Normal rate and regular rhythm.      Heart sounds: No murmur heard.    No friction rub.   Pulmonary:      Effort: Pulmonary effort is normal.      Breath sounds: Normal breath sounds.   Abdominal:      General: Bowel sounds are normal. There is no distension.      Palpations: Abdomen is soft.      Tenderness: There is no abdominal tenderness.   Skin:     General: Skin is warm and dry.   Neurological:      Mental Status: He is alert.   Psychiatric:         Mood and Affect: Mood normal.         Behavior: Behavior normal.         Results Review:    Glucose   Date Value Ref Range Status   04/08/2023 147 (H) 65 - 99 mg/dL Final   04/07/2023 106 (H) 65 - 99 mg/dL Final   04/06/2023 111 (H) 65 - 99 mg/dL Final     Results from last 7 days   Lab Units 04/08/23  0640   WBC 10*3/mm3 5.65   HEMOGLOBIN g/dL 11.0*   HEMATOCRIT % 32.5*   PLATELETS 10*3/mm3 179     Results from last 7 days   Lab Units 04/08/23  0640   SODIUM mmol/L 131*   POTASSIUM mmol/L 4.2   CHLORIDE mmol/L 95*   CO2 mmol/L 28.2   BUN mg/dL 38*   CREATININE mg/dL 1.20   CALCIUM  mg/dL 8.8   BILIRUBIN mg/dL 0.7   ALK PHOS U/L 221*   ALT (SGPT) U/L 80*   AST (SGOT) U/L 121*   GLUCOSE mg/dL 147*         Results from last 7 days   Lab Units 04/08/23  0640   MAGNESIUM mg/dL 2.6*         Cultures:  Respiratory Culture   Date Value Ref Range Status   04/05/2023   Final    Scant growth (1+) Normal respiratory rob. No S. aureus or Pseudomonas aeruginosa detected. Final report.       I have reviewed daily medications and changes in CPOE    Scheduled meds  apixaban, 5 mg, Oral, Q12H  bicalutamide, 50 mg, Oral, Daily  fluticasone, 2 spray, Each Nare, Daily  latanoprost, 1 drop, Both Eyes, Daily  levothyroxine, 88 mcg, Oral, Daily  melatonin, 2 mg, Oral, Nightly  multivitamin with minerals, 1 tablet, Oral, Daily  predniSONE, 20 mg, Oral, BID With Meals  rosuvastatin, 10 mg, Oral, Daily  torsemide, 40 mg, Oral, Daily           PRN meds  •  acetaminophen  •  albuterol  •  aluminum-magnesium hydroxide-simethicone  •  magnesium sulfate **OR** magnesium sulfate **OR** magnesium sulfate  •  nitroglycerin  •  ondansetron **OR** ondansetron  •  polyethylene glycol  •  potassium & sodium phosphates **OR** potassium & sodium phosphates  •  potassium chloride **OR** potassium chloride **OR** potassium chloride  •  senna-docusate sodium  •  [COMPLETED] Insert Peripheral IV **AND** sodium chloride  •  sodium chloride  •  sodium chloride        Acute respiratory failure with hypoxia    SCC (squamous cell carcinoma), face    Pacemaker    Atrial fibrillation    Coronary arteriosclerosis    Dyslipidemia    Hypertension    Hypothyroidism    Long term (current) use of anticoagulants    Stage 3a chronic kidney disease    Acute on chronic combined systolic and diastolic CHF (congestive heart failure)    Hyponatremia    Normocytic anemia    Transaminitis        Assessment/Plan:    04/07/23   AFB smears negative, trial of steroids. he is DNR     Acute respiratory failure with hypoxia  Acute on chronic combined systolic and  diastolic heart failure  -ProBNP 2683 OA  -CXR w/ b/l effusions, congestion  -Procalcitonin, strep, Legionella, respiratory viral panel negative.  -TTE (4/1/23): 41-45%; systolic and diastolic flattening of IV septum consistent with RV pressure/volume overload  -Cardiology, Nephrology following  -Pulmonology consulted given persistent O2 needs-AFB smears pending; if negative empiric steroids versus bronchoscopy with biopsy  -on demedex      Hyponatremia  -improved with diuresis  -Renal following     Atrial fibrillation on long-term anticoagulation  -RC: Amiodarone 100 mg every other day; Coreg ON HOLD given soft BP  -AC: Apixaban     CKD3a  -Crt near baseline; monitor      Transaminitis  -LFTs slightly elevated, however, relatively stable     Hypothyroidism  -Synthroid 88 mcg     Hyperlipidemia  -Rosuvastatin 10 mg     Generalized weakness/Debility  - Consulted PT/OT, fall precautions.    Hypoxemia is improved on 0.5 liters, continue with PT    >45 minutes spent, > 1/2 time spent counseling and coordination of care     Abad Gonzalez MD  04/08/23  11:50 EDT

## 2023-04-08 NOTE — PLAN OF CARE
Goal Outcome Evaluation:  Plan of Care Reviewed With: patient, daughter        Progress: improving  Outcome Evaluation: VSS; no complaints of pain or discomfort; weaning oxygen; pt is down to 1 liter; pt has been up to the chair today and has eaten a little bit more than the past  couple of days; waiting for results of AFB cultures; pt back on torsemide daily; safety maintained; continue to monitor    Daily Care Plan Summary: Heart Failure    Diuretic in use (IV or PO):  torsemide po        Daily weight (up or down):   down      Output > Intake (yes/no): yes      O2 Requirements (current, any change?):  1L NC      Symptoms noted with Activity (Respiratory Tolerance, functional state):    none      Anticipated Discharge Plans:    rehab

## 2023-04-09 LAB
ALBUMIN SERPL-MCNC: 3 G/DL (ref 3.5–5.2)
ALBUMIN/GLOB SERPL: 0.7 G/DL
ALP SERPL-CCNC: 246 U/L (ref 39–117)
ALT SERPL W P-5'-P-CCNC: 89 U/L (ref 1–41)
ANION GAP SERPL CALCULATED.3IONS-SCNC: 9 MMOL/L (ref 5–15)
AST SERPL-CCNC: 128 U/L (ref 1–40)
BILIRUB SERPL-MCNC: 0.6 MG/DL (ref 0–1.2)
BUN SERPL-MCNC: 37 MG/DL (ref 8–23)
BUN/CREAT SERPL: 29.4 (ref 7–25)
CALCIUM SPEC-SCNC: 8.3 MG/DL (ref 8.2–9.6)
CHLORIDE SERPL-SCNC: 95 MMOL/L (ref 98–107)
CO2 SERPL-SCNC: 29 MMOL/L (ref 22–29)
CREAT SERPL-MCNC: 1.26 MG/DL (ref 0.76–1.27)
DEPRECATED RDW RBC AUTO: 46.1 FL (ref 37–54)
EGFRCR SERPLBLD CKD-EPI 2021: 53.2 ML/MIN/1.73
ERYTHROCYTE [DISTWIDTH] IN BLOOD BY AUTOMATED COUNT: 13.6 % (ref 12.3–15.4)
GLOBULIN UR ELPH-MCNC: 4.4 GM/DL
GLUCOSE SERPL-MCNC: 134 MG/DL (ref 65–99)
HCT VFR BLD AUTO: 31.5 % (ref 37.5–51)
HGB BLD-MCNC: 11.2 G/DL (ref 13–17.7)
MAGNESIUM SERPL-MCNC: 2.4 MG/DL (ref 1.7–2.3)
MCH RBC QN AUTO: 33 PG (ref 26.6–33)
MCHC RBC AUTO-ENTMCNC: 35.6 G/DL (ref 31.5–35.7)
MCV RBC AUTO: 92.9 FL (ref 79–97)
PHOSPHATE SERPL-MCNC: 2.6 MG/DL (ref 2.5–4.5)
PLATELET # BLD AUTO: 185 10*3/MM3 (ref 140–450)
PMV BLD AUTO: 10.4 FL (ref 6–12)
POTASSIUM SERPL-SCNC: 4.1 MMOL/L (ref 3.5–5.2)
PROT SERPL-MCNC: 7.4 G/DL (ref 6–8.5)
RBC # BLD AUTO: 3.39 10*6/MM3 (ref 4.14–5.8)
SODIUM SERPL-SCNC: 133 MMOL/L (ref 136–145)
URATE SERPL-MCNC: 6.1 MG/DL (ref 3.4–7)
WBC NRBC COR # BLD: 7.32 10*3/MM3 (ref 3.4–10.8)

## 2023-04-09 PROCEDURE — 85027 COMPLETE CBC AUTOMATED: CPT | Performed by: STUDENT IN AN ORGANIZED HEALTH CARE EDUCATION/TRAINING PROGRAM

## 2023-04-09 PROCEDURE — 84100 ASSAY OF PHOSPHORUS: CPT | Performed by: INTERNAL MEDICINE

## 2023-04-09 PROCEDURE — 83735 ASSAY OF MAGNESIUM: CPT | Performed by: INTERNAL MEDICINE

## 2023-04-09 PROCEDURE — 80053 COMPREHEN METABOLIC PANEL: CPT | Performed by: STUDENT IN AN ORGANIZED HEALTH CARE EDUCATION/TRAINING PROGRAM

## 2023-04-09 PROCEDURE — 63710000001 PREDNISONE PER 1 MG: Performed by: INTERNAL MEDICINE

## 2023-04-09 PROCEDURE — 84550 ASSAY OF BLOOD/URIC ACID: CPT | Performed by: INTERNAL MEDICINE

## 2023-04-09 RX ADMIN — APIXABAN 5 MG: 5 TABLET, FILM COATED ORAL at 20:44

## 2023-04-09 RX ADMIN — APIXABAN 5 MG: 5 TABLET, FILM COATED ORAL at 09:39

## 2023-04-09 RX ADMIN — LEVOTHYROXINE SODIUM 88 MCG: 0.09 TABLET ORAL at 09:39

## 2023-04-09 RX ADMIN — BICALUTAMIDE 50 MG: 50 TABLET ORAL at 09:39

## 2023-04-09 RX ADMIN — PREDNISONE 20 MG: 20 TABLET ORAL at 09:39

## 2023-04-09 RX ADMIN — Medication 2 MG: at 20:44

## 2023-04-09 RX ADMIN — FLUTICASONE PROPIONATE 2 SPRAY: 50 SPRAY, METERED NASAL at 09:39

## 2023-04-09 RX ADMIN — PREDNISONE 20 MG: 20 TABLET ORAL at 18:00

## 2023-04-09 RX ADMIN — TORSEMIDE 40 MG: 20 TABLET ORAL at 09:39

## 2023-04-09 RX ADMIN — MULTIPLE VITAMINS W/ MINERALS TAB 1 TABLET: TAB at 09:39

## 2023-04-09 RX ADMIN — LATANOPROST 1 DROP: 50 SOLUTION OPHTHALMIC at 20:44

## 2023-04-09 RX ADMIN — POLYETHYLENE GLYCOL 3350 17 G: 17 POWDER, FOR SOLUTION ORAL at 09:39

## 2023-04-09 NOTE — PROGRESS NOTES
LOS: 9 days   Patient Care Team:  Alma Cat MD as PCP - General (Family Medicine)  Maurice Cook MD as Consulting Physician (Radiation Oncology)    Subjective     Patient is a 89 y.o. male.  Asked to see for persistent O2 requirements and bilateral pleural effusions.   Patient feeling significantly better today he is not short of breath he has been up walking out of his room in the [place].  His appetite is improved.  Review of Systems:          Objective     Vital Signs  Vital Sign Min/Max for last 24 hours  Temp  Min: 97.2 °F (36.2 °C)  Max: 98 °F (36.7 °C)   BP  Min: 95/56  Max: 111/69   Pulse  Min: 74  Max: 75   Resp  Min: 16  Max: 18   SpO2  Min: 93 %  Max: 97 %   Flow (L/min)  Min: 0.5  Max: 0.5   No data recorded        Ventilator/Non-Invasive Ventilation Settings (From admission, onward)    None                       Body mass index is 24.17 kg/m².  I/O last 3 completed shifts:  In: 50 [P.O.:50]  Out: 900 [Urine:900]  I/O this shift:  In: -   Out: 900 [Urine:900]        Physical Exam:    General Appearance: Well-developed elderly white male resting in bed he does not look in acute distress he is on room air saturating in the mid 90s in no distress  Eyes: Conjunctiva are clear and anicteric pupils look equal mucous membranes are moist no erythema no exudates  ENT: Mucous membranes are moist Mallampati 1 airway nasal septum midline  Neck: No palpable adenopathy or thyromegaly no visible jugular venous distention and trachea midline  Lungs: Clear I do not hear wheezes rales or rhonchi   Cardiac: Irregularly irregular no definite murmur  Abdomen: Soft no palpable hepatosplenomegaly or masses  : Not examined  Musculoskeletal: He has some mild to moderate thoracic kyphosis  Skin: Warm and dry no jaundice no petechiae  Neuro: He is hard of hearing but he is alert and oriented he is cooperative  Extremities/P Vascular: No clubbing no cyanosis no edema palpable radial and dorsalis pedis pulses  MSE:  Pleasant gentleman he is appropriate     Labs:  Results from last 7 days   Lab Units 04/09/23  0403 04/08/23  0640 04/07/23  0509 04/06/23  0350 04/05/23  0506 04/04/23  2048 04/04/23  0334 04/03/23  0427   GLUCOSE mg/dL 134* 147* 106* 111* 105* 129* 110* 108*   SODIUM mmol/L 133* 131* 133* 134* 130* 136 133* 132*   POTASSIUM mmol/L 4.1 4.2 3.4* 3.8 3.6 3.8 4.0 4.6   MAGNESIUM mg/dL 2.4* 2.6* 2.6* 2.5*  --  2.4* 2.4* 2.2   CO2 mmol/L 29.0 28.2 28.0 26.5 27.1 26.8 25.0 26.7   CHLORIDE mmol/L 95* 95* 94* 94* 95* 96* 97* 96*   ANION GAP mmol/L 9.0 7.8 11.0 13.5 7.9 13.2 11.0 9.3   CREATININE mg/dL 1.26 1.20 1.44* 1.36* 1.50* 1.34* 1.45* 1.37*   BUN mg/dL 37* 38* 39* 32* 30* 29* 27* 24*   BUN / CREAT RATIO  29.4* 31.7* 27.1* 23.5 20.0 21.6 18.6 17.5   CALCIUM mg/dL 8.3 8.8 8.6 8.4 8.4 8.6 8.5 8.8   ALK PHOS U/L 246* 221* 232* 227* 215*  --  247* 251*   TOTAL PROTEIN g/dL 7.4 7.2 7.4 7.3 7.3  --  7.2 7.0   ALT (SGPT) U/L 89* 80* 83* 60* 41  --  44* 45*   AST (SGOT) U/L 128* 121* 141* 101* 56*  --  64* 65*   BILIRUBIN mg/dL 0.6 0.7 0.6 0.8 0.9  --  0.8 0.7   ALBUMIN g/dL 3.0* 2.9* 2.9* 3.0* 3.0* 3.4* 2.9* 3.1*   GLOBULIN gm/dL 4.4 4.3 4.5 4.3 4.3  --  4.3 3.9     Estimated Creatinine Clearance: 36.3 mL/min (by C-G formula based on SCr of 1.26 mg/dL).      Results from last 7 days   Lab Units 04/09/23  0403 04/08/23  0640 04/07/23  0509 04/06/23  0350 04/05/23  0506 04/04/23  2048 04/04/23  0334 04/03/23  0427   WBC 10*3/mm3 7.32 5.65 6.60 7.41 9.78 9.55 10.90* 10.61   RBC 10*6/mm3 3.39* 3.43* 3.45* 3.44* 3.52* 3.69* 3.60* 3.49*   HEMOGLOBIN g/dL 11.2* 11.0* 11.5* 11.2* 11.8* 12.3* 11.7* 11.9*   HEMATOCRIT % 31.5* 32.5* 33.1* 32.2* 33.2* 35.1* 34.1* 32.4*   MCV fL 92.9 94.8 95.9 93.6 94.3 95.1 94.7 92.8   MCH pg 33.0 32.1 33.3* 32.6 33.5* 33.3* 32.5 34.1*   MCHC g/dL 35.6 33.8 34.7 34.8 35.5 35.0 34.3 36.7*   RDW % 13.6 14.0 14.5 14.1 14.1 14.5 14.3 14.0   RDW-SD fl 46.1 48.5 50.3 47.1 48.3 50.8 49.2 47.1   MPV fL 10.4  10.9 10.5 10.1 10.0 10.1 10.3 10.5   PLATELETS 10*3/mm3 185 179 161 162 154 163 177 181   NEUTROPHIL % %  --  79.7*  --  64.1  --  71.6  --  70.2   LYMPHOCYTE % %  --  14.5*  --  19.3*  --  10.4*  --  12.1*   MONOCYTES % %  --  3.2*  --  10.5  --  12.6*  --  11.8   EOSINOPHIL % %  --  0.0*  --  4.3  --  3.4  --  3.8   BASOPHIL % %  --  0.5  --  0.9  --  0.7  --  0.8   IMM GRAN % %  --  2.1*  --  0.9*  --   --   --  1.3*   NEUTROS ABS 10*3/mm3  --  4.50  --  4.74  --  6.85  --  7.45*   LYMPHS ABS 10*3/mm3  --  0.82  --  1.43  --  0.99  --  1.28   MONOS ABS 10*3/mm3  --  0.18  --  0.78  --  1.20*  --  1.25*   EOS ABS 10*3/mm3  --  0.00  --  0.32  --  0.32  --  0.40   BASOS ABS 10*3/mm3  --  0.03  --  0.07  --  0.07  --  0.09   IMMATURE GRANS (ABS) 10*3/mm3  --  0.12*  --  0.07*  --   --   --  0.14*   NRBC /100 WBC  --  0.0  --  0.1  --   --   --  0.2                     Results from last 7 days   Lab Units 04/04/23  0334   PROCALCITONIN ng/mL 0.14         Microbiology Results (last 10 days)     Procedure Component Value - Date/Time    AFB Culture - Sputum, Cough [[id number]] Collected: 04/06/23 1311    Lab Status: Preliminary result Specimen: Sputum from Cough Updated: 04/07/23 2118     AFB Stain No acid fast bacilli seen on direct smear    AFB Culture - Sputum, Cough [[id number]] Collected: 04/05/23 2222    Lab Status: Preliminary result Specimen: Sputum from Cough Updated: 04/07/23 2118     AFB Stain No acid fast bacilli seen on concentrated smear    Respiratory Culture - Sputum, Cough [[id number]] Collected: 04/05/23 0811    Lab Status: Final result Specimen: Sputum from Cough Updated: 04/07/23 1040     Respiratory Culture Scant growth (1+) Normal respiratory rob. No S. aureus or Pseudomonas aeruginosa detected. Final report.     Gram Stain Many (4+) WBCs per low power field      Rare (1+) Epithelial cells per low power field      Few (2+) Gram positive cocci      Rare (1+) Gram positive bacilli      Rare (1+) Gram  negative bacilli    AFB Culture - Sputum, Cough [[id number]] Collected: 04/05/23 0811    Lab Status: Preliminary result Specimen: Sputum from Cough Updated: 04/06/23 1406     AFB Stain No acid fast bacilli seen on concentrated smear    Legionella Antigen, Urine - Urine, Urine, Clean Catch [[id number]]  (Normal) Collected: 04/04/23 2300    Lab Status: Final result Specimen: Urine, Clean Catch Updated: 04/04/23 2343     LEGIONELLA ANTIGEN, URINE Negative    Legionella Antigen, Urine - Urine, Urine, Clean Catch [[id number]]  (Normal) Collected: 03/31/23 0819    Lab Status: Final result Specimen: Urine, Clean Catch Updated: 03/31/23 0922     LEGIONELLA ANTIGEN, URINE Negative    S. Pneumo Ag Urine or CSF - Urine, Urine, Clean Catch [[id number]]  (Normal) Collected: 03/31/23 0819    Lab Status: Final result Specimen: Urine, Clean Catch Updated: 03/31/23 0922     Strep Pneumo Ag Negative    Blood Culture - Blood, Arm, Left [[id number]]  (Normal) Collected: 03/30/23 2229    Lab Status: Final result Specimen: Blood from Arm, Left Updated: 04/04/23 2246     Blood Culture No growth at 5 days    Blood Culture - Blood, Arm, Left [[id number]]  (Normal) Collected: 03/30/23 2204    Lab Status: Final result Specimen: Blood from Arm, Left Updated: 04/04/23 2231     Blood Culture No growth at 5 days    Respiratory Panel PCR w/COVID-19(SARS-CoV-2) [facility] In-House, NP Swab in UTM/VTM, 3-4 HR TAT - Swab, Nasopharynx [[id number]]  (Normal) Collected: 03/30/23 2004    Lab Status: Final result Specimen: Swab from Nasopharynx Updated: 03/30/23 2336     ADENOVIRUS, PCR Not Detected     Coronavirus 229E Not Detected     Coronavirus HKU1 Not Detected     Coronavirus NL63 Not Detected     Coronavirus OC43 Not Detected     COVID19 Not Detected     Human Metapneumovirus Not Detected     Human Rhinovirus/Enterovirus Not Detected     Influenza A PCR Not Detected     Influenza B PCR Not Detected     Parainfluenza Virus 1 Not Detected      Parainfluenza Virus 2 Not Detected     Parainfluenza Virus 3 Not Detected     Parainfluenza Virus 4 Not Detected     RSV, PCR Not Detected     Bordetella pertussis pcr Not Detected     Bordetella parapertussis PCR Not Detected     Chlamydophila pneumoniae PCR Not Detected     Mycoplasma pneumo by PCR Not Detected    Narrative:      In the setting of a positive respiratory panel with a viral infection PLUS a negative procalcitonin without other underlying concern for bacterial infection, consider observing off antibiotics or discontinuation of antibiotics and continue supportive care. If the respiratory panel is positive for atypical bacterial infection (Bordetella pertussis, Chlamydophila pneumoniae, or Mycoplasma pneumoniae), consider antibiotic de-escalation to target atypical bacterial infection.              apixaban, 5 mg, Oral, Q12H  bicalutamide, 50 mg, Oral, Daily  fluticasone, 2 spray, Each Nare, Daily  latanoprost, 1 drop, Both Eyes, Daily  levothyroxine, 88 mcg, Oral, Daily  melatonin, 2 mg, Oral, Nightly  multivitamin with minerals, 1 tablet, Oral, Daily  polyethylene glycol, 17 g, Oral, Daily  predniSONE, 20 mg, Oral, BID With Meals  torsemide, 40 mg, Oral, Daily           Diagnostics:  Adult Transthoracic Echo Complete W/ Cont if Necessary Per Protocol    Result Date: 4/1/2023  •  Left ventricular ejection fraction appears to be 41 - 45%. •  Systolic and diastolic flattening of the interventricular septum consistent with right ventricle pressure and volume overload. •  The right ventricular cavity is severely dilated. Normal right ventricular systolic function noted. •  The left atrial cavity is moderately dilated. •  The right atrial cavity is severely dilated. •  Saline test results are negative. •  Trace mitral valve regurgitation is present. The mitral valve mean gradient is 1.65 mmHg. There is a mitral valve ring present. •  Severe tricuspid valve regurgitation is present. •  Calculated right  ventricular systolic pressure from tricuspid regurgitation is 38.0 mmHg. •  There is a trivial pericardial effusion.     CT Chest Without Contrast Diagnostic    Result Date: 3/31/2023  CT CHEST WITHOUT CONTRAST  HISTORY: Dyspnea.  TECHNIQUE: Noncontrast chest CT is provided and correlated with x-ray from yesterday.  FINDINGS: Cardiac pacing hardware is observed along with hardware at the mitral valve, dilated cardiac chambers, and simple appearing, posteriorly layering pleural effusions bilaterally. Those measure under 3 cm AP thickness in each. There is an enlarged precarinal lymph node measuring about 12 mm short axis. No other significant appearing lymphadenopathy. Abnormal opacity in the left upper lobe and posterior left lower lobe with groundglass density throughout the right upper lobe favored represent pneumonia. No obstructing airway lesion.  Visualized upper abdominal structures appear normal. Degenerative change in the spine.      Cardiac hardware with changes of prior sternotomy and mitral valve repair or replacement. There our bilateral pleural effusions with patchy opacity in the lungs bilaterally which appears similar as on the x-ray from yesterday and is favored represent pneumonia.  Radiation dose reduction techniques were utilized, including automated exposure control and exposure modulation based on body size.  This report was finalized on 3/31/2023 9:06 PM by Dr. Maurice Alejandro M.D.      XR Chest 1 View    Result Date: 4/3/2023  XR CHEST 1 VW-  Clinical: CHF, bilateral pleural effusions  COMPARISON examination 03/31/2013 CT chest  FINDINGS: Unchanged left-sided pleural effusion, right-sided pleural effusion appears slightly improved within the interim. The cardiomediastinal silhouette is stable. Right base infiltrate/atelectasis more pronounced compared to the previous examination. Right and left upper lobe infiltrates similar to the previous examination. The remainder of examination is  unremarkable.  This report was finalized on 4/3/2023 6:22 AM by Dr. Segun Jara M.D.      XR Chest 1 View    Result Date: 3/30/2023  PORTABLE CHEST X-RAY  HISTORY: Shortness of breath.  TECHNIQUE: Portable chest x-ray correlated with chest x-ray 04/14/2022.  FINDINGS: Sternal wires with cardiac valve hardware and a cardiac pacing device are again observed. The cardiac silhouette is enlarged. Patchy infiltrate in the upper lobes is observed bilaterally, more dense on the left than the right. There is also some density in the left lung base and mildly at the periphery of the right lung base. There also appears to be small pleural effusions blunting the costophrenic angles.      Bilateral pneumonia.  This report was finalized on 3/30/2023 8:50 PM by Dr. Maurice Alejandro M.D.      XR CHEST 1 VW PORTABLE    Result Date: 3/10/2023  EXAM: CR Chest 1 Vw Portable NUMBER OF IMAGES:  1 INDICATION: Shortness of breath Technique: AP view of the chest obtained portably on 3/10/2023 10:31 AM Comparison:  Comparison is made to frontal radiograph the chest dated 09/04/2022. Findings: The cardiomediastinal silhouette is enlarged. There are calcified lesions of the thoracic aorta. There are bilateral pleural effusions, right larger than left, with associated patchy bilateral lower lobe airspace disease. There is no pneumothorax. The visualized osseous structures demonstrate degenerative changes. Intact midline sternotomy wires are seen. Lines and Tubes: Left-sided dual-lead cardiac pacer is in place, with leads terminating in satisfactory position. Impression:  Bilateral pleural effusions, right larger than left, with associated patchy bilateral lower lobe airspace disease. Dictated by: Paco Shah MD Signed by Paco Shah MD on 3/10/2023 10:46 AM ##### Final ##### Dictated by:    PACO SHAH MD-RAD Dictated DT/TM: 03/10/2023 10:46 am Interpreted and electronically signed by:  PACO SHAH MD-RAD Signed DT/TM:  03/10/2023 10:46  am    Results for orders placed during the hospital encounter of 03/30/23    Adult Transthoracic Echo Complete W/ Cont if Necessary Per Protocol    Interpretation Summary  •  Left ventricular ejection fraction appears to be 41 - 45%.  •  Systolic and diastolic flattening of the interventricular septum consistent with right ventricle pressure and volume overload.  •  The right ventricular cavity is severely dilated. Normal right ventricular systolic function noted.  •  The left atrial cavity is moderately dilated.  •  The right atrial cavity is severely dilated.  •  Saline test results are negative.  •  Trace mitral valve regurgitation is present. The mitral valve mean gradient is 1.65 mmHg. There is a mitral valve ring present.  •  Severe tricuspid valve regurgitation is present.  •  Calculated right ventricular systolic pressure from tricuspid regurgitation is 38.0 mmHg.  •  There is a trivial pericardial effusion.          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    Assessment & Plan     1. Bilateral  pulmonary infiltrates versus edema CT scan certainly looks more infiltrative than pulmonary edema white count procalcitonin initially and I have repeated it today are negative no leukocytosis no fevers or chills.  This speaks against at least typical infections.  Do not think it entirely excludes atypical infections with his anorexia etc. which could also be explained by heart failure but he is not improved with diuresis we have to consider atypical infection such as TB atypical Mycobacterium fungal less likely.  Viral infection less likely given the respiratory panel being negative.  This of course could be noninfectious inflammation could be a pneumonitis from aspiration although we have no history of aspiration.  He could have a noninfectious process such as cryptogenic organizing pneumonia or this could be an another ILD.  Lymphangitic tumor would seem to be less likely.    Tb pretty well ruled out with 3 negative AFB sputum's and a negative T spot.  Drug-induced interstitial disease including amiodarone and Casodex not entirely excluded, the amiodarone has been discontinued.  As per discussions yesterday we are doing empiric steroids we will see how he progresses.  2. Bilateral pleural effusions looks like there has been some improvement in these with diuresis.  3. Acute hypoxic respiratory failure resolved  4. Acute on chronic combined heart failure per nephrology and cardiology looks like patient has been diuresing.  5. Acute kidney injury chronic kidney disease/cardiorenal syndrome nephrology following  6. Sick sinus syndrome and paroxysmal atrial fibrillation rate controlled on Eliquis  7. Pulmonary hypertension mild by echocardiogram RV dilatation  8. History of mitral valve disease status post mitral valve repair valve appears to be functioning well by echocardiogram  9. Hyponatremia nephrology following and managing  10. Anemia mild  11. Mildly elevated transaminases/hepatitis question etiology.                 Plan for disposition: From a pulmonary standpoint patient can be discharged at any time the real question is how long do we continue steroids.  I am going to check a chest x-ray in the morning since I hear he still going to be here waiting for insurance approval's.    Martín Flowers Jr, MD  04/09/23  14:02 EDT    Time:

## 2023-04-09 NOTE — CASE MANAGEMENT/SOCIAL WORK
Continued Stay Note  [facility]     Patient Name: Bridger Elias  MRN: [id number]  Today's Date: 4/9/2023    Admit Date: 3/30/2023    Plan: SNF referrals pending/pt will need precert   Discharge Plan     Row Name 04/09/23 1359       Plan    Plan SNF referrals pending/pt will need precert    Patient/Family in Agreement with Plan yes  spoke with pt's daughter Sultana    Plan Comments Inbound call from staff RN who stated pt's daughter is at the bedside and she would like to discuss DC planning. Called in to pt's room and spoke with dtr Sultana. Reviewed current DC plan of palliative consult pending, acceptance of [place] pending bed availability/precert, and denial of [facility] d/t out-of-network insurance. Sultana stated they are not interested in palliative care at this time. She would like pt to go to a rehab and then go back to his assisted living. Sultana requested [facility]CP find out if pt's insurance denial for [facility] had anything to do with going there palliative care. Called and left a M for Mariya/[facility] to follow up with [facility] on Monday about that. Reviewed the CMS Compare site for pt's [facility] area. [facility]errals [facility] to [facility] and [facility] per Sultana's request. In basket referrals in EPIC. Email to [facility] for [facility]. [facility] emailed back and will follow up with [facility] on Monday. Spoke with [facility] and she will check pt's insurance and clinicals first thing on Monday and follow up with [facility]CP. Called and updated pt's daughter Sultana. [facility]CP to follow.......JW               Discharge Codes    No documentation.               Expected Discharge Date and Time     Expected Discharge Date Expected Discharge Time    Apr 10, 2023             Radha Kan, RN

## 2023-04-09 NOTE — PROGRESS NOTES
[facility]     LOS: 9 days     Subjective:    CC:Shortness of Breath and Weakness - Generalized    DIET:  Diet Order   Procedures   • Diet: Cardiac Diets, Fluid Restriction (240 mL/tray) Diets; Low Sodium (2g); 1500 mL/day; No Mixed Consistencies; Texture: Soft to Chew (NDD 3); Soft to Chew: Chopped Meat; Fluid Consistency: Thin (IDDSI 0)     Refusing to eat at times per the nurse, patient says he food is not good  No cp  No soa  eating well    Objective:    Vital Signs:  Temp:  [97.2 °F (36.2 °C)-98 °F (36.7 °C)] 97.2 °F (36.2 °C)  Heart Rate:  [75] 75  Resp:  [16-18] 16  BP: ()/(56-68) 95/56    SpO2:  [93 %-98 %] 93 %  on  Flow (L/min):  [0.5-2] 0.5;   Device (Oxygen Therapy): room air  Body mass index is 24.17 kg/m².    Physical Exam  Constitutional:       Appearance: Normal appearance.   HENT:      Head: Normocephalic and atraumatic.   Cardiovascular:      Rate and Rhythm: Normal rate and regular rhythm.      Heart sounds: No murmur heard.    No friction rub.   Pulmonary:      Effort: Pulmonary effort is normal.      Breath sounds: Normal breath sounds.   Abdominal:      General: Bowel sounds are normal. There is no distension.      Palpations: Abdomen is soft.      Tenderness: There is no abdominal tenderness.   Skin:     General: Skin is warm and dry.   Neurological:      Mental Status: He is alert.   Psychiatric:         Mood and Affect: Mood normal.         Behavior: Behavior normal.         Results Review:    Glucose   Date Value Ref Range Status   04/09/2023 134 (H) 65 - 99 mg/dL Final   04/08/2023 147 (H) 65 - 99 mg/dL Final   04/07/2023 106 (H) 65 - 99 mg/dL Final     Results from last 7 days   Lab Units 04/09/23  0403   WBC 10*3/mm3 7.32   HEMOGLOBIN g/dL 11.2*   HEMATOCRIT % 31.5*   PLATELETS 10*3/mm3 185     Results from last 7 days   Lab Units 04/09/23  0403   SODIUM mmol/L 133*   POTASSIUM mmol/L 4.1   CHLORIDE mmol/L 95*   CO2 mmol/L 29.0   BUN mg/dL 37*    CREATININE mg/dL 1.26   CALCIUM mg/dL 8.3   BILIRUBIN mg/dL 0.6   ALK PHOS U/L 246*   ALT (SGPT) U/L 89*   AST (SGOT) U/L 128*   GLUCOSE mg/dL 134*         Results from last 7 days   Lab Units 04/09/23  0403   MAGNESIUM mg/dL 2.4*         Cultures:  Respiratory Culture   Date Value Ref Range Status   04/05/2023   Final    Scant growth (1+) Normal respiratory rob. No S. aureus or Pseudomonas aeruginosa detected. Final report.       I have reviewed daily medications and changes in CPOE    Scheduled meds  apixaban, 5 mg, Oral, Q12H  bicalutamide, 50 mg, Oral, Daily  fluticasone, 2 spray, Each Nare, Daily  latanoprost, 1 drop, Both Eyes, Daily  levothyroxine, 88 mcg, Oral, Daily  melatonin, 2 mg, Oral, Nightly  multivitamin with minerals, 1 tablet, Oral, Daily  polyethylene glycol, 17 g, Oral, Daily  predniSONE, 20 mg, Oral, BID With Meals  torsemide, 40 mg, Oral, Daily           PRN meds  •  acetaminophen  •  albuterol  •  aluminum-magnesium hydroxide-simethicone  •  magnesium sulfate **OR** magnesium sulfate **OR** magnesium sulfate  •  nitroglycerin  •  ondansetron **OR** ondansetron  •  polyethylene glycol  •  potassium & sodium phosphates **OR** potassium & sodium phosphates  •  potassium chloride **OR** potassium chloride **OR** potassium chloride  •  senna-docusate sodium  •  [COMPLETED] Insert Peripheral IV **AND** sodium chloride  •  sodium chloride  •  sodium chloride        Acute respiratory failure with hypoxia    SCC (squamous cell carcinoma), face    Pacemaker    Atrial fibrillation    Coronary arteriosclerosis    Dyslipidemia    Hypertension    Hypothyroidism    Long term (current) use of anticoagulants    Stage 3a chronic kidney disease    Acute on chronic combined systolic and diastolic CHF (congestive heart failure)    Hyponatremia    Normocytic anemia    Transaminitis        Assessment/Plan:    04/07/23   AFB smears negative, trial of steroids. he is DNR     Acute respiratory failure with  hypoxia  Acute on chronic combined systolic and diastolic heart failure  -ProBNP 2683 OA  -CXR w/ b/l effusions, congestion  -Procalcitonin, strep, Legionella, respiratory viral panel negative.  -TTE (4/1/23): 41-45%; systolic and diastolic flattening of IV septum consistent with RV pressure/volume overload  -Cardiology, Nephrology following  -Pulmonology consulted given persistent O2 needs-AFB smears pending; if negative empiric steroids versus bronchoscopy with biopsy  -on demedex      Hyponatremia  -improved with diuresis  -Renal following     Atrial fibrillation on long-term anticoagulation  -RC: Amiodarone 100 mg every other day (stopped); Coreg ON HOLD given soft BP  -AC: Apixaban     CKD3a  -Crt near baseline; monitor      Transaminitis  -LFTs slightly elevated, however, relatively stable     Hypothyroidism  -Synthroid 88 mcg     Hyperlipidemia  -Rosuvastatin 10 mg     Generalized weakness/Debility  - Consulted PT/OT, fall precautions.    Oxygen is improving    Awaiting nephrology follow      Abad Gonzalez MD  04/09/23  11:14 EDT

## 2023-04-09 NOTE — PLAN OF CARE
Goal Outcome Evaluation:  Plan of Care Reviewed With: patient, daughter        Progress: improving  Outcome Evaluation: VSS; no complaints of pain or discomfort; pt ambulated in room with daughter and ambulated to bathroom; pt is room air; repeat chest x-ray for morning for follow up; safety maintained; continue to monitor  Problem: Fall Injury Risk  Goal: Absence of Fall and Fall-Related Injury  Intervention: Identify and Manage Contributors  Recent Flowsheet Documentation  Taken 4/9/2023 1325 by Glenys Younger RN  Medication Review/Management: medications reviewed  Taken 4/9/2023 0939 by Glenys Younger RN  Medication Review/Management: medications reviewed  Intervention: Promote Injury-Free Environment  Recent Flowsheet Documentation  Taken 4/9/2023 1800 by Glenys Younger RN  Safety Promotion/Fall Prevention:   activity supervised   assistive device/personal items within reach   clutter free environment maintained   fall prevention program maintained   lighting adjusted   nonskid shoes/slippers when out of bed   safety round/check completed  Taken 4/9/2023 1638 by Glenys Younger RN  Safety Promotion/Fall Prevention:   activity supervised   assistive device/personal items within reach   clutter free environment maintained   fall prevention program maintained   lighting adjusted   nonskid shoes/slippers when out of bed   room organization consistent   safety round/check completed  Taken 4/9/2023 1325 by Glenys Younger RN  Safety Promotion/Fall Prevention:   activity supervised   assistive device/personal items within reach   clutter free environment maintained   fall prevention program maintained   lighting adjusted   nonskid shoes/slippers when out of bed   room organization consistent   safety round/check completed  Taken 4/9/2023 1245 by Glenys Younger RN  Safety Promotion/Fall Prevention:   activity supervised   assistive device/personal items within reach   clutter free environment  maintained   fall prevention program maintained   lighting adjusted   nonskid shoes/slippers when out of bed   room organization consistent   safety round/check completed  Taken 4/9/2023 1041 by Glenys Younger, RN  Safety Promotion/Fall Prevention:   activity supervised   assistive device/personal items within reach   clutter free environment maintained   fall prevention program maintained   lighting adjusted   nonskid shoes/slippers when out of bed   room organization consistent   safety round/check completed  Taken 4/9/2023 0939 by Glenys Younger, RN  Safety Promotion/Fall Prevention:   activity supervised   clutter free environment maintained   assistive device/personal items within reach   fall prevention program maintained   lighting adjusted   nonskid shoes/slippers when out of bed   room organization consistent   safety round/check completed

## 2023-04-09 NOTE — PROGRESS NOTES
[facility] Progress Note      Patient Name: Bridger Elias  : 1930  MRN: [id number]  Primary Care Physician:  Alma Cat MD  Date of admission: 3/30/2023    Subjective     Interval History:   Follow-up hyponatremia and CKD    The patient is feeling better, denies chest pain or shortness of air, no orthopnea or PND, no nausea or vomiting, no abdominal pain, no dysuria or gross hematuria, his blood pressure improved somewhat but remains on the low side.  Overall he has been stable.    Review of Systems:   As noted above    Objective     Vitals:   Temp:  [97.2 °F (36.2 °C)-98 °F (36.7 °C)] 97.2 °F (36.2 °C)  Heart Rate:  [75] 75  Resp:  [16-18] 16  BP: ()/(56-68) 95/56  Flow (L/min):  [0.5-2] 0.5    Intake/Output Summary (Last 24 hours) at 2023 1103  Last data filed at 2023 0800  Gross per 24 hour   Intake --   Output 1100 ml   Net -1100 ml       Physical Exam:    General Appearance: Awake, alert, very frail and chronically ill  Skin: warm and dry  HEENT: oral mucosa normal, nonicteric sclera  Neck: Minimal JVD  Lungs: Bilateral rhonchi, breathing effort not labored  Heart: Irregularly irregular, no rub  Abdomen: soft, nontender, nondistended, normoactive bowels  : no palpable bladder  Extremities: No left ankle edema no edema on the right, no hip or thigh edema  Neuro: normal speech and mental status, hard of hearing    Scheduled Meds:     apixaban, 5 mg, Oral, Q12H  bicalutamide, 50 mg, Oral, Daily  fluticasone, 2 spray, Each Nare, Daily  latanoprost, 1 drop, Both Eyes, Daily  levothyroxine, 88 mcg, Oral, Daily  melatonin, 2 mg, Oral, Nightly  multivitamin with minerals, 1 tablet, Oral, Daily  polyethylene glycol, 17 g, Oral, Daily  predniSONE, 20 mg, Oral, BID With Meals  torsemide, 40 mg, Oral, Daily      IV Meds:        Results Reviewed:   I have personally reviewed the results from the time of this admission to 2023 11:03 EDT     Results from last  7 days   Lab Units 04/09/23  0403 04/08/23  0640 04/07/23  0509   SODIUM mmol/L 133* 131* 133*   POTASSIUM mmol/L 4.1 4.2 3.4*   CHLORIDE mmol/L 95* 95* 94*   CO2 mmol/L 29.0 28.2 28.0   BUN mg/dL 37* 38* 39*   CREATININE mg/dL 1.26 1.20 1.44*   CALCIUM mg/dL 8.3 8.8 8.6   BILIRUBIN mg/dL 0.6 0.7 0.6   ALK PHOS U/L 246* 221* 232*   ALT (SGPT) U/L 89* 80* 83*   AST (SGOT) U/L 128* 121* 141*   GLUCOSE mg/dL 134* 147* 106*       Estimated Creatinine Clearance: 36.3 mL/min (by C-G formula based on SCr of 1.26 mg/dL).    Results from last 7 days   Lab Units 04/09/23  0403 04/08/23  0640 04/07/23  0509   MAGNESIUM mg/dL 2.4* 2.6* 2.6*   PHOSPHORUS mg/dL 2.6 3.0 2.9       Results from last 7 days   Lab Units 04/09/23  0403 04/08/23  0640 04/07/23  0509 04/06/23  0350 04/04/23  2048 04/04/23  0334 04/03/23  0427 04/02/23  1215   URIC ACID mg/dL 6.1 6.1 6.4 5.9 5.6 5.0 4.6 4.4       Results from last 7 days   Lab Units 04/09/23  0403 04/08/23  0640 04/07/23  0509 04/06/23  0350 04/05/23  0506   WBC 10*3/mm3 7.32 5.65 6.60 7.41 9.78   HEMOGLOBIN g/dL 11.2* 11.0* 11.5* 11.2* 11.8*   PLATELETS 10*3/mm3 185 179 161 162 154             Assessment / Plan     ASSESSMENT:  -Acute kidney injury on chronic kidney disease, cardiorenal syndrome, creatinine today is 1.26, stable  -Hyponatremia associated with acute on chronic diastolic dysfunction, sodium today is 133  -Hypokalemia, potassium 4.1  -CKD stage III secondary to nephrosclerosis and advanced age  -History of hypertension but the patient is hypotensive at present  -A-fib  -Chronically anticoagulated  -Coronary artery disease  -Valvular heart disease  -Frailty    PLAN:  -Continue the same dose of diuretics  -I discussed the case with the patient and his daughter at the bedside  -Surveillance labs.  -The patient could be discharged anytime from the renal standpoint and I will plan to see him in my office in 1 month    Thank you for involving us in the care of Bridger Elias.   Please feel free to call with any questions.    Naeem Hernandez MD  04/09/23  11:03 EDT    [facility]  [phone]    Please note that portions of this note were completed with a voice recognition program.

## 2023-04-10 ENCOUNTER — APPOINTMENT (OUTPATIENT)
Dept: GENERAL RADIOLOGY | Facility: HOSPITAL | Age: 88
DRG: 291 | End: 2023-04-10
Payer: MEDICARE

## 2023-04-10 LAB
ALBUMIN SERPL-MCNC: 2.8 G/DL (ref 3.5–5.2)
ALBUMIN/GLOB SERPL: 0.7 G/DL
ALP SERPL-CCNC: 219 U/L (ref 39–117)
ALT SERPL W P-5'-P-CCNC: 77 U/L (ref 1–41)
ANION GAP SERPL CALCULATED.3IONS-SCNC: 10.1 MMOL/L (ref 5–15)
AST SERPL-CCNC: 85 U/L (ref 1–40)
BILIRUB SERPL-MCNC: 0.7 MG/DL (ref 0–1.2)
BUN SERPL-MCNC: 48 MG/DL (ref 8–23)
BUN/CREAT SERPL: 32.9 (ref 7–25)
CALCIUM SPEC-SCNC: 8.5 MG/DL (ref 8.2–9.6)
CHLORIDE SERPL-SCNC: 96 MMOL/L (ref 98–107)
CO2 SERPL-SCNC: 28.9 MMOL/L (ref 22–29)
CREAT SERPL-MCNC: 1.46 MG/DL (ref 0.76–1.27)
DEPRECATED RDW RBC AUTO: 46.7 FL (ref 37–54)
EGFRCR SERPLBLD CKD-EPI 2021: 44.6 ML/MIN/1.73
ERYTHROCYTE [DISTWIDTH] IN BLOOD BY AUTOMATED COUNT: 13.7 % (ref 12.3–15.4)
GLOBULIN UR ELPH-MCNC: 4.2 GM/DL
GLUCOSE SERPL-MCNC: 156 MG/DL (ref 65–99)
HCT VFR BLD AUTO: 31.5 % (ref 37.5–51)
HGB BLD-MCNC: 10.8 G/DL (ref 13–17.7)
MAGNESIUM SERPL-MCNC: 2.5 MG/DL (ref 1.7–2.3)
MCH RBC QN AUTO: 32 PG (ref 26.6–33)
MCHC RBC AUTO-ENTMCNC: 34.3 G/DL (ref 31.5–35.7)
MCV RBC AUTO: 93.5 FL (ref 79–97)
MTB PCR: NOT DETECTED
PHOSPHATE SERPL-MCNC: 2.8 MG/DL (ref 2.5–4.5)
PLATELET # BLD AUTO: 210 10*3/MM3 (ref 140–450)
PMV BLD AUTO: 10.7 FL (ref 6–12)
POTASSIUM SERPL-SCNC: 4.1 MMOL/L (ref 3.5–5.2)
PROT SERPL-MCNC: 7 G/DL (ref 6–8.5)
RBC # BLD AUTO: 3.37 10*6/MM3 (ref 4.14–5.8)
SODIUM SERPL-SCNC: 135 MMOL/L (ref 136–145)
URATE SERPL-MCNC: 6.2 MG/DL (ref 3.4–7)
WBC NRBC COR # BLD: 8.5 10*3/MM3 (ref 3.4–10.8)

## 2023-04-10 PROCEDURE — 83735 ASSAY OF MAGNESIUM: CPT | Performed by: INTERNAL MEDICINE

## 2023-04-10 PROCEDURE — 71045 X-RAY EXAM CHEST 1 VIEW: CPT

## 2023-04-10 PROCEDURE — 84100 ASSAY OF PHOSPHORUS: CPT | Performed by: INTERNAL MEDICINE

## 2023-04-10 PROCEDURE — 84550 ASSAY OF BLOOD/URIC ACID: CPT | Performed by: INTERNAL MEDICINE

## 2023-04-10 PROCEDURE — 63710000001 PREDNISONE PER 1 MG: Performed by: INTERNAL MEDICINE

## 2023-04-10 PROCEDURE — 97110 THERAPEUTIC EXERCISES: CPT

## 2023-04-10 PROCEDURE — 85027 COMPLETE CBC AUTOMATED: CPT | Performed by: STUDENT IN AN ORGANIZED HEALTH CARE EDUCATION/TRAINING PROGRAM

## 2023-04-10 PROCEDURE — 80053 COMPREHEN METABOLIC PANEL: CPT | Performed by: STUDENT IN AN ORGANIZED HEALTH CARE EDUCATION/TRAINING PROGRAM

## 2023-04-10 RX ORDER — CHOLECALCIFEROL (VITAMIN D3) 125 MCG
5 CAPSULE ORAL NIGHTLY PRN
Status: DISCONTINUED | OUTPATIENT
Start: 2023-04-10 | End: 2023-04-24 | Stop reason: HOSPADM

## 2023-04-10 RX ORDER — TEMAZEPAM 7.5 MG/1
7.5 CAPSULE ORAL NIGHTLY PRN
Status: ACTIVE | OUTPATIENT
Start: 2023-04-10 | End: 2023-04-17

## 2023-04-10 RX ORDER — PREDNISONE 20 MG/1
20 TABLET ORAL DAILY
Status: DISCONTINUED | OUTPATIENT
Start: 2023-04-11 | End: 2023-04-24 | Stop reason: HOSPADM

## 2023-04-10 RX ORDER — SULFAMETHOXAZOLE AND TRIMETHOPRIM 800; 160 MG/1; MG/1
1 TABLET ORAL 3 TIMES WEEKLY
Status: DISCONTINUED | OUTPATIENT
Start: 2023-04-10 | End: 2023-04-15

## 2023-04-10 RX ADMIN — APIXABAN 5 MG: 5 TABLET, FILM COATED ORAL at 09:50

## 2023-04-10 RX ADMIN — TORSEMIDE 40 MG: 20 TABLET ORAL at 09:49

## 2023-04-10 RX ADMIN — BICALUTAMIDE 50 MG: 50 TABLET ORAL at 09:50

## 2023-04-10 RX ADMIN — Medication 10 ML: at 09:49

## 2023-04-10 RX ADMIN — MULTIPLE VITAMINS W/ MINERALS TAB 1 TABLET: TAB at 09:49

## 2023-04-10 RX ADMIN — APIXABAN 5 MG: 5 TABLET, FILM COATED ORAL at 21:26

## 2023-04-10 RX ADMIN — POLYETHYLENE GLYCOL 3350 17 G: 17 POWDER, FOR SOLUTION ORAL at 09:48

## 2023-04-10 RX ADMIN — PREDNISONE 20 MG: 20 TABLET ORAL at 09:49

## 2023-04-10 RX ADMIN — PREDNISONE 20 MG: 20 TABLET ORAL at 18:30

## 2023-04-10 RX ADMIN — SULFAMETHOXAZOLE AND TRIMETHOPRIM 1 TABLET: 800; 160 TABLET ORAL at 21:26

## 2023-04-10 RX ADMIN — LATANOPROST 1 DROP: 50 SOLUTION OPHTHALMIC at 21:26

## 2023-04-10 RX ADMIN — LEVOTHYROXINE SODIUM 88 MCG: 0.09 TABLET ORAL at 09:49

## 2023-04-10 RX ADMIN — FLUTICASONE PROPIONATE 2 SPRAY: 50 SPRAY, METERED NASAL at 09:50

## 2023-04-10 NOTE — PROGRESS NOTES
LOS: 10 days   Patient Care Team:  Alma Cat MD as PCP - General (Family Medicine)  Maurice Cook MD as Consulting Physician (Radiation Oncology)    Subjective     Patient is a 89 y.o. male.  Asked to see for persistent O2 requirements and bilateral pleural effusions.   Patient is feeling better he still feels weak but his appetite is improving he said he even desired a couple coffee today and it tasted good.  His breathing is doing good he has been off of O2 he has been ambulating with assistance and not short of breath.  No significant cough.  Review of Systems:          Objective     Vital Signs  Vital Sign Min/Max for last 24 hours  Temp  Min: 95.4 °F (35.2 °C)  Max: 98.2 °F (36.8 °C)   BP  Min: 81/56  Max: 119/69   Pulse  Min: 74  Max: 75   Resp  Min: 16  Max: 18   SpO2  Min: 93 %  Max: 97 %   No data recorded   Weight  Min: 70.8 kg (156 lb 1.4 oz)  Max: 70.8 kg (156 lb 1.4 oz)        Ventilator/Non-Invasive Ventilation Settings (From admission, onward)    None                       Body mass index is 24.45 kg/m².  I/O last 3 completed shifts:  In: -   Out: 1150 [Urine:1150]  No intake/output data recorded.        Physical Exam:    General Appearance: Well-developed elderly white male he is sitting up in a chair on room air his oxygen saturations in the mid to upper 90s in no distress  Eyes: Conjunctiva are clear and anicteric pupils look equal mucous membranes are moist no erythema no exudates  ENT: Mucous membranes are moist Mallampati 1 airway nasal septum midline  Neck: No palpable adenopathy or thyromegaly no visible jugular venous distention and trachea midline  Lungs: Clear I do not hear wheezes rales or rhonchi   Cardiac: Irregularly irregular no definite murmur  Abdomen: Soft no palpable hepatosplenomegaly or masses  : Not examined  Musculoskeletal: He has some mild to moderate thoracic kyphosis  Skin: Warm and dry no jaundice no petechiae  Neuro: He is hard of hearing but he is  alert and oriented he is cooperative  Extremities/P Vascular: No clubbing no cyanosis no edema palpable radial and dorsalis pedis pulses  MSE: Pleasant gentleman he is appropriate     Labs:  Results from last 7 days   Lab Units 04/10/23  0433 04/09/23  0403 04/08/23  0640 04/07/23  0509 04/06/23  0350 04/05/23  0506 04/04/23  2048 04/04/23  0334   GLUCOSE mg/dL 156* 134* 147* 106* 111* 105* 129* 110*   SODIUM mmol/L 135* 133* 131* 133* 134* 130* 136 133*   POTASSIUM mmol/L 4.1 4.1 4.2 3.4* 3.8 3.6 3.8 4.0   MAGNESIUM mg/dL 2.5* 2.4* 2.6* 2.6* 2.5*  --  2.4* 2.4*   CO2 mmol/L 28.9 29.0 28.2 28.0 26.5 27.1 26.8 25.0   CHLORIDE mmol/L 96* 95* 95* 94* 94* 95* 96* 97*   ANION GAP mmol/L 10.1 9.0 7.8 11.0 13.5 7.9 13.2 11.0   CREATININE mg/dL 1.46* 1.26 1.20 1.44* 1.36* 1.50* 1.34* 1.45*   BUN mg/dL 48* 37* 38* 39* 32* 30* 29* 27*   BUN / CREAT RATIO  32.9* 29.4* 31.7* 27.1* 23.5 20.0 21.6 18.6   CALCIUM mg/dL 8.5 8.3 8.8 8.6 8.4 8.4 8.6 8.5   ALK PHOS U/L 219* 246* 221* 232* 227* 215*  --  247*   TOTAL PROTEIN g/dL 7.0 7.4 7.2 7.4 7.3 7.3  --  7.2   ALT (SGPT) U/L 77* 89* 80* 83* 60* 41  --  44*   AST (SGOT) U/L 85* 128* 121* 141* 101* 56*  --  64*   BILIRUBIN mg/dL 0.7 0.6 0.7 0.6 0.8 0.9  --  0.8   ALBUMIN g/dL 2.8* 3.0* 2.9* 2.9* 3.0* 3.0* 3.4* 2.9*   GLOBULIN gm/dL 4.2 4.4 4.3 4.5 4.3 4.3  --  4.3     Estimated Creatinine Clearance: 31.7 mL/min (A) (by C-G formula based on SCr of 1.46 mg/dL (H)).      Results from last 7 days   Lab Units 04/10/23  0433 04/09/23  0403 04/08/23  0640 04/07/23  0509 04/06/23  0350 04/05/23  0506 04/04/23  2048   WBC 10*3/mm3 8.50 7.32 5.65 6.60 7.41 9.78 9.55   RBC 10*6/mm3 3.37* 3.39* 3.43* 3.45* 3.44* 3.52* 3.69*   HEMOGLOBIN g/dL 10.8* 11.2* 11.0* 11.5* 11.2* 11.8* 12.3*   HEMATOCRIT % 31.5* 31.5* 32.5* 33.1* 32.2* 33.2* 35.1*   MCV fL 93.5 92.9 94.8 95.9 93.6 94.3 95.1   MCH pg 32.0 33.0 32.1 33.3* 32.6 33.5* 33.3*   MCHC g/dL 34.3 35.6 33.8 34.7 34.8 35.5 35.0   RDW % 13.7 13.6  14.0 14.5 14.1 14.1 14.5   RDW-SD fl 46.7 46.1 48.5 50.3 47.1 48.3 50.8   MPV fL 10.7 10.4 10.9 10.5 10.1 10.0 10.1   PLATELETS 10*3/mm3 210 185 179 161 162 154 163   NEUTROPHIL % %  --   --  79.7*  --  64.1  --  71.6   LYMPHOCYTE % %  --   --  14.5*  --  19.3*  --  10.4*   MONOCYTES % %  --   --  3.2*  --  10.5  --  12.6*   EOSINOPHIL % %  --   --  0.0*  --  4.3  --  3.4   BASOPHIL % %  --   --  0.5  --  0.9  --  0.7   IMM GRAN % %  --   --  2.1*  --  0.9*  --   --    NEUTROS ABS 10*3/mm3  --   --  4.50  --  4.74  --  6.85   LYMPHS ABS 10*3/mm3  --   --  0.82  --  1.43  --  0.99   MONOS ABS 10*3/mm3  --   --  0.18  --  0.78  --  1.20*   EOS ABS 10*3/mm3  --   --  0.00  --  0.32  --  0.32   BASOS ABS 10*3/mm3  --   --  0.03  --  0.07  --  0.07   IMMATURE GRANS (ABS) 10*3/mm3  --   --  0.12*  --  0.07*  --   --    NRBC /100 WBC  --   --  0.0  --  0.1  --   --                      Results from last 7 days   Lab Units 04/04/23  0334   PROCALCITONIN ng/mL 0.14         Microbiology Results (last 10 days)     Procedure Component Value - Date/Time    AFB Culture - Sputum, Cough [[id number]] Collected: 04/06/23 1311    Lab Status: Preliminary result Specimen: Sputum from Cough Updated: 04/07/23 2118     AFB Stain No acid fast bacilli seen on direct smear    AFB Culture - Sputum, Cough [[id number]] Collected: 04/05/23 2222    Lab Status: Preliminary result Specimen: Sputum from Cough Updated: 04/07/23 2118     AFB Stain No acid fast bacilli seen on concentrated smear    Respiratory Culture - Sputum, Cough [[id number]] Collected: 04/05/23 0811    Lab Status: Final result Specimen: Sputum from Cough Updated: 04/07/23 1040     Respiratory Culture Scant growth (1+) Normal respiratory rob. No S. aureus or Pseudomonas aeruginosa detected. Final report.     Gram Stain Many (4+) WBCs per low power field      Rare (1+) Epithelial cells per low power field      Few (2+) Gram positive cocci      Rare (1+) Gram positive bacilli      Rare  (1+) Gram negative bacilli    AFB Culture - Sputum, Cough [[id number]] Collected: 04/05/23 0811    Lab Status: Preliminary result Specimen: Sputum from Cough Updated: 04/10/23 0830     AFB Culture No AFB isolated at less than 1 week     AFB Stain No acid fast bacilli seen on concentrated smear    Legionella Antigen, Urine - Urine, Urine, Clean Catch [[id number]]  (Normal) Collected: 04/04/23 2300    Lab Status: Final result Specimen: Urine, Clean Catch Updated: 04/04/23 2343     LEGIONELLA ANTIGEN, URINE Negative              apixaban, 5 mg, Oral, Q12H  bicalutamide, 50 mg, Oral, Daily  fluticasone, 2 spray, Each Nare, Daily  latanoprost, 1 drop, Both Eyes, Daily  levothyroxine, 88 mcg, Oral, Daily  melatonin, 2 mg, Oral, Nightly  multivitamin with minerals, 1 tablet, Oral, Daily  polyethylene glycol, 17 g, Oral, Daily  predniSONE, 20 mg, Oral, BID With Meals  torsemide, 40 mg, Oral, Daily           Diagnostics:  Adult Transthoracic Echo Complete W/ Cont if Necessary Per Protocol    Result Date: 4/1/2023  •  Left ventricular ejection fraction appears to be 41 - 45%. •  Systolic and diastolic flattening of the interventricular septum consistent with right ventricle pressure and volume overload. •  The right ventricular cavity is severely dilated. Normal right ventricular systolic function noted. •  The left atrial cavity is moderately dilated. •  The right atrial cavity is severely dilated. •  Saline test results are negative. •  Trace mitral valve regurgitation is present. The mitral valve mean gradient is 1.65 mmHg. There is a mitral valve ring present. •  Severe tricuspid valve regurgitation is present. •  Calculated right ventricular systolic pressure from tricuspid regurgitation is 38.0 mmHg. •  There is a trivial pericardial effusion.     CT Chest Without Contrast Diagnostic    Result Date: 3/31/2023  CT CHEST WITHOUT CONTRAST  HISTORY: Dyspnea.  TECHNIQUE: Noncontrast chest CT is provided and correlated with  x-ray from yesterday.  FINDINGS: Cardiac pacing hardware is observed along with hardware at the mitral valve, dilated cardiac chambers, and simple appearing, posteriorly layering pleural effusions bilaterally. Those measure under 3 cm AP thickness in each. There is an enlarged precarinal lymph node measuring about 12 mm short axis. No other significant appearing lymphadenopathy. Abnormal opacity in the left upper lobe and posterior left lower lobe with groundglass density throughout the right upper lobe favored represent pneumonia. No obstructing airway lesion.  Visualized upper abdominal structures appear normal. Degenerative change in the spine.      Cardiac hardware with changes of prior sternotomy and mitral valve repair or replacement. There our bilateral pleural effusions with patchy opacity in the lungs bilaterally which appears similar as on the x-ray from yesterday and is favored represent pneumonia.  Radiation dose reduction techniques were utilized, including automated exposure control and exposure modulation based on body size.  This report was finalized on 3/31/2023 9:06 PM by Dr. Maurice Alejandro M.D.      XR Chest 1 View    Result Date: 4/3/2023  XR CHEST 1 VW-  Clinical: CHF, bilateral pleural effusions  COMPARISON examination 03/31/2013 CT chest  FINDINGS: Unchanged left-sided pleural effusion, right-sided pleural effusion appears slightly improved within the interim. The cardiomediastinal silhouette is stable. Right base infiltrate/atelectasis more pronounced compared to the previous examination. Right and left upper lobe infiltrates similar to the previous examination. The remainder of examination is unremarkable.  This report was finalized on 4/3/2023 6:22 AM by Dr. Segun Jara M.D.      XR Chest 1 View    Result Date: 3/30/2023  PORTABLE CHEST X-RAY  HISTORY: Shortness of breath.  TECHNIQUE: Portable chest x-ray correlated with chest x-ray 04/14/2022.  FINDINGS: Sternal wires with cardiac  valve hardware and a cardiac pacing device are again observed. The cardiac silhouette is enlarged. Patchy infiltrate in the upper lobes is observed bilaterally, more dense on the left than the right. There is also some density in the left lung base and mildly at the periphery of the right lung base. There also appears to be small pleural effusions blunting the costophrenic angles.      Bilateral pneumonia.  This report was finalized on 3/30/2023 8:50 PM by Dr. Maurice Alejandro M.D.      XR CHEST 1 VW PORTABLE    Result Date: 3/10/2023  EXAM: CR Chest 1 Vw Portable NUMBER OF IMAGES:  1 INDICATION: Shortness of breath Technique: AP view of the chest obtained portably on 3/10/2023 10:31 AM Comparison:  Comparison is made to frontal radiograph the chest dated 09/04/2022. Findings: The cardiomediastinal silhouette is enlarged. There are calcified lesions of the thoracic aorta. There are bilateral pleural effusions, right larger than left, with associated patchy bilateral lower lobe airspace disease. There is no pneumothorax. The visualized osseous structures demonstrate degenerative changes. Intact midline sternotomy wires are seen. Lines and Tubes: Left-sided dual-lead cardiac pacer is in place, with leads terminating in satisfactory position. Impression:  Bilateral pleural effusions, right larger than left, with associated patchy bilateral lower lobe airspace disease. Dictated by: Paco Shah MD Signed by Paco hSah MD on 3/10/2023 10:46 AM ##### Final ##### Dictated by:    PACO SHAH MD-RAD Dictated DT/TM: 03/10/2023 10:46 am Interpreted and electronically signed by:  PACO SHAH MD-RAD Signed DT/TM:  03/10/2023 10:46 am    Results for orders placed during the hospital encounter of 03/30/23    Adult Transthoracic Echo Complete W/ Cont if Necessary Per Protocol    Interpretation Summary  •  Left ventricular ejection fraction appears to be 41 - 45%.  •  Systolic and diastolic flattening of the interventricular  septum consistent with right ventricle pressure and volume overload.  •  The right ventricular cavity is severely dilated. Normal right ventricular systolic function noted.  •  The left atrial cavity is moderately dilated.  •  The right atrial cavity is severely dilated.  •  Saline test results are negative.  •  Trace mitral valve regurgitation is present. The mitral valve mean gradient is 1.65 mmHg. There is a mitral valve ring present.  •  Severe tricuspid valve regurgitation is present.  •  Calculated right ventricular systolic pressure from tricuspid regurgitation is 38.0 mmHg.  •  There is a trivial pericardial effusion.          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    Assessment & Plan     1. Bilateral pulmonary infiltrates uncertain etiology infection relatively well ruled out including TB could be ILD related to drugs including amiodarone which has been discontinued and unlikely it was such a small dose, Casodex rare but certainly can do it, this could be a idiopathic NSIP or could be cryptogenic  organizing pneumonia.  Because of the time he was hypoxic and age we elected not to do bronchoscopic biopsies which may not even be diagnostic certainly was not a good candidate for open biopsies.  We have been treating empirically with steroids he has had dramatic improvement.  Now the question will be how much and how long on the steroids.  He is done very good him to try and drop his dose down to about 20 mg a day we will keep him on that and see him back in the office in 4 to 6 weeks we will give him Bactrim DS 1 3 times a week for PCP prophylaxis.  2. Bilateral pleural effusions looks like there has been some improvement in these with diuresis.  3. Acute hypoxic respiratory failure resolved  4. Acute on chronic combined heart failure per nephrology and cardiology looks like patient has been diuresing.  5. Acute kidney injury chronic kidney disease/cardiorenal syndrome nephrology following  6. Sick sinus syndrome and paroxysmal atrial fibrillation rate controlled on Eliquis  7. Pulmonary hypertension mild by echocardiogram RV dilatation  8. History of mitral valve disease status post mitral valve repair valve appears to be functioning well by echocardiogram  9. Hyponatremia nephrology following and managing  10. Anemia mild  11. Mildly elevated transaminases/hepatitis question etiology.                Plan for disposition: From a pulmonary standpoint patient can be discharged at any time.  Discussed with Dr. Lias Flowers Jr, MD  04/10/23  10:59 EDT    Time:

## 2023-04-10 NOTE — PROGRESS NOTES
[facility]     LOS: 10 days     Subjective:    CC:Shortness of Breath and Weakness - Generalized    DIET:  Diet Order   Procedures   • Diet: Cardiac Diets, Fluid Restriction (240 mL/tray) Diets; Low Sodium (2g); 1500 mL/day; No Mixed Consistencies; Texture: Soft to Chew (NDD 3); Soft to Chew: Chopped Meat; Fluid Consistency: Thin (IDDSI 0)     Eating better    No cp  No soa  eating well    Objective:    Vital Signs:  Temp:  [95.4 °F (35.2 °C)-98.2 °F (36.8 °C)] 95.4 °F (35.2 °C)  Heart Rate:  [74-75] 75  Resp:  [16-18] 18  BP: ()/(56-69) 119/69    SpO2:  [93 %-97 %] 93 %  on   ;   Device (Oxygen Therapy): room air  Body mass index is 24.45 kg/m².    Physical Exam  Constitutional:       Appearance: Normal appearance.   HENT:      Head: Normocephalic and atraumatic.   Cardiovascular:      Rate and Rhythm: Normal rate and regular rhythm.      Heart sounds: No murmur heard.    No friction rub.   Pulmonary:      Effort: Pulmonary effort is normal.      Breath sounds: Normal breath sounds.   Abdominal:      General: Bowel sounds are normal. There is no distension.      Palpations: Abdomen is soft.      Tenderness: There is no abdominal tenderness.   Skin:     General: Skin is warm and dry.   Neurological:      Mental Status: He is alert.   Psychiatric:         Mood and Affect: Mood normal.         Behavior: Behavior normal.         Results Review:    Glucose   Date Value Ref Range Status   04/10/2023 156 (H) 65 - 99 mg/dL Final   04/09/2023 134 (H) 65 - 99 mg/dL Final   04/08/2023 147 (H) 65 - 99 mg/dL Final     Results from last 7 days   Lab Units 04/10/23  0433   WBC 10*3/mm3 8.50   HEMOGLOBIN g/dL 10.8*   HEMATOCRIT % 31.5*   PLATELETS 10*3/mm3 210     Results from last 7 days   Lab Units 04/10/23  0433   SODIUM mmol/L 135*   POTASSIUM mmol/L 4.1   CHLORIDE mmol/L 96*   CO2 mmol/L 28.9   BUN mg/dL 48*   CREATININE mg/dL 1.46*   CALCIUM mg/dL 8.5   BILIRUBIN mg/dL 0.7   ALK PHOS U/L  219*   ALT (SGPT) U/L 77*   AST (SGOT) U/L 85*   GLUCOSE mg/dL 156*         Results from last 7 days   Lab Units 04/10/23  0433   MAGNESIUM mg/dL 2.5*         Cultures:  Respiratory Culture   Date Value Ref Range Status   04/05/2023   Final    Scant growth (1+) Normal respiratory rob. No S. aureus or Pseudomonas aeruginosa detected. Final report.       I have reviewed daily medications and changes in CPOE    Scheduled meds  apixaban, 5 mg, Oral, Q12H  bicalutamide, 50 mg, Oral, Daily  fluticasone, 2 spray, Each Nare, Daily  latanoprost, 1 drop, Both Eyes, Daily  levothyroxine, 88 mcg, Oral, Daily  multivitamin with minerals, 1 tablet, Oral, Daily  polyethylene glycol, 17 g, Oral, Daily  predniSONE, 20 mg, Oral, BID With Meals  torsemide, 40 mg, Oral, Daily           PRN meds  •  acetaminophen  •  albuterol  •  aluminum-magnesium hydroxide-simethicone  •  magnesium sulfate **OR** magnesium sulfate **OR** magnesium sulfate  •  melatonin  •  nitroglycerin  •  ondansetron **OR** ondansetron  •  polyethylene glycol  •  potassium & sodium phosphates **OR** potassium & sodium phosphates  •  potassium chloride **OR** potassium chloride **OR** potassium chloride  •  senna-docusate sodium  •  [COMPLETED] Insert Peripheral IV **AND** sodium chloride  •  sodium chloride  •  sodium chloride  •  temazepam        Acute respiratory failure with hypoxia    SCC (squamous cell carcinoma), face    Pacemaker    Atrial fibrillation    Coronary arteriosclerosis    Dyslipidemia    Hypertension    Hypothyroidism    Long term (current) use of anticoagulants    Stage 3a chronic kidney disease    Acute on chronic combined systolic and diastolic CHF (congestive heart failure)    Hyponatremia    Normocytic anemia    Transaminitis        Assessment/Plan:    04/07/23   AFB smears negative, trial of steroids. he is DNR     Acute respiratory failure with hypoxia  Acute on chronic combined systolic and diastolic heart failure  -ProBNP 2683 OA  -CXR  w/ b/l effusions, congestion  -Procalcitonin, strep, Legionella, respiratory viral panel negative.  -TTE (4/1/23): 41-45%; systolic and diastolic flattening of IV septum consistent with RV pressure/volume overload  -Cardiology, Nephrology following  -Pulmonology consulted given persistent O2 needs-AFB smears negative  -Continue with steroids  -on demedex      Hyponatremia  -improved with diuresis  -Renal following     Atrial fibrillation on long-term anticoagulation  -RC: Amiodarone 100 mg every other day (stopped); Coreg ON HOLD given soft BP  -AC: Apixaban     CKD3a  -Crt near baseline; monitor      Transaminitis  -LFTs slightly elevated, however, relatively stable     Hypothyroidism  -Synthroid 88 mcg     Hyperlipidemia  -Rosuvastatin 10 mg     Generalized weakness/Debility  - Consulted PT/OT, fall precautions.    Oxygen is improving, now on RA      Abad Gonzalez MD  04/10/23  12:03 EDT

## 2023-04-10 NOTE — CASE MANAGEMENT/SOCIAL WORK
Continued Stay Note  [facility]     Patient Name: Bridger Elias  MRN: [id number]  Today's Date: 4/10/2023    Admit Date: 3/30/2023    Plan: [facility] SNF pending [employer] MCR precert initiated today   Discharge Plan     Row Name 04/10/23 1330       Plan    [facility] [employer] M[employer] precert initiated today    Patient/Family in Agreement with Plan yes    Plan Comments CCP spoke with Izabela/Lidya who states they still do not have a bed and have no planned discharges to know when they would have a bed. CCP spoke with Mariya/Charles who states they are not in network with patient;s insurance. CCP spoke with Viridiana/Charles who states they do not have a bed at this time. CCP spoke with Valencia/Violet who states they do not have a bed at [facility] but they do have a bed at [facility]. CCP spoke with patient's daughter who is agreeable to initiating precert at [facility]. CCP spoke with Valencia/Stuart who is initiating precert today. CCP following pending precert. Patient will likely need transport. LUIZ MAN               Discharge Codes    No documentation.               Expected Discharge Date and Time     Expected Discharge Date Expected Discharge Time    Apr 10, 2023             LUIZ Husain

## 2023-04-10 NOTE — PLAN OF CARE
Goal Outcome Evaluation:  Plan of Care Reviewed With: patient        Progress: improving  Outcome Evaluation: Pt seen by PT for treatment this date. Dtr had assisted pt up to chair for breakfast earlier. Pt stood w/ CGA/min A and use of fww. Pt then amb to door of room and back to EOB. Pt slow w/ no overt LOB and limited by fatigue. Pt performed ther ex in chair for strengthening. PT will prog as pt olaf. Rec SNF after dc to address deficits.

## 2023-04-10 NOTE — PROGRESS NOTES
[facility] Progress Note      Patient Name: Bridger Elias  : 1930  MRN: [id number]  Primary Care Physician:  Alma Cat MD  Date of admission: 3/30/2023    Subjective     Interval History:   Follow-up hyponatremia and CKD  Seen and examined.  Ambulated around the room.  On room air.  No shortness of air or chest pain.    Review of Systems:   As noted above    Objective     Vitals:   Temp:  [96.7 °F (35.9 °C)-98.2 °F (36.8 °C)] 96.7 °F (35.9 °C)  Heart Rate:  [74-75] 74  Resp:  [16-18] 18  BP: ()/(56-69) 92/59    Intake/Output Summary (Last 24 hours) at 4/10/2023 0715  Last data filed at 2023 1638  Gross per 24 hour   Intake --   Output 1150 ml   Net -1150 ml       Physical Exam:    General Appearance: Awake, alert, very frail and chronically ill  Skin: warm and dry  HEENT: oral mucosa normal, nonicteric sclera  Neck: Minimal JVD  Lungs: Bilateral rhonchi, breathing effort not labored  Heart: Irregularly irregular, no rub  Abdomen: soft, nontender, nondistended, normoactive bowels  : no palpable bladder  Extremities: No left ankle edema no edema on the right, no hip or thigh edema  Neuro: normal speech and mental status, hard of hearing    Scheduled Meds:     apixaban, 5 mg, Oral, Q12H  bicalutamide, 50 mg, Oral, Daily  fluticasone, 2 spray, Each Nare, Daily  latanoprost, 1 drop, Both Eyes, Daily  levothyroxine, 88 mcg, Oral, Daily  melatonin, 2 mg, Oral, Nightly  multivitamin with minerals, 1 tablet, Oral, Daily  polyethylene glycol, 17 g, Oral, Daily  predniSONE, 20 mg, Oral, BID With Meals  torsemide, 40 mg, Oral, Daily      IV Meds:        Results Reviewed:   I have personally reviewed the results from the time of this admission to 4/10/2023 07:15 EDT     Results from last 7 days   Lab Units 04/10/23  0433 23  0403 23  0640   SODIUM mmol/L 135* 133* 131*   POTASSIUM mmol/L 4.1 4.1 4.2   CHLORIDE mmol/L 96* 95* 95*   CO2 mmol/L 28.9 29.0 28.2    BUN mg/dL 48* 37* 38*   CREATININE mg/dL 1.46* 1.26 1.20   CALCIUM mg/dL 8.5 8.3 8.8   BILIRUBIN mg/dL 0.7 0.6 0.7   ALK PHOS U/L 219* 246* 221*   ALT (SGPT) U/L 77* 89* 80*   AST (SGOT) U/L 85* 128* 121*   GLUCOSE mg/dL 156* 134* 147*       Estimated Creatinine Clearance: 31.7 mL/min (A) (by C-G formula based on SCr of 1.46 mg/dL (H)).    Results from last 7 days   Lab Units 04/10/23  0433 04/09/23  0403 04/08/23  0640   MAGNESIUM mg/dL 2.5* 2.4* 2.6*   PHOSPHORUS mg/dL 2.8 2.6 3.0       Results from last 7 days   Lab Units 04/10/23  0433 04/09/23  0403 04/08/23  0640 04/07/23  0509 04/06/23  0350 04/04/23  2048 04/04/23  0334   URIC ACID mg/dL 6.2 6.1 6.1 6.4 5.9 5.6 5.0       Results from last 7 days   Lab Units 04/10/23  0433 04/09/23  0403 04/08/23  0640 04/07/23  0509 04/06/23  0350   WBC 10*3/mm3 8.50 7.32 5.65 6.60 7.41   HEMOGLOBIN g/dL 10.8* 11.2* 11.0* 11.5* 11.2*   PLATELETS 10*3/mm3 210 185 179 161 162             Assessment / Plan     ASSESSMENT:  -Acute kidney injury on chronic kidney disease, cardiorenal syndrome, creatinine today is 1.46, stable  -Hyponatremia associated with acute on chronic diastolic dysfunction, sodium today is 135  -Hypokalemia, potassium 4.1  -CKD stage III secondary to nephrosclerosis and advanced age  -History of hypertension but the patient is hypotensive at present  -A-fib  -Chronically anticoagulated  -Coronary artery disease  -Valvular heart disease  -Frailty    PLAN:  -Continue the same dose of diuretics.  Azotemia is likely driven by prednisone.  Continue same dose of torsemide for now  -I discussed the case with the patient and his daughter at the bedside  -Surveillance labs.  -The patient could be discharged anytime from the renal standpoint and I will plan to see him in my office in 1 month    Thank you for involving us in the care of Bridger Elias.  Please feel free to call with any questions.    Kiarra Iraheta MD  04/10/23  07:15 EDT    Nephrology  [facility]  [phone]    Please note that portions of this note were completed with a voice recognition program.

## 2023-04-10 NOTE — THERAPY TREATMENT NOTE
Patient Name: Bridger Elias  : 1930    MRN: [id number]                              Today's Date: 4/10/2023       Admit Date: 3/30/2023    Visit Dx:     ICD-10-CM ICD-9-CM   1. Community acquired pneumonia, unspecified laterality  J18.9 486     Patient Active Problem List   Diagnosis   • SCC (squamous cell carcinoma), face   • General weakness   • Pacemaker   • Abnormal gait   • Atrial fibrillation   • Chronic diastolic heart failure   • Coronary arteriosclerosis   • Dyslipidemia   • Glaucoma   • Mitral valve disorder   • Hypertension   • Hypertensive kidney disease, stage III   • Hypothyroidism   • Long term (current) use of anticoagulants   • Malignant neoplasm of prostate   • Osteoporosis   • Squamous cell carcinoma of skin of face   • Acute on chronic diastolic CHF (congestive heart failure)   • Stage 3a chronic kidney disease   • Personal history of radiation therapy   • Acute on chronic combined systolic and diastolic CHF (congestive heart failure)   • Cellulitis of right leg   • Chronic acquired lymphedema   • Contusion of face   • Contusion of forearm, left   • Fall   • Chronic systolic heart failure (CMS/HCC)   • Acute respiratory failure with hypoxia   • Hyponatremia   • Normocytic anemia   • Transaminitis     Past Medical History:   Diagnosis Date   • Acute on chronic diastolic CHF (congestive heart failure) 2022   • Atrial fibrillation 2022   • Coronary arteriosclerosis 7/3/2014    Formatting of this note might be different from the original. [facility] 16  IMPRESSION:   1. Right heart disease, hypertensive cardiac disease.   2. Status post mitral valve repair.   3. Anatomy: No hemodynamically significant disease. about 30-40% lesion of    the right coronary artery. Normal. Left coronary artery system.   • Dyslipidemia 2013   • Glaucoma 2022   • Hypertension 2013   • Hypertensive kidney disease, stage III 3/13/2017   • Hypothyroidism 3/19/2017   • Long term (current) use  of anticoagulants 12/5/2013    Formatting of this note might be different from the original. Mitral valve replacement and atrial fibrillation Assume a range of 2.5-3.5.   • Malignant neoplasm of prostate 12/5/2013    Formatting of this note might be different from the original. 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   • Stage 3b chronic kidney disease 4/14/2022     Past Surgical History:   Procedure Laterality Date   • PACEMAKER IMPLANTATION  2018      General Information     Row Name 04/10/23 0859          Physical Therapy Time and Intention    Document Type therapy note (daily note)  -     Mode of Treatment physical therapy  -     Row Name 04/10/23 0859          General Information    Existing Precautions/Restrictions fall  -     Barriers to Rehab hearing deficit  -     Row Name 04/10/23 0859          Safety Issues, Functional Mobility    Impairments Affecting Function (Mobility) balance;endurance/activity tolerance;strength;shortness of breath;postural/trunk control  -     Comment, Safety Issues/Impairments (Mobility) gt belt and non skid socks donned  -           User Key  (r) = Recorded By, (t) = Taken By, (c) = Cosigned By    Initials Name Provider Type    PH Nolvia Dobson PTA Physical Therapist Assistant               Mobility     Row Name 04/10/23 0900          Bed Mobility    Supine-Sit [place] (Bed Mobility) not tested  -     Sit-Supine [place] (Bed Mobility) not tested  -     Comment, (Bed Mobility) UIC and returned to chair - dtr in room  -     Row Name 04/10/23 0900          Sit-Stand Transfer    Sit-Stand [place] (Transfers) contact guard;verbal cues;minimum assist (75% patient effort)  -     Assistive Device (Sit-Stand Transfers) walker,  front-wheeled  -PH     Row Name 04/10/23 0900          Gait/Stairs (Locomotion)    [place] Level (Gait) contact guard  -PH     Assistive Device (Gait) walker, front-wheeled  -PH     Distance in Feet (Gait) 30'  -PH     Deviations/Abnormal Patterns (Gait) hussein decreased;gait speed decreased;stride length decreased  -PH     Bilateral Gait Deviations forward flexed posture;heel strike decreased  -PH     [place] Level (Stairs) not tested  -PH     Comment, (Gait/Stairs) slow w/ no overt LOB; fatigue limiting; cues for postural correction  -PH           User Key  (r) = Recorded By, (t) = Taken By, (c) = Cosigned By    Initials Name Provider Type    PH Nolvia Dobson PTA Physical Therapist Assistant               Obj/Interventions     Row Name 04/10/23 0904          Motor Skills    Therapeutic Exercise other (see comments)  BAP, LAQ, seated march, punches; x 10 reps all  -PH     Row Name 04/10/23 0904          Balance    Balance Assessment sitting static balance;standing static balance  -PH     Static Sitting Balance standby assist  -PH     Static Standing Balance contact guard  -PH     Position/Device Used, Standing Balance walker, front-wheeled  -PH           User Key  (r) = Recorded By, (t) = Taken By, (c) = Cosigned By    Initials Name Provider Type     Nolvia Dobson PTA Physical Therapist Assistant               Goals/Plan    No documentation.                Clinical Impression     Row Name 04/10/23 0905          Pain    Pretreatment Pain Rating 0/10 - no pain  -PH     Posttreatment Pain Rating 0/10 - no pain  -PH     Row Name 04/10/23 0905          Plan of Care Review    Plan of Care Reviewed With patient  -PH     Progress improving  -PH     Outcome Evaluation Pt seen by PT for treatment this date. Dtr had assisted pt up to chair for breakfast earlier. Pt stood w/ CGA/min A and use of fww. Pt then amb to door of room and back to EOB. Pt slow w/ no overt LOB and limited by fatigue. Pt  performed ther ex in chair for strengthening. PT will prog as pt olaf. Rec SNF after dc to address deficits.  -PH     Row Name 04/10/23 0905          Vital Signs    O2 Delivery Pre Treatment room air  -PH     O2 Delivery Intra Treatment room air  -PH     O2 Delivery Post Treatment room air  -PH     Row Name 04/10/23 0905          Positioning and Restraints    Pre-Treatment Position sitting in chair/recliner  -PH     Post Treatment Position chair  -PH     In Chair sitting;call light within reach;encouraged to call for assist;exit alarm on;with family/caregiver;with nsg  -PH           User Key  (r) = Recorded By, (t) = Taken By, (c) = Cosigned By    Initials Name Provider Type     Nolvia Dobson PTA Physical Therapist Assistant               Outcome Measures     Row Name 04/10/23 0907          How much help from another person do you currently need...    Turning from your back to your side while in flat bed without using bedrails? 4  -PH     Moving from lying on back to sitting on the side of a flat bed without bedrails? 3  -PH     Moving to and from a bed to a chair (including a wheelchair)? 3  -PH     Standing up from a chair using your arms (e.g., wheelchair, bedside chair)? 3  -PH     Climbing 3-5 steps with a railing? 3  -PH     To walk in hospital room? 3  -PH     AM-PAC 6 Clicks Score (PT) 19  -PH     Highest level of mobility 6 --> Walked 10 steps or more  -PH     Row Name 04/10/23 0907          Functional Assessment    Outcome Measure Options AM-PAC 6 Clicks Basic Mobility (PT)  -PH           User Key  (r) = Recorded By, (t) = Taken By, (c) = Cosigned By    Initials Name Provider Type     Nolvia Dobson PTA Physical Therapist Assistant                             Physical Therapy Education     Title: PT OT SLP Therapies (Done)     Topic: Physical Therapy (Done)     Point: Mobility training (Done)     Learning Progress Summary           Patient Acceptance, E,D,TB, VU,DU by  at 4/10/2023  0907    Acceptance, E,TB, VU,DU by CS at 4/7/2023 1437    Acceptance, E, VU by EB at 4/6/2023 1552    Acceptance, E, VU,NR by EB at 4/5/2023 1617    Acceptance, E, VU by EB at 4/4/2023 1545    Acceptance, E,D, VU,NR by EB at 4/3/2023 1603    Acceptance, E, VU,NR by DJ at 3/31/2023 1633                   Point: Home exercise program (Done)     Learning Progress Summary           Patient Acceptance, E,D,TB, VU,DU by  at 4/10/2023 0907    Acceptance, E,TB, VU,DU by CS at 4/7/2023 1437    Acceptance, E, VU by EB at 4/6/2023 1552    Acceptance, E, VU,NR by EB at 4/5/2023 1617                   Point: Body mechanics (Done)     Learning Progress Summary           Patient Acceptance, E,D,TB, VU,DU by  at 4/10/2023 0907    Acceptance, E,TB, VU,DU by CS at 4/7/2023 1437    Acceptance, E, VU by EB at 4/6/2023 1552    Acceptance, E, VU,NR by EB at 4/5/2023 1617    Acceptance, E, VU by EB at 4/4/2023 1545    Acceptance, E,D, VU,NR by EB at 4/3/2023 1603    Acceptance, E, VU,NR by DJ at 3/31/2023 1633                   Point: Precautions (Done)     Learning Progress Summary           Patient Acceptance, E,D,TB, VU,DU by PH at 4/10/2023 0907    Acceptance, E,TB, VU,DU by CS at 4/7/2023 1437    Acceptance, E, VU by EB at 4/6/2023 1552    Acceptance, E, VU,NR by EB at 4/5/2023 1617    Acceptance, E, VU by EB at 4/4/2023 1545    Acceptance, E,D, VU,NR by EB at 4/3/2023 1603    Acceptance, E, VU,NR by DJ at 3/31/2023 1633                               User Key     Initials Effective Dates Name Provider Type Discipline    EB 02/14/23 -  Garcia, Noa, PTA Physical Therapist Assistant PT    PH 06/16/21 -  Nolvia Dobson PTA Physical Therapist Assistant PT    DJ 10/25/19 -  Latosha Sanders PT Physical Therapist PT    CS 09/22/22 -  Josefina Santiago, PT Physical Therapist PT              PT Recommendation and Plan     Plan of Care Reviewed With: patient  Progress: improving  Outcome Evaluation: Pt seen by PT for treatment this  date. Dtr had assisted pt up to chair for breakfast earlier. Pt stood w/ CGA/min A and use of fww. Pt then amb to door of room and back to EOB. Pt slow w/ no overt LOB and limited by fatigue. Pt performed ther ex in chair for strengthening. PT will prog as pt olaf. Rec SNF after dc to address deficits.     Time Calculation:    PT Charges     Row Name 04/10/23 0907             Time Calculation    Start Time 0829  -PH      Stop Time 0843  -PH      Time Calculation (min) 14 min  -PH      PT Received On 04/10/23  -PH      PT - Next Appointment 04/11/23  -PH         Timed Charges    03164 - PT Therapeutic Exercise Minutes 8  -PH      71525 - PT Therapeutic Activity Minutes 6  -PH         Total Minutes    Timed Charges Total Minutes 14  -PH       Total Minutes 14  -PH            User Key  (r) = Recorded By, (t) = Taken By, (c) = Cosigned By    Initials Name Provider Type    PH Nolvia Dobson PTA Physical Therapist Assistant              Therapy Charges for Today     Code Description Service Date Service Provider Modifiers Qty    97015722432 HC PT THER PROC EA 15 MIN 4/10/2023 Nolvia Dobson PTA GP 1          PT G-Codes  Outcome Measure Options: AM-PAC 6 Clicks Basic Mobility (PT)  AM-PAC 6 Clicks Score (PT): 19  AM-PAC 6 Clicks Score (OT): 17  PT Discharge Summary  Anticipated Discharge Disposition (PT): skilled nursing facility    Nolvia Dobson PTA  4/10/2023

## 2023-04-10 NOTE — PLAN OF CARE
Goal Outcome Evaluation:              Outcome Evaluation: VSS, patient's daughter at bedside helpful with patient. Repeat cxr this am for follow up. Maintain safety and continue to monitor.

## 2023-04-10 NOTE — PROGRESS NOTES
Chart reviewed and discussed with RN. He is on room air. Rhythm and HR remains stable off amiodarone. Nephrology is managing diuretics. Cardiac status appears stable. Follow up with primary cards, Dr. Vanegas. Will see as needed. Please call with questions.    Electronically signed by JESUS El, 04/10/23, 11:18 AM EDT.     Dr. Escobar' patient   [phone] called patient to let her know a Rx for Evista has been sent to her pharmacy, left message for patient to call the office.

## 2023-04-11 LAB
ALBUMIN SERPL-MCNC: 3.2 G/DL (ref 3.5–5.2)
ALBUMIN/GLOB SERPL: 0.8 G/DL
ALP SERPL-CCNC: 241 U/L (ref 39–117)
ALT SERPL W P-5'-P-CCNC: 195 U/L (ref 1–41)
ANION GAP SERPL CALCULATED.3IONS-SCNC: 10.5 MMOL/L (ref 5–15)
AST SERPL-CCNC: 213 U/L (ref 1–40)
BILIRUB SERPL-MCNC: 0.7 MG/DL (ref 0–1.2)
BUN SERPL-MCNC: 48 MG/DL (ref 8–23)
BUN/CREAT SERPL: 35.3 (ref 7–25)
CALCIUM SPEC-SCNC: 8.9 MG/DL (ref 8.2–9.6)
CHLORIDE SERPL-SCNC: 95 MMOL/L (ref 98–107)
CO2 SERPL-SCNC: 32.5 MMOL/L (ref 22–29)
CREAT SERPL-MCNC: 1.36 MG/DL (ref 0.76–1.27)
DEPRECATED RDW RBC AUTO: 48.6 FL (ref 37–54)
EGFRCR SERPLBLD CKD-EPI 2021: 48.5 ML/MIN/1.73
ERYTHROCYTE [DISTWIDTH] IN BLOOD BY AUTOMATED COUNT: 14.4 % (ref 12.3–15.4)
GLOBULIN UR ELPH-MCNC: 3.9 GM/DL
GLUCOSE SERPL-MCNC: 139 MG/DL (ref 65–99)
HCT VFR BLD AUTO: 33.6 % (ref 37.5–51)
HGB BLD-MCNC: 11.5 G/DL (ref 13–17.7)
MCH RBC QN AUTO: 32.4 PG (ref 26.6–33)
MCHC RBC AUTO-ENTMCNC: 34.2 G/DL (ref 31.5–35.7)
MCV RBC AUTO: 94.6 FL (ref 79–97)
PLATELET # BLD AUTO: 230 10*3/MM3 (ref 140–450)
PMV BLD AUTO: 10.7 FL (ref 6–12)
POTASSIUM SERPL-SCNC: 4.5 MMOL/L (ref 3.5–5.2)
PROT SERPL-MCNC: 7.1 G/DL (ref 6–8.5)
RBC # BLD AUTO: 3.55 10*6/MM3 (ref 4.14–5.8)
SODIUM SERPL-SCNC: 138 MMOL/L (ref 136–145)
WBC NRBC COR # BLD: 9.97 10*3/MM3 (ref 3.4–10.8)

## 2023-04-11 PROCEDURE — 97530 THERAPEUTIC ACTIVITIES: CPT

## 2023-04-11 PROCEDURE — 92526 ORAL FUNCTION THERAPY: CPT

## 2023-04-11 PROCEDURE — 80053 COMPREHEN METABOLIC PANEL: CPT | Performed by: STUDENT IN AN ORGANIZED HEALTH CARE EDUCATION/TRAINING PROGRAM

## 2023-04-11 PROCEDURE — 85027 COMPLETE CBC AUTOMATED: CPT | Performed by: STUDENT IN AN ORGANIZED HEALTH CARE EDUCATION/TRAINING PROGRAM

## 2023-04-11 PROCEDURE — 63710000001 PREDNISONE PER 1 MG: Performed by: INTERNAL MEDICINE

## 2023-04-11 RX ORDER — MIDODRINE HYDROCHLORIDE 5 MG/1
5 TABLET ORAL
Status: DISCONTINUED | OUTPATIENT
Start: 2023-04-11 | End: 2023-04-12

## 2023-04-11 RX ADMIN — LATANOPROST 1 DROP: 50 SOLUTION OPHTHALMIC at 22:09

## 2023-04-11 RX ADMIN — APIXABAN 5 MG: 5 TABLET, FILM COATED ORAL at 10:01

## 2023-04-11 RX ADMIN — LEVOTHYROXINE SODIUM 88 MCG: 0.09 TABLET ORAL at 09:59

## 2023-04-11 RX ADMIN — PREDNISONE 20 MG: 20 TABLET ORAL at 09:59

## 2023-04-11 RX ADMIN — MULTIPLE VITAMINS W/ MINERALS TAB 1 TABLET: TAB at 09:59

## 2023-04-11 RX ADMIN — FLUTICASONE PROPIONATE 2 SPRAY: 50 SPRAY, METERED NASAL at 10:00

## 2023-04-11 RX ADMIN — POLYETHYLENE GLYCOL 3350 17 G: 17 POWDER, FOR SOLUTION ORAL at 10:00

## 2023-04-11 RX ADMIN — TORSEMIDE 40 MG: 20 TABLET ORAL at 09:59

## 2023-04-11 RX ADMIN — APIXABAN 5 MG: 5 TABLET, FILM COATED ORAL at 22:09

## 2023-04-11 RX ADMIN — MIDODRINE HYDROCHLORIDE 5 MG: 5 TABLET ORAL at 22:09

## 2023-04-11 RX ADMIN — Medication 10 ML: at 09:59

## 2023-04-11 RX ADMIN — BICALUTAMIDE 50 MG: 50 TABLET ORAL at 09:59

## 2023-04-11 NOTE — PROGRESS NOTES
[facility] Progress Note      Patient Name: Bridger Elias  : 1930  MRN: [id number]  Primary Care Physician:  Alma Cat MD  Date of admission: 3/30/2023    Subjective     Interval History:   Follow up Yanira on CKD III.  Feels better. Breathing ok. Not getting food choices.  Waiting on insurance pre-cert,    Review of Systems:   As noted above    Objective     Vitals:   Temp:  [97.4 °F (36.3 °C)-97.6 °F (36.4 °C)] 97.6 °F (36.4 °C)  Heart Rate:  [74] 74  Resp:  [16-18] 18  BP: ()/(58-79) 106/72    Intake/Output Summary (Last 24 hours) at 2023 1702  Last data filed at 2023 1308  Gross per 24 hour   Intake 220 ml   Output 900 ml   Net -680 ml       Physical Exam:    General Appearance: alert, oriented x 3, no acute distress   Skin: warm and dry  HEENT: oral mucosa normal, nonicteric sclera. Voice weak  Neck: supple, no JVD  Lungs: Clear to auscultation.   Heart: RRR, normal S1 and S2  Abdomen: soft, nontender, nondistended. + bs  Extremities: no edema.  Neuro: normal speech and mental status     Scheduled Meds:     apixaban, 5 mg, Oral, Q12H  bicalutamide, 50 mg, Oral, Daily  fluticasone, 2 spray, Each Nare, Daily  latanoprost, 1 drop, Both Eyes, Daily  levothyroxine, 88 mcg, Oral, Daily  multivitamin with minerals, 1 tablet, Oral, Daily  polyethylene glycol, 17 g, Oral, Daily  predniSONE, 20 mg, Oral, Daily  sulfamethoxazole-trimethoprim, 1 tablet, Oral, Once per day on   torsemide, 40 mg, Oral, Daily      IV Meds:        Results Reviewed:   I have personally reviewed the results from the time of this admission to 2023 17:02 EDT     Results from last 7 days   Lab Units 23  0428 04/10/23  0433 23  0403   SODIUM mmol/L 138 135* 133*   POTASSIUM mmol/L 4.5 4.1 4.1   CHLORIDE mmol/L 95* 96* 95*   CO2 mmol/L 32.5* 28.9 29.0   BUN mg/dL 48* 48* 37*   CREATININE mg/dL 1.36* 1.46* 1.26   CALCIUM mg/dL 8.9 8.5 8.3   BILIRUBIN mg/dL 0.7  0.7 0.6   ALK PHOS U/L 241* 219* 246*   ALT (SGPT) U/L 195* 77* 89*   AST (SGOT) U/L 213* 85* 128*   GLUCOSE mg/dL 139* 156* 134*       Estimated Creatinine Clearance: 33.6 mL/min (A) (by C-G formula based on SCr of 1.36 mg/dL (H)).    Results from last 7 days   Lab Units 04/10/23  0433 04/09/23  0403 04/08/23  0640   MAGNESIUM mg/dL 2.5* 2.4* 2.6*   PHOSPHORUS mg/dL 2.8 2.6 3.0       Results from last 7 days   Lab Units 04/10/23  0433 04/09/23  0403 04/08/23  0640 04/07/23  0509 04/06/23  0350 04/04/23  2048   URIC ACID mg/dL 6.2 6.1 6.1 6.4 5.9 5.6       Results from last 7 days   Lab Units 04/11/23  0428 04/10/23  0433 04/09/23  0403 04/08/23  0640 04/07/23  0509   WBC 10*3/mm3 9.97 8.50 7.32 5.65 6.60   HEMOGLOBIN g/dL 11.5* 10.8* 11.2* 11.0* 11.5*   PLATELETS 10*3/mm3 230 210 185 179 161             Assessment / Plan     ASSESSMENT:  1. MONISHA on CKD 3.  Baseline creatinine 1.2-1.4. Cardiorenal syndrome . Azotemia on steroid.   2 . Bilateral pulmonary infiltrates. Acute hypoxic respiratory failure. Possible ILD versus organizing PNA.  On steroid with clinical improvement.   3. Acute on chronic combined heart failure. Po diuretic  4.SSS, PAF.  5. SP MV repair .  6. Transaminitis.   7. Hypotension.  Add midodrine.   PLAN:  1. Midodrine 5 mg tid.  2. Ok with renal for dc anytime.  3. DC cardiac restriction to diet  4. DW patient and daughter .  Catrina Waters MD  04/11/23  17:02 EDT    [facility]  [phone]

## 2023-04-11 NOTE — THERAPY TREATMENT NOTE
Patient Name: Bridger Elias  : 1930    MRN: [id number]                              Today's Date: 2023       Admit Date: 3/30/2023    Visit Dx:     ICD-10-CM ICD-9-CM   1. Community acquired pneumonia, unspecified laterality  J18.9 486     Patient Active Problem List   Diagnosis   • SCC (squamous cell carcinoma), face   • General weakness   • Pacemaker   • Abnormal gait   • Atrial fibrillation   • Chronic diastolic heart failure   • Coronary arteriosclerosis   • Dyslipidemia   • Glaucoma   • Mitral valve disorder   • Hypertension   • Hypertensive kidney disease, stage III   • Hypothyroidism   • Long term (current) use of anticoagulants   • Malignant neoplasm of prostate   • Osteoporosis   • Squamous cell carcinoma of skin of face   • Acute on chronic diastolic CHF (congestive heart failure)   • Stage 3a chronic kidney disease   • Personal history of radiation therapy   • Acute on chronic combined systolic and diastolic CHF (congestive heart failure)   • Cellulitis of right leg   • Chronic acquired lymphedema   • Contusion of face   • Contusion of forearm, left   • Fall   • Chronic systolic heart failure (CMS/HCC)   • Acute respiratory failure with hypoxia   • Hyponatremia   • Normocytic anemia   • Transaminitis     Past Medical History:   Diagnosis Date   • Acute on chronic diastolic CHF (congestive heart failure) 2022   • Atrial fibrillation 2022   • Coronary arteriosclerosis 7/3/2014    Formatting of this note might be different from the original. [facility] 16  IMPRESSION:   1. Right heart disease, hypertensive cardiac disease.   2. Status post mitral valve repair.   3. Anatomy: No hemodynamically significant disease. about 30-40% lesion of    the right coronary artery. Normal. Left coronary artery system.   • Dyslipidemia 2013   • Glaucoma 2022   • Hypertension 2013   • Hypertensive kidney disease, stage III 3/13/2017   • Hypothyroidism 3/19/2017   • Long term (current) use  of anticoagulants 12/5/2013    Formatting of this note might be different from the original. Mitral valve replacement and atrial fibrillation Assume a range of 2.5-3.5.   • Malignant neoplasm of prostate 12/5/2013    Formatting of this note might be different from the original. 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   • Stage 3b chronic kidney disease 4/14/2022     Past Surgical History:   Procedure Laterality Date   • PACEMAKER IMPLANTATION  2018      General Information     Row Name 04/11/23 1021          Physical Therapy Time and Intention    Document Type therapy note (daily note)  -     Mode of Treatment physical therapy  -     Row Name 04/11/23 1021          General Information    Existing Precautions/Restrictions fall  -     Row Name 04/11/23 1021          Safety Issues, Functional Mobility    Impairments Affecting Function (Mobility) balance;endurance/activity tolerance;strength;shortness of breath  -PH     Comment, Safety Issues/Impairments (Mobility) gt belt and non skid socks donned  -PH           User Key  (r) = Recorded By, (t) = Taken By, (c) = Cosigned By    Initials Name Provider Type    PH Nolvia Dobson PTA Physical Therapist Assistant               Mobility     Row Name 04/11/23 1021          Bed Mobility    Bed Mobility supine-sit  -PH     Supine-Sit [place] (Bed Mobility) supervision  -PH     Assistive Device (Bed Mobility) bed rails;head of bed elevated  -PH     Comment, (Bed Mobility) extra time to EOB; Pt SV at EOB  -     Row Name 04/11/23 1021          Sit-Stand Transfer    Sit-Stand [place] (Transfers) contact guard;verbal cues  -PH     Assistive Device (Sit-Stand Transfers) walker, front-wheeled  -     Comment, (Sit-Stand Transfer) correct B  hand placement noted  -PH     Row Name 04/11/23 1021          Gait/Stairs (Locomotion)    [place] (Gait) contact guard  -PH     Assistive Device (Gait) walker, front-wheeled  -PH     Distance in Feet (Gait) 40'  -PH     Deviations/Abnormal Patterns (Gait) hussein decreased;gait speed decreased;stride length decreased  -PH     Bilateral Gait Deviations forward flexed posture;heel strike decreased  -PH     Left Sided Gait Deviations foot drop/toe drag  -PH     Comment, (Gait/Stairs) slow w/ no overt LOB; fatigue limiting; pt cued for posteral correction and B heel strike.  -PH           User Key  (r) = Recorded By, (t) = Taken By, (c) = Cosigned By    Initials Name Provider Type    PH Nolvia Dobson PTA Physical Therapist Assistant               Obj/Interventions     Row Name 04/11/23 1023          Motor Skills    Therapeutic Exercise other (see comments)  BAP, LAQ, seated march, SLR, punches; x 10 reps all w/ AROM  -PH     Row Name 04/11/23 1023          Balance    Balance Assessment sitting static balance;standing static balance  -PH     Static Sitting Balance supervision;standby assist  -PH     Static Standing Balance contact guard  -PH     Position/Device Used, Standing Balance walker, front-wheeled  -PH           User Key  (r) = Recorded By, (t) = Taken By, (c) = Cosigned By    Initials Name Provider Type     Nolvia Dobson PTA Physical Therapist Assistant               Goals/Plan    No documentation.                Clinical Impression     Row Name 04/11/23 1023          Pain    Pretreatment Pain Rating 0/10 - no pain  -PH     Posttreatment Pain Rating 0/10 - no pain  -PH     Row Name 04/11/23 1023          Plan of Care Review    Plan of Care Reviewed With patient  -PH     Progress improving  -PH     Outcome Evaluation Pt seen by PT this AM for treatment. Pt sat up to EOB w/ SV and was SBA at EOB. Pt stood and amb 40' req CGA and use of fww. Pt slow w/ L foot drop noted although no  overt LOB. Pt limited in distance by fatigue. Pt performed ther ex for strengthening and to incr endurance. PT will prog as pt olaf. Rec SNF to address deficits after dc.  -PH     Row Name 04/11/23 1023          Vital Signs    O2 Delivery Pre Treatment room air  -PH     O2 Delivery Intra Treatment room air  -PH     O2 Delivery Post Treatment room air  -PH     Row Name 04/11/23 1023          Positioning and Restraints    Pre-Treatment Position in bed  -PH     Post Treatment Position chair  -PH     In Chair reclined;call light within reach;encouraged to call for assist;exit alarm on  -PH           User Key  (r) = Recorded By, (t) = Taken By, (c) = Cosigned By    Initials Name Provider Type     Nolvia Dobson PTA Physical Therapist Assistant               Outcome Measures     Row Name 04/11/23 1025          How much help from another person do you currently need...    Turning from your back to your side while in flat bed without using bedrails? 4  -PH     Moving from lying on back to sitting on the side of a flat bed without bedrails? 3  -PH     Moving to and from a bed to a chair (including a wheelchair)? 3  -PH     Standing up from a chair using your arms (e.g., wheelchair, bedside chair)? 3  -PH     Climbing 3-5 steps with a railing? 2  -PH     To walk in hospital room? 3  -PH     AM-PAC 6 Clicks Score (PT) 18  -PH     Highest level of mobility 6 --> Walked 10 steps or more  -PH     Row Name 04/11/23 1025          Functional Assessment    Outcome Measure Options AM-PAC 6 Clicks Basic Mobility (PT)  -PH           User Key  (r) = Recorded By, (t) = Taken By, (c) = Cosigned By    Initials Name Provider Type     Nolvia Dobson PTA Physical Therapist Assistant                             Physical Therapy Education     Title: PT OT SLP Therapies (Done)     Topic: Physical Therapy (Done)     Point: Mobility training (Done)     Learning Progress Summary           Patient Acceptance, E,D,TB, VU,DU by   at 4/11/2023 1025    Acceptance, E,D,TB, VU,DU by PH at 4/10/2023 0907    Acceptance, E,TB, VU,DU by CS at 4/7/2023 1437    Acceptance, E, VU by EB at 4/6/2023 1552    Acceptance, E, VU,NR by EB at 4/5/2023 1617    Acceptance, E, VU by EB at 4/4/2023 1545    Acceptance, E,D, VU,NR by EB at 4/3/2023 1603    Acceptance, E, VU,NR by DJ at 3/31/2023 1633                   Point: Home exercise program (Done)     Learning Progress Summary           Patient Acceptance, E,D,TB, VU,DU by PH at 4/11/2023 1025    Acceptance, E,D,TB, VU,DU by PH at 4/10/2023 0907    Acceptance, E,TB, VU,DU by CS at 4/7/2023 1437    Acceptance, E, VU by EB at 4/6/2023 1552    Acceptance, E, VU,NR by EB at 4/5/2023 1617                   Point: Body mechanics (Done)     Learning Progress Summary           Patient Acceptance, E,D,TB, VU,DU by PH at 4/11/2023 1025    Acceptance, E,D,TB, VU,DU by PH at 4/10/2023 0907    Acceptance, E,TB, VU,DU by CS at 4/7/2023 1437    Acceptance, E, VU by EB at 4/6/2023 1552    Acceptance, E, VU,NR by EB at 4/5/2023 1617    Acceptance, E, VU by EB at 4/4/2023 1545    Acceptance, E,D, VU,NR by EB at 4/3/2023 1603    Acceptance, E, VU,NR by DJ at 3/31/2023 1633                   Point: Precautions (Done)     Learning Progress Summary           Patient Acceptance, E,D,TB, VU,DU by PH at 4/11/2023 1025    Acceptance, E,D,TB, VU,DU by PH at 4/10/2023 0907    Acceptance, E,TB, VU,DU by CS at 4/7/2023 1437    Acceptance, E, VU by EB at 4/6/2023 1552    Acceptance, E, VU,NR by EB at 4/5/2023 1617    Acceptance, E, VU by EB at 4/4/2023 1545    Acceptance, E,D, VU,NR by EB at 4/3/2023 1603    Acceptance, E, VU,NR by DJ at 3/31/2023 1633                               User Key     Initials Effective Dates Name Provider Type Discipline    EB 02/14/23 -  Noa Garcia PTA Physical Therapist Assistant PT    PH 06/16/21 -  Nolvia Dobson PTA Physical Therapist Assistant PT    DJ 10/25/19 -  Latosha Sanders, PT Physical  Therapist PT    CS 09/22/22 -  Josefina Santiago PT Physical Therapist PT              PT Recommendation and Plan     Plan of Care Reviewed With: patient  Progress: improving  Outcome Evaluation: Pt seen by PT this AM for treatment. Pt sat up to EOB w/ SV and was SBA at EOB. Pt stood and amb 40' req CGA and use of fww. Pt slow w/ L foot drop noted although no overt LOB. Pt limited in distance by fatigue. Pt performed ther ex for strengthening and to incr endurance. PT will prog as pt olaf. Rec SNF to address deficits after dc.     Time Calculation:    PT Charges     Row Name 04/11/23 1026             Time Calculation    Start Time 0936  -PH      Stop Time 0953  -PH      Time Calculation (min) 17 min  -PH      PT Received On 04/11/23  -PH      PT - Next Appointment 04/12/23  -PH         Timed Charges    72285 - PT Therapeutic Exercise Minutes 7  -PH      05373 - PT Therapeutic Activity Minutes 10  -PH         Total Minutes    Timed Charges Total Minutes 17  -PH       Total Minutes 17  -PH            User Key  (r) = Recorded By, (t) = Taken By, (c) = Cosigned By    Initials Name Provider Type    PH Nolvia Dobson PTA Physical Therapist Assistant              Therapy Charges for Today     Code Description Service Date Service Provider Modifiers Qty    27646457305 HC PT THER PROC EA 15 MIN 4/10/2023 Nolvia Dobson PTA GP 1    40358826192 HC PT THERAPEUTIC ACT EA 15 MIN 4/11/2023 Nolvia Dobson PTA GP 1          PT G-Codes  Outcome Measure Options: AM-PAC 6 Clicks Basic Mobility (PT)  AM-PAC 6 Clicks Score (PT): 18  AM-PAC 6 Clicks Score (OT): 17  PT Discharge Summary  Anticipated Discharge Disposition (PT): skilled nursing facility    Nolvia Dobson PTA  4/11/2023

## 2023-04-11 NOTE — THERAPY DISCHARGE NOTE
Acute Care - Speech Language Pathology   Swallow Re-Evaluation/Discharge [facility]     Patient Name: Bridger Elias  : 1930  MRN: [id number]  Today's Date: 2023               Admit Date: 3/30/2023    Visit Dx:    ICD-10-CM ICD-9-CM   1. Community acquired pneumonia, unspecified laterality  J18.9 486     Patient Active Problem List   Diagnosis   • SCC (squamous cell carcinoma), face   • General weakness   • Pacemaker   • Abnormal gait   • Atrial fibrillation   • Chronic diastolic heart failure   • Coronary arteriosclerosis   • Dyslipidemia   • Glaucoma   • Mitral valve disorder   • Hypertension   • Hypertensive kidney disease, stage III   • Hypothyroidism   • Long term (current) use of anticoagulants   • Malignant neoplasm of prostate   • Osteoporosis   • Squamous cell carcinoma of skin of face   • Acute on chronic diastolic CHF (congestive heart failure)   • Stage 3a chronic kidney disease   • Personal history of radiation therapy   • Acute on chronic combined systolic and diastolic CHF (congestive heart failure)   • Cellulitis of right leg   • Chronic acquired lymphedema   • Contusion of face   • Contusion of forearm, left   • Fall   • Chronic systolic heart failure (CMS/HCC)   • Acute respiratory failure with hypoxia   • Hyponatremia   • Normocytic anemia   • Transaminitis     Past Medical History:   Diagnosis Date   • Acute on chronic diastolic CHF (congestive heart failure) 2022   • Atrial fibrillation 2022   • Coronary arteriosclerosis 7/3/2014    Formatting of this note might be different from the original. [facility] 16  IMPRESSION:   1. Right heart disease, hypertensive cardiac disease.   2. Status post mitral valve repair.   3. Anatomy: No hemodynamically significant disease. about 30-40% lesion of    the right coronary artery. Normal. Left coronary artery system.   • Dyslipidemia 2013   • Glaucoma 2022   • Hypertension 2013   • Hypertensive kidney disease, stage III  3/13/2017   • Hypothyroidism 3/19/2017   • Long term (current) use of anticoagulants 12/5/2013    Formatting of this note might be different from the original. Mitral valve replacement and atrial fibrillation Assume a range of 2.5-3.5.   • Malignant neoplasm of prostate 12/5/2013    Formatting of this note might be different from the original. 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   • Stage 3b chronic kidney disease 4/14/2022     Past Surgical History:   Procedure Laterality Date   • PACEMAKER IMPLANTATION  2018       SLP Recommendation and Plan  Recommend: mechanical soft and thin liquids. Medications: with thin liquids or puree if difficulties. Compensations: alternate liquids and solids, multiple swallows s/p solids, multiple swallows s/p liquids.      Note planned discharge to rehab. No additional acute interventions required - 100% accuracy for comprehension oropharyngeal swallow function, cues required for carryover of strategies. Pt may benefit from ongoing dysphagia therapy at next level of care.    SWALLOW EVALUATION (last 72 hours)     SLP Adult Swallow Evaluation     Row Name 04/11/23 1200       Rehab Evaluation    Document Type discharge treatment  -AB    Subjective Information no complaints  -AB    Patient Observations alert;cooperative;agree to therapy  -AB    Patient/Family/Caregiver Comments/Observations pt seen upright in recliner, [address] [place] [address]. daughter at bedside  -AB    Patient Effort good  -AB    Symptoms Noted During/After Treatment none  -AB          User Key  (r) = Recorded By, (t) = Taken By, (c) = Cosigned By    Initials Name Effective Dates    Criss Duarte, MS CCC-SLP 12/27/22 -                 EDUCATION  The patient has been educated in the following areas:    Dysphagia (Swallowing Impairment) Modified Diet Instruction.         SLP GOALS     Row Name 04/11/23 1200       (LTG) Patient will demonstrate functional swallow for    Diet Texture (Demonstrate functional swallow) soft to chew (chopped) textures  -AB    Liquid viscosity (Demonstrate functional swallow) thin liquids  -AB    [place] (Demonstrate functional swallow) independently (over 90% accuracy)  -AB    Time Frame (Demonstrate functional swallow) by discharge  -AB    Progress/Outcomes (Demonstrate functional swallow) goal met  -AB    Comment (Demonstrate functional swallow) V[facility]SS follow up completed this date, reviewing video images and verbal explanation with pt, providing explanation and written compensations for diet and recommendations. Discussed mild oropharyngeal dysphagia, deficits (decreased pharyngeal squeeze and epiglottic inversion). Pt consumes thins, soft solids with no overt s/s aspiration, mildly extended oral phase, functional. Moderate consistent verbal cues for second swallow initiation following liquids and solids.  Daughter arrives, verbal review all education and recommendations, written format provided.   Recommend: mechanical soft and thin liquids. Medications: with thin liquids or puree if difficulties. Compensations: alternate liquids and solids, multiple swallows s/p solids, multiple swallows s/p liquids.  Note planned discharge to rehab. No additional acute interventions required - 100% accuracy for comprehension oropharyngeal swallow function, cues required for carryover of strategies. Pt may benefit from ongoing dysphagia therapy at next level of care.  -AB          User Key  (r) = Recorded By, (t) = Taken By, (c) = Cosigned By    Initials Name Provider Type    Criss Duarte, MS CCC-SLP Speech and Language Pathologist                     Time Calculation:    Time Calculation- SLP     Row Name 04/11/23 1236             Time Calculation- SLP    SLP Received On 04/11/23  -AB             User Key  (r) = Recorded By, (t) = Taken By, (c) = Cosigned By    Initials Name Provider Type    AB Criss Martinez, MS CCC-SLP Speech and Language Pathologist                Therapy Charges for Today     Code Description Service Date Service Provider Modifiers Qty    60687500141  ST TREATMENT SWALLOW 4 4/11/2023 Criss Martinez, MS NADYA-SLP GN 1               SLP Discharge Summary  Anticipated Discharge Disposition (SLP): unknown    Criss Martinez MS CCC-SLP  4/11/2023

## 2023-04-11 NOTE — PLAN OF CARE
Goal Outcome Evaluation:  Plan of Care Reviewed With: patient, daughter        Progress: improving  Outcome Evaluation: No falls or injury. No skin issues. Weight up from two days ago. Resp status stable on room air. No complaints. daugter states pt gets restless with prednisone.

## 2023-04-11 NOTE — PROGRESS NOTES
LOS: 11 days   Patient Care Team:  Alma Cat MD as PCP - General (Family Medicine)  Maurice Cook MD as Consulting Physician (Radiation Oncology)    Subjective     Patient is a 89 y.o. male.  Asked to see for persistent O2 requirements and bilateral pleural effusions.   Patient is daughter both noted that he is continuing to improve he is feeling better today he ate lunch and he ate meat which she had not had much appetite 4.  He is not short of breath at all not coughing anxiously awaiting for approval for rehab.  Review of Systems:          Objective     Vital Signs  Vital Sign Min/Max for last 24 hours  Temp  Min: 97.4 °F (36.3 °C)  Max: 97.6 °F (36.4 °C)   BP  Min: 91/58  Max: 123/79   Pulse  Min: 74  Max: 74   Resp  Min: 16  Max: 18   SpO2  Min: 95 %  Max: 96 %   No data recorded   Weight  Min: 70 kg (154 lb 5.2 oz)  Max: 70 kg (154 lb 5.2 oz)        Ventilator/Non-Invasive Ventilation Settings (From admission, onward)    None                       Body mass index is 24.17 kg/m².  I/O last 3 completed shifts:  In: -   Out: 600 [Urine:600]  I/O this shift:  In: 220 [P.O.:220]  Out: 300 [Urine:300]        Physical Exam:    General Appearance: Well-developed elderly white male he is sitting up in a chair on room air his oxygen saturations in the mid to upper 90s in no distress  Eyes: Conjunctiva are clear and anicteric pupils look equal mucous membranes are moist no erythema no exudates  ENT: Mucous membranes are moist Mallampati 1 airway nasal septum midline  Neck: No palpable adenopathy or thyromegaly no visible jugular venous distention and trachea midline  Lungs: Clear I do not hear wheezes rales or rhonchi   Cardiac: Irregularly irregular no definite murmur  Abdomen: Soft no palpable hepatosplenomegaly or masses  : Not examined  Musculoskeletal: He has some mild to moderate thoracic kyphosis  Skin: Warm and dry no jaundice no petechiae  Neuro: He is hard of hearing but he is alert and  oriented he is cooperative  Extremities/P Vascular: No clubbing no cyanosis no edema palpable radial and dorsalis pedis pulses  MSE: Pleasant gentleman he is appropriate     Labs:  Results from last 7 days   Lab Units 04/11/23  0428 04/10/23  0433 04/09/23  0403 04/08/23  0640 04/07/23  0509 04/06/23  0350 04/05/23  0506 04/04/23  2048   GLUCOSE mg/dL 139* 156* 134* 147* 106* 111* 105* 129*   SODIUM mmol/L 138 135* 133* 131* 133* 134* 130* 136   POTASSIUM mmol/L 4.5 4.1 4.1 4.2 3.4* 3.8 3.6 3.8   MAGNESIUM mg/dL  --  2.5* 2.4* 2.6* 2.6* 2.5*  --  2.4*   CO2 mmol/L 32.5* 28.9 29.0 28.2 28.0 26.5 27.1 26.8   CHLORIDE mmol/L 95* 96* 95* 95* 94* 94* 95* 96*   ANION GAP mmol/L 10.5 10.1 9.0 7.8 11.0 13.5 7.9 13.2   CREATININE mg/dL 1.36* 1.46* 1.26 1.20 1.44* 1.36* 1.50* 1.34*   BUN mg/dL 48* 48* 37* 38* 39* 32* 30* 29*   BUN / CREAT RATIO  35.3* 32.9* 29.4* 31.7* 27.1* 23.5 20.0 21.6   CALCIUM mg/dL 8.9 8.5 8.3 8.8 8.6 8.4 8.4 8.6   ALK PHOS U/L 241* 219* 246* 221* 232* 227* 215*  --    TOTAL PROTEIN g/dL 7.1 7.0 7.4 7.2 7.4 7.3 7.3  --    ALT (SGPT) U/L 195* 77* 89* 80* 83* 60* 41  --    AST (SGOT) U/L 213* 85* 128* 121* 141* 101* 56*  --    BILIRUBIN mg/dL 0.7 0.7 0.6 0.7 0.6 0.8 0.9  --    ALBUMIN g/dL 3.2* 2.8* 3.0* 2.9* 2.9* 3.0* 3.0* 3.4*   GLOBULIN gm/dL 3.9 4.2 4.4 4.3 4.5 4.3 4.3  --      Estimated Creatinine Clearance: 33.6 mL/min (A) (by C-G formula based on SCr of 1.36 mg/dL (H)).      Results from last 7 days   Lab Units 04/11/23  0428 04/10/23  0433 04/09/23  0403 04/08/23  0640 04/07/23  0509 04/06/23  0350 04/05/23  0506 04/04/23  2048   WBC 10*3/mm3 9.97 8.50 7.32 5.65 6.60 7.41 9.78 9.55   RBC 10*6/mm3 3.55* 3.37* 3.39* 3.43* 3.45* 3.44* 3.52* 3.69*   HEMOGLOBIN g/dL 11.5* 10.8* 11.2* 11.0* 11.5* 11.2* 11.8* 12.3*   HEMATOCRIT % 33.6* 31.5* 31.5* 32.5* 33.1* 32.2* 33.2* 35.1*   MCV fL 94.6 93.5 92.9 94.8 95.9 93.6 94.3 95.1   MCH pg 32.4 32.0 33.0 32.1 33.3* 32.6 33.5* 33.3*   MCHC g/dL 34.2 34.3 35.6  33.8 34.7 34.8 35.5 35.0   RDW % 14.4 13.7 13.6 14.0 14.5 14.1 14.1 14.5   RDW-SD fl 48.6 46.7 46.1 48.5 50.3 47.1 48.3 50.8   MPV fL 10.7 10.7 10.4 10.9 10.5 10.1 10.0 10.1   PLATELETS 10*3/mm3 230 210 185 179 161 162 154 163   NEUTROPHIL % %  --   --   --  79.7*  --  64.1  --  71.6   LYMPHOCYTE % %  --   --   --  14.5*  --  19.3*  --  10.4*   MONOCYTES % %  --   --   --  3.2*  --  10.5  --  12.6*   EOSINOPHIL % %  --   --   --  0.0*  --  4.3  --  3.4   BASOPHIL % %  --   --   --  0.5  --  0.9  --  0.7   IMM GRAN % %  --   --   --  2.1*  --  0.9*  --   --    NEUTROS ABS 10*3/mm3  --   --   --  4.50  --  4.74  --  6.85   LYMPHS ABS 10*3/mm3  --   --   --  0.82  --  1.43  --  0.99   MONOS ABS 10*3/mm3  --   --   --  0.18  --  0.78  --  1.20*   EOS ABS 10*3/mm3  --   --   --  0.00  --  0.32  --  0.32   BASOS ABS 10*3/mm3  --   --   --  0.03  --  0.07  --  0.07   IMMATURE GRANS (ABS) 10*3/mm3  --   --   --  0.12*  --  0.07*  --   --    NRBC /100 WBC  --   --   --  0.0  --  0.1  --   --                              Microbiology Results (last 10 days)     Procedure Component Value - Date/Time    AFB Culture - Sputum, Cough [[id number]] Collected: 04/06/23 1311    Lab Status: Preliminary result Specimen: Sputum from Cough Updated: 04/07/23 2118     AFB Stain No acid fast bacilli seen on direct smear    MTB Complex and Rifampin Resistance PCR - Sputum, Cough [[id number]]  (Normal) Collected: 04/06/23 1311    Lab Status: Final result Specimen: Sputum from Cough Updated: 04/10/23 1228     MTB PCR Not Detected    Narrative:      Specimen may not contain M. tuberculosis, or result may be falsely negative due to low numbers of M. tuberculosis in the presence or absence of LILLIAN, or interference with the assay detection by specimen inhibitors.    AFB Culture - Sputum, Cough [[id number]] Collected: 04/05/23 2222    Lab Status: Preliminary result Specimen: Sputum from Cough Updated: 04/07/23 2118     AFB Stain No acid fast bacilli  seen on concentrated smear    Respiratory Culture - Sputum, Cough [[id number]] Collected: 04/05/23 0811    Lab Status: Final result Specimen: Sputum from Cough Updated: 04/07/23 1040     Respiratory Culture Scant growth (1+) Normal respiratory rob. No S. aureus or Pseudomonas aeruginosa detected. Final report.     Gram Stain Many (4+) WBCs per low power field      Rare (1+) Epithelial cells per low power field      Few (2+) Gram positive cocci      Rare (1+) Gram positive bacilli      Rare (1+) Gram negative bacilli    AFB Culture - Sputum, Cough [[id number]] Collected: 04/05/23 0811    Lab Status: Preliminary result Specimen: Sputum from Cough Updated: 04/10/23 0830     AFB Culture No AFB isolated at less than 1 week     AFB Stain No acid fast bacilli seen on concentrated smear    Legionella Antigen, Urine - Urine, Urine, Clean Catch [[id number]]  (Normal) Collected: 04/04/23 2300    Lab Status: Final result Specimen: Urine, Clean Catch Updated: 04/04/23 2343     LEGIONELLA ANTIGEN, URINE Negative              apixaban, 5 mg, Oral, Q12H  bicalutamide, 50 mg, Oral, Daily  fluticasone, 2 spray, Each Nare, Daily  latanoprost, 1 drop, Both Eyes, Daily  levothyroxine, 88 mcg, Oral, Daily  multivitamin with minerals, 1 tablet, Oral, Daily  polyethylene glycol, 17 g, Oral, Daily  predniSONE, 20 mg, Oral, Daily  sulfamethoxazole-trimethoprim, 1 tablet, Oral, Once per day on Mon Wed Fri  torsemide, 40 mg, Oral, Daily           Diagnostics:  Adult Transthoracic Echo Complete W/ Cont if Necessary Per Protocol    Result Date: 4/1/2023  •  Left ventricular ejection fraction appears to be 41 - 45%. •  Systolic and diastolic flattening of the interventricular septum consistent with right ventricle pressure and volume overload. •  The right ventricular cavity is severely dilated. Normal right ventricular systolic function noted. •  The left atrial cavity is moderately dilated. •  The right atrial cavity is severely dilated. •   Saline test results are negative. •  Trace mitral valve regurgitation is present. The mitral valve mean gradient is 1.65 mmHg. There is a mitral valve ring present. •  Severe tricuspid valve regurgitation is present. •  Calculated right ventricular systolic pressure from tricuspid regurgitation is 38.0 mmHg. •  There is a trivial pericardial effusion.     CT Chest Without Contrast Diagnostic    Result Date: 3/31/2023  CT CHEST WITHOUT CONTRAST  HISTORY: Dyspnea.  TECHNIQUE: Noncontrast chest CT is provided and correlated with x-ray from yesterday.  FINDINGS: Cardiac pacing hardware is observed along with hardware at the mitral valve, dilated cardiac chambers, and simple appearing, posteriorly layering pleural effusions bilaterally. Those measure under 3 cm AP thickness in each. There is an enlarged precarinal lymph node measuring about 12 mm short axis. No other significant appearing lymphadenopathy. Abnormal opacity in the left upper lobe and posterior left lower lobe with groundglass density throughout the right upper lobe favored represent pneumonia. No obstructing airway lesion.  Visualized upper abdominal structures appear normal. Degenerative change in the spine.      Cardiac hardware with changes of prior sternotomy and mitral valve repair or replacement. There our bilateral pleural effusions with patchy opacity in the lungs bilaterally which appears similar as on the x-ray from yesterday and is favored represent pneumonia.  Radiation dose reduction techniques were utilized, including automated exposure control and exposure modulation based on body size.  This report was finalized on 3/31/2023 9:06 PM by Dr. Maurice Alejandro M.D.      XR Chest 1 View    Result Date: 4/3/2023  XR CHEST 1 VW-  Clinical: CHF, bilateral pleural effusions  COMPARISON examination 03/31/2013 CT chest  FINDINGS: Unchanged left-sided pleural effusion, right-sided pleural effusion appears slightly improved within the interim. The  cardiomediastinal silhouette is stable. Right base infiltrate/atelectasis more pronounced compared to the previous examination. Right and left upper lobe infiltrates similar to the previous examination. The remainder of examination is unremarkable.  This report was finalized on 4/3/2023 6:22 AM by Dr. Segun Jara M.D.      XR Chest 1 View    Result Date: 3/30/2023  PORTABLE CHEST X-RAY  HISTORY: Shortness of breath.  TECHNIQUE: Portable chest x-ray correlated with chest x-ray 04/14/2022.  FINDINGS: Sternal wires with cardiac valve hardware and a cardiac pacing device are again observed. The cardiac silhouette is enlarged. Patchy infiltrate in the upper lobes is observed bilaterally, more dense on the left than the right. There is also some density in the left lung base and mildly at the periphery of the right lung base. There also appears to be small pleural effusions blunting the costophrenic angles.      Bilateral pneumonia.  This report was finalized on 3/30/2023 8:50 PM by Dr. Maurice Alejandro M.D.      XR CHEST 1 VW PORTABLE    Result Date: 3/10/2023  EXAM: CR Chest 1 Vw Portable NUMBER OF IMAGES:  1 INDICATION: Shortness of breath Technique: AP view of the chest obtained portably on 3/10/2023 10:31 AM Comparison:  Comparison is made to frontal radiograph the chest dated 09/04/2022. Findings: The cardiomediastinal silhouette is enlarged. There are calcified lesions of the thoracic aorta. There are bilateral pleural effusions, right larger than left, with associated patchy bilateral lower lobe airspace disease. There is no pneumothorax. The visualized osseous structures demonstrate degenerative changes. Intact midline sternotomy wires are seen. Lines and Tubes: Left-sided dual-lead cardiac pacer is in place, with leads terminating in satisfactory position. Impression:  Bilateral pleural effusions, right larger than left, with associated patchy bilateral lower lobe airspace disease. Dictated by: Paco Shah MD  Signed by Paco Shah MD on 3/10/2023 10:46 AM ##### Final ##### Dictated by:    PACO SHAH MD-RAD Dictated DT/TM: 03/10/2023 10:46 am Interpreted and electronically signed by:  PACO SHAH MD-RAD Signed DT/TM:  03/10/2023 10:46 am    Results for orders placed during the hospital encounter of 03/30/23    Adult Transthoracic Echo Complete W/ Cont if Necessary Per Protocol    Interpretation Summary  •  Left ventricular ejection fraction appears to be 41 - 45%.  •  Systolic and diastolic flattening of the interventricular septum consistent with right ventricle pressure and volume overload.  •  The right ventricular cavity is severely dilated. Normal right ventricular systolic function noted.  •  The left atrial cavity is moderately dilated.  •  The right atrial cavity is severely dilated.  •  Saline test results are negative.  •  Trace mitral valve regurgitation is present. The mitral valve mean gradient is 1.65 mmHg. There is a mitral valve ring present.  •  Severe tricuspid valve regurgitation is present.  •  Calculated right ventricular systolic pressure from tricuspid regurgitation is 38.0 mmHg.  •  There is a trivial pericardial effusion.          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    Assessment & Plan     1. Bilateral pulmonary infiltrates uncertain etiology infection relatively well ruled out including TB could be ILD related to drugs including amiodarone which has been discontinued and unlikely it was such a small dose, Casodex rare but certainly can do it, this could be a idiopathic NSIP or could be cryptogenic organizing pneumonia.  Because of the time he was hypoxic and age we elected not to do bronchoscopic biopsies which may not even be diagnostic certainly was not a good candidate for open biopsies.  We have been treating empirically with steroids he has had dramatic improvement.  Now the question will be how much and how long on the steroids.  He is done very good him to try and drop his dose down to about 20 mg a day we will keep him on that and see him back in the office in 4 to 6 weeks we will give him Bactrim DS 1 3 times a week for PCP prophylaxis.  2. Bilateral pleural effusions looks like there has been some improvement in these with diuresis.  3. Acute hypoxic respiratory failure resolved  4. Acute on chronic combined heart failure per nephrology and cardiology looks like patient has been diuresing.  5. Acute kidney injury chronic kidney disease/cardiorenal syndrome nephrology following  6. Sick sinus syndrome and paroxysmal atrial fibrillation rate controlled on Eliquis  7. Pulmonary hypertension mild by echocardiogram RV dilatation  8. History of mitral valve disease status post mitral valve repair valve appears to be functioning well by echocardiogram  9. Hyponatremia nephrology following and managing  10. Anemia mild  11. Mildly elevated transaminases/hepatitis question etiology.                Plan for disposition: From a pulmonary standpoint patient can be discharged at any time.   Appears to be purely an insurance issue.  I will see as needed    Martín Flowers Jr, MD  04/11/23  15:04 EDT    Time:

## 2023-04-11 NOTE — PLAN OF CARE
Goal Outcome Evaluation:  Plan of Care Reviewed With: patient        Progress: no change  Outcome Evaluation: VFSS follow up completed this date, reviewing video images and verbal explanation with pt, providing explanation and written compensations for diet and recommendations. Discussed mild oropharyngeal dysphagia, deficits (decreased pharyngeal squeeze and epiglottic inversion). Pt consumes thins, soft solids with no overt s/s aspiration, mildly extended oral phase, functional. Moderate consistent verbal cues for second swallow initiation following liquids and solids.  Daughter arrives, verbal review all education and recommendations, written format provided.     Recommend: mechanical soft and thin liquids. Medications: with thin liquids or puree if difficulties. Compensations: alternate liquids and solids, multiple swallows s/p solids, multiple swallows s/p liquids.    Note planned discharge to rehab. No additional acute interventions required - 100% accuracy for comprehension oropharyngeal swallow function, cues required for carryover of strategies. Pt may benefit from ongoing dysphagia therapy at next level of care.

## 2023-04-11 NOTE — PLAN OF CARE
Goal Outcome Evaluation:  Plan of Care Reviewed With: patient        Progress: improving  Outcome Evaluation: Pt seen by PT this AM for treatment. Pt sat up to EOB w/ SV and was SBA at EOB. Pt stood and amb 40' req CGA and use of fww. Pt slow w/ L foot drop noted although no overt LOB. Pt limited in distance by fatigue. Pt performed ther ex for strengthening and to incr endurance. PT will prog as pt olaf. Rec SNF to address deficits after dc.

## 2023-04-11 NOTE — PROGRESS NOTES
[facility]     LOS: 11 days     Subjective:    CC:Shortness of Breath and Weakness - Generalized    DIET:  Diet Order   Procedures   • Diet: Fluid Restriction (240 mL/tray) Diets; 1500 mL/day; No Mixed Consistencies; Texture: Soft to Chew (NDD 3); Soft to Chew: Chopped Meat; Fluid Consistency: Thin (IDDSI 0)     Patient doing okay.  He anxious for discharge.  He is tolerating oral intake.  Food is tasting better    No cp  No soa  eating well    Objective:    Vital Signs:  Temp:  [97.4 °F (36.3 °C)-97.6 °F (36.4 °C)] 97.6 °F (36.4 °C)  Heart Rate:  [74] 74  Resp:  [16-18] 18  BP: ()/(58-79) 106/72    SpO2:  [95 %-96 %] 95 %  on   ;   Device (Oxygen Therapy): room air  Body mass index is 24.17 kg/m².    Physical Exam  Constitutional:       Appearance: Normal appearance.   HENT:      Head: Normocephalic and atraumatic.   Cardiovascular:      Rate and Rhythm: Normal rate and regular rhythm.      Heart sounds: No murmur heard.    No friction rub.   Pulmonary:      Effort: Pulmonary effort is normal.      Breath sounds: Normal breath sounds.   Abdominal:      General: Bowel sounds are normal. There is no distension.      Palpations: Abdomen is soft.      Tenderness: There is no abdominal tenderness.   Skin:     General: Skin is warm and dry.   Neurological:      Mental Status: He is alert.   Psychiatric:         Mood and Affect: Mood normal.         Behavior: Behavior normal.         Results Review:    Glucose   Date Value Ref Range Status   04/11/2023 139 (H) 65 - 99 mg/dL Final   04/10/2023 156 (H) 65 - 99 mg/dL Final   04/09/2023 134 (H) 65 - 99 mg/dL Final     Results from last 7 days   Lab Units 04/11/23  0428   WBC 10*3/mm3 9.97   HEMOGLOBIN g/dL 11.5*   HEMATOCRIT % 33.6*   PLATELETS 10*3/mm3 230     Results from last 7 days   Lab Units 04/11/23  0428   SODIUM mmol/L 138   POTASSIUM mmol/L 4.5   CHLORIDE mmol/L 95*   CO2 mmol/L 32.5*   BUN mg/dL 48*   CREATININE mg/dL 1.36*    CALCIUM mg/dL 8.9   BILIRUBIN mg/dL 0.7   ALK PHOS U/L 241*   ALT (SGPT) U/L 195*   AST (SGOT) U/L 213*   GLUCOSE mg/dL 139*         Results from last 7 days   Lab Units 04/10/23  0433   MAGNESIUM mg/dL 2.5*         Cultures:  Respiratory Culture   Date Value Ref Range Status   04/05/2023   Final    Scant growth (1+) Normal respiratory rob. No S. aureus or Pseudomonas aeruginosa detected. Final report.       I have reviewed daily medications and changes in CPOE    Scheduled meds  apixaban, 5 mg, Oral, Q12H  bicalutamide, 50 mg, Oral, Daily  fluticasone, 2 spray, Each Nare, Daily  latanoprost, 1 drop, Both Eyes, Daily  levothyroxine, 88 mcg, Oral, Daily  midodrine, 5 mg, Oral, TID AC  multivitamin with minerals, 1 tablet, Oral, Daily  polyethylene glycol, 17 g, Oral, Daily  predniSONE, 20 mg, Oral, Daily  sulfamethoxazole-trimethoprim, 1 tablet, Oral, Once per day on Mon Wed Fri  torsemide, 40 mg, Oral, Daily           PRN meds  •  acetaminophen  •  albuterol  •  aluminum-magnesium hydroxide-simethicone  •  melatonin  •  nitroglycerin  •  ondansetron **OR** ondansetron  •  polyethylene glycol  •  potassium & sodium phosphates **OR** potassium & sodium phosphates  •  potassium chloride **OR** potassium chloride **OR** potassium chloride  •  senna-docusate sodium  •  [COMPLETED] Insert Peripheral IV **AND** sodium chloride  •  sodium chloride  •  sodium chloride  •  temazepam        Acute respiratory failure with hypoxia    SCC (squamous cell carcinoma), face    Pacemaker    Atrial fibrillation    Coronary arteriosclerosis    Dyslipidemia    Hypertension    Hypothyroidism    Long term (current) use of anticoagulants    Stage 3a chronic kidney disease    Acute on chronic combined systolic and diastolic CHF (congestive heart failure)    Hyponatremia    Normocytic anemia    Transaminitis        Assessment/Plan:    04/07/23   AFB smears negative, trial of steroids. he is DNR     Acute respiratory failure with  hypoxia  Acute on chronic combined systolic and diastolic heart failure  -ProBNP 2683 OA  -CXR w/ b/l effusions, congestion  -Procalcitonin, strep, Legionella, respiratory viral panel negative.  -TTE (4/1/23): 41-45%; systolic and diastolic flattening of IV septum consistent with RV pressure/volume overload  -Continue with steroids (started on bactrim for pneumonia prophylaxis)  -on demedex  -Possibly related to cryptogenic organizing pneumonia verse pneumonitis from the Casodex (now the patient's LFTs are rising so we will stop Casodex and ask urology to see to make sure they are okay with this).      Hyponatremia  -improved with diuresis  -Renal following     Atrial fibrillation on long-term anticoagulation  -RC: Amiodarone 100 mg every other day (stopped); Coreg ON HOLD given soft BP  -AC: Apixaban     CKD3a  -Crt near baseline; monitor      Transaminitis  -LFTs more elevated, stopping the casodex until seen by urology  -Concerned that Casodex is causing the liver elevated station and also concerned that the casodex could be contributing to lung pneumonitis.    Hypotension  -midodrine     Hypothyroidism  -Synthroid 88 mcg     Hyperlipidemia  -Rosuvastatin 10 mg     Generalized weakness/Debility  - Consulted PT/OT, fall precautions.    Worsening LFTs, will stop Casodex, monitor    >45 minutes spent, > 1/2 time spent counseling and coordination of care     Abad Gonzalez MD  04/11/23  17:36 EDT

## 2023-04-11 NOTE — PLAN OF CARE
Goal Outcome Evaluation:               Diuretic in Use: demedex  Response to Diuretics (Output greater than intake): yes  Daily Weight (up or down): down 2 lbs  O2 Requirements: room air  Functional Status (Activity level, tolerance and respiratory symptoms): up to chair during day, no c/o of SOA during the night  Discharge Plans:  rehab

## 2023-04-11 NOTE — CASE MANAGEMENT/SOCIAL WORK
Continued Stay Note  [facility]     Patient Name: Bridger Elias  MRN: [id number]  Today's Date: 4/11/2023    Admit Date: 3/30/2023    Plan: [facility] pending [employer] precert initiated 4/10   Discharge Plan     Row Name 04/11/23 1020       Plan    [facility] [employer] [facility] precert initiated 4/10    Plan Comments CCP spoke with See who states the precert is still pending. CCP following pending precert. Patient will likely need transport. LUIZ MAN               Discharge Codes    No documentation.               Expected Discharge Date and Time     Expected Discharge Date Expected Discharge Time    Apr 12, 2023             LUIZ Husain

## 2023-04-12 LAB
ALBUMIN SERPL-MCNC: 3.1 G/DL (ref 3.5–5.2)
ALBUMIN/GLOB SERPL: 0.8 G/DL
ALP SERPL-CCNC: 234 U/L (ref 39–117)
ALT SERPL W P-5'-P-CCNC: 161 U/L (ref 1–41)
ANION GAP SERPL CALCULATED.3IONS-SCNC: 9 MMOL/L (ref 5–15)
AST SERPL-CCNC: 144 U/L (ref 1–40)
BILIRUB SERPL-MCNC: 0.7 MG/DL (ref 0–1.2)
BUN SERPL-MCNC: 46 MG/DL (ref 8–23)
BUN/CREAT SERPL: 30.5 (ref 7–25)
CALCIUM SPEC-SCNC: 8.7 MG/DL (ref 8.2–9.6)
CHLORIDE SERPL-SCNC: 97 MMOL/L (ref 98–107)
CO2 SERPL-SCNC: 31 MMOL/L (ref 22–29)
CREAT SERPL-MCNC: 1.51 MG/DL (ref 0.76–1.27)
DEPRECATED RDW RBC AUTO: 48.7 FL (ref 37–54)
EGFRCR SERPLBLD CKD-EPI 2021: 42.8 ML/MIN/1.73
ERYTHROCYTE [DISTWIDTH] IN BLOOD BY AUTOMATED COUNT: 14 % (ref 12.3–15.4)
GLOBULIN UR ELPH-MCNC: 4.1 GM/DL
GLUCOSE SERPL-MCNC: 113 MG/DL (ref 65–99)
HCT VFR BLD AUTO: 34.8 % (ref 37.5–51)
HGB BLD-MCNC: 12 G/DL (ref 13–17.7)
MAGNESIUM SERPL-MCNC: 2.7 MG/DL (ref 1.7–2.3)
MCH RBC QN AUTO: 32.7 PG (ref 26.6–33)
MCHC RBC AUTO-ENTMCNC: 34.5 G/DL (ref 31.5–35.7)
MCV RBC AUTO: 94.8 FL (ref 79–97)
PHOSPHATE SERPL-MCNC: 3 MG/DL (ref 2.5–4.5)
PLATELET # BLD AUTO: 258 10*3/MM3 (ref 140–450)
PMV BLD AUTO: 10.9 FL (ref 6–12)
POTASSIUM SERPL-SCNC: 4.1 MMOL/L (ref 3.5–5.2)
PROT SERPL-MCNC: 7.2 G/DL (ref 6–8.5)
RBC # BLD AUTO: 3.67 10*6/MM3 (ref 4.14–5.8)
SODIUM SERPL-SCNC: 137 MMOL/L (ref 136–145)
WBC NRBC COR # BLD: 10.91 10*3/MM3 (ref 3.4–10.8)

## 2023-04-12 PROCEDURE — 80053 COMPREHEN METABOLIC PANEL: CPT | Performed by: STUDENT IN AN ORGANIZED HEALTH CARE EDUCATION/TRAINING PROGRAM

## 2023-04-12 PROCEDURE — 83735 ASSAY OF MAGNESIUM: CPT | Performed by: INTERNAL MEDICINE

## 2023-04-12 PROCEDURE — 97530 THERAPEUTIC ACTIVITIES: CPT

## 2023-04-12 PROCEDURE — 85027 COMPLETE CBC AUTOMATED: CPT | Performed by: STUDENT IN AN ORGANIZED HEALTH CARE EDUCATION/TRAINING PROGRAM

## 2023-04-12 PROCEDURE — 63710000001 PREDNISONE PER 1 MG: Performed by: INTERNAL MEDICINE

## 2023-04-12 PROCEDURE — 84100 ASSAY OF PHOSPHORUS: CPT | Performed by: INTERNAL MEDICINE

## 2023-04-12 RX ORDER — SULFAMETHOXAZOLE AND TRIMETHOPRIM 800; 160 MG/1; MG/1
1 TABLET ORAL 3 TIMES WEEKLY
Start: 2023-04-14 | End: 2023-04-24 | Stop reason: HOSPADM

## 2023-04-12 RX ORDER — MIDODRINE HYDROCHLORIDE 2.5 MG/1
2.5 TABLET ORAL
Status: DISCONTINUED | OUTPATIENT
Start: 2023-04-12 | End: 2023-04-16

## 2023-04-12 RX ORDER — FLUTICASONE PROPIONATE 50 MCG
2 SPRAY, SUSPENSION (ML) NASAL DAILY
Start: 2023-04-13

## 2023-04-12 RX ORDER — MIDODRINE HYDROCHLORIDE 5 MG/1
5 TABLET ORAL
Start: 2023-04-12 | End: 2023-04-13 | Stop reason: HOSPADM

## 2023-04-12 RX ORDER — AMOXICILLIN 250 MG
1 CAPSULE ORAL 2 TIMES DAILY PRN
Start: 2023-04-12

## 2023-04-12 RX ORDER — PREDNISONE 20 MG/1
20 TABLET ORAL DAILY
Start: 2023-04-13

## 2023-04-12 RX ORDER — CHOLECALCIFEROL (VITAMIN D3) 125 MCG
5 CAPSULE ORAL NIGHTLY PRN
Start: 2023-04-12

## 2023-04-12 RX ADMIN — TORSEMIDE 40 MG: 20 TABLET ORAL at 09:12

## 2023-04-12 RX ADMIN — MIDODRINE HYDROCHLORIDE 5 MG: 5 TABLET ORAL at 14:01

## 2023-04-12 RX ADMIN — MIDODRINE HYDROCHLORIDE 2.5 MG: 2.5 TABLET ORAL at 18:31

## 2023-04-12 RX ADMIN — APIXABAN 5 MG: 5 TABLET, FILM COATED ORAL at 09:13

## 2023-04-12 RX ADMIN — SULFAMETHOXAZOLE AND TRIMETHOPRIM 1 TABLET: 800; 160 TABLET ORAL at 09:12

## 2023-04-12 RX ADMIN — MIDODRINE HYDROCHLORIDE 5 MG: 5 TABLET ORAL at 09:13

## 2023-04-12 RX ADMIN — MULTIPLE VITAMINS W/ MINERALS TAB 1 TABLET: TAB at 09:13

## 2023-04-12 RX ADMIN — POLYETHYLENE GLYCOL 3350 17 G: 17 POWDER, FOR SOLUTION ORAL at 09:13

## 2023-04-12 RX ADMIN — PREDNISONE 20 MG: 20 TABLET ORAL at 09:13

## 2023-04-12 RX ADMIN — APIXABAN 5 MG: 5 TABLET, FILM COATED ORAL at 20:35

## 2023-04-12 RX ADMIN — FLUTICASONE PROPIONATE 2 SPRAY: 50 SPRAY, METERED NASAL at 09:15

## 2023-04-12 RX ADMIN — LATANOPROST 1 DROP: 50 SOLUTION OPHTHALMIC at 20:35

## 2023-04-12 RX ADMIN — LEVOTHYROXINE SODIUM 88 MCG: 0.09 TABLET ORAL at 09:13

## 2023-04-12 NOTE — PLAN OF CARE
Goal Outcome Evaluation:           Progress: (P) no change  Outcome Evaluation: (P) PT seen by PT for treatment. Pt seated for ther ex which he performed AROM. C/O generalized weakness while performing ther ex. STS at EOB req SBA/CGA to FWW. Pt ambulated 40' req SBA/CGA w/ FWW. Cues for upright posture and safety awareness for obstacles. DC to SNF to assess deficits.

## 2023-04-12 NOTE — PROGRESS NOTES
Patient Name: Bridger Elias  : 1930  MRN: [id number]    Date of Admission: 3/30/2023  Date of Discharge:  2023  Primary Care Physician: Alma Cat MD      Chief Complaint:   Shortness of Breath and Weakness - Generalized      Discharge Diagnoses     Active Hospital Problems    Diagnosis  POA   • **Acute respiratory failure with hypoxia [J96.01]  Yes   • Hyponatremia [E87.1]  Yes   • Normocytic anemia [D64.9]  Yes   • Transaminitis [R74.01]  Yes   • Stage 3a chronic kidney disease [N18.31]  Yes   • Pacemaker [Z95.0]  Yes   • Atrial fibrillation [I48.91]  Yes   • Acute on chronic combined systolic and diastolic CHF (congestive heart failure) [I50.43]  Yes   • SCC (squamous cell carcinoma), face [C44.320]  Yes   • Hypothyroidism [E03.9]  Yes   • Coronary arteriosclerosis [I25.10]  Yes   • Dyslipidemia [E78.5]  Yes   • Hypertension [I10]  Yes   • Long term (current) use of anticoagulants [Z79.01]  Not Applicable      Resolved Hospital Problems    Diagnosis Date Resolved POA   • Hypokalemia [E87.6] 2023 No   • Community acquired pneumonia, unspecified laterality [J18.9] 2023 Yes        Hospital Course     Mr. Elias is a 89 y.o. male with a history of chronic combined systolic and diastolic heart failure, atrial fibrillation on long-term anticoagulation, s/p PPM, CAD, hypertension, hyperlipidemia, squamous cell carcinoma, CKD stage IIIb, hypothyroidism, prostate cancer who presented to [facility] initially complaining of shortness of breath and generalized weakness.  Please see the admitting history and physical for further details.  He was found to have acute hypoxic respiratory failure secondary to acute on chronic combined systolic and diastolic heart failure and was admitted to the hospital for further evaluation and treatment. Repeat echocardiogram showed EF 41 to 45%, cardiology was consulted. The patient responded well to diuresis however developed worsening  hyponatremia for which nephrology was consulted.  Sodium improved with a fluid restriction which will be continued at discharge, 1500 mL daily.  The patient was also noted to have bilateral pulmonary infiltrates on CT scan, further described below.  Pulmonology was consulted for further evaluation/recommendations.  Infectious etiologies were ruled out including TB and he was initiated on steroids and has significantly improved.  Given his hypoxia and age bronchoscopy was deferred.  Pulmonology is recommending continuing prednisone 20 mg daily and seeing him in 4 to 6 weeks in clinic, continue Bactrim double strength 1 tablet daily Monday Wednesday Friday for PCP prophylaxis until follow-up with them.  Prior to discharge the patient was noted to have a mild elevation in LFTs as well.  This is a known side effect of the patient's Casodex, this medication was held and his urologist was contacted.  Dr. Petty, the patient's urologist, is recommending to hold the patient's Casodex at discharge and obtain a PSA and testosterone at 1 and 3 months.  The patient is scheduled to see Dr. Petty next month.  DISCHARGE Follow Up Recommendations for labs and diagnostics:   See PCP in 1 week for posthospital follow-up visit.  Follow-up with patient's cardiologist Dr. Vanegas, 1 month.  Follow-up with pulmonology, 4 to 6 weeks, continue prednisone 20 mg daily and Bactrim DS 1 tablet Monday Wednesday Friday for PCP prophylaxis until follow-up.  Follow-up with urology, Dr. Petty, hold Casodex until follow-up, recheck testosterone and PSA in 1 month and 3 months.  Continue 1500 mL fluid restriction.    Day of Discharge     Subjective:  Resting in his recliner, accompanied bu his daughter. He feels good and is hopeful for DC soon.     Physical Exam:  Temp:  [97.4 °F (36.3 °C)-98 °F (36.7 °C)] 97.9 °F (36.6 °C)  Heart Rate:  [74-75] 74  Resp:  [16-18] 18  BP: (100-142)/(71-94) 142/94  Body mass index is 23.65 kg/m².  Physical  Exam  Constitutional:       Appearance: Normal appearance.      Comments: Elderly, frail   HENT:      Head: Normocephalic and atraumatic.   Cardiovascular:      Rate and Rhythm: Normal rate and regular rhythm.      Heart sounds: No murmur heard.    No friction rub.   Pulmonary:      Effort: Pulmonary effort is normal.      Breath sounds: Normal breath sounds.   Abdominal:      General: Bowel sounds are normal. There is no distension.      Palpations: Abdomen is soft.      Tenderness: There is no abdominal tenderness.   Skin:     General: Skin is warm and dry.   Neurological:      Mental Status: He is alert.   Psychiatric:         Mood and Affect: Mood normal.         Behavior: Behavior normal.         Consultants     Consult Orders (all) (From admission, onward)     Start     Ordered    04/11/23 1743  Inpatient Urology Consult  Once        Specialty:  Urology  Provider:  Luis Miguel Roca MD    04/11/23 1743    04/07/23 1252  Inpatient Palliative Care Team Consult  Once        Provider:  (Not yet assigned)    04/07/23 1252    04/05/23 1303  Inpatient Palliative Care Team Consult  Once        Provider:  (Not yet assigned)    04/05/23 1302    04/04/23 1842  Inpatient Infection Control Nurse Consult  Once        Provider:  (Not yet assigned)    04/04/23 1841    04/04/23 1726  Inpatient Spiritual Care Consult  Once        Provider:  (Not yet assigned)    04/04/23 1726    04/04/23 1226  Inpatient Pulmonology Consult  Once        Specialty:  Pulmonary Disease  Provider:  Lon Guerra MD    04/04/23 1225    04/02/23 0859  Inpatient Nephrology Consult  Once        Specialty:  Nephrology  Provider:  Naeem Hernandez MD    04/02/23 0859    03/31/23 1214  Inpatient Cardiology Consult  Once        Specialty:  Cardiology  Provider:  Medardo Vanegas MD    03/31/23 1214    03/31/23 0019  [facility] (on-call MD unless specified) Details  Once        Specialty:  Hospitalist  Provider:  Ramsey Talamantes MD     03/31/23 0018              Procedures     Imaging Results (All)     Procedure Component Value Units Date/Time    XR Chest 1 View [[id number]] Collected: 04/10/23 0932     Updated: 04/10/23 0932    Narrative:        Patient: AILYN DUQUE  Time Out: 09:31  Exam(s): XR CXR 1 VIEW     EXAM:    XR Chest, 1 View    CLINICAL HISTORY:     Reason for exam: Follow-up infiltrates.    TECHNIQUE:    Frontal view of the chest.    COMPARISON:    No relevant prior studies available.    FINDINGS:    Lungs:  Multifocal consolidations most prominent in the bilateral bases.      Pleural space:  Unremarkable.  No pneumothorax.    Heart: Mild cardiomegaly.  You centimeter wires and valvular prosthesis   in place.  Left chest pacer in place.    Mediastinum:  Unremarkable.    Bones joints:  Unremarkable.    IMPRESSION:       Multifocal consolidations most prominent in the bilateral bases.    Impression:          Electronically signed by Justus Goldberg M.D. on 04-10-23 at 0931    FL Video Swallow Single Contrast [[id number]] Collected: 04/08/23 0135     Updated: 04/08/23 0135    Narrative:        Patient: AILYN DUQUE  Time Out: 01:34  Exam(s): XR OTHER VIDEO SWALLOW SINGLE-CONTRAST     EXAM:    FL Swallowing Function With Video Cine    CLINICAL HISTORY:       assess swallow function.    TECHNIQUE:    Fluoroscopy of the oral cavity through upper esophagus with video cine   recording.  The study was performed in conjunction with speech pathology.    Various consistencies of liquid and food were administered orally by the   speech pathologist.  Fluoroscopic guidance was provided by a physician.    1 minute 50 seconds of fluoroscopy time.  4mGy dose.    COMPARISON:    No relevant prior studies available.    FINDINGS:    Oral phase:  Unremarkable.  Normal bolus transfer.  No premature   spillage.    Pharyngeal phase:  Flash laryngeal penetration without aspiration with   thin liquids.  Pooling of the piriform sinuses with all consistencies  is   noted.  No aspiration with thin, nectar thick or solid consistencies.      Cervical esophageal phase:  Unremarkable.  Normal upper esophageal   sphincter function.    IMPRESSION:       1.  Flash laryngeal penetration without aspiration with thin liquids.  2.  Pooling of the piriform sinuses with all consistencies is noted.      Impression:          Electronically signed by Roxanne Terrazas MD on 04-08-23 at 0134    XR Chest 1 View [[id number]] Collected: 04/07/23 1227     Updated: 04/07/23 1303    Narrative:      EXAMINATION: SINGLE VIEW CHEST RADIOGRAPH     HISTORY: 89-year-old male undergoing followup evaluation of pulmonary  infiltrates.     FINDINGS: An upright AP semierect portable chest radiograph was  obtained. Comparison is made to a chest radiograph dated 04/03/2023.  There has been interval reduction in interstitial opacification  throughout both lungs. A dual lead left-sided cardiac pacemaker is  stable in position. Midline sternal wires are noted. A prosthetic  cardiac valve is appreciated. The chronic silhouette is enlarged but  stable.       Impression:      There is improved but persistent diffuse bilateral  interstitial opacification likely representing an improvement in diffuse  bilateral interstitial edema and/or pneumonia.     This report was finalized on 4/7/2023 1:00 PM by Dr. Gonzalez Salazar M.D.       XR Chest 1 View [[id number]] Collected: 04/03/23 0613     Updated: 04/03/23 0625    Narrative:      XR CHEST 1 VW-     Clinical: CHF, bilateral pleural effusions     COMPARISON examination 03/31/2013 CT chest     FINDINGS: Unchanged left-sided pleural effusion, right-sided pleural  effusion appears slightly improved within the interim. The  cardiomediastinal silhouette is stable. Right base  infiltrate/atelectasis more pronounced compared to the previous  examination. Right and left upper lobe infiltrates similar to the  previous examination. The remainder of examination is unremarkable.      This report was finalized on 4/3/2023 6:22 AM by Dr. Segun Jara M.D.       CT Chest Without Contrast Diagnostic [[id number]] Collected: 03/31/23 2103     Updated: 03/31/23 2109    Narrative:      CT CHEST WITHOUT CONTRAST     HISTORY: Dyspnea.     TECHNIQUE: Noncontrast chest CT is provided and correlated with x-ray  from yesterday.     FINDINGS: Cardiac pacing hardware is observed along with hardware at the  mitral valve, dilated cardiac chambers, and simple appearing,  posteriorly layering pleural effusions bilaterally. Those measure under  3 cm AP thickness in each. There is an enlarged precarinal lymph node  measuring about 12 mm short axis. No other significant appearing  lymphadenopathy. Abnormal opacity in the left upper lobe and posterior  left lower lobe with groundglass density throughout the right upper lobe  favored represent pneumonia. No obstructing airway lesion.     Visualized upper abdominal structures appear normal. Degenerative change  in the spine.       Impression:      Cardiac hardware with changes of prior sternotomy and mitral  valve repair or replacement. There our bilateral pleural effusions with  patchy opacity in the lungs bilaterally which appears similar as on the  x-ray from yesterday and is favored represent pneumonia.     Radiation dose reduction techniques were utilized, including automated  exposure control and exposure modulation based on body size.     This report was finalized on 3/31/2023 9:06 PM by Dr. Maurice Alejandro M.D.       XR Chest 1 View [[id number]] Collected: 03/30/23 2045     Updated: 03/30/23 2053    Narrative:      PORTABLE CHEST X-RAY     HISTORY: Shortness of breath.     TECHNIQUE: Portable chest x-ray correlated with chest x-ray 04/14/2022.     FINDINGS: Sternal wires with cardiac valve hardware and a cardiac pacing  device are again observed. The cardiac silhouette is enlarged. Patchy  infiltrate in the upper lobes is observed bilaterally, more dense on  the  left than the right. There is also some density in the left lung base  and mildly at the periphery of the right lung base. There also appears  to be small pleural effusions blunting the costophrenic angles.       Impression:      Bilateral pneumonia.     This report was finalized on 3/30/2023 8:50 PM by Dr. Maurice Alejandro M.D.             Pertinent Labs     Results from last 7 days   Lab Units 04/12/23  0442 04/11/23  0428 04/10/23  0433 04/09/23  0403   WBC 10*3/mm3 10.91* 9.97 8.50 7.32   HEMOGLOBIN g/dL 12.0* 11.5* 10.8* 11.2*   PLATELETS 10*3/mm3 258 230 210 185     Results from last 7 days   Lab Units 04/12/23  0442 04/11/23  0428 04/10/23  0433 04/09/23  0403   SODIUM mmol/L 137 138 135* 133*   POTASSIUM mmol/L 4.1 4.5 4.1 4.1   CHLORIDE mmol/L 97* 95* 96* 95*   CO2 mmol/L 31.0* 32.5* 28.9 29.0   BUN mg/dL 46* 48* 48* 37*   CREATININE mg/dL 1.51* 1.36* 1.46* 1.26   GLUCOSE mg/dL 113* 139* 156* 134*   Estimated Creatinine Clearance: 29.6 mL/min (A) (by C-G formula based on SCr of 1.51 mg/dL (H)).  Results from last 7 days   Lab Units 04/12/23  0442 04/11/23  0428 04/10/23  0433 04/09/23  0403   ALBUMIN g/dL 3.1* 3.2* 2.8* 3.0*   BILIRUBIN mg/dL 0.7 0.7 0.7 0.6   ALK PHOS U/L 234* 241* 219* 246*   AST (SGOT) U/L 144* 213* 85* 128*   ALT (SGPT) U/L 161* 195* 77* 89*     Results from last 7 days   Lab Units 04/12/23  0442 04/11/23  0428 04/10/23  0433 04/09/23  0403 04/08/23  0640   CALCIUM mg/dL 8.7 8.9 8.5 8.3 8.8   ALBUMIN g/dL 3.1* 3.2* 2.8* 3.0* 2.9*   MAGNESIUM mg/dL 2.7*  --  2.5* 2.4* 2.6*   PHOSPHORUS mg/dL 3.0  --  2.8 2.6 3.0         Results from last 7 days   Lab Units 04/10/23  0433   URIC ACID mg/dL 6.2         Invalid input(s): LDLCALC        Test Results Pending at Discharge     Pending Labs     Order Current Status    AFB Culture - Sputum, Cough Preliminary result    AFB Culture - Sputum, Cough Preliminary result    AFB Culture - Sputum, Cough Preliminary result          Discharge Details         Discharge Medications      New Medications      Instructions Start Date   fluticasone 50 MCG/ACT nasal spray  Commonly known as: FLONASE   2 sprays, Each Nare, Daily   Start Date: April 13, 2023     melatonin 5 MG tablet tablet   5 mg, Oral, Nightly PRN      midodrine 5 MG tablet  Commonly known as: PROAMATINE   5 mg, Oral, 3 Times Daily Before Meals      predniSONE 20 MG tablet  Commonly known as: DELTASONE   20 mg, Oral, Daily   Start Date: April 13, 2023     sennosides-docusate 8.6-50 MG per tablet  Commonly known as: PERICOLACE   1 tablet, Oral, 2 Times Daily PRN      sulfamethoxazole-trimethoprim 800-160 MG per tablet  Commonly known as: BACTRIM DS,SEPTRA DS   1 tablet, Oral, 3 Times Weekly   Start Date: April 14, 2023     Torsemide 40 MG tablet   40 mg, Oral, Daily   Start Date: April 13, 2023        Continue These Medications      Instructions Start Date   acetaminophen 325 MG tablet  Commonly known as: TYLENOL   650 mg, Oral, Every 6 Hours PRN      apixaban 5 MG tablet tablet  Commonly known as: ELIQUIS   5 mg, Oral, Every 12 Hours Scheduled      calcium carbonate 600 MG tablet  Commonly known as: OS-SOPHIA   600 mg, Oral, Daily      latanoprost 0.005 % ophthalmic solution  Commonly known as: XALATAN   Ophthalmic      levothyroxine 88 MCG tablet  Commonly known as: SYNTHROID, LEVOTHROID   88 mcg, Oral, Daily      multivitamin with minerals tablet tablet   1 tablet, Oral, Daily      polyethylene glycol 17 g packet  Commonly known as: MIRALAX   17 g, Oral, Daily PRN      silver sulfadiazine 1 % cream  Commonly known as: SILVADENE, SSD   1 application, Topical, 3 Times Daily, Apply to facial regions of inflammation per rad onc recs      silver sulfadiazine 1 % cream  Commonly known as: SILVADENE, SSD   1 application, Topical, 2 Times Daily         Stop These Medications    amiodarone 200 MG tablet  Commonly known as: PACERONE     bicalutamide 50 MG tablet  Commonly known as: CASODEX     carvedilol 3.125 MG  tablet  Commonly known as: COREG     rosuvastatin 10 MG tablet  Commonly known as: CRESTOR            No Known Allergies      Discharge Disposition:  Skilled Nursing Facility (DC - External)    Discharge Diet:  Diet Order   Procedures   • Diet: Fluid Restriction (240 mL/tray) Diets; 1500 mL/day; No Mixed Consistencies; Texture: Soft to Chew (NDD 3); Soft to Chew: Chopped Meat; Fluid Consistency: Thin (IDDSI 0)       Discharge Activity:       CODE STATUS:    Code Status and Medical Interventions:   Ordered at: 04/06/23 1503     Medical Intervention Limits:    NO intubation (DNI)     Code Status (Patient has no pulse and is not breathing):    No CPR (Do Not Attempt to Resuscitate)     Medical Interventions (Patient has pulse or is breathing):    Limited Support       No future appointments.   Follow-up Information     Medardo Vanegas MD Follow up.    Specialty: Cardiology  Contact information:  [address] [facility], [place] [id number] [phone]             Kiarra Iraheta MD. Schedule an appointment as soon as possible for a visit in 1 month(s).    Specialty: Nephrology  Contact information:  [address]  [facility] [address]  [facility] [address]  [phone]             Martín Flowers Jr., MD Follow up in 4 week(s).    Specialty: Pulmonary Disease  Why: Follow-up in about 4 to 6 weeks in my office with full PFTs and a chest x-ray prior to my seeing him  Contact information:  [address]  [facility] [address]  [facility] [id number] [phone]             Alma Cat MD .    Specialty: Family Medicine  Contact information:  [address] [facility] [id number] [phone]                         Time Spent on Discharge:  I spent greater than 30 minutes on this discharge activity which included: face-to-face encounter with the patient, reviewing the data in the system, coordination of the care with the nursing staff as well as consultants, documentation, and entering orders.        Marion Wen MD  [facility]  04/12/23  13:54 EDT

## 2023-04-12 NOTE — CASE MANAGEMENT/SOCIAL WORK
Continued Stay Note  [facility]     Patient Name: Bridger Elias  MRN: [id number]  Today's Date: 4/12/2023    Admit Date: 3/30/2023    Plan: [facility] pending [facility] precert initiated 4/10 - family to transport   Discharge Plan     Row Name 04/12/23 1334       Plan    Plan [facility] SNF pending [employer] [facility] precert initiated 4/10 - family to transport    Patient/Family in Agreement with Plan yes    Plan Comments [facility] left [facility] for [facility] for updates on the precert. CCP spoke with patient's daughter Sultana and notified her that precert is still pending. Patient's daughter states she can transport to the facility when he is dc. LUIZ MAN               Discharge Codes    No documentation.               Expected Discharge Date and Time     Expected Discharge Date Expected Discharge Time    Apr 12, 2023             LUIZ Husain

## 2023-04-12 NOTE — PLAN OF CARE
Goal Outcome Evaluation:   Diuretic in Use: torsemide  Response to Diuretics (Output greater than intake): output greater  Daily Weight (up or down): down  O2 Requirements: room air  Functional Status (Activity level, tolerance and respiratory symptoms): up with 1 assist + walker + gait belt; assisted by family to walk in hallway  Discharge Plans:  to transfer to [facility], time unknown, possibly 4/13/23 pending pre-certification.  Patient's daughter visited throughout shift, very involved and helpful.

## 2023-04-12 NOTE — THERAPY TREATMENT NOTE
Patient Name: Bridger Elias  : 1930    MRN: [id number]                              Today's Date: 2023       Admit Date: 3/30/2023    Visit Dx:     ICD-10-CM ICD-9-CM   1. Community acquired pneumonia, unspecified laterality  J18.9 486     Patient Active Problem List   Diagnosis   • SCC (squamous cell carcinoma), face   • General weakness   • Pacemaker   • Abnormal gait   • Atrial fibrillation   • Chronic diastolic heart failure   • Coronary arteriosclerosis   • Dyslipidemia   • Glaucoma   • Mitral valve disorder   • Hypertension   • Hypertensive kidney disease, stage III   • Hypothyroidism   • Long term (current) use of anticoagulants   • Malignant neoplasm of prostate   • Osteoporosis   • Squamous cell carcinoma of skin of face   • Acute on chronic diastolic CHF (congestive heart failure)   • Stage 3a chronic kidney disease   • Personal history of radiation therapy   • Acute on chronic combined systolic and diastolic CHF (congestive heart failure)   • Cellulitis of right leg   • Chronic acquired lymphedema   • Contusion of face   • Contusion of forearm, left   • Fall   • Chronic systolic heart failure (CMS/HCC)   • Acute respiratory failure with hypoxia   • Hyponatremia   • Normocytic anemia   • Transaminitis     Past Medical History:   Diagnosis Date   • Acute on chronic diastolic CHF (congestive heart failure) 2022   • Atrial fibrillation 2022   • Coronary arteriosclerosis 7/3/2014    Formatting of this note might be different from the original. [facility] 16  IMPRESSION:   1. Right heart disease, hypertensive cardiac disease.   2. Status post mitral valve repair.   3. Anatomy: No hemodynamically significant disease. about 30-40% lesion of    the right coronary artery. Normal. Left coronary artery system.   • Dyslipidemia 2013   • Glaucoma 2022   • Hypertension 2013   • Hypertensive kidney disease, stage III 3/13/2017   • Hypothyroidism 3/19/2017   • Long term (current) use  of anticoagulants 12/5/2013    Formatting of this note might be different from the original. Mitral valve replacement and atrial fibrillation Assume a range of 2.5-3.5.   • Malignant neoplasm of prostate 12/5/2013    Formatting of this note might be different from the original. 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   • Stage 3b chronic kidney disease 4/14/2022     Past Surgical History:   Procedure Laterality Date   • PACEMAKER IMPLANTATION  2018      General Information     Row Name 04/12/23 1343          Physical Therapy Time and Intention    Document Type therapy note (daily note) (P)   -LD     Mode of Treatment physical therapy (P)   -LD     Row Name 04/12/23 1343          General Information    Barriers to Rehab medically complex;previous functional deficit (P)   -LD     Row Name 04/12/23 1342          Safety Issues, Functional Mobility    Impairments Affecting Function (Mobility) balance;endurance/activity tolerance (P)   -LD     Comment, Safety Issues/Impairments (Mobility) Gait belt and non skid socks donned. (P)   -LD           User Key  (r) = Recorded By, (t) = Taken By, (c) = Cosigned By    Initials Name Provider Type    Nena Prabhakar, PT Student PT Student               Mobility     Row Name 04/12/23 8892          Bed Mobility    Comment, (Bed Mobility) In bathroom when arrived. (P)   -LD     Row Name 04/12/23 1355          Sit-Stand Transfer    Sit-Stand [place] (Transfers) standby assist;verbal cues;contact guard (P)   -LD     Assistive Device (Sit-Stand Transfers) walker, front-wheeled (P)   -LD     Comment, (Sit-Stand Transfer) Cue to push through BL UE and for upright posture. (P)   -LD     Row Name 04/12/23 135          Gait/Stairs (Locomotion)    [place] Level  (Gait) standby assist;verbal cues;contact guard (P)   -LD     Assistive Device (Gait) walker, front-wheeled (P)   -LD     Distance in Feet (Gait) 40' (P)   -LD     Deviations/Abnormal Patterns (Gait) gait speed decreased;festinating/shuffling;stride length decreased (P)   -LD     Left Sided Gait Deviations forward flexed posture (P)   -LD     [place] Level (Stairs) not tested (P)   -LD     Comment, (Gait/Stairs) Pt was steady with no overt LOB. Cues for upright posture and safety awareness of obstacles. (P)   -LD           User Key  (r) = Recorded By, (t) = Taken By, (c) = Cosigned By    Initials Name Provider Type    Nena Prabhakar, PT Student PT Student               Obj/Interventions     Row Name 04/12/23 3997          Motor Skills    Therapeutic Exercise other (see comments) (P)   BAPS, LAQ, seated marches, punches: X10 each  -LD     Row Name 04/12/23 1607          Balance    Balance Assessment sitting static balance (P)   -LD     Static Sitting Balance standby assist;1-person assist;verbal cues (P)   -LD           User Key  (r) = Recorded By, (t) = Taken By, (c) = Cosigned By    Initials Name Provider Type    Nena Prabhakar, PT Student PT Student               Goals/Plan    No documentation.                Clinical Impression     Row Name 04/12/23 5360          Pain    Pretreatment Pain Rating 0/10 - no pain (P)   -LD     Posttreatment Pain Rating 0/10 - no pain (P)   -LD     Pre/Posttreatment Pain Comment no pain indicated just weakness. (P)   -LD     Additional Documentation Pain Scale: Numbers Pre/Post-Treatment (Group) (P)   -LD     Row Name 04/12/23 9036          Plan of Care Review    Progress no change (P)   -LD     Outcome Evaluation PT seen by PT for treatment. Pt seated for ther ex which he performed AROM. C/O generalized weakness while performing ther ex. STS at EOB req SBA/CGA to FWW. Pt ambulated 40' req SBA/CGA w/ FWW. Cues for upright posture and safety awareness for obstacles. DC to  SNF to assess deficits. (P)   -LD     Row Name 04/12/23 1357          Positioning and Restraints    Pre-Treatment Position bathroom (P)   -LD     Post Treatment Position chair (P)   -LD     In Chair reclined;call light within reach;encouraged to call for assist;with family/caregiver (P)   Staff aware alarm not working and pt agreeable to calling for assist to get up- pt daughter in room.  -LD           User Key  (r) = Recorded By, (t) = Taken By, (c) = Cosigned By    Initials Name Provider Type    Nena Prabhakar, PT Student PT Student               Outcome Measures     Row Name 04/12/23 1406          How much help from another person do you currently need...    Turning from your back to your side while in flat bed without using bedrails? 3 (P)   -LD     Moving from lying on back to sitting on the side of a flat bed without bedrails? 3 (P)   -LD     Moving to and from a bed to a chair (including a wheelchair)? 3 (P)   -LD     Standing up from a chair using your arms (e.g., wheelchair, bedside chair)? 3 (P)   -LD     Climbing 3-5 steps with a railing? 2 (P)   -LD     To walk in hospital room? 3 (P)   -LD     AM-PAC 6 Clicks Score (PT) 17 (P)   -LD     Highest level of mobility 5 --> Static standing (P)   -LD     Row Name 04/12/23 1406          Functional Assessment    Outcome Measure Options AM-PAC 6 Clicks Basic Mobility (PT) (P)   -LD           User Key  (r) = Recorded By, (t) = Taken By, (c) = Cosigned By    Initials Name Provider Type    Nena Prabhakar, PT Student PT Student                             Physical Therapy Education     Title: PT OT SLP Therapies (Done)     Topic: Physical Therapy (Done)     Point: Mobility training (Done)     Learning Progress Summary           Patient Acceptance, E,TB,D, VU,DU,NR by LD at 4/12/2023 1406    Acceptance, E,D,TB, VU,DU by PH at 4/11/2023 1025    Acceptance, E,D,TB, VU,DU by PH at 4/10/2023 0907    Acceptance, E,TB, VU,DU by  at 4/7/2023 1437    Acceptance, E,  VU by EB at 4/6/2023 1552    Acceptance, E, VU,NR by EB at 4/5/2023 1617    Acceptance, E, VU by EB at 4/4/2023 1545    Acceptance, E,D, VU,NR by EB at 4/3/2023 1603    Acceptance, E, VU,NR by DJ at 3/31/2023 1633                   Point: Home exercise program (Done)     Learning Progress Summary           Patient Acceptance, E,TB,D, VU,DU,NR by LD at 4/12/2023 1406    Acceptance, E,D,TB, VU,DU by PH at 4/11/2023 1025    Acceptance, E,D,TB, VU,DU by PH at 4/10/2023 0907    Acceptance, E,TB, VU,DU by CS at 4/7/2023 1437    Acceptance, E, VU by EB at 4/6/2023 1552    Acceptance, E, VU,NR by EB at 4/5/2023 1617                   Point: Body mechanics (Done)     Learning Progress Summary           Patient Acceptance, E,TB,D, VU,DU,NR by LD at 4/12/2023 1406    Acceptance, E,D,TB, VU,DU by PH at 4/11/2023 1025    Acceptance, E,D,TB, VU,DU by PH at 4/10/2023 0907    Acceptance, E,TB, VU,DU by CS at 4/7/2023 1437    Acceptance, E, VU by EB at 4/6/2023 1552    Acceptance, E, VU,NR by EB at 4/5/2023 1617    Acceptance, E, VU by EB at 4/4/2023 1545    Acceptance, E,D, VU,NR by EB at 4/3/2023 1603    Acceptance, E, VU,NR by DJ at 3/31/2023 1633                   Point: Precautions (Done)     Learning Progress Summary           Patient Acceptance, E,TB,D, VU,DU,NR by LD at 4/12/2023 1406    Acceptance, E,D,TB, VU,DU by PH at 4/11/2023 1025    Acceptance, E,D,TB, VU,DU by PH at 4/10/2023 0907    Acceptance, E,TB, VU,DU by CS at 4/7/2023 1437    Acceptance, E, VU by EB at 4/6/2023 1552    Acceptance, E, VU,NR by EB at 4/5/2023 1617    Acceptance, E, VU by EB at 4/4/2023 1545    Acceptance, E,D, VU,NR by EB at 4/3/2023 1603    Acceptance, E, VU,NR by DJ at 3/31/2023 1633                               User Key     Initials Effective Dates Name Provider Type Discipline    EB 02/14/23 -  Noa Garcia PTA Physical Therapist Assistant PT    PH 06/16/21 -  Nolvia Dobson PTA Physical Therapist Assistant PT    RODGER 10/25/19 -   Latosha Sanders, PT Physical Therapist PT    CS 09/22/22 -  Josefina Santiago, PT Physical Therapist PT    LD 04/06/23 -  Nena Acosta, MONA Student PT Student PT              PT Recommendation and Plan     Progress: (P) no change  Outcome Evaluation: (P) PT seen by PT for treatment. Pt seated for ther ex which he performed AROM. C/O generalized weakness while performing ther ex. STS at EOB req SBA/CGA to FWW. Pt ambulated 40' req SBA/CGA w/ FWW. Cues for upright posture and safety awareness for obstacles. DC to SNF to assess deficits.     Time Calculation:    PT Charges     Row Name 04/12/23 1410             Time Calculation    Start Time 1326 (P)   -LD      Stop Time 1345 (P)   -LD      Time Calculation (min) 19 min (P)   -LD      PT Received On 04/12/23 (P)   -LD      PT - Next Appointment 04/13/23 (P)   -LD         Timed Charges    44804 - PT Therapeutic Exercise Minutes 9 (P)   -LD      53053 - PT Therapeutic Activity Minutes 10 (P)   -LD         Total Minutes    Timed Charges Total Minutes 19 (P)   -LD       Total Minutes 19 (P)   -LD            User Key  (r) = Recorded By, (t) = Taken By, (c) = Cosigned By    Initials Name Provider Type    LD Nena Acosta, PT Student PT Student              Therapy Charges for Today     Code Description Service Date Service Provider Modifiers Qty    [id number]  PT THERAPEUTIC ACT EA 15 MIN 4/12/2023 Nena Acosta, PT Student GP 1          PT G-Codes  Outcome Measure Options: (P) AM-PAC 6 Clicks Basic Mobility (PT)  AM-PAC 6 Clicks Score (PT): (P) 17  AM-PAC 6 Clicks Score (OT): 17       Nena Acosta PT Student  4/12/2023

## 2023-04-12 NOTE — PLAN OF CARE
Goal Outcome Evaluation:  Plan of Care Reviewed With: patient        Progress: improving  Outcome Evaluation: Pt generally improved except liver enzymes are higher. Pt tolerated in chair today without resp symptoms/distress. Tolerated room air and lost two pounds.  No falls or injury. No new skin issues.

## 2023-04-12 NOTE — DISCHARGE SUMMARY
Patient Name: Bridger Elias  : 1930  MRN: [id number]    Date of Admission: 3/30/2023  Date of Discharge:  2023  Primary Care Physician: Alma Cat MD      Chief Complaint:   Shortness of Breath and Weakness - Generalized      Discharge Diagnoses     Active Hospital Problems    Diagnosis  POA   • **Acute respiratory failure with hypoxia [J96.01]  Yes   • Hyponatremia [E87.1]  Yes   • Normocytic anemia [D64.9]  Yes   • Transaminitis [R74.01]  Yes   • Stage 3a chronic kidney disease [N18.31]  Yes   • Pacemaker [Z95.0]  Yes   • Atrial fibrillation [I48.91]  Yes   • Acute on chronic combined systolic and diastolic CHF (congestive heart failure) [I50.43]  Yes   • SCC (squamous cell carcinoma), face [C44.320]  Yes   • Hypothyroidism [E03.9]  Yes   • Coronary arteriosclerosis [I25.10]  Yes   • Dyslipidemia [E78.5]  Yes   • Hypertension [I10]  Yes   • Long term (current) use of anticoagulants [Z79.01]  Not Applicable      Resolved Hospital Problems    Diagnosis Date Resolved POA   • Hypokalemia [E87.6] 2023 No   • Community acquired pneumonia, unspecified laterality [J18.9] 2023 Yes        Hospital Course     Mr. Elias is a 89 y.o. male with a history of chronic combined systolic and diastolic heart failure, atrial fibrillation on long-term anticoagulation, s/p PPM, CAD, hypertension, hyperlipidemia, squamous cell carcinoma, CKD stage IIIb, hypothyroidism, prostate cancer who presented to [facility] initially complaining of shortness of breath and generalized weakness.  Please see the admitting history and physical for further details.  He was found to have acute hypoxic respiratory failure secondary to acute on chronic combined systolic and diastolic heart failure and was admitted to the hospital for further evaluation and treatment. Repeat echocardiogram showed EF 41 to 45%, cardiology was consulted. The patient responded well to diuresis however developed worsening  hyponatremia for which nephrology was consulted.  Sodium improved with a fluid restriction which will be continued at discharge, 1500 mL daily.  The patient was also noted to have bilateral pulmonary infiltrates on CT scan, further described below.  Pulmonology was consulted for further evaluation/recommendations.  Infectious etiologies were ruled out including TB and he was initiated on steroids and has significantly improved.  Given his hypoxia and age bronchoscopy was deferred.  Pulmonology is recommending continuing prednisone 20 mg daily and seeing him in 4 to 6 weeks in clinic, continue Bactrim double strength 1 tablet daily Monday Wednesday Friday for PCP prophylaxis until follow-up with them.  Prior to discharge the patient was noted to have a mild elevation in LFTs as well.  This is a known side effect of the patient's Casodex, this medication was held and his urologist was contacted.  Dr. Petty, the patient's urologist, is recommending to hold the patient's Casodex at discharge and obtain a PSA and testosterone at 1 and 3 months.  The patient is scheduled to see Dr. Petty next month.  DISCHARGE Follow Up Recommendations for labs and diagnostics:   See PCP in 1 week for posthospital follow-up visit.  Follow-up with patient's cardiologist Dr. Vanegas, 1 month.  Follow-up with pulmonology, 4 to 6 weeks, continue prednisone 20 mg daily and Bactrim DS 1 tablet Monday Wednesday Friday for PCP prophylaxis until follow-up.  Follow-up with urology, Dr. Petty, hold Casodex until follow-up, recheck testosterone and PSA in 1 month and 3 months.  Continue 1500 mL fluid restriction.    Day of Discharge     Subjective:  Resting in his recliner, accompanied bu his daughter. He feels good and is hopeful for DC soon.       Physical Exam:  Temp:  [97.4 °F (36.3 °C)-98 °F (36.7 °C)] 97.9 °F (36.6 °C)  Heart Rate:  [74-75] 74  Resp:  [16-18] 18  BP: (100-142)/(71-94) 142/94  Body mass index is 23.65 kg/m².  Physical  Exam  Constitutional:       Appearance: Normal appearance.      Comments: Elderly, frail   HENT:      Head: Normocephalic and atraumatic.   Cardiovascular:      Rate and Rhythm: Normal rate and regular rhythm.      Heart sounds: No murmur heard.    No friction rub.   Pulmonary:      Effort: Pulmonary effort is normal.      Breath sounds: Normal breath sounds.   Abdominal:      General: Bowel sounds are normal. There is no distension.      Palpations: Abdomen is soft.      Tenderness: There is no abdominal tenderness.   Skin:     General: Skin is warm and dry.   Neurological:      Mental Status: He is alert.   Psychiatric:         Mood and Affect: Mood normal.         Behavior: Behavior normal  Consultants     Consult Orders (all) (From admission, onward)     Start     Ordered    04/11/23 1743  Inpatient Urology Consult  Once        Specialty:  Urology  Provider:  Luis Miguel Roca MD    04/11/23 1743    04/07/23 1252  Inpatient Palliative Care Team Consult  Once        Provider:  (Not yet assigned)    04/07/23 1252    04/05/23 1303  Inpatient Palliative Care Team Consult  Once        Provider:  (Not yet assigned)    04/05/23 1302    04/04/23 1842  Inpatient Infection Control Nurse Consult  Once        Provider:  (Not yet assigned)    04/04/23 1841    04/04/23 1726  Inpatient Spiritual Care Consult  Once        Provider:  (Not yet assigned)    04/04/23 1726    04/04/23 1226  Inpatient Pulmonology Consult  Once        Specialty:  Pulmonary Disease  Provider:  Lon Guerra MD    04/04/23 1225    04/02/23 0859  Inpatient Nephrology Consult  Once        Specialty:  Nephrology  Provider:  Naeem Hernandez MD    04/02/23 0859    03/31/23 1214  Inpatient Cardiology Consult  Once        Specialty:  Cardiology  Provider:  Medardo Vanegas MD    03/31/23 1214    03/31/23 0019  A (on-call MD unless specified) Details  Once        Specialty:  Hospitalist  Provider:  Ramsey Talamantes MD    03/31/23  0018              Procedures     Imaging Results (All)     Procedure Component Value Units Date/Time    XR Chest 1 View [[id number]] Collected: 04/10/23 0932     Updated: 04/10/23 0932    Narrative:        Patient: AILYN DUQUE  Time Out: 09:31  Exam(s): XR CXR 1 VIEW     EXAM:    XR Chest, 1 View    CLINICAL HISTORY:     Reason for exam: Follow-up infiltrates.    TECHNIQUE:    Frontal view of the chest.    COMPARISON:    No relevant prior studies available.    FINDINGS:    Lungs:  Multifocal consolidations most prominent in the bilateral bases.      Pleural space:  Unremarkable.  No pneumothorax.    Heart: Mild cardiomegaly.  You centimeter wires and valvular prosthesis   in place.  Left chest pacer in place.    Mediastinum:  Unremarkable.    Bones joints:  Unremarkable.    IMPRESSION:       Multifocal consolidations most prominent in the bilateral bases.    Impression:          Electronically signed by Justus Goldberg M.D. on 04-10-23 at 0931    FL Video Swallow Single Contrast [[id number]] Collected: 04/08/23 0135     Updated: 04/08/23 0135    Narrative:        Patient: AILYN DUQUE  Time Out: 01:34  Exam(s): XR OTHER VIDEO SWALLOW SINGLE-CONTRAST     EXAM:    FL Swallowing Function With Video Cine    CLINICAL HISTORY:       assess swallow function.    TECHNIQUE:    Fluoroscopy of the oral cavity through upper esophagus with video cine   recording.  The study was performed in conjunction with speech pathology.    Various consistencies of liquid and food were administered orally by the   speech pathologist.  Fluoroscopic guidance was provided by a physician.    1 minute 50 seconds of fluoroscopy time.  4mGy dose.    COMPARISON:    No relevant prior studies available.    FINDINGS:    Oral phase:  Unremarkable.  Normal bolus transfer.  No premature   spillage.    Pharyngeal phase:  Flash laryngeal penetration without aspiration with   thin liquids.  Pooling of the piriform sinuses with all consistencies is    noted.  No aspiration with thin, nectar thick or solid consistencies.      Cervical esophageal phase:  Unremarkable.  Normal upper esophageal   sphincter function.    IMPRESSION:       1.  Flash laryngeal penetration without aspiration with thin liquids.  2.  Pooling of the piriform sinuses with all consistencies is noted.      Impression:          Electronically signed by Roxanne Terrazas MD on 04-08-23 at 0134    XR Chest 1 View [[id number]] Collected: 04/07/23 1227     Updated: 04/07/23 1303    Narrative:      EXAMINATION: SINGLE VIEW CHEST RADIOGRAPH     HISTORY: 89-year-old male undergoing followup evaluation of pulmonary  infiltrates.     FINDINGS: An upright AP semierect portable chest radiograph was  obtained. Comparison is made to a chest radiograph dated 04/03/2023.  There has been interval reduction in interstitial opacification  throughout both lungs. A dual lead left-sided cardiac pacemaker is  stable in position. Midline sternal wires are noted. A prosthetic  cardiac valve is appreciated. The chronic silhouette is enlarged but  stable.       Impression:      There is improved but persistent diffuse bilateral  interstitial opacification likely representing an improvement in diffuse  bilateral interstitial edema and/or pneumonia.     This report was finalized on 4/7/2023 1:00 PM by Dr. Gonzalez Salazar M.D.       XR Chest 1 View [[id number]] Collected: 04/03/23 0613     Updated: 04/03/23 0625    Narrative:      XR CHEST 1 VW-     Clinical: CHF, bilateral pleural effusions     COMPARISON examination 03/31/2013 CT chest     FINDINGS: Unchanged left-sided pleural effusion, right-sided pleural  effusion appears slightly improved within the interim. The  cardiomediastinal silhouette is stable. Right base  infiltrate/atelectasis more pronounced compared to the previous  examination. Right and left upper lobe infiltrates similar to the  previous examination. The remainder of examination is unremarkable.     This  report was finalized on 4/3/2023 6:22 AM by Dr. Segun Jara M.D.       CT Chest Without Contrast Diagnostic [[id number]] Collected: 03/31/23 2103     Updated: 03/31/23 2109    Narrative:      CT CHEST WITHOUT CONTRAST     HISTORY: Dyspnea.     TECHNIQUE: Noncontrast chest CT is provided and correlated with x-ray  from yesterday.     FINDINGS: Cardiac pacing hardware is observed along with hardware at the  mitral valve, dilated cardiac chambers, and simple appearing,  posteriorly layering pleural effusions bilaterally. Those measure under  3 cm AP thickness in each. There is an enlarged precarinal lymph node  measuring about 12 mm short axis. No other significant appearing  lymphadenopathy. Abnormal opacity in the left upper lobe and posterior  left lower lobe with groundglass density throughout the right upper lobe  favored represent pneumonia. No obstructing airway lesion.     Visualized upper abdominal structures appear normal. Degenerative change  in the spine.       Impression:      Cardiac hardware with changes of prior sternotomy and mitral  valve repair or replacement. There our bilateral pleural effusions with  patchy opacity in the lungs bilaterally which appears similar as on the  x-ray from yesterday and is favored represent pneumonia.     Radiation dose reduction techniques were utilized, including automated  exposure control and exposure modulation based on body size.     This report was finalized on 3/31/2023 9:06 PM by Dr. Maurice Alejandro M.D.       XR Chest 1 View [[id number]] Collected: 03/30/23 2045     Updated: 03/30/23 2053    Narrative:      PORTABLE CHEST X-RAY     HISTORY: Shortness of breath.     TECHNIQUE: Portable chest x-ray correlated with chest x-ray 04/14/2022.     FINDINGS: Sternal wires with cardiac valve hardware and a cardiac pacing  device are again observed. The cardiac silhouette is enlarged. Patchy  infiltrate in the upper lobes is observed bilaterally, more dense on the  left  than the right. There is also some density in the left lung base  and mildly at the periphery of the right lung base. There also appears  to be small pleural effusions blunting the costophrenic angles.       Impression:      Bilateral pneumonia.     This report was finalized on 3/30/2023 8:50 PM by Dr. Maurice Alejandro M.D.             Pertinent Labs     Results from last 7 days   Lab Units 04/12/23  0442 04/11/23  0428 04/10/23  0433 04/09/23  0403   WBC 10*3/mm3 10.91* 9.97 8.50 7.32   HEMOGLOBIN g/dL 12.0* 11.5* 10.8* 11.2*   PLATELETS 10*3/mm3 258 230 210 185     Results from last 7 days   Lab Units 04/12/23  0442 04/11/23  0428 04/10/23  0433 04/09/23  0403   SODIUM mmol/L 137 138 135* 133*   POTASSIUM mmol/L 4.1 4.5 4.1 4.1   CHLORIDE mmol/L 97* 95* 96* 95*   CO2 mmol/L 31.0* 32.5* 28.9 29.0   BUN mg/dL 46* 48* 48* 37*   CREATININE mg/dL 1.51* 1.36* 1.46* 1.26   GLUCOSE mg/dL 113* 139* 156* 134*   Estimated Creatinine Clearance: 29.6 mL/min (A) (by C-G formula based on SCr of 1.51 mg/dL (H)).  Results from last 7 days   Lab Units 04/12/23  0442 04/11/23  0428 04/10/23  0433 04/09/23  0403   ALBUMIN g/dL 3.1* 3.2* 2.8* 3.0*   BILIRUBIN mg/dL 0.7 0.7 0.7 0.6   ALK PHOS U/L 234* 241* 219* 246*   AST (SGOT) U/L 144* 213* 85* 128*   ALT (SGPT) U/L 161* 195* 77* 89*     Results from last 7 days   Lab Units 04/12/23  0442 04/11/23  0428 04/10/23  0433 04/09/23  0403 04/08/23  0640   CALCIUM mg/dL 8.7 8.9 8.5 8.3 8.8   ALBUMIN g/dL 3.1* 3.2* 2.8* 3.0* 2.9*   MAGNESIUM mg/dL 2.7*  --  2.5* 2.4* 2.6*   PHOSPHORUS mg/dL 3.0  --  2.8 2.6 3.0         Results from last 7 days   Lab Units 04/10/23  0433   URIC ACID mg/dL 6.2         Invalid input(s): LDLCALC        Test Results Pending at Discharge     Pending Labs     Order Current Status    AFB Culture - Sputum, Cough Preliminary result    AFB Culture - Sputum, Cough Preliminary result    AFB Culture - Sputum, Cough Preliminary result          Discharge Details         Discharge Medications      New Medications      Instructions Start Date   fluticasone 50 MCG/ACT nasal spray  Commonly known as: FLONASE   2 sprays, Each Nare, Daily   Start Date: April 13, 2023     melatonin 5 MG tablet tablet   5 mg, Oral, Nightly PRN      midodrine 5 MG tablet  Commonly known as: PROAMATINE   5 mg, Oral, 3 Times Daily Before Meals      predniSONE 20 MG tablet  Commonly known as: DELTASONE   20 mg, Oral, Daily   Start Date: April 13, 2023     sennosides-docusate 8.6-50 MG per tablet  Commonly known as: PERICOLACE   1 tablet, Oral, 2 Times Daily PRN      sulfamethoxazole-trimethoprim 800-160 MG per tablet  Commonly known as: BACTRIM DS,SEPTRA DS   1 tablet, Oral, 3 Times Weekly   Start Date: April 14, 2023     Torsemide 40 MG tablet   40 mg, Oral, Daily   Start Date: April 13, 2023        Continue These Medications      Instructions Start Date   acetaminophen 325 MG tablet  Commonly known as: TYLENOL   650 mg, Oral, Every 6 Hours PRN      apixaban 5 MG tablet tablet  Commonly known as: ELIQUIS   5 mg, Oral, Every 12 Hours Scheduled      calcium carbonate 600 MG tablet  Commonly known as: OS-SOPHIA   600 mg, Oral, Daily      latanoprost 0.005 % ophthalmic solution  Commonly known as: XALATAN   Ophthalmic      levothyroxine 88 MCG tablet  Commonly known as: SYNTHROID, LEVOTHROID   88 mcg, Oral, Daily      multivitamin with minerals tablet tablet   1 tablet, Oral, Daily      polyethylene glycol 17 g packet  Commonly known as: MIRALAX   17 g, Oral, Daily PRN      silver sulfadiazine 1 % cream  Commonly known as: SILVADENE, SSD   1 application, Topical, 3 Times Daily, Apply to facial regions of inflammation per rad onc recs      silver sulfadiazine 1 % cream  Commonly known as: SILVADENE, SSD   1 application, Topical, 2 Times Daily         Stop These Medications    amiodarone 200 MG tablet  Commonly known as: PACERONE     bicalutamide 50 MG tablet  Commonly known as: CASODEX     carvedilol 3.125 MG  tablet  Commonly known as: COREG     rosuvastatin 10 MG tablet  Commonly known as: CRESTOR            No Known Allergies      Discharge Disposition:  Skilled Nursing Facility (DC - External)    Discharge Diet:  Diet Order   Procedures   • Diet: Fluid Restriction (240 mL/tray) Diets; 1500 mL/day; No Mixed Consistencies; Texture: Soft to Chew (NDD 3); Soft to Chew: Chopped Meat; Fluid Consistency: Thin (IDDSI 0)       Discharge Activity:   Activity Instructions     Activity as Tolerated            CODE STATUS:    Code Status and Medical Interventions:   Ordered at: 04/06/23 1503     Medical Intervention Limits:    NO intubation (DNI)     Code Status (Patient has no pulse and is not breathing):    No CPR (Do Not Attempt to Resuscitate)     Medical Interventions (Patient has pulse or is breathing):    Limited Support       No future appointments.  Additional Instructions for the Follow-ups that You Need to Schedule     Discharge Follow-up with PCP   As directed       Currently Documented PCP:    Alma Cat MD    PCP Phone Number:    [phone]     Follow Up Details: 1 week         Discharge Follow-up with Specified Provider: cardiology Dr. Vanegas; 1 Month   As directed      To: cardiology Dr. Vanegas    Follow Up: 1 Month         Discharge Follow-up with Specified Provider: pulmonology 4-6 weeks   As directed      To: pulmonology 4-6 weeks         Discharge Follow-up with Specified Provider: urology next month   As directed      To: urology next month            Follow-up Information     Medardo Vanegas MD Follow up.    Specialty: Cardiology  Contact information:  [address] [facility] [place] [address] [phone]             Kiarra Iraheta MD. Schedule an appointment as soon as possible for a visit in 1 month(s).    Specialty: Nephrology  Contact information:  [address] [id number] [phone]             Martín Flowers Jr., MD Follow up in 4  week(s).    Specialty: Pulmonary Disease  Why: Follow-up in about 4 to 6 weeks in my office with full PFTs and a chest x-ray prior to my seeing him  Contact information:  [address]   [facility] [id number] [phone]             Alma Cat MD .    Specialty: Family Medicine  Why: 1 week  Contact information:  [address]  [facility] [id number] [phone]                         Additional Instructions for the Follow-ups that You Need to Schedule     Discharge Follow-up with PCP   As directed       Currently Documented PCP:    Alma Cat MD    PCP Phone Number:    [phone]     Follow Up Details: 1 week         Discharge Follow-up with Specified Provider: cardiology Dr. Vanegas; 1 Month   As directed      To: cardiology Dr. Vanegas    Follow Up: 1 Month         Discharge Follow-up with Specified Provider: pulmonology 4-6 weeks   As directed      To: pulmonology 4-6 weeks         Discharge Follow-up with Specified Provider: urology next month   As directed      To: urology next month           Time Spent on Discharge:  I spent greater than 30 minutes on this discharge activity which included: face-to-face encounter with the patient, reviewing the data in the system, coordination of the care with the nursing staff as well as consultants, documentation, and entering orders.       Marion Wen MD  [facility]  04/12/23  14:46 EDT

## 2023-04-12 NOTE — CASE MANAGEMENT/SOCIAL WORK
Continued Stay Note  [facility]     Patient Name: Bridger Elias  MRN: [id number]  Today's Date: 4/12/2023    Admit Date: 3/30/2023    Plan: [facility] pending [employer] MCR precert initiated 4/10 - family to transport   Discharge Plan     Row Name 04/12/23 1549       Plan    Plan [facility] pending [employer] M[employer] precert initiated 4/10 - family to transport    Plan Comments [facility] manager Vandana BARON spoke with [employer] who states the precert is still pending. [employer] notified to call [facility] nurses station if precert comes back approved after hours so family can transport to [facility]. MD & RN notified of updates. LUIZ MAN    Row Name 04/12/23 1334       Plan    Plan [facility] pending [employer] M[employer] precert initiated 4/10 - family to transport    Patient/Family in Agreement with Plan yes    Plan Comments CCP left [facility] for [employer] for updates on the precert. [facility] spoke with patient's daughter Sultana and notified her that precert is still pending. Patient's daughter states she can transport to the facility when he is dc. LUIZ MAN               Discharge Codes    No documentation.               Expected Discharge Date and Time     Expected Discharge Date Expected Discharge Time    Apr 12, 2023             LUIZ Husain

## 2023-04-12 NOTE — PROGRESS NOTES
[facility] Progress Note      Patient Name: Bridger Elias  : 1930  MRN: [id number]  Primary Care Physician:  Alma Cat MD  Date of admission: 3/30/2023    Subjective     Interval History:   Follow up Yanira on CKD III.  Hoping for dc today. MERCEDEZ Wen.  Casodex dc and plan in place with  for follow up. Waiting on precert.     Review of Systems:   As noted above    Objective     Vitals:   Temp:  [97.4 °F (36.3 °C)-98 °F (36.7 °C)] 97.9 °F (36.6 °C)  Heart Rate:  [74-75] 74  Resp:  [16-18] 18  BP: (100-142)/(71-94) 142/94    Intake/Output Summary (Last 24 hours) at 2023 1501  Last data filed at 2023 1223  Gross per 24 hour   Intake 150 ml   Output 600 ml   Net -450 ml       Physical Exam:    General Appearance: alert, oriented x 3, no acute distress . Up in chair.   Skin: warm and dry  HEENT: oral mucosa normal, nonicteric sclera. Voice weak  Neck: supple, no JVD  Lungs: Clear to auscultation.   Heart: RRR, normal S1 and S2  Abdomen: soft, nontender, nondistended. + bs  Extremities: no edema.  Neuro: normal speech and mental status     Scheduled Meds:     apixaban, 5 mg, Oral, Q12H  fluticasone, 2 spray, Each Nare, Daily  latanoprost, 1 drop, Both Eyes, Daily  levothyroxine, 88 mcg, Oral, Daily  midodrine, 5 mg, Oral, TID AC  multivitamin with minerals, 1 tablet, Oral, Daily  polyethylene glycol, 17 g, Oral, Daily  predniSONE, 20 mg, Oral, Daily  sulfamethoxazole-trimethoprim, 1 tablet, Oral, Once per day on   torsemide, 40 mg, Oral, Daily      IV Meds:        Results Reviewed:   I have personally reviewed the results from the time of this admission to 2023 15:01 EDT     Results from last 7 days   Lab Units 23  0442 23  0428 04/10/23  0433   SODIUM mmol/L 137 138 135*   POTASSIUM mmol/L 4.1 4.5 4.1   CHLORIDE mmol/L 97* 95* 96*   CO2 mmol/L 31.0* 32.5* 28.9   BUN mg/dL 46* 48* 48*   CREATININE mg/dL 1.51* 1.36* 1.46*   CALCIUM  mg/dL 8.7 8.9 8.5   BILIRUBIN mg/dL 0.7 0.7 0.7   ALK PHOS U/L 234* 241* 219*   ALT (SGPT) U/L 161* 195* 77*   AST (SGOT) U/L 144* 213* 85*   GLUCOSE mg/dL 113* 139* 156*       Estimated Creatinine Clearance: 29.6 mL/min (A) (by C-G formula based on SCr of 1.51 mg/dL (H)).    Results from last 7 days   Lab Units 04/12/23  0442 04/10/23  0433 04/09/23  0403   MAGNESIUM mg/dL 2.7* 2.5* 2.4*   PHOSPHORUS mg/dL 3.0 2.8 2.6       Results from last 7 days   Lab Units 04/10/23  0433 04/09/23  0403 04/08/23  0640 04/07/23  0509 04/06/23  0350   URIC ACID mg/dL 6.2 6.1 6.1 6.4 5.9       Results from last 7 days   Lab Units 04/12/23  0442 04/11/23  0428 04/10/23  0433 04/09/23  0403 04/08/23  0640   WBC 10*3/mm3 10.91* 9.97 8.50 7.32 5.65   HEMOGLOBIN g/dL 12.0* 11.5* 10.8* 11.2* 11.0*   PLATELETS 10*3/mm3 258 230 210 185 179             Assessment / Plan     ASSESSMENT:  1. MONISHA on CKD 3.  Baseline creatinine 1.2-1.4. Cardiorenal syndrome . Azotemia on steroid.   2 . Bilateral pulmonary infiltrates. Acute hypoxic respiratory failure. Possible ILD versus organizing PNA.  On steroid with clinical improvement.   3. Acute on chronic combined heart failure. Po diuretic  4.SSS, PAF.  5. SP MV repair .  6. Transaminitis. Casodex dc.   planning   7. Hypotension.  Added midodrine. BP a little labile.  May just need 2.5 mg tid.  PLAN:  1. Reduce Midodrine to 2.5 mg tid.  2. Ok with renal for dc .  3. Has followup with Dr. Dyson May 16 at 11:45 am.   Catrina Waters MD  04/12/23  15:01 EDT    [facility]  [phone]

## 2023-04-13 LAB
ALBUMIN SERPL-MCNC: 3.4 G/DL (ref 3.5–5.2)
ALBUMIN/GLOB SERPL: 0.9 G/DL
ALP SERPL-CCNC: 233 U/L (ref 39–117)
ALT SERPL W P-5'-P-CCNC: 155 U/L (ref 1–41)
ANION GAP SERPL CALCULATED.3IONS-SCNC: 6.6 MMOL/L (ref 5–15)
AST SERPL-CCNC: 107 U/L (ref 1–40)
BILIRUB SERPL-MCNC: 0.7 MG/DL (ref 0–1.2)
BUN SERPL-MCNC: 44 MG/DL (ref 8–23)
BUN/CREAT SERPL: 25.9 (ref 7–25)
CALCIUM SPEC-SCNC: 9.3 MG/DL (ref 8.2–9.6)
CHLORIDE SERPL-SCNC: 93 MMOL/L (ref 98–107)
CO2 SERPL-SCNC: 34.4 MMOL/L (ref 22–29)
CREAT SERPL-MCNC: 1.7 MG/DL (ref 0.76–1.27)
DEPRECATED RDW RBC AUTO: 46.3 FL (ref 37–54)
EGFRCR SERPLBLD CKD-EPI 2021: 37.1 ML/MIN/1.73
ERYTHROCYTE [DISTWIDTH] IN BLOOD BY AUTOMATED COUNT: 14.1 % (ref 12.3–15.4)
GLOBULIN UR ELPH-MCNC: 3.9 GM/DL
GLUCOSE SERPL-MCNC: 123 MG/DL (ref 65–99)
HCT VFR BLD AUTO: 34.8 % (ref 37.5–51)
HGB BLD-MCNC: 12.6 G/DL (ref 13–17.7)
MCH RBC QN AUTO: 33.2 PG (ref 26.6–33)
MCHC RBC AUTO-ENTMCNC: 36.2 G/DL (ref 31.5–35.7)
MCV RBC AUTO: 91.6 FL (ref 79–97)
PLATELET # BLD AUTO: 258 10*3/MM3 (ref 140–450)
PMV BLD AUTO: 10.7 FL (ref 6–12)
POTASSIUM SERPL-SCNC: 4.6 MMOL/L (ref 3.5–5.2)
PROT SERPL-MCNC: 7.3 G/DL (ref 6–8.5)
RBC # BLD AUTO: 3.8 10*6/MM3 (ref 4.14–5.8)
SODIUM SERPL-SCNC: 134 MMOL/L (ref 136–145)
WBC NRBC COR # BLD: 9.05 10*3/MM3 (ref 3.4–10.8)

## 2023-04-13 PROCEDURE — 63710000001 PREDNISONE PER 1 MG: Performed by: INTERNAL MEDICINE

## 2023-04-13 PROCEDURE — 80053 COMPREHEN METABOLIC PANEL: CPT | Performed by: STUDENT IN AN ORGANIZED HEALTH CARE EDUCATION/TRAINING PROGRAM

## 2023-04-13 PROCEDURE — 85027 COMPLETE CBC AUTOMATED: CPT | Performed by: STUDENT IN AN ORGANIZED HEALTH CARE EDUCATION/TRAINING PROGRAM

## 2023-04-13 RX ORDER — MIDODRINE HYDROCHLORIDE 2.5 MG/1
2.5 TABLET ORAL
Start: 2023-04-13

## 2023-04-13 RX ADMIN — LATANOPROST 1 DROP: 50 SOLUTION OPHTHALMIC at 21:31

## 2023-04-13 RX ADMIN — FLUTICASONE PROPIONATE 2 SPRAY: 50 SPRAY, METERED NASAL at 09:46

## 2023-04-13 RX ADMIN — TORSEMIDE 40 MG: 20 TABLET ORAL at 09:30

## 2023-04-13 RX ADMIN — PREDNISONE 20 MG: 20 TABLET ORAL at 09:29

## 2023-04-13 RX ADMIN — APIXABAN 5 MG: 5 TABLET, FILM COATED ORAL at 09:28

## 2023-04-13 RX ADMIN — Medication 10 ML: at 08:10

## 2023-04-13 RX ADMIN — MULTIPLE VITAMINS W/ MINERALS TAB 1 TABLET: TAB at 09:30

## 2023-04-13 RX ADMIN — MIDODRINE HYDROCHLORIDE 2.5 MG: 2.5 TABLET ORAL at 08:09

## 2023-04-13 RX ADMIN — LEVOTHYROXINE SODIUM 88 MCG: 0.09 TABLET ORAL at 09:29

## 2023-04-13 RX ADMIN — MIDODRINE HYDROCHLORIDE 2.5 MG: 2.5 TABLET ORAL at 17:05

## 2023-04-13 RX ADMIN — APIXABAN 5 MG: 5 TABLET, FILM COATED ORAL at 21:31

## 2023-04-13 RX ADMIN — POLYETHYLENE GLYCOL 3350 17 G: 17 POWDER, FOR SOLUTION ORAL at 09:26

## 2023-04-13 NOTE — DISCHARGE SUMMARY
Patient Name: Bridger Elias  : 1930  MRN: [id number]    Date of Admission: 3/30/2023  Date of Discharge:  2023  Primary Care Physician: Alma Cat MD      Chief Complaint:   Shortness of Breath and Weakness - Generalized      Discharge Diagnoses     Active Hospital Problems    Diagnosis  POA   • Moderate Malnutrition (HCC) [E44.0]  Yes   • Normocytic anemia [D64.9]  Yes   • Transaminitis [R74.01]  Yes   • Stage 3a chronic kidney disease [N18.31]  Yes   • Pacemaker [Z95.0]  Yes   • Atrial fibrillation [I48.91]  Yes   • SCC (squamous cell carcinoma), face [C44.320]  Yes   • Hypothyroidism [E03.9]  Yes   • Coronary arteriosclerosis [I25.10]  Yes   • Dyslipidemia [E78.5]  Yes   • Hypertension [I10]  Yes   • Long term (current) use of anticoagulants [Z79.01]  Not Applicable      Resolved Hospital Problems    Diagnosis Date Resolved POA   • **Acute respiratory failure with hypoxia [J96.01] 2023 Yes   • Acute on chronic HFrEF (heart failure with reduced ejection fraction) (HCC) [I50.23] 2023 Unknown   • Hyponatremia [E87.1] 2023 Yes   • Hypokalemia [E87.6] 2023 No   • Community acquired pneumonia, unspecified laterality [J18.9] 2023 Yes   • Acute on chronic combined systolic and diastolic CHF (congestive heart failure) [I50.43] 2023 Yes        Hospital Course     Mr. Elias is a 89 y.o. male with a history of chronic combined systolic and diastolic heart failure, atrial fibrillation on long-term anticoagulation, s/p PPM, CAD, hypertension, hyperlipidemia, squamous cell carcinoma, CKD stage IIIb, hypothyroidism, prostate cancer who presented to [facility] initially complaining of shortness of breath and generalized weakness.  Please see the admitting history and physical for further details.  He was found to have acute hypoxic respiratory failure secondary to acute on chronic combined systolic and diastolic heart failure and was admitted to the  hospital for further evaluation and treatment. Repeat echocardiogram showed EF 41 to 45%, cardiology was consulted. The patient responded well to diuresis however developed worsening hyponatremia for which nephrology was consulted.  Sodium improved with a fluid restriction which will be continued at discharge, 1500 mL daily.  The patient was also noted to have bilateral pulmonary infiltrates on CT scan, further described below.  Pulmonology was consulted for further evaluation/recommendations.  Infectious etiologies were ruled out including TB and he was initiated on steroids and has significantly improved.  Given his hypoxia and age bronchoscopy was deferred.  Pulmonology is recommending continuing prednisone 20 mg daily and seeing him in 4 to 6 weeks in clinic.  Prior to discharge the patient was noted to have a mild elevation in LFTs as well.  This is a known side effect of the patient's Casodex, this medication was held and his urologist was contacted.  Dr. Petty, the patient's urologist, is recommending to hold the patient's Casodex at discharge and obtain a PSA and testosterone at 1 and 3 months.  The patient is scheduled to see Dr. Petty next month.  Patient's discharge was delayed due to prolonged precertification, however was finally obtained on 4/24/2023 and is stable for discharge to SNF at this time.    DISCHARGE Follow Up Recommendations for labs and diagnostics:   See PCP in 1 week for posthospital follow-up visit.  Follow-up with patient's cardiologist Dr. Vanegas, 1 month.  Follow-up with pulmonology, 4 to 6 weeks,   Follow-up with urology, Dr. Petty, hold Casodex until follow-up, recheck testosterone and PSA in 1 month and 3 months.  Continue 1500 mL fluid restriction.    Day of Discharge     Subjective:  Resting in his recliner.    Physical Exam:  Temp:  [97 °F (36.1 °C)-98.5 °F (36.9 °C)] 97 °F (36.1 °C)  Heart Rate:  [74] 74  Resp:  [18] 18  BP: (117-140)/(75-85) 140/85  Body mass index is  24.41 kg/m².  Physical Exam  Constitutional:       General: He is not in acute distress.     Appearance: He is ill-appearing.   HENT:      Ears:      Comments: Hearing aids  Cardiovascular:      Rate and Rhythm: Normal rate and regular rhythm.      Pulses: Normal pulses.      Heart sounds: Normal heart sounds.   Pulmonary:      Effort: Pulmonary effort is normal. No respiratory distress.      Breath sounds: No wheezing.   Abdominal:      General: Abdomen is flat.      Palpations: Abdomen is soft.   Musculoskeletal:         General: No swelling. Normal range of motion.   Neurological:      General: No focal deficit present.      Mental Status: He is alert and oriented to person, place, and time.         Consultants     Consult Orders (all) (From admission, onward)     Start     Ordered    04/11/23 1743  Inpatient Urology Consult  Once        Specialty:  Urology  Provider:  Luis Miguel Roca MD    04/11/23 1743    04/07/23 1252  Inpatient Palliative Care Team Consult  Once        Provider:  (Not yet assigned)    04/07/23 1252    04/05/23 1303  Inpatient Palliative Care Team Consult  Once        Provider:  (Not yet assigned)    04/05/23 1302    04/04/23 1842  Inpatient Infection Control Nurse Consult  Once        Provider:  (Not yet assigned)    04/04/23 1841    04/04/23 1726  Inpatient Spiritual Care Consult  Once        Provider:  (Not yet assigned)    04/04/23 1726    04/04/23 1226  Inpatient Pulmonology Consult  Once        Specialty:  Pulmonary Disease  Provider:  Lon Guerra MD    04/04/23 1225    04/02/23 0859  Inpatient Nephrology Consult  Once        Specialty:  Nephrology  Provider:  Naeem Hernandez MD    04/02/23 0859    03/31/23 1214  Inpatient Cardiology Consult  Once        Specialty:  Cardiology  Provider:  Medardo Vanegas MD    03/31/23 1214    03/31/23 0019  [facility] (on-call MD unless specified) Details  Once        Specialty:  Hospitalist  Provider:  Ramsey Talamantes MD     03/31/23 0018              Procedures     Imaging Results (All)     Procedure Component Value Units Date/Time    XR Chest 1 View [[id number]] Collected: 04/10/23 0932     Updated: 04/10/23 0932    Narrative:        Patient: AILYN DUQUE  Time Out: 09:31  Exam(s): XR CXR 1 VIEW     EXAM:    XR Chest, 1 View    CLINICAL HISTORY:     Reason for exam: Follow-up infiltrates.    TECHNIQUE:    Frontal view of the chest.    COMPARISON:    No relevant prior studies available.    FINDINGS:    Lungs:  Multifocal consolidations most prominent in the bilateral bases.      Pleural space:  Unremarkable.  No pneumothorax.    Heart: Mild cardiomegaly.  You centimeter wires and valvular prosthesis   in place.  Left chest pacer in place.    Mediastinum:  Unremarkable.    Bones joints:  Unremarkable.    IMPRESSION:       Multifocal consolidations most prominent in the bilateral bases.    Impression:          Electronically signed by Justus Goldberg M.D. on 04-10-23 at 0931    FL Video Swallow Single Contrast [[id number]] Collected: 04/08/23 0135     Updated: 04/08/23 0135    Narrative:        Patient: AILYN DUQUE  Time Out: 01:34  Exam(s): XR OTHER VIDEO SWALLOW SINGLE-CONTRAST     EXAM:    FL Swallowing Function With Video Cine    CLINICAL HISTORY:       assess swallow function.    TECHNIQUE:    Fluoroscopy of the oral cavity through upper esophagus with video cine   recording.  The study was performed in conjunction with speech pathology.    Various consistencies of liquid and food were administered orally by the   speech pathologist.  Fluoroscopic guidance was provided by a physician.    1 minute 50 seconds of fluoroscopy time.  4mGy dose.    COMPARISON:    No relevant prior studies available.    FINDINGS:    Oral phase:  Unremarkable.  Normal bolus transfer.  No premature   spillage.    Pharyngeal phase:  Flash laryngeal penetration without aspiration with   thin liquids.  Pooling of the piriform sinuses with all consistencies  is   noted.  No aspiration with thin, nectar thick or solid consistencies.      Cervical esophageal phase:  Unremarkable.  Normal upper esophageal   sphincter function.    IMPRESSION:       1.  Flash laryngeal penetration without aspiration with thin liquids.  2.  Pooling of the piriform sinuses with all consistencies is noted.      Impression:          Electronically signed by Roxanne Terrazas MD on 04-08-23 at 0134    XR Chest 1 View [[id number]] Collected: 04/07/23 1227     Updated: 04/07/23 1303    Narrative:      EXAMINATION: SINGLE VIEW CHEST RADIOGRAPH     HISTORY: 89-year-old male undergoing followup evaluation of pulmonary  infiltrates.     FINDINGS: An upright AP semierect portable chest radiograph was  obtained. Comparison is made to a chest radiograph dated 04/03/2023.  There has been interval reduction in interstitial opacification  throughout both lungs. A dual lead left-sided cardiac pacemaker is  stable in position. Midline sternal wires are noted. A prosthetic  cardiac valve is appreciated. The chronic silhouette is enlarged but  stable.       Impression:      There is improved but persistent diffuse bilateral  interstitial opacification likely representing an improvement in diffuse  bilateral interstitial edema and/or pneumonia.     This report was finalized on 4/7/2023 1:00 PM by Dr. Gonzalez Salazar M.D.       XR Chest 1 View [[id number]] Collected: 04/03/23 0613     Updated: 04/03/23 0625    Narrative:      XR CHEST 1 VW-     Clinical: CHF, bilateral pleural effusions     COMPARISON examination 03/31/2013 CT chest     FINDINGS: Unchanged left-sided pleural effusion, right-sided pleural  effusion appears slightly improved within the interim. The  cardiomediastinal silhouette is stable. Right base  infiltrate/atelectasis more pronounced compared to the previous  examination. Right and left upper lobe infiltrates similar to the  previous examination. The remainder of examination is unremarkable.      This report was finalized on 4/3/2023 6:22 AM by Dr. Segun Jara M.D.       CT Chest Without Contrast Diagnostic [[id number]] Collected: 03/31/23 2103     Updated: 03/31/23 2109    Narrative:      CT CHEST WITHOUT CONTRAST     HISTORY: Dyspnea.     TECHNIQUE: Noncontrast chest CT is provided and correlated with x-ray  from yesterday.     FINDINGS: Cardiac pacing hardware is observed along with hardware at the  mitral valve, dilated cardiac chambers, and simple appearing,  posteriorly layering pleural effusions bilaterally. Those measure under  3 cm AP thickness in each. There is an enlarged precarinal lymph node  measuring about 12 mm short axis. No other significant appearing  lymphadenopathy. Abnormal opacity in the left upper lobe and posterior  left lower lobe with groundglass density throughout the right upper lobe  favored represent pneumonia. No obstructing airway lesion.     Visualized upper abdominal structures appear normal. Degenerative change  in the spine.       Impression:      Cardiac hardware with changes of prior sternotomy and mitral  valve repair or replacement. There our bilateral pleural effusions with  patchy opacity in the lungs bilaterally which appears similar as on the  x-ray from yesterday and is favored represent pneumonia.     Radiation dose reduction techniques were utilized, including automated  exposure control and exposure modulation based on body size.     This report was finalized on 3/31/2023 9:06 PM by Dr. Maurice Alejandro M.D.       XR Chest 1 View [[id number]] Collected: 03/30/23 2045     Updated: 03/30/23 2053    Narrative:      PORTABLE CHEST X-RAY     HISTORY: Shortness of breath.     TECHNIQUE: Portable chest x-ray correlated with chest x-ray 04/14/2022.     FINDINGS: Sternal wires with cardiac valve hardware and a cardiac pacing  device are again observed. The cardiac silhouette is enlarged. Patchy  infiltrate in the upper lobes is observed bilaterally, more dense on  the  left than the right. There is also some density in the left lung base  and mildly at the periphery of the right lung base. There also appears  to be small pleural effusions blunting the costophrenic angles.       Impression:      Bilateral pneumonia.     This report was finalized on 3/30/2023 8:50 PM by Dr. Maurice Alejandro M.D.             Pertinent Labs     Results from last 7 days   Lab Units 04/24/23 0412 04/23/23 0347 04/22/23 0554 04/21/23  0448   WBC 10*3/mm3 10.09 9.88 11.07* 10.53   HEMOGLOBIN g/dL 12.9* 12.8* 13.4 12.6*   PLATELETS 10*3/mm3 138* 144 143 127*     Results from last 7 days   Lab Units 04/24/23 0412 04/23/23 0347 04/22/23 0554 04/21/23 0448   SODIUM mmol/L 136 136 138 137   POTASSIUM mmol/L 4.9 4.8 4.8 4.9   CHLORIDE mmol/L 104 101 105 105   CO2 mmol/L 24.1 26.0 22.4 22.4   BUN mg/dL 34* 35* 41* 36*   CREATININE mg/dL 1.34* 1.09 1.22 1.17   GLUCOSE mg/dL 99 87 84 89   Estimated Creatinine Clearance: 34.4 mL/min (A) (by C-G formula based on SCr of 1.34 mg/dL (H)).  Results from last 7 days   Lab Units 04/24/23 0412 04/23/23 0347 04/22/23 0554 04/21/23  0448   ALBUMIN g/dL 3.2* 3.4* 3.3* 3.0*   BILIRUBIN mg/dL 0.7 0.6 0.9 0.7   ALK PHOS U/L 148* 153* 150* 145*   AST (SGOT) U/L 73* 68* 59* 77*   ALT (SGPT) U/L 119* 102* 102* 109*     Results from last 7 days   Lab Units 04/24/23 0412 04/23/23 0347 04/22/23 0554 04/21/23  0448   CALCIUM mg/dL 9.2 8.8 9.2 8.7   ALBUMIN g/dL 3.2* 3.4* 3.3* 3.0*                 Invalid input(s): LDLCALC        Test Results Pending at Discharge     Pending Labs     Order Current Status    Aldosterone In process    AFB Culture - Sputum, Cough Preliminary result    AFB Culture - Sputum, Cough Preliminary result    AFB Culture - Sputum, Cough Preliminary result          Discharge Details        Discharge Medications      New Medications      Instructions Start Date   fluticasone 50 MCG/ACT nasal spray  Commonly known as: FLONASE   2 sprays, Each Nare,  Daily      melatonin 5 MG tablet tablet   5 mg, Oral, Nightly PRN      midodrine 2.5 MG tablet  Commonly known as: PROAMATINE   2.5 mg, Oral, 3 Times Daily Before Meals      midodrine 10 MG tablet  Commonly known as: PROAMATINE   10 mg, Oral, 3 Times Daily Before Meals      predniSONE 20 MG tablet  Commonly known as: DELTASONE   20 mg, Oral, Daily      sennosides-docusate 8.6-50 MG per tablet  Commonly known as: PERICOLACE   1 tablet, Oral, 2 Times Daily PRN         Changes to Medications      Instructions Start Date   apixaban 2.5 MG tablet tablet  Commonly known as: ELIQUIS  What changed:   · medication strength  · how much to take   2.5 mg, Oral, Every 12 Hours Scheduled         Continue These Medications      Instructions Start Date   acetaminophen 325 MG tablet  Commonly known as: TYLENOL   650 mg, Oral, Every 6 Hours PRN      calcium carbonate 600 MG tablet  Commonly known as: OS-SOPHIA   600 mg, Oral, Daily      latanoprost 0.005 % ophthalmic solution  Commonly known as: XALATAN   Ophthalmic      levothyroxine 88 MCG tablet  Commonly known as: SYNTHROID, LEVOTHROID   88 mcg, Oral, Daily      multivitamin with minerals tablet tablet   1 tablet, Oral, Daily      polyethylene glycol 17 g packet  Commonly known as: MIRALAX   17 g, Oral, Daily PRN      silver sulfadiazine 1 % cream  Commonly known as: SILVADENE, SSD   1 application, Topical, 3 Times Daily, Apply to facial regions of inflammation per rad onc recs      silver sulfadiazine 1 % cream  Commonly known as: SILVADENE, SSD   1 application, Topical, 2 Times Daily         Stop These Medications    amiodarone 200 MG tablet  Commonly known as: PACERONE     bicalutamide 50 MG tablet  Commonly known as: CASODEX     carvedilol 3.125 MG tablet  Commonly known as: COREG     rosuvastatin 10 MG tablet  Commonly known as: CRESTOR            No Known Allergies      Discharge Disposition:  Skilled Nursing Facility (DC - External)    Discharge Diet:  Diet Order   Procedures    • Diet: Fluid Restriction (240 mL/tray) Diets; 1500 mL/day; No Mixed Consistencies; Texture: Soft to Chew (NDD 3); Soft to Chew: Chopped Meat; Fluid Consistency: Thin (IDDSI 0)       Discharge Activity:   Activity Instructions     Activity as Tolerated            CODE STATUS:    Code Status and Medical Interventions:   Ordered at: 04/06/23 1503     Medical Intervention Limits:    NO intubation (DNI)     Code Status (Patient has no pulse and is not breathing):    No CPR (Do Not Attempt to Resuscitate)     Medical Interventions (Patient has pulse or is breathing):    Limited Support       No future appointments.  Additional Instructions for the Follow-ups that You Need to Schedule     Discharge Follow-up with PCP   As directed       Currently Documented PCP:    Alma Cat MD    PCP Phone Number:    [phone]     Follow Up Details: 1 week         Discharge Follow-up with Specified Provider: cardiology Dr. Vanegas; 1 Month   As directed      To: cardiology Dr. Vanegas    Follow Up: 1 Month         Discharge Follow-up with Specified Provider: pulmonology 4-6 weeks   As directed      To: pulmonology 4-6 weeks         Discharge Follow-up with Specified Provider: urology next month   As directed      To: urology next month            Contact information for follow-up providers     Medardo Vanegas MD. Go in 1 month(s).    Specialty: Cardiology  Why: One month hospital follow up appointment May 22nd at  115 pm   please bring a current medication list  Contact information:  [address] [facility] [place] [address] [phone]             Kiarra Iraheta MD. Go on 5/16/2023.    Specialty: Nephrology  Why: has follow up May 16 at 11:45 with Dr. Dyson  Contact information:  [address]  [place] [id number] [phone]             Martín Flowers Jr., MD Follow up in 4 week(s).    Specialty: Pulmonary Disease  Why: Follow-up in about 4 to 6 weeks in my office with  full PFTs and a chest x-ray prior to my seeing him  Dr office has been notified to call you with dates and times of appointments  Contact information:  [address]  [address] [id number] [phone]             Alma Cat MD Follow up on 4/27/2023.    Specialty: Family Medicine  Why: 1 week hospital follow up  appoinment  Thurs day April 27th at  1245  pm  Bring a current medication list  Contact information:  [address] [facility] [address] [phone]                   Contact information for after-discharge care     Destination     [facility] .    Service: Skilled Nursing  Contact information:  [address] [facility] [id number] [phone]                             Additional Instructions for the Follow-ups that You Need to Schedule     Discharge Follow-up with PCP   As directed       Currently Documented PCP:    Alma Cat MD    PCP Phone Number:    [phone]     Follow Up Details: 1 week         Discharge Follow-up with Specified Provider: cardiology Dr. Vanegas; 1 Month   As directed      To: cardiology Dr. Vanegas    Follow Up: 1 Month         Discharge Follow-up with Specified Provider: pulmonology 4-6 weeks   As directed      To: pulmonology 4-6 weeks         Discharge Follow-up with Specified Provider: urology next month   As directed      To: urology next month           Time Spent on Discharge:  I spent greater than 30 minutes on this discharge activity which included: face-to-face encounter with the patient, reviewing the data in the system, coordination of the care with the nursing staff as well as consultants, documentation, and entering orders.       Rock Daley MD  [facility]  04/24/23  14:46 EDT

## 2023-04-13 NOTE — CASE MANAGEMENT/SOCIAL WORK
Continued Stay Note  [facility]     Patient Name: Bridger Elias  MRN: [id number]  Today's Date: 4/13/2023    Admit Date: 3/30/2023    Plan: [facility] pending [facility] precert initiated 4/10 - family to transport   Discharge Plan     Row Name 04/13/23 1313       Plan    Plan [facility] pending [employer] [facility] precert initiated 4/10 - family to transport    Plan Comments [facility] spoke with Valencia/Violet who states all necessary documents on the precert have now been submitted. Facility is now waiting on insurance approval for the precert. Precert still pending at this time. MD & RN notified. MAGDA, CRESENCIOW               Discharge Codes    No documentation.               Expected Discharge Date and Time     Expected Discharge Date Expected Discharge Time    Apr 12, 2023             LUIZ Husain

## 2023-04-13 NOTE — PLAN OF CARE
Goal Outcome Evaluation:               Pt up in chair at the beginning of shift. No complaints. 1 assist to get back in bed. Slept well. Used urinal with assistance.

## 2023-04-13 NOTE — PLAN OF CARE
Goal Outcome Evaluation:  Diuretic in Use: torsemide  Response to Diuretics (Output greater than intake): output appears greater.  Daily Weight (up or down): down  O2 Requirements: on room air throughout shift.  Functional Status (Activity level, tolerance and respiratory symptoms): up with 1 light assist + walker + gait belt.    Discharge Plans:   to be transferred to [facility] rehab or nursing home, pending pre-certification, most likely tomorrow.

## 2023-04-13 NOTE — PROGRESS NOTES
[facility] Progress Note      Patient Name: Bridger Elias  : 1930  MRN: [id number]  Primary Care Physician:  Alma Cat MD  Date of admission: 3/30/2023    Subjective     Interval History:   Follow up Yanira on CKD III. Waiting on insurance to go to rehab. Feels fine.  Breathing fine. Sitting up in chair.  Leg edema better.     Review of Systems:   As noted above    Objective     Vitals:   Temp:  [97.3 °F (36.3 °C)-98.3 °F (36.8 °C)] 98.3 °F (36.8 °C)  Heart Rate:  [74] 74  Resp:  [18] 18  BP: (113-140)/(71-83) 128/75    Intake/Output Summary (Last 24 hours) at 2023 1515  Last data filed at 2023 1121  Gross per 24 hour   Intake 580 ml   Output 1150 ml   Net -570 ml       Physical Exam:    General Appearance: alert, oriented x 3, no acute distress . Up in chair.   Skin: warm and dry  HEENT: oral mucosa normal, nonicteric sclera. Voice weak  Neck: supple, no JVD  Lungs: Clear to auscultation.   Heart: RRR, normal S1 and S2  Abdomen: soft, nontender, nondistended. + bs  Extremities: trace lower ext edema.   Neuro: normal speech and mental status     Scheduled Meds:     apixaban, 5 mg, Oral, Q12H  fluticasone, 2 spray, Each Nare, Daily  latanoprost, 1 drop, Both Eyes, Daily  levothyroxine, 88 mcg, Oral, Daily  midodrine, 2.5 mg, Oral, TID AC  multivitamin with minerals, 1 tablet, Oral, Daily  polyethylene glycol, 17 g, Oral, Daily  predniSONE, 20 mg, Oral, Daily  sulfamethoxazole-trimethoprim, 1 tablet, Oral, Once per day on   torsemide, 40 mg, Oral, Daily      IV Meds:        Results Reviewed:   I have personally reviewed the results from the time of this admission to 2023 15:15 EDT     Results from last 7 days   Lab Units 23  0339 23  0442 23  0428   SODIUM mmol/L 134* 137 138   POTASSIUM mmol/L 4.6 4.1 4.5   CHLORIDE mmol/L 93* 97* 95*   CO2 mmol/L 34.4* 31.0* 32.5*   BUN mg/dL 44* 46* 48*   CREATININE mg/dL 1.70* 1.51* 1.36*    CALCIUM mg/dL 9.3 8.7 8.9   BILIRUBIN mg/dL 0.7 0.7 0.7   ALK PHOS U/L 233* 234* 241*   ALT (SGPT) U/L 155* 161* 195*   AST (SGOT) U/L 107* 144* 213*   GLUCOSE mg/dL 123* 113* 139*       Estimated Creatinine Clearance: 25.9 mL/min (A) (by C-G formula based on SCr of 1.7 mg/dL (H)).    Results from last 7 days   Lab Units 04/12/23  0442 04/10/23  0433 04/09/23  0403   MAGNESIUM mg/dL 2.7* 2.5* 2.4*   PHOSPHORUS mg/dL 3.0 2.8 2.6       Results from last 7 days   Lab Units 04/10/23  0433 04/09/23  0403 04/08/23  0640 04/07/23  0509   URIC ACID mg/dL 6.2 6.1 6.1 6.4       Results from last 7 days   Lab Units 04/13/23  0339 04/12/23  0442 04/11/23  0428 04/10/23  0433 04/09/23  0403   WBC 10*3/mm3 9.05 10.91* 9.97 8.50 7.32   HEMOGLOBIN g/dL 12.6* 12.0* 11.5* 10.8* 11.2*   PLATELETS 10*3/mm3 258 258 230 210 185             Assessment / Plan     ASSESSMENT:  1. MONISHA on CKD 3.  Baseline creatinine 1.2-1.4. Cardiorenal syndrome . Azotemia on steroid. Bactrim can also reduce secretion of creatinine.  Euvolemic by exam. I think that his baseline will be higher on this dose of diuretic even without bactrim in order to keep out of heart failure.  Had been on every other day dosing prior to coming in.   2 . Bilateral pulmonary infiltrates. Acute hypoxic respiratory failure. Possible ILD versus organizing PNA.  On steroid with clinical improvement.   3. Acute on chronic combined heart failure. Po diuretic  4. SSS, PAF.  5. SP MV repair .  6. Transaminitis. Casodex dc.   planning to check PSA and testoterone at 1 and 3 months.  Slowly improving.   7. Hypotension.  Added midodrine. BP a little labile.  On  2.5 mg tid.  8. Prostate cancer.   to follow up.  Casodex dc for now.   9. Hypothyroid on replacement.   PLAN:  1. Continue current diuretic.    2. Ok with renal for dc .  3. Has followup with Dr. Dyson May 16 at 11:45 am.   Catrina Waters MD  04/13/23  15:15 EDT    [facility]  [phone]

## 2023-04-14 LAB
ALBUMIN SERPL-MCNC: 3.3 G/DL (ref 3.5–5.2)
ALBUMIN/GLOB SERPL: 0.8 G/DL
ALP SERPL-CCNC: 227 U/L (ref 39–117)
ALT SERPL W P-5'-P-CCNC: 134 U/L (ref 1–41)
ANION GAP SERPL CALCULATED.3IONS-SCNC: 8 MMOL/L (ref 5–15)
AST SERPL-CCNC: 93 U/L (ref 1–40)
BILIRUB SERPL-MCNC: 0.7 MG/DL (ref 0–1.2)
BUN SERPL-MCNC: 50 MG/DL (ref 8–23)
BUN/CREAT SERPL: 28.9 (ref 7–25)
CALCIUM SPEC-SCNC: 8.9 MG/DL (ref 8.2–9.6)
CHLORIDE SERPL-SCNC: 96 MMOL/L (ref 98–107)
CO2 SERPL-SCNC: 33 MMOL/L (ref 22–29)
CREAT SERPL-MCNC: 1.73 MG/DL (ref 0.76–1.27)
DEPRECATED RDW RBC AUTO: 49.2 FL (ref 37–54)
EGFRCR SERPLBLD CKD-EPI 2021: 36.4 ML/MIN/1.73
ERYTHROCYTE [DISTWIDTH] IN BLOOD BY AUTOMATED COUNT: 14.4 % (ref 12.3–15.4)
GLOBULIN UR ELPH-MCNC: 3.9 GM/DL
GLUCOSE SERPL-MCNC: 103 MG/DL (ref 65–99)
HCT VFR BLD AUTO: 36 % (ref 37.5–51)
HGB BLD-MCNC: 12.9 G/DL (ref 13–17.7)
MAGNESIUM SERPL-MCNC: 2.9 MG/DL (ref 1.7–2.3)
MCH RBC QN AUTO: 33.8 PG (ref 26.6–33)
MCHC RBC AUTO-ENTMCNC: 35.8 G/DL (ref 31.5–35.7)
MCV RBC AUTO: 94.2 FL (ref 79–97)
PHOSPHATE SERPL-MCNC: 4.4 MG/DL (ref 2.5–4.5)
PLATELET # BLD AUTO: 251 10*3/MM3 (ref 140–450)
PMV BLD AUTO: 10.8 FL (ref 6–12)
POTASSIUM SERPL-SCNC: 4.2 MMOL/L (ref 3.5–5.2)
PROT SERPL-MCNC: 7.2 G/DL (ref 6–8.5)
QT INTERVAL: 502 MS
RBC # BLD AUTO: 3.82 10*6/MM3 (ref 4.14–5.8)
SODIUM SERPL-SCNC: 137 MMOL/L (ref 136–145)
URATE SERPL-MCNC: 6.7 MG/DL (ref 3.4–7)
WBC NRBC COR # BLD: 8.72 10*3/MM3 (ref 3.4–10.8)

## 2023-04-14 PROCEDURE — 93010 ELECTROCARDIOGRAM REPORT: CPT | Performed by: INTERNAL MEDICINE

## 2023-04-14 PROCEDURE — 93005 ELECTROCARDIOGRAM TRACING: CPT | Performed by: NURSE PRACTITIONER

## 2023-04-14 PROCEDURE — 85027 COMPLETE CBC AUTOMATED: CPT | Performed by: STUDENT IN AN ORGANIZED HEALTH CARE EDUCATION/TRAINING PROGRAM

## 2023-04-14 PROCEDURE — 97530 THERAPEUTIC ACTIVITIES: CPT

## 2023-04-14 PROCEDURE — 84550 ASSAY OF BLOOD/URIC ACID: CPT | Performed by: INTERNAL MEDICINE

## 2023-04-14 PROCEDURE — 63710000001 PREDNISONE PER 1 MG: Performed by: INTERNAL MEDICINE

## 2023-04-14 PROCEDURE — 80053 COMPREHEN METABOLIC PANEL: CPT | Performed by: STUDENT IN AN ORGANIZED HEALTH CARE EDUCATION/TRAINING PROGRAM

## 2023-04-14 PROCEDURE — 84100 ASSAY OF PHOSPHORUS: CPT | Performed by: INTERNAL MEDICINE

## 2023-04-14 PROCEDURE — 97110 THERAPEUTIC EXERCISES: CPT

## 2023-04-14 PROCEDURE — 83735 ASSAY OF MAGNESIUM: CPT | Performed by: NURSE PRACTITIONER

## 2023-04-14 RX ADMIN — LEVOTHYROXINE SODIUM 88 MCG: 0.09 TABLET ORAL at 11:47

## 2023-04-14 RX ADMIN — PREDNISONE 20 MG: 20 TABLET ORAL at 09:12

## 2023-04-14 RX ADMIN — SULFAMETHOXAZOLE AND TRIMETHOPRIM 1 TABLET: 800; 160 TABLET ORAL at 09:12

## 2023-04-14 RX ADMIN — APIXABAN 5 MG: 5 TABLET, FILM COATED ORAL at 21:58

## 2023-04-14 RX ADMIN — FLUTICASONE PROPIONATE 2 SPRAY: 50 SPRAY, METERED NASAL at 11:47

## 2023-04-14 RX ADMIN — TORSEMIDE 40 MG: 20 TABLET ORAL at 09:12

## 2023-04-14 RX ADMIN — LATANOPROST 1 DROP: 50 SOLUTION OPHTHALMIC at 21:58

## 2023-04-14 RX ADMIN — APIXABAN 5 MG: 5 TABLET, FILM COATED ORAL at 09:13

## 2023-04-14 RX ADMIN — POLYETHYLENE GLYCOL 3350 17 G: 17 POWDER, FOR SOLUTION ORAL at 09:13

## 2023-04-14 RX ADMIN — MIDODRINE HYDROCHLORIDE 2.5 MG: 2.5 TABLET ORAL at 09:13

## 2023-04-14 RX ADMIN — MULTIPLE VITAMINS W/ MINERALS TAB 1 TABLET: TAB at 09:12

## 2023-04-14 RX ADMIN — MIDODRINE HYDROCHLORIDE 2.5 MG: 2.5 TABLET ORAL at 17:09

## 2023-04-14 NOTE — THERAPY PROGRESS REPORT/RE-CERT
Patient Name: Bridger Elias  : 1930    MRN: [id number]                              Today's Date: 2023       Admit Date: 3/30/2023    Visit Dx:     ICD-10-CM ICD-9-CM   1. Community acquired pneumonia, unspecified laterality  J18.9 486     Patient Active Problem List   Diagnosis   • SCC (squamous cell carcinoma), face   • General weakness   • Pacemaker   • Abnormal gait   • Atrial fibrillation   • Chronic diastolic heart failure   • Coronary arteriosclerosis   • Dyslipidemia   • Glaucoma   • Mitral valve disorder   • Hypertension   • Hypertensive kidney disease, stage III   • Hypothyroidism   • Long term (current) use of anticoagulants   • Malignant neoplasm of prostate   • Osteoporosis   • Squamous cell carcinoma of skin of face   • Acute on chronic diastolic CHF (congestive heart failure)   • Stage 3a chronic kidney disease   • Personal history of radiation therapy   • Acute on chronic combined systolic and diastolic CHF (congestive heart failure)   • Cellulitis of right leg   • Chronic acquired lymphedema   • Contusion of face   • Contusion of forearm, left   • Fall   • Chronic systolic heart failure (CMS/HCC)   • Acute respiratory failure with hypoxia   • Hyponatremia   • Normocytic anemia   • Transaminitis     Past Medical History:   Diagnosis Date   • Acute on chronic diastolic CHF (congestive heart failure) 2022   • Atrial fibrillation 2022   • Coronary arteriosclerosis 7/3/2014    Formatting of this note might be different from the original. [facility] 16  IMPRESSION:   1. Right heart disease, hypertensive cardiac disease.   2. Status post mitral valve repair.   3. Anatomy: No hemodynamically significant disease. about 30-40% lesion of    the right coronary artery. Normal. Left coronary artery system.   • Dyslipidemia 2013   • Glaucoma 2022   • Hypertension 2013   • Hypertensive kidney disease, stage III 3/13/2017   • Hypothyroidism 3/19/2017   • Long term (current) use  of anticoagulants 12/5/2013    Formatting of this note might be different from the original. Mitral valve replacement and atrial fibrillation Assume a range of 2.5-3.5.   • Malignant neoplasm of prostate 12/5/2013    Formatting of this note might be different from the original. 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   • Stage 3b chronic kidney disease 4/14/2022     Past Surgical History:   Procedure Laterality Date   • PACEMAKER IMPLANTATION  2018      General Information     Row Name 04/14/23 1100          Physical Therapy Time and Intention    Document Type progress note/recertification;therapy note (daily note)  -AR     Mode of Treatment physical therapy  -AR     Row Name 04/14/23 1100          General Information    Patient Profile Reviewed yes  -AR     Existing Precautions/Restrictions fall  -AR     Row Name 04/14/23 1100          Cognition    Orientation Status (Cognition) oriented x 3  -AR     Row Name 04/14/23 1100          Safety Issues, Functional Mobility    Impairments Affecting Function (Mobility) balance;endurance/activity tolerance;strength  -AR           User Key  (r) = Recorded By, (t) = Taken By, (c) = Cosigned By    Initials Name Provider Type    AR Brandee Reeves PT Physical Therapist               Mobility     Row Name 04/14/23 1100          Bed Mobility    Bed Mobility supine-sit  -AR     Supine-Sit [place] (Bed Mobility) standby assist  -AR     Sit-Supine [place] (Bed Mobility) minimum assist (75% patient effort)  -AR     Assistive Device (Bed Mobility) bed rails;head of bed elevated  -AR     Comment, (Bed Mobility) supine<>sit  twice due decreased responsiveness then BP fine so got up again to chair  -AR     Row Name 04/14/23 1100          Sit-Stand Transfer     Sit-Stand [place] (Transfers) contact guard  -AR     Assistive Device (Sit-Stand Transfers) walker, front-wheeled  -AR     Comment, (Sit-Stand Transfer) cues for UE placement  -AR     Row Name 04/14/23 1100          Gait/Stairs (Locomotion)    [place] Level (Gait) minimum assist (75% patient effort);maximum assist (25% patient effort)  -AR     Assistive Device (Gait) walker, front-wheeled  -AR     Distance in Feet (Gait) 5' increased fatigue/weakness today.  at 5' decreased balance, repsonsiveness, max A to safely get to bed.  -AR     Deviations/Abnormal Patterns (Gait) gait speed decreased;festinating/shuffling;stride length decreased  -AR     Bilateral Gait Deviations forward flexed posture;heel strike decreased  -AR           User Key  (r) = Recorded By, (t) = Taken By, (c) = Cosigned By    Initials Name Provider Type    AR Brandee Reeves, PT Physical Therapist               Obj/Interventions     Row Name 04/14/23 1102          Strength Comprehensive (MMT)    Comment, General Manual Muscle Testing (MMT) Assessment B LE 4/5  -AR     Row Name 04/14/23 1102          Motor Skills    Therapeutic Exercise --  B Ap, LAQ 10x  -AR     Row Name 04/14/23 1102          Balance    Dynamic Standing Balance minimal assist  -AR     Position/Device Used, Standing Balance walker, rolling  -AR           User Key  (r) = Recorded By, (t) = Taken By, (c) = Cosigned By    Initials Name Provider Type    AR Brandee Reeves, PT Physical Therapist               Goals/Plan     Row Name 04/14/23 1107          Bed Mobility Goal 1 (PT)    Activity/Assistive Device (Bed Mobility Goal 1, PT) sit to supine;supine to sit  -AR     [place] Level/Cues Needed (Bed Mobility Goal 1, PT) modified independence  -AR     Time Frame (Bed Mobility Goal 1, PT) 2 weeks  -AR     Row Name 04/14/23 1107          Transfer Goal 1 (PT)    Activity/Assistive Device (Transfer Goal 1, PT) sit-to-stand/stand-to-sit;walker, rolling  -AR     [place]  Level/Cues Needed (Transfer Goal 1, PT) verbal cues required;standby assist  -AR     Time Frame (Transfer Goal 1, PT) 10 days  -AR     Progress/Outcome (Transfer Goal 1, PT) goal revised this date  -AR     Row Name 04/14/23 1107          Gait Training Goal 1 (PT)    Activity/Assistive Device (Gait Training Goal 1, PT) gait (walking locomotion);walker, rolling  -AR     [place] Level (Gait Training Goal 1, PT) standby assist  -AR     Distance (Gait Training Goal 1, PT) 200  -AR     Time Frame (Gait Training Goal 1, PT) 2 weeks  -AR     Progress/Outcome (Gait Training Goal 1, PT) goal revised this date  -AR     Row Name 04/14/23 1107          ROM Goal 1 (PT)    Progress/Outcome (ROM Goal 1, PT) goal revised this date  -AR           User Key  (r) = Recorded By, (t) = Taken By, (c) = Cosigned By    Initials Name Provider Type    AR Brandee Reeves, PT Physical Therapist               Clinical Impression     Row Name 04/14/23 1103          Pain    Pain Intervention(s) Repositioned  -AR     Row Name 04/14/23 1103          Plan of Care Review    Outcome Evaluation Declined activity tolerance during PT today.  Able to ambulate 5' w/ CGA using RW, decreased responsiveness, and strength requiring max A to safely sit on EOB.   Once supine, BP stable.  Attempted again to sit up, able to take few steps bed>chair w/ CGA using RW. RN aware.  Recommend DC to SNU.  -AR     Row Name 04/14/23 1103          Therapy Assessment/Plan (PT)    Rehab Potential (PT) good, to achieve stated therapy goals  -AR     Therapy Frequency (PT) 5 times/wk  -AR     Row Name 04/14/23 1103          Vital Signs    Pre Systolic BP Rehab 107  -AR     Post Systolic BP Rehab 144  -AR     O2 Delivery Pre Treatment room air  -AR     Row Name 04/14/23 1103          Positioning and Restraints    Pre-Treatment Position in bed  -AR     Post Treatment Position chair  -AR     In Chair notified nsg;reclined;sitting;call light within reach;encouraged to call for  assist;exit alarm on;with nsg  -AR           User Key  (r) = Recorded By, (t) = Taken By, (c) = Cosigned By    Initials Name Provider Type    Brandee Merlos PT Physical Therapist               Outcome Measures     Row Name 04/14/23 1108 04/14/23 1107       How much help from another person do you currently need...    Turning from your back to your side while in flat bed without using bedrails? 3  -AR 3  -AR    Moving from lying on back to sitting on the side of a flat bed without bedrails? 3  -AR 3  -AR    Moving to and from a bed to a chair (including a wheelchair)? 3  -AR 3  -AR    Standing up from a chair using your arms (e.g., wheelchair, bedside chair)? 3  -AR 3  -AR    Climbing 3-5 steps with a railing? 2  -AR 2  -AR    To walk in hospital room? 2  -AR 2  -AR    AM-PAC 6 Clicks Score (PT) 16  -AR 16  -AR    Highest level of mobility 5 --> Static standing  -AR 5 --> Static standing  -AR    Row Name 04/14/23 0914          How much help from another person do you currently need...    Turning from your back to your side while in flat bed without using bedrails? 3  -IR     Moving from lying on back to sitting on the side of a flat bed without bedrails? 3  -IR     Moving to and from a bed to a chair (including a wheelchair)? 3  -IR     Standing up from a chair using your arms (e.g., wheelchair, bedside chair)? 3  -IR     Climbing 3-5 steps with a railing? 2  -IR     To walk in hospital room? 3  -IR     AM-PAC 6 Clicks Score (PT) 17  -IR     Highest level of mobility 5 --> Static standing  -IR     Row Name 04/14/23 1108 04/14/23 1107       Functional Assessment    Outcome Measure Options AM-PAC 6 Clicks Basic Mobility (PT)  -AR AM-PAC 6 Clicks Basic Mobility (PT)  -AR          User Key  (r) = Recorded By, (t) = Taken By, (c) = Cosigned By    Initials Name Provider Type    Brandee Merlos PT Physical Therapist    IR Ligia Bach RN Registered Nurse                             Physical Therapy Education      Title: PT OT SLP Therapies (In Progress)     Topic: Physical Therapy (In Progress)     Point: Mobility training (In Progress)     Learning Progress Summary           Patient Acceptance, E, NR by AR at 4/14/2023 1107    Acceptance, E,TB,D, VU,DU,NR by LD at 4/12/2023 1406    Acceptance, E,D,TB, VU,DU by PH at 4/11/2023 1025    Acceptance, E,D,TB, VU,DU by PH at 4/10/2023 0907    Acceptance, E,TB, VU,DU by CS at 4/7/2023 1437    Acceptance, E, VU by EB at 4/6/2023 1552    Acceptance, E, VU,NR by EB at 4/5/2023 1617    Acceptance, E, VU by EB at 4/4/2023 1545    Acceptance, E,D, VU,NR by EB at 4/3/2023 1603    Acceptance, E, VU,NR by DJ at 3/31/2023 1633                   Point: Home exercise program (In Progress)     Learning Progress Summary           Patient Acceptance, E, NR by AR at 4/14/2023 1107    Acceptance, E,TB,D, VU,DU,NR by LD at 4/12/2023 1406    Acceptance, E,D,TB, VU,DU by PH at 4/11/2023 1025    Acceptance, E,D,TB, VU,DU by PH at 4/10/2023 0907    Acceptance, E,TB, VU,DU by CS at 4/7/2023 1437    Acceptance, E, VU by EB at 4/6/2023 1552    Acceptance, E, VU,NR by EB at 4/5/2023 1617                   Point: Body mechanics (In Progress)     Learning Progress Summary           Patient Acceptance, E, NR by AR at 4/14/2023 1107    Acceptance, E,TB,D, VU,DU,NR by LD at 4/12/2023 1406    Acceptance, E,D,TB, VU,DU by PH at 4/11/2023 1025    Acceptance, E,D,TB, VU,DU by PH at 4/10/2023 0907    Acceptance, E,TB, VU,DU by CS at 4/7/2023 1437    Acceptance, E, VU by EB at 4/6/2023 1552    Acceptance, E, VU,NR by EB at 4/5/2023 1617    Acceptance, E, VU by EB at 4/4/2023 1545    Acceptance, E,D, VU,NR by EB at 4/3/2023 1603    Acceptance, E, VU,NR by DJ at 3/31/2023 1633                   Point: Precautions (In Progress)     Learning Progress Summary           Patient Acceptance, E, NR by AR at 4/14/2023 1107    Acceptance, E,TB,D, VU,DU,NR by LD at 4/12/2023 1406    Acceptance, E,D,TB, VU,DU by PH at  4/11/2023 1025    Acceptance, E,D,TB, VU,DU by PH at 4/10/2023 0907    Acceptance, E,TB, VU,DU by CS at 4/7/2023 1437    Acceptance, E, VU by EB at 4/6/2023 1552    Acceptance, E, VU,NR by EB at 4/5/2023 1617    Acceptance, E, VU by EB at 4/4/2023 1545    Acceptance, E,D, VU,NR by EB at 4/3/2023 1603    Acceptance, E, VU,NR by DJ at 3/31/2023 1633                               User Key     Initials Effective Dates Name Provider Type Discipline    AR 06/16/21 -  Brandee Reeves, PT Physical Therapist PT    EB 02/14/23 -  Noa Garcia, PTA Physical Therapist Assistant PT    PH 06/16/21 -  Nolvia Dobson, PTA Physical Therapist Assistant PT    DJ 10/25/19 -  Latosha Sanders, PT Physical Therapist PT    CS 09/22/22 -  Josefina Santiago, PT Physical Therapist PT    LD 04/06/23 -  Nena Acosta, PT Student PT Student PT              PT Recommendation and Plan     Outcome Evaluation: Declined activity tolerance during PT today.  Able to ambulate 5' w/ CGA using RW, decreased responsiveness, and strength requiring max A to safely sit on EOB.   Once supine, BP stable.  Attempted again to sit up, able to take few steps bed>chair w/ CGA using RW. RN aware.  Recommend DC to SNU.     Time Calculation:    PT Charges     Row Name 04/14/23 1059             Time Calculation    Start Time 0830  -AR      Stop Time 0856  -AR      Time Calculation (min) 26 min  -AR      PT Received On 04/14/23  -AR      PT - Next Appointment 04/17/23  -AR      PT Goal Re-Cert Due Date 04/21/23  -AR            User Key  (r) = Recorded By, (t) = Taken By, (c) = Cosigned By    Initials Name Provider Type    AR Brandee Reeves, PT Physical Therapist              Therapy Charges for Today     Code Description Service Date Service Provider Modifiers Qty    09693944974 HC PT THER PROC EA 15 MIN 4/14/2023 Brandee Reeves, PT GP 1    52759962725 HC PT THERAPEUTIC ACT EA 15 MIN 4/14/2023 Brandee Reeves, PT GP 1          PT G-Codes  Outcome Measure  Options: AM-PAC 6 Clicks Basic Mobility (PT)  AM-PAC 6 Clicks Score (PT): 16  AM-PAC 6 Clicks Score (OT): 17  PT Discharge Summary  Anticipated Discharge Disposition (PT): skilled nursing facility    Brandee Reeves PT  4/14/2023

## 2023-04-14 NOTE — PROGRESS NOTES
[facility] Progress Note      Patient Name: Bridger Elias  : 1930  MRN: [id number]  Primary Care Physician:  Alma Cat MD  Date of admission: 3/30/2023    Subjective     Interval History:   Follow up Yanira on CKD III.  Seen and examined.  Sitting up in chair.  No shortness of air or chest pain    Review of Systems:   As noted above    Objective     Vitals:   Temp:  [96.5 °F (35.8 °C)-98.3 °F (36.8 °C)] 96.7 °F (35.9 °C)  Heart Rate:  [74-76] 74  Resp:  [18] 18  BP: (107-128)/(59-83) 107/59    Intake/Output Summary (Last 24 hours) at 2023 0945  Last data filed at 2023 0847  Gross per 24 hour   Intake --   Output 900 ml   Net -900 ml       Physical Exam:    General Appearance: alert, oriented x 3, no acute distress . Up in chair.   Skin: warm and dry  HEENT: oral mucosa normal, nonicteric sclera. Voice weak  Neck: supple, no JVD  Lungs: Clear to auscultation.   Heart: RRR, normal S1 and S2  Abdomen: soft, nontender, nondistended. + bs  Extremities: trace lower ext edema.   Neuro: normal speech and mental status     Scheduled Meds:     apixaban, 5 mg, Oral, Q12H  fluticasone, 2 spray, Each Nare, Daily  latanoprost, 1 drop, Both Eyes, Daily  levothyroxine, 88 mcg, Oral, Daily  midodrine, 2.5 mg, Oral, TID AC  multivitamin with minerals, 1 tablet, Oral, Daily  polyethylene glycol, 17 g, Oral, Daily  predniSONE, 20 mg, Oral, Daily  sulfamethoxazole-trimethoprim, 1 tablet, Oral, Once per day on   torsemide, 40 mg, Oral, Daily      IV Meds:        Results Reviewed:   I have personally reviewed the results from the time of this admission to 2023 09:45 EDT     Results from last 7 days   Lab Units 23  0352 23  0339 23  0442   SODIUM mmol/L 137 134* 137   POTASSIUM mmol/L 4.2 4.6 4.1   CHLORIDE mmol/L 96* 93* 97*   CO2 mmol/L 33.0* 34.4* 31.0*   BUN mg/dL 50* 44* 46*   CREATININE mg/dL 1.73* 1.70* 1.51*   CALCIUM mg/dL 8.9 9.3 8.7    BILIRUBIN mg/dL 0.7 0.7 0.7   ALK PHOS U/L 227* 233* 234*   ALT (SGPT) U/L 134* 155* 161*   AST (SGOT) U/L 93* 107* 144*   GLUCOSE mg/dL 103* 123* 113*       Estimated Creatinine Clearance: 24.8 mL/min (A) (by C-G formula based on SCr of 1.73 mg/dL (H)).    Results from last 7 days   Lab Units 04/14/23  0352 04/12/23  0442 04/10/23  0433 04/09/23  0403   MAGNESIUM mg/dL  --  2.7* 2.5* 2.4*   PHOSPHORUS mg/dL 4.4 3.0 2.8 2.6       Results from last 7 days   Lab Units 04/14/23  0352 04/10/23  0433 04/09/23  0403 04/08/23  0640   URIC ACID mg/dL 6.7 6.2 6.1 6.1       Results from last 7 days   Lab Units 04/14/23  0352 04/13/23  0339 04/12/23 0442 04/11/23  0428 04/10/23  0433   WBC 10*3/mm3 8.72 9.05 10.91* 9.97 8.50   HEMOGLOBIN g/dL 12.9* 12.6* 12.0* 11.5* 10.8*   PLATELETS 10*3/mm3 251 258 258 230 210             Assessment / Plan     ASSESSMENT:  1. MONISHA on CKD 3.  Baseline creatinine 1.2-1.4. Cardiorenal syndrome . Azotemia on steroid. Bactrim can also reduce secretion of creatinine.  Euvolemic by exam. I think that his baseline will be higher on this dose of diuretic even without bactrim in order to keep out of heart failure.  Had been on every other day dosing prior to coming in.   2 . Bilateral pulmonary infiltrates. Acute hypoxic respiratory failure. Possible ILD versus organizing PNA.  On steroid with clinical improvement.   3. Acute on chronic combined heart failure. Po diuretic  4. SSS, PAF.  5. SP MV repair .  6. Transaminitis. Casodex dc.   planning to check PSA and testoterone at 1 and 3 months.  Slowly improving.   7. Hypotension.  Added midodrine. BP a little labile.  On  2.5 mg tid.  8. Prostate cancer.   to follow up.  9. Hypothyroid on replacement.     PLAN:  1. Continue current diuretic.    2. Has followup with me May 16 at 11:45 am.         Kiarra Iraheta MD  04/14/23  09:45 EDT    [facility]  [phone]

## 2023-04-14 NOTE — PLAN OF CARE
Goal Outcome Evaluation:  Plan of Care Reviewed With: patient        Progress: improving  Outcome Evaluation: Pt AOX4. Had an episode this shift where pt was assisted to bathroom. Upon arriving to bathroom, pt sat on toilet and had leaned forward far. Pt directed to sit up and was assisted to sit up but would not respond or even look at daughter or nurse. Pt continued to grunt but was not responding. Multiple staff arrived at bedside, including Dr. Wen. Pt began to respond after about 3 minutes. Pt was talking with staff and was able to say where he was. He did not believe he had become unresponsive. BP taken upon immediately getting back in bed- 130/81. HR remained in the 70s. Ambulated well with assist x1 with use of walker. Being kept overnight for more monitoring. VSS. Safety maintained.

## 2023-04-14 NOTE — PLAN OF CARE
Goal Outcome Evaluation:              Outcome Evaluation: Declined activity tolerance during PT today.  Able to ambulate 5' w/ CGA using RW, decreased responsiveness, and strength requiring max A to safely sit on EOB.   Once supine, BP stable.  Attempted again to sit up, able to take few steps bed>chair w/ CGA using RW. RN aware.  Recommend DC to SNU.

## 2023-04-14 NOTE — CASE MANAGEMENT/SOCIAL WORK
Continued Stay Note  [facility]     Patient Name: Bridger Elias  MRN: [id number]  Today's Date: 4/14/2023    Admit Date: 3/30/2023    Plan: [facility] precert approved - bed available tomorrow   Discharge Plan     Row Name 04/14/23 1653       Plan    Plan [facility] precert approved - bed available tonight or tomorrow    Plan Comments Incoming call from See who states the [facility] precert has been approved. MD might dc tomorrow instead due to patient having incident of vagaling. Patient's daughter is speaking with brother regarding dc plan and will contact MD. Per See, they have a bed for her tonight or tomorrow. Packet in cubby. Family to transport. LUIZ MAN    Row Name 04/14/23 0268       Plan    Plan [facility] pending [facility] precert initiated 4/10 - family to transport    Plan Comments CCP spoke with Claudia who states [facility] precert is still pending. CCP spoke with Carleen ([phone]) who states she is going to push precert to the front of their list. CCP again called Carleen who states the precert has been pushed to the medical director for review. CCP requested Carleen have the medical director call CCP in order to notify her of new PT information (patient walking 5ft, need max assist). Awaiting call from [facility] Medical director. Awaiting precert determination. LUIZ MAN               Discharge Codes    No documentation.               Expected Discharge Date and Time     Expected Discharge Date Expected Discharge Time    Apr 14, 2023             LUIZ Husain

## 2023-04-14 NOTE — PROGRESS NOTES
Name: Bridger Elias ADMIT: 3/30/2023   : 1930  PCP: Alma Cat MD    MRN: [id number] LOS: 14 days   AGE/SEX: 89 y.o. male  ROOM: [facility]     Subjective   Subjective   Feels tired today, up in his recliner, accompanied by daughter. Later in the day while attempting to use the toilet the patient lost consciousness. RN x2 were with him, reported his arm shook and he was unresponsive for 20-30 seconds but then was able to talk and knew where he was.        Objective   Objective   Vital Signs  Temp:  [96.5 °F (35.8 °C)-97.8 °F (36.6 °C)] 96.9 °F (36.1 °C)  Heart Rate:  [74-76] 74  Resp:  [18-20] 20  BP: (107-135)/(59-85) 121/69  SpO2:  [93 %-97 %] 93 %  on  Flow (L/min):  [2] 2;   Device (Oxygen Therapy): nasal cannula  Body mass index is 22.68 kg/m².  Physical Exam  Constitutional:       Appearance: Normal appearance. He is ill-appearing.      Comments: Elderly, frail   HENT:      Head: Normocephalic and atraumatic.   Cardiovascular:      Rate and Rhythm: Normal rate and regular rhythm.   Pulmonary:      Effort: Pulmonary effort is normal.      Breath sounds: Normal breath sounds.   Skin:     General: Skin is warm and dry.      Coloration: Skin is pale.   Neurological:      Mental Status: He is alert.   Psychiatric:         Mood and Affect: Mood normal.         Behavior: Behavior normal.         Results Review     I reviewed the patient's new clinical results.  Results from last 7 days   Lab Units 23  0352 23   WBC 10*3/mm3 8.72 9.05 10.91* 9.97   HEMOGLOBIN g/dL 12.9* 12.6* 12.0* 11.5*   PLATELETS 10*3/mm3 251 258 258 230     Results from last 7 days   Lab Units 23  0352 23  0339 23  0428   SODIUM mmol/L 137 134* 137 138   POTASSIUM mmol/L 4.2 4.6 4.1 4.5   CHLORIDE mmol/L 96* 93* 97* 95*   CO2 mmol/L 33.0* 34.4* 31.0* 32.5*   BUN mg/dL 50* 44* 46* 48*   CREATININE mg/dL 1.73* 1.70* 1.51* 1.36*   GLUCOSE mg/dL 103*  123* 113* 139*   Estimated Creatinine Clearance: 24.8 mL/min (A) (by C-G formula based on SCr of 1.73 mg/dL (H)).  Results from last 7 days   Lab Units 04/14/23  0352 04/13/23  0339 04/12/23 0442 04/11/23  0428   ALBUMIN g/dL 3.3* 3.4* 3.1* 3.2*   BILIRUBIN mg/dL 0.7 0.7 0.7 0.7   ALK PHOS U/L 227* 233* 234* 241*   AST (SGOT) U/L 93* 107* 144* 213*   ALT (SGPT) U/L 134* 155* 161* 195*     Results from last 7 days   Lab Units 04/14/23  1052 04/14/23  0352 04/13/23 0339 04/12/23 0442 04/11/23  0428 04/10/23  0433 04/09/23  0403   CALCIUM mg/dL  --  8.9 9.3 8.7 8.9 8.5 8.3   ALBUMIN g/dL  --  3.3* 3.4* 3.1* 3.2* 2.8* 3.0*   MAGNESIUM mg/dL 2.9*  --   --  2.7*  --  2.5* 2.4*   PHOSPHORUS mg/dL  --  4.4  --  3.0  --  2.8 2.6       COVID19   Date Value Ref Range Status   03/30/2023 Not Detected Not Detected - Ref. Range Final     SARS-CoV-2, WILNER   Date Value Ref Range Status   09/04/2022 NEGATIVE Negative Final     No results found for: HGBA1C, POCGLU    XR Chest 1 View  Narrative: Patient: AILYN DUQUE  Time Out: 09:31  Exam(s): XR CXR 1 VIEW     EXAM:    XR Chest, 1 View    CLINICAL HISTORY:     Reason for exam: Follow-up infiltrates.    TECHNIQUE:    Frontal view of the chest.    COMPARISON:    No relevant prior studies available.    FINDINGS:    Lungs:  Multifocal consolidations most prominent in the bilateral bases.      Pleural space:  Unremarkable.  No pneumothorax.    Heart: Mild cardiomegaly.  You centimeter wires and valvular prosthesis   in place.  Left chest pacer in place.    Mediastinum:  Unremarkable.    Bones joints:  Unremarkable.    IMPRESSION:       Multifocal consolidations most prominent in the bilateral bases.  Impression: Electronically signed by Justus Goldberg M.D. on 04-10-23 at 0931    Scheduled Medications  apixaban, 5 mg, Oral, Q12H  fluticasone, 2 spray, Each Nare, Daily  latanoprost, 1 drop, Both Eyes, Daily  levothyroxine, 88 mcg, Oral, Daily  midodrine, 2.5 mg, Oral, TID  AC  multivitamin with minerals, 1 tablet, Oral, Daily  polyethylene glycol, 17 g, Oral, Daily  predniSONE, 20 mg, Oral, Daily  sulfamethoxazole-trimethoprim, 1 tablet, Oral, Once per day on Mon Wed Fri  torsemide, 40 mg, Oral, Daily    Infusions   Diet  Diet: Fluid Restriction (240 mL/tray) Diets; 1500 mL/day; No Mixed Consistencies; Texture: Soft to Chew (NDD 3); Soft to Chew: Chopped Meat; Fluid Consistency: Thin (IDDSI 0)         I have personally reviewed:  [x]  Laboratory   []  Microbiology   []  Radiology   [x]  EKG/Telemetry   []  Cardiology/Vascular   []  Pathology   []  Records    Assessment/Plan     Active Hospital Problems    Diagnosis  POA   • **Acute respiratory failure with hypoxia [J96.01]  Yes   • Hyponatremia [E87.1]  Yes   • Normocytic anemia [D64.9]  Yes   • Transaminitis [R74.01]  Yes   • Stage 3a chronic kidney disease [N18.31]  Yes   • Pacemaker [Z95.0]  Yes   • Atrial fibrillation [I48.91]  Yes   • Acute on chronic combined systolic and diastolic CHF (congestive heart failure) [I50.43]  Yes   • SCC (squamous cell carcinoma), face [C44.320]  Yes   • Hypothyroidism [E03.9]  Yes   • Coronary arteriosclerosis [I25.10]  Yes   • Dyslipidemia [E78.5]  Yes   • Hypertension [I10]  Yes   • Long term (current) use of anticoagulants [Z79.01]  Not Applicable      Resolved Hospital Problems    Diagnosis Date Resolved POA   • Hypokalemia [E87.6] 04/05/2023 No   • Community acquired pneumonia, unspecified laterality [J18.9] 04/03/2023 Yes       89 y.o. male admitted with Acute respiratory failure with hypoxia.    4/14  Patient with vasovagal/convulsive syncope (arm shook) today while using toilet  Patient returned to his baseline mental status within 1 to 2 minutes, RN was present with patient the whole time.  Pre-CERT was obtained however the patient's family felt uncomfortable with him going and would like him observed tonight which is reasonable.  Plan for discharge to SNF tomorrow if no further  incidents.    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  -CXR w/ b/l effusions, congestion  -TTE (4/1/23): 41-45%; systolic and diastolic flattening of IV septum consistent with RV pressure/volume overload  -Renal managing diuretics      Bilateral pulmonary infiltrates  - possible ILD vs organizing pneumonia; clinical improvement on steroids  -Has follow-up with pulmonology arranged in 4 to 6 weeks,  -Continue with steroids (started on bactrim for pneumonia prophylaxis)    Hyponatremia- resolving  -improved with diuresis; renal following     Paroxysmal atrial fibrillation on long-term anticoagulation  SSS w/ h/o PPM  -RC: None  -AC: Apixaban     CKD3a  -Crt near baseline; monitor  - f/u outpatient arranged with Dr. Iraheta 5/16/23       Transaminitis  - Casodex stopped, discussed with patient's urologist Dr. Petty, hold Casodex at discharge, he will follow-up with him in clinic and obtain a PSA and testosterone at 1 and 3 months  -LFTs improving     Hypotension  -midodrine     Hypothyroidism  -Synthroid 88 mcg     Hyperlipidemia  -Rosuvastatin 10 mg     Generalized weakness/Debility  - Consulted PT/OT, fall precautions.      Marion Wen MD  [facility]  04/14/23  17:44 EDT    I wore protective equipment throughout this patient encounter including gloves and protective eyewear.  Hand hygiene was performed before donning protective equipment and after removal when leaving the room.    Expected Discharge Date and Time     Expected Discharge Date Expected Discharge Time    Apr 14, 2023

## 2023-04-14 NOTE — CASE MANAGEMENT/SOCIAL WORK
Continued Stay Note  [facility]     Patient Name: Bridger Elias  MRN: [id number]  Today's Date: 4/14/2023    Admit Date: 3/30/2023    Plan: [facility] pending [employer] [facility] precert initiated 4/10 - family to transport   Discharge Plan     Row Name 04/14/23 1602       Plan    Plan [facility] pending [employer] [facility] precert initiated 4/10 - family to transport    Plan Comments CCP spoke with Valencia/Violet who states [employer] precert is still pending. CCP spoke with Carleen ([phone]) who states she is going to push precert to the front of their list. [facility] again called Carleen who states the precert has been pushed to the medical director for review. CCP requested Carleen have the medical director call CCP in order to notify her of new PT information (patient walking 5ft, need max assist). Awaiting call from [facility] Medical director. Awaiting precert determination. LUIZ MAN               Discharge Codes    No documentation.               Expected Discharge Date and Time     Expected Discharge Date Expected Discharge Time    Apr 14, 2023             LUIZ Husain

## 2023-04-15 LAB
ALBUMIN SERPL-MCNC: 3.3 G/DL (ref 3.5–5.2)
ALBUMIN/GLOB SERPL: 0.9 G/DL
ALP SERPL-CCNC: 199 U/L (ref 39–117)
ALT SERPL W P-5'-P-CCNC: 107 U/L (ref 1–41)
ANION GAP SERPL CALCULATED.3IONS-SCNC: 7.8 MMOL/L (ref 5–15)
AST SERPL-CCNC: 59 U/L (ref 1–40)
BILIRUB SERPL-MCNC: 0.7 MG/DL (ref 0–1.2)
BUN SERPL-MCNC: 55 MG/DL (ref 8–23)
BUN/CREAT SERPL: 27.5 (ref 7–25)
CALCIUM SPEC-SCNC: 9.8 MG/DL (ref 8.2–9.6)
CHLORIDE SERPL-SCNC: 96 MMOL/L (ref 98–107)
CO2 SERPL-SCNC: 33.2 MMOL/L (ref 22–29)
CREAT SERPL-MCNC: 2 MG/DL (ref 0.76–1.27)
DEPRECATED RDW RBC AUTO: 48.3 FL (ref 37–54)
EGFRCR SERPLBLD CKD-EPI 2021: 30.5 ML/MIN/1.73
ERYTHROCYTE [DISTWIDTH] IN BLOOD BY AUTOMATED COUNT: 14.4 % (ref 12.3–15.4)
GLOBULIN UR ELPH-MCNC: 3.8 GM/DL
GLUCOSE SERPL-MCNC: 124 MG/DL (ref 65–99)
HCT VFR BLD AUTO: 35.2 % (ref 37.5–51)
HGB BLD-MCNC: 12.7 G/DL (ref 13–17.7)
MCH RBC QN AUTO: 33.3 PG (ref 26.6–33)
MCHC RBC AUTO-ENTMCNC: 36.1 G/DL (ref 31.5–35.7)
MCV RBC AUTO: 92.4 FL (ref 79–97)
PLATELET # BLD AUTO: 220 10*3/MM3 (ref 140–450)
PMV BLD AUTO: 10.4 FL (ref 6–12)
POTASSIUM SERPL-SCNC: 4.3 MMOL/L (ref 3.5–5.2)
PROT SERPL-MCNC: 7.1 G/DL (ref 6–8.5)
RBC # BLD AUTO: 3.81 10*6/MM3 (ref 4.14–5.8)
SODIUM SERPL-SCNC: 137 MMOL/L (ref 136–145)
WBC NRBC COR # BLD: 8.55 10*3/MM3 (ref 3.4–10.8)

## 2023-04-15 PROCEDURE — 80053 COMPREHEN METABOLIC PANEL: CPT | Performed by: STUDENT IN AN ORGANIZED HEALTH CARE EDUCATION/TRAINING PROGRAM

## 2023-04-15 PROCEDURE — 63710000001 PREDNISONE PER 1 MG: Performed by: INTERNAL MEDICINE

## 2023-04-15 PROCEDURE — 85027 COMPLETE CBC AUTOMATED: CPT | Performed by: STUDENT IN AN ORGANIZED HEALTH CARE EDUCATION/TRAINING PROGRAM

## 2023-04-15 RX ORDER — SODIUM CHLORIDE 9 MG/ML
75 INJECTION, SOLUTION INTRAVENOUS ONCE
Status: COMPLETED | OUTPATIENT
Start: 2023-04-15 | End: 2023-04-15

## 2023-04-15 RX ADMIN — TORSEMIDE 40 MG: 20 TABLET ORAL at 09:42

## 2023-04-15 RX ADMIN — LATANOPROST 1 DROP: 50 SOLUTION OPHTHALMIC at 21:17

## 2023-04-15 RX ADMIN — APIXABAN 5 MG: 5 TABLET, FILM COATED ORAL at 09:42

## 2023-04-15 RX ADMIN — POLYETHYLENE GLYCOL 3350 17 G: 17 POWDER, FOR SOLUTION ORAL at 09:42

## 2023-04-15 RX ADMIN — MULTIPLE VITAMINS W/ MINERALS TAB 1 TABLET: TAB at 09:42

## 2023-04-15 RX ADMIN — MIDODRINE HYDROCHLORIDE 2.5 MG: 2.5 TABLET ORAL at 09:42

## 2023-04-15 RX ADMIN — MIDODRINE HYDROCHLORIDE 2.5 MG: 2.5 TABLET ORAL at 16:54

## 2023-04-15 RX ADMIN — PREDNISONE 20 MG: 20 TABLET ORAL at 09:42

## 2023-04-15 RX ADMIN — SODIUM CHLORIDE 75 ML/HR: 9 INJECTION, SOLUTION INTRAVENOUS at 11:02

## 2023-04-15 RX ADMIN — APIXABAN 5 MG: 5 TABLET, FILM COATED ORAL at 21:17

## 2023-04-15 RX ADMIN — FLUTICASONE PROPIONATE 2 SPRAY: 50 SPRAY, METERED NASAL at 09:41

## 2023-04-15 RX ADMIN — LEVOTHYROXINE SODIUM 88 MCG: 0.09 TABLET ORAL at 09:42

## 2023-04-15 RX ADMIN — MIDODRINE HYDROCHLORIDE 2.5 MG: 2.5 TABLET ORAL at 12:34

## 2023-04-15 NOTE — PROGRESS NOTES
Documenting adjustment of order. Order for pentamidine received on 4/15. D/w respiratory therapist covering today and based on her explanation they are unable to give pentamidine on the weekends due to medication has to be given in the PFT lab and there are no therapists in PFT lab on the weekends. The RT did discuss with her supervisor and another therapist to ensure that was the case.  I alerted Dr. YANA Guerra of situation. Pentamidine order was adjusted to Monday morning. Last dose of bactrim was 4/14 which is typically dosed Monday, Wednesday and Friday for PCP prophylaxis, so patient does have coverage until then.   Thank you,  Justus Beverly, PharmD  4/15

## 2023-04-15 NOTE — PLAN OF CARE
Goal Outcome Evaluation:  Plan of Care Reviewed With: patient        Progress: no change  Outcome Evaluation: Pt using urinal in bed. Worried about getting out of bed. However, he is eager to leave. VSS. Possible D/C today

## 2023-04-15 NOTE — PROGRESS NOTES
"DAILY PROGRESS NOTE  [facility]    Patient Identification:  Name: Bridger Elias  Age: 89 y.o.  Sex: male  :  1930  MRN: [id number]         Primary Care Physician: Alma Cat MD      Subjective  At present no acute complaints.  I did request orthostatic blood pressures this morning.  Patient states that when he stood up \"I thought I would hit the floor\".  Severely orthostatic.  108/62, standing x 1 min 44/35     Objective:  General Appearance:  Comfortable, well-appearing, in no acute distress and not in pain.    Vital signs: (most recent): Blood pressure 118/72, pulse 74, temperature 96.7 °F (35.9 °C), temperature source Oral, resp. rate 18, height 170.2 cm (67\"), weight 64 kg (141 lb 1.5 oz), SpO2 95 %.    HEENT: (Mucous membranes borderline dry.)    Lungs:  Normal effort and normal respiratory rate.  Breath sounds clear to auscultation.    Heart: Normal rate.  Regular rhythm.    Extremities: There is no dependent edema.    Neurological: Patient is alert and oriented to person, place and time.    Skin:  Warm and dry.                Vital signs in last 24 hours:  Temp:  [96.7 °F (35.9 °C)-97.3 °F (36.3 °C)] 96.7 °F (35.9 °C)  Heart Rate:  [74] 74  Resp:  [18-20] 18  BP: ()/(62-85) 118/72    Intake/Output:    Intake/Output Summary (Last 24 hours) at 4/15/2023 1330  Last data filed at 4/15/2023 0941  Gross per 24 hour   Intake 880 ml   Output 1525 ml   Net -645 ml         Results from last 7 days   Lab Units 04/15/23  0514 23  0352 23  0339 23  0442 23  0428 04/10/23  0433 23  0403   WBC 10*3/mm3 8.55 8.72 9.05 10.91* 9.97 8.50 7.32   HEMOGLOBIN g/dL 12.7* 12.9* 12.6* 12.0* 11.5* 10.8* 11.2*   PLATELETS 10*3/mm3 220 251 258 258 230 210 185     Results from last 7 days   Lab Units 04/15/23  0514 23  0352 23  0339 23  0442 23  0428 04/10/23  0433 23  0403   SODIUM mmol/L 137 137 134* 137 138 135* 133*   POTASSIUM mmol/L " 4.3 4.2 4.6 4.1 4.5 4.1 4.1   CHLORIDE mmol/L 96* 96* 93* 97* 95* 96* 95*   CO2 mmol/L 33.2* 33.0* 34.4* 31.0* 32.5* 28.9 29.0   BUN mg/dL 55* 50* 44* 46* 48* 48* 37*   CREATININE mg/dL 2.00* 1.73* 1.70* 1.51* 1.36* 1.46* 1.26   GLUCOSE mg/dL 124* 103* 123* 113* 139* 156* 134*   Estimated Creatinine Clearance: 20.9 mL/min (A) (by C-G formula based on SCr of 2 mg/dL (H)).  Results from last 7 days   Lab Units 04/15/23  0514 04/14/23  1052 04/14/23  0352 04/13/23  0339 04/12/23  0442 04/11/23  0428 04/10/23  0433 04/09/23  0403   CALCIUM mg/dL 9.8*  --  8.9 9.3 8.7 8.9 8.5 8.3   ALBUMIN g/dL 3.3*  --  3.3* 3.4* 3.1* 3.2* 2.8* 3.0*   MAGNESIUM mg/dL  --  2.9*  --   --  2.7*  --  2.5* 2.4*   PHOSPHORUS mg/dL  --   --  4.4  --  3.0  --  2.8 2.6     Results from last 7 days   Lab Units 04/15/23  0514 04/14/23  0352 04/13/23  0339 04/12/23  0442 04/11/23  0428 04/10/23  0433 04/09/23  0403   ALBUMIN g/dL 3.3* 3.3* 3.4* 3.1* 3.2* 2.8* 3.0*   BILIRUBIN mg/dL 0.7 0.7 0.7 0.7 0.7 0.7 0.6   ALK PHOS U/L 199* 227* 233* 234* 241* 219* 246*   AST (SGOT) U/L 59* 93* 107* 144* 213* 85* 128*   ALT (SGPT) U/L 107* 134* 155* 161* 195* 77* 89*       Assessment:    Acute respiratory failure with hypoxia    SCC (squamous cell carcinoma), face    Pacemaker    Atrial fibrillation    Coronary arteriosclerosis    Dyslipidemia    Hypertension    Hypothyroidism    Long term (current) use of anticoagulants    Stage 3a chronic kidney disease    Acute on chronic combined systolic and diastolic CHF (congestive heart failure)    Hyponatremia    Normocytic anemia    Transaminitis    89 y.o. male admitted with Acute respiratory failure with hypoxia.     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  Gentle IV fluids.  Monitor.  Check a.m. cortisol.    Syncope:  Likely secondary to above in addition to vasovagal.      Bilateral pulmonary infiltrates  - possible ILD vs organizing pneumonia; clinical improvement on steroids  -Has follow-up with pulmonology arranged in 4 to 6 weeks,  -Continue with steroids (started on bactrim but switched to inhaled pentamadine for prophylaxis)     Hyponatremia-   Resolved     Paroxysmal atrial fibrillation on long-term anticoagulation  SSS w/ h/o PPM  -RC: None  -AC: Apixaban     CKD3a  Creatinine increased to 2.0 today.  Diuretics on hold.  Presently with gentle IV fluids.  - f/u outpatient arranged with Dr. Iraheta 5/16/23       Transaminitis  - Casodex stopped, discussed with patient's urologist Dr. Petty, hold Casodex at discharge, he will follow-up with him in clinic and obtain a PSA and testosterone at 1 and 3 months  -LFTs improving     Hypotension  -midodrine.  Consider increasing the dose.     Hypothyroidism  -Synthroid 88 mcg     Hyperlipidemia  -Rosuvastatin 10 mg     Generalized weakness/Debility  - Consulted PT/OT, fall precautions.    All problems new to this examiner.    Plan:  Please see above.  Discussed with patient and daughter at bedside.    Brian Castellanos MD  4/15/2023  13:30 EDT

## 2023-04-15 NOTE — NURSING NOTE
"Orthostatic Bps obtained. Lying 108/62, standing x 1 min 44/35. Pt stated he was feeling very lightheaded and dizzy and it was \"getting difficult to stand\". Pt was sat back down on the bed, readjusted/pulled up into a laying position. BP rechecked lying 118/72. Pt stated dizziness and lightheadedness had resolved at this point. Dr. Castellanos notified.   "

## 2023-04-15 NOTE — PLAN OF CARE
Goal Outcome Evaluation:              Outcome Evaluation: A/ox4, able to voice own needs and wants. [place], states left bilatera HA at home. Stood at side of bed x 1 this shift with + orthostatics. Pt unable to complete any other activity today. Staff assisted with turning/repositioning. Pt declined nurse to look at skin, stated it made him feel uncomfortable. No attempts at unassisted self transfers. Bed/chair alarm utilized. SCDs and accumax in place. Safety maintained.

## 2023-04-15 NOTE — PROGRESS NOTES
Asked by nephrology for alternative pcp proph other than bactrim.  Will order inhaled pentamadine - would be due monthly if maintaining on pred dose higher than 20.  Francisco Javier Guerra MD  [facility]  04/15/23  11:32 EDT

## 2023-04-16 LAB
ALBUMIN SERPL-MCNC: 3.1 G/DL (ref 3.5–5.2)
ALBUMIN/GLOB SERPL: 0.9 G/DL
ALP SERPL-CCNC: 185 U/L (ref 39–117)
ALT SERPL W P-5'-P-CCNC: 89 U/L (ref 1–41)
ANION GAP SERPL CALCULATED.3IONS-SCNC: 11 MMOL/L (ref 5–15)
AST SERPL-CCNC: 63 U/L (ref 1–40)
BILIRUB SERPL-MCNC: 0.6 MG/DL (ref 0–1.2)
BUN SERPL-MCNC: 51 MG/DL (ref 8–23)
BUN/CREAT SERPL: 26.2 (ref 7–25)
CALCIUM SPEC-SCNC: 8.3 MG/DL (ref 8.2–9.6)
CHLORIDE SERPL-SCNC: 101 MMOL/L (ref 98–107)
CO2 SERPL-SCNC: 27 MMOL/L (ref 22–29)
CORTIS SERPL-MCNC: 1.68 MCG/DL
CREAT SERPL-MCNC: 1.95 MG/DL (ref 0.76–1.27)
DEPRECATED RDW RBC AUTO: 50.1 FL (ref 37–54)
EGFRCR SERPLBLD CKD-EPI 2021: 31.5 ML/MIN/1.73
ERYTHROCYTE [DISTWIDTH] IN BLOOD BY AUTOMATED COUNT: 14.5 % (ref 12.3–15.4)
GLOBULIN UR ELPH-MCNC: 3.5 GM/DL
GLUCOSE SERPL-MCNC: 108 MG/DL (ref 65–99)
HCT VFR BLD AUTO: 34.8 % (ref 37.5–51)
HGB BLD-MCNC: 12.2 G/DL (ref 13–17.7)
MCH RBC QN AUTO: 33.3 PG (ref 26.6–33)
MCHC RBC AUTO-ENTMCNC: 35.1 G/DL (ref 31.5–35.7)
MCV RBC AUTO: 95.1 FL (ref 79–97)
PLATELET # BLD AUTO: 221 10*3/MM3 (ref 140–450)
PMV BLD AUTO: 10.9 FL (ref 6–12)
POTASSIUM SERPL-SCNC: 4.1 MMOL/L (ref 3.5–5.2)
PROT SERPL-MCNC: 6.6 G/DL (ref 6–8.5)
RBC # BLD AUTO: 3.66 10*6/MM3 (ref 4.14–5.8)
SODIUM SERPL-SCNC: 139 MMOL/L (ref 136–145)
WBC NRBC COR # BLD: 8.97 10*3/MM3 (ref 3.4–10.8)

## 2023-04-16 PROCEDURE — 85027 COMPLETE CBC AUTOMATED: CPT | Performed by: STUDENT IN AN ORGANIZED HEALTH CARE EDUCATION/TRAINING PROGRAM

## 2023-04-16 PROCEDURE — 63710000001 PREDNISONE PER 1 MG: Performed by: INTERNAL MEDICINE

## 2023-04-16 PROCEDURE — 80053 COMPREHEN METABOLIC PANEL: CPT | Performed by: STUDENT IN AN ORGANIZED HEALTH CARE EDUCATION/TRAINING PROGRAM

## 2023-04-16 PROCEDURE — 82533 TOTAL CORTISOL: CPT | Performed by: HOSPITALIST

## 2023-04-16 RX ORDER — MIDODRINE HYDROCHLORIDE 5 MG/1
5 TABLET ORAL
Status: DISCONTINUED | OUTPATIENT
Start: 2023-04-16 | End: 2023-04-18

## 2023-04-16 RX ORDER — SODIUM CHLORIDE 9 MG/ML
75 INJECTION, SOLUTION INTRAVENOUS ONCE
Status: COMPLETED | OUTPATIENT
Start: 2023-04-16 | End: 2023-04-16

## 2023-04-16 RX ORDER — COSYNTROPIN 0.25 MG/ML
0.25 INJECTION, POWDER, FOR SOLUTION INTRAMUSCULAR; INTRAVENOUS ONCE
Status: COMPLETED | OUTPATIENT
Start: 2023-04-17 | End: 2023-04-17

## 2023-04-16 RX ADMIN — MIDODRINE HYDROCHLORIDE 2.5 MG: 2.5 TABLET ORAL at 06:54

## 2023-04-16 RX ADMIN — SODIUM CHLORIDE 75 ML/HR: 9 INJECTION, SOLUTION INTRAVENOUS at 11:43

## 2023-04-16 RX ADMIN — LATANOPROST 1 DROP: 50 SOLUTION OPHTHALMIC at 20:40

## 2023-04-16 RX ADMIN — APIXABAN 5 MG: 5 TABLET, FILM COATED ORAL at 20:40

## 2023-04-16 RX ADMIN — MIDODRINE HYDROCHLORIDE 5 MG: 5 TABLET ORAL at 16:57

## 2023-04-16 RX ADMIN — PREDNISONE 20 MG: 20 TABLET ORAL at 09:09

## 2023-04-16 RX ADMIN — MIDODRINE HYDROCHLORIDE 5 MG: 5 TABLET ORAL at 11:43

## 2023-04-16 RX ADMIN — FLUTICASONE PROPIONATE 2 SPRAY: 50 SPRAY, METERED NASAL at 09:09

## 2023-04-16 RX ADMIN — LEVOTHYROXINE SODIUM 88 MCG: 0.09 TABLET ORAL at 09:09

## 2023-04-16 RX ADMIN — POLYETHYLENE GLYCOL 3350 17 G: 17 POWDER, FOR SOLUTION ORAL at 09:09

## 2023-04-16 RX ADMIN — APIXABAN 5 MG: 5 TABLET, FILM COATED ORAL at 09:09

## 2023-04-16 RX ADMIN — MULTIPLE VITAMINS W/ MINERALS TAB 1 TABLET: TAB at 09:09

## 2023-04-16 NOTE — NURSING NOTE
"Repeat standing BP x3 min at request of nephrology. Beginning BP sitting up in chair with feet dangling 97/57 MAP 68. Standing x 3 min 53/37 MAP 41. Pt c/o lightheadedness and weakness, needed 2 staff members to support along with walker after 2.5 min hope. Pt mouth hanging open and drooling, verbal response time decreased. Pt sat back in chair with legs elevated and he verbalized \"that feels much better\", pt returned to baseline upon sitting.   "

## 2023-04-16 NOTE — PROGRESS NOTES
"DAILY PROGRESS NOTE  [facility]    Patient Identification:  Name: Bridger Elias  Age: 89 y.o.  Sex: male  :  1930  MRN: [id number]         Primary Care Physician: Alma Cat MD      Subjective  Patient with no new or acute complaints.  States he just had a very nice nap but now he is feeling quite well thereafter.  Still overall very weak however.    Objective:  General Appearance:  Comfortable, in no acute distress and not in pain (Frail-appearing).    Vital signs: (most recent): Blood pressure 113/74, pulse 74, temperature 97.8 °F (36.6 °C), temperature source Oral, resp. rate 18, height 170.2 cm (67\"), weight 64.4 kg (141 lb 15.6 oz), SpO2 99 %.    Lungs:  Normal effort and normal respiratory rate.  Breath sounds clear to auscultation.    Heart: Normal rate.  Regular rhythm.    Extremities: There is no dependent edema.    Neurological: Patient is alert and oriented to person, place and time.    Skin:  Warm and dry.                Vital signs in last 24 hours:  Temp:  [97.4 °F (36.3 °C)-97.8 °F (36.6 °C)] 97.8 °F (36.6 °C)  Heart Rate:  [74] 74  Resp:  [18] 18  BP: ()/(36-78) 113/74    Intake/Output:    Intake/Output Summary (Last 24 hours) at 2023 1643  Last data filed at 2023 0925  Gross per 24 hour   Intake 1600 ml   Output 2300 ml   Net -700 ml         Results from last 7 days   Lab Units 23  0345 04/15/23  0514 23  0352 23  0339 23  0442 23  0428 04/10/23  0433   WBC 10*3/mm3 8.97 8.55 8.72 9.05 10.91* 9.97 8.50   HEMOGLOBIN g/dL 12.2* 12.7* 12.9* 12.6* 12.0* 11.5* 10.8*   PLATELETS 10*3/mm3 221 220 251 258 258 230 210     Results from last 7 days   Lab Units 23  0345 04/15/23  0514 23  0352 23  0339 23  0442 23  0428 04/10/23  0433   SODIUM mmol/L 139 137 137 134* 137 138 135*   POTASSIUM mmol/L 4.1 4.3 4.2 4.6 4.1 4.5 4.1   CHLORIDE mmol/L 101 96* 96* 93* 97* 95* 96*   CO2 mmol/L 27.0 33.2* 33.0* " 34.4* 31.0* 32.5* 28.9   BUN mg/dL 51* 55* 50* 44* 46* 48* 48*   CREATININE mg/dL 1.95* 2.00* 1.73* 1.70* 1.51* 1.36* 1.46*   GLUCOSE mg/dL 108* 124* 103* 123* 113* 139* 156*   Estimated Creatinine Clearance: 21.6 mL/min (A) (by C-G formula based on SCr of 1.95 mg/dL (H)).  Results from last 7 days   Lab Units 04/16/23  0345 04/15/23  0514 04/14/23  1052 04/14/23  0352 04/13/23  0339 04/12/23  0442 04/11/23  0428 04/10/23  0433   CALCIUM mg/dL 8.3 9.8*  --  8.9 9.3 8.7 8.9 8.5   ALBUMIN g/dL 3.1* 3.3*  --  3.3* 3.4* 3.1* 3.2* 2.8*   MAGNESIUM mg/dL  --   --  2.9*  --   --  2.7*  --  2.5*   PHOSPHORUS mg/dL  --   --   --  4.4  --  3.0  --  2.8     Results from last 7 days   Lab Units 04/16/23  0345 04/15/23  0514 04/14/23  0352 04/13/23  0339 04/12/23  0442 04/11/23  0428 04/10/23  0433   ALBUMIN g/dL 3.1* 3.3* 3.3* 3.4* 3.1* 3.2* 2.8*   BILIRUBIN mg/dL 0.6 0.7 0.7 0.7 0.7 0.7 0.7   ALK PHOS U/L 185* 199* 227* 233* 234* 241* 219*   AST (SGOT) U/L 63* 59* 93* 107* 144* 213* 85*   ALT (SGPT) U/L 89* 107* 134* 155* 161* 195* 77*       Assessment:  89 y.o. male admitted with Acute respiratory failure with hypoxia.     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  Possible adrenal insufficiency.  A.m. cortisol was quite low.  Check cosyntropin stimulation studies in the morning.   Hypotension  -midodrine.    See above.  Syncope:  Likely secondary to above +/- vasovagal    Bilateral pulmonary infiltrates  - possible ILD vs organizing pneumonia; clinical improvement on steroids  -Has follow-up with pulmonology arranged in 4 to 6 weeks,  -Continue with  steroids (started on bactrim but switched to inhaled pentamadine for prophylaxis)  Hyponatremia-   Resolved  Paroxysmal atrial fibrillation on long-term anticoagulation  SSS w/ h/o PPM  -RC: None  -AC: Apixaban  CKD3a  Creatinine increased to 2.0 today.  Diuretics on hold.  Presently with gentle IV fluids.  - f/u outpatient arranged with Dr. Iraheta 5/16/23   Transaminitis  - Casodex stopped, discussed with patient's urologist Dr. Petty, hold Casodex at discharge, he will follow-up with him in clinic and obtain a PSA and testosterone at 1 and 3 months  -LFTs improving  Hypothyroidism  -Synthroid 88 mcg  Hyperlipidemia  -Rosuvastatin 10 mg  Generalized weakness/Debility  - Consulted PT/OT, fall precautions.      Plan:  Please see above.  Discussed with patient and daughter at bedside.    Brian Castellanos MD  4/16/2023  16:43 EDT

## 2023-04-16 NOTE — PLAN OF CARE
"Goal Outcome Evaluation:              Outcome Evaluation: Pt up to chair with assist x 1-2 and walker. Pt continues with + orthostatics. While standing with pt was able to see and assess buttock and noted blanchable redness. Pt yesterday had declined skin assessment stating he was \"too embarrassed\". Barrier cream applied, cushion placed in chair. Discussed with patient and daughter importance of allowing skin assessment as well as turning and repositioning to preventn skin breakdown. Pt again expressed embarrassment and dtr discussed with pt that is necceassry regardless. Will continue to promote compliance. Accumax in place. SCDs in place. Safety maintained.  "

## 2023-04-16 NOTE — NURSING NOTE
Up to chair with staff assist and walker. Denies any lightheadedness or dizziness. BP taken while standing 55/36.

## 2023-04-16 NOTE — PROGRESS NOTES
[facility] Progress Note      Patient Name: Bridger Elias  : 1930  MRN: [id number]  Primary Care Physician:  Alma Cat MD  Date of admission: 3/30/2023    Subjective     Interval History:   Follow up Yanira on CKD III.  Seen and examined.  Dizziness is much better.    Review of Systems:   As noted above    Objective     Vitals:   Temp:  [96.2 °F (35.7 °C)-97.8 °F (36.6 °C)] 97.8 °F (36.6 °C)  Heart Rate:  [74] 74  Resp:  [18] 18  BP: (110-118)/(62-78) 116/71    Intake/Output Summary (Last 24 hours) at 2023 1014  Last data filed at 2023 0925  Gross per 24 hour   Intake 1840 ml   Output 2350 ml   Net -510 ml       Physical Exam:    General Appearance: alert, oriented x 3, no acute distress . Up in chair.   Skin: warm and dry  HEENT: oral mucosa normal, nonicteric sclera. Voice weak  Neck: supple, no JVD  Lungs: Clear to auscultation.   Heart: RRR, normal S1 and S2  Abdomen: soft, nontender, nondistended. + bs  Extremities: trace lower ext edema.   Neuro: normal speech and mental status     Scheduled Meds:     apixaban, 5 mg, Oral, Q12H  fluticasone, 2 spray, Each Nare, Daily  latanoprost, 1 drop, Both Eyes, Daily  levothyroxine, 88 mcg, Oral, Daily  midodrine, 2.5 mg, Oral, TID AC  multivitamin with minerals, 1 tablet, Oral, Daily  [START ON 2023] pentamidine, 300 mg, Inhalation, Once  polyethylene glycol, 17 g, Oral, Daily  predniSONE, 20 mg, Oral, Daily      IV Meds:        Results Reviewed:   I have personally reviewed the results from the time of this admission to 2023 10:14 EDT     Results from last 7 days   Lab Units 23  0345 04/15/23  0514 23  0352   SODIUM mmol/L 139 137 137   POTASSIUM mmol/L 4.1 4.3 4.2   CHLORIDE mmol/L 101 96* 96*   CO2 mmol/L 27.0 33.2* 33.0*   BUN mg/dL 51* 55* 50*   CREATININE mg/dL 1.95* 2.00* 1.73*   CALCIUM mg/dL 8.3 9.8* 8.9   BILIRUBIN mg/dL 0.6 0.7 0.7   ALK PHOS U/L 185* 199* 227*   ALT (SGPT) U/L 89*  107* 134*   AST (SGOT) U/L 63* 59* 93*   GLUCOSE mg/dL 108* 124* 103*       Estimated Creatinine Clearance: 21.6 mL/min (A) (by C-G formula based on SCr of 1.95 mg/dL (H)).    Results from last 7 days   Lab Units 04/14/23  1052 04/14/23  0352 04/12/23  0442 04/10/23  0433   MAGNESIUM mg/dL 2.9*  --  2.7* 2.5*   PHOSPHORUS mg/dL  --  4.4 3.0 2.8       Results from last 7 days   Lab Units 04/14/23  0352 04/10/23  0433   URIC ACID mg/dL 6.7 6.2       Results from last 7 days   Lab Units 04/16/23  0345 04/15/23  0514 04/14/23  0352 04/13/23  0339 04/12/23  0442   WBC 10*3/mm3 8.97 8.55 8.72 9.05 10.91*   HEMOGLOBIN g/dL 12.2* 12.7* 12.9* 12.6* 12.0*   PLATELETS 10*3/mm3 221 220 251 258 258             Assessment / Plan     ASSESSMENT:  1. MONISHA on CKD 3.  Baseline creatinine 1.2-1.4. Cardiorenal syndrome . Azotemia on steroid. Bactrim can also reduce secretion of creatinine.I think that his baseline will be higher on this dose of diuretic even without bactrim in order to keep out of heart failure.  While we need to accept higher creatinine number patient is quite orthostatic and has been complaining of dizziness we will give him another liter of IV fluid    2 . Bilateral pulmonary infiltrates. Acute hypoxic respiratory failure. Possible ILD versus organizing PNA.  On steroid with clinical improvement.  Bactrim has been discontinued due to worsening kidney function  3. Acute on chronic combined heart failure.  Volume status appears to be acceptable or slightly on lower side  4. SSS, PAF.  5. SP MV repair .  6. Transaminitis. Casodex dc.   planning to check PSA and testoterone at 1 and 3 months.  Slowly improving.   7. Hypotension.  BP is labile.  Will increase midodrine to 5 mg p.o. 3 times a day  8. Prostate cancer.   to follow up.  9. Hypothyroid on replacement.     PLAN:    1.  Kidney function slightly better after IV fluid.  Bactrim has been discontinued by pulmonary.  Will give another liter of IV fluid.  Increase  midodrine.  Repeat orthostatic blood pressure  2. Has followup with me May 16 at 11:45 am.   3.  Hold discharge for today        Kiarra Iraheta MD  04/16/23  10:14 EDT    [facility]  [phone]

## 2023-04-17 LAB
ALBUMIN SERPL-MCNC: 3.3 G/DL (ref 3.5–5.2)
ALBUMIN/GLOB SERPL: 1.1 G/DL
ALP SERPL-CCNC: 172 U/L (ref 39–117)
ALT SERPL W P-5'-P-CCNC: 97 U/L (ref 1–41)
ANION GAP SERPL CALCULATED.3IONS-SCNC: 6.4 MMOL/L (ref 5–15)
AST SERPL-CCNC: 62 U/L (ref 1–40)
BILIRUB SERPL-MCNC: 0.6 MG/DL (ref 0–1.2)
BUN SERPL-MCNC: 49 MG/DL (ref 8–23)
BUN/CREAT SERPL: 31.8 (ref 7–25)
CALCIUM SPEC-SCNC: 8.9 MG/DL (ref 8.2–9.6)
CHLORIDE SERPL-SCNC: 103 MMOL/L (ref 98–107)
CO2 SERPL-SCNC: 28.6 MMOL/L (ref 22–29)
CORTIS SERPL-MCNC: 1.83 MCG/DL
CORTIS SERPL-MCNC: 11.4 MCG/DL
CORTIS SERPL-MCNC: 8.33 MCG/DL
CREAT SERPL-MCNC: 1.54 MG/DL (ref 0.76–1.27)
DEPRECATED RDW RBC AUTO: 48.6 FL (ref 37–54)
EGFRCR SERPLBLD CKD-EPI 2021: 41.8 ML/MIN/1.73
ERYTHROCYTE [DISTWIDTH] IN BLOOD BY AUTOMATED COUNT: 14.3 % (ref 12.3–15.4)
GLOBULIN UR ELPH-MCNC: 3 GM/DL
GLUCOSE SERPL-MCNC: 114 MG/DL (ref 65–99)
HCT VFR BLD AUTO: 33.6 % (ref 37.5–51)
HGB BLD-MCNC: 12.2 G/DL (ref 13–17.7)
MCH RBC QN AUTO: 33.8 PG (ref 26.6–33)
MCHC RBC AUTO-ENTMCNC: 36.3 G/DL (ref 31.5–35.7)
MCV RBC AUTO: 93.1 FL (ref 79–97)
PLATELET # BLD AUTO: 201 10*3/MM3 (ref 140–450)
PMV BLD AUTO: 11 FL (ref 6–12)
POTASSIUM SERPL-SCNC: 4.6 MMOL/L (ref 3.5–5.2)
PROT SERPL-MCNC: 6.3 G/DL (ref 6–8.5)
RBC # BLD AUTO: 3.61 10*6/MM3 (ref 4.14–5.8)
SODIUM SERPL-SCNC: 138 MMOL/L (ref 136–145)
TSH SERPL DL<=0.05 MIU/L-ACNC: 1.47 UIU/ML (ref 0.27–4.2)
WBC NRBC COR # BLD: 8.46 10*3/MM3 (ref 3.4–10.8)

## 2023-04-17 PROCEDURE — 84443 ASSAY THYROID STIM HORMONE: CPT | Performed by: HOSPITALIST

## 2023-04-17 PROCEDURE — 80053 COMPREHEN METABOLIC PANEL: CPT | Performed by: STUDENT IN AN ORGANIZED HEALTH CARE EDUCATION/TRAINING PROGRAM

## 2023-04-17 PROCEDURE — 63710000001 PREDNISONE PER 1 MG: Performed by: INTERNAL MEDICINE

## 2023-04-17 PROCEDURE — 94664 DEMO&/EVAL PT USE INHALER: CPT

## 2023-04-17 PROCEDURE — 94799 UNLISTED PULMONARY SVC/PX: CPT

## 2023-04-17 PROCEDURE — 94761 N-INVAS EAR/PLS OXIMETRY MLT: CPT

## 2023-04-17 PROCEDURE — 94642 AEROSOL INHALATION TREATMENT: CPT

## 2023-04-17 PROCEDURE — 82533 TOTAL CORTISOL: CPT | Performed by: HOSPITALIST

## 2023-04-17 PROCEDURE — 85027 COMPLETE CBC AUTOMATED: CPT | Performed by: STUDENT IN AN ORGANIZED HEALTH CARE EDUCATION/TRAINING PROGRAM

## 2023-04-17 PROCEDURE — 25010000002 COSYNTROPIN PER 0.25 MG: Performed by: HOSPITALIST

## 2023-04-17 PROCEDURE — 82088 ASSAY OF ALDOSTERONE: CPT | Performed by: HOSPITALIST

## 2023-04-17 RX ADMIN — ALBUTEROL SULFATE 2.5 MG: 2.5 SOLUTION RESPIRATORY (INHALATION) at 15:37

## 2023-04-17 RX ADMIN — MULTIPLE VITAMINS W/ MINERALS TAB 1 TABLET: TAB at 09:25

## 2023-04-17 RX ADMIN — MIDODRINE HYDROCHLORIDE 5 MG: 5 TABLET ORAL at 06:03

## 2023-04-17 RX ADMIN — LATANOPROST 1 DROP: 50 SOLUTION OPHTHALMIC at 21:39

## 2023-04-17 RX ADMIN — POLYETHYLENE GLYCOL 3350 17 G: 17 POWDER, FOR SOLUTION ORAL at 09:25

## 2023-04-17 RX ADMIN — PREDNISONE 20 MG: 20 TABLET ORAL at 09:25

## 2023-04-17 RX ADMIN — COSYNTROPIN 0.25 MG: 0.25 INJECTION, POWDER, LYOPHILIZED, FOR SOLUTION INTRAMUSCULAR; INTRAVENOUS at 05:59

## 2023-04-17 RX ADMIN — MIDODRINE HYDROCHLORIDE 5 MG: 5 TABLET ORAL at 13:07

## 2023-04-17 RX ADMIN — LEVOTHYROXINE SODIUM 88 MCG: 0.09 TABLET ORAL at 09:25

## 2023-04-17 RX ADMIN — MIDODRINE HYDROCHLORIDE 5 MG: 5 TABLET ORAL at 18:25

## 2023-04-17 RX ADMIN — WATER 300 MG: 1 INJECTION INTRAMUSCULAR; INTRAVENOUS; SUBCUTANEOUS at 15:45

## 2023-04-17 RX ADMIN — FLUTICASONE PROPIONATE 2 SPRAY: 50 SPRAY, METERED NASAL at 13:07

## 2023-04-17 RX ADMIN — APIXABAN 5 MG: 5 TABLET, FILM COATED ORAL at 09:25

## 2023-04-17 RX ADMIN — APIXABAN 2.5 MG: 2.5 TABLET, FILM COATED ORAL at 21:39

## 2023-04-17 NOTE — PAYOR COMM NOTE
"Ailyn Elias E \"GENE\" (89 y.o. Male)     PLEASE SEE ATTACHED FOR CONTINUED STAY AUTH.    REF#[id number]    PLEASE CALL   OR  [phone]    THANK YOU    JONAH TAFOYA, ;DONAVANN CCP    Date of Birth   01/31/1930    Social Security Number       Address   [address] [place] [address]    Home Phone   [phone]    MRN   [id number]       [facility]    Marital Status                               Admission Date   3/30/23    Admission Type   Emergency    Admitting Provider   Som Mckinney DO    Attending Provider   Brian Castellanos MD    Department, Room/Bed   [address], [address]       Discharge Date       Discharge Disposition   Skilled Nursing Facility (DC - External)    Discharge Destination                               Attending Provider: Brian Castellanos MD    Allergies: No Known Allergies    Isolation: None   Infection: None   Code Status: No CPR    Ht: 170.2 cm (67\")   Wt: 67.2 kg (148 lb 2.4 oz)    Admission Cmt: None   Principal Problem: Acute respiratory failure with hypoxia [J96.01]                 Active Insurance as of 3/30/2023     Primary Coverage     Payor Plan Insurance Group Employer/Plan Group    AETNA MEDICARE REPLACEMENT AETNA MEDICARE REPLACEMENT [id number]     Payor Plan Address Payor Plan Phone Number Payor Plan Fax Number Effective Dates    [address] [phone]  1/1/2021 - None Entered    [facility] [id number]       Subscriber Name Subscriber Birth Date Member ID       AILYN ELIAS 1/31/1930 [id number]                 Emergency Contacts      (Rel.) Home Phone Work Phone Mobile Phone    EVELIA HIGH (Daughter) [phone] -- [phone]    Yasir Elias (Son) [phone] -- --            Operative/Procedure Notes (last 72 hours)  Notes from 04/14/23 0823 through 04/17/23 0823   No notes of this type exist for this encounter.            Physician Progress Notes (last 72 hours)      Jasmin" "Brian ROBERSON MD at 23 1643          DAILY PROGRESS NOTE  [facility]    Patient Identification:  Name: Bridger Elias  Age: 89 y.o.  Sex: male  :  1930  MRN: [id number]         Primary Care Physician: Alma Cat MD      Subjective  Patient with no new or acute complaints.  States he just had a very nice nap but now he is feeling quite well thereafter.  Still overall very weak however.    Objective:  General Appearance:  Comfortable, in no acute distress and not in pain (Frail-appearing).    Vital signs: (most recent): Blood pressure 113/74, pulse 74, temperature 97.8 °F (36.6 °C), temperature source Oral, resp. rate 18, height 170.2 cm (67\"), weight 64.4 kg (141 lb 15.6 oz), SpO2 99 %.    Lungs:  Normal effort and normal respiratory rate.  Breath sounds clear to auscultation.    Heart: Normal rate.  Regular rhythm.    Extremities: There is no dependent edema.    Neurological: Patient is alert and oriented to person, place and time.    Skin:  Warm and dry.                Vital signs in last 24 hours:  Temp:  [97.4 °F (36.3 °C)-97.8 °F (36.6 °C)] 97.8 °F (36.6 °C)  Heart Rate:  [74] 74  Resp:  [18] 18  BP: ()/(36-78) 113/74    Intake/Output:    Intake/Output Summary (Last 24 hours) at 2023 1643  Last data filed at 2023 0925  Gross per 24 hour   Intake 1600 ml   Output 2300 ml   Net -700 ml         Results from last 7 days   Lab Units 23  0345 04/15/23  0514 23  0352 23  0339 23  0442 23  0428 04/10/23  0433   WBC 10*3/mm3 8.97 8.55 8.72 9.05 10.91* 9.97 8.50   HEMOGLOBIN g/dL 12.2* 12.7* 12.9* 12.6* 12.0* 11.5* 10.8*   PLATELETS 10*3/mm3 221 220 251 258 258 230 210     Results from last 7 days   Lab Units 23  0345 04/15/23  0514 23  0352 23  0339 23  0442 23  0428 04/10/23  0433   SODIUM mmol/L 139 137 137 134* 137 138 135*   POTASSIUM mmol/L 4.1 4.3 4.2 4.6 4.1 4.5 4.1   CHLORIDE mmol/L 101 96* 96* 93* 97* " 95* 96*   CO2 mmol/L 27.0 33.2* 33.0* 34.4* 31.0* 32.5* 28.9   BUN mg/dL 51* 55* 50* 44* 46* 48* 48*   CREATININE mg/dL 1.95* 2.00* 1.73* 1.70* 1.51* 1.36* 1.46*   GLUCOSE mg/dL 108* 124* 103* 123* 113* 139* 156*   Estimated Creatinine Clearance: 21.6 mL/min (A) (by C-G formula based on SCr of 1.95 mg/dL (H)).  Results from last 7 days   Lab Units 04/16/23  0345 04/15/23  0514 04/14/23  1052 04/14/23  0352 04/13/23  0339 04/12/23  0442 04/11/23  0428 04/10/23  0433   CALCIUM mg/dL 8.3 9.8*  --  8.9 9.3 8.7 8.9 8.5   ALBUMIN g/dL 3.1* 3.3*  --  3.3* 3.4* 3.1* 3.2* 2.8*   MAGNESIUM mg/dL  --   --  2.9*  --   --  2.7*  --  2.5*   PHOSPHORUS mg/dL  --   --   --  4.4  --  3.0  --  2.8     Results from last 7 days   Lab Units 04/16/23  0345 04/15/23  0514 04/14/23  0352 04/13/23  0339 04/12/23  0442 04/11/23  0428 04/10/23  0433   ALBUMIN g/dL 3.1* 3.3* 3.3* 3.4* 3.1* 3.2* 2.8*   BILIRUBIN mg/dL 0.6 0.7 0.7 0.7 0.7 0.7 0.7   ALK PHOS U/L 185* 199* 227* 233* 234* 241* 219*   AST (SGOT) U/L 63* 59* 93* 107* 144* 213* 85*   ALT (SGPT) U/L 89* 107* 134* 155* 161* 195* 77*       Assessment:  89 y.o. male admitted with Acute respiratory failure with hypoxia.     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  Possible adrenal insufficiency.  A.m. cortisol was quite low.  Check cosyntropin stimulation studies in the morning.   Hypotension  -midodrine.    See above.  Syncope:  Likely secondary to above +/- vasovagal    Bilateral pulmonary infiltrates  - possible ILD vs organizing pneumonia; clinical improvement on steroids  -Has follow-up with pulmonology arranged in  4 to 6 weeks,  -Continue with steroids (started on bactrim but switched to inhaled pentamadine for prophylaxis)  Hyponatremia-   Resolved  Paroxysmal atrial fibrillation on long-term anticoagulation  SSS w/ h/o PPM  -RC: None  -AC: Apixaban  CKD3a  Creatinine increased to 2.0 today.  Diuretics on hold.  Presently with gentle IV fluids.  - f/u outpatient arranged with Dr. Iraheta 23   Transaminitis  - Casodex stopped, discussed with patient's urologist Dr. Petty, hold Casodex at discharge, he will follow-up with him in clinic and obtain a PSA and testosterone at 1 and 3 months  -LFTs improving  Hypothyroidism  -Synthroid 88 mcg  Hyperlipidemia  -Rosuvastatin 10 mg  Generalized weakness/Debility  - Consulted PT/OT, fall precautions.      Plan:  Please see above.  Discussed with patient and daughter at bedside.    Brian Castellanos MD  2023  16:43 EDT      Electronically signed by Brian Castellanos MD at 23 1646     Kiarra Iraheta MD at 23 1014              [facility] Progress Note      Patient Name: Bridger Elias  : 1930  MRN: [id number]  Primary Care Physician:  Alma Cat MD  Date of admission: 3/30/2023    Subjective     Interval History:   Follow up Yainra on CKD III.  Seen and examined.  Dizziness is much better.    Review of Systems:   As noted above    Objective     Vitals:   Temp:  [96.2 °F (35.7 °C)-97.8 °F (36.6 °C)] 97.8 °F (36.6 °C)  Heart Rate:  [74] 74  Resp:  [18] 18  BP: (110-118)/(62-78) 116/71    Intake/Output Summary (Last 24 hours) at 2023 1014  Last data filed at 2023 0925  Gross per 24 hour   Intake 1840 ml   Output 2350 ml   Net -510 ml       Physical Exam:    General Appearance: alert, oriented x 3, no acute distress . Up in chair.   Skin: warm and dry  HEENT: oral mucosa normal, nonicteric sclera. Voice weak  Neck: supple, no JVD  Lungs: Clear to auscultation.   Heart: RRR, normal S1 and S2  Abdomen: soft,  nontender, nondistended. + bs  Extremities: trace lower ext edema.   Neuro: normal speech and mental status     Scheduled Meds:     apixaban, 5 mg, Oral, Q12H  fluticasone, 2 spray, Each Nare, Daily  latanoprost, 1 drop, Both Eyes, Daily  levothyroxine, 88 mcg, Oral, Daily  midodrine, 2.5 mg, Oral, TID AC  multivitamin with minerals, 1 tablet, Oral, Daily  [START ON 4/17/2023] pentamidine, 300 mg, Inhalation, Once  polyethylene glycol, 17 g, Oral, Daily  predniSONE, 20 mg, Oral, Daily      IV Meds:        Results Reviewed:   I have personally reviewed the results from the time of this admission to 4/16/2023 10:14 EDT     Results from last 7 days   Lab Units 04/16/23  0345 04/15/23  0514 04/14/23  0352   SODIUM mmol/L 139 137 137   POTASSIUM mmol/L 4.1 4.3 4.2   CHLORIDE mmol/L 101 96* 96*   CO2 mmol/L 27.0 33.2* 33.0*   BUN mg/dL 51* 55* 50*   CREATININE mg/dL 1.95* 2.00* 1.73*   CALCIUM mg/dL 8.3 9.8* 8.9   BILIRUBIN mg/dL 0.6 0.7 0.7   ALK PHOS U/L 185* 199* 227*   ALT (SGPT) U/L 89* 107* 134*   AST (SGOT) U/L 63* 59* 93*   GLUCOSE mg/dL 108* 124* 103*       Estimated Creatinine Clearance: 21.6 mL/min (A) (by C-G formula based on SCr of 1.95 mg/dL (H)).    Results from last 7 days   Lab Units 04/14/23  1052 04/14/23  0352 04/12/23  0442 04/10/23  0433   MAGNESIUM mg/dL 2.9*  --  2.7* 2.5*   PHOSPHORUS mg/dL  --  4.4 3.0 2.8       Results from last 7 days   Lab Units 04/14/23  0352 04/10/23  0433   URIC ACID mg/dL 6.7 6.2       Results from last 7 days   Lab Units 04/16/23  0345 04/15/23  0514 04/14/23  0352 04/13/23  0339 04/12/23  0442   WBC 10*3/mm3 8.97 8.55 8.72 9.05 10.91*   HEMOGLOBIN g/dL 12.2* 12.7* 12.9* 12.6* 12.0*   PLATELETS 10*3/mm3 221 220 251 258 258             Assessment / Plan     ASSESSMENT:  1. MONISHA on CKD 3.  Baseline creatinine 1.2-1.4. Cardiorenal syndrome . Azotemia on steroid. Bactrim can also reduce secretion of creatinine.I think that his baseline will be higher on this dose of diuretic  "even without bactrim in order to keep out of heart failure.  While we need to accept higher creatinine number patient is quite orthostatic and has been complaining of dizziness we will give him another liter of IV fluid    2 . Bilateral pulmonary infiltrates. Acute hypoxic respiratory failure. Possible ILD versus organizing PNA.  On steroid with clinical improvement.  Bactrim has been discontinued due to worsening kidney function  3. Acute on chronic combined heart failure.  Volume status appears to be acceptable or slightly on lower side  4. SSS, PAF.  5. SP MV repair .  6. Transaminitis. Casodex dc.   planning to check PSA and testoterone at 1 and 3 months.  Slowly improving.   7. Hypotension.  BP is labile.  Will increase midodrine to 5 mg p.o. 3 times a day  8. Prostate cancer.   to follow up.  9. Hypothyroid on replacement.     PLAN:    1.  Kidney function slightly better after IV fluid.  Bactrim has been discontinued by pulmonary.  Will give another liter of IV fluid.  Increase midodrine.  Repeat orthostatic blood pressure  2. Has followup with me May 16 at 11:45 am.   3.  Hold discharge for today        Kiarra Iraheta MD  23  10:14 EDT    [facility]  [phone]      Electronically signed by Kiarra Iraheta MD at 23 1042     Brian Castellanos MD at 04/15/23 1330          DAILY PROGRESS NOTE  [facility]    Patient Identification:  Name: Bridger Elias  Age: 89 y.o.  Sex: male  :  1930  MRN: [id number]         Primary Care Physician: Alma Cat MD      Subjective  At present no acute complaints.  I did request orthostatic blood pressures this morning.  Patient states that when he stood up \"I thought I would hit the floor\".  Severely orthostatic.  108/62, standing x 1 min 44/35     Objective:  General Appearance:  Comfortable, well-appearing, in no acute distress and not in pain.    Vital signs: (most recent): Blood pressure " "118/72, pulse 74, temperature 96.7 °F (35.9 °C), temperature source Oral, resp. rate 18, height 170.2 cm (67\"), weight 64 kg (141 lb 1.5 oz), SpO2 95 %.    HEENT: (Mucous membranes borderline dry.)    Lungs:  Normal effort and normal respiratory rate.  Breath sounds clear to auscultation.    Heart: Normal rate.  Regular rhythm.    Extremities: There is no dependent edema.    Neurological: Patient is alert and oriented to person, place and time.    Skin:  Warm and dry.                Vital signs in last 24 hours:  Temp:  [96.7 °F (35.9 °C)-97.3 °F (36.3 °C)] 96.7 °F (35.9 °C)  Heart Rate:  [74] 74  Resp:  [18-20] 18  BP: ()/(62-85) 118/72    Intake/Output:    Intake/Output Summary (Last 24 hours) at 4/15/2023 1330  Last data filed at 4/15/2023 0941  Gross per 24 hour   Intake 880 ml   Output 1525 ml   Net -645 ml         Results from last 7 days   Lab Units 04/15/23  0514 04/14/23  0352 04/13/23  0339 04/12/23  0442 04/11/23  0428 04/10/23  0433 04/09/23  0403   WBC 10*3/mm3 8.55 8.72 9.05 10.91* 9.97 8.50 7.32   HEMOGLOBIN g/dL 12.7* 12.9* 12.6* 12.0* 11.5* 10.8* 11.2*   PLATELETS 10*3/mm3 220 251 258 258 230 210 185     Results from last 7 days   Lab Units 04/15/23  0514 04/14/23  0352 04/13/23  0339 04/12/23  0442 04/11/23  0428 04/10/23  0433 04/09/23  0403   SODIUM mmol/L 137 137 134* 137 138 135* 133*   POTASSIUM mmol/L 4.3 4.2 4.6 4.1 4.5 4.1 4.1   CHLORIDE mmol/L 96* 96* 93* 97* 95* 96* 95*   CO2 mmol/L 33.2* 33.0* 34.4* 31.0* 32.5* 28.9 29.0   BUN mg/dL 55* 50* 44* 46* 48* 48* 37*   CREATININE mg/dL 2.00* 1.73* 1.70* 1.51* 1.36* 1.46* 1.26   GLUCOSE mg/dL 124* 103* 123* 113* 139* 156* 134*   Estimated Creatinine Clearance: 20.9 mL/min (A) (by C-G formula based on SCr of 2 mg/dL (H)).  Results from last 7 days   Lab Units 04/15/23  0514 04/14/23  1052 04/14/23  0352 04/13/23  0339 04/12/23  0442 04/11/23  0428 04/10/23  0433 04/09/23  0403   CALCIUM mg/dL 9.8*  --  8.9 9.3 8.7 8.9 8.5 8.3   ALBUMIN g/dL " 3.3*  --  3.3* 3.4* 3.1* 3.2* 2.8* 3.0*   MAGNESIUM mg/dL  --  2.9*  --   --  2.7*  --  2.5* 2.4*   PHOSPHORUS mg/dL  --   --  4.4  --  3.0  --  2.8 2.6     Results from last 7 days   Lab Units 04/15/23  0514 04/14/23  0352 04/13/23  0339 04/12/23  0442 04/11/23  0428 04/10/23  0433 04/09/23  0403   ALBUMIN g/dL 3.3* 3.3* 3.4* 3.1* 3.2* 2.8* 3.0*   BILIRUBIN mg/dL 0.7 0.7 0.7 0.7 0.7 0.7 0.6   ALK PHOS U/L 199* 227* 233* 234* 241* 219* 246*   AST (SGOT) U/L 59* 93* 107* 144* 213* 85* 128*   ALT (SGPT) U/L 107* 134* 155* 161* 195* 77* 89*       Assessment:    Acute respiratory failure with hypoxia    SCC (squamous cell carcinoma), face    Pacemaker    Atrial fibrillation    Coronary arteriosclerosis    Dyslipidemia    Hypertension    Hypothyroidism    Long term (current) use of anticoagulants    Stage 3a chronic kidney disease    Acute on chronic combined systolic and diastolic CHF (congestive heart failure)    Hyponatremia    Normocytic anemia    Transaminitis    89 y.o. male admitted with Acute respiratory failure with hypoxia.     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  Gentle IV fluids.  Monitor.  Check a.m. cortisol.    Syncope:  Likely secondary to above in addition to vasovagal.      Bilateral pulmonary infiltrates  - possible ILD vs organizing pneumonia; clinical improvement on steroids  -Has follow-up with pulmonology arranged in 4 to 6 weeks,  -Continue with steroids (started on bactrim but switched to inhaled pentamadine for prophylaxis)     Hyponatremia-   Resolved     Paroxysmal atrial fibrillation on long-term anticoagulation  SSS w/ h/o  PPM  -RC: None  -AC: Apixaban     CKD3a  Creatinine increased to 2.0 today.  Diuretics on hold.  Presently with gentle IV fluids.  - f/u outpatient arranged with Dr. Iraheta 23       Transaminitis  - Casodex stopped, discussed with patient's urologist Dr. Petty, hold Casodex at discharge, he will follow-up with him in clinic and obtain a PSA and testosterone at 1 and 3 months  -LFTs improving     Hypotension  -midodrine.  Consider increasing the dose.     Hypothyroidism  -Synthroid 88 mcg     Hyperlipidemia  -Rosuvastatin 10 mg     Generalized weakness/Debility  - Consulted PT/OT, fall precautions.    All problems new to this examiner.    Plan:  Please see above.  Discussed with patient and daughter at bedside.    Brian Castellanos MD  4/15/2023  13:30 EDT      Electronically signed by Brian Castellanos MD at 04/15/23 1340     Francisco Javier Guerra MD at 04/15/23 1132        Asked by nephrology for alternative pcp proph other than bactrim.  Will order inhaled pentamadine - would be due monthly if maintaining on pred dose higher than 20.  Francisco Javier Guerra MD  [facility]  04/15/23  11:32 EDT        Electronically signed by Francisco Javier Guerra MD at 04/15/23 1133     Kiarra Iraheta MD at 04/15/23 1041              [facility] Progress Note      Patient Name: Bridger Elias  : 1930  MRN: [id number]  Primary Care Physician:  Alma Cat MD  Date of admission: 3/30/2023    Subjective     Interval History:   Follow up Yanira on CKD III.  Seen and examined.  Patient is dizzy.  Daughter is on bedside.  No new issues.    Review of Systems:   As noted above    Objective     Vitals:   Temp:  [96.7 °F (35.9 °C)-97.3 °F (36.3 °C)] 96.7 °F (35.9 °C)  Heart Rate:  [74] 74  Resp:  [18-20] 18  BP: ()/(62-85) 108/62  Flow (L/min):  [2] 2    Intake/Output Summary (Last 24 hours) at 4/15/2023 1042  Last data filed at 4/15/2023 0941  Gross per 24 hour    Intake 1120 ml   Output 1900 ml   Net -780 ml       Physical Exam:    General Appearance: alert, oriented x 3, no acute distress . Up in chair.   Skin: warm and dry  HEENT: oral mucosa normal, nonicteric sclera. Voice weak  Neck: supple, no JVD  Lungs: Clear to auscultation.   Heart: RRR, normal S1 and S2  Abdomen: soft, nontender, nondistended. + bs  Extremities: trace lower ext edema.   Neuro: normal speech and mental status     Scheduled Meds:     apixaban, 5 mg, Oral, Q12H  fluticasone, 2 spray, Each Nare, Daily  latanoprost, 1 drop, Both Eyes, Daily  levothyroxine, 88 mcg, Oral, Daily  midodrine, 2.5 mg, Oral, TID AC  multivitamin with minerals, 1 tablet, Oral, Daily  polyethylene glycol, 17 g, Oral, Daily  predniSONE, 20 mg, Oral, Daily  sulfamethoxazole-trimethoprim, 1 tablet, Oral, Once per day on Mon Wed Fri      IV Meds:        Results Reviewed:   I have personally reviewed the results from the time of this admission to 4/15/2023 10:42 EDT     Results from last 7 days   Lab Units 04/15/23  0514 04/14/23  0352 04/13/23  0339   SODIUM mmol/L 137 137 134*   POTASSIUM mmol/L 4.3 4.2 4.6   CHLORIDE mmol/L 96* 96* 93*   CO2 mmol/L 33.2* 33.0* 34.4*   BUN mg/dL 55* 50* 44*   CREATININE mg/dL 2.00* 1.73* 1.70*   CALCIUM mg/dL 9.8* 8.9 9.3   BILIRUBIN mg/dL 0.7 0.7 0.7   ALK PHOS U/L 199* 227* 233*   ALT (SGPT) U/L 107* 134* 155*   AST (SGOT) U/L 59* 93* 107*   GLUCOSE mg/dL 124* 103* 123*       Estimated Creatinine Clearance: 20.9 mL/min (A) (by C-G formula based on SCr of 2 mg/dL (H)).    Results from last 7 days   Lab Units 04/14/23  1052 04/14/23  0352 04/12/23  0442 04/10/23  0433   MAGNESIUM mg/dL 2.9*  --  2.7* 2.5*   PHOSPHORUS mg/dL  --  4.4 3.0 2.8       Results from last 7 days   Lab Units 04/14/23  0352 04/10/23  0433 04/09/23  0403   URIC ACID mg/dL 6.7 6.2 6.1       Results from last 7 days   Lab Units 04/15/23  0514 04/14/23  0352 04/13/23  0339 04/12/23  0442 04/11/23  0428   WBC 10*3/mm3 8.55  8.72 9.05 10.91* 9.97   HEMOGLOBIN g/dL 12.7* 12.9* 12.6* 12.0* 11.5*   PLATELETS 10*3/mm3 220 251 258 258 230             Assessment / Plan     ASSESSMENT:  1. MONISHA on CKD 3.  Baseline creatinine 1.2-1.4. Cardiorenal syndrome . Azotemia on steroid. Bactrim can also reduce secretion of creatinine.  Euvolemic by exam. I think that his baseline will be higher on this dose of diuretic even without bactrim in order to keep out of heart failure.   2 . Bilateral pulmonary infiltrates. Acute hypoxic respiratory failure. Possible ILD versus organizing PNA.  On steroid with clinical improvement.   3. Acute on chronic combined heart failure. Po diuretic  4. SSS, PAF.  5. SP MV repair .  6. Transaminitis. Casodex dc.   planning to check PSA and testoterone at 1 and 3 months.  Slowly improving.   7. Hypotension.  Added midodrine. BP a little labile.  On  2.5 mg tid.  8. Prostate cancer.   to follow up.  9. Hypothyroid on replacement.     PLAN:    1.  Kidney function has worsened that might be related to intravascular volume depletion however patient is taking Bactrim double strength as well so we will go ahead and hold/discontinue for now.  We will hold diuretic and start patient on gentle hydration 1 L of IV fluid and repeat lab in the morning.  Discussed with the nurse  2. Has followup with me May 16 at 11:45 am.   3.  Hold discharge for today        Kiarra Iraheta MD  04/15/23  10:42 EDT    [facility]  [phone]      Electronically signed by Kiarra Iraheta MD at 04/15/23 7422     Marion Wen MD at 23 6251              Name: Bridger Elias ADMIT: 3/30/2023   : 1930  PCP: Alma Cat MD    MRN: [id number] LOS: 14 days   AGE/SEX: 89 y.o. male  ROOM: [facility]     Subjective   Subjective   Feels tired today, up in his recliner, accompanied by daughter. Later in the day while attempting to use the toilet the patient lost consciousness. RN x2 were with him,  reported his arm shook and he was unresponsive for 20-30 seconds but then was able to talk and knew where he was.       Objective   Objective   Vital Signs  Temp:  [96.5 °F (35.8 °C)-97.8 °F (36.6 °C)] 96.9 °F (36.1 °C)  Heart Rate:  [74-76] 74  Resp:  [18-20] 20  BP: (107-135)/(59-85) 121/69  SpO2:  [93 %-97 %] 93 %  on  Flow (L/min):  [2] 2;   Device (Oxygen Therapy): nasal cannula  Body mass index is 22.68 kg/m².  Physical Exam  Constitutional:       Appearance: Normal appearance. He is ill-appearing.      Comments: Elderly, frail   HENT:      Head: Normocephalic and atraumatic.   Cardiovascular:      Rate and Rhythm: Normal rate and regular rhythm.   Pulmonary:      Effort: Pulmonary effort is normal.      Breath sounds: Normal breath sounds.   Skin:     General: Skin is warm and dry.      Coloration: Skin is pale.   Neurological:      Mental Status: He is alert.   Psychiatric:         Mood and Affect: Mood normal.         Behavior: Behavior normal.         Results Review     I reviewed the patient's new clinical results.  Results from last 7 days   Lab Units 04/14/23  0352 04/13/23 0339 04/12/23 0442 04/11/23 0428   WBC 10*3/mm3 8.72 9.05 10.91* 9.97   HEMOGLOBIN g/dL 12.9* 12.6* 12.0* 11.5*   PLATELETS 10*3/mm3 251 258 258 230     Results from last 7 days   Lab Units 04/14/23  0352 04/13/23  0339 04/12/23 0442 04/11/23  0428   SODIUM mmol/L 137 134* 137 138   POTASSIUM mmol/L 4.2 4.6 4.1 4.5   CHLORIDE mmol/L 96* 93* 97* 95*   CO2 mmol/L 33.0* 34.4* 31.0* 32.5*   BUN mg/dL 50* 44* 46* 48*   CREATININE mg/dL 1.73* 1.70* 1.51* 1.36*   GLUCOSE mg/dL 103* 123* 113* 139*   Estimated Creatinine Clearance: 24.8 mL/min (A) (by C-G formula based on SCr of 1.73 mg/dL (H)).  Results from last 7 days   Lab Units 04/14/23  0352 04/13/23  0339 04/12/23  0442 04/11/23  0428   ALBUMIN g/dL 3.3* 3.4* 3.1* 3.2*   BILIRUBIN mg/dL 0.7 0.7 0.7 0.7   ALK PHOS U/L 227* 233* 234* 241*   AST (SGOT) U/L 93* 107* 144* 213*   ALT  (SGPT) U/L 134* 155* 161* 195*     Results from last 7 days   Lab Units 04/14/23  1052 04/14/23  0352 04/13/23  0339 04/12/23  0442 04/11/23  0428 04/10/23  0433 04/09/23  0403   CALCIUM mg/dL  --  8.9 9.3 8.7 8.9 8.5 8.3   ALBUMIN g/dL  --  3.3* 3.4* 3.1* 3.2* 2.8* 3.0*   MAGNESIUM mg/dL 2.9*  --   --  2.7*  --  2.5* 2.4*   PHOSPHORUS mg/dL  --  4.4  --  3.0  --  2.8 2.6       COVID19   Date Value Ref Range Status   03/30/2023 Not Detected Not Detected - Ref. Range Final     SARS-CoV-2, [facility]   Date Value Ref Range Status   09/04/2022 NEGATIVE Negative Final     No results found for: HGBA1C, POCGLU    XR Chest 1 View  Narrative: Patient: AILYN DUQUE  Time Out: 09:31  Exam(s): XR CXR 1 VIEW     EXAM:    XR Chest, 1 View    CLINICAL HISTORY:     Reason for exam: Follow-up infiltrates.    TECHNIQUE:    Frontal view of the chest.    COMPARISON:    No relevant prior studies available.    FINDINGS:    Lungs:  Multifocal consolidations most prominent in the bilateral bases.      Pleural space:  Unremarkable.  No pneumothorax.    Heart: Mild cardiomegaly.  You centimeter wires and valvular prosthesis   in place.  Left chest pacer in place.    Mediastinum:  Unremarkable.    Bones joints:  Unremarkable.    IMPRESSION:       Multifocal consolidations most prominent in the bilateral bases.  Impression: Electronically signed by Justus Goldberg M.D. on 04-10-23 at 0931    Scheduled Medications  apixaban, 5 mg, Oral, Q12H  fluticasone, 2 spray, Each Nare, Daily  latanoprost, 1 drop, Both Eyes, Daily  levothyroxine, 88 mcg, Oral, Daily  midodrine, 2.5 mg, Oral, TID AC  multivitamin with minerals, 1 tablet, Oral, Daily  polyethylene glycol, 17 g, Oral, Daily  predniSONE, 20 mg, Oral, Daily  sulfamethoxazole-trimethoprim, 1 tablet, Oral, Once per day on Mon Wed Fri  torsemide, 40 mg, Oral, Daily    Infusions   Diet  Diet: Fluid Restriction (240 mL/tray) Diets; 1500 mL/day; No Mixed Consistencies; Texture: Soft to Chew (NDD 3); Soft  to Chew: Chopped Meat; Fluid Consistency: Thin (IDDSI 0)        I have personally reviewed:  [x]  Laboratory   []  Microbiology   []  Radiology   [x]  EKG/Telemetry   []  Cardiology/Vascular   []  Pathology   []  Records    Assessment/Plan     Active Hospital Problems    Diagnosis  POA   • **Acute respiratory failure with hypoxia [J96.01]  Yes   • Hyponatremia [E87.1]  Yes   • Normocytic anemia [D64.9]  Yes   • Transaminitis [R74.01]  Yes   • Stage 3a chronic kidney disease [N18.31]  Yes   • Pacemaker [Z95.0]  Yes   • Atrial fibrillation [I48.91]  Yes   • Acute on chronic combined systolic and diastolic CHF (congestive heart failure) [I50.43]  Yes   • SCC (squamous cell carcinoma), face [C44.320]  Yes   • Hypothyroidism [E03.9]  Yes   • Coronary arteriosclerosis [I25.10]  Yes   • Dyslipidemia [E78.5]  Yes   • Hypertension [I10]  Yes   • Long term (current) use of anticoagulants [Z79.01]  Not Applicable      Resolved Hospital Problems    Diagnosis Date Resolved POA   • Hypokalemia [E87.6] 04/05/2023 No   • Community acquired pneumonia, unspecified laterality [J18.9] 04/03/2023 Yes       89 y.o. male admitted with Acute respiratory failure with hypoxia.    4/14  Patient with vasovagal/convulsive syncope (arm shook) today while using toilet  Patient returned to his baseline mental status within 1 to 2 minutes, RN was present with patient the whole time.  Pre-CERT was obtained however the patient's family felt uncomfortable with him going and would like him observed tonight which is reasonable.  Plan for discharge to SNF tomorrow if no further incidents.    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  -CXR w/ b/l effusions, congestion  -TTE (4/1/23): 41-45%; systolic and diastolic flattening of IV septum consistent with RV pressure/volume overload  -Renal managing diuretics      Bilateral pulmonary  infiltrates  - possible ILD vs organizing pneumonia; clinical improvement on steroids  -Has follow-up with pulmonology arranged in 4 to 6 weeks,  -Continue with steroids (started on bactrim for pneumonia prophylaxis)    Hyponatremia- resolving  -improved with diuresis; renal following     Paroxysmal atrial fibrillation on long-term anticoagulation  SSS w/ h/o PPM  -RC: None  -AC: Apixaban     CKD3a  -Crt near baseline; monitor  - f/u outpatient arranged with Dr. Iraheta 23       Transaminitis  - Casodex stopped, discussed with patient's urologist Dr. Petty, hold Casodex at discharge, he will follow-up with him in clinic and obtain a PSA and testosterone at 1 and 3 months  -LFTs improving     Hypotension  -midodrine     Hypothyroidism  -Synthroid 88 mcg     Hyperlipidemia  -Rosuvastatin 10 mg     Generalized weakness/Debility  - Consulted PT/OT, fall precautions.      Marion Wen MD  [facility]  23  17:44 EDT    I wore protective equipment throughout this patient encounter including gloves and protective eyewear.  Hand hygiene was performed before donning protective equipment and after removal when leaving the room.    Expected Discharge Date and Time     Expected Discharge Date Expected Discharge Time    2023                Electronically signed by Marion Wen MD at 23 [id number]     Kiarra Iraheta MD at 23 0911              [facility] Progress Note      Patient Name: Bridger Elias  : 1930  MRN: [id number]  Primary Care Physician:  Alma Cat MD  Date of admission: 3/30/2023    Subjective     Interval History:   Follow up Yanira on CKD III.  Seen and examined.  Sitting up in chair.  No shortness of air or chest pain    Review of Systems:   As noted above    Objective     Vitals:   Temp:  [96.5 °F (35.8 °C)-98.3 °F (36.8 °C)] 96.7 °F (35.9 °C)  Heart Rate:  [74-76] 74  Resp:  [18] 18  BP:  (107-128)/(59-83) 107/59    Intake/Output Summary (Last 24 hours) at 4/14/2023 0945  Last data filed at 4/14/2023 0847  Gross per 24 hour   Intake --   Output 900 ml   Net -900 ml       Physical Exam:    General Appearance: alert, oriented x 3, no acute distress . Up in chair.   Skin: warm and dry  HEENT: oral mucosa normal, nonicteric sclera. Voice weak  Neck: supple, no JVD  Lungs: Clear to auscultation.   Heart: RRR, normal S1 and S2  Abdomen: soft, nontender, nondistended. + bs  Extremities: trace lower ext edema.   Neuro: normal speech and mental status     Scheduled Meds:     apixaban, 5 mg, Oral, Q12H  fluticasone, 2 spray, Each Nare, Daily  latanoprost, 1 drop, Both Eyes, Daily  levothyroxine, 88 mcg, Oral, Daily  midodrine, 2.5 mg, Oral, TID AC  multivitamin with minerals, 1 tablet, Oral, Daily  polyethylene glycol, 17 g, Oral, Daily  predniSONE, 20 mg, Oral, Daily  sulfamethoxazole-trimethoprim, 1 tablet, Oral, Once per day on Mon Wed Fri  torsemide, 40 mg, Oral, Daily      IV Meds:        Results Reviewed:   I have personally reviewed the results from the time of this admission to 4/14/2023 09:45 EDT     Results from last 7 days   Lab Units 04/14/23  0352 04/13/23  0339 04/12/23  0442   SODIUM mmol/L 137 134* 137   POTASSIUM mmol/L 4.2 4.6 4.1   CHLORIDE mmol/L 96* 93* 97*   CO2 mmol/L 33.0* 34.4* 31.0*   BUN mg/dL 50* 44* 46*   CREATININE mg/dL 1.73* 1.70* 1.51*   CALCIUM mg/dL 8.9 9.3 8.7   BILIRUBIN mg/dL 0.7 0.7 0.7   ALK PHOS U/L 227* 233* 234*   ALT (SGPT) U/L 134* 155* 161*   AST (SGOT) U/L 93* 107* 144*   GLUCOSE mg/dL 103* 123* 113*       Estimated Creatinine Clearance: 24.8 mL/min (A) (by C-G formula based on SCr of 1.73 mg/dL (H)).    Results from last 7 days   Lab Units 04/14/23  0352 04/12/23  0442 04/10/23  0433 04/09/23  0403   MAGNESIUM mg/dL  --  2.7* 2.5* 2.4*   PHOSPHORUS mg/dL 4.4 3.0 2.8 2.6       Results from last 7 days   Lab Units 04/14/23  0352 04/10/23  0433 04/09/23  0403  04/08/23  0640   URIC ACID mg/dL 6.7 6.2 6.1 6.1       Results from last 7 days   Lab Units 04/14/23  0352 04/13/23  0339 04/12/23  0442 04/11/23  0428 04/10/23  0433   WBC 10*3/mm3 8.72 9.05 10.91* 9.97 8.50   HEMOGLOBIN g/dL 12.9* 12.6* 12.0* 11.5* 10.8*   PLATELETS 10*3/mm3 251 258 258 230 210             Assessment / Plan     ASSESSMENT:  1. MONISHA on CKD 3.  Baseline creatinine 1.2-1.4. Cardiorenal syndrome . Azotemia on steroid. Bactrim can also reduce secretion of creatinine.  Euvolemic by exam. I think that his baseline will be higher on this dose of diuretic even without bactrim in order to keep out of heart failure.  Had been on every other day dosing prior to coming in.   2 . Bilateral pulmonary infiltrates. Acute hypoxic respiratory failure. Possible ILD versus organizing PNA.  On steroid with clinical improvement.   3. Acute on chronic combined heart failure. Po diuretic  4. SSS, PAF.  5. SP MV repair .  6. Transaminitis. Casodex dc.   planning to check PSA and testoterone at 1 and 3 months.  Slowly improving.   7. Hypotension.  Added midodrine. BP a little labile.  On  2.5 mg tid.  8. Prostate cancer.   to follow up.  9. Hypothyroid on replacement.     PLAN:  1. Continue current diuretic.    2. Has followup with me May 16 at 11:45 am.         Kiarra Iraheta MD  04/14/23  09:45 EDT    [facility]  [phone]      Electronically signed by Kiarra Iraheta MD at 04/14/23 0966       Consult Notes (last 72 hours)  Notes from 04/14/23 0823 through 04/17/23 0823   No notes of this type exist for this encounter.

## 2023-04-17 NOTE — PLAN OF CARE
Goal Outcome Evaluation:  Plan of Care Reviewed With: patient        Progress: improving  Outcome Evaluation: AOx4. Orthostatics performed and BP dropped slightly but not significantly. Daughter assisted pt to chair and reported he did well. Large bm this shift. Encouraged pt to increase PO intake. VSS. Safety maintained.

## 2023-04-17 NOTE — PROGRESS NOTES
[facility] Progress Note      Patient Name: Bridger Elias  : 1930  MRN: [id number]  Primary Care Physician:  Alma Cat MD  Date of admission: 3/30/2023    Subjective     Interval History:   Follow up Yanira on CKD III.  Seen and examined. Remains orthostatic and reported dizzinss otherwise no new issues    Review of Systems:   As noted above    Objective     Vitals:   Temp:  [97.4 °F (36.3 °C)-97.6 °F (36.4 °C)] 97.6 °F (36.4 °C)  Heart Rate:  [74] 74  Resp:  [18] 18  BP: ()/(36-97) 149/97    Intake/Output Summary (Last 24 hours) at 2023 0839  Last data filed at 2023 0218  Gross per 24 hour   Intake 600 ml   Output 1125 ml   Net -525 ml       Physical Exam:    General Appearance: alert, oriented x 3, no acute distress . Up in chair.   Skin: warm and dry  HEENT: oral mucosa normal, nonicteric sclera. Voice weak  Neck: supple, no JVD  Lungs: Clear to auscultation.   Heart: RRR, normal S1 and S2  Abdomen: soft, nontender, nondistended. + bs  Extremities: trace lower ext edema.   Neuro: normal speech and mental status     Scheduled Meds:     apixaban, 5 mg, Oral, Q12H  fluticasone, 2 spray, Each Nare, Daily  latanoprost, 1 drop, Both Eyes, Daily  levothyroxine, 88 mcg, Oral, Daily  midodrine, 5 mg, Oral, TID AC  multivitamin with minerals, 1 tablet, Oral, Daily  pentamidine, 300 mg, Inhalation, Once  polyethylene glycol, 17 g, Oral, Daily  predniSONE, 20 mg, Oral, Daily      IV Meds:        Results Reviewed:   I have personally reviewed the results from the time of this admission to 2023 08:39 EDT     Results from last 7 days   Lab Units 23  0421 23  0345 04/15/23  0514   SODIUM mmol/L 138 139 137   POTASSIUM mmol/L 4.6 4.1 4.3   CHLORIDE mmol/L 103 101 96*   CO2 mmol/L 28.6 27.0 33.2*   BUN mg/dL 49* 51* 55*   CREATININE mg/dL 1.54* 1.95* 2.00*   CALCIUM mg/dL 8.9 8.3 9.8*   BILIRUBIN mg/dL 0.6 0.6 0.7   ALK PHOS U/L 172* 185* 199*   ALT  (SGPT) U/L 97* 89* 107*   AST (SGOT) U/L 62* 63* 59*   GLUCOSE mg/dL 114* 108* 124*       Estimated Creatinine Clearance: 28.5 mL/min (A) (by C-G formula based on SCr of 1.54 mg/dL (H)).    Results from last 7 days   Lab Units 04/14/23  1052 04/14/23  0352 04/12/23  0442   MAGNESIUM mg/dL 2.9*  --  2.7*   PHOSPHORUS mg/dL  --  4.4 3.0       Results from last 7 days   Lab Units 04/14/23  0352   URIC ACID mg/dL 6.7       Results from last 7 days   Lab Units 04/17/23  0421 04/16/23  0345 04/15/23  0514 04/14/23  0352 04/13/23  0339   WBC 10*3/mm3 8.46 8.97 8.55 8.72 9.05   HEMOGLOBIN g/dL 12.2* 12.2* 12.7* 12.9* 12.6*   PLATELETS 10*3/mm3 201 221 220 251 258             Assessment / Plan     ASSESSMENT:  1. MONISHA on CKD 3.  Baseline creatinine 1.2-1.4. Cardiorenal syndrome . Azotemia on steroid. Bactrim can also reduce secretion of creatinine.I think that his baseline will be higher on this dose of diuretic even without bactrim in order to keep out of heart failure.  While we need to accept higher creatinine number patient is quite orthostatic and has been complaining of dizziness.    2 . Bilateral pulmonary infiltrates. Acute hypoxic respiratory failure. Possible ILD versus organizing PNA.  On steroid with clinical improvement.  Bactrim has been discontinued due to worsening kidney function  3. Acute on chronic combined heart failure.  Volume status appears to be acceptable or slightly on lower side  4. SSS, PAF.  5. SP MV repair .  6. Transaminitis. Casodex dc.   planning to check PSA and testoterone at 1 and 3 months.  Slowly improving.   7. Hypotension.  BP is labile.  Will increase midodrine to 5 mg p.o. 3 times a day  8. Prostate cancer.   to follow up.  9. Hypothyroid on replacement.     PLAN:    1. Kidney function is better.  Bactrim has been discontinued by pulmonary.  Cont  midodrine.  '  2. Cosytropin test showed proper response.   3. Has followup with me May 16 at 11:45 am.   4. Surveillance  labs        Kiarra Iraheta MD  04/17/23  08:39 EDT    [facility]""  [phone]

## 2023-04-17 NOTE — PROGRESS NOTES
"DAILY PROGRESS NOTE  [facility]    Patient Identification:  Name: Bridger Elias  Age: 89 y.o.  Sex: male  :  1930  MRN: [id number]         Primary Care Physician: Alma Cat MD      Subjective  Patient with no new or acute complaints.    Objective:  General Appearance:  Comfortable, well-appearing, in no acute distress and not in pain.    Vital signs: (most recent): Blood pressure 112/69, pulse 75, temperature 96.1 °F (35.6 °C), temperature source Oral, resp. rate 18, height 170.2 cm (67\"), weight 67.2 kg (148 lb 2.4 oz), SpO2 100 %.    Lungs:  Normal effort and normal respiratory rate.  Breath sounds clear to auscultation.    Heart: Normal rate.  Regular rhythm.    Extremities: There is no dependent edema.    Neurological: Patient is alert and oriented to person, place and time.    Skin:  Warm and dry.                Vital signs in last 24 hours:  Temp:  [96.1 °F (35.6 °C)-97.6 °F (36.4 °C)] 96.1 °F (35.6 °C)  Heart Rate:  [74-75] 75  Resp:  [18] 18  BP: (109-149)/(66-97) 112/69    Intake/Output:    Intake/Output Summary (Last 24 hours) at 2023 1558  Last data filed at 2023 1316  Gross per 24 hour   Intake 920 ml   Output 1450 ml   Net -530 ml         Results from last 7 days   Lab Units 23  04223  0345 04/15/23  0514 23  0352 23  0339 23  0442 23  0428   WBC 10*3/mm3 8.46 8.97 8.55 8.72 9.05 10.91* 9.97   HEMOGLOBIN g/dL 12.2* 12.2* 12.7* 12.9* 12.6* 12.0* 11.5*   PLATELETS 10*3/mm3 201 221 220 251 258 258 230     Results from last 7 days   Lab Units 23  0421 23  0345 04/15/23  0514 23  0352 23  0339 23  0442 23  0428   SODIUM mmol/L 138 139 137 137 134* 137 138   POTASSIUM mmol/L 4.6 4.1 4.3 4.2 4.6 4.1 4.5   CHLORIDE mmol/L 103 101 96* 96* 93* 97* 95*   CO2 mmol/L 28.6 27.0 33.2* 33.0* 34.4* 31.0* 32.5*   BUN mg/dL 49* 51* 55* 50* 44* 46* 48*   CREATININE mg/dL 1.54* 1.95* 2.00* 1.73* 1.70* " 1.51* 1.36*   GLUCOSE mg/dL 114* 108* 124* 103* 123* 113* 139*   Estimated Creatinine Clearance: 28.5 mL/min (A) (by C-G formula based on SCr of 1.54 mg/dL (H)).  Results from last 7 days   Lab Units 04/17/23  0421 04/16/23  0345 04/15/23  0514 04/14/23  1052 04/14/23  0352 04/13/23  0339 04/12/23 0442 04/11/23  0428   CALCIUM mg/dL 8.9 8.3 9.8*  --  8.9 9.3 8.7 8.9   ALBUMIN g/dL 3.3* 3.1* 3.3*  --  3.3* 3.4* 3.1* 3.2*   MAGNESIUM mg/dL  --   --   --  2.9*  --   --  2.7*  --    PHOSPHORUS mg/dL  --   --   --   --  4.4  --  3.0  --      Results from last 7 days   Lab Units 04/17/23  0421 04/16/23  0345 04/15/23  0514 04/14/23  0352 04/13/23  0339 04/12/23 0442 04/11/23  0428   ALBUMIN g/dL 3.3* 3.1* 3.3* 3.3* 3.4* 3.1* 3.2*   BILIRUBIN mg/dL 0.6 0.6 0.7 0.7 0.7 0.7 0.7   ALK PHOS U/L 172* 185* 199* 227* 233* 234* 241*   AST (SGOT) U/L 62* 63* 59* 93* 107* 144* 213*   ALT (SGPT) U/L 97* 89* 107* 134* 155* 161* 195*       Assessment:    Acute respiratory failure with hypoxia    SCC (squamous cell carcinoma), face    Pacemaker    Atrial fibrillation    Coronary arteriosclerosis    Dyslipidemia    Hypertension    Hypothyroidism    Long term (current) use of anticoagulants    Stage 3a chronic kidney disease    Acute on chronic combined systolic and diastolic CHF (congestive heart failure)    Hyponatremia    Normocytic anemia    Transaminitis    89 y.o. male admitted with Acute respiratory failure with hypoxia.     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  Much improved.  Cortisol level is low secondary to prednisone.  Reasonable response to cosyntropin.  Recheck  orthostatic pressures.  Monitor closely when prednisone discontinued.  Hypotension  -midodrine.    See above.  Syncope:  Likely secondary to above +/- vasovagal    Bilateral pulmonary infiltrates  - possible ILD vs organizing pneumonia; clinical improvement on steroids  -Has follow-up with pulmonology arranged in 4 to 6 weeks,  -Continue with steroids (started on bactrim but switched to inhaled pentamadine for prophylaxis)  Hyponatremia-   Resolved  Paroxysmal atrial fibrillation on long-term anticoagulation  SSS w/ h/o PPM  -RC: None  -AC: Apixaban  CKD3a  Creatinine increased to 2.0 but improving again with hydration.  Diuretics on hold.  - f/u outpatient arranged with Dr. Iraheta 5/16/23   Transaminitis  - Casodex stopped, discussed with patient's urologist Dr. Petty, hold Casodex at discharge, he will follow-up with him in clinic and obtain a PSA and testosterone at 1 and 3 months  -LFTs improving  Hypothyroidism  -Synthroid 88 mcg  Hyperlipidemia  -Rosuvastatin 10 mg  Generalized weakness/Debility  - Consulted PT/OT, fall precautions.    Plan:  Please see above.  Discussed with patient.  Discussed with daughter and son.  Discussed with CCP.  Discussed with RN.  Probable discharge tomorrow.    Brian Castellanos MD  4/17/2023  15:58 EDT

## 2023-04-17 NOTE — PLAN OF CARE
Goal Outcome Evaluation:  Plan of Care Reviewed With: patient           Outcome Evaluation: VSS, no c/o pain. Pt up in chair most of the night--appeared to sleep well between care. IVF completed. Good UOP. Cortrosyn given and labs drawn. Will CTM, safety maintianed.   Diuretic in Use: on hold  Response to Diuretics (Output greater than intake): NA  Daily Weight (up or down): up  O2 Requirements: RA  Functional Status (Activity level, tolerance and respiratory symptoms): None  Discharge Plans: TBD

## 2023-04-17 NOTE — PROGRESS NOTES
Notified by pharmacy for pentamidine treatment today treatment given via filtered nebulizer and hepa filter in room patient tolerated well.

## 2023-04-18 LAB
ALBUMIN SERPL-MCNC: 2.9 G/DL (ref 3.5–5.2)
ALBUMIN/GLOB SERPL: 0.8 G/DL
ALP SERPL-CCNC: 164 U/L (ref 39–117)
ALT SERPL W P-5'-P-CCNC: 96 U/L (ref 1–41)
ANION GAP SERPL CALCULATED.3IONS-SCNC: 6.3 MMOL/L (ref 5–15)
AST SERPL-CCNC: 74 U/L (ref 1–40)
BILIRUB SERPL-MCNC: 0.7 MG/DL (ref 0–1.2)
BUN SERPL-MCNC: 44 MG/DL (ref 8–23)
BUN/CREAT SERPL: 35.2 (ref 7–25)
CALCIUM SPEC-SCNC: 9.1 MG/DL (ref 8.2–9.6)
CHLORIDE SERPL-SCNC: 106 MMOL/L (ref 98–107)
CO2 SERPL-SCNC: 24.7 MMOL/L (ref 22–29)
CREAT SERPL-MCNC: 1.25 MG/DL (ref 0.76–1.27)
DEPRECATED RDW RBC AUTO: 50.9 FL (ref 37–54)
EGFRCR SERPLBLD CKD-EPI 2021: 53.7 ML/MIN/1.73
ERYTHROCYTE [DISTWIDTH] IN BLOOD BY AUTOMATED COUNT: 14.9 % (ref 12.3–15.4)
GLOBULIN UR ELPH-MCNC: 3.6 GM/DL
GLUCOSE SERPL-MCNC: 112 MG/DL (ref 65–99)
HCT VFR BLD AUTO: 35.8 % (ref 37.5–51)
HGB BLD-MCNC: 12.1 G/DL (ref 13–17.7)
MCH RBC QN AUTO: 32.5 PG (ref 26.6–33)
MCHC RBC AUTO-ENTMCNC: 33.8 G/DL (ref 31.5–35.7)
MCV RBC AUTO: 96.2 FL (ref 79–97)
PLATELET # BLD AUTO: 189 10*3/MM3 (ref 140–450)
PMV BLD AUTO: 10.9 FL (ref 6–12)
POTASSIUM SERPL-SCNC: 4.6 MMOL/L (ref 3.5–5.2)
PROT SERPL-MCNC: 6.5 G/DL (ref 6–8.5)
RBC # BLD AUTO: 3.72 10*6/MM3 (ref 4.14–5.8)
SODIUM SERPL-SCNC: 137 MMOL/L (ref 136–145)
WBC NRBC COR # BLD: 11.73 10*3/MM3 (ref 3.4–10.8)

## 2023-04-18 PROCEDURE — 97116 GAIT TRAINING THERAPY: CPT

## 2023-04-18 PROCEDURE — 97110 THERAPEUTIC EXERCISES: CPT

## 2023-04-18 PROCEDURE — 80053 COMPREHEN METABOLIC PANEL: CPT | Performed by: STUDENT IN AN ORGANIZED HEALTH CARE EDUCATION/TRAINING PROGRAM

## 2023-04-18 PROCEDURE — 82024 ASSAY OF ACTH: CPT | Performed by: HOSPITALIST

## 2023-04-18 PROCEDURE — 85027 COMPLETE CBC AUTOMATED: CPT | Performed by: STUDENT IN AN ORGANIZED HEALTH CARE EDUCATION/TRAINING PROGRAM

## 2023-04-18 PROCEDURE — 63710000001 PREDNISONE PER 1 MG: Performed by: INTERNAL MEDICINE

## 2023-04-18 RX ORDER — MIDODRINE HYDROCHLORIDE 5 MG/1
10 TABLET ORAL
Status: DISCONTINUED | OUTPATIENT
Start: 2023-04-18 | End: 2023-04-24 | Stop reason: HOSPADM

## 2023-04-18 RX ADMIN — APIXABAN 2.5 MG: 2.5 TABLET, FILM COATED ORAL at 20:58

## 2023-04-18 RX ADMIN — LATANOPROST 1 DROP: 50 SOLUTION OPHTHALMIC at 20:58

## 2023-04-18 RX ADMIN — LEVOTHYROXINE SODIUM 88 MCG: 0.09 TABLET ORAL at 08:52

## 2023-04-18 RX ADMIN — PREDNISONE 20 MG: 20 TABLET ORAL at 08:52

## 2023-04-18 RX ADMIN — MIDODRINE HYDROCHLORIDE 5 MG: 5 TABLET ORAL at 06:38

## 2023-04-18 RX ADMIN — Medication 5 MG: at 21:00

## 2023-04-18 RX ADMIN — MULTIPLE VITAMINS W/ MINERALS TAB 1 TABLET: TAB at 08:52

## 2023-04-18 RX ADMIN — MIDODRINE HYDROCHLORIDE 10 MG: 5 TABLET ORAL at 17:07

## 2023-04-18 RX ADMIN — MIDODRINE HYDROCHLORIDE 5 MG: 5 TABLET ORAL at 12:55

## 2023-04-18 RX ADMIN — APIXABAN 2.5 MG: 2.5 TABLET, FILM COATED ORAL at 08:52

## 2023-04-18 RX ADMIN — POLYETHYLENE GLYCOL 3350 17 G: 17 POWDER, FOR SOLUTION ORAL at 08:52

## 2023-04-18 RX ADMIN — FLUTICASONE PROPIONATE 2 SPRAY: 50 SPRAY, METERED NASAL at 08:52

## 2023-04-18 NOTE — PROGRESS NOTES
Continued Stay Note  [facility]     Patient Name: Bridger Elias  MRN: [id number]  Today's Date: 4/18/2023    Admit Date: 3/30/2023    [facility]   Discharge Plan     Row Name 04/18/23 1249       Plan    Plan [facility] pending cert    Plan Comments Spoke with [place], Cert remains pending; [facility] is requesting updated therapy notes. Secure chat sent to [facility].               Discharge Codes    No documentation.               Expected Discharge Date and Time     Expected Discharge Date Expected Discharge Time    Apr 19, 2023             Jennifer Marcos RN

## 2023-04-18 NOTE — PLAN OF CARE
Goal Outcome Evaluation:  Plan of Care Reviewed With: patient           Outcome Evaluation: VSS, no c/o pain. Pt appeared to sleep well between care. Pt up with 1 x assist. Possible D/C today. Will CTM, safety maintained.

## 2023-04-18 NOTE — THERAPY TREATMENT NOTE
Patient Name: Bridger Elias  : 1930    MRN: [id number]                              Today's Date: 2023       Admit Date: 3/30/2023    Visit Dx:     ICD-10-CM ICD-9-CM   1. Community acquired pneumonia, unspecified laterality  J18.9 486     Patient Active Problem List   Diagnosis   • SCC (squamous cell carcinoma), face   • General weakness   • Pacemaker   • Abnormal gait   • Atrial fibrillation   • Chronic diastolic heart failure   • Coronary arteriosclerosis   • Dyslipidemia   • Glaucoma   • Mitral valve disorder   • Hypertension   • Hypertensive kidney disease, stage III   • Hypothyroidism   • Long term (current) use of anticoagulants   • Malignant neoplasm of prostate   • Osteoporosis   • Squamous cell carcinoma of skin of face   • Acute on chronic diastolic CHF (congestive heart failure)   • Stage 3a chronic kidney disease   • Personal history of radiation therapy   • Acute on chronic combined systolic and diastolic CHF (congestive heart failure)   • Cellulitis of right leg   • Chronic acquired lymphedema   • Contusion of face   • Contusion of forearm, left   • Fall   • Chronic systolic heart failure (CMS/HCC)   • Acute respiratory failure with hypoxia   • Hyponatremia   • Normocytic anemia   • Transaminitis     Past Medical History:   Diagnosis Date   • Acute on chronic diastolic CHF (congestive heart failure) 2022   • Atrial fibrillation 2022   • Coronary arteriosclerosis 7/3/2014    Formatting of this note might be different from the original. [facility]  IMPRESSION:   1. Right heart disease, hypertensive cardiac disease.   2. Status post mitral valve repair.   3. Anatomy: No hemodynamically significant disease. about 30-40% lesion of    the right coronary artery. Normal. Left coronary artery system.   • Dyslipidemia 2013   • Glaucoma 2022   • Hypertension 2013   • Hypertensive kidney disease, stage III 3/13/2017   • Hypothyroidism 3/19/2017   • Long term (current) use  of anticoagulants 12/5/2013    Formatting of this note might be different from the original. Mitral valve replacement and atrial fibrillation Assume a range of 2.5-3.5.   • Malignant neoplasm of prostate 12/5/2013    Formatting of this note might be different from the original. 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   • Stage 3b chronic kidney disease 4/14/2022     Past Surgical History:   Procedure Laterality Date   • PACEMAKER IMPLANTATION  2018      General Information     Row Name 04/18/23 1342          Physical Therapy Time and Intention    Document Type therapy note (daily note)  -     Mode of Treatment individual therapy;physical therapy  -EB     Row Name 04/18/23 1342          General Information    Existing Precautions/Restrictions fall  -EB     Row Name 04/18/23 1342          Cognition    Orientation Status (Cognition) oriented x 3  -EB     Row Name 04/18/23 1342          Safety Issues, Functional Mobility    Impairments Affecting Function (Mobility) endurance/activity tolerance;strength  -EB           User Key  (r) = Recorded By, (t) = Taken By, (c) = Cosigned By    Initials Name Provider Type    EB Noa Garcia PTA Physical Therapist Assistant               Mobility     Row Name 04/18/23 1523          Bed Mobility    Comment, (Bed Mobility) NT-UIC  -EB     Row Name 04/18/23 1523          Sit-Stand Transfer    Sit-Stand [place] (Transfers) contact guard  -     Assistive Device (Sit-Stand Transfers) walker, front-wheeled  -EB     Row Name 04/18/23 1523          Gait/Stairs (Locomotion)    [place] Level (Gait) minimum assist (75% patient effort)  -     Assistive Device (Gait) walker, front-wheeled  -EB     Distance in Feet (Gait) 25ft  -EB     Deviations/Abnormal Patterns  (Gait) gait speed decreased;festinating/shuffling;stride length decreased  -EB     Bilateral Gait Deviations forward flexed posture;heel strike decreased  -EB     Comment, (Gait/Stairs) cues for upright posture. Pt fairly steady.  -EB           User Key  (r) = Recorded By, (t) = Taken By, (c) = Cosigned By    Initials Name Provider Type    Noa Hancock PTA Physical Therapist Assistant               Obj/Interventions     Row Name 04/18/23 1524          Motor Skills    Therapeutic Exercise --  BLE: LAQs and seated marches (X10)  -EB           User Key  (r) = Recorded By, (t) = Taken By, (c) = Cosigned By    Initials Name Provider Type    Noa Hancock PTA Physical Therapist Assistant               Goals/Plan    No documentation.                Clinical Impression     Row Name 04/18/23 1525          Plan of Care Review    Plan of Care Reviewed With patient  -EB     Progress improving  -EB     Outcome Evaluation Pt seen for PT treatment today. Pt sitting UI when PT arrived. pt performed bilateral LE exercises without difficulty. Pt is CGA with STS transfers today. Pt was able to ambulate 25ft with rwx, Shaista. Pt fairly steady but required cues for upright posture. Will continue to progress pt as able.  -EB     Row Name 04/18/23 1525          Therapy Assessment/Plan (PT)    Therapy Frequency (PT) 5 times/wk  -EB     Row Name 04/18/23 1525          Positioning and Restraints    Pre-Treatment Position sitting in chair/recliner  -EB     Post Treatment Position chair  -EB     In Chair reclined;call light within reach;encouraged to call for assist;exit alarm on  -EB           User Key  (r) = Recorded By, (t) = Taken By, (c) = Cosigned By    Initials Name Provider Type    Noa Hancock PTA Physical Therapist Assistant               Outcome Measures     Row Name 04/18/23 1528 04/18/23 0852       How much help from another person do you currently need...    Turning from your back to your side while in flat bed  without using bedrails? 3  -EB 4  -SP    Moving from lying on back to sitting on the side of a flat bed without bedrails? 3  -EB 3  -SP    Moving to and from a bed to a chair (including a wheelchair)? 3  -EB 3  -SP    Standing up from a chair using your arms (e.g., wheelchair, bedside chair)? 3  -EB 3  -SP    Climbing 3-5 steps with a railing? 2  -EB 3  -SP    To walk in hospital room? 3  -EB 3  -SP    AM-PAC 6 Clicks Score (PT) 17  -EB 19  -SP    Highest level of mobility 5 --> Static standing  -EB 6 --> Walked 10 steps or more  -SP          User Key  (r) = Recorded By, (t) = Taken By, (c) = Cosigned By    Initials Name Provider Type    Glenys Franz RN Registered Nurse    Noa Hancock PTA Physical Therapist Assistant                             Physical Therapy Education     Title: PT OT SLP Therapies (Done)     Topic: Physical Therapy (Done)     Point: Mobility training (Done)     Learning Progress Summary           Patient Acceptance, E,D, VU,NR by EB at 4/18/2023 1529    Acceptance, E, NR by AR at 4/14/2023 1107    Acceptance, E,TB,D, VU,DU,NR by LD at 4/12/2023 1406    Acceptance, E,D,TB, VU,DU by PH at 4/11/2023 1025    Acceptance, E,D,TB, VU,DU by PH at 4/10/2023 0907    Acceptance, E,TB, VU,DU by CS at 4/7/2023 1437    Acceptance, E, VU by EB at 4/6/2023 1552    Acceptance, E, VU,NR by EB at 4/5/2023 1617    Acceptance, E, VU by EB at 4/4/2023 1545    Acceptance, E,D, VU,NR by EB at 4/3/2023 1603    Acceptance, E, VU,NR by  at 3/31/2023 1633                   Point: Home exercise program (Done)     Learning Progress Summary           Patient Acceptance, E,D, VU,NR by EB at 4/18/2023 1529    Acceptance, E, NR by AR at 4/14/2023 1107    Acceptance, E,TB,D, VU,DU,NR by LD at 4/12/2023 1406    Acceptance, E,D,TB, VU,DU by PH at 4/11/2023 1025    Acceptance, E,D,TB, VU,DU by PH at 4/10/2023 0907    Acceptance, E,TB, VU,DU by CS at 4/7/2023 1437    Acceptance, E, VU by EB at 4/6/2023 1552     Acceptance, E, VU,NR by EB at 4/5/2023 1617                   Point: Body mechanics (Done)     Learning Progress Summary           Patient Acceptance, E,D, VU,NR by EB at 4/18/2023 1529    Acceptance, E, NR by AR at 4/14/2023 1107    Acceptance, E,TB,D, VU,DU,NR by LD at 4/12/2023 1406    Acceptance, E,D,TB, VU,DU by PH at 4/11/2023 1025    Acceptance, E,D,TB, VU,DU by PH at 4/10/2023 0907    Acceptance, E,TB, VU,DU by CS at 4/7/2023 1437    Acceptance, E, VU by EB at 4/6/2023 1552    Acceptance, E, VU,NR by EB at 4/5/2023 1617    Acceptance, E, VU by EB at 4/4/2023 1545    Acceptance, E,D, VU,NR by EB at 4/3/2023 1603    Acceptance, E, VU,NR by DJ at 3/31/2023 1633                   Point: Precautions (Done)     Learning Progress Summary           Patient Acceptance, E,D, VU,NR by EB at 4/18/2023 1529    Acceptance, E, NR by AR at 4/14/2023 1107    Acceptance, E,TB,D, VU,DU,NR by LD at 4/12/2023 1406    Acceptance, E,D,TB, VU,DU by PH at 4/11/2023 1025    Acceptance, E,D,TB, VU,DU by PH at 4/10/2023 0907    Acceptance, E,TB, VU,DU by CS at 4/7/2023 1437    Acceptance, E, VU by EB at 4/6/2023 1552    Acceptance, E, VU,NR by EB at 4/5/2023 1617    Acceptance, E, VU by EB at 4/4/2023 1545    Acceptance, E,D, VU,NR by EB at 4/3/2023 1603    Acceptance, E, VU,NR by DJ at 3/31/2023 1633                               User Key     Initials Effective Dates Name Provider Type Discipline    AR 06/16/21 -  Brandee Reeves, PT Physical Therapist PT    EB 02/14/23 -  Garcia, Noa, PTA Physical Therapist Assistant PT    PH 06/16/21 -  Nolvia Dobson, PTA Physical Therapist Assistant PT    DJ 10/25/19 -  Latosha Sanders, PT Physical Therapist PT    CS 09/22/22 -  Josefina Santiago, PT Physical Therapist PT    LD 04/06/23 -  Nena Acosta, PT Student PT Student PT              PT Recommendation and Plan     Plan of Care Reviewed With: patient  Progress: improving  Outcome Evaluation: Pt seen for PT treatment today. Pt  sitting UIC when PT arrived. pt performed bilateral LE exercises without difficulty. Pt is CGA with STS transfers today. Pt was able to ambulate 25ft with rwx, [place]. Pt fairly steady but required cues for upright posture. Will continue to progress pt as able.     Time Calculation:    PT Charges     Row Name 04/18/23 1341             Time Calculation    Start Time 1315  -EB      Stop Time 1340  -EB      Time Calculation (min) 25 min  -EB      PT Received On 04/18/23  -EB      PT - Next Appointment 04/19/23  -EB         Time Calculation- PT    Total Timed Code Minutes- PT 25 minute(s)  -EB            User Key  (r) = Recorded By, (t) = Taken By, (c) = Cosigned By    Initials Name Provider Type    EB Noa Garcia PTA Physical Therapist Assistant              Therapy Charges for Today     Code Description Service Date Service Provider Modifiers Qty    43914789469 HC GAIT TRAINING EA 15 MIN 4/18/2023 Noa Garcia PTA GP 1    70560412034 HC PT THER PROC EA 15 MIN 4/18/2023 Noa Garcia PTA GP 1          PT G-Codes  Outcome Measure Options: AM-PAC 6 Clicks Basic Mobility (PT)  AM-PAC 6 Clicks Score (PT): 17  AM-PAC 6 Clicks Score (OT): 17       Noa Garcia PTA  4/18/2023

## 2023-04-18 NOTE — PROGRESS NOTES
"DAILY PROGRESS NOTE  [facility]    Patient Identification:  Name: Bridger Elias  Age: 89 y.o.  Sex: male  :  1930  MRN: [id number]         Primary Care Physician: Alma Cat MD      Subjective  Patient with no new or acute complaints.  Still with a significant amount of orthostasis but on questioning the patient states he did not feel lightheadedness at this time.  He did note that he felt \"a little weak in the knees\".    Objective:  General Appearance:  Comfortable, well-appearing, in no acute distress and not in pain.    Vital signs: (most recent): Blood pressure (!) 88/57, pulse 74, temperature 97.7 °F (36.5 °C), temperature source Oral, resp. rate 16, height 170.2 cm (67\"), weight 67.9 kg (149 lb 12.8 oz), SpO2 97 %.    Lungs:  Normal effort and normal respiratory rate.  Breath sounds clear to auscultation.    Heart: Normal rate.  Regular rhythm.    Extremities: There is no dependent edema.    Neurological: Patient is alert and oriented to person, place and time.    Skin:  Warm and dry.                Vital signs in last 24 hours:  Temp:  [96.1 °F (35.6 °C)-98.2 °F (36.8 °C)] 97.7 °F (36.5 °C)  Heart Rate:  [74-75] 74  Resp:  [16-18] 16  BP: ()/(31-93) 88/57    Intake/Output:    Intake/Output Summary (Last 24 hours) at 2023 1146  Last data filed at 2023 0716  Gross per 24 hour   Intake 1367 ml   Output 800 ml   Net 567 ml         Results from last 7 days   Lab Units 23  0350 23  0421 23  0345 04/15/23  0514 23  0352 23  0339 23  0442   WBC 10*3/mm3 11.73* 8.46 8.97 8.55 8.72 9.05 10.91*   HEMOGLOBIN g/dL 12.1* 12.2* 12.2* 12.7* 12.9* 12.6* 12.0*   PLATELETS 10*3/mm3 189 201 221 220 251 258 258     Results from last 7 days   Lab Units 23  0350 23  0421 23  0345 04/15/23  0514 23  0352 23  0339 23  0442   SODIUM mmol/L 137 138 139 137 137 134* 137   POTASSIUM mmol/L 4.6 4.6 4.1 4.3 4.2 4.6 4.1 "   CHLORIDE mmol/L 106 103 101 96* 96* 93* 97*   CO2 mmol/L 24.7 28.6 27.0 33.2* 33.0* 34.4* 31.0*   BUN mg/dL 44* 49* 51* 55* 50* 44* 46*   CREATININE mg/dL 1.25 1.54* 1.95* 2.00* 1.73* 1.70* 1.51*   GLUCOSE mg/dL 112* 114* 108* 124* 103* 123* 113*   Estimated Creatinine Clearance: 35.5 mL/min (by C-G formula based on SCr of 1.25 mg/dL).  Results from last 7 days   Lab Units 04/18/23  0350 04/17/23  0421 04/16/23  0345 04/15/23  0514 04/14/23  1052 04/14/23 0352 04/13/23 0339 04/12/23  0442   CALCIUM mg/dL 9.1 8.9 8.3 9.8*  --  8.9 9.3 8.7   ALBUMIN g/dL 2.9* 3.3* 3.1* 3.3*  --  3.3* 3.4* 3.1*   MAGNESIUM mg/dL  --   --   --   --  2.9*  --   --  2.7*   PHOSPHORUS mg/dL  --   --   --   --   --  4.4  --  3.0     Results from last 7 days   Lab Units 04/18/23  0350 04/17/23  0421 04/16/23  0345 04/15/23  0514 04/14/23 0352 04/13/23 0339 04/12/23  0442   ALBUMIN g/dL 2.9* 3.3* 3.1* 3.3* 3.3* 3.4* 3.1*   BILIRUBIN mg/dL 0.7 0.6 0.6 0.7 0.7 0.7 0.7   ALK PHOS U/L 164* 172* 185* 199* 227* 233* 234*   AST (SGOT) U/L 74* 62* 63* 59* 93* 107* 144*   ALT (SGPT) U/L 96* 97* 89* 107* 134* 155* 161*       Assessment:  89 y.o. male admitted with Acute respiratory failure with hypoxia.     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    Improved but still with significant orthostasis present.  Tolerated midodrine 5 mg 3 times daily well.  I requested support hose.  Consider increasing midodrine to 10 mg and monitor response.  Cortisol secondary to prednisone but reasonable response to cosyntropin.  When prednisone discontinued would monitor blood pressures very closely.  Consider  adding Florinef if orthostasis worsens at that point.  Hypotension  -midodrine.    See above.  Syncope:  Likely secondary to above +/- vasovagal    Bilateral pulmonary infiltrates  - possible ILD vs organizing pneumonia; clinical improvement on steroids  -Has follow-up with pulmonology arranged in 4 to 6 weeks,  -Continue with steroids (started on bactrim but switched to inhaled pentamadine for prophylaxis)  Hyponatremia-   Resolved  Paroxysmal atrial fibrillation on long-term anticoagulation  SSS w/ h/o PPM  -RC: None  -AC: Apixaban  CKD3a  Creatinine increased to 2.0 but improving again with hydration.  Diuretics on hold.  - f/u outpatient arranged with Dr. Iraheta 5/16/23   Transaminitis  - Casodex stopped, discussed with patient's urologist Dr. Petty, hold Casodex at discharge, he will follow-up with him in clinic and obtain a PSA and testosterone at 1 and 3 months  -LFTs improving  Hypothyroidism  -Synthroid 88 mcg  Hyperlipidemia  -Rosuvastatin 10 mg  Generalized weakness/Debility  - Consulted PT/OT, fall precautions.      Plan:  Please see above.  Okay to discharge to rehab when bed is available.  They will need to continue monitoring and working on the orthostasis issues.  My recommendations are noted above.  Discussed with CCP.    Brian Castellanos MD  4/18/2023  11:46 EDT

## 2023-04-18 NOTE — PLAN OF CARE
Goal Outcome Evaluation:  Plan of Care Reviewed With: patient, daughter        Progress: improving  Outcome Evaluation: no complaints of pain or discomfort/ morning orthostatics were low; midodrine increased to 10mg 3x/day; pt up with PT today and likes sitting in the chair; possibly discharge tomorrow; family wants pt to go by ambulance, but wants to speak with the discharge RN first; continue to monitor; safety maintained

## 2023-04-18 NOTE — PROGRESS NOTES
Nutrition Services    Patient Name:  Bridger Elias  YOB: 1930  MRN: [id number]  Admit Date:  3/30/2023  Assessment Date:  04/18/23    Comment: MST score 2 and LOS     Spoke with pt at bedside who reports PO intake 25-50% of meals related to decreased appetite. He reports enjoying Boost Plus with meals. Observed several on bedside table. Will decrease frequency of order. Weight history reviewed with 18 lb (10.8%) weight loss past 6-7 months. NFPE completed with moderate subcutaneous fat and muscle wasting observed. +BM yesterday per I/Os.     Recommendations:   1. Continue current diet order as appropriate and encourage adequate PO intake.     2. Boost Plus BID with meals for additional nutrition support.     3. Add Magic Cup BID with meals for additional nutrition support.     RD will continue to follow course for PO intake and tolerance.     CLINICAL NUTRITION ASSESSMENT      Reason for Assessment Length of Stay, MST score 2+     Diagnosis/Problem   Acute respiratory failure with hypoxia    Medical/Surgical History Past Medical History:   Diagnosis Date   • Acute on chronic diastolic CHF (congestive heart failure) 4/14/2022   • Atrial fibrillation 4/14/2022   • Coronary arteriosclerosis 7/3/2014    Formatting of this note might be different from the original. [facility] 12/7/16  IMPRESSION:   1. Right heart disease, hypertensive cardiac disease.   2. Status post mitral valve repair.   3. Anatomy: No hemodynamically significant disease. about 30-40% lesion of    the right coronary artery. Normal. Left coronary artery system.   • Dyslipidemia 12/5/2013   • Glaucoma 4/14/2022   • Hypertension 12/5/2013   • Hypertensive kidney disease, stage III 3/13/2017   • Hypothyroidism 3/19/2017   • Long term (current) use of anticoagulants 12/5/2013    Formatting of this note might be different from the original. Mitral valve replacement and atrial fibrillation Assume a range of 2.5-3.5.   • Malignant neoplasm of  "prostate 12/5/2013    Formatting of this note might be different from the original. 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   Adjusted BMI (if applicable)        Admission Weight 164 lb (74.4 kg)       Ideal Body Weight (IBW) 148 lb (67.3 kg)   Adjusted IBW (if applicable)        Usual Body Weight (UBW) 165-170 lb (9/2022)   Weight Change/Trend Loss, Amount/Timeframe: 18 lb (10.8%) weight loss past 6-7 months       Weight History Wt Readings from Last 30 Encounters:   04/18/23 0437 67.9 kg (149 lb 12.8 oz)   04/18/23 0311 67.9 kg (149 lb 12.8 oz)   04/17/23 0507 67.2 kg (148 lb 2.4 oz)   04/16/23 0551 64.4 kg (141 lb 15.6 oz)   04/15/23 0500 64 kg (141 lb 1.5 oz)   04/14/23 0555 65.7 kg (144 lb 12.8 oz)   04/13/23 0436 67.4 kg (148 lb 9.4 oz)   04/12/23 0355 68.5 kg (151 lb 0.2 oz)   04/11/23 0500 70 kg (154 lb 5.2 oz)   04/10/23 0616 70.8 kg (156 lb 1.4 oz)   04/08/23 0631 70 kg (154 lb 5.2 oz)   04/07/23 0558 68.7 kg (151 lb 7.3 oz)   04/06/23 0412 69.6 kg (153 lb 7 oz)   04/05/23 0713 68.9 kg (151 lb 14.4 oz)   04/04/23 0700 69.9 kg (154 lb 1.6 oz)   04/03/23 0502 70.3 kg (154 lb 14.4 oz)   04/02/23 0241 71.1 kg (156 lb 12.8 oz)   04/01/23 1018 71.2 kg (157 lb)   04/01/23 0546 71.4 kg (157 lb 4.8 oz)   03/31/23 1156 73.5 kg (162 " lb 1.6 oz)   03/30/23 1939 74.4 kg (164 lb)   09/05/22 1215 75.8 kg (167 lb)   09/05/22 0231 76.2 kg (167 lb 14.4 oz)   06/06/22 1434 72.4 kg (159 lb 9.6 oz)   04/22/22 0533 76.3 kg (168 lb 4.8 oz)   04/21/22 0627 78 kg (172 lb)   04/20/22 0540 72.6 kg (160 lb 1.6 oz)   04/19/22 0550 75.6 kg (166 lb 11.2 oz)   04/18/22 0607 72.9 kg (160 lb 11.2 oz)   04/17/22 0523 73 kg (160 lb 14.4 oz)   04/16/22 0543 76 kg (167 lb 9.6 oz)   04/15/22 0157 75.3 kg (166 lb)   04/14/22 2141 75.3 kg (166 lb)   04/14/22 2000 75.3 kg (166 lb)   04/14/22 1928 75.3 kg (166 lb)   04/14/22 1129 77.6 kg (171 lb)   03/24/22 1553 77.8 kg (171 lb 9.6 oz)   03/17/22 1002 73.2 kg (161 lb 6.4 oz)   03/03/22 1148 74.8 kg (165 lb)   02/02/22 1554 75.2 kg (165 lb 12.8 oz)           --  Tests/Procedures        Tests/Procedures No new tests/procedures     Labs       Pertinent Labs    Results from last 7 days   Lab Units 04/18/23  0350 04/17/23  0421 04/16/23  0345   SODIUM mmol/L 137 138 139   POTASSIUM mmol/L 4.6 4.6 4.1   CHLORIDE mmol/L 106 103 101   CO2 mmol/L 24.7 28.6 27.0   BUN mg/dL 44* 49* 51*   CREATININE mg/dL 1.25 1.54* 1.95*   CALCIUM mg/dL 9.1 8.9 8.3   BILIRUBIN mg/dL 0.7 0.6 0.6   ALK PHOS U/L 164* 172* 185*   ALT (SGPT) U/L 96* 97* 89*   AST (SGOT) U/L 74* 62* 63*   GLUCOSE mg/dL 112* 114* 108*     Results from last 7 days   Lab Units 04/18/23  0350 04/15/23  0514 04/14/23  1052 04/14/23  0352 04/13/23  0339 04/12/23  0442   MAGNESIUM mg/dL  --   --  2.9*  --   --  2.7*   PHOSPHORUS mg/dL  --   --   --  4.4  --  3.0   HEMOGLOBIN g/dL 12.1*   < >  --  12.9*   < > 12.0*   HEMATOCRIT % 35.8*   < >  --  36.0*   < > 34.8*   WBC 10*3/mm3 11.73*   < >  --  8.72   < > 10.91*   ALBUMIN g/dL 2.9*   < >  --  3.3*   < > 3.1*    < > = values in this interval not displayed.     Results from last 7 days   Lab Units 04/18/23  0350 04/17/23  0421 04/16/23  0345 04/15/23  0514 04/14/23  0352   PLATELETS 10*3/mm3 189 201 221 220 251     COVID19   Date  Value Ref Range Status   03/30/2023 Not Detected Not Detected - Ref. Range Final     No results found for: HGBA1C       Medications           Scheduled Medications apixaban, 2.5 mg, Oral, Q12H  fluticasone, 2 spray, Each Nare, Daily  latanoprost, 1 drop, Both Eyes, Daily  levothyroxine, 88 mcg, Oral, Daily  midodrine, 10 mg, Oral, TID AC  multivitamin with minerals, 1 tablet, Oral, Daily  polyethylene glycol, 17 g, Oral, Daily  predniSONE, 20 mg, Oral, Daily       Infusions     PRN Medications •  acetaminophen  •  albuterol  •  aluminum-magnesium hydroxide-simethicone  •  melatonin  •  nitroglycerin  •  ondansetron **OR** ondansetron  •  polyethylene glycol  •  potassium & sodium phosphates **OR** potassium & sodium phosphates  •  potassium chloride **OR** potassium chloride **OR** potassium chloride  •  senna-docusate sodium  •  [COMPLETED] Insert Peripheral IV **AND** sodium chloride  •  sodium chloride  •  sodium chloride     Physical Findings          Physical Appearance alert, hard of hearing, loss of muscle mass, loss of subcutaneous fat, oriented, room air   Oral/Mouth Cavity teeth missing   Edema  lower extremity , upper extremity, 1+ (trace), 2+ (mild)   Gastrointestinal normoactive, last bowel movement:4/17   Skin  bruising   Tubes/Drains none   NFPE Date Completed: 4/18   --  Current Nutrition Orders & Evaluation of Intake       Oral Nutrition     Food Allergies NKFA   Current PO Diet Diet: Fluid Restriction (240 mL/tray) Diets; 1500 mL/day; No Mixed Consistencies; Texture: Soft to Chew (NDD 3); Soft to Chew: Chopped Meat; Fluid Consistency: Thin (IDDSI 0)   Supplement Boost Plus, TID   PO Evaluation     % PO Intake 25-50%    # of Days Evaluated    --  PES STATEMENT / NUTRITION DIAGNOSIS      Nutrition Dx Problem  Problem: Malnutrition  Etiology: Medical Diagnosis and Factors Affecting Nutrition decreased appetite, limited food preferences  Signs/Symptoms: PO intake, Report of Minimal PO Intake, NFPE  Results, Unintended Weight Change and Report/Observation    Comment:    --  NUTRITION INTERVENTION / PLAN OF CARE      Intervention Goal(s) Maintain nutrition status, Nutrition support treatment, Improved nutrition related labs, Meet estimated needs, Disease management/therapy, Tolerate PO , Increase intake, Maintain weight and No significant weight loss         RD Intervention/Action Adjusted supplement, Encourage intake, Follow Tx Progress, Care plan reviewed and Recommend/ordered:          Prescription/Orders:       PO Diet       Supplements Boost Plus BID with meals       Snacks       Enteral Nutrition       Parenteral Nutrition    New Prescription Ordered? Yes   --      Monitor/Evaluation Per protocol, I&O, PO intake, Supplement intake, Pertinent labs, Weight, Skin status, GI status, Symptoms, POC/GOC   Discharge Plan/Needs Pending clinical course   Education Will instruct as appropriate   --    RD to follow per protocol.      Electronically signed by:  Catalina Arrieta RD  04/18/23 13:51 EDT

## 2023-04-18 NOTE — PLAN OF CARE
Goal Outcome Evaluation:  Plan of Care Reviewed With: patient        Progress: improving  Outcome Evaluation: Pt seen for PT treatment today. Pt sitting UIC when PT arrived. pt performed bilateral LE exercises without difficulty. Pt is CGA with STS transfers today. Pt was able to ambulate 25ft with rwx, Shaista. Pt fairly steady but required cues for upright posture. Will continue to progress pt as able.

## 2023-04-18 NOTE — PROGRESS NOTES
[facility] Progress Note      Patient Name: Bridger Elias  : 1930  MRN: [id number]  Primary Care Physician:  Alma Cat MD  Date of admission: 3/30/2023    Subjective     Interval History:   Follow up Yanira on CKD III. Waiting on insurance to go to rehab. BP orthostatic, but not symptomatic today .    Review of Systems:   As noted above    Objective     Vitals:   Temp:  [97.6 °F (36.4 °C)-98.2 °F (36.8 °C)] 97.7 °F (36.5 °C)  Heart Rate:  [74-75] 74  Resp:  [16] 16  BP: ()/(31-81) 111/72    Intake/Output Summary (Last 24 hours) at 2023 1902  Last data filed at 2023 1552  Gross per 24 hour   Intake 667 ml   Output 810 ml   Net -143 ml       Physical Exam:    General Appearance: alert, oriented x 3, no acute distress . Up in chair.   Skin: warm and dry  HEENT: oral mucosa normal, nonicteric sclera. Voice weak  Neck: supple, no JVD  Lungs: Clear to auscultation.   Heart: RRR, normal S1 and S2  Abdomen: soft, nontender, nondistended. + bs  Extremities: trace lower ext edema.   Neuro: normal speech and mental status     Scheduled Meds:     apixaban, 2.5 mg, Oral, Q12H  fluticasone, 2 spray, Each Nare, Daily  latanoprost, 1 drop, Both Eyes, Daily  levothyroxine, 88 mcg, Oral, Daily  midodrine, 10 mg, Oral, TID AC  multivitamin with minerals, 1 tablet, Oral, Daily  polyethylene glycol, 17 g, Oral, Daily  predniSONE, 20 mg, Oral, Daily      IV Meds:        Results Reviewed:   I have personally reviewed the results from the time of this admission to 2023 19:02 EDT     Results from last 7 days   Lab Units 23  0350 23  0421 23  0345   SODIUM mmol/L 137 138 139   POTASSIUM mmol/L 4.6 4.6 4.1   CHLORIDE mmol/L 106 103 101   CO2 mmol/L 24.7 28.6 27.0   BUN mg/dL 44* 49* 51*   CREATININE mg/dL 1.25 1.54* 1.95*   CALCIUM mg/dL 9.1 8.9 8.3   BILIRUBIN mg/dL 0.7 0.6 0.6   ALK PHOS U/L 164* 172* 185*   ALT (SGPT) U/L 96* 97* 89*   AST (SGOT) U/L 74*  62* 63*   GLUCOSE mg/dL 112* 114* 108*       Estimated Creatinine Clearance: 35.5 mL/min (by C-G formula based on SCr of 1.25 mg/dL).    Results from last 7 days   Lab Units 04/14/23  1052 04/14/23  0352 04/12/23  0442   MAGNESIUM mg/dL 2.9*  --  2.7*   PHOSPHORUS mg/dL  --  4.4 3.0       Results from last 7 days   Lab Units 04/14/23  0352   URIC ACID mg/dL 6.7       Results from last 7 days   Lab Units 04/18/23  0350 04/17/23  0421 04/16/23  0345 04/15/23  0514 04/14/23  0352   WBC 10*3/mm3 11.73* 8.46 8.97 8.55 8.72   HEMOGLOBIN g/dL 12.1* 12.2* 12.2* 12.7* 12.9*   PLATELETS 10*3/mm3 189 201 221 220 251             Assessment / Plan     ASSESSMENT:  1. MONISHA on CKD 3.  Baseline creatinine 1.2-1.4. Cardiorenal syndrome . Azotemia on steroid. Improving .Bactrim can also reduce secretion of creatinine.  Now Euvolemic by exam after IVF given for orthostasis. I increased midodrine today .  2 . Bilateral pulmonary infiltrates. Acute hypoxic respiratory failure. Possible ILD versus organizing PNA.  On steroid with clinical improvement.   3. Acute on chronic combined heart failure. No diuretic for now due to orthostasis.  Difficult problem as he came in with heart failure .  4. SSS, PAF.  5. SP MV repair .  6. Transaminitis. Casodex dc.   planning to check PSA and testoterone at 1 and 3 months.    7. Hypotension.  Added midodrine. BP a little labile.  On  2.5 mg tid.  8. Prostate cancer.   to follow up.  Casodex dc for now.   9. Hypothyroid on replacement.   PLAN:  1. On no diuretic   2. Ok with renal for dc if stable tomorrow.  3. Has followup with Dr. Dyson May 16 at 11:45 am.   Catrina Waters MD  04/18/23  19:02 EDT    [facility]  [phone]

## 2023-04-19 LAB
ACTH PLAS-MCNC: 6.5 PG/ML (ref 7.2–63.3)
ALBUMIN SERPL-MCNC: 3.2 G/DL (ref 3.5–5.2)
ALBUMIN/GLOB SERPL: 1.1 G/DL
ALP SERPL-CCNC: 154 U/L (ref 39–117)
ALT SERPL W P-5'-P-CCNC: 103 U/L (ref 1–41)
ANION GAP SERPL CALCULATED.3IONS-SCNC: 4 MMOL/L (ref 5–15)
AST SERPL-CCNC: 66 U/L (ref 1–40)
BILIRUB SERPL-MCNC: 0.7 MG/DL (ref 0–1.2)
BUN SERPL-MCNC: 38 MG/DL (ref 8–23)
BUN/CREAT SERPL: 30.6 (ref 7–25)
CALCIUM SPEC-SCNC: 8.9 MG/DL (ref 8.2–9.6)
CHLORIDE SERPL-SCNC: 103 MMOL/L (ref 98–107)
CO2 SERPL-SCNC: 30 MMOL/L (ref 22–29)
CREAT SERPL-MCNC: 1.24 MG/DL (ref 0.76–1.27)
DEPRECATED RDW RBC AUTO: 50.7 FL (ref 37–54)
EGFRCR SERPLBLD CKD-EPI 2021: 54.2 ML/MIN/1.73
ERYTHROCYTE [DISTWIDTH] IN BLOOD BY AUTOMATED COUNT: 14.8 % (ref 12.3–15.4)
GLOBULIN UR ELPH-MCNC: 3 GM/DL
GLUCOSE SERPL-MCNC: 116 MG/DL (ref 65–99)
HCT VFR BLD AUTO: 35.4 % (ref 37.5–51)
HGB BLD-MCNC: 12.6 G/DL (ref 13–17.7)
MCH RBC QN AUTO: 34 PG (ref 26.6–33)
MCHC RBC AUTO-ENTMCNC: 35.6 G/DL (ref 31.5–35.7)
MCV RBC AUTO: 95.4 FL (ref 79–97)
PLATELET # BLD AUTO: 171 10*3/MM3 (ref 140–450)
PMV BLD AUTO: 11.6 FL (ref 6–12)
POTASSIUM SERPL-SCNC: 5.2 MMOL/L (ref 3.5–5.2)
PROT SERPL-MCNC: 6.2 G/DL (ref 6–8.5)
RBC # BLD AUTO: 3.71 10*6/MM3 (ref 4.14–5.8)
SODIUM SERPL-SCNC: 137 MMOL/L (ref 136–145)
WBC NRBC COR # BLD: 10.25 10*3/MM3 (ref 3.4–10.8)

## 2023-04-19 PROCEDURE — 63710000001 PREDNISONE PER 1 MG: Performed by: INTERNAL MEDICINE

## 2023-04-19 PROCEDURE — 97110 THERAPEUTIC EXERCISES: CPT

## 2023-04-19 PROCEDURE — 80053 COMPREHEN METABOLIC PANEL: CPT | Performed by: STUDENT IN AN ORGANIZED HEALTH CARE EDUCATION/TRAINING PROGRAM

## 2023-04-19 PROCEDURE — 85027 COMPLETE CBC AUTOMATED: CPT | Performed by: STUDENT IN AN ORGANIZED HEALTH CARE EDUCATION/TRAINING PROGRAM

## 2023-04-19 RX ADMIN — Medication 5 MG: at 21:39

## 2023-04-19 RX ADMIN — APIXABAN 2.5 MG: 2.5 TABLET, FILM COATED ORAL at 21:39

## 2023-04-19 RX ADMIN — LEVOTHYROXINE SODIUM 88 MCG: 0.09 TABLET ORAL at 09:10

## 2023-04-19 RX ADMIN — FLUTICASONE PROPIONATE 2 SPRAY: 50 SPRAY, METERED NASAL at 09:10

## 2023-04-19 RX ADMIN — LATANOPROST 1 DROP: 50 SOLUTION OPHTHALMIC at 21:39

## 2023-04-19 RX ADMIN — APIXABAN 2.5 MG: 2.5 TABLET, FILM COATED ORAL at 09:10

## 2023-04-19 RX ADMIN — PREDNISONE 20 MG: 20 TABLET ORAL at 09:10

## 2023-04-19 RX ADMIN — MIDODRINE HYDROCHLORIDE 10 MG: 5 TABLET ORAL at 09:10

## 2023-04-19 RX ADMIN — POLYETHYLENE GLYCOL 3350 17 G: 17 POWDER, FOR SOLUTION ORAL at 09:10

## 2023-04-19 RX ADMIN — MULTIPLE VITAMINS W/ MINERALS TAB 1 TABLET: TAB at 09:10

## 2023-04-19 RX ADMIN — MIDODRINE HYDROCHLORIDE 10 MG: 5 TABLET ORAL at 16:31

## 2023-04-19 RX ADMIN — MIDODRINE HYDROCHLORIDE 10 MG: 5 TABLET ORAL at 12:20

## 2023-04-19 NOTE — PROGRESS NOTES
Name: Bridger Elias ADMIT: 3/30/2023   : 1930  PCP: Alma Cat MD    MRN: [id number] LOS: 19 days   AGE/SEX: 89 y.o. male  ROOM: [facility]     Subjective   Subjective   Patient seen and examined this morning. Hospital day 20. Discussed with nursing, no reported acute events overnight. Patient with no acute complaints at present, sitting up in chair next to bedside eating lunch.       Objective   Objective   Vital Signs  Temp:  [96.2 °F (35.7 °C)-98 °F (36.7 °C)] 97.4 °F (36.3 °C)  Heart Rate:  [74] 74  Resp:  [16] 16  BP: (113-133)/(72-89) 125/74  SpO2:  [96 %-98 %] 98 %  on   ;   Device (Oxygen Therapy): room air  Body mass index is 23.48 kg/m².  Physical Exam  Vitals and nursing note reviewed.   Constitutional:       General: He is awake. He is not in acute distress.     Appearance: He is ill-appearing (Elderly, frail-appearing).   Cardiovascular:      Rate and Rhythm: Normal rate and regular rhythm.      Pulses: Normal pulses.      Heart sounds: Normal heart sounds.   Pulmonary:      Effort: Pulmonary effort is normal. No respiratory distress.      Breath sounds: Normal breath sounds.   Abdominal:      General: Bowel sounds are normal.      Palpations: Abdomen is soft.      Tenderness: There is no abdominal tenderness.   Musculoskeletal:      Right lower leg: Edema present.      Left lower leg: Edema present.   Skin:     General: Skin is warm and dry.      Coloration: Skin is pale.   Neurological:      Mental Status: He is alert.   Psychiatric:         Behavior: Behavior is cooperative.       Results Review     I reviewed the patient's new clinical results.  Results from last 7 days   Lab Units 23  03423  0350 23  04223  0345   WBC 10*3/mm3 10.25 11.73* 8.46 8.97   HEMOGLOBIN g/dL 12.6* 12.1* 12.2* 12.2*   PLATELETS 10*3/mm3 171 189 201 221     Results from last 7 days   Lab Units 23  0349 23  0350 23  04223  0345   SODIUM mmol/L 137 137 138  139   POTASSIUM mmol/L 5.2 4.6 4.6 4.1   CHLORIDE mmol/L 103 106 103 101   CO2 mmol/L 30.0* 24.7 28.6 27.0   BUN mg/dL 38* 44* 49* 51*   CREATININE mg/dL 1.24 1.25 1.54* 1.95*   GLUCOSE mg/dL 116* 112* 114* 108*   EGFR mL/min/1.73 54.2* 53.7* 41.8* 31.5*     Results from last 7 days   Lab Units 04/19/23  0349 04/18/23  0350 04/17/23  0421 04/16/23  0345   ALBUMIN g/dL 3.2* 2.9* 3.3* 3.1*   BILIRUBIN mg/dL 0.7 0.7 0.6 0.6   ALK PHOS U/L 154* 164* 172* 185*   AST (SGOT) U/L 66* 74* 62* 63*   ALT (SGPT) U/L 103* 96* 97* 89*     Results from last 7 days   Lab Units 04/19/23  0349 04/18/23  0350 04/17/23  0421 04/16/23  0345 04/15/23  0514 04/14/23  1052 04/14/23  0352   CALCIUM mg/dL 8.9 9.1 8.9 8.3   < >  --  8.9   ALBUMIN g/dL 3.2* 2.9* 3.3* 3.1*   < >  --  3.3*   MAGNESIUM mg/dL  --   --   --   --   --  2.9*  --    PHOSPHORUS mg/dL  --   --   --   --   --   --  4.4    < > = values in this interval not displayed.       No results found for: HGBA1C, POCGLU    No radiology results for the last day  I have personally reviewed all medications:  Scheduled Medications  apixaban, 2.5 mg, Oral, Q12H  fluticasone, 2 spray, Each Nare, Daily  latanoprost, 1 drop, Both Eyes, Daily  levothyroxine, 88 mcg, Oral, Daily  midodrine, 10 mg, Oral, TID AC  multivitamin with minerals, 1 tablet, Oral, Daily  polyethylene glycol, 17 g, Oral, Daily  predniSONE, 20 mg, Oral, Daily    Infusions   Diet  Diet: Fluid Restriction (240 mL/tray) Diets; 1500 mL/day; No Mixed Consistencies; Texture: Soft to Chew (NDD 3); Soft to Chew: Chopped Meat; Fluid Consistency: Thin (IDDSI 0)    I have personally reviewed:  [x]  Laboratory   [x]  Microbiology   [x]  Radiology   [x]  EKG/Telemetry  [x]  Cardiology/Vascular        Assessment/Plan     Active Hospital Problems    Diagnosis  POA   • **Acute respiratory failure with hypoxia [J96.01]  Yes   • Hyponatremia [E87.1]  Yes   • Normocytic anemia [D64.9]  Yes   • Transaminitis [R74.01]  Yes   • Stage 3a  chronic kidney disease [N18.31]  Yes   • Pacemaker [Z95.0]  Yes   • Atrial fibrillation [I48.91]  Yes   • Acute on chronic combined systolic and diastolic CHF (congestive heart failure) [I50.43]  Yes   • SCC (squamous cell carcinoma), face [C44.320]  Yes   • Hypothyroidism [E03.9]  Yes   • Coronary arteriosclerosis [I25.10]  Yes   • Dyslipidemia [E78.5]  Yes   • Hypertension [I10]  Yes   • Long term (current) use of anticoagulants [Z79.01]  Not Applicable      Resolved Hospital Problems    Diagnosis Date Resolved POA   • Hypokalemia [E87.6] 04/05/2023 No   • Community acquired pneumonia, unspecified laterality [J18.9] 04/03/2023 Yes     89 y.o. male with history of chronic combined systolic and diastolic heart failure, atrial fibrillation on long-term anticoagulation, s/p PPM, CAD, hypertension, hyperlipidemia, squamous cell carcinoma, CKD stage IIIb, hypothyroidism who presents to [facility] with complaints of worsening dyspnea and generalized weakness.  Admitted with acute hypoxic respiratory failure secondary to acute on chronic combined systolic and diastolic heart failure.      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  -CXR w/ b/l effusions, congestion  -TTE (4/1/23): 41-45%; systolic and diastolic flattening of IV septum consistent with RV pressure/volume overload    Severe symptomatic orthostatic hypotension:  Diuretics held and patient rehydrated.    Improved from prior but still present. Currently on Midodrine 10 mg TID. Cortisol secondary to prednisone but reasonable response to cosyntropin.  When prednisone discontinued would monitor blood pressures very closely.  Consider adding Florinef if orthostasis worsens at that point.    Hypotension  - Stable at present on increased dose of midodrine. Continue as prescribed for now.    Syncope:  - Likely secondary to above +/-  vasovagal    - Continue to monitor.    Bilateral pulmonary infiltrates  - possible ILD vs organizing pneumonia; clinical improvement on steroids  -Has follow-up with pulmonology arranged in 4 to 6 weeks,  -Continue with steroids (started on bactrim but switched to inhaled pentamadine for prophylaxis)    Paroxysmal atrial fibrillation on long-term anticoagulation  SSS w/ h/o PPM  -RC: None  -AC: Apixaban  -Appears relatively stable at present. Continue current treatment as prescribed and monitor for now.    CKD3a  - Creatinine appears stable/slightly improved when compared to prior.  Diuretics on hold.  - f/u outpatient arranged with Dr. Iraheta 5/16/23   - Order repeat BMP in AM for reassessment.    Transaminitis  - Casodex stopped, discussed with patient's urologist Dr. Petty, hold Casodex at discharge, he will follow-up with him in clinic and obtain a PSA and testosterone at 1 and 3 months  -LFTs improving on most recent labs. Repeat labs in AM, continue to closely monitor.    Hypothyroidism  - Stable. Continue Levothyroxine as prescribed.    Hyperlipidemia  - Stable. Continue Statin as prescribed.    Generalized weakness/Debility  - Consulted PT/OT, fall precautions.    Anemia  - Hemoglobin low but stable from prior.  - Order repeat CBC in AM for reassessment.    Hyponatremia-   Resolved      All workup, problems and plans new to me today. First day taking care of patient.    · SCDs for DVT prophylaxis.  · Limited code (no CPR, no intubation).  · Discussed with patient, nursing staff, CCP and care team on multidisciplinary rounds.  · Anticipate discharge to SNU facility once arrangements have been made.      Som Mckinney DO  [facility]  04/19/23  15:00 EDT

## 2023-04-19 NOTE — PLAN OF CARE
Goal Outcome Evaluation:           Progress: improving  Outcome Evaluation: Pt seen for OT treatment this date. Pt was up in chair upon arrival, agreeable to participate. Declined to get up to sink for grooming, due to fatigue, but agreeable to participate in sitting. Performed grooming with set up. Performed UE ther ex to improve functional strength/endurance for adl function. Performed set of 20 of each exercise. Pt verbalized fatigue at end of reps. OT issued ther ex as HEP and encouraged pt to perform daily. He verbalized understanding. Plans to dc to snf for cont therapy. OT wore appropriate PPE during session and performed hand hygiene before/after session.

## 2023-04-19 NOTE — THERAPY TREATMENT NOTE
Patient Name: Bridger Elias  : 1930    MRN: [id number]                              Today's Date: 2023       Admit Date: 3/30/2023    Visit Dx:     ICD-10-CM ICD-9-CM   1. Community acquired pneumonia, unspecified laterality  J18.9 486     Patient Active Problem List   Diagnosis   • SCC (squamous cell carcinoma), face   • General weakness   • Pacemaker   • Abnormal gait   • Atrial fibrillation   • Chronic diastolic heart failure   • Coronary arteriosclerosis   • Dyslipidemia   • Glaucoma   • Mitral valve disorder   • Hypertension   • Hypertensive kidney disease, stage III   • Hypothyroidism   • Long term (current) use of anticoagulants   • Malignant neoplasm of prostate   • Osteoporosis   • Squamous cell carcinoma of skin of face   • Acute on chronic diastolic CHF (congestive heart failure)   • Stage 3a chronic kidney disease   • Personal history of radiation therapy   • Acute on chronic combined systolic and diastolic CHF (congestive heart failure)   • Cellulitis of right leg   • Chronic acquired lymphedema   • Contusion of face   • Contusion of forearm, left   • Fall   • Chronic systolic heart failure (CMS/HCC)   • Acute respiratory failure with hypoxia   • Hyponatremia   • Normocytic anemia   • Transaminitis     Past Medical History:   Diagnosis Date   • Acute on chronic diastolic CHF (congestive heart failure) 2022   • Atrial fibrillation 2022   • Coronary arteriosclerosis 7/3/2014    Formatting of this note might be different from the original. [facility]  IMPRESSION:   1. Right heart disease, hypertensive cardiac disease.   2. Status post mitral valve repair.   3. Anatomy: No hemodynamically significant disease. about 30-40% lesion of    the right coronary artery. Normal. Left coronary artery system.   • Dyslipidemia 2013   • Glaucoma 2022   • Hypertension 2013   • Hypertensive kidney disease, stage III 3/13/2017   • Hypothyroidism 3/19/2017   • Long term (current) use  of anticoagulants 12/5/2013    Formatting of this note might be different from the original. Mitral valve replacement and atrial fibrillation Assume a range of 2.5-3.5.   • Malignant neoplasm of prostate 12/5/2013    Formatting of this note might be different from the original. 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   • Stage 3b chronic kidney disease 4/14/2022     Past Surgical History:   Procedure Laterality Date   • PACEMAKER IMPLANTATION  2018      General Information     Row Name 04/19/23 1254          OT Time and Intention    Document Type therapy note (daily note)  -KB     Mode of Treatment individual therapy;occupational therapy  -     Row Name 04/19/23 1254          General Information    Patient Profile Reviewed yes  -     Row Name 04/19/23 1254          Cognition    Orientation Status (Cognition) oriented x 3  -KB     Row Name 04/19/23 1254          Safety Issues, Functional Mobility    Impairments Affecting Function (Mobility) endurance/activity tolerance;strength  -           User Key  (r) = Recorded By, (t) = Taken By, (c) = Cosigned By    Initials Name Provider Type    KB Ame Red, AZUCENA Occupational Therapist                 Mobility/ADL's     Row Name 04/19/23 1255          Bed Mobility    Comment, (Bed Mobility) up in chair  -     Row Name 04/19/23 1255          Transfers    Comment, (Transfers) declined to stand due to fatigue  -     Row Name 04/19/23 1255          Activities of Daily Living    BADL Assessment/Intervention grooming  -     Row Name 04/19/23 1255          Grooming Assessment/Training    [place] Level (Grooming) oral care regimen;set up  -KB     Position (Grooming) supported sitting  -KB           User Key  (r) = Recorded By, (t) = Taken By, (c) =  Cosigned By    Initials Name Provider Type    Ame Lambert OT Occupational Therapist               Obj/Interventions     Row Name 04/19/23 1255          Shoulder (Therapeutic Exercise)    Shoulder (Therapeutic Exercise) AROM (active range of motion)  -     Shoulder AROM (Therapeutic Exercise) bilateral;flexion;extension;20 repititions;aBduction  -     Row Name 04/19/23 1255          Elbow/Forearm (Therapeutic Exercise)    Elbow/Forearm (Therapeutic Exercise) AROM (active range of motion)  -     Elbow/Forearm AROM (Therapeutic Exercise) bilateral;flexion;extension;20 repititions  -     Row Name 04/19/23 1255          Hand (Therapeutic Exercise)    Hand (Therapeutic Exercise) AROM (active range of motion)  -     Hand AROM/AAROM (Therapeutic Exercise) bilateral;AROM (active range of motion);20 repititions  -     Row Name 04/19/23 1255          Motor Skills    Motor Skills functional endurance  -     Functional Endurance poor  -     Therapeutic Exercise shoulder;elbow/forearm;hand  -           User Key  (r) = Recorded By, (t) = Taken By, (c) = Cosigned By    Initials Name Provider Type    Ame Lambert OT Occupational Therapist               Goals/Plan    No documentation.                Clinical Impression     Row Name 04/19/23 1256          Pain Assessment    Pretreatment Pain Rating 0/10 - no pain  -     Posttreatment Pain Rating 0/10 - no pain  -[facility] Name 04/19/23 1256          Plan of Care Review    Progress improving  -     Outcome Evaluation Pt seen for OT treatment this date. Pt was up in chair upon arrival, agreeable to participate. Declined to get up to sink for grooming, due to fatigue, but agreeable to participate in sitting. Performed grooming with set up. Performed UE ther ex to improve functional strength/endurance for adl function. Performed set of 20 of each exercise. Pt verbalized fatigue at end of reps. OT issued ther ex as HEP and encouraged pt to perform daily.  He verbalized understanding. Plans to dc to snf for cont therapy  -     Row Name 04/19/23 1256          Therapy Assessment/Plan (OT)    Rehab Potential (OT) good, to achieve stated therapy goals  -     Row Name 04/19/23 1256          Therapy Plan Review/Discharge Plan (OT)    Anticipated Discharge Disposition (OT) skilled nursing facility  -     Row Name 04/19/23 1256          Positioning and Restraints    Pre-Treatment Position sitting in chair/recliner  -KB     Post Treatment Position chair  -KB     In Chair reclined;sitting;call light within reach;encouraged to call for assist;exit alarm on  -KB           User Key  (r) = Recorded By, (t) = Taken By, (c) = Cosigned By    Initials Name Provider Type    Ame Lambert OT Occupational Therapist               Outcome Measures     Row Name 04/19/23 1259          How much help from another is currently needed...    Putting on and taking off regular lower body clothing? 3  -KB     Bathing (including washing, rinsing, and drying) 3  -KB     Toileting (which includes using toilet bed pan or urinal) 3  -KB     Putting on and taking off regular upper body clothing 3  -KB     Taking care of personal grooming (such as brushing teeth) 4  -KB     Eating meals 4  -KB     AM-PAC 6 Clicks Score (OT) 20  -KB     Row Name 04/19/23 1259          Functional Assessment    Outcome Measure Options AM-PAC 6 Clicks Daily Activity (OT)  -KB           User Key  (r) = Recorded By, (t) = Taken By, (c) = Cosigned By    Initials Name Provider Type    Ame Lambert OT Occupational Therapist                Occupational Therapy Education     Title: PT OT SLP Therapies (Done)     Topic: Occupational Therapy (Done)     Point: ADL training (Done)     Description:   Instruct learner(s) on proper safety adaptation and remediation techniques during self care or transfers.   Instruct in proper use of assistive devices.              Learning Progress Summary           Patient Acceptance, E,  VU by  at 3/31/2023 1528    Comment: role of OT, d/c rec   Family Acceptance, E, VU by  at 3/31/2023 1528    Comment: role of OT, d/c rec                   Point: Home exercise program (Done)     Description:   Instruct learner(s) on appropriate technique for monitoring, assisting and/or progressing therapeutic exercises/activities.              Learning Progress Summary           Patient Acceptance, E, VU by  at 3/31/2023 1528    Comment: role of OT, d/c rec   Family Acceptance, E, VU by  at 3/31/2023 1528    Comment: role of OT, d/c rec                   Point: Precautions (Done)     Description:   Instruct learner(s) on prescribed precautions during self-care and functional transfers.              Learning Progress Summary           Patient Acceptance, E, VU by  at 3/31/2023 1528    Comment: role of OT, d/c rec   Family Acceptance, E, VU by  at 3/31/2023 1528    Comment: role of OT, d/c rec                   Point: Body mechanics (Done)     Description:   Instruct learner(s) on proper positioning and spine alignment during self-care, functional mobility activities and/or exercises.              Learning Progress Summary           Patient Acceptance, E, VU by  at 3/31/2023 1528    Comment: role of OT, d/c rec   Family Acceptance, E, VU by  at 3/31/2023 1528    Comment: role of OT, d/c rec                               User Key     Initials Effective Dates Name Provider Type Discipline     08/20/21 -  Mariel Garza OT Occupational Therapist OT              OT Recommendation and Plan     Plan of Care Review  Progress: improving  Outcome Evaluation: Pt seen for OT treatment this date. Pt was up in chair upon arrival, agreeable to participate. Declined to get up to sink for grooming, due to fatigue, but agreeable to participate in sitting. Performed grooming with set up. Performed UE ther ex to improve functional strength/endurance for adl function. Performed set of 20 of each exercise. Pt  verbalized fatigue at end of reps. OT issued ther ex as HEP and encouraged pt to perform daily. He verbalized understanding. Plans to dc to snf for cont therapy     Time Calculation:    Time Calculation- OT     Row Name 04/19/23 1300             Time Calculation- OT    OT Start Time 1010  -KB      OT Stop Time 1025  -KB      OT Time Calculation (min) 15 min  -KB      Total Timed Code Minutes- OT 15 minute(s)  -KB      OT Received On 04/19/23  -KB      OT - Next Appointment 04/21/23  -KB      OT Goal Re-Cert Due Date 04/21/23  -KB         Timed Charges    70392 - OT Therapeutic Exercise Minutes 15  -KB         Total Minutes    Timed Charges Total Minutes 15  -KB       Total Minutes 15  -KB            User Key  (r) = Recorded By, (t) = Taken By, (c) = Cosigned By    Initials Name Provider Type    Ame Lambert OT Occupational Therapist              Therapy Charges for Today     Code Description Service Date Service Provider Modifiers Qty    [id number] HC OT THER PROC EA 15 MIN 4/19/2023 Ame Red OT GO 1               Ame Red OT  4/19/2023

## 2023-04-19 NOTE — PROGRESS NOTES
Continued Stay Note  [facility]     Patient Name: Bridger Elias  MRN: [id number]  Today's Date: 4/19/2023    Admit Date: 3/30/2023    Plan: [facility] cert pending   Discharge Plan     Row Name 04/19/23 1603       Plan    Plan [facility] cert pending    Plan Comments Spoke with [facility], pre-cert remains pending for [facility], [facility] will update CCP once cert is obtained. Follow up with patient and daughter at the bedside, plan remains to go to short-term rehab at [facility]. Anticipate wc van transport. [facility]'s pharmacy is selected in [employer].               Discharge Codes    No documentation.               Expected Discharge Date and Time     Expected Discharge Date Expected Discharge Time    Apr 19, 2023             Jennifer Marcos RN

## 2023-04-19 NOTE — PROGRESS NOTES
[facility] Progress Note      Patient Name: Bridger Elias  : 1930  MRN: [id number]  Primary Care Physician:  Alma Cat MD  Date of admission: 3/30/2023    Subjective     Interval History:   Follow up Yanira on CKD III. Waiting on insurance to go to rehab.  Denied dizziness or shortness of.    Review of Systems:   As noted above    Objective     Vitals:   Temp:  [96.2 °F (35.7 °C)-98 °F (36.7 °C)] 96.2 °F (35.7 °C)  Heart Rate:  [74] 74  Resp:  [16] 16  BP: ()/(31-89) 113/72    Intake/Output Summary (Last 24 hours) at 2023 0745  Last data filed at 2023 0213  Gross per 24 hour   Intake --   Output 860 ml   Net -860 ml       Physical Exam:    General Appearance: alert, oriented x 3, no acute distress . Up in chair.   Skin: warm and dry  HEENT: oral mucosa normal, nonicteric sclera. Voice weak  Neck: supple, no JVD  Lungs: Clear to auscultation.   Heart: RRR, normal S1 and S2  Abdomen: soft, nontender, nondistended. + bs  Extremities: trace lower ext edema.   Neuro: normal speech and mental status     Scheduled Meds:     apixaban, 2.5 mg, Oral, Q12H  fluticasone, 2 spray, Each Nare, Daily  latanoprost, 1 drop, Both Eyes, Daily  levothyroxine, 88 mcg, Oral, Daily  midodrine, 10 mg, Oral, TID AC  multivitamin with minerals, 1 tablet, Oral, Daily  polyethylene glycol, 17 g, Oral, Daily  predniSONE, 20 mg, Oral, Daily      IV Meds:        Results Reviewed:   I have personally reviewed the results from the time of this admission to 2023 07:45 EDT     Results from last 7 days   Lab Units 23  0349 23  0350 23  0421   SODIUM mmol/L 137 137 138   POTASSIUM mmol/L 5.2 4.6 4.6   CHLORIDE mmol/L 103 106 103   CO2 mmol/L 30.0* 24.7 28.6   BUN mg/dL 38* 44* 49*   CREATININE mg/dL 1.24 1.25 1.54*   CALCIUM mg/dL 8.9 9.1 8.9   BILIRUBIN mg/dL 0.7 0.7 0.6   ALK PHOS U/L 154* 164* 172*   ALT (SGPT) U/L 103* 96* 97*   AST (SGOT) U/L 66* 74* 62*   GLUCOSE  mg/dL 116* 112* 114*       Estimated Creatinine Clearance: 35.8 mL/min (by C-G formula based on SCr of 1.24 mg/dL).    Results from last 7 days   Lab Units 04/14/23  1052 04/14/23  0352   MAGNESIUM mg/dL 2.9*  --    PHOSPHORUS mg/dL  --  4.4       Results from last 7 days   Lab Units 04/14/23  0352   URIC ACID mg/dL 6.7       Results from last 7 days   Lab Units 04/19/23  0349 04/18/23  0350 04/17/23  0421 04/16/23  0345 04/15/23  0514   WBC 10*3/mm3 10.25 11.73* 8.46 8.97 8.55   HEMOGLOBIN g/dL 12.6* 12.1* 12.2* 12.2* 12.7*   PLATELETS 10*3/mm3 171 189 201 221 220             Assessment / Plan     ASSESSMENT:  1. MONISHA on CKD 3.  Baseline creatinine 1.2-1.4. Cardiorenal syndrome . Azotemia on steroid. Improving .Bactrim can also reduce secretion of creatinine.  Now Euvolemic by exam.  Continue midodrine  2 . Bilateral pulmonary infiltrates. Acute hypoxic respiratory failure. Possible ILD versus organizing PNA.  On steroid with clinical improvement.   3. Acute on chronic combined heart failure. No diuretic for now due to orthostasis.  Difficult problem as he came in with heart failure .  4. SSS, PAF.  5. SP MV repair .  6. Transaminitis. Casodex dc.   planning to check PSA and testoterone at 1 and 3 months.    7. Hypotension.  Added midodrine. BP a little labile.  On  2.5 mg tid.  8. Prostate cancer.   to follow up.  Casodex dc for now.   9. Hypothyroid on replacement.     PLAN:  1. On no diuretic   2. Ok with renal for dc if stable tomorrow.  3. Has followup with Dr. Dyson May 16 at 11:45 am.     Kiarra Iraheta MD  04/19/23  07:45 EDT    [facility]  [phone]

## 2023-04-19 NOTE — PLAN OF CARE
Goal Outcome Evaluation:  Plan of Care Reviewed With: patient        Progress: improving  Outcome Evaluation: VSS; no complaints of pain or discomfort; pt likes to sit in the chair; blood pressures better; may discharge tomorrow; continue to monitor

## 2023-04-20 PROBLEM — E44.0 MODERATE MALNUTRITION: Status: ACTIVE | Noted: 2023-04-20

## 2023-04-20 LAB
ALBUMIN SERPL-MCNC: 3.2 G/DL (ref 3.5–5.2)
ALBUMIN/GLOB SERPL: 1 G/DL
ALP SERPL-CCNC: 164 U/L (ref 39–117)
ALT SERPL W P-5'-P-CCNC: 127 U/L (ref 1–41)
ANION GAP SERPL CALCULATED.3IONS-SCNC: 6.7 MMOL/L (ref 5–15)
AST SERPL-CCNC: 82 U/L (ref 1–40)
BILIRUB SERPL-MCNC: 0.6 MG/DL (ref 0–1.2)
BUN SERPL-MCNC: 38 MG/DL (ref 8–23)
BUN/CREAT SERPL: 27.3 (ref 7–25)
CALCIUM SPEC-SCNC: 9 MG/DL (ref 8.2–9.6)
CHLORIDE SERPL-SCNC: 98 MMOL/L (ref 98–107)
CO2 SERPL-SCNC: 28.3 MMOL/L (ref 22–29)
CREAT SERPL-MCNC: 1.39 MG/DL (ref 0.76–1.27)
DEPRECATED RDW RBC AUTO: 51.5 FL (ref 37–54)
EGFRCR SERPLBLD CKD-EPI 2021: 47.3 ML/MIN/1.73
ERYTHROCYTE [DISTWIDTH] IN BLOOD BY AUTOMATED COUNT: 14.9 % (ref 12.3–15.4)
GLOBULIN UR ELPH-MCNC: 3.3 GM/DL
GLUCOSE SERPL-MCNC: 114 MG/DL (ref 65–99)
HCT VFR BLD AUTO: 38 % (ref 37.5–51)
HGB BLD-MCNC: 12.8 G/DL (ref 13–17.7)
MCH RBC QN AUTO: 32.4 PG (ref 26.6–33)
MCHC RBC AUTO-ENTMCNC: 33.7 G/DL (ref 31.5–35.7)
MCV RBC AUTO: 96.2 FL (ref 79–97)
PLATELET # BLD AUTO: 166 10*3/MM3 (ref 140–450)
PMV BLD AUTO: 11 FL (ref 6–12)
POTASSIUM SERPL-SCNC: 5.3 MMOL/L (ref 3.5–5.2)
PROT SERPL-MCNC: 6.5 G/DL (ref 6–8.5)
RBC # BLD AUTO: 3.95 10*6/MM3 (ref 4.14–5.8)
SODIUM SERPL-SCNC: 133 MMOL/L (ref 136–145)
WBC NRBC COR # BLD: 9.83 10*3/MM3 (ref 3.4–10.8)

## 2023-04-20 PROCEDURE — 80053 COMPREHEN METABOLIC PANEL: CPT | Performed by: STUDENT IN AN ORGANIZED HEALTH CARE EDUCATION/TRAINING PROGRAM

## 2023-04-20 PROCEDURE — 85027 COMPLETE CBC AUTOMATED: CPT | Performed by: STUDENT IN AN ORGANIZED HEALTH CARE EDUCATION/TRAINING PROGRAM

## 2023-04-20 PROCEDURE — 63710000001 PREDNISONE PER 1 MG: Performed by: INTERNAL MEDICINE

## 2023-04-20 RX ADMIN — MIDODRINE HYDROCHLORIDE 10 MG: 5 TABLET ORAL at 12:37

## 2023-04-20 RX ADMIN — MIDODRINE HYDROCHLORIDE 10 MG: 5 TABLET ORAL at 18:01

## 2023-04-20 RX ADMIN — MIDODRINE HYDROCHLORIDE 10 MG: 5 TABLET ORAL at 08:15

## 2023-04-20 RX ADMIN — APIXABAN 2.5 MG: 2.5 TABLET, FILM COATED ORAL at 22:05

## 2023-04-20 RX ADMIN — LATANOPROST 1 DROP: 50 SOLUTION OPHTHALMIC at 22:04

## 2023-04-20 RX ADMIN — MULTIPLE VITAMINS W/ MINERALS TAB 1 TABLET: TAB at 08:15

## 2023-04-20 RX ADMIN — APIXABAN 2.5 MG: 2.5 TABLET, FILM COATED ORAL at 08:15

## 2023-04-20 RX ADMIN — PREDNISONE 20 MG: 20 TABLET ORAL at 08:15

## 2023-04-20 RX ADMIN — FLUTICASONE PROPIONATE 2 SPRAY: 50 SPRAY, METERED NASAL at 08:15

## 2023-04-20 RX ADMIN — LEVOTHYROXINE SODIUM 88 MCG: 0.09 TABLET ORAL at 08:15

## 2023-04-20 RX ADMIN — POLYETHYLENE GLYCOL 3350 17 G: 17 POWDER, FOR SOLUTION ORAL at 08:15

## 2023-04-20 NOTE — PLAN OF CARE
Goal Outcome Evaluation:           Progress: improving  Outcome Evaluation: VSS stable pt remains in paced rhythmn, slept in chair overnight, pt dc back to [facility] today pending pre cert, no c/o pain, safety maintained, will CTM cl

## 2023-04-20 NOTE — PLAN OF CARE
Goal Outcome Evaluation:   Denies pain ,denies nausea, olaf meals, vitals stable, denies soa

## 2023-04-20 NOTE — PROGRESS NOTES
Continued Stay Note  [facility]     Patient Name: Bridger Elias  MRN: [id number]  Today's Date: 4/20/2023    Admit Date: 3/30/2023    Plan: tbd   Discharge Plan     Row Name 04/20/23 1324       Plan    Plan tbd    Plan Comments Spoke with [employer], [employer] has denied pre-cert, but they're offering a peer to peer. [facility] Physician Advisor notified and PA tab updated with [facility] contact information. Peer to peer must be scheduled today by 1600. If PA does not want to complete P2P, patient/family may appeal by calling [phone]. CCP will follow up.               Discharge Codes    No documentation.               Expected Discharge Date and Time     Expected Discharge Date Expected Discharge Time    Apr 20, 2023 11:30 AM            Jennifer Marcos RN

## 2023-04-20 NOTE — PROGRESS NOTES
Name: Bridger Elias ADMIT: 3/30/2023   : 1930  PCP: Alma Cat MD    MRN: [id number] LOS: 20 days   AGE/SEX: 89 y.o. male  ROOM: [facility]     Subjective   Subjective   Patient seen and examined this morning. Hospital day 21. Discussed with nursing, no reported acute events overnight. Patient awake, alert, resting comfortably at time of my examination. He states that he feels well today and has no acute complaints. On room air with 02 sat >90%. Awaiting pre-cert to SNF.       Objective   Objective   Vital Signs  Temp:  [96.3 °F (35.7 °C)-97.3 °F (36.3 °C)] 96.3 °F (35.7 °C)  Heart Rate:  [74] 74  Resp:  [16-18] 18  BP: (115-142)/(74-91) 115/74  SpO2:  [95 %-99 %] 97 %  on   ;   Device (Oxygen Therapy): room air  Body mass index is 23.62 kg/m².  Physical Exam  Vitals and nursing note reviewed.   Constitutional:       General: He is awake. He is not in acute distress.     Appearance: He is ill-appearing (Elderly, frail-appearing).   Cardiovascular:      Rate and Rhythm: Normal rate and regular rhythm.      Pulses: Normal pulses.      Heart sounds: Normal heart sounds.   Pulmonary:      Effort: Pulmonary effort is normal. No respiratory distress.      Breath sounds: Normal breath sounds.   Abdominal:      General: Bowel sounds are normal.      Palpations: Abdomen is soft.      Tenderness: There is no abdominal tenderness.   Musculoskeletal:      Right lower leg: Edema present.      Left lower leg: Edema present.   Skin:     General: Skin is warm and dry.      Coloration: Skin is pale.      Findings: Bruising (scattered) present.      Comments: Chronic venous stasis changes LE   Neurological:      Mental Status: He is alert.   Psychiatric:         Behavior: Behavior is cooperative.       Results Review     I reviewed the patient's new clinical results.  Results from last 7 days   Lab Units 23  0405 23  0349 23  0350 23  0421   WBC 10*3/mm3 9.83 10.25 11.73* 8.46   HEMOGLOBIN  g/dL 12.8* 12.6* 12.1* 12.2*   PLATELETS 10*3/mm3 166 171 189 201     Results from last 7 days   Lab Units 04/20/23 0405 04/19/23 0349 04/18/23 0350 04/17/23  0421   SODIUM mmol/L 133* 137 137 138   POTASSIUM mmol/L 5.3* 5.2 4.6 4.6   CHLORIDE mmol/L 98 103 106 103   CO2 mmol/L 28.3 30.0* 24.7 28.6   BUN mg/dL 38* 38* 44* 49*   CREATININE mg/dL 1.39* 1.24 1.25 1.54*   GLUCOSE mg/dL 114* 116* 112* 114*   EGFR mL/min/1.73 47.3* 54.2* 53.7* 41.8*     Results from last 7 days   Lab Units 04/20/23 0405 04/19/23 0349 04/18/23 0350 04/17/23  0421   ALBUMIN g/dL 3.2* 3.2* 2.9* 3.3*   BILIRUBIN mg/dL 0.6 0.7 0.7 0.6   ALK PHOS U/L 164* 154* 164* 172*   AST (SGOT) U/L 82* 66* 74* 62*   ALT (SGPT) U/L 127* 103* 96* 97*     Results from last 7 days   Lab Units 04/20/23 0405 04/19/23 0349 04/18/23 0350 04/17/23  0421 04/15/23  0514 04/14/23  1052 04/14/23  0352   CALCIUM mg/dL 9.0 8.9 9.1 8.9   < >  --  8.9   ALBUMIN g/dL 3.2* 3.2* 2.9* 3.3*   < >  --  3.3*   MAGNESIUM mg/dL  --   --   --   --   --  2.9*  --    PHOSPHORUS mg/dL  --   --   --   --   --   --  4.4    < > = values in this interval not displayed.       No results found for: HGBA1C, POCGLU    No radiology results for the last day  I have personally reviewed all medications:  Scheduled Medications  apixaban, 2.5 mg, Oral, Q12H  fluticasone, 2 spray, Each Nare, Daily  latanoprost, 1 drop, Both Eyes, Daily  levothyroxine, 88 mcg, Oral, Daily  midodrine, 10 mg, Oral, TID AC  multivitamin with minerals, 1 tablet, Oral, Daily  polyethylene glycol, 17 g, Oral, Daily  predniSONE, 20 mg, Oral, Daily    Infusions   Diet  Diet: Fluid Restriction (240 mL/tray) Diets; 1500 mL/day; No Mixed Consistencies; Texture: Soft to Chew (NDD 3); Soft to Chew: Chopped Meat; Fluid Consistency: Thin (IDDSI 0)    I have personally reviewed:  [x]  Laboratory   [x]  Microbiology   [x]  Radiology   [x]  EKG/Telemetry  [x]  Cardiology/Vascular        Assessment/Plan     Active Hospital  Problems    Diagnosis  POA   • **Acute respiratory failure with hypoxia [J96.01]  Yes   • Moderate Malnutrition (HCC) [E44.0]  Yes   • Hyponatremia [E87.1]  Yes   • Normocytic anemia [D64.9]  Yes   • Transaminitis [R74.01]  Yes   • Stage 3a chronic kidney disease [N18.31]  Yes   • Pacemaker [Z95.0]  Yes   • Atrial fibrillation [I48.91]  Yes   • Acute on chronic combined systolic and diastolic CHF (congestive heart failure) [I50.43]  Yes   • SCC (squamous cell carcinoma), face [C44.320]  Yes   • Hypothyroidism [E03.9]  Yes   • Coronary arteriosclerosis [I25.10]  Yes   • Dyslipidemia [E78.5]  Yes   • Hypertension [I10]  Yes   • Long term (current) use of anticoagulants [Z79.01]  Not Applicable      Resolved Hospital Problems    Diagnosis Date Resolved POA   • Hypokalemia [E87.6] 04/05/2023 No   • Community acquired pneumonia, unspecified laterality [J18.9] 04/03/2023 Yes     89 y.o. male with history of chronic combined systolic and diastolic heart failure, atrial fibrillation on long-term anticoagulation, s/p PPM, CAD, hypertension, hyperlipidemia, squamous cell carcinoma, CKD stage IIIb, hypothyroidism who presents to [facility] with complaints of worsening dyspnea and generalized weakness.  Admitted with acute hypoxic respiratory failure secondary to acute on chronic combined systolic and diastolic heart failure.      Acute respiratory failure with hypoxia  Acute on chronic combined systolic and diastolic heart failure  Valvular heart disease (severe TR, s/p MVR)  - Patient met criteria for diagnosis of acute respiratory failure with hypoxia with: Complaints of dyspnea, new oxygen requirement, P/F ratio <300 per ABG.  - ProBNP elevated to 2683 on admission (most recently was 662 on 03/10).  Chest x-ray showing bilateral pleural effusions and pulmonary vascular congestion. Infectious workup negative.  - 2D Echocardiogram showing EF 41-45% with systolic and diastolic flattening of the interventricular  septum consistent with right ventricular pressure and volume overload, severely dilated right ventricular cavity.   - Cardiology and Nephrology followed patient during his stay, he was initially treated with diuretics, however, he developed electrolyte abnormalities, worsening renal failure and hypotension, which required these to be stopped.  - He appears stable at this time from this perspective, he is on room air with O2 sats greater than 90%.  He appears relatively euvolemic, no evidence to suggest further hypervolemia warranting acute intervention.  Diuretics remain on hold at this time.  - Strict I/O, daily weights, telemetry, pulse oximetry, low sodium diet.    History of hypertension, now with hypotension  Severe symptomatic orthostatic hypotension  - Diuretics held and patient rehydrated.  Also, patient started on treatment with Midodrine. Currently on Midodrine 10 mg TID. Cortisol secondary to prednisone but reasonable response to cosyntropin.  When prednisone discontinued would monitor blood pressures very closely.  Consider adding Florinef if orthostasis worsens at that point.  - Continue to monitor for now and continue treatment as prescribed. Closely monitor BP, fall precautions.    Syncope  - Likely secondary to above +/- vasovagal    - Continue to monitor.    Bilateral pulmonary infiltrates  - possible ILD vs organizing pneumonia; clinical improvement on steroids  - Has follow-up with pulmonology arranged in 4 to 6 weeks,  - Continue with steroids (started on bactrim but switched to inhaled pentamadine for prophylaxis, however, this has now been stopped).    Paroxysmal atrial fibrillation on long-term anticoagulation  History of sick sinus syndrome s/p PPM  SSS w/ h/o PPM  - Appears stable at present. Was on Carvedilol as outpatient, however, this was stopped in setting of acute decompensated heart failure and hypotension. Also, was on amiodarone, which was since been discontinued as well.  - Not on  rate control medications at present. Remains on Eliquis.  - Continue current treatment as prescribed and monitor for now on telemetry.    Chronic kidney disease stage 3A  - On most recent labs, patient's creatinine found to be relatively stable when compared to prior. Nephrology followed, patient diuretics on hold at present. He has outpatient follow up arranged with Dr. Iraheta 5/16/23.  - No indications to warrant acute changes and/or intervention at this time.  - Order repeat BMP in AM for reassessment of renal function.   - Will continue to monitor and trend creatinine to guide ongoing management decisions. Avoid nephrotoxic medications, IVF, strict I/O, daily weights    Transaminitis  - LFTs slightly elevated, however, relatively stable from prior.  Casodex previously stopped, this was discussed with patient's urologist Dr. Petty, who recommended holding Casodex at discharge, he will follow-up with him in clinic and obtain a PSA and testosterone at 1 and 3 months. Was on Statin, which has also been discontinued.  - Monitor for now.  Repeat CMP in the AM for reassessment.    Anemia  - Hemoglobin low on most recent labs, however, stable from prior. No evidence of overt blood loss. No indications for acute intervention at this time.    - Order repeat CBC in AM for reassessment. Continue to monitor, transfuse for hemoglobin <7.    Hyponatremia  - Na low previously earlier in hospital stay, then resolved, however, is slightly low again on most recent labs, but only minimal drop. Can monitor for now.  - Repeat labs in AM to guide further management decisions.    Hypothyroidism  - Stable. Continue Levothyroxine as prescribed.    Hyperlipidemia  - Stable. Continue Statin as prescribed.    Generalized weakness/Debility  - Consulted PT/OT, fall precautions.    · SCDs for DVT prophylaxis.  · Limited code (no CPR, no intubation).  · Discussed with patient, nursing staff, CCP and care team on multidisciplinary  rounds.  · Anticipate discharge to SNU facility once arrangements have been made.      Som Mckinney DO  [facility]  04/20/23

## 2023-04-20 NOTE — CASE MANAGEMENT/SOCIAL WORK
Continued Stay Note  [facility]     Patient Name: Bridger Elias  MRN: [id number]  Today's Date: 4/20/2023    Admit Date: 3/30/2023    Plan: DC to SNF once pre-cert obtained   Discharge Plan     Row Name 04/20/23 1558       Plan    Plan DC to SNF once pre-cert obtained    Plan Comments Informed daughter, Sultana Barnes, of [employer] denial and of peer to peer review. Will contact family in  4-    Row Name 04/20/23 1324       Plan    Plan tbd    Plan Comments Spoke with [place], [employer] has denied pre-cert, but they're offering a peer to peer. [facility] Physician Advisor notified and PA tab updated with [employer] contact information. Peer to peer must be scheduled today by 1600. If PA does not want to complete P2P, patient/family may appeal by calling [phone]. CCP will follow up.               Discharge Codes    No documentation.               Expected Discharge Date and Time     Expected Discharge Date Expected Discharge Time    Apr 21, 2023 11:30 AM            Kandy Wallace RN

## 2023-04-20 NOTE — PROGRESS NOTES
[facility] Progress Note      Patient Name: Bridger Elias  : 1930  MRN: [id number]  Primary Care Physician:  Alma Cat MD  Date of admission: 3/30/2023    Subjective     Interval History:   Follow up Yanira on CKD III.  Plan for discharge back to [facility] today pending precertification.  No shortness of air or chest pain.  Slept in chair overnight.    Review of Systems:   As noted above    Objective     Vitals:   Temp:  [97.2 °F (36.2 °C)-97.4 °F (36.3 °C)] 97.2 °F (36.2 °C)  Heart Rate:  [74] 74  Resp:  [16] 16  BP: (122-125)/(74-80) 124/80    Intake/Output Summary (Last 24 hours) at 2023 0725  Last data filed at 2023 0302  Gross per 24 hour   Intake 1280 ml   Output 850 ml   Net 430 ml       Physical Exam:    General Appearance: alert, oriented x 3, no acute distress . Up in chair.   Skin: warm and dry  HEENT: oral mucosa normal, nonicteric sclera. Voice weak  Neck: supple, no JVD  Lungs: Clear to auscultation.   Heart: RRR, normal S1 and S2  Abdomen: soft, nontender, nondistended. + bs  Extremities: trace lower ext edema.   Neuro: normal speech and mental status     Scheduled Meds:     apixaban, 2.5 mg, Oral, Q12H  fluticasone, 2 spray, Each Nare, Daily  latanoprost, 1 drop, Both Eyes, Daily  levothyroxine, 88 mcg, Oral, Daily  midodrine, 10 mg, Oral, TID AC  multivitamin with minerals, 1 tablet, Oral, Daily  polyethylene glycol, 17 g, Oral, Daily  predniSONE, 20 mg, Oral, Daily      IV Meds:        Results Reviewed:   I have personally reviewed the results from the time of this admission to 2023 07:25 EDT     Results from last 7 days   Lab Units 23  0405 23  0349 23  0350   SODIUM mmol/L 133* 137 137   POTASSIUM mmol/L 5.3* 5.2 4.6   CHLORIDE mmol/L 98 103 106   CO2 mmol/L 28.3 30.0* 24.7   BUN mg/dL 38* 38* 44*   CREATININE mg/dL 1.39* 1.24 1.25   CALCIUM mg/dL 9.0 8.9 9.1   BILIRUBIN mg/dL 0.6 0.7 0.7   ALK PHOS U/L 164* 154* 164*    ALT (SGPT) U/L 127* 103* 96*   AST (SGOT) U/L 82* 66* 74*   GLUCOSE mg/dL 114* 116* 112*       Estimated Creatinine Clearance: 32.1 mL/min (A) (by C-G formula based on SCr of 1.39 mg/dL (H)).    Results from last 7 days   Lab Units 04/14/23  1052 04/14/23  0352   MAGNESIUM mg/dL 2.9*  --    PHOSPHORUS mg/dL  --  4.4       Results from last 7 days   Lab Units 04/14/23  0352   URIC ACID mg/dL 6.7       Results from last 7 days   Lab Units 04/20/23  0405 04/19/23  0349 04/18/23  0350 04/17/23  0421 04/16/23  0345   WBC 10*3/mm3 9.83 10.25 11.73* 8.46 8.97   HEMOGLOBIN g/dL 12.8* 12.6* 12.1* 12.2* 12.2*   PLATELETS 10*3/mm3 166 171 189 201 221             Assessment / Plan     ASSESSMENT:  1. MONISHA on CKD 3.  Baseline creatinine 1.2-1.4. Cardiorenal syndrome .  Improving .Bactrim can also reduce secretion of creatinine.  Now Euvolemic by exam.  Continue midodrine  2 . Bilateral pulmonary infiltrates. Acute hypoxic respiratory failure. Possible ILD versus organizing PNA.  On steroid with clinical improvement.   3. Acute on chronic combined heart failure. No diuretic for now due to orthostasis.  Difficult problem as he came in with heart failure .  4. SSS, PAF.  5. SP MV repair .  6. Transaminitis. Casodex dc.   planning to check PSA and testoterone at 1 and 3 months.    7. Hypotension.  Better now on midodrine 10 mg p.o. every 8 hours  8. Prostate cancer.   to follow up.  Casodex dc for now.   9. Hypothyroid on replacement.     PLAN:    1. Continue current management.  Will need follow-up with nephrology and cardiology as an outpatient.  2. Ok with renal for dc if stable tomorrow.  3. Has followup with Dr. Dyson May 16 at 11:45 am.     Kiarra Iraheta MD  04/20/23  07:25 EDT    [facility]  [phone]

## 2023-04-21 LAB
ALBUMIN SERPL-MCNC: 3 G/DL (ref 3.5–5.2)
ALBUMIN/GLOB SERPL: 0.9 G/DL
ALP SERPL-CCNC: 145 U/L (ref 39–117)
ALT SERPL W P-5'-P-CCNC: 109 U/L (ref 1–41)
ANION GAP SERPL CALCULATED.3IONS-SCNC: 9.6 MMOL/L (ref 5–15)
AST SERPL-CCNC: 77 U/L (ref 1–40)
BILIRUB SERPL-MCNC: 0.7 MG/DL (ref 0–1.2)
BUN SERPL-MCNC: 36 MG/DL (ref 8–23)
BUN/CREAT SERPL: 30.8 (ref 7–25)
CALCIUM SPEC-SCNC: 8.7 MG/DL (ref 8.2–9.6)
CHLORIDE SERPL-SCNC: 105 MMOL/L (ref 98–107)
CO2 SERPL-SCNC: 22.4 MMOL/L (ref 22–29)
CREAT SERPL-MCNC: 1.17 MG/DL (ref 0.76–1.27)
DEPRECATED RDW RBC AUTO: 54.3 FL (ref 37–54)
EGFRCR SERPLBLD CKD-EPI 2021: 58.1 ML/MIN/1.73
ERYTHROCYTE [DISTWIDTH] IN BLOOD BY AUTOMATED COUNT: 15.2 % (ref 12.3–15.4)
GLOBULIN UR ELPH-MCNC: 3.2 GM/DL
GLUCOSE SERPL-MCNC: 89 MG/DL (ref 65–99)
HCT VFR BLD AUTO: 36.8 % (ref 37.5–51)
HGB BLD-MCNC: 12.6 G/DL (ref 13–17.7)
MCH RBC QN AUTO: 33.6 PG (ref 26.6–33)
MCHC RBC AUTO-ENTMCNC: 34.2 G/DL (ref 31.5–35.7)
MCV RBC AUTO: 98.1 FL (ref 79–97)
PLATELET # BLD AUTO: 127 10*3/MM3 (ref 140–450)
PMV BLD AUTO: 12 FL (ref 6–12)
POTASSIUM SERPL-SCNC: 4.9 MMOL/L (ref 3.5–5.2)
PROT SERPL-MCNC: 6.2 G/DL (ref 6–8.5)
RBC # BLD AUTO: 3.75 10*6/MM3 (ref 4.14–5.8)
SODIUM SERPL-SCNC: 137 MMOL/L (ref 136–145)
WBC NRBC COR # BLD: 10.53 10*3/MM3 (ref 3.4–10.8)

## 2023-04-21 PROCEDURE — 85027 COMPLETE CBC AUTOMATED: CPT | Performed by: STUDENT IN AN ORGANIZED HEALTH CARE EDUCATION/TRAINING PROGRAM

## 2023-04-21 PROCEDURE — 63710000001 PREDNISONE PER 1 MG: Performed by: INTERNAL MEDICINE

## 2023-04-21 PROCEDURE — 97116 GAIT TRAINING THERAPY: CPT

## 2023-04-21 PROCEDURE — 97110 THERAPEUTIC EXERCISES: CPT

## 2023-04-21 PROCEDURE — 80053 COMPREHEN METABOLIC PANEL: CPT | Performed by: STUDENT IN AN ORGANIZED HEALTH CARE EDUCATION/TRAINING PROGRAM

## 2023-04-21 RX ORDER — AMOXICILLIN 250 MG
1 CAPSULE ORAL 2 TIMES DAILY
Status: DISCONTINUED | OUTPATIENT
Start: 2023-04-21 | End: 2023-04-24 | Stop reason: HOSPADM

## 2023-04-21 RX ADMIN — LEVOTHYROXINE SODIUM 88 MCG: 0.09 TABLET ORAL at 08:17

## 2023-04-21 RX ADMIN — FLUTICASONE PROPIONATE 2 SPRAY: 50 SPRAY, METERED NASAL at 08:17

## 2023-04-21 RX ADMIN — APIXABAN 2.5 MG: 2.5 TABLET, FILM COATED ORAL at 22:00

## 2023-04-21 RX ADMIN — MIDODRINE HYDROCHLORIDE 10 MG: 5 TABLET ORAL at 16:48

## 2023-04-21 RX ADMIN — MIDODRINE HYDROCHLORIDE 10 MG: 5 TABLET ORAL at 08:17

## 2023-04-21 RX ADMIN — POLYETHYLENE GLYCOL 3350 17 G: 17 POWDER, FOR SOLUTION ORAL at 08:17

## 2023-04-21 RX ADMIN — MULTIPLE VITAMINS W/ MINERALS TAB 1 TABLET: TAB at 08:17

## 2023-04-21 RX ADMIN — LATANOPROST 1 DROP: 50 SOLUTION OPHTHALMIC at 22:00

## 2023-04-21 RX ADMIN — MIDODRINE HYDROCHLORIDE 10 MG: 5 TABLET ORAL at 11:45

## 2023-04-21 RX ADMIN — DOCUSATE SODIUM 50MG AND SENNOSIDES 8.6MG 1 TABLET: 8.6; 5 TABLET, FILM COATED ORAL at 14:03

## 2023-04-21 RX ADMIN — APIXABAN 2.5 MG: 2.5 TABLET, FILM COATED ORAL at 08:17

## 2023-04-21 RX ADMIN — PREDNISONE 20 MG: 20 TABLET ORAL at 08:17

## 2023-04-21 NOTE — PLAN OF CARE
Goal Outcome Evaluation:  Plan of Care Reviewed With: patient, family        Progress: no change  Outcome Evaluation: Patient has been alert and oriented. No complaints of pain noted. Up with therapy to recliner. Up to BSC with this RN and staff. Tolerated well. Required cueing for keeping head up while walking. Voided and BM per BSC. Family here this shift and stayed for a while. Appetite fair to good. Follows fluid restriction well. Reminders and assistance to tilt/turn while in recliner. Vital signs stable. Call light in reach. Safety maintained.  Diuretic in Use: None  Response to Diuretics (Output greater than intake): output greater than intake this shift.   Daily Weight (up or down): down  O2 Requirements: room air.  Functional Status (Activity level, tolerance and respiratory symptoms): continues to become fatigued with exertion.   Discharge Plans: to be determined at this time.

## 2023-04-21 NOTE — PLAN OF CARE
Goal Outcome Evaluation:         Diuretic in Use: NONE  Response to Diuretics (Output greater than intake): YES  Daily Weight (up or down): DOWN  O2 Requirements: RA  Functional Status (Activity level, tolerance and respiratory symptoms): UP IN CHAIR  Discharge Plans:  TO REHAB      Outcome Evaluation: VSS. RESTED WELL OVERNIGHT WITHOUT C/O'S. WAITING ON APPEAL FOR REHAB PLACEMENT.

## 2023-04-21 NOTE — PROGRESS NOTES
[facility] Progress Note      Patient Name: Bridger Elias  : 1930  MRN: [id number]  Primary Care Physician:  Alma Cat MD  Date of admission: 3/30/2023    Subjective     Interval History:   Follow up Yanira on CKD III.  Sitting up in chair.  Denies shortness of air or chest pain.  No new issues.  Denies nursing home by his insurance.  Review of Systems:   As noted above    Objective     Vitals:   Temp:  [96.3 °F (35.7 °C)-97.6 °F (36.4 °C)] 97.6 °F (36.4 °C)  Heart Rate:  [] 100  Resp:  [18] 18  BP: (115-141)/(74-87) 120/75    Intake/Output Summary (Last 24 hours) at 2023 1123  Last data filed at 2023 0820  Gross per 24 hour   Intake 970 ml   Output 1050 ml   Net -80 ml       Physical Exam:    General Appearance: alert, oriented x 3, no acute distress .   Skin: warm and dry  HEENT: oral mucosa normal, nonicteric sclera. Voice weak  Neck: supple, no JVD  Lungs: Clear to auscultation.   Heart: RRR, normal S1 and S2  Abdomen: soft, nontender, nondistended. + bs  Extremities: trace lower ext edema.   Neuro: normal speech and mental status     Scheduled Meds:     apixaban, 2.5 mg, Oral, Q12H  fluticasone, 2 spray, Each Nare, Daily  latanoprost, 1 drop, Both Eyes, Daily  levothyroxine, 88 mcg, Oral, Daily  midodrine, 10 mg, Oral, TID AC  multivitamin with minerals, 1 tablet, Oral, Daily  polyethylene glycol, 17 g, Oral, Daily  predniSONE, 20 mg, Oral, Daily      IV Meds:        Results Reviewed:   I have personally reviewed the results from the time of this admission to 2023 11:23 EDT     Results from last 7 days   Lab Units 23  0448 23  0405 23  0349   SODIUM mmol/L 137 133* 137   POTASSIUM mmol/L 4.9 5.3* 5.2   CHLORIDE mmol/L 105 98 103   CO2 mmol/L 22.4 28.3 30.0*   BUN mg/dL 36* 38* 38*   CREATININE mg/dL 1.17 1.39* 1.24   CALCIUM mg/dL 8.7 9.0 8.9   BILIRUBIN mg/dL 0.7 0.6 0.7   ALK PHOS U/L 145* 164* 154*   ALT (SGPT) U/L 109* 127*  103*   AST (SGOT) U/L 77* 82* 66*   GLUCOSE mg/dL 89 114* 116*       Estimated Creatinine Clearance: 37.4 mL/min (by C-G formula based on SCr of 1.17 mg/dL).                Results from last 7 days   Lab Units 04/21/23  0448 04/20/23  0405 04/19/23  0349 04/18/23  0350 04/17/23  0421   WBC 10*3/mm3 10.53 9.83 10.25 11.73* 8.46   HEMOGLOBIN g/dL 12.6* 12.8* 12.6* 12.1* 12.2*   PLATELETS 10*3/mm3 127* 166 171 189 201             Assessment / Plan     ASSESSMENT:  1. MONISHA on CKD 3.  Baseline creatinine 1.2-1.4. Cardiorenal syndrome .  Improving .Bactrim can also reduce secretion of creatinine.  Now Euvolemic by exam.  Continue midodrine  2 . Bilateral pulmonary infiltrates. Acute hypoxic respiratory failure. Possible ILD versus organizing PNA.  On steroid with clinical improvement.   3. Acute on chronic combined heart failure. No diuretic for now due to orthostasis.    4. SSS, PAF.  5. SP MV repair .  6. Transaminitis. Casodex dc.   planning to check PSA and testoterone at 1 and 3 months.    7. Hypotension.  Better now on midodrine 10 mg p.o. every 8 hours  8. Prostate cancer.   9. Hypothyroid on replacement.     PLAN:    1. Continue current management.  Will need follow-up with nephrology and cardiology as an outpatient.  2. Ok with renal for dc if stable tomorrow.  3. Has followup with Dr. Dyson May 16 at 11:45 am.     Kiarra Iraheta MD  04/21/23  11:23 EDT    [facility]  [phone]

## 2023-04-21 NOTE — CASE MANAGEMENT/SOCIAL WORK
Continued Stay Note  [facility]     Patient Name: Bridger Elias  MRN: [id number]  Today's Date: 4/21/2023    Admit Date: 3/30/2023    Plan: SNF, pending appeal from [employer]   Discharge Plan     Row Name 04/21/23 1258       Plan    Plan SNF, pending appeal from [employer]    Plan Comments MD peer to peer denied from [employer]. SOn, Yogesh Elias present, completed family appeal process ([phone]) PT notes of 4-, as well as additional chart information faxed to [facility] per request. Process could take up to 48 hours. CCP will continue to monitor               Discharge Codes    No documentation.               Expected Discharge Date and Time     Expected Discharge Date Expected Discharge Time    Apr 24, 2023 11:30 AM            Kandy Wallace RN

## 2023-04-21 NOTE — PLAN OF CARE
Goal Outcome Evaluation:  Plan of Care Reviewed With: patient        Progress: no change  Outcome Evaluation: Pt seen for PT treatment today. Pt needed a little more assistance with mobility. Pt required Shaista with supine to sit  and CGA with STS transfers. Pt able to ambulate 8ft with Shaista, rwx. Pt c/o bilateral knees feeling weak and unable to ambulate further today. Pt did perform several bilateral LE exercises to improve LE strength. PT continues to recommend SNF for pt to improve strength, gait and balance to decrease falls risk. Pt is below baseline.

## 2023-04-21 NOTE — PROGRESS NOTES
Name: Bridger Elias ADMIT: 3/30/2023   : 1930  PCP: Alma Cat MD    MRN: [id number] LOS: 21 days   AGE/SEX: 89 y.o. male  ROOM: [facility]     Subjective   Subjective   Patient seen and examined this morning. Hospital day 22. Discussed with nursing, no reported acute events overnight. Patient awake, alert, resting comfortably at time of my examination.  Family at bedside.  He states that he feels well today and has no acute complaints.  He remains on room air with O2 sats greater than 90%, vitals stable.         Objective   Objective   Vital Signs  Temp:  [97.3 °F (36.3 °C)-97.6 °F (36.4 °C)] 97.6 °F (36.4 °C)  Heart Rate:  [] 86  Resp:  [18] 18  BP: (110-141)/(62-87) 110/62  SpO2:  [92 %-96 %] 92 %  on   ;   Device (Oxygen Therapy): room air  Body mass index is 23.13 kg/m².  Physical Exam  Vitals and nursing note reviewed.   Constitutional:       General: He is awake. He is not in acute distress.     Appearance: He is ill-appearing (Elderly, frail-appearing).   Cardiovascular:      Rate and Rhythm: Normal rate and regular rhythm.      Pulses: Normal pulses.      Heart sounds: Normal heart sounds.   Pulmonary:      Effort: Pulmonary effort is normal. No respiratory distress.      Breath sounds: Normal breath sounds.   Abdominal:      General: Bowel sounds are normal.      Palpations: Abdomen is soft.      Tenderness: There is no abdominal tenderness.   Musculoskeletal:      Right lower leg: Edema present.      Left lower leg: Edema present.   Skin:     General: Skin is warm and dry.      Coloration: Skin is pale.      Findings: Bruising (scattered) present.      Comments: Chronic venous stasis changes LE   Neurological:      Mental Status: He is alert.   Psychiatric:         Behavior: Behavior is cooperative.       Results Review     I reviewed the patient's new clinical results.  Results from last 7 days   Lab Units 23  0448 23  0405 23  0349 23  0350   WBC 10*3/mm3  10.53 9.83 10.25 11.73*   HEMOGLOBIN g/dL 12.6* 12.8* 12.6* 12.1*   PLATELETS 10*3/mm3 127* 166 171 189     Results from last 7 days   Lab Units 04/21/23 0448 04/20/23 0405 04/19/23 0349 04/18/23  0350   SODIUM mmol/L 137 133* 137 137   POTASSIUM mmol/L 4.9 5.3* 5.2 4.6   CHLORIDE mmol/L 105 98 103 106   CO2 mmol/L 22.4 28.3 30.0* 24.7   BUN mg/dL 36* 38* 38* 44*   CREATININE mg/dL 1.17 1.39* 1.24 1.25   GLUCOSE mg/dL 89 114* 116* 112*   EGFR mL/min/1.73 58.1* 47.3* 54.2* 53.7*     Results from last 7 days   Lab Units 04/21/23 0448 04/20/23 0405 04/19/23 0349 04/18/23  0350   ALBUMIN g/dL 3.0* 3.2* 3.2* 2.9*   BILIRUBIN mg/dL 0.7 0.6 0.7 0.7   ALK PHOS U/L 145* 164* 154* 164*   AST (SGOT) U/L 77* 82* 66* 74*   ALT (SGPT) U/L 109* 127* 103* 96*     Results from last 7 days   Lab Units 04/21/23 0448 04/20/23 0405 04/19/23 0349 04/18/23  0350   CALCIUM mg/dL 8.7 9.0 8.9 9.1   ALBUMIN g/dL 3.0* 3.2* 3.2* 2.9*       No results found for: HGBA1C, POCGLU    No radiology results for the last day  I have personally reviewed all medications:  Scheduled Medications  apixaban, 2.5 mg, Oral, Q12H  fluticasone, 2 spray, Each Nare, Daily  latanoprost, 1 drop, Both Eyes, Daily  levothyroxine, 88 mcg, Oral, Daily  midodrine, 10 mg, Oral, TID AC  multivitamin with minerals, 1 tablet, Oral, Daily  polyethylene glycol, 17 g, Oral, Daily  predniSONE, 20 mg, Oral, Daily    Infusions   Diet  Diet: Fluid Restriction (240 mL/tray) Diets; 1500 mL/day; No Mixed Consistencies; Texture: Soft to Chew (NDD 3); Soft to Chew: Chopped Meat; Fluid Consistency: Thin (IDDSI 0)    I have personally reviewed:  [x]  Laboratory   [x]  Radiology   [x]  EKG/Telemetry  [x]  Cardiology/Vascular        Assessment/Plan     Active Hospital Problems    Diagnosis  POA   • **Acute respiratory failure with hypoxia [J96.01]  Yes   • Moderate Malnutrition (HCC) [E44.0]  Yes   • Hyponatremia [E87.1]  Yes   • Normocytic anemia [D64.9]  Yes   • Transaminitis  [R74.01]  Yes   • Stage 3a chronic kidney disease [N18.31]  Yes   • Pacemaker [Z95.0]  Yes   • Atrial fibrillation [I48.91]  Yes   • Acute on chronic combined systolic and diastolic CHF (congestive heart failure) [I50.43]  Yes   • SCC (squamous cell carcinoma), face [C44.320]  Yes   • Hypothyroidism [E03.9]  Yes   • Coronary arteriosclerosis [I25.10]  Yes   • Dyslipidemia [E78.5]  Yes   • Hypertension [I10]  Yes   • Long term (current) use of anticoagulants [Z79.01]  Not Applicable      Resolved Hospital Problems    Diagnosis Date Resolved POA   • Hypokalemia [E87.6] 04/05/2023 No   • Community acquired pneumonia, unspecified laterality [J18.9] 04/03/2023 Yes     89 y.o. male with history of chronic combined systolic and diastolic heart failure, atrial fibrillation on long-term anticoagulation, s/p PPM, CAD, hypertension, hyperlipidemia, squamous cell carcinoma, CKD stage IIIb, hypothyroidism who presents to [facility] with complaints of worsening dyspnea and generalized weakness.  Admitted with acute hypoxic respiratory failure secondary to acute on chronic combined systolic and diastolic heart failure.      Acute respiratory failure with hypoxia  Acute on chronic combined systolic and diastolic heart failure  Valvular heart disease (severe TR, s/p MVR)  - Patient met criteria for diagnosis of acute respiratory failure with hypoxia with: Complaints of dyspnea, new oxygen requirement, P/F ratio <300 per ABG.  - ProBNP elevated to 2683 on admission (most recently was 662 on 03/10).  Chest x-ray showing bilateral pleural effusions and pulmonary vascular congestion. Infectious workup negative.  - 2D Echocardiogram showing EF 41-45% with systolic and diastolic flattening of the interventricular septum consistent with right ventricular pressure and volume overload, severely dilated right ventricular cavity.   - Cardiology and Nephrology followed patient during his stay, he was initially treated with  diuretics, however, he developed electrolyte abnormalities, worsening renal failure and hypotension, which required these to be stopped.  - He appears stable at this time from this perspective, he is on room air with O2 sats greater than 90%.  He appears relatively euvolemic, no evidence to suggest further hypervolemia warranting acute intervention.  Diuretics remain on hold at this time.  - Strict I/O, daily weights, telemetry, pulse oximetry, low sodium diet.    History of hypertension, now with hypotension  Severe symptomatic orthostatic hypotension  - Diuretics held and patient rehydrated.  Also, patient started on treatment with Midodrine. Currently on Midodrine 10 mg TID. Cortisol secondary to prednisone but reasonable response to cosyntropin.  When prednisone discontinued would monitor blood pressures very closely.  Consider adding Florinef if orthostasis worsens at that point.  - Continue to monitor for now and continue treatment as prescribed. Closely monitor BP, fall precautions.    Syncope  - Likely secondary to above +/- vasovagal    - Continue to monitor.    Bilateral pulmonary infiltrates  - possible ILD vs organizing pneumonia; clinical improvement on steroids  - Has follow-up with pulmonology arranged in 4 to 6 weeks,  - Continue with steroids (started on bactrim but switched to inhaled pentamadine for prophylaxis, however, this has now been stopped).    Paroxysmal atrial fibrillation on long-term anticoagulation  History of sick sinus syndrome s/p PPM  SSS w/ h/o PPM  - Appears stable at present. Was on Carvedilol as outpatient, however, this was stopped in setting of acute decompensated heart failure and hypotension. Also, was on amiodarone, which was since been discontinued as well.  - Not on rate control medications at present. Remains on Eliquis.  - Continue current treatment as prescribed and monitor for now on telemetry.    Chronic kidney disease stage 3A  - On most recent labs, patient's  creatinine found to be relatively stable when compared to prior. Nephrology followed, patient diuretics on hold at present. He has outpatient follow up arranged with Dr. Iraheta 5/16/23.  - No indications to warrant acute changes and/or intervention at this time.  - Order repeat BMP in AM for reassessment of renal function.   - Will continue to monitor and trend creatinine to guide ongoing management decisions. Avoid nephrotoxic medications, IVF, strict I/O, daily weights    Transaminitis  - LFTs slightly elevated, however, relatively stable from prior.  Casodex previously stopped, this was discussed with patient's urologist Dr. Petty, who recommended holding Casodex at discharge, he will follow-up with him in clinic and obtain a PSA and testosterone at 1 and 3 months. Was on Statin, which has also been discontinued.  - Monitor for now.  Repeat CMP in the AM for reassessment.    Anemia  - Hemoglobin low on most recent labs, however, stable from prior. No evidence of overt blood loss. No indications for acute intervention at this time.    - Order repeat CBC in AM for reassessment. Continue to monitor, transfuse for hemoglobin <7.    Hypothyroidism  - Stable. Continue Levothyroxine as prescribed.    Hyperlipidemia  - Stable. Continue Statin as prescribed.    Generalized weakness/Debility  - Consulted PT/OT, fall precautions.    Disposition update  - Initially was declined for SNF, however, appeal has been completed, now awaiting final answer to see if patient can go to SNF.  We will follow-up with CCP regarding this to see what the final disposition plan is going to be.    Hyponatremia, resolved  - Na low previously, however, within normal limits on most recent labs.  - Repeat labs in AM to guide further management decisions.    · SCDs for DVT prophylaxis.  · Limited code (no CPR, no intubation).  · Discussed with patient, nursing staff, CCP and care team on multidisciplinary rounds.  · Anticipate discharge to  SNU facility once arrangements have been made.      Som Mckinney DO  [facility]  04/21/23

## 2023-04-21 NOTE — THERAPY TREATMENT NOTE
Patient Name: Bridger Elias  : 1930    MRN: [id number]                              Today's Date: 2023       Admit Date: 3/30/2023    Visit Dx:     ICD-10-CM ICD-9-CM   1. Community acquired pneumonia, unspecified laterality  J18.9 486     Patient Active Problem List   Diagnosis   • SCC (squamous cell carcinoma), face   • General weakness   • Pacemaker   • Abnormal gait   • Atrial fibrillation   • Chronic diastolic heart failure   • Coronary arteriosclerosis   • Dyslipidemia   • Glaucoma   • Mitral valve disorder   • Hypertension   • Hypertensive kidney disease, stage III   • Hypothyroidism   • Long term (current) use of anticoagulants   • Malignant neoplasm of prostate   • Osteoporosis   • Squamous cell carcinoma of skin of face   • Acute on chronic diastolic CHF (congestive heart failure)   • Stage 3a chronic kidney disease   • Personal history of radiation therapy   • Acute on chronic combined systolic and diastolic CHF (congestive heart failure)   • Cellulitis of right leg   • Chronic acquired lymphedema   • Contusion of face   • Contusion of forearm, left   • Fall   • Chronic systolic heart failure (CMS/HCC)   • Acute respiratory failure with hypoxia   • Hyponatremia   • Normocytic anemia   • Transaminitis   • Moderate Malnutrition (HCC)     Past Medical History:   Diagnosis Date   • Acute on chronic diastolic CHF (congestive heart failure) 2022   • Atrial fibrillation 2022   • Coronary arteriosclerosis 7/3/2014    Formatting of this note might be different from the original. [facility]  IMPRESSION:   1. Right heart disease, hypertensive cardiac disease.   2. Status post mitral valve repair.   3. Anatomy: No hemodynamically significant disease. about 30-40% lesion of    the right coronary artery. Normal. Left coronary artery system.   • Dyslipidemia 2013   • Glaucoma 2022   • Hypertension 2013   • Hypertensive kidney disease, stage III 3/13/2017   • Hypothyroidism  3/19/2017   • Long term (current) use of anticoagulants 12/5/2013    Formatting of this note might be different from the original. Mitral valve replacement and atrial fibrillation Assume a range of 2.5-3.5.   • Malignant neoplasm of prostate 12/5/2013    Formatting of this note might be different from the original. 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   • Stage 3b chronic kidney disease 4/14/2022     Past Surgical History:   Procedure Laterality Date   • PACEMAKER IMPLANTATION  2018      General Information     Row Name 04/21/23 1049          Physical Therapy Time and Intention    Document Type therapy note (daily note)  -EB     Mode of Treatment individual therapy;physical therapy  -EB     Row Name 04/21/23 1049          General Information    Existing Precautions/Restrictions fall  -EB     Row Name 04/21/23 1049          Cognition    Orientation Status (Cognition) oriented x 3  -EB     Row Name 04/21/23 1049          Safety Issues, Functional Mobility    Impairments Affecting Function (Mobility) endurance/activity tolerance;strength  -EB           User Key  (r) = Recorded By, (t) = Taken By, (c) = Cosigned By    Initials Name Provider Type    EB Noa Garcia PTA Physical Therapist Assistant               Mobility     Row Name 04/21/23 1049          Bed Mobility    Supine-Sit [place] (Bed Mobility) minimum assist (75% patient effort)  -EB     Sit-Supine [place] (Bed Mobility) not tested  -EB     Assistive Device (Bed Mobility) bed rails;head of bed elevated  -EB     Comment, (Bed Mobility) pt needing a little more assistance today getting upper trunk to midline.  -EB     Row Name 04/21/23 1049          Sit-Stand Transfer    Sit-Stand [place] (Transfers) contact guard  -EB      Assistive Device (Sit-Stand Transfers) walker, front-wheeled  -EB     Row Name 04/21/23 1049          Gait/Stairs (Locomotion)    [place] Level (Gait) minimum assist (75% patient effort)  -EB     Assistive Device (Gait) walker, front-wheeled  -EB     Distance in Feet (Gait) 8ft  -EB     Deviations/Abnormal Patterns (Gait) gait speed decreased;festinating/shuffling;stride length decreased  -EB     Bilateral Gait Deviations forward flexed posture;heel strike decreased  -EB     Comment, (Gait/Stairs) cues for upright posture. Pt unable to ambulate further today due to knees feeling very weak.  -EB           User Key  (r) = Recorded By, (t) = Taken By, (c) = Cosigned By    Initials Name Provider Type    Noa Hancock PTA Physical Therapist Assistant               Obj/Interventions     Row Name 04/21/23 1050          Motor Skills    Therapeutic Exercise --  BLE: SAQs, seated marches, LAQs, hip abd/add, hip add squeezes (2X10)  -EB           User Key  (r) = Recorded By, (t) = Taken By, (c) = Cosigned By    Initials Name Provider Type    Noa Hancock PTA Physical Therapist Assistant               Goals/Plan    No documentation.                Clinical Impression     Row Name 04/21/23 1051          Plan of Care Review    Plan of Care Reviewed With patient  -EB     Progress no change  -EB     Outcome Evaluation Pt seen for PT treatment today. Pt needed a little more assistance with mobility. Pt required Shaista with supine to sit  and CGA with STS transfers. Pt able to ambulate 8ft with Shaista, rwx. Pt c/o bilateral knees feeling weak and unable to ambulate further today. Pt did perform several bilateral LE exercises to improve LE strength. PT continues to recommend SNF for pt to improve strength, gait and balance to decrease falls risk. Pt is below baseline.  -EB     Row Name 04/21/23 1051          Therapy Assessment/Plan (PT)    Therapy Frequency (PT) 5 times/wk  -     Row Name 04/21/23 1051          Positioning  and Restraints    Pre-Treatment Position in bed  -EB     Post Treatment Position chair  -EB     In Chair reclined;call light within reach;encouraged to call for assist;exit alarm on  -EB           User Key  (r) = Recorded By, (t) = Taken By, (c) = Cosigned By    Initials Name Provider Type    Noa Hancock PTA Physical Therapist Assistant               Outcome Measures     Row Name 04/21/23 1056 04/21/23 0817       How much help from another person do you currently need...    Turning from your back to your side while in flat bed without using bedrails? 3  -EB 3  -HD    Moving from lying on back to sitting on the side of a flat bed without bedrails? 3  -EB 3  -HD    Moving to and from a bed to a chair (including a wheelchair)? 3  -EB 3  -HD    Standing up from a chair using your arms (e.g., wheelchair, bedside chair)? 3  -EB 3  -HD    Climbing 3-5 steps with a railing? 2  -EB 2  -HD    To walk in hospital room? 3  -EB 3  -HD    AM-PAC 6 Clicks Score (PT) 17  -EB 17  -HD    Highest level of mobility 5 --> Static standing  -EB 5 --> Static standing  -HD          User Key  (r) = Recorded By, (t) = Taken By, (c) = Cosigned By    Initials Name Provider Type    Noa Hancock PTA Physical Therapist Assistant    Elly Ang, RN Registered Nurse                             Physical Therapy Education     Title: PT OT SLP Therapies (Done)     Topic: Physical Therapy (Done)     Point: Mobility training (Done)     Learning Progress Summary           Patient Acceptance, E,D, VU,DU by EB at 4/21/2023 1056    Acceptance, E,D, VU,NR by EB at 4/18/2023 1529    Acceptance, E, NR by AR at 4/14/2023 1107    Acceptance, E,TB,D, VU,DU,NR by LD at 4/12/2023 1406    Acceptance, E,D,TB, VU,DU by PH at 4/11/2023 1025    Acceptance, E,D,TB, VU,DU by PH at 4/10/2023 0907    Acceptance, E,TB, VU,DU by CS at 4/7/2023 1437    Acceptance, E, VU by EB at 4/6/2023 1552    Acceptance, E, VU,NR by EB at 4/5/2023 1617    Acceptance, E, VU  by EB at 4/4/2023 1545    Acceptance, E,D, VU,NR by EB at 4/3/2023 1603    Acceptance, E, VU,NR by DJ at 3/31/2023 1633                   Point: Home exercise program (Done)     Learning Progress Summary           Patient Acceptance, E,D, VU,DU by EB at 4/21/2023 1056    Acceptance, E,D, VU,NR by EB at 4/18/2023 1529    Acceptance, E, NR by AR at 4/14/2023 1107    Acceptance, E,TB,D, VU,DU,NR by LD at 4/12/2023 1406    Acceptance, E,D,TB, VU,DU by PH at 4/11/2023 1025    Acceptance, E,D,TB, VU,DU by PH at 4/10/2023 0907    Acceptance, E,TB, VU,DU by CS at 4/7/2023 1437    Acceptance, E, VU by EB at 4/6/2023 1552    Acceptance, E, VU,NR by EB at 4/5/2023 1617                   Point: Body mechanics (Done)     Learning Progress Summary           Patient Acceptance, E,D, VU,DU by EB at 4/21/2023 1056    Acceptance, E,D, VU,NR by EB at 4/18/2023 1529    Acceptance, E, NR by AR at 4/14/2023 1107    Acceptance, E,TB,D, VU,DU,NR by LD at 4/12/2023 1406    Acceptance, E,D,TB, VU,DU by PH at 4/11/2023 1025    Acceptance, E,D,TB, VU,DU by PH at 4/10/2023 0907    Acceptance, E,TB, VU,DU by CS at 4/7/2023 1437    Acceptance, E, VU by EB at 4/6/2023 1552    Acceptance, E, VU,NR by EB at 4/5/2023 1617    Acceptance, E, VU by EB at 4/4/2023 1545    Acceptance, E,D, VU,NR by EB at 4/3/2023 1603    Acceptance, E, VU,NR by DJ at 3/31/2023 1633                   Point: Precautions (Done)     Learning Progress Summary           Patient Acceptance, E,D, VU,DU by EB at 4/21/2023 1056    Acceptance, E,D, VU,NR by EB at 4/18/2023 1529    Acceptance, E, NR by AR at 4/14/2023 1107    Acceptance, E,TB,D, VU,DU,NR by LD at 4/12/2023 1406    Acceptance, E,D,TB, VU,DU by PH at 4/11/2023 1025    Acceptance, E,D,TB, VU,DU by PH at 4/10/2023 0907    Acceptance, E,TB, VU,DU by CS at 4/7/2023 1437    Acceptance, E, VU by EB at 4/6/2023 1552    Acceptance, E, VU,NR by EB at 4/5/2023 1617    Acceptance, E, VU by EB at 4/4/2023 1545    Acceptance, E,D,  VU,NR by EB at 4/3/2023 1603    Acceptance, E, VU,NR by DJ at 3/31/2023 1633                               User Key     Initials Effective Dates Name Provider Type Discipline    AR 06/16/21 -  Brandee Reeves, PT Physical Therapist PT    EB 02/14/23 -  Noa Garcia PTA Physical Therapist Assistant PT    PH 06/16/21 -  Nolvia Dobson, IN[facility] Physical Therapist Assistant PT    DJ 10/25/19 -  Latosha Sanders, PT Physical Therapist PT    CS 09/22/22 -  Josefina Santiago, PT Physical Therapist PT    LD 04/06/23 -  Nena Acosta, PT Student PT Student PT              PT Recommendation and Plan     Plan of Care Reviewed With: patient  Progress: no change  Outcome Evaluation: Pt seen for PT treatment today. Pt needed a little more assistance with mobility. Pt required Shaista with supine to sit  and CGA with STS transfers. Pt able to ambulate 8ft with [place]ista, rwx. Pt c/o bilateral knees feeling weak and unable to ambulate further today. Pt did perform several bilateral LE exercises to improve LE strength. PT continues to recommend SNF for pt to improve strength, gait and balance to decrease falls risk. Pt is below baseline.     Time Calculation:    PT Charges     Row Name 04/21/23 1048             Time Calculation    Start Time 0919  -EB      Stop Time 0945  -EB      Time Calculation (min) 26 min  -EB      PT Received On 04/21/23  -EB      PT - Next Appointment 04/22/23  -EB         Time Calculation- PT    Total Timed Code Minutes- PT 26 minute(s)  -EB            User Key  (r) = Recorded By, (t) = Taken By, (c) = Cosigned By    Initials Name Provider Type    EB Noa Garcia PTA Physical Therapist Assistant              Therapy Charges for Today     Code Description Service Date Service Provider Modifiers Qty    07671927953 HC GAIT TRAINING EA 15 MIN 4/21/2023 Noa Garcia PTA GP 1    74093698267 HC PT THER PROC EA 15 MIN 4/21/2023 Noa Garcia PTA GP 1          PT G-Codes  Outcome Measure Options: AM-PAC 6 Clicks  Daily Activity (OT)  AM-PAC 6 Clicks Score (PT): 17  AM-PAC 6 Clicks Score (OT): 20       Noa Garcia, INEZ  4/21/2023

## 2023-04-21 NOTE — CASE MANAGEMENT/SOCIAL WORK
Continued Stay Note  [facility]     Patient Name: Bridger Elias  MRN: [id number]  Today's Date: 4/21/2023    Admit Date: 3/30/2023    Plan: DC to SNF, family appealing denial   Discharge Plan     Row Name 04/21/23 0917       Plan    Plan DC to SNF, family appealing denial    Plan Comments Spoke with pts daughter, Sultana Barnes, and informed that after peer to peer, [employer] has denied pre-cert. Gave daughter phone number to [employer] to appeal denial.               Discharge Codes    No documentation.               Expected Discharge Date and Time     Expected Discharge Date Expected Discharge Time    Apr 21, 2023 11:30 AM            Kandy Wallace RN

## 2023-04-22 LAB
ALBUMIN SERPL-MCNC: 3.3 G/DL (ref 3.5–5.2)
ALBUMIN/GLOB SERPL: 0.9 G/DL
ALP SERPL-CCNC: 150 U/L (ref 39–117)
ALT SERPL W P-5'-P-CCNC: 102 U/L (ref 1–41)
ANION GAP SERPL CALCULATED.3IONS-SCNC: 10.6 MMOL/L (ref 5–15)
AST SERPL-CCNC: 59 U/L (ref 1–40)
BILIRUB SERPL-MCNC: 0.9 MG/DL (ref 0–1.2)
BUN SERPL-MCNC: 41 MG/DL (ref 8–23)
BUN/CREAT SERPL: 33.6 (ref 7–25)
CALCIUM SPEC-SCNC: 9.2 MG/DL (ref 8.2–9.6)
CHLORIDE SERPL-SCNC: 105 MMOL/L (ref 98–107)
CO2 SERPL-SCNC: 22.4 MMOL/L (ref 22–29)
CREAT SERPL-MCNC: 1.22 MG/DL (ref 0.76–1.27)
DEPRECATED RDW RBC AUTO: 52.6 FL (ref 37–54)
EGFRCR SERPLBLD CKD-EPI 2021: 55.3 ML/MIN/1.73
ERYTHROCYTE [DISTWIDTH] IN BLOOD BY AUTOMATED COUNT: 15.3 % (ref 12.3–15.4)
GLOBULIN UR ELPH-MCNC: 3.5 GM/DL
GLUCOSE SERPL-MCNC: 84 MG/DL (ref 65–99)
HCT VFR BLD AUTO: 39.3 % (ref 37.5–51)
HGB BLD-MCNC: 13.4 G/DL (ref 13–17.7)
MCH RBC QN AUTO: 32.6 PG (ref 26.6–33)
MCHC RBC AUTO-ENTMCNC: 34.1 G/DL (ref 31.5–35.7)
MCV RBC AUTO: 95.6 FL (ref 79–97)
PLATELET # BLD AUTO: 143 10*3/MM3 (ref 140–450)
PMV BLD AUTO: 11.5 FL (ref 6–12)
POTASSIUM SERPL-SCNC: 4.8 MMOL/L (ref 3.5–5.2)
PROT SERPL-MCNC: 6.8 G/DL (ref 6–8.5)
RBC # BLD AUTO: 4.11 10*6/MM3 (ref 4.14–5.8)
SODIUM SERPL-SCNC: 138 MMOL/L (ref 136–145)
WBC NRBC COR # BLD: 11.07 10*3/MM3 (ref 3.4–10.8)

## 2023-04-22 PROCEDURE — 85027 COMPLETE CBC AUTOMATED: CPT | Performed by: STUDENT IN AN ORGANIZED HEALTH CARE EDUCATION/TRAINING PROGRAM

## 2023-04-22 PROCEDURE — 63710000001 PREDNISONE PER 1 MG: Performed by: INTERNAL MEDICINE

## 2023-04-22 PROCEDURE — 80053 COMPREHEN METABOLIC PANEL: CPT | Performed by: STUDENT IN AN ORGANIZED HEALTH CARE EDUCATION/TRAINING PROGRAM

## 2023-04-22 RX ADMIN — MIDODRINE HYDROCHLORIDE 10 MG: 5 TABLET ORAL at 17:22

## 2023-04-22 RX ADMIN — MULTIPLE VITAMINS W/ MINERALS TAB 1 TABLET: TAB at 09:44

## 2023-04-22 RX ADMIN — FLUTICASONE PROPIONATE 2 SPRAY: 50 SPRAY, METERED NASAL at 09:45

## 2023-04-22 RX ADMIN — APIXABAN 2.5 MG: 2.5 TABLET, FILM COATED ORAL at 09:44

## 2023-04-22 RX ADMIN — LEVOTHYROXINE SODIUM 88 MCG: 0.09 TABLET ORAL at 09:44

## 2023-04-22 RX ADMIN — LATANOPROST 1 DROP: 50 SOLUTION OPHTHALMIC at 21:39

## 2023-04-22 RX ADMIN — MIDODRINE HYDROCHLORIDE 10 MG: 5 TABLET ORAL at 09:44

## 2023-04-22 RX ADMIN — PREDNISONE 20 MG: 20 TABLET ORAL at 09:44

## 2023-04-22 RX ADMIN — APIXABAN 2.5 MG: 2.5 TABLET, FILM COATED ORAL at 21:39

## 2023-04-22 RX ADMIN — MIDODRINE HYDROCHLORIDE 10 MG: 5 TABLET ORAL at 12:31

## 2023-04-22 NOTE — PLAN OF CARE
Goal Outcome Evaluation:  Plan of Care Reviewed With: patient         Outcome Evaluation: Patient A/O x4. Pleasant and cooperative. Able to voice needs. No c/o of SOA or pain. Patient stated he felt really tired today. Slept most of the morning. Family visited this afternoon. VSS. Safety maintained.    Daily Care Plan Summary: Heart Failure    Diuretic in use (IV or PO): None   Daily weight (up or down): Up 2 Lb  Output > Intake (yes/no): No  O2 Requirements (current, any change?): Room air, no change  Symptoms noted with Activity (Respiratory Tolerance, functional state):      Fatigue with exertion  Anticipated Discharge Plans: SNF

## 2023-04-22 NOTE — PROGRESS NOTES
Name: Bridger Elias ADMIT: 3/30/2023   : 1930  PCP: Alma Cat MD    MRN: [id number] LOS: 22 days   AGE/SEX: 89 y.o. male  ROOM: [facility]     Subjective   Subjective   Patient seen and examined this morning. Hospital day 23. Discussed with nursing, no reported acute events overnight. Patient awake, alert, resting comfortably at time of my examination.  He states that he feels well today, has no acute complaints.  On room air with O2 sat greater than 90%.        Objective   Objective   Vital Signs  Temp:  [97.4 °F (36.3 °C)-97.8 °F (36.6 °C)] 97.7 °F (36.5 °C)  Heart Rate:  [74-86] 75  Resp:  [17-18] 18  BP: (110-145)/(62-93) 133/83  SpO2:  [90 %-99 %] 97 %  on   ;   Device (Oxygen Therapy): room air  Body mass index is 23.48 kg/m².  Physical Exam  Vitals and nursing note reviewed.   Constitutional:       General: He is awake. He is not in acute distress.     Appearance: He is ill-appearing (Elderly, frail-appearing).   Cardiovascular:      Rate and Rhythm: Normal rate and regular rhythm.      Pulses: Normal pulses.      Heart sounds: Normal heart sounds.   Pulmonary:      Effort: Pulmonary effort is normal. No respiratory distress.      Breath sounds: Normal breath sounds.   Abdominal:      General: Bowel sounds are normal. There is no distension.      Palpations: Abdomen is soft.      Tenderness: There is no abdominal tenderness.   Musculoskeletal:      Right lower leg: Edema present.      Left lower leg: Edema present.   Skin:     General: Skin is warm and dry.      Coloration: Skin is pale.      Findings: Bruising (scattered) present.      Comments: Chronic venous stasis changes LE   Neurological:      Mental Status: He is alert.   Psychiatric:         Behavior: Behavior is cooperative.       Results Review     I reviewed the patient's new clinical results.  Results from last 7 days   Lab Units 23  0554 23  0448 23  0405 23  0349   WBC 10*3/mm3 11.07* 10.53 9.83 10.25    HEMOGLOBIN g/dL 13.4 12.6* 12.8* 12.6*   PLATELETS 10*3/mm3 143 127* 166 171     Results from last 7 days   Lab Units 04/22/23  0554 04/21/23 0448 04/20/23 0405 04/19/23  0349   SODIUM mmol/L 138 137 133* 137   POTASSIUM mmol/L 4.8 4.9 5.3* 5.2   CHLORIDE mmol/L 105 105 98 103   CO2 mmol/L 22.4 22.4 28.3 30.0*   BUN mg/dL 41* 36* 38* 38*   CREATININE mg/dL 1.22 1.17 1.39* 1.24   GLUCOSE mg/dL 84 89 114* 116*   EGFR mL/min/1.73 55.3* 58.1* 47.3* 54.2*     Results from last 7 days   Lab Units 04/22/23 0554 04/21/23 0448 04/20/23 0405 04/19/23  0349   ALBUMIN g/dL 3.3* 3.0* 3.2* 3.2*   BILIRUBIN mg/dL 0.9 0.7 0.6 0.7   ALK PHOS U/L 150* 145* 164* 154*   AST (SGOT) U/L 59* 77* 82* 66*   ALT (SGPT) U/L 102* 109* 127* 103*     Results from last 7 days   Lab Units 04/22/23 0554 04/21/23 0448 04/20/23 0405 04/19/23  0349   CALCIUM mg/dL 9.2 8.7 9.0 8.9   ALBUMIN g/dL 3.3* 3.0* 3.2* 3.2*       No results found for: HGBA1C, POCGLU    No radiology results for the last day  I have personally reviewed all medications:  Scheduled Medications  apixaban, 2.5 mg, Oral, Q12H  fluticasone, 2 spray, Each Nare, Daily  latanoprost, 1 drop, Both Eyes, Daily  levothyroxine, 88 mcg, Oral, Daily  midodrine, 10 mg, Oral, TID AC  multivitamin with minerals, 1 tablet, Oral, Daily  polyethylene glycol, 17 g, Oral, Daily  predniSONE, 20 mg, Oral, Daily  senna-docusate sodium, 1 tablet, Oral, BID    Infusions   Diet  Diet: Fluid Restriction (240 mL/tray) Diets; 1500 mL/day; No Mixed Consistencies; Texture: Soft to Chew (NDD 3); Soft to Chew: Chopped Meat; Fluid Consistency: Thin (IDDSI 0)    I have personally reviewed:  [x]  Laboratory   [x]  Radiology   [x]  EKG/Telemetry  [x]  Cardiology/Vascular        Assessment/Plan     Active Hospital Problems    Diagnosis  POA   • **Acute respiratory failure with hypoxia [J96.01]  Yes   • Moderate Malnutrition (HCC) [E44.0]  Yes   • Hyponatremia [E87.1]  Yes   • Normocytic anemia [D64.9]  Yes   •  Transaminitis [R74.01]  Yes   • Stage 3a chronic kidney disease [N18.31]  Yes   • Pacemaker [Z95.0]  Yes   • Atrial fibrillation [I48.91]  Yes   • Acute on chronic combined systolic and diastolic CHF (congestive heart failure) [I50.43]  Yes   • SCC (squamous cell carcinoma), face [C44.320]  Yes   • Hypothyroidism [E03.9]  Yes   • Coronary arteriosclerosis [I25.10]  Yes   • Dyslipidemia [E78.5]  Yes   • Hypertension [I10]  Yes   • Long term (current) use of anticoagulants [Z79.01]  Not Applicable      Resolved Hospital Problems    Diagnosis Date Resolved POA   • Hypokalemia [E87.6] 04/05/2023 No   • Community acquired pneumonia, unspecified laterality [J18.9] 04/03/2023 Yes     89 y.o. male with history of chronic combined systolic and diastolic heart failure, atrial fibrillation on long-term anticoagulation, s/p PPM, CAD, hypertension, hyperlipidemia, squamous cell carcinoma, CKD stage IIIb, hypothyroidism who presents to [facility] with complaints of worsening dyspnea and generalized weakness.  Admitted with acute hypoxic respiratory failure secondary to acute on chronic combined systolic and diastolic heart failure.      Acute respiratory failure with hypoxia  Acute on chronic combined systolic and diastolic heart failure  Valvular heart disease (severe TR, s/p MVR)  - Patient met criteria for diagnosis of acute respiratory failure with hypoxia with: Complaints of dyspnea, new oxygen requirement, P/F ratio <300 per ABG.  - ProBNP elevated to 2683 on admission (most recently was 662 on 03/10).  Chest x-ray showing bilateral pleural effusions and pulmonary vascular congestion. Infectious workup negative.  - 2D Echocardiogram showing EF 41-45% with systolic and diastolic flattening of the interventricular septum consistent with right ventricular pressure and volume overload, severely dilated right ventricular cavity.   - Cardiology and Nephrology followed patient during his stay, he was initially  treated with diuretics, however, he developed electrolyte abnormalities, worsening renal failure and hypotension, which required these to be stopped.  - He appears stable at this time from this perspective, he is on room air with O2 sats greater than 90%.  He appears relatively euvolemic, no evidence to suggest further hypervolemia warranting acute intervention.  Diuretics remain on hold at this time.  - Strict I/O, daily weights, telemetry, pulse oximetry, low sodium diet.  - Patient will need close outpatient follow-up with cardiology and nephrology after discharge.    History of hypertension, now with hypotension  Severe symptomatic orthostatic hypotension  - Diuretics held and patient rehydrated.  Also, patient started on treatment with Midodrine. Currently on Midodrine 10 mg TID. Cortisol secondary to prednisone but reasonable response to cosyntropin.  When prednisone discontinued would monitor blood pressures very closely.  Consider adding Florinef if orthostasis worsens at that point.  - Nephrology reassessed (04/22) recommending continue Midodrine as prescribed.  - Patient has follow up with Dr. Dai @ [facility] May 16 at 11:45 am.  - Continue to monitor for now and continue treatment as prescribed. Closely monitor BP, fall precautions.    Syncope  - Likely secondary to above +/- vasovagal    - Continue to monitor.    Bilateral pulmonary infiltrates  - possible ILD vs organizing pneumonia; clinical improvement on steroids  - Has follow-up with pulmonology arranged in 4 to 6 weeks,  - Continue with steroids (started on bactrim but switched to inhaled pentamadine for prophylaxis, however, this has now been stopped).    Paroxysmal atrial fibrillation on long-term anticoagulation  History of sick sinus syndrome s/p PPM  SSS w/ h/o PPM  - Appears stable at present. Was on Carvedilol as outpatient, however, this was stopped in setting of acute decompensated heart failure and hypotension. Also, was on amiodarone,  which was since been discontinued as well.  - Not on rate control medications at present. Remains on Eliquis.  - Continue current treatment as prescribed and monitor for now on telemetry.    Chronic kidney disease stage 3A  - On most recent labs, patient's creatinine found to be relatively stable when compared to prior. Nephrology followed, patient diuretics on hold at present.   - Nephrology reassessed (04/22) recommending continue Midodrine as prescribed, okay for DC from their standpoint.  - Patient has follow up with Dr. Dai @ [facility] May 16 at 11:45 am.  - No indications to warrant acute changes and/or intervention at this time.  - Order repeat BMP in AM for reassessment of renal function.   - Will continue to monitor and trend creatinine to guide ongoing management decisions. Avoid nephrotoxic medications, IVF, strict I/O, daily weights    Transaminitis  - LFTs slightly elevated, however, improving from prior. Casodex previously stopped, this was discussed with patient's urologist Dr. Petty, who recommended holding Casodex at discharge, he will follow-up with him in clinic and obtain a PSA and testosterone at 1 and 3 months. Was on Statin, which has also been discontinued.  - Monitor for now.  Repeat CMP in the AM for reassessment.    Anemia  - Hemoglobin low on most recent labs, however, stable from prior. No evidence of overt blood loss. No indications for acute intervention at this time.    - Order repeat CBC in AM for reassessment. Continue to monitor, transfuse for hemoglobin <7.    Hypothyroidism  - Stable. Continue Levothyroxine as prescribed.    Hyperlipidemia  - Stable. Continue Statin as prescribed.    Generalized weakness/Debility  - Consulted PT/OT, fall precautions.    Disposition update  - Initially was declined for SNF, however, appeal has been completed, now awaiting final answer to see if patient can go to SNF.  We will follow-up with [facility] regarding this to see what the final disposition  plan is going to be.    Hyponatremia, resolved  - Na low previously, however, within normal limits on most recent labs.  - Repeat labs in AM to guide further management decisions.    · Eliquis (home med) for DVT prophylaxis.  · Limited code (no CPR, no intubation).  · Discussed with patient and nursing staff.  · Anticipate discharge to SNU facility once arrangements have been made.      Som Mckinney DO  [facility]  04/22/23

## 2023-04-22 NOTE — PROGRESS NOTES
[facility] Progress Note      Patient Name: Bridger Elias  : 1930  MRN: [id number]  Primary Care Physician:  Alma Cat MD  Date of admission: 3/30/2023    Subjective     Interval History:   Follow up Yanira on CKD III.  Sitting up in bed, no soa, cp orthopnea or pnd  Review of Systems:   As noted above    Objective     Vitals:   Temp:  [97.4 °F (36.3 °C)-97.8 °F (36.6 °C)] 97.7 °F (36.5 °C)  Heart Rate:  [74-86] 75  Resp:  [17-18] 18  BP: (110-145)/(62-93) 133/83    Intake/Output Summary (Last 24 hours) at 2023 0953  Last data filed at 2023 0831  Gross per 24 hour   Intake 840 ml   Output 1050 ml   Net -210 ml       Physical Exam:    General Appearance: alert, oriented x 3, no acute distress .   Skin: warm and dry  HEENT: oral mucosa normal, nonicteric sclera. Voice weak  Neck: supple, no JVD  Lungs: Clear to auscultation.   Heart: RRR, normal S1 and S2  Abdomen: soft, nontender, nondistended. + bs  Extremities: trace lower ext edema.   Neuro: normal speech and mental status     Scheduled Meds:     apixaban, 2.5 mg, Oral, Q12H  fluticasone, 2 spray, Each Nare, Daily  latanoprost, 1 drop, Both Eyes, Daily  levothyroxine, 88 mcg, Oral, Daily  midodrine, 10 mg, Oral, TID AC  multivitamin with minerals, 1 tablet, Oral, Daily  polyethylene glycol, 17 g, Oral, Daily  predniSONE, 20 mg, Oral, Daily  senna-docusate sodium, 1 tablet, Oral, BID      IV Meds:        Results Reviewed:   I have personally reviewed the results from the time of this admission to 2023 09:53 EDT     Results from last 7 days   Lab Units 23  0554 23  0448 23  0405   SODIUM mmol/L 138 137 133*   POTASSIUM mmol/L 4.8 4.9 5.3*   CHLORIDE mmol/L 105 105 98   CO2 mmol/L 22.4 22.4 28.3   BUN mg/dL 41* 36* 38*   CREATININE mg/dL 1.22 1.17 1.39*   CALCIUM mg/dL 9.2 8.7 9.0   BILIRUBIN mg/dL 0.9 0.7 0.6   ALK PHOS U/L 150* 145* 164*   ALT (SGPT) U/L 102* 109* 127*   AST (SGOT) U/L  59* 77* 82*   GLUCOSE mg/dL 84 89 114*       Estimated Creatinine Clearance: 36.4 mL/min (by C-G formula based on SCr of 1.22 mg/dL).                Results from last 7 days   Lab Units 04/22/23  0554 04/21/23  0448 04/20/23  0405 04/19/23  0349 04/18/23  0350   WBC 10*3/mm3 11.07* 10.53 9.83 10.25 11.73*   HEMOGLOBIN g/dL 13.4 12.6* 12.8* 12.6* 12.1*   PLATELETS 10*3/mm3 143 127* 166 171 189             Assessment / Plan     ASSESSMENT:  1. MONISHA on CKD 3 - resolved, back to baseline creatinine 1.3, due to the cardiorenal syndrome .   Euvolemic by exam again today  2 . Bilateral pulmonary infiltrates. Acute hypoxic respiratory failure. Possible ILD versus organizing PNA.  On steroid with clinical improvement.   3. Acute on chronic combined heart failure. No diuretic for now due to orthostasis.    4. SSS, PAF.  5. SP MV repair .  6. Transaminitis. Casodex dc.   planning to check PSA and testoterone at 1 and 3 months.    7. Hypotension.  Better now on midodrine 10 mg p.o. every 8 hours  8. Prostate cancer.   9. Hypothyroid on replacement.     PLAN:    1. Continue current management.  Will need follow-up with nephrology and cardiology as an outpatient.  2. Ok with renal for dc   3. Continue midodrine  4. Has followup with Dr. Dai @ [facility] May 16 at 11:45 am.     Capo Bonilla MD  04/22/23  09:53 EDT    [facility]  [phone]

## 2023-04-22 NOTE — PLAN OF CARE
Goal Outcome Evaluation:         Diuretic in Use: NONE   Response to Diuretics (Output greater than intake):   Daily Weight (up or down): UP  O2 Requirements: RA  Functional Status (Activity level, tolerance and respiratory symptoms): ASSISTED UP WITH WALKER   Discharge Plans:  TBD     Outcome Evaluation: VSS. RESTED WELL OVERNIGHT WITHOUT C/O'S.

## 2023-04-23 LAB
ALBUMIN SERPL-MCNC: 3.4 G/DL (ref 3.5–5.2)
ALBUMIN/GLOB SERPL: 1.1 G/DL
ALP SERPL-CCNC: 153 U/L (ref 39–117)
ALT SERPL W P-5'-P-CCNC: 102 U/L (ref 1–41)
ANION GAP SERPL CALCULATED.3IONS-SCNC: 9 MMOL/L (ref 5–15)
AST SERPL-CCNC: 68 U/L (ref 1–40)
BILIRUB SERPL-MCNC: 0.6 MG/DL (ref 0–1.2)
BUN SERPL-MCNC: 35 MG/DL (ref 8–23)
BUN/CREAT SERPL: 32.1 (ref 7–25)
CALCIUM SPEC-SCNC: 8.8 MG/DL (ref 8.2–9.6)
CHLORIDE SERPL-SCNC: 101 MMOL/L (ref 98–107)
CO2 SERPL-SCNC: 26 MMOL/L (ref 22–29)
CREAT SERPL-MCNC: 1.09 MG/DL (ref 0.76–1.27)
DEPRECATED RDW RBC AUTO: 52.3 FL (ref 37–54)
EGFRCR SERPLBLD CKD-EPI 2021: 63.3 ML/MIN/1.73
ERYTHROCYTE [DISTWIDTH] IN BLOOD BY AUTOMATED COUNT: 15.6 % (ref 12.3–15.4)
GLOBULIN UR ELPH-MCNC: 3.1 GM/DL
GLUCOSE SERPL-MCNC: 87 MG/DL (ref 65–99)
HCT VFR BLD AUTO: 36.9 % (ref 37.5–51)
HGB BLD-MCNC: 12.8 G/DL (ref 13–17.7)
MCH RBC QN AUTO: 33.2 PG (ref 26.6–33)
MCHC RBC AUTO-ENTMCNC: 34.7 G/DL (ref 31.5–35.7)
MCV RBC AUTO: 95.6 FL (ref 79–97)
PLATELET # BLD AUTO: 144 10*3/MM3 (ref 140–450)
PMV BLD AUTO: 12 FL (ref 6–12)
POTASSIUM SERPL-SCNC: 4.8 MMOL/L (ref 3.5–5.2)
PROT SERPL-MCNC: 6.5 G/DL (ref 6–8.5)
RBC # BLD AUTO: 3.86 10*6/MM3 (ref 4.14–5.8)
SODIUM SERPL-SCNC: 136 MMOL/L (ref 136–145)
WBC NRBC COR # BLD: 9.88 10*3/MM3 (ref 3.4–10.8)

## 2023-04-23 PROCEDURE — 63710000001 PREDNISONE PER 1 MG: Performed by: INTERNAL MEDICINE

## 2023-04-23 PROCEDURE — 80053 COMPREHEN METABOLIC PANEL: CPT | Performed by: STUDENT IN AN ORGANIZED HEALTH CARE EDUCATION/TRAINING PROGRAM

## 2023-04-23 PROCEDURE — 97530 THERAPEUTIC ACTIVITIES: CPT

## 2023-04-23 PROCEDURE — 85027 COMPLETE CBC AUTOMATED: CPT | Performed by: STUDENT IN AN ORGANIZED HEALTH CARE EDUCATION/TRAINING PROGRAM

## 2023-04-23 RX ADMIN — MIDODRINE HYDROCHLORIDE 10 MG: 5 TABLET ORAL at 17:34

## 2023-04-23 RX ADMIN — MIDODRINE HYDROCHLORIDE 10 MG: 5 TABLET ORAL at 12:46

## 2023-04-23 RX ADMIN — LEVOTHYROXINE SODIUM 88 MCG: 0.09 TABLET ORAL at 09:08

## 2023-04-23 RX ADMIN — APIXABAN 2.5 MG: 2.5 TABLET, FILM COATED ORAL at 09:08

## 2023-04-23 RX ADMIN — MULTIPLE VITAMINS W/ MINERALS TAB 1 TABLET: TAB at 09:08

## 2023-04-23 RX ADMIN — POLYETHYLENE GLYCOL 3350 17 G: 17 POWDER, FOR SOLUTION ORAL at 09:08

## 2023-04-23 RX ADMIN — FLUTICASONE PROPIONATE 2 SPRAY: 50 SPRAY, METERED NASAL at 09:08

## 2023-04-23 RX ADMIN — MIDODRINE HYDROCHLORIDE 10 MG: 5 TABLET ORAL at 09:08

## 2023-04-23 RX ADMIN — PREDNISONE 20 MG: 20 TABLET ORAL at 09:08

## 2023-04-23 RX ADMIN — LATANOPROST 1 DROP: 50 SOLUTION OPHTHALMIC at 20:30

## 2023-04-23 RX ADMIN — DOCUSATE SODIUM 50MG AND SENNOSIDES 8.6MG 1 TABLET: 8.6; 5 TABLET, FILM COATED ORAL at 20:29

## 2023-04-23 RX ADMIN — APIXABAN 2.5 MG: 2.5 TABLET, FILM COATED ORAL at 20:30

## 2023-04-23 NOTE — PLAN OF CARE
Goal Outcome Evaluation:      Diuretic in Use: NONE  Response to Diuretics (Output greater than intake): NONE  Daily Weight (up or down): UP  O2 Requirements: RA  HAS BEEN IN BED OVERNIGHT.  Discharge Plans:  WAITING ON APPEAL.        Outcome Evaluation: VSS. O2 SAT STABLE ON RA. RESTED WELL WITHOUT C/O'S.

## 2023-04-23 NOTE — PROGRESS NOTES
[facility] Progress Note      Patient Name: Bridger Elias  : 1930  MRN: [id number]  Primary Care Physician:  Alma Cat MD  Date of admission: 3/30/2023    Subjective     Interval History:     Follow up Yanira on CKD III.  Sitting up in bed, no soa, cp orthopnea or pnd, eating well    Review of Systems:   As noted above    Objective     Vitals:   Temp:  [97.4 °F (36.3 °C)-97.6 °F (36.4 °C)] 97.6 °F (36.4 °C)  Heart Rate:  [74] 74  Resp:  [17-18] 17  BP: (114-150)/(81-87) 131/87    Intake/Output Summary (Last 24 hours) at 2023 1010  Last data filed at 2023 0921  Gross per 24 hour   Intake 1020 ml   Output 1400 ml   Net -380 ml       Physical Exam:    General Appearance: alert, oriented x 3, no acute distress, pleasant and polite  Skin: warm and dry  HEENT: oral mucosa normal, nonicteric sclera. Voice weak  Neck: supple, no JVD  Lungs: Clear to auscultation.   Heart: RRR, normal S1 and S2  Abdomen: soft, nontender, nondistended. + bs  Extremities: trace lower ext edema.   Neuro: normal speech and mental status     Scheduled Meds:     apixaban, 2.5 mg, Oral, Q12H  fluticasone, 2 spray, Each Nare, Daily  latanoprost, 1 drop, Both Eyes, Daily  levothyroxine, 88 mcg, Oral, Daily  midodrine, 10 mg, Oral, TID AC  multivitamin with minerals, 1 tablet, Oral, Daily  polyethylene glycol, 17 g, Oral, Daily  predniSONE, 20 mg, Oral, Daily  senna-docusate sodium, 1 tablet, Oral, BID      IV Meds:        Results Reviewed:   I have personally reviewed the results from the time of this admission to 2023 10:10 EDT     Results from last 7 days   Lab Units 23  0347 23  0554 23  0448   SODIUM mmol/L 136 138 137   POTASSIUM mmol/L 4.8 4.8 4.9   CHLORIDE mmol/L 101 105 105   CO2 mmol/L 26.0 22.4 22.4   BUN mg/dL 35* 41* 36*   CREATININE mg/dL 1.09 1.22 1.17   CALCIUM mg/dL 8.8 9.2 8.7   BILIRUBIN mg/dL 0.6 0.9 0.7   ALK PHOS U/L 153* 150* 145*   ALT (SGPT) U/L  102* 102* 109*   AST (SGOT) U/L 68* 59* 77*   GLUCOSE mg/dL 87 84 89       Estimated Creatinine Clearance: 41.7 mL/min (by C-G formula based on SCr of 1.09 mg/dL).                Results from last 7 days   Lab Units 04/23/23  0347 04/22/23  0554 04/21/23  0448 04/20/23  0405 04/19/23  0349   WBC 10*3/mm3 9.88 11.07* 10.53 9.83 10.25   HEMOGLOBIN g/dL 12.8* 13.4 12.6* 12.8* 12.6*   PLATELETS 10*3/mm3 144 143 127* 166 171             Assessment / Plan     ASSESSMENT:  1. MONISHA on CKD 3 - resolved, back to better than baseline creatinine 1.1, due to the cardiorenal syndrome .   Euvolemic by exam again today  2 . Bilateral pulmonary infiltrates. Acute hypoxic respiratory failure. Possible ILD versus organizing PNA.  On steroid with clinical improvement.   3. Acute on chronic combined heart failure. No diuretic for now due to orthostasis.    4. SSS, PAF.  5. SP MV repair .  6. Transaminitis. Casodex dc.   planning to check PSA and testoterone at 1 and 3 months.    7. Hypotension.  Better now on midodrine 10 mg p.o. every 8 hours  8. Prostate cancer.   9. Hypothyroid on replacement.     PLAN:    1. Continue current management.  Will need follow-up with nephrology and cardiology as an outpatient.  2. Ok with renal for dc   3. Continue midodrine  4. Has followup with Dr. Dai @ [facility] May 16 at 11:45 am.     Capo Bonilla MD  04/23/23  10:10 EDT    [facility]  [phone]

## 2023-04-23 NOTE — PLAN OF CARE
Goal Outcome Evaluation:  Plan of Care Reviewed With: patient        Progress: improving  Outcome Evaluation: Pt seen by PT this date for treatment. Pt in bed and sat up to EOB w/ CGA. Pt amb into hallway and back to EOB w/ CGA and use of fww. Pt limited in distance by fatigue. PT performed a few B LE ther ex before returning to bed w/ SV. PT will prog as pt olaf.

## 2023-04-23 NOTE — PROGRESS NOTES
Name: Bridger Elias ADMIT: 3/30/2023   : 1930  PCP: Alma Cat MD    MRN: [id number] LOS: 23 days   AGE/SEX: 89 y.o. male  ROOM: [facility]     Subjective   Subjective   Patient seen and examined this morning. Hospital day 24. Discussed with nursing, no reported acute events overnight. Patient awake, alert, resting comfortably in bed at time of my examination.  Family at bedside.  Patient states he feels well today, has no acute complaints, breathing stable, on room air with O2 sats greater than 90%.        Objective   Objective   Vital Signs  Temp:  [97.4 °F (36.3 °C)-97.7 °F (36.5 °C)] 97.7 °F (36.5 °C)  Heart Rate:  [74] 74  Resp:  [17-18] 17  BP: (114-150)/(71-87) 114/71  SpO2:  [95 %-98 %] 96 %  on   ;   Device (Oxygen Therapy): room air  Body mass index is 24.76 kg/m².  Physical Exam  Vitals and nursing note reviewed.   Constitutional:       General: He is awake. He is not in acute distress.     Appearance: He is ill-appearing (Elderly, frail-appearing).   Cardiovascular:      Rate and Rhythm: Normal rate and regular rhythm.      Pulses: Normal pulses.      Heart sounds: Normal heart sounds.   Pulmonary:      Effort: Pulmonary effort is normal. No respiratory distress.      Breath sounds: Normal breath sounds.   Abdominal:      General: Bowel sounds are normal.      Palpations: Abdomen is soft.      Tenderness: There is no abdominal tenderness.   Musculoskeletal:      Right lower leg: Edema present.      Left lower leg: Edema present.   Skin:     General: Skin is warm and dry.      Coloration: Skin is pale.      Findings: Bruising (scattered) present.      Comments: Chronic venous stasis changes LE   Neurological:      Mental Status: He is alert.   Psychiatric:         Behavior: Behavior is cooperative.       Results Review     I reviewed the patient's new clinical results.  Results from last 7 days   Lab Units 23  0347 23  0554 23  0448 23  0405   WBC 10*3/mm3 9.88  11.07* 10.53 9.83   HEMOGLOBIN g/dL 12.8* 13.4 12.6* 12.8*   PLATELETS 10*3/mm3 144 143 127* 166     Results from last 7 days   Lab Units 04/23/23 0347 04/22/23  0554 04/21/23 0448 04/20/23  0405   SODIUM mmol/L 136 138 137 133*   POTASSIUM mmol/L 4.8 4.8 4.9 5.3*   CHLORIDE mmol/L 101 105 105 98   CO2 mmol/L 26.0 22.4 22.4 28.3   BUN mg/dL 35* 41* 36* 38*   CREATININE mg/dL 1.09 1.22 1.17 1.39*   GLUCOSE mg/dL 87 84 89 114*   EGFR mL/min/1.73 63.3 55.3* 58.1* 47.3*     Results from last 7 days   Lab Units 04/23/23 0347 04/22/23  0554 04/21/23 0448 04/20/23  0405   ALBUMIN g/dL 3.4* 3.3* 3.0* 3.2*   BILIRUBIN mg/dL 0.6 0.9 0.7 0.6   ALK PHOS U/L 153* 150* 145* 164*   AST (SGOT) U/L 68* 59* 77* 82*   ALT (SGPT) U/L 102* 102* 109* 127*     Results from last 7 days   Lab Units 04/23/23 0347 04/22/23  0554 04/21/23 0448 04/20/23  0405   CALCIUM mg/dL 8.8 9.2 8.7 9.0   ALBUMIN g/dL 3.4* 3.3* 3.0* 3.2*       No results found for: HGBA1C, POCGLU    No radiology results for the last day  I have personally reviewed all medications:  Scheduled Medications  apixaban, 2.5 mg, Oral, Q12H  fluticasone, 2 spray, Each Nare, Daily  latanoprost, 1 drop, Both Eyes, Daily  levothyroxine, 88 mcg, Oral, Daily  midodrine, 10 mg, Oral, TID AC  multivitamin with minerals, 1 tablet, Oral, Daily  polyethylene glycol, 17 g, Oral, Daily  predniSONE, 20 mg, Oral, Daily  senna-docusate sodium, 1 tablet, Oral, BID    Infusions   Diet  Diet: Fluid Restriction (240 mL/tray) Diets; 1500 mL/day; No Mixed Consistencies; Texture: Soft to Chew (NDD 3); Soft to Chew: Chopped Meat; Fluid Consistency: Thin (IDDSI 0)    I have personally reviewed:  [x]  Laboratory   [x]  EKG/Telemetry       Assessment/Plan     Active Hospital Problems    Diagnosis  POA   • **Acute respiratory failure with hypoxia [J96.01]  Yes   • Moderate Malnutrition (HCC) [E44.0]  Yes   • Hyponatremia [E87.1]  Yes   • Normocytic anemia [D64.9]  Yes   • Transaminitis [R74.01]  Yes    • Stage 3a chronic kidney disease [N18.31]  Yes   • Pacemaker [Z95.0]  Yes   • Atrial fibrillation [I48.91]  Yes   • Acute on chronic combined systolic and diastolic CHF (congestive heart failure) [I50.43]  Yes   • SCC (squamous cell carcinoma), face [C44.320]  Yes   • Hypothyroidism [E03.9]  Yes   • Coronary arteriosclerosis [I25.10]  Yes   • Dyslipidemia [E78.5]  Yes   • Hypertension [I10]  Yes   • Long term (current) use of anticoagulants [Z79.01]  Not Applicable      Resolved Hospital Problems    Diagnosis Date Resolved POA   • Hypokalemia [E87.6] 04/05/2023 No   • Community acquired pneumonia, unspecified laterality [J18.9] 04/03/2023 Yes     89 y.o. male with history of chronic combined systolic and diastolic heart failure, atrial fibrillation on long-term anticoagulation, s/p PPM, CAD, hypertension, hyperlipidemia, squamous cell carcinoma, CKD stage IIIb, hypothyroidism who presents to [facility] with complaints of worsening dyspnea and generalized weakness.  Admitted with acute hypoxic respiratory failure secondary to acute on chronic combined systolic and diastolic heart failure.      Acute respiratory failure with hypoxia  Acute on chronic combined systolic and diastolic heart failure  Valvular heart disease (severe TR, s/p MVR)  - Patient met criteria for diagnosis of acute respiratory failure with hypoxia with: Complaints of dyspnea, new oxygen requirement, P/F ratio <300 per ABG.  - ProBNP elevated to 2683 on admission (most recently was 662 on 03/10).  Chest x-ray showing bilateral pleural effusions and pulmonary vascular congestion. Infectious workup negative.  - 2D Echocardiogram showing EF 41-45% with systolic and diastolic flattening of the interventricular septum consistent with right ventricular pressure and volume overload, severely dilated right ventricular cavity.   - Cardiology and Nephrology followed patient during his stay, he was initially treated with diuretics, however,  "he developed electrolyte abnormalities, worsening renal failure and hypotension, which required these to be stopped.  - He appears stable at this time from this perspective, he is on room air with O2 sats greater than 90%.  He appears relatively euvolemic, no evidence to suggest further hypervolemia warranting acute intervention.  Diuretics remain on hold at this time.  - Strict I/O, daily weights, telemetry, pulse oximetry, low sodium diet.  - Patient will need close outpatient follow-up with cardiology and nephrology after discharge.    History of hypertension, now with hypotension  Severe symptomatic orthostatic hypotension  - Diuretics held and patient rehydrated.  Also, patient started on treatment with Midodrine. Currently on Midodrine 10 mg TID. When prednisone discontinued would monitor blood pressures very closely.  Consider adding Florinef if orthostasis worsens at that point.  - Nephrology reassessed (04/23) recommending continue Midodrine as prescribed.  - Patient has follow up with Dr. Dai @ [facility] May 16 at 11:45 am.  - Continue to monitor for now and continue treatment as prescribed. Closely monitor BP, fall precautions.    Syncope  - Likely secondary to above +/- vasovagal    - Continue to monitor.    Bilateral pulmonary infiltrates  - possible ILD vs organizing pneumonia; clinical improvement on steroids. Pulmonology was following, per their most recent note on 04/11/23: \"We have been treating empirically with steroids he has had dramatic improvement.  Now the question will be how much and how long on the steroids.  He is done very good him to try and drop his dose down to about 20 mg a day we will keep him on that and see him back in the office in 4 to 6 weeks we will give him Bactrim DS 1 3 times a week for PCP prophylaxis.\" On 04/15, nephrology reached out to get alternative to bactrim for prophylaxis, so this was switched to inhaled pentamadine for prophylaxis, to be given monthly if " maintaining on prednisone dose higher than 20 mg.  - Has follow-up with pulmonology arranged in 4 to 6 weeks,    Paroxysmal atrial fibrillation on long-term anticoagulation  History of sick sinus syndrome s/p PPM  SSS w/ h/o PPM  - Appears stable at present. Was on Carvedilol as outpatient, however, this was stopped in setting of acute decompensated heart failure and hypotension. Also, was on amiodarone, which was since been discontinued as well. Not on rate control medications at present. Remains on Eliquis.  - Continue current treatment as prescribed and monitor for now on telemetry.    Chronic kidney disease stage 3A  - On most recent labs, patient's creatinine found to be relatively stable when compared to prior. Nephrology followed, patient diuretics on hold at present.   - Nephrology reassessed (04/22) recommending continue Midodrine as prescribed, okay for DC from their standpoint.  - Patient has follow up with Dr. Dai @ [facility] May 16 at 11:45 am.  - No indications to warrant acute changes and/or intervention at this time.  - Order repeat BMP in AM for reassessment of renal function.   - Will continue to monitor and trend creatinine to guide ongoing management decisions. Avoid nephrotoxic medications, IVF, strict I/O, daily weights    Transaminitis  - LFTs slightly elevated, however, stable. Casodex previously stopped, this was discussed with patient's urologist Dr. Petty, who recommended holding Casodex at discharge, he will follow-up with him in clinic and obtain a PSA and testosterone at 1 and 3 months. Was on Statin, which has also been discontinued.  - Monitor for now.  Repeat CMP in the AM for reassessment.    Anemia  - Hemoglobin low on most recent labs, however, stable from prior. No evidence of overt blood loss. No indications for acute intervention at this time.    - Order repeat CBC in AM for reassessment. Continue to monitor, transfuse for hemoglobin <7.    Hypothyroidism  - Stable.  Continue Levothyroxine as prescribed.    Hyperlipidemia  - Stable. Continue Statin as prescribed.    Generalized weakness/Debility  - Consulted PT/OT, fall precautions.    Disposition update  - Initially was declined for SNF, however, appeal has been completed, now awaiting final answer to see if patient can go to SNF.  We will follow-up with CCP regarding this to see what the final disposition plan is going to be.    Hyponatremia, resolved  - Na low previously, however, within normal limits on most recent labs.  - Repeat labs in AM to guide further management decisions.    · Eliquis (home med) for DVT prophylaxis.  · Limited code (no CPR, no intubation).  · Discussed with patient and nursing staff.  · Anticipate discharge to SNU facility once arrangements have been made.      Som Mckinney DO  [facility]  04/23/23

## 2023-04-23 NOTE — PLAN OF CARE
"Goal Outcome Evaluation:  Plan of Care Reviewed With: patient      Outcome Evaluation: Patient alert and oriented x4. Pleasant and cooperative. BMx1 this shift. Worked with PT today. Patient states he feels \"rested\" today. Family visited this afternoon. Assistance with turns provided. VSS. Safety maintained.       "

## 2023-04-23 NOTE — THERAPY TREATMENT NOTE
Patient Name: Bridger Elias  : 1930    MRN: [id number]                              Today's Date: 2023       Admit Date: 3/30/2023    Visit Dx:     ICD-10-CM ICD-9-CM   1. Community acquired pneumonia, unspecified laterality  J18.9 486     Patient Active Problem List   Diagnosis   • SCC (squamous cell carcinoma), face   • General weakness   • Pacemaker   • Abnormal gait   • Atrial fibrillation   • Chronic diastolic heart failure   • Coronary arteriosclerosis   • Dyslipidemia   • Glaucoma   • Mitral valve disorder   • Hypertension   • Hypertensive kidney disease, stage III   • Hypothyroidism   • Long term (current) use of anticoagulants   • Malignant neoplasm of prostate   • Osteoporosis   • Squamous cell carcinoma of skin of face   • Acute on chronic diastolic CHF (congestive heart failure)   • Stage 3a chronic kidney disease   • Personal history of radiation therapy   • Acute on chronic combined systolic and diastolic CHF (congestive heart failure)   • Cellulitis of right leg   • Chronic acquired lymphedema   • Contusion of face   • Contusion of forearm, left   • Fall   • Chronic systolic heart failure (CMS/HCC)   • Acute respiratory failure with hypoxia   • Hyponatremia   • Normocytic anemia   • Transaminitis   • Moderate Malnutrition (HCC)     Past Medical History:   Diagnosis Date   • Acute on chronic diastolic CHF (congestive heart failure) 2022   • Atrial fibrillation 2022   • Coronary arteriosclerosis 7/3/2014    Formatting of this note might be different from the original. [facility] 16  IMPRESSION:   1. Right heart disease, hypertensive cardiac disease.   2. Status post mitral valve repair.   3. Anatomy: No hemodynamically significant disease. about 30-40% lesion of    the right coronary artery. Normal. Left coronary artery system.   • Dyslipidemia 2013   • Glaucoma 2022   • Hypertension 2013   • Hypertensive kidney disease, stage III 3/13/2017   • Hypothyroidism  3/19/2017   • Long term (current) use of anticoagulants 12/5/2013    Formatting of this note might be different from the original. Mitral valve replacement and atrial fibrillation Assume a range of 2.5-3.5.   • Malignant neoplasm of prostate 12/5/2013    Formatting of this note might be different from the original. 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   • Stage 3b chronic kidney disease 4/14/2022     Past Surgical History:   Procedure Laterality Date   • PACEMAKER IMPLANTATION  2018      General Information     Row Name 04/23/23 1200          Physical Therapy Time and Intention    Document Type therapy note (daily note)  -     Mode of Treatment physical therapy  -     Row Name 04/23/23 1200          General Information    Existing Precautions/Restrictions fall  -     Row Name 04/23/23 1200          Safety Issues, Functional Mobility    Impairments Affecting Function (Mobility) endurance/activity tolerance;strength;balance  -     Comment, Safety Issues/Impairments (Mobility) gt belt and non skid socks donned  -           User Key  (r) = Recorded By, (t) = Taken By, (c) = Cosigned By    Initials Name Provider Type    PH Nolvia Dobson PTA Physical Therapist Assistant               Mobility     Row Name 04/23/23 1201          Bed Mobility    Bed Mobility supine-sit;sit-supine  -PH     Supine-Sit [place] (Bed Mobility) contact guard;verbal cues  -     Sit-Supine [place] (Bed Mobility) supervision;verbal cues  -PH     Assistive Device (Bed Mobility) bed rails;head of bed elevated  -     Row Name 04/23/23 1201          Sit-Stand Transfer    Sit-Stand [place] (Transfers) contact guard  -     Assistive Device (Sit-Stand Transfers) walker, front-wheeled  -      Comment, (Sit-Stand Transfer) cues for postural correction  -PH     Row Name 04/23/23 1201          Gait/Stairs (Locomotion)    [place] Level (Gait) contact guard;verbal cues;nonverbal cues (demo/gesture)  -PH     Assistive Device (Gait) walker, front-wheeled  -PH     Distance in Feet (Gait) 35'  -PH     Deviations/Abnormal Patterns (Gait) hussein decreased;gait speed decreased;stride length decreased  -PH     Bilateral Gait Deviations forward flexed posture  -PH     [place] Level (Stairs) not tested  -PH     Comment, (Gait/Stairs) pt slow w/ no overt LOB today; cues for postural correction  -PH           User Key  (r) = Recorded By, (t) = Taken By, (c) = Cosigned By    Initials Name Provider Type    PH Nolvia Dobson PTA Physical Therapist Assistant               Obj/Interventions     Row Name 04/23/23 1202          Motor Skills    Therapeutic Exercise other (see comments)  BAP, LAQ, seated march, hip add w/ pillow squeeze; x 10 reps all  -PH     Row Name 04/23/23 1202          Balance    Balance Assessment sitting static balance  -PH     Static Sitting Balance standby assist  -PH           User Key  (r) = Recorded By, (t) = Taken By, (c) = Cosigned By    Initials Name Provider Type    PH Nolvia Dobson PTA Physical Therapist Assistant               Goals/Plan    No documentation.                Clinical Impression     Row Name 04/23/23 1203          Pain    Pretreatment Pain Rating 0/10 - no pain  -PH     Posttreatment Pain Rating 0/10 - no pain  -PH     Row Name 04/23/23 1203          Plan of Care Review    Plan of Care Reviewed With patient  -PH     Progress improving  -PH     Outcome Evaluation Pt seen by PT this date for treatment. Pt in bed and sat up to EOB w/ CGA. Pt amb into hallway and back to EOB w/ CGA and use of fww. Pt limited in distance by fatigue. PT performed a few B LE ther ex before returning to bed w/ SV. PT will prog as pt olaf.  -PH     Row Name 04/23/23 1203           Vital Signs    O2 Delivery Pre Treatment room air  -PH     O2 Delivery Intra Treatment room air  -PH     O2 Delivery Post Treatment room air  -PH     Row Name 04/23/23 1203          Positioning and Restraints    Pre-Treatment Position in bed  -PH     Post Treatment Position bed  -PH     In Bed fowlers;call light within reach;encouraged to call for assist;exit alarm on;notified nsg  -PH           User Key  (r) = Recorded By, (t) = Taken By, (c) = Cosigned By    Initials Name Provider Type     Nolvia Dobson PTA Physical Therapist Assistant               Outcome Measures     Row Name 04/23/23 1205 04/23/23 0908       How much help from another person do you currently need...    Turning from your back to your side while in flat bed without using bedrails? 3  -PH 3  -PW    Moving from lying on back to sitting on the side of a flat bed without bedrails? 3  -PH 3  -PW    Moving to and from a bed to a chair (including a wheelchair)? 3  -PH 3  -PW    Standing up from a chair using your arms (e.g., wheelchair, bedside chair)? 3  -PH 3  -PW    Climbing 3-5 steps with a railing? 2  -PH 2  -PW    To walk in hospital room? 3  -PH 2  -PW    AM-PAC 6 Clicks Score (PT) 17  -PH 16  -PW    Highest level of mobility 5 --> Static standing  -PH 5 --> Static standing  -PW    Row Name 04/23/23 1205          Functional Assessment    Outcome Measure Options AM-PAC 6 Clicks Basic Mobility (PT)  -PH           User Key  (r) = Recorded By, (t) = Taken By, (c) = Cosigned By    Initials Name Provider Type     Nolvia Dobson PTA Physical Therapist Assistant    PW Chuyita Myers, RN Registered Nurse                             Physical Therapy Education     Title: PT OT SLP Therapies (Done)     Topic: Physical Therapy (Done)     Point: Mobility training (Done)     Learning Progress Summary           Patient Acceptance, E,TB,D, VU,NR by  at 4/23/2023 1205    Acceptance, E,D, VU,DU by GEORGE at 4/21/2023 1056    Acceptance,  E,D, VU,NR by EB at 4/18/2023 1529    Acceptance, E, NR by AR at 4/14/2023 1107    Acceptance, E,TB,D, VU,DU,NR by LD at 4/12/2023 1406    Acceptance, E,D,TB, VU,DU by PH at 4/11/2023 1025    Acceptance, E,D,TB, VU,DU by PH at 4/10/2023 0907    Acceptance, E,TB, VU,DU by CS at 4/7/2023 1437    Acceptance, E, VU by EB at 4/6/2023 1552    Acceptance, E, VU,NR by EB at 4/5/2023 1617    Acceptance, E, VU by EB at 4/4/2023 1545    Acceptance, E,D, VU,NR by EB at 4/3/2023 1603    Acceptance, E, VU,NR by DJ at 3/31/2023 1633                   Point: Home exercise program (Done)     Learning Progress Summary           Patient Acceptance, E,TB,D, VU,NR by PH at 4/23/2023 1205    Acceptance, E,D, VU,DU by EB at 4/21/2023 1056    Acceptance, E,D, VU,NR by EB at 4/18/2023 1529    Acceptance, E, NR by AR at 4/14/2023 1107    Acceptance, E,TB,D, VU,DU,NR by LD at 4/12/2023 1406    Acceptance, E,D,TB, VU,DU by PH at 4/11/2023 1025    Acceptance, E,D,TB, VU,DU by PH at 4/10/2023 0907    Acceptance, E,TB, VU,DU by CS at 4/7/2023 1437    Acceptance, E, VU by EB at 4/6/2023 1552    Acceptance, E, VU,NR by EB at 4/5/2023 1617                   Point: Body mechanics (Done)     Learning Progress Summary           Patient Acceptance, E,TB,D, VU,NR by PH at 4/23/2023 1205    Acceptance, E,D, VU,DU by EB at 4/21/2023 1056    Acceptance, E,D, VU,NR by EB at 4/18/2023 1529    Acceptance, E, NR by AR at 4/14/2023 1107    Acceptance, E,TB,D, VU,DU,NR by LD at 4/12/2023 1406    Acceptance, E,D,TB, VU,DU by PH at 4/11/2023 1025    Acceptance, E,D,TB, VU,DU by PH at 4/10/2023 0907    Acceptance, E,TB, VU,DU by CS at 4/7/2023 1437    Acceptance, E, VU by EB at 4/6/2023 1552    Acceptance, E, VU,NR by EB at 4/5/2023 1617    Acceptance, E, VU by EB at 4/4/2023 1545    Acceptance, E,D, VU,NR by EB at 4/3/2023 1603    Acceptance, E, VU,NR by DJ at 3/31/2023 1633                   Point: Precautions (Done)     Learning Progress Summary           Patient  Acceptance, E,TB,D, VU,NR by PH at 4/23/2023 1205    Acceptance, E,D, VU,DU by EB at 4/21/2023 1056    Acceptance, E,D, VU,NR by EB at 4/18/2023 1529    Acceptance, E, NR by AR at 4/14/2023 1107    Acceptance, E,TB,D, VU,DU,NR by LD at 4/12/2023 1406    Acceptance, E,D,TB, VU,DU by PH at 4/11/2023 1025    Acceptance, E,D,TB, VU,DU by PH at 4/10/2023 0907    Acceptance, E,TB, VU,DU by CS at 4/7/2023 1437    Acceptance, E, VU by EB at 4/6/2023 1552    Acceptance, E, VU,NR by EB at 4/5/2023 1617    Acceptance, E, VU by EB at 4/4/2023 1545    Acceptance, E,D, VU,NR by EB at 4/3/2023 1603    Acceptance, E, VU,NR by DJ at 3/31/2023 1633                               User Key     Initials Effective Dates Name Provider Type Discipline    AR 06/16/21 -  Brandee Reeves, PT Physical Therapist PT    EB 02/14/23 -  Noa Garcia, PTA Physical Therapist Assistant PT    PH 06/16/21 -  Nolvia Dobson, PTA Physical Therapist Assistant PT    DJ 10/25/19 -  Latosha Sanders, PT Physical Therapist PT    CS 09/22/22 -  Josefina Santiago, PT Physical Therapist PT    LD 04/06/23 -  Nena Acosta, PT Student PT Student PT              PT Recommendation and Plan     Plan of Care Reviewed With: patient  Progress: improving  Outcome Evaluation: Pt seen by PT this date for treatment. Pt in bed and sat up to EOB w/ CGA. Pt amb into hallway and back to EOB w/ CGA and use of fww. Pt limited in distance by fatigue. PT performed a few B LE ther ex before returning to bed w/ SV. PT will prog as pt olaf.     Time Calculation:    PT Charges     Row Name 04/23/23 1205             Time Calculation    Start Time 0921  -PH      Stop Time 0936  -PH      Time Calculation (min) 15 min  -PH      PT Received On 04/23/23  -PH      PT - Next Appointment 04/24/23  -PH         Timed Charges    31607 - PT Therapeutic Exercise Minutes 4  -PH      25727 - PT Therapeutic Activity Minutes 11  -PH         Total Minutes    Timed Charges Total Minutes 15  -PH        Total Minutes 15  -PH            User Key  (r) = Recorded By, (t) = Taken By, (c) = Cosigned By    Initials Name Provider Type    Nolvia Jain PTA Physical Therapist Assistant              Therapy Charges for Today     Code Description Service Date Service Provider Modifiers Qty    [id number]  PT THERAPEUTIC ACT EA 15 MIN 4/23/2023 Nolvia Dobson PTA GP 1          PT G-Codes  Outcome Measure Options: AM-PAC 6 Clicks Basic Mobility (PT)  AM-PAC 6 Clicks Score (PT): 17  AM-PAC 6 Clicks Score (OT): 20  PT Discharge Summary  Anticipated Discharge Disposition (PT): skilled nursing facility    Nolvia Dobson PTA  4/23/2023

## 2023-04-24 VITALS
HEART RATE: 74 BPM | RESPIRATION RATE: 18 BRPM | HEIGHT: 67 IN | OXYGEN SATURATION: 96 % | BODY MASS INDEX: 24.46 KG/M2 | TEMPERATURE: 97 F | SYSTOLIC BLOOD PRESSURE: 140 MMHG | DIASTOLIC BLOOD PRESSURE: 85 MMHG | WEIGHT: 155.87 LBS

## 2023-04-24 PROBLEM — J96.01 ACUTE RESPIRATORY FAILURE WITH HYPOXIA: Status: RESOLVED | Noted: 2023-04-02 | Resolved: 2023-04-24

## 2023-04-24 PROBLEM — I50.23 ACUTE ON CHRONIC HFREF (HEART FAILURE WITH REDUCED EJECTION FRACTION): Status: RESOLVED | Noted: 2023-04-24 | Resolved: 2023-04-24

## 2023-04-24 PROBLEM — I50.43 ACUTE ON CHRONIC COMBINED SYSTOLIC AND DIASTOLIC CHF (CONGESTIVE HEART FAILURE): Status: RESOLVED | Noted: 2022-04-14 | Resolved: 2023-04-24

## 2023-04-24 PROBLEM — I50.23 ACUTE ON CHRONIC HFREF (HEART FAILURE WITH REDUCED EJECTION FRACTION): Status: ACTIVE | Noted: 2023-04-24

## 2023-04-24 PROBLEM — E87.1 HYPONATREMIA: Status: RESOLVED | Noted: 2023-04-02 | Resolved: 2023-04-24

## 2023-04-24 LAB
ALBUMIN SERPL-MCNC: 3.2 G/DL (ref 3.5–5.2)
ALBUMIN/GLOB SERPL: 1.1 G/DL
ALP SERPL-CCNC: 148 U/L (ref 39–117)
ALT SERPL W P-5'-P-CCNC: 119 U/L (ref 1–41)
ANION GAP SERPL CALCULATED.3IONS-SCNC: 7.9 MMOL/L (ref 5–15)
AST SERPL-CCNC: 73 U/L (ref 1–40)
BILIRUB SERPL-MCNC: 0.7 MG/DL (ref 0–1.2)
BUN SERPL-MCNC: 34 MG/DL (ref 8–23)
BUN/CREAT SERPL: 25.4 (ref 7–25)
CALCIUM SPEC-SCNC: 9.2 MG/DL (ref 8.2–9.6)
CHLORIDE SERPL-SCNC: 104 MMOL/L (ref 98–107)
CO2 SERPL-SCNC: 24.1 MMOL/L (ref 22–29)
CREAT SERPL-MCNC: 1.34 MG/DL (ref 0.76–1.27)
DEPRECATED RDW RBC AUTO: 54.2 FL (ref 37–54)
EGFRCR SERPLBLD CKD-EPI 2021: 49.4 ML/MIN/1.73
ERYTHROCYTE [DISTWIDTH] IN BLOOD BY AUTOMATED COUNT: 15.8 % (ref 12.3–15.4)
GLOBULIN UR ELPH-MCNC: 3 GM/DL
GLUCOSE SERPL-MCNC: 99 MG/DL (ref 65–99)
HCT VFR BLD AUTO: 37.3 % (ref 37.5–51)
HGB BLD-MCNC: 12.9 G/DL (ref 13–17.7)
MCH RBC QN AUTO: 33.2 PG (ref 26.6–33)
MCHC RBC AUTO-ENTMCNC: 34.6 G/DL (ref 31.5–35.7)
MCV RBC AUTO: 95.9 FL (ref 79–97)
PLATELET # BLD AUTO: 138 10*3/MM3 (ref 140–450)
PMV BLD AUTO: 11.5 FL (ref 6–12)
POTASSIUM SERPL-SCNC: 4.9 MMOL/L (ref 3.5–5.2)
PROT SERPL-MCNC: 6.2 G/DL (ref 6–8.5)
RBC # BLD AUTO: 3.89 10*6/MM3 (ref 4.14–5.8)
SODIUM SERPL-SCNC: 136 MMOL/L (ref 136–145)
WBC NRBC COR # BLD: 10.09 10*3/MM3 (ref 3.4–10.8)

## 2023-04-24 PROCEDURE — 97530 THERAPEUTIC ACTIVITIES: CPT | Performed by: PHYSICAL THERAPIST

## 2023-04-24 PROCEDURE — 80053 COMPREHEN METABOLIC PANEL: CPT | Performed by: STUDENT IN AN ORGANIZED HEALTH CARE EDUCATION/TRAINING PROGRAM

## 2023-04-24 PROCEDURE — 85027 COMPLETE CBC AUTOMATED: CPT | Performed by: STUDENT IN AN ORGANIZED HEALTH CARE EDUCATION/TRAINING PROGRAM

## 2023-04-24 PROCEDURE — 63710000001 PREDNISONE PER 1 MG: Performed by: INTERNAL MEDICINE

## 2023-04-24 RX ORDER — MIDODRINE HYDROCHLORIDE 10 MG/1
10 TABLET ORAL
Qty: 90 TABLET | Refills: 1 | Status: SHIPPED | OUTPATIENT
Start: 2023-04-24

## 2023-04-24 RX ADMIN — POLYETHYLENE GLYCOL 3350 17 G: 17 POWDER, FOR SOLUTION ORAL at 09:09

## 2023-04-24 RX ADMIN — MIDODRINE HYDROCHLORIDE 10 MG: 5 TABLET ORAL at 09:09

## 2023-04-24 RX ADMIN — APIXABAN 2.5 MG: 2.5 TABLET, FILM COATED ORAL at 09:09

## 2023-04-24 RX ADMIN — MULTIPLE VITAMINS W/ MINERALS TAB 1 TABLET: TAB at 09:09

## 2023-04-24 RX ADMIN — LEVOTHYROXINE SODIUM 88 MCG: 0.09 TABLET ORAL at 09:09

## 2023-04-24 RX ADMIN — PREDNISONE 20 MG: 20 TABLET ORAL at 09:09

## 2023-04-24 RX ADMIN — FLUTICASONE PROPIONATE 2 SPRAY: 50 SPRAY, METERED NASAL at 09:09

## 2023-04-24 NOTE — PLAN OF CARE
Goal Outcome Evaluation:  Plan of Care Reviewed With: patient, family        Progress: improving  Outcome Evaluation: The patient was seen this AM for PT treatment session. Today he is able to ambulate 80 ft with rw and CGA prior to needing a sitting rest break due to fatigue. O2 sats at 94% with activity. The patient required a couple minutes sitting rest prior to ambulating additional 10 ft with rw and CGA to chair. Patient sitting in chair at end of session. He continues to require skilled PT to address limitations in BLE strength, endurance, and balance impacting functional mobility.

## 2023-04-24 NOTE — CASE MANAGEMENT/SOCIAL WORK
Continued Stay Note  [facility]     Patient Name: Bridger Elias  MRN: [id number]  Today's Date: 4/24/2023    Admit Date: 3/30/2023    Plan: DC to SNF   Discharge Plan     Row Name 04/24/23 1017       Plan    Plan DC to SNF    Plan Comments Received notification from [employer] that pt has received auth for admission to SNF. Auth # [id number]. Informed Valencia NINO/Violet and sonYogesh               Discharge Codes    No documentation.               Expected Discharge Date and Time     Expected Discharge Date Expected Discharge Time    Apr 24, 2023 11:30 AM            Kandy [facility], RN

## 2023-04-24 NOTE — DISCHARGE PLACEMENT REQUEST
"Ailyn Elias \"GENE\" (89 y.o. Male)     Date of Birth   1930    Social Security Number       Address   [address] [place] [address]    Home Phone   [phone]    MRN   [id number]       [facility]    Marital Status                               Admission Date   3/30/23    Admission Type   Emergency    Admitting Provider   Som Mckinney DO    Attending Provider   Rock Daley MD    Department, Room/Bed   [address], [address]/1       Discharge Date       Discharge Disposition   Skilled Nursing Facility (DC - External)    Discharge Destination                               Attending Provider: Rock Daley MD    Allergies: No Known Allergies    Isolation: None   Infection: None   Code Status: No CPR    Ht: 170.2 cm (67\")   Wt: 70.7 kg (155 lb 13.8 oz)    Admission Cmt: None   Principal Problem: Acute respiratory failure with hypoxia [J96.01]                 Active Insurance as of 3/30/2023     Primary Coverage     Payor Plan Insurance Group Employer/Plan Group    AETNA MEDICARE REPLACEMENT AETNA MEDICARE REPLACEMENT [id number]     Payor Plan Address Payor Plan Phone Number Payor Plan Fax Number Effective Dates    [address] [phone]  2021 - None Entered    [place] [address]       Subscriber Name Subscriber Birth Date Member ID       AILYN ELIAS 1930 [id number]                 Emergency Contacts      (Rel.) Home Phone Work Phone Mobile Phone    EVELIA HIGH (Daughter) [phone] -- [phone]    Yasir Elias (Son) [phone] -- --                 Discharge Summary      Marion Wen MD at 23 1446              Patient Name: Ailyn Elias  : 1930  MRN: [id number]    Date of Admission: 3/30/2023  Date of Discharge:  2023  Primary Care Physician: Alma Cat MD      Chief Complaint:   Shortness of Breath and Weakness - Generalized      Discharge Diagnoses     Active " Hospital Problems    Diagnosis  POA   • **Acute respiratory failure with hypoxia [J96.01]  Yes   • Hyponatremia [E87.1]  Yes   • Normocytic anemia [D64.9]  Yes   • Transaminitis [R74.01]  Yes   • Stage 3a chronic kidney disease [N18.31]  Yes   • Pacemaker [Z95.0]  Yes   • Atrial fibrillation [I48.91]  Yes   • Acute on chronic combined systolic and diastolic CHF (congestive heart failure) [I50.43]  Yes   • SCC (squamous cell carcinoma), face [C44.320]  Yes   • Hypothyroidism [E03.9]  Yes   • Coronary arteriosclerosis [I25.10]  Yes   • Dyslipidemia [E78.5]  Yes   • Hypertension [I10]  Yes   • Long term (current) use of anticoagulants [Z79.01]  Not Applicable      Resolved Hospital Problems    Diagnosis Date Resolved POA   • Hypokalemia [E87.6] 04/05/2023 No   • Community acquired pneumonia, unspecified laterality [J18.9] 04/03/2023 Yes        Hospital Course     Mr. Elias is a 89 y.o. male with a history of chronic combined systolic and diastolic heart failure, atrial fibrillation on long-term anticoagulation, s/p PPM, CAD, hypertension, hyperlipidemia, squamous cell carcinoma, CKD stage IIIb, hypothyroidism, prostate cancer who presented to [facility] initially complaining of shortness of breath and generalized weakness.  Please see the admitting history and physical for further details.  He was found to have acute hypoxic respiratory failure secondary to acute on chronic combined systolic and diastolic heart failure and was admitted to the hospital for further evaluation and treatment. Repeat echocardiogram showed EF 41 to 45%, cardiology was consulted. The patient responded well to diuresis however developed worsening hyponatremia for which nephrology was consulted.  Sodium improved with a fluid restriction which will be continued at discharge, 1500 mL daily.  The patient was also noted to have bilateral pulmonary infiltrates on CT scan, further described below.  Pulmonology was consulted for  further evaluation/recommendations.  Infectious etiologies were ruled out including TB and he was initiated on steroids and has significantly improved.  Given his hypoxia and age bronchoscopy was deferred.  Pulmonology is recommending continuing prednisone 20 mg daily and seeing him in 4 to 6 weeks in clinic, continue Bactrim double strength 1 tablet daily Monday Wednesday Friday for PCP prophylaxis until follow-up with them.  Prior to discharge the patient was noted to have a mild elevation in LFTs as well.  This is a known side effect of the patient's Casodex, this medication was held and his urologist was contacted.  Dr. Petty, the patient's urologist, is recommending to hold the patient's Casodex at discharge and obtain a PSA and testosterone at 1 and 3 months.  The patient is scheduled to see Dr. Petty next month.  DISCHARGE Follow Up Recommendations for labs and diagnostics:   See PCP in 1 week for posthospital follow-up visit.  Follow-up with patient's cardiologist Dr. Vanegas, 1 month.  Follow-up with pulmonology, 4 to 6 weeks, continue prednisone 20 mg daily and Bactrim DS 1 tablet Monday Wednesday Friday for PCP prophylaxis until follow-up.  Follow-up with urology, Dr. Petty, hold Casodex until follow-up, recheck testosterone and PSA in 1 month and 3 months.  Continue 1500 mL fluid restriction.    Day of Discharge     Subjective:  Resting in his recliner, accompanied bu his daughter. He feels good and is hopeful for DC soon.       Physical Exam:  Temp:  [97.4 °F (36.3 °C)-98 °F (36.7 °C)] 97.9 °F (36.6 °C)  Heart Rate:  [74-75] 74  Resp:  [16-18] 18  BP: (100-142)/(71-94) 142/94  Body mass index is 23.65 kg/m².  Physical Exam  Constitutional:       Appearance: Normal appearance.      Comments: Elderly, frail   HENT:      Head: Normocephalic and atraumatic.   Cardiovascular:      Rate and Rhythm: Normal rate and regular rhythm.      Heart sounds: No murmur heard.    No friction rub.   Pulmonary:       Effort: Pulmonary effort is normal.      Breath sounds: Normal breath sounds.   Abdominal:      General: Bowel sounds are normal. There is no distension.      Palpations: Abdomen is soft.      Tenderness: There is no abdominal tenderness.   Skin:     General: Skin is warm and dry.   Neurological:      Mental Status: He is alert.   Psychiatric:         Mood and Affect: Mood normal.         Behavior: Behavior normal  Consultants     Consult Orders (all) (From admission, onward)     Start     Ordered    04/11/23 1743  Inpatient Urology Consult  Once        Specialty:  Urology  Provider:  Luis Miguel Roca MD    04/11/23 1743    04/07/23 1252  Inpatient Palliative Care Team Consult  Once        Provider:  (Not yet assigned)    04/07/23 1252    04/05/23 1303  Inpatient Palliative Care Team Consult  Once        Provider:  (Not yet assigned)    04/05/23 1302    04/04/23 1842  Inpatient Infection Control Nurse Consult  Once        Provider:  (Not yet assigned)    04/04/23 1841    04/04/23 1726  Inpatient Spiritual Care Consult  Once        Provider:  (Not yet assigned)    04/04/23 1726    04/04/23 1226  Inpatient Pulmonology Consult  Once        Specialty:  Pulmonary Disease  Provider:  Lon Guerra MD    04/04/23 1225    04/02/23 0859  Inpatient Nephrology Consult  Once        Specialty:  Nephrology  Provider:  Naeem Hernandez MD    04/02/23 0859    03/31/23 1214  Inpatient Cardiology Consult  Once        Specialty:  Cardiology  Provider:  Medardo Vanegas MD    03/31/23 1214    03/31/23 0019  [facility] (on-call MD unless specified) Details  Once        Specialty:  Hospitalist  Provider:  Ramsey Talamantes MD    03/31/23 0018              Procedures     Imaging Results (All)     Procedure Component Value Units Date/Time    XR Chest 1 View [[id number]] Collected: 04/10/23 0932     Updated: 04/10/23 0932    Narrative:        Patient: AILYN DUQUE  Time Out: 09:31  Exam(s): XR CXR 1 VIEW     EXAM:    XR  Chest, 1 View    CLINICAL HISTORY:     Reason for exam: Follow-up infiltrates.    TECHNIQUE:    Frontal view of the chest.    COMPARISON:    No relevant prior studies available.    FINDINGS:    Lungs:  Multifocal consolidations most prominent in the bilateral bases.      Pleural space:  Unremarkable.  No pneumothorax.    Heart: Mild cardiomegaly.  You centimeter wires and valvular prosthesis   in place.  Left chest pacer in place.    Mediastinum:  Unremarkable.    Bones joints:  Unremarkable.    IMPRESSION:       Multifocal consolidations most prominent in the bilateral bases.    Impression:          Electronically signed by Justus Goldberg M.D. on 04-10-23 at 0931    FL Video Swallow Single Contrast [[id number]] Collected: 04/08/23 0135     Updated: 04/08/23 0135    Narrative:        Patient: AILYN DUQUE  Time Out: 01:34  Exam(s): XR OTHER VIDEO SWALLOW SINGLE-CONTRAST     EXAM:    FL Swallowing Function With Video Cine    CLINICAL HISTORY:       assess swallow function.    TECHNIQUE:    Fluoroscopy of the oral cavity through upper esophagus with video cine   recording.  The study was performed in conjunction with speech pathology.    Various consistencies of liquid and food were administered orally by the   speech pathologist.  Fluoroscopic guidance was provided by a physician.    1 minute 50 seconds of fluoroscopy time.  4mGy dose.    COMPARISON:    No relevant prior studies available.    FINDINGS:    Oral phase:  Unremarkable.  Normal bolus transfer.  No premature   spillage.    Pharyngeal phase:  Flash laryngeal penetration without aspiration with   thin liquids.  Pooling of the piriform sinuses with all consistencies is   noted.  No aspiration with thin, nectar thick or solid consistencies.      Cervical esophageal phase:  Unremarkable.  Normal upper esophageal   sphincter function.    IMPRESSION:       1.  Flash laryngeal penetration without aspiration with thin liquids.  2.  Pooling of the piriform sinuses  with all consistencies is noted.      Impression:          Electronically signed by Roxanne Terrazas MD on 04-08-23 at 0134    XR Chest 1 View [[id number]] Collected: 04/07/23 1227     Updated: 04/07/23 1303    Narrative:      EXAMINATION: SINGLE VIEW CHEST RADIOGRAPH     HISTORY: 89-year-old male undergoing followup evaluation of pulmonary  infiltrates.     FINDINGS: An upright AP semierect portable chest radiograph was  obtained. Comparison is made to a chest radiograph dated 04/03/2023.  There has been interval reduction in interstitial opacification  throughout both lungs. A dual lead left-sided cardiac pacemaker is  stable in position. Midline sternal wires are noted. A prosthetic  cardiac valve is appreciated. The chronic silhouette is enlarged but  stable.       Impression:      There is improved but persistent diffuse bilateral  interstitial opacification likely representing an improvement in diffuse  bilateral interstitial edema and/or pneumonia.     This report was finalized on 4/7/2023 1:00 PM by Dr. Gonzalez Salazar M.D.       XR Chest 1 View [[id number]] Collected: 04/03/23 0613     Updated: 04/03/23 0625    Narrative:      XR CHEST 1 VW-     Clinical: CHF, bilateral pleural effusions     COMPARISON examination 03/31/2013 CT chest     FINDINGS: Unchanged left-sided pleural effusion, right-sided pleural  effusion appears slightly improved within the interim. The  cardiomediastinal silhouette is stable. Right base  infiltrate/atelectasis more pronounced compared to the previous  examination. Right and left upper lobe infiltrates similar to the  previous examination. The remainder of examination is unremarkable.     This report was finalized on 4/3/2023 6:22 AM by Dr. Segun Jara M.D.       CT Chest Without Contrast Diagnostic [[id number]] Collected: 03/31/23 2103     Updated: 03/31/23 2109    Narrative:      CT CHEST WITHOUT CONTRAST     HISTORY: Dyspnea.     TECHNIQUE: Noncontrast chest CT is provided and  correlated with x-ray  from yesterday.     FINDINGS: Cardiac pacing hardware is observed along with hardware at the  mitral valve, dilated cardiac chambers, and simple appearing,  posteriorly layering pleural effusions bilaterally. Those measure under  3 cm AP thickness in each. There is an enlarged precarinal lymph node  measuring about 12 mm short axis. No other significant appearing  lymphadenopathy. Abnormal opacity in the left upper lobe and posterior  left lower lobe with groundglass density throughout the right upper lobe  favored represent pneumonia. No obstructing airway lesion.     Visualized upper abdominal structures appear normal. Degenerative change  in the spine.       Impression:      Cardiac hardware with changes of prior sternotomy and mitral  valve repair or replacement. There our bilateral pleural effusions with  patchy opacity in the lungs bilaterally which appears similar as on the  x-ray from yesterday and is favored represent pneumonia.     Radiation dose reduction techniques were utilized, including automated  exposure control and exposure modulation based on body size.     This report was finalized on 3/31/2023 9:06 PM by Dr. Maurice Alejandro M.D.       XR Chest 1 View [[id number]] Collected: 03/30/23 2045     Updated: 03/30/23 2053    Narrative:      PORTABLE CHEST X-RAY     HISTORY: Shortness of breath.     TECHNIQUE: Portable chest x-ray correlated with chest x-ray 04/14/2022.     FINDINGS: Sternal wires with cardiac valve hardware and a cardiac pacing  device are again observed. The cardiac silhouette is enlarged. Patchy  infiltrate in the upper lobes is observed bilaterally, more dense on the  left than the right. There is also some density in the left lung base  and mildly at the periphery of the right lung base. There also appears  to be small pleural effusions blunting the costophrenic angles.       Impression:      Bilateral pneumonia.     This report was finalized on 3/30/2023 8:50  PM by Dr. Maurice Alejandro M.D.             Pertinent Labs     Results from last 7 days   Lab Units 04/12/23  0442 04/11/23  0428 04/10/23  0433 04/09/23  0403   WBC 10*3/mm3 10.91* 9.97 8.50 7.32   HEMOGLOBIN g/dL 12.0* 11.5* 10.8* 11.2*   PLATELETS 10*3/mm3 258 230 210 185     Results from last 7 days   Lab Units 04/12/23  0442 04/11/23  0428 04/10/23  0433 04/09/23  0403   SODIUM mmol/L 137 138 135* 133*   POTASSIUM mmol/L 4.1 4.5 4.1 4.1   CHLORIDE mmol/L 97* 95* 96* 95*   CO2 mmol/L 31.0* 32.5* 28.9 29.0   BUN mg/dL 46* 48* 48* 37*   CREATININE mg/dL 1.51* 1.36* 1.46* 1.26   GLUCOSE mg/dL 113* 139* 156* 134*   Estimated Creatinine Clearance: 29.6 mL/min (A) (by C-G formula based on SCr of 1.51 mg/dL (H)).  Results from last 7 days   Lab Units 04/12/23  0442 04/11/23  0428 04/10/23  0433 04/09/23  0403   ALBUMIN g/dL 3.1* 3.2* 2.8* 3.0*   BILIRUBIN mg/dL 0.7 0.7 0.7 0.6   ALK PHOS U/L 234* 241* 219* 246*   AST (SGOT) U/L 144* 213* 85* 128*   ALT (SGPT) U/L 161* 195* 77* 89*     Results from last 7 days   Lab Units 04/12/23  0442 04/11/23  0428 04/10/23  0433 04/09/23  0403 04/08/23  0640   CALCIUM mg/dL 8.7 8.9 8.5 8.3 8.8   ALBUMIN g/dL 3.1* 3.2* 2.8* 3.0* 2.9*   MAGNESIUM mg/dL 2.7*  --  2.5* 2.4* 2.6*   PHOSPHORUS mg/dL 3.0  --  2.8 2.6 3.0         Results from last 7 days   Lab Units 04/10/23  0433   URIC ACID mg/dL 6.2         Invalid input(s): LDLCALC        Test Results Pending at Discharge     Pending Labs     Order Current Status    AFB Culture - Sputum, Cough Preliminary result    AFB Culture - Sputum, Cough Preliminary result    AFB Culture - Sputum, Cough Preliminary result          Discharge Details        Discharge Medications      New Medications      Instructions Start Date   fluticasone 50 MCG/ACT nasal spray  Commonly known as: FLONASE   2 sprays, Each Nare, Daily   Start Date: April 13, 2023     melatonin 5 MG tablet tablet   5 mg, Oral, Nightly PRN      midodrine 5 MG tablet  Commonly known  as: PROAMATINE   5 mg, Oral, 3 Times Daily Before Meals      predniSONE 20 MG tablet  Commonly known as: DELTASONE   20 mg, Oral, Daily   Start Date: April 13, 2023     sennosides-docusate 8.6-50 MG per tablet  Commonly known as: PERICOLACE   1 tablet, Oral, 2 Times Daily PRN      sulfamethoxazole-trimethoprim 800-160 MG per tablet  Commonly known as: BACTRIM DS,SEPTRA DS   1 tablet, Oral, 3 Times Weekly   Start Date: April 14, 2023     Torsemide 40 MG tablet   40 mg, Oral, Daily   Start Date: April 13, 2023        Continue These Medications      Instructions Start Date   acetaminophen 325 MG tablet  Commonly known as: TYLENOL   650 mg, Oral, Every 6 Hours PRN      apixaban 5 MG tablet tablet  Commonly known as: ELIQUIS   5 mg, Oral, Every 12 Hours Scheduled      calcium carbonate 600 MG tablet  Commonly known as: OS-SOPHIA   600 mg, Oral, Daily      latanoprost 0.005 % ophthalmic solution  Commonly known as: XALATAN   Ophthalmic      levothyroxine 88 MCG tablet  Commonly known as: SYNTHROID, LEVOTHROID   88 mcg, Oral, Daily      multivitamin with minerals tablet tablet   1 tablet, Oral, Daily      polyethylene glycol 17 g packet  Commonly known as: MIRALAX   17 g, Oral, Daily PRN      silver sulfadiazine 1 % cream  Commonly known as: SILVADENE, SSD   1 application, Topical, 3 Times Daily, Apply to facial regions of inflammation per rad onc recs      silver sulfadiazine 1 % cream  Commonly known as: SILVADENE, SSD   1 application, Topical, 2 Times Daily         Stop These Medications    amiodarone 200 MG tablet  Commonly known as: PACERONE     bicalutamide 50 MG tablet  Commonly known as: CASODEX     carvedilol 3.125 MG tablet  Commonly known as: COREG     rosuvastatin 10 MG tablet  Commonly known as: CRESTOR            No Known Allergies      Discharge Disposition:  Skilled Nursing Facility (DC - External)    Discharge Diet:  Diet Order   Procedures   • Diet: Fluid Restriction (240 mL/tray) Diets; 1500 mL/day; No  Mixed Consistencies; Texture: Soft to Chew (NDD 3); Soft to Chew: Chopped Meat; Fluid Consistency: Thin (IDDSI 0)       Discharge Activity:   Activity Instructions     Activity as Tolerated            CODE STATUS:    Code Status and Medical Interventions:   Ordered at: 04/06/23 1503     Medical Intervention Limits:    NO intubation (DNI)     Code Status (Patient has no pulse and is not breathing):    No CPR (Do Not Attempt to Resuscitate)     Medical Interventions (Patient has pulse or is breathing):    Limited Support       No future appointments.  Additional Instructions for the Follow-ups that You Need to Schedule     Discharge Follow-up with PCP   As directed       Currently Documented PCP:    Alma Cat MD    PCP Phone Number:    [phone]     Follow Up Details: 1 week         Discharge Follow-up with Specified Provider: cardiology Dr. Vanegas; 1 Month   As directed      To: cardiology Dr. Vanegas    Follow Up: 1 Month         Discharge Follow-up with Specified Provider: pulmonology 4-6 weeks   As directed      To: pulmonology 4-6 weeks         Discharge Follow-up with Specified Provider: urology next month   As directed      To: urology next month            Follow-up Information     Medardo Vanegas MD Follow up.    Specialty: Cardiology  Contact information:  [address] [facility] [place] [id number] [phone]             Kiarra Iraheta MD. Schedule an appointment as soon as possible for a visit in 1 month(s).    Specialty: Nephrology  Contact information:  [address]  [address]  [place] [address]  [phone]             Martín Flowers Jr., MD Follow up in 4 week(s).    Specialty: Pulmonary Disease  Why: Follow-up in about 4 to 6 weeks in my office with full PFTs and a chest x-ray prior to my seeing him  Contact information:  [address]  [address]  [place] [id number] [phone]             Alma Cat MD .    Specialty: Family Medicine  Why:  1 week  Contact information:  [address]  [facility] [address]  [phone]                         Additional Instructions for the Follow-ups that You Need to Schedule     Discharge Follow-up with PCP   As directed       Currently Documented PCP:    Alma Cat MD    PCP Phone Number:    [phone]     Follow Up Details: 1 week         Discharge Follow-up with Specified Provider: cardiology Dr. Vanegas; 1 Month   As directed      To: cardiology Dr. Vanegas    Follow Up: 1 Month         Discharge Follow-up with Specified Provider: pulmonology 4-6 weeks   As directed      To: pulmonology 4-6 weeks         Discharge Follow-up with Specified Provider: urology next month   As directed      To: urology next month           Time Spent on Discharge:  I spent greater than 30 minutes on this discharge activity which included: face-to-face encounter with the patient, reviewing the data in the system, coordination of the care with the nursing staff as well as consultants, documentation, and entering orders.       Marion Wen MD  [facility]  23  14:46 EDT                Electronically signed by Marion Wen MD at 23 1447     Marion Wen MD at 23 1603              Patient Name: Bridger Elias  : 1930  MRN: [id number]    Date of Admission: 3/30/2023  Date of Discharge:  2023  Primary Care Physician: Alma Cat MD      Chief Complaint:   Shortness of Breath and Weakness - Generalized      Discharge Diagnoses     Active Hospital Problems    Diagnosis  POA   • **Acute respiratory failure with hypoxia [J96.01]  Yes   • Hyponatremia [E87.1]  Yes   • Normocytic anemia [D64.9]  Yes   • Transaminitis [R74.01]  Yes   • Stage 3a chronic kidney disease [N18.31]  Yes   • Pacemaker [Z95.0]  Yes   • Atrial fibrillation [I48.91]  Yes   • Acute on chronic combined systolic and diastolic CHF (congestive heart failure) [I50.43]  Yes   • SCC  (squamous cell carcinoma), face [C44.320]  Yes   • Hypothyroidism [E03.9]  Yes   • Coronary arteriosclerosis [I25.10]  Yes   • Dyslipidemia [E78.5]  Yes   • Hypertension [I10]  Yes   • Long term (current) use of anticoagulants [Z79.01]  Not Applicable      Resolved Hospital Problems    Diagnosis Date Resolved POA   • Hypokalemia [E87.6] 04/05/2023 No   • Community acquired pneumonia, unspecified laterality [J18.9] 04/03/2023 Yes        Hospital Course     Mr. Elias is a 89 y.o. male with a history of chronic combined systolic and diastolic heart failure, atrial fibrillation on long-term anticoagulation, s/p PPM, CAD, hypertension, hyperlipidemia, squamous cell carcinoma, CKD stage IIIb, hypothyroidism, prostate cancer who presented to [facility] initially complaining of shortness of breath and generalized weakness.  Please see the admitting history and physical for further details.  He was found to have acute hypoxic respiratory failure secondary to acute on chronic combined systolic and diastolic heart failure and was admitted to the hospital for further evaluation and treatment. Repeat echocardiogram showed EF 41 to 45%, cardiology was consulted. The patient responded well to diuresis however developed worsening hyponatremia for which nephrology was consulted.  Sodium improved with a fluid restriction which will be continued at discharge, 1500 mL daily.  The patient was also noted to have bilateral pulmonary infiltrates on CT scan, further described below.  Pulmonology was consulted for further evaluation/recommendations.  Infectious etiologies were ruled out including TB and he was initiated on steroids and has significantly improved.  Given his hypoxia and age bronchoscopy was deferred.  Pulmonology is recommending continuing prednisone 20 mg daily and seeing him in 4 to 6 weeks in clinic, continue Bactrim double strength 1 tablet daily Monday Wednesday Friday for PCP prophylaxis until follow-up  with them.  Prior to discharge the patient was noted to have a mild elevation in LFTs as well.  This is a known side effect of the patient's Casodex, this medication was held and his urologist was contacted.  Dr. Petty, the patient's urologist, is recommending to hold the patient's Casodex at discharge and obtain a PSA and testosterone at 1 and 3 months.  The patient is scheduled to see Dr. Petty next month.  DISCHARGE Follow Up Recommendations for labs and diagnostics:   See PCP in 1 week for posthospital follow-up visit.  Follow-up with patient's cardiologist Dr. Vanegas, 1 month.  Follow-up with pulmonology, 4 to 6 weeks, continue prednisone 20 mg daily and Bactrim DS 1 tablet Monday Wednesday Friday for PCP prophylaxis until follow-up.  Follow-up with urology, Dr. Petty, hold Casodex until follow-up, recheck testosterone and PSA in 1 month and 3 months.  Continue 1500 mL fluid restriction.    Day of Discharge     Subjective:  Resting in his recliner, accompanied by his daughter. He is asleep and I deferred waking him up at his daughters request.     Physical Exam:  Temp:  [97.3 °F (36.3 °C)-98.3 °F (36.8 °C)] 98.3 °F (36.8 °C)  Heart Rate:  [74] 74  Resp:  [18] 18  BP: (113-140)/(71-83) 128/75  Body mass index is 23.27 kg/m².  Physical Exam  Constitutional:       Appearance: Normal appearance.      Comments: Elderly, frail   HENT:      Head: Normocephalic and atraumatic.   Cardiovascular:      Rate and Rhythm: Normal rate and regular rhythm on telemetry.  Pulmonary:      Effort: Pulmonary effort is normal.   Skin:     General: Skin is warm and dry.   Neurological:      Mental Status: He is asleep.   Consultants     Consult Orders (all) (From admission, onward)     Start     Ordered    04/11/23 1743  Inpatient Urology Consult  Once        Specialty:  Urology  Provider:  Luis Miguel Roca MD    04/11/23 1743 04/07/23 1252  Inpatient Palliative Care Team Consult  Once        Provider:  (Not yet  assigned)    04/07/23 1252    04/05/23 1303  Inpatient Palliative Care Team Consult  Once        Provider:  (Not yet assigned)    04/05/23 1302    04/04/23 1842  Inpatient Infection Control Nurse Consult  Once        Provider:  (Not yet assigned)    04/04/23 1841    04/04/23 1726  Inpatient Spiritual Care Consult  Once        Provider:  (Not yet assigned)    04/04/23 1726    04/04/23 1226  Inpatient Pulmonology Consult  Once        Specialty:  Pulmonary Disease  Provider:  Lon Guerra MD    04/04/23 1225    04/02/23 0859  Inpatient Nephrology Consult  Once        Specialty:  Nephrology  Provider:  Naeem Hernandez MD    04/02/23 0859    03/31/23 1214  Inpatient Cardiology Consult  Once        Specialty:  Cardiology  Provider:  Medardo Vanegas MD    03/31/23 1214    03/31/23 0019  [facility] (on-call MD unless specified) Details  Once        Specialty:  Hospitalist  Provider:  Ramsey Talamantes MD    03/31/23 0018              Procedures     Imaging Results (All)     Procedure Component Value Units Date/Time    XR Chest 1 View [[id number]] Collected: 04/10/23 0932     Updated: 04/10/23 0932    Narrative:        Patient: AILYN DUQUE  Time Out: 09:31  Exam(s): XR CXR 1 VIEW     EXAM:    XR Chest, 1 View    CLINICAL HISTORY:     Reason for exam: Follow-up infiltrates.    TECHNIQUE:    Frontal view of the chest.    COMPARISON:    No relevant prior studies available.    FINDINGS:    Lungs:  Multifocal consolidations most prominent in the bilateral bases.      Pleural space:  Unremarkable.  No pneumothorax.    Heart: Mild cardiomegaly.  You centimeter wires and valvular prosthesis   in place.  Left chest pacer in place.    Mediastinum:  Unremarkable.    Bones joints:  Unremarkable.    IMPRESSION:       Multifocal consolidations most prominent in the bilateral bases.    Impression:          Electronically signed by Justus Goldberg M.D. on 04-10-23 at 0931    FL Video Swallow Single Contrast [[id number]]  Collected: 04/08/23 0135     Updated: 04/08/23 0135    Narrative:        Patient: AILYN DUQUE  Time Out: 01:34  Exam(s): XR OTHER VIDEO SWALLOW SINGLE-CONTRAST     EXAM:    FL Swallowing Function With Video Cine    CLINICAL HISTORY:       assess swallow function.    TECHNIQUE:    Fluoroscopy of the oral cavity through upper esophagus with video cine   recording.  The study was performed in conjunction with speech pathology.    Various consistencies of liquid and food were administered orally by the   speech pathologist.  Fluoroscopic guidance was provided by a physician.    1 minute 50 seconds of fluoroscopy time.  4mGy dose.    COMPARISON:    No relevant prior studies available.    FINDINGS:    Oral phase:  Unremarkable.  Normal bolus transfer.  No premature   spillage.    Pharyngeal phase:  Flash laryngeal penetration without aspiration with   thin liquids.  Pooling of the piriform sinuses with all consistencies is   noted.  No aspiration with thin, nectar thick or solid consistencies.      Cervical esophageal phase:  Unremarkable.  Normal upper esophageal   sphincter function.    IMPRESSION:       1.  Flash laryngeal penetration without aspiration with thin liquids.  2.  Pooling of the piriform sinuses with all consistencies is noted.      Impression:          Electronically signed by Roxanne Terrazas MD on 04-08-23 at 0134    XR Chest 1 View [[id number]] Collected: 04/07/23 1227     Updated: 04/07/23 1303    Narrative:      EXAMINATION: SINGLE VIEW CHEST RADIOGRAPH     HISTORY: 89-year-old male undergoing followup evaluation of pulmonary  infiltrates.     FINDINGS: An upright AP semierect portable chest radiograph was  obtained. Comparison is made to a chest radiograph dated 04/03/2023.  There has been interval reduction in interstitial opacification  throughout both lungs. A dual lead left-sided cardiac pacemaker is  stable in position. Midline sternal wires are noted. A prosthetic  cardiac valve is  appreciated. The chronic silhouette is enlarged but  stable.       Impression:      There is improved but persistent diffuse bilateral  interstitial opacification likely representing an improvement in diffuse  bilateral interstitial edema and/or pneumonia.     This report was finalized on 4/7/2023 1:00 PM by Dr. Gonzalez Salazar M.D.       XR Chest 1 View [[id number]] Collected: 04/03/23 0613     Updated: 04/03/23 0625    Narrative:      XR CHEST 1 VW-     Clinical: CHF, bilateral pleural effusions     COMPARISON examination 03/31/2013 CT chest     FINDINGS: Unchanged left-sided pleural effusion, right-sided pleural  effusion appears slightly improved within the interim. The  cardiomediastinal silhouette is stable. Right base  infiltrate/atelectasis more pronounced compared to the previous  examination. Right and left upper lobe infiltrates similar to the  previous examination. The remainder of examination is unremarkable.     This report was finalized on 4/3/2023 6:22 AM by Dr. Segun Jara M.D.       CT Chest Without Contrast Diagnostic [[id number]] Collected: 03/31/23 2103     Updated: 03/31/23 2109    Narrative:      CT CHEST WITHOUT CONTRAST     HISTORY: Dyspnea.     TECHNIQUE: Noncontrast chest CT is provided and correlated with x-ray  from yesterday.     FINDINGS: Cardiac pacing hardware is observed along with hardware at the  mitral valve, dilated cardiac chambers, and simple appearing,  posteriorly layering pleural effusions bilaterally. Those measure under  3 cm AP thickness in each. There is an enlarged precarinal lymph node  measuring about 12 mm short axis. No other significant appearing  lymphadenopathy. Abnormal opacity in the left upper lobe and posterior  left lower lobe with groundglass density throughout the right upper lobe  favored represent pneumonia. No obstructing airway lesion.     Visualized upper abdominal structures appear normal. Degenerative change  in the spine.       Impression:       Cardiac hardware with changes of prior sternotomy and mitral  valve repair or replacement. There our bilateral pleural effusions with  patchy opacity in the lungs bilaterally which appears similar as on the  x-ray from yesterday and is favored represent pneumonia.     Radiation dose reduction techniques were utilized, including automated  exposure control and exposure modulation based on body size.     This report was finalized on 3/31/2023 9:06 PM by Dr. Maurice Alejandro M.D.       XR Chest 1 View [[id number]] Collected: 03/30/23 2045     Updated: 03/30/23 2053    Narrative:      PORTABLE CHEST X-RAY     HISTORY: Shortness of breath.     TECHNIQUE: Portable chest x-ray correlated with chest x-ray 04/14/2022.     FINDINGS: Sternal wires with cardiac valve hardware and a cardiac pacing  device are again observed. The cardiac silhouette is enlarged. Patchy  infiltrate in the upper lobes is observed bilaterally, more dense on the  left than the right. There is also some density in the left lung base  and mildly at the periphery of the right lung base. There also appears  to be small pleural effusions blunting the costophrenic angles.       Impression:      Bilateral pneumonia.     This report was finalized on 3/30/2023 8:50 PM by Dr. Maurice Alejandro M.D.             Pertinent Labs     Results from last 7 days   Lab Units 04/13/23  0339 04/12/23  0442 04/11/23  0428 04/10/23  0433   WBC 10*3/mm3 9.05 10.91* 9.97 8.50   HEMOGLOBIN g/dL 12.6* 12.0* 11.5* 10.8*   PLATELETS 10*3/mm3 258 258 230 210     Results from last 7 days   Lab Units 04/13/23  0339 04/12/23  0442 04/11/23  0428 04/10/23  0433   SODIUM mmol/L 134* 137 138 135*   POTASSIUM mmol/L 4.6 4.1 4.5 4.1   CHLORIDE mmol/L 93* 97* 95* 96*   CO2 mmol/L 34.4* 31.0* 32.5* 28.9   BUN mg/dL 44* 46* 48* 48*   CREATININE mg/dL 1.70* 1.51* 1.36* 1.46*   GLUCOSE mg/dL 123* 113* 139* 156*   Estimated Creatinine Clearance: 25.9 mL/min (A) (by C-G formula based on SCr of 1.7  mg/dL (H)).  Results from last 7 days   Lab Units 04/13/23  0339 04/12/23 0442 04/11/23  0428 04/10/23  0433   ALBUMIN g/dL 3.4* 3.1* 3.2* 2.8*   BILIRUBIN mg/dL 0.7 0.7 0.7 0.7   ALK PHOS U/L 233* 234* 241* 219*   AST (SGOT) U/L 107* 144* 213* 85*   ALT (SGPT) U/L 155* 161* 195* 77*     Results from last 7 days   Lab Units 04/13/23  0339 04/12/23 0442 04/11/23  0428 04/10/23  0433 04/09/23  0403 04/08/23  0640   CALCIUM mg/dL 9.3 8.7 8.9 8.5 8.3 8.8   ALBUMIN g/dL 3.4* 3.1* 3.2* 2.8* 3.0* 2.9*   MAGNESIUM mg/dL  --  2.7*  --  2.5* 2.4* 2.6*   PHOSPHORUS mg/dL  --  3.0  --  2.8 2.6 3.0         Results from last 7 days   Lab Units 04/10/23  0433   URIC ACID mg/dL 6.2         Invalid input(s): LDLCALC        Test Results Pending at Discharge     Pending Labs     Order Current Status    AFB Culture - Sputum, Cough Preliminary result    AFB Culture - Sputum, Cough Preliminary result    AFB Culture - Sputum, Cough Preliminary result          Discharge Details        Discharge Medications      New Medications      Instructions Start Date   fluticasone 50 MCG/ACT nasal spray  Commonly known as: FLONASE   2 sprays, Each Nare, Daily      melatonin 5 MG tablet tablet   5 mg, Oral, Nightly PRN      midodrine 2.5 MG tablet  Commonly known as: PROAMATINE   2.5 mg, Oral, 3 Times Daily Before Meals      predniSONE 20 MG tablet  Commonly known as: DELTASONE   20 mg, Oral, Daily      sennosides-docusate 8.6-50 MG per tablet  Commonly known as: PERICOLACE   1 tablet, Oral, 2 Times Daily PRN      sulfamethoxazole-trimethoprim 800-160 MG per tablet  Commonly known as: BACTRIM DS,SEPTRA DS   1 tablet, Oral, 3 Times Weekly   Start Date: April 14, 2023     Torsemide 40 MG tablet   40 mg, Oral, Daily         Continue These Medications      Instructions Start Date   acetaminophen 325 MG tablet  Commonly known as: TYLENOL   650 mg, Oral, Every 6 Hours PRN      apixaban 5 MG tablet tablet  Commonly known as: ELIQUIS   5 mg, Oral, Every 12  Hours Scheduled      calcium carbonate 600 MG tablet  Commonly known as: OS-SOPHIA   600 mg, Oral, Daily      latanoprost 0.005 % ophthalmic solution  Commonly known as: XALATAN   Ophthalmic      levothyroxine 88 MCG tablet  Commonly known as: SYNTHROID, LEVOTHROID   88 mcg, Oral, Daily      multivitamin with minerals tablet tablet   1 tablet, Oral, Daily      polyethylene glycol 17 g packet  Commonly known as: MIRALAX   17 g, Oral, Daily PRN      silver sulfadiazine 1 % cream  Commonly known as: SILVADENE, SSD   1 application, Topical, 3 Times Daily, Apply to facial regions of inflammation per rad onc recs      silver sulfadiazine 1 % cream  Commonly known as: SILVADENE, SSD   1 application, Topical, 2 Times Daily         Stop These Medications    amiodarone 200 MG tablet  Commonly known as: PACERONE     bicalutamide 50 MG tablet  Commonly known as: CASODEX     carvedilol 3.125 MG tablet  Commonly known as: COREG     rosuvastatin 10 MG tablet  Commonly known as: CRESTOR            No Known Allergies      Discharge Disposition:  Skilled Nursing Facility (DC - External)    Discharge Diet:  Diet Order   Procedures   • Diet: Fluid Restriction (240 mL/tray) Diets; 1500 mL/day; No Mixed Consistencies; Texture: Soft to Chew (NDD 3); Soft to Chew: Chopped Meat; Fluid Consistency: Thin (IDDSI 0)       Discharge Activity:   Activity Instructions     Activity as Tolerated            CODE STATUS:    Code Status and Medical Interventions:   Ordered at: 04/06/23 1503     Medical Intervention Limits:    NO intubation (DNI)     Code Status (Patient has no pulse and is not breathing):    No CPR (Do Not Attempt to Resuscitate)     Medical Interventions (Patient has pulse or is breathing):    Limited Support       No future appointments.  Additional Instructions for the Follow-ups that You Need to Schedule     Discharge Follow-up with PCP   As directed       Currently Documented PCP:    Alma Cat MD    PCP Phone Number:     [phone]     Follow Up Details: 1 week         Discharge Follow-up with Specified Provider: cardiology Dr. Vanegas; 1 Month   As directed      To: cardiology Dr. Vanegas    Follow Up: 1 Month         Discharge Follow-up with Specified Provider: pulmonology 4-6 weeks   As directed      To: pulmonology 4-6 weeks         Discharge Follow-up with Specified Provider: urology next month   As directed      To: urology next month            Follow-up Information     Medardo Vanegas MD Follow up.    Specialty: Cardiology  Contact information:  [address] [facility] [place] [address]  [phone]             Kiarra Iraheta MD. Go on 5/16/2023.    Specialty: Nephrology  Why: has follow up May 16 at 11:45 with Dr. Dyson  Contact information:  [address]  [address]  [place] [address]  [phone]             Martín Flowers Jr., MD Follow up in 4 week(s).    Specialty: Pulmonary Disease  Why: Follow-up in about 4 to 6 weeks in my office with full PFTs and a chest x-ray prior to my seeing him  Contact information:  [address] [facility] [address]  [place] [id number] [phone]             Alma Cat MD .    Specialty: Family Medicine  Why: 1 week  Contact information:  [address] [id number] [phone]                         Additional Instructions for the Follow-ups that You Need to Schedule     Discharge Follow-up with PCP   As directed       Currently Documented PCP:    Alma Cat MD    PCP Phone Number:    [phone]     Follow Up Details: 1 week         Discharge Follow-up with Specified Provider: cardiology Dr. Vanegas; 1 Month   As directed      To: cardiology Dr. Vanegas    Follow Up: 1 Month         Discharge Follow-up with Specified Provider: pulmonology 4-6 weeks   As directed      To: pulmonology 4-6 weeks         Discharge Follow-up with Specified Provider: urology next month   As directed      To: urology next month            Time Spent on Discharge:  I spent greater than 30 minutes on this discharge activity which included: face-to-face encounter with the patient, reviewing the data in the system, coordination of the care with the nursing staff as well as consultants, documentation, and entering orders.       Zach Wen MD  [facility]  04/13/23  14:46 EDT                Electronically signed by Zach Wen MD at 04/13/23 1605       Discharge Order (From admission, onward)     Start     Ordered    04/24/23 1038  Discharge patient  Once        Expected Discharge Date: 04/24/23    Discharge Disposition: Skilled Nursing Facility (DC - External)    Physician of Record for Attribution - Please select from Treatment Team: PAMELA JACKSON [[id number]]    Review needed by CMO to determine Physician of Record: No       Question Answer Comment   Physician of Record for Attribution - Please select from Treatment Team PAMELA JACKSON    Review needed by CMO to determine Physician of Record No        04/24/23 1042    04/20/23 1131  Discharge patient  Once,   Status:  Canceled        Expected Discharge Date: 04/20/23    Expected Discharge Time: 11:30    Discharge Disposition: Skilled Nursing Facility (DC - External)    Physician of Record for Attribution - Please select from Treatment Team: DOMENIC HUFF [[id number]]    Review needed by CMO to determine Physician of Record: No       Question Answer Comment   Physician of Record for Attribution - Please select from Treatment Team DOMENIC HUFF    Review needed by CMO to determine Physician of Record No        04/20/23 1131    04/12/23 1347  Discharge patient  Once,   Status:  Canceled        Expected Discharge Date: 04/12/23    Discharge Disposition: Skilled Nursing Facility (DC - External)    Physician of Record for Attribution - Please select from Treatment Team: ZACH WEN [[id number]]    Review needed by CMO to determine Physician of Record: No        Question Answer Comment   Physician of Record for Attribution - Please select from Treatment Team ZACH ALFRED    Review needed by CMO to determine Physician of Record No        04/12/23 9909

## 2023-04-24 NOTE — PROGRESS NOTES
Nutrition Services    Patient Name:  Bridger Elias  YOB: 1930  MRN: [id number]  Admit Date:  3/30/2023  Assessment Date:  04/24/23    CLINICAL NUTRITION - PROGRESS NOTE       Reason for Encounter Follow-up         Nutrition Order Diet: Fluid Restriction (240 mL/tray) Diets; 1500 mL/day; No Mixed Consistencies; Texture: Soft to Chew (NDD 3); Soft to Chew: Chopped Meat; Fluid Consistency: Thin (IDDSI 0)    Supplements/Snacks Boost Plus BID with meals    EN/PN Order          Pertinent Information Spoke with pt at bedside who reports PO intake increasing, now eating % of meals. Had several Boost Plus at bedside table but endorsed drinking them/liking them. Plan for pt to d/c to SNF this afternoon per notes.         Intervention/Plan 1. Continue to encourage adequate PO intake PRN.     2. Boost Plus BID with meals for additional nutrition support.      RD to follow up per protocol.    Electronically signed by:  Catalina Arrieta RD  04/24/23 13:34 EDT

## 2023-04-24 NOTE — PLAN OF CARE
Goal Outcome Evaluation:  Plan of Care Reviewed With: patient  Pt has improved so much in the last several days  Presently up in the chair visiting with family  Eating better  Denies pain  Understands plan for discharge to nursing facility  Family at [facility]  Safety maintained        Progress: improving

## 2023-04-24 NOTE — NURSING NOTE
Pt to [facility] rehab facility per wheelchair van  -family at side  Report to Radha JAIME at facility

## 2023-04-24 NOTE — CASE MANAGEMENT/SOCIAL WORK
Continued Stay Note  [facility]     Patient Name: Bridger Elias  MRN: [id number]  Today's Date: 4/24/2023    Admit Date: 3/30/2023    Plan: DC to [facility] via [facility]   Discharge Plan     Row Name 04/24/23 1235       Plan    [facility]    Plan Comments Pt to dc to [facility] via [facility] at 1600, confirmation # [id number]    Row Name 04/24/23 1017       Plan    Plan DC to SNF    Plan Comments [facility] for admission to SNF. Auth # [id number]. Informed Valencia NINO/Violet and Yogesh arevalo               Discharge Codes    No documentation.               Expected Discharge Date and Time     Expected Discharge Date Expected Discharge Time    Apr 24, 2023             Kandy Wallace RN

## 2023-04-24 NOTE — PROGRESS NOTES
[facility] Progress Note      Patient Name: Bridger Elias  : 1930  MRN: [id number]  Primary Care Physician:  Alma Cat MD  Date of admission: 3/30/2023    Subjective     Interval History:     Follow up Yanira on CKD III.  Sitting up in bed, no soa, cp orthopnea or pnd, eating well.  Still awaiting for placement.  No new issues overnight    Review of Systems:   As noted above    Objective     Vitals:   Temp:  [97 °F (36.1 °C)-98.5 °F (36.9 °C)] 97 °F (36.1 °C)  Heart Rate:  [74] 74  Resp:  [17-18] 18  BP: (114-140)/(71-85) 140/85    Intake/Output Summary (Last 24 hours) at 2023 0817  Last data filed at 2023 0514  Gross per 24 hour   Intake 1134 ml   Output 1950 ml   Net -816 ml       Physical Exam:    General Appearance: alert, oriented x 3, no acute distress, pleasant and polite  Skin: warm and dry  HEENT: oral mucosa normal, nonicteric sclera. Voice weak  Neck: supple, no JVD  Lungs: Clear to auscultation.   Heart: RRR, normal S1 and S2  Abdomen: soft, nontender, nondistended. + bs  Extremities: trace lower ext edema.   Neuro: normal speech and mental status     Scheduled Meds:     apixaban, 2.5 mg, Oral, Q12H  fluticasone, 2 spray, Each Nare, Daily  latanoprost, 1 drop, Both Eyes, Daily  levothyroxine, 88 mcg, Oral, Daily  midodrine, 10 mg, Oral, TID AC  multivitamin with minerals, 1 tablet, Oral, Daily  polyethylene glycol, 17 g, Oral, Daily  predniSONE, 20 mg, Oral, Daily  senna-docusate sodium, 1 tablet, Oral, BID      IV Meds:        Results Reviewed:   I have personally reviewed the results from the time of this admission to 2023 08:17 EDT     Results from last 7 days   Lab Units 23  0412 23  0347 23  0554   SODIUM mmol/L 136 136 138   POTASSIUM mmol/L 4.9 4.8 4.8   CHLORIDE mmol/L 104 101 105   CO2 mmol/L 24.1 26.0 22.4   BUN mg/dL 34* 35* 41*   CREATININE mg/dL 1.34* 1.09 1.22   CALCIUM mg/dL 9.2 8.8 9.2   BILIRUBIN mg/dL 0.7 0.6  0.9   ALK PHOS U/L 148* 153* 150*   ALT (SGPT) U/L 119* 102* 102*   AST (SGOT) U/L 73* 68* 59*   GLUCOSE mg/dL 99 87 84       Estimated Creatinine Clearance: 34.4 mL/min (A) (by C-G formula based on SCr of 1.34 mg/dL (H)).                Results from last 7 days   Lab Units 04/24/23  0412 04/23/23  0347 04/22/23  0554 04/21/23  0448 04/20/23  0405   WBC 10*3/mm3 10.09 9.88 11.07* 10.53 9.83   HEMOGLOBIN g/dL 12.9* 12.8* 13.4 12.6* 12.8*   PLATELETS 10*3/mm3 138* 144 143 127* 166             Assessment / Plan     ASSESSMENT:  1. MONISHA on CKD 3 - resolved, back to better than baseline creatinine 1.1, due to the cardiorenal syndrome .   Euvolemic by exam again today  2 . Bilateral pulmonary infiltrates. Acute hypoxic respiratory failure. Possible ILD versus organizing PNA.  On steroid with clinical improvement.   3. Acute on chronic combined heart failure. No diuretic for now due to orthostasis.    4. SSS, PAF.  5. SP MV repair .  6. Transaminitis. Casodex dc.   planning to check PSA and testoterone at 1 and 3 months.    7. Hypotension.  Better now on midodrine 10 mg p.o. every 8 hours  8. Prostate cancer.   9. Hypothyroid on replacement.     PLAN:    1. Continue current management.  Will need follow-up with nephrology and cardiology as an outpatient.  2. Ok with renal for dc   3. Continue midodrine  4. Has followup with Dr. Dai @ [facility] May 16 at 11:45 am.     Kiarra Dai MD  04/24/23  08:17 EDT    [facility]  [phone]

## 2023-04-24 NOTE — THERAPY PROGRESS REPORT/RE-CERT
Patient Name: Bridger Elias  : 1930    MRN: [id number]                              Today's Date: 2023       Admit Date: 3/30/2023    Visit Dx:     ICD-10-CM ICD-9-CM   1. Community acquired pneumonia, unspecified laterality  J18.9 486     Patient Active Problem List   Diagnosis   • SCC (squamous cell carcinoma), face   • General weakness   • Pacemaker   • Abnormal gait   • Atrial fibrillation   • Chronic diastolic heart failure   • Coronary arteriosclerosis   • Dyslipidemia   • Glaucoma   • Mitral valve disorder   • Hypertension   • Hypertensive kidney disease, stage III   • Hypothyroidism   • Long term (current) use of anticoagulants   • Malignant neoplasm of prostate   • Osteoporosis   • Squamous cell carcinoma of skin of face   • Acute on chronic diastolic CHF (congestive heart failure)   • Stage 3a chronic kidney disease   • Personal history of radiation therapy   • Cellulitis of right leg   • Chronic acquired lymphedema   • Contusion of face   • Contusion of forearm, left   • Fall   • Chronic systolic heart failure (CMS/HCC)   • Normocytic anemia   • Transaminitis   • Moderate Malnutrition (HCC)     Past Medical History:   Diagnosis Date   • Acute on chronic diastolic CHF (congestive heart failure) 2022   • Atrial fibrillation 2022   • Coronary arteriosclerosis 7/3/2014    Formatting of this note might be different from the original. [facility] 16  IMPRESSION:   1. Right heart disease, hypertensive cardiac disease.   2. Status post mitral valve repair.   3. Anatomy: No hemodynamically significant disease. about 30-40% lesion of    the right coronary artery. Normal. Left coronary artery system.   • Dyslipidemia 2013   • Glaucoma 2022   • Hypertension 2013   • Hypertensive kidney disease, stage III 3/13/2017   • Hypothyroidism 3/19/2017   • Long term (current) use of anticoagulants 2013    Formatting of this note might be different from the original. Mitral valve  replacement and atrial fibrillation Assume a range of 2.5-3.5.   • Malignant neoplasm of prostate 12/5/2013    Formatting of this note might be different from the original. 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   • Stage 3b chronic kidney disease 4/14/2022     Past Surgical History:   Procedure Laterality Date   • PACEMAKER IMPLANTATION  2018      General Information     Row Name 04/24/23 1034          Physical Therapy Time and Intention    Document Type therapy note (daily note)  -EL     Mode of Treatment physical therapy  -     Row Name 04/24/23 1034          General Information    Patient Profile Reviewed yes  -EL     Existing Precautions/Restrictions fall  -[facility] Name 04/24/23 1034          Cognition    Orientation Status (Cognition) oriented x 3  -EL     Row Name 04/24/23 1034          Safety Issues, Functional Mobility    Impairments Affecting Function (Mobility) endurance/activity tolerance;strength;balance  -           User Key  (r) = Recorded By, (t) = Taken By, (c) = Cosigned By    Initials Name Provider Type    Kriss Barney PT Physical Therapist               Mobility     Row Name 04/24/23 1034          Bed Mobility    Bed Mobility supine-sit  -EL     Supine-Sit Walsh (Bed Mobility) supervision  -EL     Sit-Supine Walsh (Bed Mobility) not tested  -EL     Assistive Device (Bed Mobility) bed rails;head of bed elevated  -     Row Name 04/24/23 1034          Bed-Chair Transfer    Bed-Chair Walsh (Transfers) not tested  -[facility] Name 04/24/23 1034          Sit-Stand Transfer    Sit-Stand Walsh (Transfers) contact guard  -EL     Assistive Device (Sit-Stand Transfers) walker, front-wheeled  -EL     Comment, (Sit-Stand Transfer) x 2 reps from bed   -EL     Row Name 04/24/23 1034          Gait/Stairs (Locomotion)    [place] Level (Gait) contact guard  -EL     Assistive Device (Gait) walker, front-wheeled  -EL     Distance in Feet (Gait) 80 ft, 10 ft with seated rest in between bouts  -EL     Deviations/Abnormal Patterns (Gait) hussein decreased;gait speed decreased;stride length decreased;base of support, narrow  -EL     Bilateral Gait Deviations forward flexed posture  -EL           User Key  (r) = Recorded By, (t) = Taken By, (c) = Cosigned By    Initials Name Provider Type    Kriss Barney PT Physical Therapist               Obj/Interventions     Row Name 04/24/23 1035          Range of Motion Comprehensive    General Range of Motion bilateral lower extremity ROM WFL  -EL     Row Name 04/24/23 1035          Strength Comprehensive (MMT)    General Manual Muscle Testing (MMT) Assessment lower extremity strength deficits identified  -EL     Comment, General Manual Muscle Testing (MMT) Assessment Generalized weakness BLEs 4/5  -EL     Row Name 04/24/23 1035          Motor Skills    Motor Skills functional endurance  -EL     Functional Endurance poor-fair  -     Row Name 04/24/23 1035          Balance    Balance Assessment sitting static balance;sitting dynamic balance;standing static balance;standing dynamic balance  -EL     Static Sitting Balance standby assist  -EL     Dynamic Sitting Balance supervision  -EL     Position, Sitting Balance sitting edge of bed  -EL     Static Standing Balance contact guard  -EL     Dynamic Standing Balance contact guard  -EL     Position/Device Used, Standing Balance walker, rolling  -EL           User Key  (r) = Recorded By, (t) = Taken By, (c) = Cosigned By    Initials Name Provider Type    Kriss Barney PT Physical Therapist               Goals/Plan     Row Name 04/24/23 1039          Bed Mobility Goal 1 (PT)    Activity/Assistive Device (Bed Mobility Goal 1, PT) sit to supine;supine to sit  -EL      Wendell Level/Cues Needed (Bed Mobility Goal 1, PT) modified independence  -EL     Time Frame (Bed Mobility Goal 1, PT) 1 week  -EL     Progress/Outcomes (Bed Mobility Goal 1, PT) good progress toward goal;goal ongoing;goal revised this date  -EL     Row Name 04/24/23 1039          Transfer Goal 1 (PT)    Activity/Assistive Device (Transfer Goal 1, PT) sit-to-stand/stand-to-sit;walker, rolling  -EL     [place]endell Level/Cues Needed (Transfer Goal 1, PT) verbal cues required;standby assist  -EL     Time Frame (Transfer Goal 1, PT) 1 week  -EL     Progress/Outcome (Transfer Goal 1, PT) goal revised this date;good progress toward goal;goal ongoing  -EL     Row Name 04/24/23 1039          Gait Training Goal 1 (PT)    Activity/Assistive Device (Gait Training Goal 1, PT) gait (walking locomotion);walker, rolling  -EL     Wendell Level (Gait Training Goal 1, PT) standby assist  -EL     Distance (Gait Training Goal 1, PT) 100  -EL     Time Frame (Gait Training Goal 1, PT) 1 week  -EL     Progress/Outcome (Gait Training Goal 1, PT) goal revised this date;good progress toward goal;goal ongoing  -EL     Row Name 04/24/23 1039          Patient Education Goal (PT)    Activity (Patient Education Goal, PT) HEP  -EL     Wendell/Cues/Accuracy (Memory Goal 2, PT) demonstrates adequately;verbalizes understanding  -EL     Time Frame (Patient Education Goal, PT) 5 days;short term goal (STG)  -EL     Progress/Outcome (Patient Education Goal, PT) goal met  -EL           User Key  (r) = Recorded By, (t) = Taken By, (c) = Cosigned By    Initials Name Provider Type    Kriss Barney, PT Physical Therapist               Clinical Impression     Row Name 04/24/23 1036          Pain    Pretreatment Pain Rating 0/10 - no pain  -EL     Posttreatment Pain Rating 0/10 - no pain  -EL     Row Name 04/24/23 1036          Plan of Care Review    Plan of Care Reviewed With patient;family  -EL     Progress improving  -EL     Outcome  Evaluation The patient was seen this AM for PT treatment session. Today he is able to ambulate 80 ft with rw and CGA prior to needing a sitting rest break due to fatigue. O2 sats at 94% with activity. The patient required a couple minutes sitting rest prior to ambulating additional 10 ft with rw and CGA to chair. Patient sitting in chair at end of session. He continues to require skilled PT to address limitations in BLE strength, endurance, and balance impacting functional mobility.  -EL     Row Name 04/24/23 1036          Therapy Assessment/Plan (PT)    Rehab Potential (PT) good, to achieve stated therapy goals  -EL     Criteria for Skilled Interventions Met (PT) yes;meets criteria;skilled treatment is necessary  -EL     Therapy Frequency (PT) 5 times/wk  -EL     Row Name 04/24/23 1036          Positioning and Restraints    Pre-Treatment Position in bed  -EL     Post Treatment Position chair  -EL     In Chair reclined;sitting;call light within reach;encouraged to call for assist;exit alarm on;with family/caregiver  -EL           User Key  (r) = Recorded By, (t) = Taken By, (c) = Cosigned By    Initials Name Provider Type    EL Kriss Coronel, PT Physical Therapist               Outcome Measures     Row Name 04/24/23 1040 04/24/23 0900       How much help from another person do you currently need...    Turning from your back to your side while in flat bed without using bedrails? 3  -EL 3  -CM    Moving from lying on back to sitting on the side of a flat bed without bedrails? 3  -EL 3  -CM    Moving to and from a bed to a chair (including a wheelchair)? 3  -EL 3  -CM    Standing up from a chair using your arms (e.g., wheelchair, bedside chair)? 3  -EL 3  -CM    Climbing 3-5 steps with a railing? 2  -EL 2  -CM    To walk in hospital room? 3  -EL 3  -CM    AM-PAC 6 Clicks Score (PT) 17  -EL 17  -CM    Highest level of mobility 5 --> Static standing  -EL 5 --> Static standing  -CM    Row Name 04/24/23 0800           How much help from another person do you currently need...    Turning from your back to your side while in flat bed without using bedrails? 3  -CM     Moving from lying on back to sitting on the side of a flat bed without bedrails? 3  -CM     Moving to and from a bed to a chair (including a wheelchair)? 3  -CM     Standing up from a chair using your arms (e.g., wheelchair, bedside chair)? 3  -CM     Climbing 3-5 steps with a railing? 2  -CM     To walk in hospital room? 3  -CM     AM-PAC 6 Clicks Score (PT) 17  -CM     Highest level of mobility 5 --> Static standing  -CM     Row Name 04/24/23 1040          Functional Assessment    Outcome Measure Options AM-PAC 6 Clicks Basic Mobility (PT)  -EL           User Key  (r) = Recorded By, (t) = Taken By, (c) = Cosigned By    Initials Name Provider Type    Violette Ashby RN Registered Nurse    Kriss Barney, MONA Physical Therapist                             Physical Therapy Education     Title: PT OT SLP Therapies (Done)     Topic: Physical Therapy (Done)     Point: Mobility training (Done)     Learning Progress Summary           Patient Acceptance, E, VU by EL at 4/24/2023 1040    Acceptance, E,TB,D, VU,NR by PH at 4/23/2023 1205    Acceptance, E,D, VU,DU by EB at 4/21/2023 1056    Acceptance, E,D, VU,NR by EB at 4/18/2023 1529    Acceptance, E, NR by AR at 4/14/2023 1107    Acceptance, E,TB,D, VU,DU,NR by LD at 4/12/2023 1406    Acceptance, E,D,TB, VU,DU by PH at 4/11/2023 1025    Acceptance, E,D,TB, VU,DU by PH at 4/10/2023 0907    Acceptance, E,TB, VU,DU by CS at 4/7/2023 1437    Acceptance, E, VU by EB at 4/6/2023 1552    Acceptance, E, VU,NR by EB at 4/5/2023 1617    Acceptance, E, VU by EB at 4/4/2023 1545    Acceptance, E,D, VU,NR by EB at 4/3/2023 1603    Acceptance, E, VU,NR by  at 3/31/2023 1633                   Point: Home exercise program (Done)     Learning Progress Summary           Patient Acceptance, E,TB,D, DU,NR by  at 4/23/2023 1208     Acceptance, E,D, VU,DU by EB at 4/21/2023 1056    Acceptance, E,D, VU,NR by EB at 4/18/2023 1529    Acceptance, E, NR by AR at 4/14/2023 1107    Acceptance, E,TB,D, VU,DU,NR by LD at 4/12/2023 1406    Acceptance, E,D,TB, VU,DU by PH at 4/11/2023 1025    Acceptance, E,D,TB, VU,DU by PH at 4/10/2023 0907    Acceptance, E,TB, VU,DU by CS at 4/7/2023 1437    Acceptance, E, VU by EB at 4/6/2023 1552    Acceptance, E, VU,NR by EB at 4/5/2023 1617                   Point: Body mechanics (Done)     Learning Progress Summary           Patient Acceptance, E, VU by EL at 4/24/2023 1040    Acceptance, E,TB,D, VU,NR by PH at 4/23/2023 1205    Acceptance, E,D, VU,DU by EB at 4/21/2023 1056    Acceptance, E,D, VU,NR by EB at 4/18/2023 1529    Acceptance, E, NR by AR at 4/14/2023 1107    Acceptance, E,TB,D, VU,DU,NR by LD at 4/12/2023 1406    Acceptance, E,D,TB, VU,DU by PH at 4/11/2023 1025    Acceptance, E,D,TB, VU,DU by PH at 4/10/2023 0907    Acceptance, E,TB, VU,DU by CS at 4/7/2023 1437    Acceptance, E, VU by EB at 4/6/2023 1552    Acceptance, E, VU,NR by EB at 4/5/2023 1617    Acceptance, E, VU by EB at 4/4/2023 1545    Acceptance, E,D, VU,NR by EB at 4/3/2023 1603    Acceptance, E, VU,NR by DJ at 3/31/2023 1633                   Point: Precautions (Done)     Learning Progress Summary           Patient Acceptance, E, VU by EL at 4/24/2023 1040    Acceptance, E,TB,D, VU,NR by PH at 4/23/2023 1205    Acceptance, E,D, VU,DU by EB at 4/21/2023 1056    Acceptance, E,D, VU,NR by EB at 4/18/2023 1529    Acceptance, E, NR by AR at 4/14/2023 1107    Acceptance, E,TB,D, VU,DU,NR by LD at 4/12/2023 1406    Acceptance, E,D,TB, VU,DU by PH at 4/11/2023 1025    Acceptance, E,D,TB, VU,DU by PH at 4/10/2023 0907    Acceptance, E,TB, VU,DU by CS at 4/7/2023 1437    Acceptance, E, VU by EB at 4/6/2023 1552    Acceptance, E, VU,NR by EB at 4/5/2023 1617    Acceptance, E, VU by EB at 4/4/2023 1545    Acceptance, E,D, VU,NR by EB at 4/3/2023  1603    Acceptance, E, VU,NR by DJ at 3/31/2023 1633                               User Key     Initials Effective Dates Name Provider Type Discipline    AR 06/16/21 -  Brandee Reeves, PT Physical Therapist PT    EB 02/14/23 -  Noa Garcia, PTA Physical Therapist Assistant PT    PH 06/16/21 -  Nolvia Dobson, PTA Physical Therapist Assistant PT    DJ 10/25/19 -  Latosha Sanders, PT Physical Therapist PT    EL 11/16/21 -  Kriss Coronel, PT Physical Therapist PT    CS 09/22/22 -  Josefina Santiago, PT Physical Therapist PT    LD 04/06/23 -  Nena Acosta, PT Student PT Student PT              PT Recommendation and Plan     Plan of Care Reviewed With: patient, family  Progress: improving  Outcome Evaluation: The patient was seen this AM for PT treatment session. Today he is able to ambulate 80 ft with rw and CGA prior to needing a sitting rest break due to fatigue. O2 sats at 94% with activity. The patient required a couple minutes sitting rest prior to ambulating additional 10 ft with rw and CGA to chair. Patient sitting in chair at end of session. He continues to require skilled PT to address limitations in BLE strength, endurance, and balance impacting functional mobility.     Time Calculation:    PT Charges     Row Name 04/24/23 1040             Time Calculation    Start Time 1019  -EL      Stop Time 1032  -EL      Time Calculation (min) 13 min  -EL      PT Received On 04/24/23  -EL      PT - Next Appointment 04/25/23  -EL      PT Goal Re-Cert Due Date 05/01/23  -EL         Time Calculation- PT    Total Timed Code Minutes- PT 13 minute(s)  -EL            User Key  (r) = Recorded By, (t) = Taken By, (c) = Cosigned By    Initials Name Provider Type    EL Kriss Coronel, PT Physical Therapist              Therapy Charges for Today     Code Description Service Date Service Provider Modifiers Qty    [id number] HC PT THERAPEUTIC ACT EA 15 MIN 4/24/2023 Kriss Coronel, PT GP 1          PT G-Codes  Outcome  Measure Options: AM-PAC 6 Clicks Basic Mobility (PT)  AM-PAC 6 Clicks Score (PT): 17  AM-PAC 6 Clicks Score (OT): 20  PT Discharge Summary  Anticipated Discharge Disposition (PT): skilled nursing facility    Kriss Coronel, MONA  4/24/2023

## 2023-04-24 NOTE — PLAN OF CARE
Goal Outcome Evaluation:               Diuretic in Use: yes  Response to Diuretics (Output greater than intake): good  Daily Weight (up or down):   O2 Requirements: RA  Functional Status (Activity level, tolerance and respiratory symptoms): issues with being dizzy when oob  Discharge Plans: rehab vs extended care

## 2023-04-25 NOTE — CASE MANAGEMENT/SOCIAL WORK
Case Management Discharge Note      Final Note: DC to [facility] via [facility]    Provided Post Acute Provider List?: N/A  Provided Post Acute Provider Quality & Resource List?: N/A    Selected Continued Care - Discharged on 4/24/2023 Admission date: 3/30/2023 - Discharge disposition: Skilled Nursing Facility (DC - External)    Destination Coordination complete.    Service Provider Selected Services Address Phone Fax Patient Preferred    [facility] [address] [id number] [phone] --          Durable Medical Equipment    No services have been selected for the patient.              Dialysis/Infusion    No services have been selected for the patient.              Home Medical Care    No services have been selected for the patient.              Therapy    No services have been selected for the patient.              Community Resources    No services have been selected for the patient.              Community & DME    No services have been selected for the patient.                  Transportation Services  [facility]: [facility]    Final Discharge Disposition Code: 03 - skilled nursing facility (SNF)

## 2023-04-25 NOTE — PAYOR COMM NOTE
"Ailyn Elias E \"GENE\" (89 y.o. Male)     Please see ATTACHED DC SUMMARY    REF#[id number]    THANK YOU    JONAH TAFOYA LPN [facility]    Date of Birth   1930    Social Security Number       Address   [address] [place] [address]    Home Phone   [phone]    MRN   [id number]       Orthodox   Buddhism    Marital Status                               Admission Date   3/30/23    Admission Type   Emergency    Admitting Provider   Som Mckinney DO    Attending Provider       Department, Room/Bed   [address], [address]/1       Discharge Date   2023    Discharge Disposition   Skilled Nursing Facility (DC - External)    Discharge Destination                               Attending Provider: (none)   Allergies: No Known Allergies    Isolation: None   Infection: None   Code Status: Prior    Ht: 170.2 cm (67\")   Wt: 70.7 kg (155 lb 13.8 oz)    Admission Cmt: None   Principal Problem: Acute respiratory failure with hypoxia [J96.01]                 Active Insurance as of 3/30/2023     Primary Coverage     Payor Plan Insurance Group Employer/Plan Group    AETNA MEDICARE REPLACEMENT AETNA MEDICARE REPLACEMENT [id number]     Payor Plan Address Payor Plan Phone Number Payor Plan Fax Number Effective Dates    [address] [phone]  2021 - None Entered    [facility] [id number]       Subscriber Name Subscriber Birth Date Member ID       AILYN ELIAS 1930 [id number]                 Emergency Contacts      (Rel.) Home Phone Work Phone Mobile Phone    EVELIA HIGH (Daughter) [phone] -- [phone]    Yasir Elias (Son) [phone] -- --               Discharge Summary      Marion Wen MD at 23 1446              Patient Name: Ailyn Elias  : 1930  MRN: [id number]    Date of Admission: 3/30/2023  Date of Discharge:  2023  Primary Care Physician: Alma Cat MD      Chief Complaint:   Shortness of Breath and " Weakness - Generalized      Discharge Diagnoses     Active Hospital Problems    Diagnosis  POA   • **Acute respiratory failure with hypoxia [J96.01]  Yes   • Hyponatremia [E87.1]  Yes   • Normocytic anemia [D64.9]  Yes   • Transaminitis [R74.01]  Yes   • Stage 3a chronic kidney disease [N18.31]  Yes   • Pacemaker [Z95.0]  Yes   • Atrial fibrillation [I48.91]  Yes   • Acute on chronic combined systolic and diastolic CHF (congestive heart failure) [I50.43]  Yes   • SCC (squamous cell carcinoma), face [C44.320]  Yes   • Hypothyroidism [E03.9]  Yes   • Coronary arteriosclerosis [I25.10]  Yes   • Dyslipidemia [E78.5]  Yes   • Hypertension [I10]  Yes   • Long term (current) use of anticoagulants [Z79.01]  Not Applicable      Resolved Hospital Problems    Diagnosis Date Resolved POA   • Hypokalemia [E87.6] 04/05/2023 No   • Community acquired pneumonia, unspecified laterality [J18.9] 04/03/2023 Yes        Hospital Course     Mr. Elias is a 89 y.o. male with a history of chronic combined systolic and diastolic heart failure, atrial fibrillation on long-term anticoagulation, s/p PPM, CAD, hypertension, hyperlipidemia, squamous cell carcinoma, CKD stage IIIb, hypothyroidism, prostate cancer who presented to [facility] initially complaining of shortness of breath and generalized weakness.  Please see the admitting history and physical for further details.  He was found to have acute hypoxic respiratory failure secondary to acute on chronic combined systolic and diastolic heart failure and was admitted to the hospital for further evaluation and treatment. Repeat echocardiogram showed EF 41 to 45%, cardiology was consulted. The patient responded well to diuresis however developed worsening hyponatremia for which nephrology was consulted.  Sodium improved with a fluid restriction which will be continued at discharge, 1500 mL daily.  The patient was also noted to have bilateral pulmonary infiltrates on CT scan,  further described below.  Pulmonology was consulted for further evaluation/recommendations.  Infectious etiologies were ruled out including TB and he was initiated on steroids and has significantly improved.  Given his hypoxia and age bronchoscopy was deferred.  Pulmonology is recommending continuing prednisone 20 mg daily and seeing him in 4 to 6 weeks in clinic, continue Bactrim double strength 1 tablet daily Monday Wednesday Friday for PCP prophylaxis until follow-up with them.  Prior to discharge the patient was noted to have a mild elevation in LFTs as well.  This is a known side effect of the patient's Casodex, this medication was held and his urologist was contacted.  Dr. Petty, the patient's urologist, is recommending to hold the patient's Casodex at discharge and obtain a PSA and testosterone at 1 and 3 months.  The patient is scheduled to see Dr. Petty next month.  DISCHARGE Follow Up Recommendations for labs and diagnostics:   See PCP in 1 week for posthospital follow-up visit.  Follow-up with patient's cardiologist Dr. Vanegas, 1 month.  Follow-up with pulmonology, 4 to 6 weeks, continue prednisone 20 mg daily and Bactrim DS 1 tablet Monday Wednesday Friday for PCP prophylaxis until follow-up.  Follow-up with urology, Dr. Petty, hold Casodex until follow-up, recheck testosterone and PSA in 1 month and 3 months.  Continue 1500 mL fluid restriction.    Day of Discharge     Subjective:  Resting in his recliner, accompanied bu his daughter. He feels good and is hopeful for DC soon.       Physical Exam:  Temp:  [97.4 °F (36.3 °C)-98 °F (36.7 °C)] 97.9 °F (36.6 °C)  Heart Rate:  [74-75] 74  Resp:  [16-18] 18  BP: (100-142)/(71-94) 142/94  Body mass index is 23.65 kg/m².  Physical Exam  Constitutional:       Appearance: Normal appearance.      Comments: Elderly, frail   HENT:      Head: Normocephalic and atraumatic.   Cardiovascular:      Rate and Rhythm: Normal rate and regular rhythm.      Heart sounds:  No murmur heard.    No friction rub.   Pulmonary:      Effort: Pulmonary effort is normal.      Breath sounds: Normal breath sounds.   Abdominal:      General: Bowel sounds are normal. There is no distension.      Palpations: Abdomen is soft.      Tenderness: There is no abdominal tenderness.   Skin:     General: Skin is warm and dry.   Neurological:      Mental Status: He is alert.   Psychiatric:         Mood and Affect: Mood normal.         Behavior: Behavior normal  Consultants     Consult Orders (all) (From admission, onward)     Start     Ordered    04/11/23 1743  Inpatient Urology Consult  Once        Specialty:  Urology  Provider:  Luis Miguel Roca MD    04/11/23 1743    04/07/23 1252  Inpatient Palliative Care Team Consult  Once        Provider:  (Not yet assigned)    04/07/23 1252    04/05/23 1303  Inpatient Palliative Care Team Consult  Once        Provider:  (Not yet assigned)    04/05/23 1302    04/04/23 1842  Inpatient Infection Control Nurse Consult  Once        Provider:  (Not yet assigned)    04/04/23 1841    04/04/23 1726  Inpatient Spiritual Care Consult  Once        Provider:  (Not yet assigned)    04/04/23 1726    04/04/23 1226  Inpatient Pulmonology Consult  Once        Specialty:  Pulmonary Disease  Provider:  Lon Guerra MD    04/04/23 1225    04/02/23 0859  Inpatient Nephrology Consult  Once        Specialty:  Nephrology  Provider:  Naeem Hernandez MD    04/02/23 0859    03/31/23 1214  Inpatient Cardiology Consult  Once        Specialty:  Cardiology  Provider:  Medardo Vanegas MD    03/31/23 1214    03/31/23 0019  [facility] (on-call MD unless specified) Details  Once        Specialty:  Hospitalist  Provider:  Ramsey Talamantes MD    03/31/23 0018              Procedures     Imaging Results (All)     Procedure Component Value Units Date/Time    XR Chest 1 View [[id number]] Collected: 04/10/23 0932     Updated: 04/10/23 0932    Narrative:        Patient: AILYN DUQUE   Time Out: 09:31  Exam(s): XR CXR 1 VIEW     EXAM:    XR Chest, 1 View    CLINICAL HISTORY:     Reason for exam: Follow-up infiltrates.    TECHNIQUE:    Frontal view of the chest.    COMPARISON:    No relevant prior studies available.    FINDINGS:    Lungs:  Multifocal consolidations most prominent in the bilateral bases.      Pleural space:  Unremarkable.  No pneumothorax.    Heart: Mild cardiomegaly.  You centimeter wires and valvular prosthesis   in place.  Left chest pacer in place.    Mediastinum:  Unremarkable.    Bones joints:  Unremarkable.    IMPRESSION:       Multifocal consolidations most prominent in the bilateral bases.    Impression:          Electronically signed by Justus Goldberg M.D. on 04-10-23 at 0931    FL Video Swallow Single Contrast [[id number]] Collected: 04/08/23 0135     Updated: 04/08/23 0135    Narrative:        Patient: AILYN DUQUE  Time Out: 01:34  Exam(s): XR OTHER VIDEO SWALLOW SINGLE-CONTRAST     EXAM:    FL Swallowing Function With Video Cine    CLINICAL HISTORY:       assess swallow function.    TECHNIQUE:    Fluoroscopy of the oral cavity through upper esophagus with video cine   recording.  The study was performed in conjunction with speech pathology.    Various consistencies of liquid and food were administered orally by the   speech pathologist.  Fluoroscopic guidance was provided by a physician.    1 minute 50 seconds of fluoroscopy time.  4mGy dose.    COMPARISON:    No relevant prior studies available.    FINDINGS:    Oral phase:  Unremarkable.  Normal bolus transfer.  No premature   spillage.    Pharyngeal phase:  Flash laryngeal penetration without aspiration with   thin liquids.  Pooling of the piriform sinuses with all consistencies is   noted.  No aspiration with thin, nectar thick or solid consistencies.      Cervical esophageal phase:  Unremarkable.  Normal upper esophageal   sphincter function.    IMPRESSION:       1.  Flash laryngeal penetration without aspiration  with thin liquids.  2.  Pooling of the piriform sinuses with all consistencies is noted.      Impression:          Electronically signed by Roxanne Terrazas MD on 04-08-23 at 0134    XR Chest 1 View [[id number]] Collected: 04/07/23 1227     Updated: 04/07/23 1303    Narrative:      EXAMINATION: SINGLE VIEW CHEST RADIOGRAPH     HISTORY: 89-year-old male undergoing followup evaluation of pulmonary  infiltrates.     FINDINGS: An upright AP semierect portable chest radiograph was  obtained. Comparison is made to a chest radiograph dated 04/03/2023.  There has been interval reduction in interstitial opacification  throughout both lungs. A dual lead left-sided cardiac pacemaker is  stable in position. Midline sternal wires are noted. A prosthetic  cardiac valve is appreciated. The chronic silhouette is enlarged but  stable.       Impression:      There is improved but persistent diffuse bilateral  interstitial opacification likely representing an improvement in diffuse  bilateral interstitial edema and/or pneumonia.     This report was finalized on 4/7/2023 1:00 PM by Dr. Gonzalez Salazar M.D.       XR Chest 1 View [[id number]] Collected: 04/03/23 0613     Updated: 04/03/23 0625    Narrative:      XR CHEST 1 VW-     Clinical: CHF, bilateral pleural effusions     COMPARISON examination 03/31/2013 CT chest     FINDINGS: Unchanged left-sided pleural effusion, right-sided pleural  effusion appears slightly improved within the interim. The  cardiomediastinal silhouette is stable. Right base  infiltrate/atelectasis more pronounced compared to the previous  examination. Right and left upper lobe infiltrates similar to the  previous examination. The remainder of examination is unremarkable.     This report was finalized on 4/3/2023 6:22 AM by Dr. Segun Jara M.D.       CT Chest Without Contrast Diagnostic [[id number]] Collected: 03/31/23 2103     Updated: 03/31/23 2109    Narrative:      CT CHEST WITHOUT CONTRAST     HISTORY:  Dyspnea.     TECHNIQUE: Noncontrast chest CT is provided and correlated with x-ray  from yesterday.     FINDINGS: Cardiac pacing hardware is observed along with hardware at the  mitral valve, dilated cardiac chambers, and simple appearing,  posteriorly layering pleural effusions bilaterally. Those measure under  3 cm AP thickness in each. There is an enlarged precarinal lymph node  measuring about 12 mm short axis. No other significant appearing  lymphadenopathy. Abnormal opacity in the left upper lobe and posterior  left lower lobe with groundglass density throughout the right upper lobe  favored represent pneumonia. No obstructing airway lesion.     Visualized upper abdominal structures appear normal. Degenerative change  in the spine.       Impression:      Cardiac hardware with changes of prior sternotomy and mitral  valve repair or replacement. There our bilateral pleural effusions with  patchy opacity in the lungs bilaterally which appears similar as on the  x-ray from yesterday and is favored represent pneumonia.     Radiation dose reduction techniques were utilized, including automated  exposure control and exposure modulation based on body size.     This report was finalized on 3/31/2023 9:06 PM by Dr. Maurice Alejandro M.D.       XR Chest 1 View [[id number]] Collected: 03/30/23 2045     Updated: 03/30/23 2053    Narrative:      PORTABLE CHEST X-RAY     HISTORY: Shortness of breath.     TECHNIQUE: Portable chest x-ray correlated with chest x-ray 04/14/2022.     FINDINGS: Sternal wires with cardiac valve hardware and a cardiac pacing  device are again observed. The cardiac silhouette is enlarged. Patchy  infiltrate in the upper lobes is observed bilaterally, more dense on the  left than the right. There is also some density in the left lung base  and mildly at the periphery of the right lung base. There also appears  to be small pleural effusions blunting the costophrenic angles.       Impression:       Bilateral pneumonia.     This report was finalized on 3/30/2023 8:50 PM by Dr. Maurice Alejandro M.D.             Pertinent Labs     Results from last 7 days   Lab Units 04/12/23  0442 04/11/23  0428 04/10/23  0433 04/09/23  0403   WBC 10*3/mm3 10.91* 9.97 8.50 7.32   HEMOGLOBIN g/dL 12.0* 11.5* 10.8* 11.2*   PLATELETS 10*3/mm3 258 230 210 185     Results from last 7 days   Lab Units 04/12/23  0442 04/11/23  0428 04/10/23  0433 04/09/23  0403   SODIUM mmol/L 137 138 135* 133*   POTASSIUM mmol/L 4.1 4.5 4.1 4.1   CHLORIDE mmol/L 97* 95* 96* 95*   CO2 mmol/L 31.0* 32.5* 28.9 29.0   BUN mg/dL 46* 48* 48* 37*   CREATININE mg/dL 1.51* 1.36* 1.46* 1.26   GLUCOSE mg/dL 113* 139* 156* 134*   Estimated Creatinine Clearance: 29.6 mL/min (A) (by C-G formula based on SCr of 1.51 mg/dL (H)).  Results from last 7 days   Lab Units 04/12/23  0442 04/11/23  0428 04/10/23  0433 04/09/23  0403   ALBUMIN g/dL 3.1* 3.2* 2.8* 3.0*   BILIRUBIN mg/dL 0.7 0.7 0.7 0.6   ALK PHOS U/L 234* 241* 219* 246*   AST (SGOT) U/L 144* 213* 85* 128*   ALT (SGPT) U/L 161* 195* 77* 89*     Results from last 7 days   Lab Units 04/12/23  0442 04/11/23  0428 04/10/23  0433 04/09/23  0403 04/08/23  0640   CALCIUM mg/dL 8.7 8.9 8.5 8.3 8.8   ALBUMIN g/dL 3.1* 3.2* 2.8* 3.0* 2.9*   MAGNESIUM mg/dL 2.7*  --  2.5* 2.4* 2.6*   PHOSPHORUS mg/dL 3.0  --  2.8 2.6 3.0         Results from last 7 days   Lab Units 04/10/23  0433   URIC ACID mg/dL 6.2         Invalid input(s): LDLCALC        Test Results Pending at Discharge     Pending Labs     Order Current Status    AFB Culture - Sputum, Cough Preliminary result    AFB Culture - Sputum, Cough Preliminary result    AFB Culture - Sputum, Cough Preliminary result          Discharge Details        Discharge Medications      New Medications      Instructions Start Date   fluticasone 50 MCG/ACT nasal spray  Commonly known as: FLONASE   2 sprays, Each Nare, Daily   Start Date: April 13, 2023     melatonin 5 MG tablet tablet    5 mg, Oral, Nightly PRN      midodrine 5 MG tablet  Commonly known as: PROAMATINE   5 mg, Oral, 3 Times Daily Before Meals      predniSONE 20 MG tablet  Commonly known as: DELTASONE   20 mg, Oral, Daily   Start Date: April 13, 2023     sennosides-docusate 8.6-50 MG per tablet  Commonly known as: PERICOLACE   1 tablet, Oral, 2 Times Daily PRN      sulfamethoxazole-trimethoprim 800-160 MG per tablet  Commonly known as: BACTRIM DS,SEPTRA DS   1 tablet, Oral, 3 Times Weekly   Start Date: April 14, 2023     Torsemide 40 MG tablet   40 mg, Oral, Daily   Start Date: April 13, 2023        Continue These Medications      Instructions Start Date   acetaminophen 325 MG tablet  Commonly known as: TYLENOL   650 mg, Oral, Every 6 Hours PRN      apixaban 5 MG tablet tablet  Commonly known as: ELIQUIS   5 mg, Oral, Every 12 Hours Scheduled      calcium carbonate 600 MG tablet  Commonly known as: OS-SOPHIA   600 mg, Oral, Daily      latanoprost 0.005 % ophthalmic solution  Commonly known as: XALATAN   Ophthalmic      levothyroxine 88 MCG tablet  Commonly known as: SYNTHROID, LEVOTHROID   88 mcg, Oral, Daily      multivitamin with minerals tablet tablet   1 tablet, Oral, Daily      polyethylene glycol 17 g packet  Commonly known as: MIRALAX   17 g, Oral, Daily PRN      silver sulfadiazine 1 % cream  Commonly known as: SILVADENE, SSD   1 application, Topical, 3 Times Daily, Apply to facial regions of inflammation per rad onc recs      silver sulfadiazine 1 % cream  Commonly known as: SILVADENE, SSD   1 application, Topical, 2 Times Daily         Stop These Medications    amiodarone 200 MG tablet  Commonly known as: PACERONE     bicalutamide 50 MG tablet  Commonly known as: CASODEX     carvedilol 3.125 MG tablet  Commonly known as: COREG     rosuvastatin 10 MG tablet  Commonly known as: CRESTOR            No Known Allergies      Discharge Disposition:  Skilled Nursing Facility (DC - External)    Discharge Diet:  Diet Order   Procedures    • Diet: Fluid Restriction (240 mL/tray) Diets; 1500 mL/day; No Mixed Consistencies; Texture: Soft to Chew (NDD 3); Soft to Chew: Chopped Meat; Fluid Consistency: Thin (IDDSI 0)       Discharge Activity:   Activity Instructions     Activity as Tolerated            CODE STATUS:    Code Status and Medical Interventions:   Ordered at: 04/06/23 1503     Medical Intervention Limits:    NO intubation (DNI)     Code Status (Patient has no pulse and is not breathing):    No CPR (Do Not Attempt to Resuscitate)     Medical Interventions (Patient has pulse or is breathing):    Limited Support       No future appointments.  Additional Instructions for the Follow-ups that You Need to Schedule     Discharge Follow-up with PCP   As directed       Currently Documented PCP:    Alma Cat MD    PCP Phone Number:    [phone]     Follow Up Details: 1 week         Discharge Follow-up with Specified Provider: cardiology Dr. Vanegas; 1 Month   As directed      To: cardiology Dr. Vanegas    Follow Up: 1 Month         Discharge Follow-up with Specified Provider: pulmonology 4-6 weeks   As directed      To: pulmonology 4-6 weeks         Discharge Follow-up with Specified Provider: urology next month   As directed      To: urology next month            Follow-up Information     Medardo Vanegas MD Follow up.    Specialty: Cardiology  Contact information:  [address] [facility] [place] [id number] [phone]             Kiarra Iraheta MD. Schedule an appointment as soon as possible for a visit in 1 month(s).    Specialty: Nephrology  Contact information:  [address]  [facility] [address]  [place] [id number] [phone]             Martín Flowers Jr., MD Follow up in 4 week(s).    Specialty: Pulmonary Disease  Why: Follow-up in about 4 to 6 weeks in my office with full PFTs and a chest x-ray prior to my seeing him  Contact information:  [address]  [facility] [address]  [facility] [id number] [phone]              Alma Cat MD .    Specialty: Family Medicine  Why: 1 week  Contact information:  [address]  [facility] [address]  [phone]                         Additional Instructions for the Follow-ups that You Need to Schedule     Discharge Follow-up with PCP   As directed       Currently Documented PCP:    Alma Cat MD    PCP Phone Number:    [phone]     Follow Up Details: 1 week         Discharge Follow-up with Specified Provider: cardiology Dr. Vanegas; 1 Month   As directed      To: cardiology Dr. Vanegas    Follow Up: 1 Month         Discharge Follow-up with Specified Provider: pulmonology 4-6 weeks   As directed      To: pulmonology 4-6 weeks         Discharge Follow-up with Specified Provider: urology next month   As directed      To: urology next month           Time Spent on Discharge:  I spent greater than 30 minutes on this discharge activity which included: face-to-face encounter with the patient, reviewing the data in the system, coordination of the care with the nursing staff as well as consultants, documentation, and entering orders.       Marion Wen MD  [facility]  23  14:46 EDT                Electronically signed by Marion Wen MD at 23 1447     Rock Daley MD at 23 1406              Patient Name: Bridger Elias  : 1930  MRN: [id number]    Date of Admission: 3/30/2023  Date of Discharge:  2023  Primary Care Physician: Alma Cat MD      Chief Complaint:   Shortness of Breath and Weakness - Generalized      Discharge Diagnoses     Active Hospital Problems    Diagnosis  POA   • Moderate Malnutrition (HCC) [E44.0]  Yes   • Normocytic anemia [D64.9]  Yes   • Transaminitis [R74.01]  Yes   • Stage 3a chronic kidney disease [N18.31]  Yes   • Pacemaker [Z95.0]  Yes   • Atrial fibrillation [I48.91]  Yes   • SCC (squamous cell carcinoma), face [C44.320]  Yes   • Hypothyroidism [E03.9]  Yes    • Coronary arteriosclerosis [I25.10]  Yes   • Dyslipidemia [E78.5]  Yes   • Hypertension [I10]  Yes   • Long term (current) use of anticoagulants [Z79.01]  Not Applicable      Resolved Hospital Problems    Diagnosis Date Resolved POA   • **Acute respiratory failure with hypoxia [J96.01] 04/24/2023 Yes   • Acute on chronic HFrEF (heart failure with reduced ejection fraction) ([facility]) [I50.23] 04/24/2023 Unknown   • Hyponatremia [E87.1] 04/24/2023 Yes   • Hypokalemia [E87.6] 04/05/2023 No   • Community acquired pneumonia, unspecified laterality [J18.9] 04/03/2023 Yes   • Acute on chronic combined systolic and diastolic CHF (congestive heart failure) [I50.43] 04/24/2023 Yes        Hospital Course     Mr. Elias is a 89 y.o. male with a history of chronic combined systolic and diastolic heart failure, atrial fibrillation on long-term anticoagulation, s/p PPM, CAD, hypertension, hyperlipidemia, squamous cell carcinoma, CKD stage IIIb, hypothyroidism, prostate cancer who presented to [facility] initially complaining of shortness of breath and generalized weakness.  Please see the admitting history and physical for further details.  He was found to have acute hypoxic respiratory failure secondary to acute on chronic combined systolic and diastolic heart failure and was admitted to the hospital for further evaluation and treatment. Repeat echocardiogram showed EF 41 to 45%, cardiology was consulted. The patient responded well to diuresis however developed worsening hyponatremia for which nephrology was consulted.  Sodium improved with a fluid restriction which will be continued at discharge, 1500 mL daily.  The patient was also noted to have bilateral pulmonary infiltrates on CT scan, further described below.  Pulmonology was consulted for further evaluation/recommendations.  Infectious etiologies were ruled out including TB and he was initiated on steroids and has significantly improved.  Given his hypoxia  and age bronchoscopy was deferred.  Pulmonology is recommending continuing prednisone 20 mg daily and seeing him in 4 to 6 weeks in clinic.  Prior to discharge the patient was noted to have a mild elevation in LFTs as well.  This is a known side effect of the patient's Casodex, this medication was held and his urologist was contacted.  Dr. Petty, the patient's urologist, is recommending to hold the patient's Casodex at discharge and obtain a PSA and testosterone at 1 and 3 months.  The patient is scheduled to see Dr. Petty next month.  Patient's discharge was delayed due to prolonged precertification, however was finally obtained on 4/24/2023 and is stable for discharge to SNF at this time.    DISCHARGE Follow Up Recommendations for labs and diagnostics:   See PCP in 1 week for posthospital follow-up visit.  Follow-up with patient's cardiologist Dr. Vanegas, 1 month.  Follow-up with pulmonology, 4 to 6 weeks,   Follow-up with urology, Dr. Petty, hold Casodex until follow-up, recheck testosterone and PSA in 1 month and 3 months.  Continue 1500 mL fluid restriction.    Day of Discharge     Subjective:  Resting in his recliner.    Physical Exam:  Temp:  [97 °F (36.1 °C)-98.5 °F (36.9 °C)] 97 °F (36.1 °C)  Heart Rate:  [74] 74  Resp:  [18] 18  BP: (117-140)/(75-85) 140/85  Body mass index is 24.41 kg/m².  Physical Exam  Constitutional:       General: He is not in acute distress.     Appearance: He is ill-appearing.   HENT:      Ears:      Comments: Hearing aids  Cardiovascular:      Rate and Rhythm: Normal rate and regular rhythm.      Pulses: Normal pulses.      Heart sounds: Normal heart sounds.   Pulmonary:      Effort: Pulmonary effort is normal. No respiratory distress.      Breath sounds: No wheezing.   Abdominal:      General: Abdomen is flat.      Palpations: Abdomen is soft.   Musculoskeletal:         General: No swelling. Normal range of motion.   Neurological:      General: No focal deficit present.       Mental Status: He is alert and oriented to person, place, and time.         Consultants     Consult Orders (all) (From admission, onward)     Start     Ordered    04/11/23 1743  Inpatient Urology Consult  Once        Specialty:  Urology  Provider:  Luis Miguel Roca MD    04/11/23 1743    04/07/23 1252  Inpatient Palliative Care Team Consult  Once        Provider:  (Not yet assigned)    04/07/23 1252    04/05/23 1303  Inpatient Palliative Care Team Consult  Once        Provider:  (Not yet assigned)    04/05/23 1302    04/04/23 1842  Inpatient Infection Control Nurse Consult  Once        Provider:  (Not yet assigned)    04/04/23 1841    04/04/23 1726  Inpatient Spiritual Care Consult  Once        Provider:  (Not yet assigned)    04/04/23 1726    04/04/23 1226  Inpatient Pulmonology Consult  Once        Specialty:  Pulmonary Disease  Provider:  Lon Guerra MD    04/04/23 1225    04/02/23 0859  Inpatient Nephrology Consult  Once        Specialty:  Nephrology  Provider:  Naeem Hernandez MD    04/02/23 0859    03/31/23 1214  Inpatient Cardiology Consult  Once        Specialty:  Cardiology  Provider:  Medardo Vanegas MD    03/31/23 1214    03/31/23 0019  [facility] (on-call MD unless specified) Details  Once        Specialty:  Hospitalist  Provider:  Ramsey Talamantes MD    03/31/23 0018              Procedures     Imaging Results (All)     Procedure Component Value Units Date/Time    XR Chest 1 View [[id number]] Collected: 04/10/23 0932     Updated: 04/10/23 0932    Narrative:        Patient: AILYN DUQUE  Time Out: 09:31  Exam(s): XR CXR 1 VIEW     EXAM:    XR Chest, 1 View    CLINICAL HISTORY:     Reason for exam: Follow-up infiltrates.    TECHNIQUE:    Frontal view of the chest.    COMPARISON:    No relevant prior studies available.    FINDINGS:    Lungs:  Multifocal consolidations most prominent in the bilateral bases.      Pleural space:  Unremarkable.  No pneumothorax.    Heart: Mild  cardiomegaly.  You centimeter wires and valvular prosthesis   in place.  Left chest pacer in place.    Mediastinum:  Unremarkable.    Bones joints:  Unremarkable.    IMPRESSION:       Multifocal consolidations most prominent in the bilateral bases.    Impression:          Electronically signed by Justus Goldberg M.D. on 04-10-23 at 0931    FL Video Swallow Single Contrast [[id number]] Collected: 04/08/23 0135     Updated: 04/08/23 0135    Narrative:        Patient: AILYN DUQUE  Time Out: 01:34  Exam(s): XR OTHER VIDEO SWALLOW SINGLE-CONTRAST     EXAM:    FL Swallowing Function With Video Cine    CLINICAL HISTORY:       assess swallow function.    TECHNIQUE:    Fluoroscopy of the oral cavity through upper esophagus with video cine   recording.  The study was performed in conjunction with speech pathology.    Various consistencies of liquid and food were administered orally by the   speech pathologist.  Fluoroscopic guidance was provided by a physician.    1 minute 50 seconds of fluoroscopy time.  4mGy dose.    COMPARISON:    No relevant prior studies available.    FINDINGS:    Oral phase:  Unremarkable.  Normal bolus transfer.  No premature   spillage.    Pharyngeal phase:  Flash laryngeal penetration without aspiration with   thin liquids.  Pooling of the piriform sinuses with all consistencies is   noted.  No aspiration with thin, nectar thick or solid consistencies.      Cervical esophageal phase:  Unremarkable.  Normal upper esophageal   sphincter function.    IMPRESSION:       1.  Flash laryngeal penetration without aspiration with thin liquids.  2.  Pooling of the piriform sinuses with all consistencies is noted.      Impression:          Electronically signed by Roxanne Terrazas MD on 04-08-23 at 0134    XR Chest 1 View [[id number]] Collected: 04/07/23 1227     Updated: 04/07/23 1303    Narrative:      EXAMINATION: SINGLE VIEW CHEST RADIOGRAPH     HISTORY: 89-year-old male undergoing followup evaluation of  pulmonary  infiltrates.     FINDINGS: An upright AP semierect portable chest radiograph was  obtained. Comparison is made to a chest radiograph dated 04/03/2023.  There has been interval reduction in interstitial opacification  throughout both lungs. A dual lead left-sided cardiac pacemaker is  stable in position. Midline sternal wires are noted. A prosthetic  cardiac valve is appreciated. The chronic silhouette is enlarged but  stable.       Impression:      There is improved but persistent diffuse bilateral  interstitial opacification likely representing an improvement in diffuse  bilateral interstitial edema and/or pneumonia.     This report was finalized on 4/7/2023 1:00 PM by Dr. Gonzalez Salazar M.D.       XR Chest 1 View [[id number]] Collected: 04/03/23 0613     Updated: 04/03/23 0625    Narrative:      XR CHEST 1 VW-     Clinical: CHF, bilateral pleural effusions     COMPARISON examination 03/31/2013 CT chest     FINDINGS: Unchanged left-sided pleural effusion, right-sided pleural  effusion appears slightly improved within the interim. The  cardiomediastinal silhouette is stable. Right base  infiltrate/atelectasis more pronounced compared to the previous  examination. Right and left upper lobe infiltrates similar to the  previous examination. The remainder of examination is unremarkable.     This report was finalized on 4/3/2023 6:22 AM by Dr. Segun Jara M.D.       CT Chest Without Contrast Diagnostic [[id number]] Collected: 03/31/23 2103     Updated: 03/31/23 2109    Narrative:      CT CHEST WITHOUT CONTRAST     HISTORY: Dyspnea.     TECHNIQUE: Noncontrast chest CT is provided and correlated with x-ray  from yesterday.     FINDINGS: Cardiac pacing hardware is observed along with hardware at the  mitral valve, dilated cardiac chambers, and simple appearing,  posteriorly layering pleural effusions bilaterally. Those measure under  3 cm AP thickness in each. There is an enlarged precarinal lymph  node  measuring about 12 mm short axis. No other significant appearing  lymphadenopathy. Abnormal opacity in the left upper lobe and posterior  left lower lobe with groundglass density throughout the right upper lobe  favored represent pneumonia. No obstructing airway lesion.     Visualized upper abdominal structures appear normal. Degenerative change  in the spine.       Impression:      Cardiac hardware with changes of prior sternotomy and mitral  valve repair or replacement. There our bilateral pleural effusions with  patchy opacity in the lungs bilaterally which appears similar as on the  x-ray from yesterday and is favored represent pneumonia.     Radiation dose reduction techniques were utilized, including automated  exposure control and exposure modulation based on body size.     This report was finalized on 3/31/2023 9:06 PM by Dr. Maurice Alejandro M.D.       XR Chest 1 View [[id number]] Collected: 03/30/23 2045     Updated: 03/30/23 2053    Narrative:      PORTABLE CHEST X-RAY     HISTORY: Shortness of breath.     TECHNIQUE: Portable chest x-ray correlated with chest x-ray 04/14/2022.     FINDINGS: Sternal wires with cardiac valve hardware and a cardiac pacing  device are again observed. The cardiac silhouette is enlarged. Patchy  infiltrate in the upper lobes is observed bilaterally, more dense on the  left than the right. There is also some density in the left lung base  and mildly at the periphery of the right lung base. There also appears  to be small pleural effusions blunting the costophrenic angles.       Impression:      Bilateral pneumonia.     This report was finalized on 3/30/2023 8:50 PM by Dr. Maurice Alejandro M.D.             Pertinent Labs     Results from last 7 days   Lab Units 04/24/23  0412 04/23/23  0347 04/22/23  0554 04/21/23  0448   WBC 10*3/mm3 10.09 9.88 11.07* 10.53   HEMOGLOBIN g/dL 12.9* 12.8* 13.4 12.6*   PLATELETS 10*3/mm3 138* 144 143 127*     Results from last 7 days   Lab  Units 04/24/23  0412 04/23/23 0347 04/22/23  0554 04/21/23  0448   SODIUM mmol/L 136 136 138 137   POTASSIUM mmol/L 4.9 4.8 4.8 4.9   CHLORIDE mmol/L 104 101 105 105   CO2 mmol/L 24.1 26.0 22.4 22.4   BUN mg/dL 34* 35* 41* 36*   CREATININE mg/dL 1.34* 1.09 1.22 1.17   GLUCOSE mg/dL 99 87 84 89   Estimated Creatinine Clearance: 34.4 mL/min (A) (by C-G formula based on SCr of 1.34 mg/dL (H)).  Results from last 7 days   Lab Units 04/24/23 0412 04/23/23 0347 04/22/23  0554 04/21/23  0448   ALBUMIN g/dL 3.2* 3.4* 3.3* 3.0*   BILIRUBIN mg/dL 0.7 0.6 0.9 0.7   ALK PHOS U/L 148* 153* 150* 145*   AST (SGOT) U/L 73* 68* 59* 77*   ALT (SGPT) U/L 119* 102* 102* 109*     Results from last 7 days   Lab Units 04/24/23 0412 04/23/23 0347 04/22/23  0554 04/21/23  0448   CALCIUM mg/dL 9.2 8.8 9.2 8.7   ALBUMIN g/dL 3.2* 3.4* 3.3* 3.0*                 Invalid input(s): LDLCALC        Test Results Pending at Discharge     Pending Labs     Order Current Status    Aldosterone In process    AFB Culture - Sputum, Cough Preliminary result    AFB Culture - Sputum, Cough Preliminary result    AFB Culture - Sputum, Cough Preliminary result          Discharge Details        Discharge Medications      New Medications      Instructions Start Date   fluticasone 50 MCG/ACT nasal spray  Commonly known as: FLONASE   2 sprays, Each Nare, Daily      melatonin 5 MG tablet tablet   5 mg, Oral, Nightly PRN      midodrine 2.5 MG tablet  Commonly known as: PROAMATINE   2.5 mg, Oral, 3 Times Daily Before Meals      midodrine 10 MG tablet  Commonly known as: PROAMATINE   10 mg, Oral, 3 Times Daily Before Meals      predniSONE 20 MG tablet  Commonly known as: DELTASONE   20 mg, Oral, Daily      sennosides-docusate 8.6-50 MG per tablet  Commonly known as: PERICOLACE   1 tablet, Oral, 2 Times Daily PRN         Changes to Medications      Instructions Start Date   apixaban 2.5 MG tablet tablet  Commonly known as: JOHN  What changed:   · medication  strength  · how much to take   2.5 mg, Oral, Every 12 Hours Scheduled         Continue These Medications      Instructions Start Date   acetaminophen 325 MG tablet  Commonly known as: TYLENOL   650 mg, Oral, Every 6 Hours PRN      calcium carbonate 600 MG tablet  Commonly known as: OS-SOPHIA   600 mg, Oral, Daily      latanoprost 0.005 % ophthalmic solution  Commonly known as: XALATAN   Ophthalmic      levothyroxine 88 MCG tablet  Commonly known as: SYNTHROID, LEVOTHROID   88 mcg, Oral, Daily      multivitamin with minerals tablet tablet   1 tablet, Oral, Daily      polyethylene glycol 17 g packet  Commonly known as: MIRALAX   17 g, Oral, Daily PRN      silver sulfadiazine 1 % cream  Commonly known as: SILVADENE, SSD   1 application, Topical, 3 Times Daily, Apply to facial regions of inflammation per rad onc recs      silver sulfadiazine 1 % cream  Commonly known as: SILVADENE, SSD   1 application, Topical, 2 Times Daily         Stop These Medications    amiodarone 200 MG tablet  Commonly known as: PACERONE     bicalutamide 50 MG tablet  Commonly known as: CASODEX     carvedilol 3.125 MG tablet  Commonly known as: COREG     rosuvastatin 10 MG tablet  Commonly known as: CRESTOR            No Known Allergies      Discharge Disposition:  Skilled Nursing Facility (DC - External)    Discharge Diet:  Diet Order   Procedures   • Diet: Fluid Restriction (240 mL/tray) Diets; 1500 mL/day; No Mixed Consistencies; Texture: Soft to Chew (NDD 3); Soft to Chew: Chopped Meat; Fluid Consistency: Thin (IDDSI 0)       Discharge Activity:   Activity Instructions     Activity as Tolerated            CODE STATUS:    Code Status and Medical Interventions:   Ordered at: 04/06/23 1503     Medical Intervention Limits:    NO intubation (DNI)     Code Status (Patient has no pulse and is not breathing):    No CPR (Do Not Attempt to Resuscitate)     Medical Interventions (Patient has pulse or is breathing):    Limited Support       No future  appointments.  Additional Instructions for the Follow-ups that You Need to Schedule     Discharge Follow-up with PCP   As directed       Currently Documented PCP:    Alma Cat MD    PCP Phone Number:    [phone]     Follow Up Details: 1 week         Discharge Follow-up with Specified Provider: cardiology Dr. Vanegas; 1 Month   As directed      To: cardiology Dr. Vanegas    Follow Up: 1 Month         Discharge Follow-up with Specified Provider: pulmonology 4-6 weeks   As directed      To: pulmonology 4-6 weeks         Discharge Follow-up with Specified Provider: urology next month   As directed      To: urology next month            Contact information for follow-up providers     Medardo Vanegas MD. Go in 1 month(s).    Specialty: Cardiology  Why: One month hospital follow up appointment May 22nd at  115 pm   please bring a current medication list  Contact information:  [facility] [place] [id number] [phone]             Kiarra Iraheta MD. Go on 5/16/2023.    Specialty: Nephrology  Why: has follow up May 16 at 11:45 with Dr. Dyson  Contact information:  [address] [facility] [address]  [facility] [id number] [phone]             Martín Flowers Jr., MD Follow up in 4 week(s).    Specialty: Pulmonary Disease  Why: Follow-up in about 4 to 6 weeks in my office with full PFTs and a chest x-ray prior to my seeing him  Dr office has been notified to call you with dates and times of appointments  Contact information:  [address] [facility] [address]  [facility] [id number] [phone]             Alma Cat MD Follow up on 4/27/2023.    Specialty: Family Medicine  Why: 1 week hospital follow up  appoinment  Thurs day April 27th at  1245  pm  Bring a current medication list  Contact information:  [address] [facility] [address] [phone]                   Contact information for after-discharge care     [facility]     Service: Skilled Nursing  Contact information:  [address] [facility] [address]  [phone]                             Additional Instructions for the Follow-ups that You Need to Schedule     Discharge Follow-up with PCP   As directed       Currently Documented PCP:    Alma Cat MD    PCP Phone Number:    [phone]     Follow Up Details: 1 week         Discharge Follow-up with Specified Provider: cardiology Dr. Vanegas; 1 Month   As directed      To: cardiology Dr. Vanegas    Follow Up: 1 Month         Discharge Follow-up with Specified Provider: pulmonology 4-6 weeks   As directed      To: pulmonology 4-6 weeks         Discharge Follow-up with Specified Provider: urology next month   As directed      To: urology next month           Time Spent on Discharge:  I spent greater than 30 minutes on this discharge activity which included: face-to-face encounter with the patient, reviewing the data in the system, coordination of the care with the nursing staff as well as consultants, documentation, and entering orders.       Rock Daley MD  [facility]  04/24/23  14:46 EDT              Electronically signed by Rock Daley MD at 04/24/23 1407       Discharge Order (From admission, onward)     Start     Ordered    04/24/23 1038  Discharge patient  Once        Expected Discharge Date: 04/24/23    Discharge Disposition: Skilled Nursing Facility (DC - External)    Physician of Record for Attribution - Please select from Treatment Team: PAMELA JACKSON [[id number]]    Review needed by CMO to determine Physician of Record: No       Question Answer Comment   Physician of Record for Attribution - Please select from Treatment Team PAMELA JACKSON    Review needed by CMO to determine Physician of Record No        04/24/23 1042    04/20/23 1131  Discharge patient  Once,   Status:  Canceled        Expected Discharge Date: 04/20/23    Expected Discharge Time: 11:30     Discharge Disposition: Skilled Nursing Facility (DC - External)    Physician of Record for Attribution - Please select from Treatment Team: DOMENIC HUFF [[id number]]    Review needed by CMO to determine Physician of Record: No       Question Answer Comment   Physician of Record for Attribution - Please select from Treatment Team DOMENIC HUFF    Review needed by CMO to determine Physician of Record No        04/20/23 1131    04/12/23 1347  Discharge patient  Once,   Status:  Canceled        Expected Discharge Date: 04/12/23    Discharge Disposition: Skilled Nursing Facility (DC - External)    Physician of Record for Attribution - Please select from Treatment Team: ZACH ALFRED [[id number]]    Review needed by CMO to determine Physician of Record: No       Question Answer Comment   Physician of Record for Attribution - Please select from Treatment Team ZACH ALFRED    Review needed by CMO to determine Physician of Record No        04/12/23 5380

## 2023-04-26 LAB
MYCOBACTERIUM SPEC CULT: NORMAL
MYCOBACTERIUM SPEC CULT: NORMAL
NIGHT BLUE STAIN TISS: NORMAL
NIGHT BLUE STAIN TISS: NORMAL

## 2023-04-27 LAB
MYCOBACTERIUM SPEC CULT: NORMAL
NIGHT BLUE STAIN TISS: NORMAL

## 2023-05-04 LAB
MYCOBACTERIUM SPEC CULT: NORMAL
NIGHT BLUE STAIN TISS: NORMAL

## 2023-05-11 LAB
MYCOBACTERIUM SPEC CULT: NORMAL
NIGHT BLUE STAIN TISS: NORMAL

## 2023-05-15 ENCOUNTER — TRANSCRIBE ORDERS (OUTPATIENT)
Dept: ADMINISTRATIVE | Facility: HOSPITAL | Age: 88
End: 2023-05-15
Payer: MEDICARE

## 2023-05-15 DIAGNOSIS — J18.9 PNEUMONIA DUE TO INFECTIOUS ORGANISM, UNSPECIFIED LATERALITY, UNSPECIFIED PART OF LUNG: Primary | ICD-10-CM

## 2023-05-18 LAB
MYCOBACTERIUM SPEC CULT: NORMAL
NIGHT BLUE STAIN TISS: NORMAL

## 2023-06-15 ENCOUNTER — TRANSCRIBE ORDERS (OUTPATIENT)
Dept: ADMINISTRATIVE | Facility: HOSPITAL | Age: 88
End: 2023-06-15
Payer: MEDICARE

## 2023-06-15 DIAGNOSIS — J18.9 PNEUMONIA DUE TO INFECTIOUS ORGANISM, UNSPECIFIED LATERALITY, UNSPECIFIED PART OF LUNG: Primary | ICD-10-CM

## 2023-11-20 ENCOUNTER — HOSPITAL ENCOUNTER (OUTPATIENT)
Dept: CT IMAGING | Facility: HOSPITAL | Age: 88
Discharge: HOME | End: 2023-11-20
Admitting: INTERNAL MEDICINE
Payer: MEDICARE

## 2023-11-20 DIAGNOSIS — J84.9 ILD (INTERSTITIAL LUNG DISEASE): ICD-10-CM

## 2023-11-20 PROCEDURE — 71250 CT THORAX DX C-: CPT

## 2023-11-30 ENCOUNTER — TRANSCRIBE ORDERS (OUTPATIENT)
Dept: ADMINISTRATIVE | Facility: HOSPITAL | Age: 88
End: 2023-11-30
Payer: MEDICARE

## 2023-11-30 DIAGNOSIS — R91.1 PULMONARY NODULE: Primary | ICD-10-CM

## 2024-03-25 ENCOUNTER — HOSPITAL ENCOUNTER (OUTPATIENT)
Dept: CT IMAGING | Facility: HOSPITAL | Age: 89
Discharge: HOME | End: 2024-03-25
Admitting: INTERNAL MEDICINE
Payer: MEDICARE

## 2024-03-25 DIAGNOSIS — R91.1 PULMONARY NODULE: ICD-10-CM

## 2024-03-25 PROCEDURE — 71250 CT THORAX DX C-: CPT

## 2024-04-25 ENCOUNTER — TRANSCRIBE ORDERS (OUTPATIENT)
Dept: ADMINISTRATIVE | Facility: HOSPITAL | Age: 89
End: 2024-04-25
Payer: MEDICARE

## 2024-04-25 DIAGNOSIS — J84.9 INTERSTITIAL LUNG DISEASE: Primary | ICD-10-CM

## 2024-10-07 ENCOUNTER — HOSPITAL ENCOUNTER (OUTPATIENT)
Dept: CT IMAGING | Facility: HOSPITAL | Age: 89
Discharge: HOME | End: 2024-10-07
Admitting: INTERNAL MEDICINE
Payer: MEDICARE

## 2024-10-07 DIAGNOSIS — J84.9 INTERSTITIAL LUNG DISEASE: ICD-10-CM

## 2024-10-07 PROCEDURE — 71250 CT THORAX DX C-: CPT
